# Patient Record
Sex: FEMALE | Race: WHITE | NOT HISPANIC OR LATINO | Employment: OTHER | URBAN - METROPOLITAN AREA
[De-identification: names, ages, dates, MRNs, and addresses within clinical notes are randomized per-mention and may not be internally consistent; named-entity substitution may affect disease eponyms.]

---

## 2017-07-05 ENCOUNTER — CONVERSION ENCOUNTER (OUTPATIENT)
Dept: MAMMOGRAPHY | Facility: HOSPITAL | Age: 62
End: 2017-07-05

## 2018-01-10 ENCOUNTER — OFFICE VISIT CONVERTED (OUTPATIENT)
Dept: FAMILY MEDICINE CLINIC | Age: 63
End: 2018-01-10
Attending: FAMILY MEDICINE

## 2018-01-18 ENCOUNTER — OFFICE VISIT (OUTPATIENT)
Dept: ORTHOPEDIC SURGERY | Facility: CLINIC | Age: 63
End: 2018-01-18

## 2018-01-18 VITALS — TEMPERATURE: 98 F | HEIGHT: 63 IN | WEIGHT: 256 LBS | BODY MASS INDEX: 45.36 KG/M2

## 2018-01-18 DIAGNOSIS — G56.02 CARPAL TUNNEL SYNDROME OF LEFT WRIST: Primary | ICD-10-CM

## 2018-01-18 PROCEDURE — 99204 OFFICE O/P NEW MOD 45 MIN: CPT | Performed by: ORTHOPAEDIC SURGERY

## 2018-01-18 RX ORDER — GABAPENTIN 400 MG/1
CAPSULE ORAL
COMMUNITY
Start: 2018-01-01 | End: 2021-06-16 | Stop reason: ALTCHOICE

## 2018-01-18 RX ORDER — NAPROXEN 500 MG/1
TABLET ORAL
COMMUNITY
Start: 2018-01-10 | End: 2021-06-16 | Stop reason: ALTCHOICE

## 2018-01-18 RX ORDER — ARIPIPRAZOLE 15 MG/1
TABLET ORAL
COMMUNITY
Start: 2018-01-10 | End: 2021-06-16 | Stop reason: ALTCHOICE

## 2018-01-18 RX ORDER — ZOLPIDEM TARTRATE 5 MG/1
TABLET ORAL
COMMUNITY
Start: 2018-01-14 | End: 2021-06-16 | Stop reason: ALTCHOICE

## 2018-01-18 RX ORDER — FUROSEMIDE 40 MG/1
TABLET ORAL
COMMUNITY
Start: 2018-01-03 | End: 2021-06-16 | Stop reason: ALTCHOICE

## 2018-01-18 RX ORDER — ALPRAZOLAM 1 MG/1
TABLET ORAL
COMMUNITY
Start: 2018-01-10 | End: 2021-06-16 | Stop reason: ALTCHOICE

## 2018-01-18 RX ORDER — ATORVASTATIN CALCIUM 10 MG/1
TABLET, FILM COATED ORAL
COMMUNITY
Start: 2018-01-01 | End: 2021-06-29

## 2018-01-18 RX ORDER — BACLOFEN 10 MG/1
10 TABLET ORAL 3 TIMES DAILY
COMMUNITY
Start: 2018-01-10 | End: 2021-07-09

## 2018-01-18 RX ORDER — POTASSIUM CHLORIDE 1500 MG/1
TABLET, FILM COATED, EXTENDED RELEASE ORAL
COMMUNITY
Start: 2018-01-03 | End: 2021-06-16 | Stop reason: ALTCHOICE

## 2018-01-18 RX ORDER — PROPRANOLOL HYDROCHLORIDE 120 MG/1
CAPSULE, EXTENDED RELEASE ORAL
COMMUNITY
Start: 2018-01-09 | End: 2021-06-16 | Stop reason: ALTCHOICE

## 2018-01-21 PROBLEM — G56.02 CARPAL TUNNEL SYNDROME OF LEFT WRIST: Status: ACTIVE | Noted: 2018-01-21

## 2018-01-29 ENCOUNTER — OUTSIDE FACILITY SERVICE (OUTPATIENT)
Dept: ORTHOPEDIC SURGERY | Facility: CLINIC | Age: 63
End: 2018-01-29

## 2018-01-29 PROCEDURE — 64721 CARPAL TUNNEL SURGERY: CPT | Performed by: ORTHOPAEDIC SURGERY

## 2018-02-02 ENCOUNTER — OFFICE VISIT (OUTPATIENT)
Dept: ORTHOPEDIC SURGERY | Facility: CLINIC | Age: 63
End: 2018-02-02

## 2018-02-02 DIAGNOSIS — G56.02 CARPAL TUNNEL SYNDROME OF LEFT WRIST: Primary | ICD-10-CM

## 2018-02-02 PROCEDURE — 99024 POSTOP FOLLOW-UP VISIT: CPT | Performed by: ORTHOPAEDIC SURGERY

## 2018-03-01 ENCOUNTER — OFFICE VISIT (OUTPATIENT)
Dept: ORTHOPEDIC SURGERY | Facility: CLINIC | Age: 63
End: 2018-03-01

## 2018-03-01 VITALS — WEIGHT: 249 LBS | BODY MASS INDEX: 42.51 KG/M2 | HEIGHT: 64 IN

## 2018-03-01 DIAGNOSIS — G56.01 CARPAL TUNNEL SYNDROME OF RIGHT WRIST: Primary | ICD-10-CM

## 2018-03-01 PROCEDURE — 99213 OFFICE O/P EST LOW 20 MIN: CPT | Performed by: ORTHOPAEDIC SURGERY

## 2018-03-01 RX ORDER — HYDROCODONE BITARTRATE AND ACETAMINOPHEN 10; 325 MG/1; MG/1
1 TABLET ORAL EVERY 4 HOURS PRN
COMMUNITY
Start: 2018-02-20 | End: 2021-07-26

## 2018-03-01 RX ORDER — PRIMIDONE 50 MG/1
TABLET ORAL
COMMUNITY
Start: 2018-02-01 | End: 2021-06-16 | Stop reason: ALTCHOICE

## 2018-03-01 RX ORDER — VENLAFAXINE HYDROCHLORIDE 150 MG/1
CAPSULE, EXTENDED RELEASE ORAL
COMMUNITY
Start: 2018-01-30 | End: 2021-06-16 | Stop reason: ALTCHOICE

## 2018-03-01 RX ORDER — VENLAFAXINE HYDROCHLORIDE 75 MG/1
CAPSULE, EXTENDED RELEASE ORAL
COMMUNITY
Start: 2018-01-31 | End: 2021-06-16 | Stop reason: ALTCHOICE

## 2018-03-01 RX ORDER — LISINOPRIL AND HYDROCHLOROTHIAZIDE 25; 20 MG/1; MG/1
TABLET ORAL
COMMUNITY
Start: 2018-02-24 | End: 2021-06-16 | Stop reason: ALTCHOICE

## 2018-03-03 PROBLEM — G56.01 CARPAL TUNNEL SYNDROME OF RIGHT WRIST: Status: ACTIVE | Noted: 2018-03-03

## 2018-04-10 ENCOUNTER — OFFICE VISIT CONVERTED (OUTPATIENT)
Dept: FAMILY MEDICINE CLINIC | Age: 63
End: 2018-04-10
Attending: FAMILY MEDICINE

## 2018-05-01 ENCOUNTER — OFFICE VISIT CONVERTED (OUTPATIENT)
Dept: FAMILY MEDICINE CLINIC | Age: 63
End: 2018-05-01
Attending: FAMILY MEDICINE

## 2018-05-14 ENCOUNTER — OFFICE VISIT CONVERTED (OUTPATIENT)
Dept: FAMILY MEDICINE CLINIC | Age: 63
End: 2018-05-14
Attending: FAMILY MEDICINE

## 2018-05-29 ENCOUNTER — OFFICE VISIT CONVERTED (OUTPATIENT)
Dept: FAMILY MEDICINE CLINIC | Age: 63
End: 2018-05-29
Attending: FAMILY MEDICINE

## 2018-06-18 ENCOUNTER — OFFICE VISIT CONVERTED (OUTPATIENT)
Dept: FAMILY MEDICINE CLINIC | Age: 63
End: 2018-06-18
Attending: FAMILY MEDICINE

## 2018-07-11 ENCOUNTER — OFFICE VISIT CONVERTED (OUTPATIENT)
Dept: FAMILY MEDICINE CLINIC | Age: 63
End: 2018-07-11
Attending: NURSE PRACTITIONER

## 2018-07-12 ENCOUNTER — CONVERSION ENCOUNTER (OUTPATIENT)
Dept: OTHER | Facility: HOSPITAL | Age: 63
End: 2018-07-12

## 2018-08-27 ENCOUNTER — OFFICE VISIT CONVERTED (OUTPATIENT)
Dept: FAMILY MEDICINE CLINIC | Age: 63
End: 2018-08-27
Attending: FAMILY MEDICINE

## 2018-12-18 ENCOUNTER — OFFICE VISIT CONVERTED (OUTPATIENT)
Dept: FAMILY MEDICINE CLINIC | Age: 63
End: 2018-12-18
Attending: NURSE PRACTITIONER

## 2019-01-16 ENCOUNTER — OFFICE VISIT CONVERTED (OUTPATIENT)
Dept: FAMILY MEDICINE CLINIC | Age: 64
End: 2019-01-16
Attending: FAMILY MEDICINE

## 2019-01-16 ENCOUNTER — HOSPITAL ENCOUNTER (OUTPATIENT)
Dept: OTHER | Facility: HOSPITAL | Age: 64
Discharge: HOME | End: 2019-01-16
Attending: FAMILY MEDICINE

## 2019-01-16 LAB
ALBUMIN SERPL-MCNC: 3.9 G/DL (ref 3.5–5)
ALBUMIN/GLOB SERPL: 1.1 {RATIO} (ref 1.4–2.6)
ALP SERPL-CCNC: 123 U/L (ref 43–160)
ALT SERPL-CCNC: 12 U/L (ref 10–40)
ANION GAP SERPL CALC-SCNC: 15 MMOL/L (ref 8–19)
AST SERPL-CCNC: 15 U/L (ref 15–50)
BILIRUB SERPL-MCNC: <0.15 MG/DL (ref 0.2–1.3)
BUN SERPL-MCNC: 18 MG/DL (ref 5–25)
BUN/CREAT SERPL: 17 {RATIO} (ref 6–20)
CALCIUM SERPL-MCNC: 9.3 MG/DL (ref 8.7–10.4)
CHLORIDE SERPL-SCNC: 99 MMOL/L (ref 99–111)
CHOLEST SERPL-MCNC: 165 MG/DL (ref 107–200)
CHOLEST/HDLC SERPL: 3.1 {RATIO} (ref 3–6)
CONV CO2: 28 MMOL/L (ref 22–32)
CONV TOTAL PROTEIN: 7.6 G/DL (ref 6.3–8.2)
CREAT UR-MCNC: 1.09 MG/DL (ref 0.5–0.9)
GFR SERPLBLD BASED ON 1.73 SQ M-ARVRAT: 54 ML/MIN/{1.73_M2}
GLOBULIN UR ELPH-MCNC: 3.7 G/DL (ref 2–3.5)
GLUCOSE SERPL-MCNC: 98 MG/DL (ref 65–99)
HDLC SERPL-MCNC: 53 MG/DL (ref 40–60)
LDLC SERPL CALC-MCNC: 80 MG/DL (ref 70–100)
OSMOLALITY SERPL CALC.SUM OF ELEC: 288 MOSM/KG (ref 273–304)
POTASSIUM SERPL-SCNC: 3.7 MMOL/L (ref 3.5–5.3)
SODIUM SERPL-SCNC: 138 MMOL/L (ref 135–147)
TRIGL SERPL-MCNC: 160 MG/DL (ref 40–150)
VLDLC SERPL-MCNC: 32 MG/DL (ref 5–37)

## 2019-01-17 LAB — 25(OH)D3 SERPL-MCNC: 35.4 NG/ML (ref 30–100)

## 2019-04-22 ENCOUNTER — HOSPITAL ENCOUNTER (OUTPATIENT)
Dept: OTHER | Facility: HOSPITAL | Age: 64
Discharge: HOME | End: 2019-04-22
Attending: FAMILY MEDICINE

## 2019-04-22 ENCOUNTER — OFFICE VISIT CONVERTED (OUTPATIENT)
Dept: FAMILY MEDICINE CLINIC | Age: 64
End: 2019-04-22
Attending: FAMILY MEDICINE

## 2019-04-22 LAB
ERYTHROCYTE [DISTWIDTH] IN BLOOD BY AUTOMATED COUNT: 12.9 % (ref 11.5–14.5)
HBA1C MFR BLD: 13.7 G/DL (ref 12–16)
HCT VFR BLD AUTO: 39.2 % (ref 37–47)
IRON SERPL-MCNC: 128 UG/DL (ref 60–170)
MCH RBC QN AUTO: 31.6 PG (ref 27–31)
MCHC RBC AUTO-ENTMCNC: 34.9 G/DL (ref 33–37)
MCV RBC AUTO: 90.5 FL (ref 81–99)
PLATELET # BLD AUTO: 302 10*3/UL (ref 130–400)
PMV BLD AUTO: 9 FL (ref 7.4–10.4)
RBC # BLD AUTO: 4.33 10*6/UL (ref 4.2–5.4)
WBC # BLD AUTO: 6.5 10*3/UL (ref 4.8–10.8)

## 2019-07-24 ENCOUNTER — HOSPITAL ENCOUNTER (OUTPATIENT)
Dept: OTHER | Facility: HOSPITAL | Age: 64
Discharge: HOME | End: 2019-07-24
Attending: FAMILY MEDICINE

## 2019-07-24 ENCOUNTER — OFFICE VISIT CONVERTED (OUTPATIENT)
Dept: FAMILY MEDICINE CLINIC | Age: 64
End: 2019-07-24
Attending: FAMILY MEDICINE

## 2019-07-24 LAB
ALBUMIN SERPL-MCNC: 3.7 G/DL (ref 3.5–5)
ALBUMIN/GLOB SERPL: 1.1 {RATIO} (ref 1.4–2.6)
ALP SERPL-CCNC: 115 U/L (ref 43–160)
ALT SERPL-CCNC: 9 U/L (ref 10–40)
ANION GAP SERPL CALC-SCNC: 17 MMOL/L (ref 8–19)
AST SERPL-CCNC: 13 U/L (ref 15–50)
BILIRUB SERPL-MCNC: 0.17 MG/DL (ref 0.2–1.3)
BUN SERPL-MCNC: 14 MG/DL (ref 5–25)
BUN/CREAT SERPL: 16 {RATIO} (ref 6–20)
CALCIUM SERPL-MCNC: 9.4 MG/DL (ref 8.7–10.4)
CHLORIDE SERPL-SCNC: 100 MMOL/L (ref 99–111)
CONV CO2: 24 MMOL/L (ref 22–32)
CONV TOTAL PROTEIN: 7.2 G/DL (ref 6.3–8.2)
CREAT UR-MCNC: 0.89 MG/DL (ref 0.5–0.9)
GFR SERPLBLD BASED ON 1.73 SQ M-ARVRAT: >60 ML/MIN/{1.73_M2}
GLOBULIN UR ELPH-MCNC: 3.5 G/DL (ref 2–3.5)
GLUCOSE SERPL-MCNC: 92 MG/DL (ref 65–99)
OSMOLALITY SERPL CALC.SUM OF ELEC: 284 MOSM/KG (ref 273–304)
POTASSIUM SERPL-SCNC: 3.9 MMOL/L (ref 3.5–5.3)
SODIUM SERPL-SCNC: 137 MMOL/L (ref 135–147)

## 2019-07-25 LAB — 25(OH)D3 SERPL-MCNC: 59.1 NG/ML (ref 30–100)

## 2019-09-17 ENCOUNTER — OFFICE VISIT CONVERTED (OUTPATIENT)
Dept: NEUROLOGY | Facility: CLINIC | Age: 64
End: 2019-09-17
Attending: PSYCHIATRY & NEUROLOGY

## 2019-09-17 ENCOUNTER — CONVERSION ENCOUNTER (OUTPATIENT)
Dept: NEUROLOGY | Facility: CLINIC | Age: 64
End: 2019-09-17

## 2019-10-15 ENCOUNTER — OFFICE VISIT CONVERTED (OUTPATIENT)
Dept: FAMILY MEDICINE CLINIC | Age: 64
End: 2019-10-15
Attending: FAMILY MEDICINE

## 2019-10-22 ENCOUNTER — OFFICE VISIT CONVERTED (OUTPATIENT)
Dept: NEUROLOGY | Facility: CLINIC | Age: 64
End: 2019-10-22
Attending: PSYCHIATRY & NEUROLOGY

## 2019-12-23 ENCOUNTER — HOSPITAL ENCOUNTER (OUTPATIENT)
Dept: OTHER | Facility: HOSPITAL | Age: 64
Discharge: HOME | End: 2019-12-23
Attending: FAMILY MEDICINE

## 2019-12-23 LAB
ALBUMIN SERPL-MCNC: 3.8 G/DL (ref 3.5–5)
ALBUMIN/GLOB SERPL: 1 {RATIO} (ref 1.4–2.6)
ALP SERPL-CCNC: 114 U/L (ref 43–160)
ALT SERPL-CCNC: 27 U/L (ref 10–40)
ANION GAP SERPL CALC-SCNC: 15 MMOL/L (ref 8–19)
AST SERPL-CCNC: 22 U/L (ref 15–50)
BILIRUB SERPL-MCNC: 0.15 MG/DL (ref 0.2–1.3)
BUN SERPL-MCNC: 13 MG/DL (ref 5–25)
BUN/CREAT SERPL: 16 {RATIO} (ref 6–20)
CALCIUM SERPL-MCNC: 9.7 MG/DL (ref 8.7–10.4)
CHLORIDE SERPL-SCNC: 105 MMOL/L (ref 99–111)
CHOLEST SERPL-MCNC: 155 MG/DL (ref 107–200)
CHOLEST/HDLC SERPL: 2.9 {RATIO} (ref 3–6)
CONV CO2: 27 MMOL/L (ref 22–32)
CONV TOTAL PROTEIN: 7.5 G/DL (ref 6.3–8.2)
CREAT UR-MCNC: 0.81 MG/DL (ref 0.5–0.9)
GFR SERPLBLD BASED ON 1.73 SQ M-ARVRAT: >60 ML/MIN/{1.73_M2}
GLOBULIN UR ELPH-MCNC: 3.7 G/DL (ref 2–3.5)
GLUCOSE SERPL-MCNC: 100 MG/DL (ref 65–99)
HDLC SERPL-MCNC: 54 MG/DL (ref 40–60)
LDLC SERPL CALC-MCNC: 85 MG/DL (ref 70–100)
OSMOLALITY SERPL CALC.SUM OF ELEC: 296 MOSM/KG (ref 273–304)
POTASSIUM SERPL-SCNC: 4 MMOL/L (ref 3.5–5.3)
SODIUM SERPL-SCNC: 143 MMOL/L (ref 135–147)
TRIGL SERPL-MCNC: 81 MG/DL (ref 40–150)
VLDLC SERPL-MCNC: 16 MG/DL (ref 5–37)

## 2020-03-03 ENCOUNTER — OFFICE VISIT CONVERTED (OUTPATIENT)
Dept: NEUROLOGY | Facility: CLINIC | Age: 65
End: 2020-03-03
Attending: PSYCHIATRY & NEUROLOGY

## 2020-03-17 ENCOUNTER — HOSPITAL ENCOUNTER (OUTPATIENT)
Dept: OTHER | Facility: HOSPITAL | Age: 65
Discharge: HOME | End: 2020-03-17
Attending: FAMILY MEDICINE

## 2020-03-17 ENCOUNTER — OFFICE VISIT CONVERTED (OUTPATIENT)
Dept: FAMILY MEDICINE CLINIC | Age: 65
End: 2020-03-17
Attending: FAMILY MEDICINE

## 2020-04-13 ENCOUNTER — HOSPITAL ENCOUNTER (OUTPATIENT)
Dept: OTHER | Facility: HOSPITAL | Age: 65
Discharge: HOME | End: 2020-04-13
Attending: FAMILY MEDICINE

## 2020-04-13 ENCOUNTER — OFFICE VISIT CONVERTED (OUTPATIENT)
Dept: FAMILY MEDICINE CLINIC | Age: 65
End: 2020-04-13
Attending: FAMILY MEDICINE

## 2020-04-16 LAB — GABAPENTIN UR-MCNC: 152.7 UG/ML

## 2020-04-19 LAB
AMOBARBITAL UR QL: <50 NG/ML
BUTALBITAL UR-MCNC: <50 NG/ML
Lab: >5000 NG/ML
PENTOBARB UR QL: <50 NG/ML
SECOBARBITAL UR QL: <50 NG/ML

## 2020-08-15 ENCOUNTER — HOSPITAL ENCOUNTER (OUTPATIENT)
Dept: OTHER | Facility: HOSPITAL | Age: 65
Discharge: HOME | End: 2020-08-15
Attending: FAMILY MEDICINE

## 2020-08-15 LAB
ALBUMIN SERPL-MCNC: 4 G/DL (ref 3.5–5)
ALBUMIN/GLOB SERPL: 1.2 {RATIO} (ref 1.4–2.6)
ALP SERPL-CCNC: 104 U/L (ref 43–160)
ALT SERPL-CCNC: 9 U/L (ref 10–40)
ANION GAP SERPL CALC-SCNC: 15 MMOL/L (ref 8–19)
AST SERPL-CCNC: 15 U/L (ref 15–50)
BILIRUB SERPL-MCNC: 0.31 MG/DL (ref 0.2–1.3)
BUN SERPL-MCNC: 24 MG/DL (ref 5–25)
BUN/CREAT SERPL: 21 {RATIO} (ref 6–20)
CALCIUM SERPL-MCNC: 10.2 MG/DL (ref 8.7–10.4)
CHLORIDE SERPL-SCNC: 102 MMOL/L (ref 99–111)
CHOLEST SERPL-MCNC: 178 MG/DL (ref 107–200)
CHOLEST/HDLC SERPL: 3.8 {RATIO} (ref 3–6)
CONV CO2: 27 MMOL/L (ref 22–32)
CONV TOTAL PROTEIN: 7.4 G/DL (ref 6.3–8.2)
CREAT UR-MCNC: 1.15 MG/DL (ref 0.5–0.9)
GFR SERPLBLD BASED ON 1.73 SQ M-ARVRAT: 50 ML/MIN/{1.73_M2}
GLOBULIN UR ELPH-MCNC: 3.4 G/DL (ref 2–3.5)
GLUCOSE SERPL-MCNC: 103 MG/DL (ref 65–99)
HDLC SERPL-MCNC: 47 MG/DL (ref 40–60)
LDLC SERPL CALC-MCNC: 107 MG/DL (ref 70–100)
OSMOLALITY SERPL CALC.SUM OF ELEC: 294 MOSM/KG (ref 273–304)
POTASSIUM SERPL-SCNC: 3.9 MMOL/L (ref 3.5–5.3)
SODIUM SERPL-SCNC: 140 MMOL/L (ref 135–147)
TRIGL SERPL-MCNC: 122 MG/DL (ref 40–150)
VLDLC SERPL-MCNC: 24 MG/DL (ref 5–37)

## 2020-08-25 ENCOUNTER — CONVERSION ENCOUNTER (OUTPATIENT)
Dept: NEUROLOGY | Facility: CLINIC | Age: 65
End: 2020-08-25

## 2020-08-25 ENCOUNTER — OFFICE VISIT CONVERTED (OUTPATIENT)
Dept: NEUROLOGY | Facility: CLINIC | Age: 65
End: 2020-08-25
Attending: PSYCHIATRY & NEUROLOGY

## 2020-08-31 ENCOUNTER — OFFICE VISIT CONVERTED (OUTPATIENT)
Dept: FAMILY MEDICINE CLINIC | Age: 65
End: 2020-08-31
Attending: FAMILY MEDICINE

## 2020-10-13 ENCOUNTER — HOSPITAL ENCOUNTER (OUTPATIENT)
Dept: OTHER | Facility: HOSPITAL | Age: 65
Discharge: HOME | End: 2020-10-13
Attending: FAMILY MEDICINE

## 2020-10-23 ENCOUNTER — HOSPITAL ENCOUNTER (OUTPATIENT)
Dept: URGENT CARE | Facility: CLINIC | Age: 65
Discharge: HOME | End: 2020-10-23
Attending: EMERGENCY MEDICINE

## 2020-10-26 LAB — SARS-COV-2 RNA SPEC QL NAA+PROBE: NOT DETECTED

## 2020-11-17 ENCOUNTER — OFFICE VISIT CONVERTED (OUTPATIENT)
Dept: FAMILY MEDICINE CLINIC | Age: 65
End: 2020-11-17
Attending: NURSE PRACTITIONER

## 2020-12-16 ENCOUNTER — HOSPITAL ENCOUNTER (OUTPATIENT)
Dept: OTHER | Facility: HOSPITAL | Age: 65
Discharge: HOME | End: 2020-12-16
Attending: FAMILY MEDICINE

## 2020-12-16 ENCOUNTER — OFFICE VISIT CONVERTED (OUTPATIENT)
Dept: FAMILY MEDICINE CLINIC | Age: 65
End: 2020-12-16
Attending: FAMILY MEDICINE

## 2020-12-16 LAB
ALBUMIN SERPL-MCNC: 3.9 G/DL (ref 3.5–5)
ALBUMIN/GLOB SERPL: 1.1 {RATIO} (ref 1.4–2.6)
ALP SERPL-CCNC: 114 U/L (ref 43–160)
ALT SERPL-CCNC: 11 U/L (ref 10–40)
ANION GAP SERPL CALC-SCNC: 14 MMOL/L (ref 8–19)
APPEARANCE UR: CLEAR
AST SERPL-CCNC: 14 U/L (ref 15–50)
BACTERIA UR QL AUTO: ABNORMAL
BASOPHILS # BLD MANUAL: 0.05 10*3/UL (ref 0–0.2)
BASOPHILS NFR BLD MANUAL: 0.7 % (ref 0–3)
BILIRUB SERPL-MCNC: 0.23 MG/DL (ref 0.2–1.3)
BILIRUB UR QL: NEGATIVE
BUN SERPL-MCNC: 14 MG/DL (ref 5–25)
BUN/CREAT SERPL: 13 {RATIO} (ref 6–20)
CALCIUM SERPL-MCNC: 9.7 MG/DL (ref 8.7–10.4)
CASTS URNS QL MICRO: ABNORMAL /[LPF]
CHLORIDE SERPL-SCNC: 99 MMOL/L (ref 99–111)
CHOLEST SERPL-MCNC: 166 MG/DL (ref 107–200)
CHOLEST/HDLC SERPL: 2.6 {RATIO} (ref 3–6)
COLOR UR: YELLOW
CONV CO2: 28 MMOL/L (ref 22–32)
CONV LEUKOCYTE ESTERASE: NEGATIVE
CONV TOTAL PROTEIN: 7.5 G/DL (ref 6.3–8.2)
CONV UROBILINOGEN IN URINE BY AUTOMATED TEST STRIP: 0.2 {EHRLICHU}/DL (ref 0.1–1)
CREAT UR-MCNC: 1.09 MG/DL (ref 0.5–0.9)
DEPRECATED RDW RBC AUTO: 45.7 FL
EOSINOPHIL # BLD MANUAL: 0.24 10*3/UL (ref 0–0.7)
EOSINOPHIL NFR BLD MANUAL: 3.5 % (ref 0–7)
EPI CELLS #/AREA URNS HPF: ABNORMAL /[HPF]
ERYTHROCYTE [DISTWIDTH] IN BLOOD BY AUTOMATED COUNT: 12.7 % (ref 11.5–14.5)
GFR SERPLBLD BASED ON 1.73 SQ M-ARVRAT: 53 ML/MIN/{1.73_M2}
GLOBULIN UR ELPH-MCNC: 3.6 G/DL (ref 2–3.5)
GLUCOSE 24H UR-MCNC: NEGATIVE MG/DL
GLUCOSE SERPL-MCNC: 86 MG/DL (ref 65–99)
GRANS (ABSOLUTE): 2.92 10*3/UL (ref 2–8)
GRANS: 42.5 % (ref 30–85)
HBA1C MFR BLD: 13.6 G/DL (ref 12–16)
HCT VFR BLD AUTO: 41.4 % (ref 37–47)
HDLC SERPL-MCNC: 64 MG/DL (ref 40–60)
HGB UR QL STRIP: NEGATIVE
IMM GRANULOCYTES # BLD: 0.01 10*3/UL (ref 0–0.54)
IMM GRANULOCYTES NFR BLD: 0.1 % (ref 0–0.43)
KETONES UR QL STRIP: NEGATIVE MG/DL
LDLC SERPL CALC-MCNC: 78 MG/DL (ref 70–100)
LYMPHOCYTES # BLD MANUAL: 2.8 10*3/UL (ref 1–5)
LYMPHOCYTES NFR BLD MANUAL: 12.6 % (ref 3–10)
MCH RBC QN AUTO: 31.7 PG (ref 27–31)
MCHC RBC AUTO-ENTMCNC: 32.9 G/DL (ref 33–37)
MCV RBC AUTO: 96.5 FL (ref 81–99)
MONOCYTES # BLD AUTO: 0.87 10*3/UL (ref 0.2–1.2)
MUCOUS THREADS URNS QL MICRO: ABNORMAL
NITRITE UR-MCNC: NEGATIVE MG/ML
OSMOLALITY SERPL CALC.SUM OF ELEC: 284 MOSM/KG (ref 273–304)
PH UR STRIP.AUTO: 5 [PH] (ref 5–8)
PLATELET # BLD AUTO: 264 10*3/UL (ref 130–400)
PMV BLD AUTO: 9 FL (ref 7.4–10.4)
POTASSIUM SERPL-SCNC: 4 MMOL/L (ref 3.5–5.3)
PROT UR-MCNC: NEGATIVE MG/DL
RBC # BLD AUTO: 4.29 10*6/UL (ref 4.2–5.4)
RBC # BLD AUTO: ABNORMAL /[HPF]
SODIUM SERPL-SCNC: 137 MMOL/L (ref 135–147)
SP GR UR STRIP: 1.01 (ref 1–1.03)
SPECIMEN SOURCE: ABNORMAL
TRIGL SERPL-MCNC: 121 MG/DL (ref 40–150)
TSH SERPL-ACNC: 1.37 M[IU]/L (ref 0.27–4.2)
UNIDENT CRYS URNS QL MICRO: ABNORMAL /[HPF]
URATE SERPL-MCNC: 6.8 MG/DL (ref 2.5–7.5)
VARIANT LYMPHS NFR BLD MANUAL: 40.6 % (ref 20–45)
VIT B12 SERPL-MCNC: 646 PG/ML (ref 211–911)
VLDLC SERPL-MCNC: 24 MG/DL (ref 5–37)
WBC # BLD AUTO: 6.89 10*3/UL (ref 4.8–10.8)
WBC #/AREA URNS HPF: ABNORMAL /[HPF]

## 2020-12-23 ENCOUNTER — HOSPITAL ENCOUNTER (OUTPATIENT)
Dept: OTHER | Facility: HOSPITAL | Age: 65
Discharge: HOME | End: 2020-12-23
Attending: FAMILY MEDICINE

## 2021-01-06 ENCOUNTER — HOSPITAL ENCOUNTER (OUTPATIENT)
Dept: OTHER | Facility: HOSPITAL | Age: 66
Discharge: HOME | End: 2021-01-06
Attending: FAMILY MEDICINE

## 2021-01-06 LAB
AMYLASE SERPL-CCNC: 48 U/L (ref 30–110)
LIPASE SERPL-CCNC: 36 U/L (ref 5–51)

## 2021-01-28 ENCOUNTER — OFFICE VISIT CONVERTED (OUTPATIENT)
Dept: FAMILY MEDICINE CLINIC | Age: 66
End: 2021-01-28
Attending: NURSE PRACTITIONER

## 2021-02-18 ENCOUNTER — OFFICE VISIT CONVERTED (OUTPATIENT)
Dept: FAMILY MEDICINE CLINIC | Age: 66
End: 2021-02-18
Attending: FAMILY MEDICINE

## 2021-03-17 ENCOUNTER — OFFICE VISIT CONVERTED (OUTPATIENT)
Dept: FAMILY MEDICINE CLINIC | Age: 66
End: 2021-03-17
Attending: FAMILY MEDICINE

## 2021-03-29 ENCOUNTER — OFFICE VISIT CONVERTED (OUTPATIENT)
Dept: FAMILY MEDICINE CLINIC | Age: 66
End: 2021-03-29
Attending: FAMILY MEDICINE

## 2021-04-10 ENCOUNTER — OFFICE VISIT CONVERTED (OUTPATIENT)
Dept: FAMILY MEDICINE CLINIC | Age: 66
End: 2021-04-10
Attending: NURSE PRACTITIONER

## 2021-04-19 ENCOUNTER — HOSPITAL ENCOUNTER (OUTPATIENT)
Dept: OTHER | Facility: HOSPITAL | Age: 66
Discharge: HOME | End: 2021-04-19
Attending: FAMILY MEDICINE

## 2021-04-19 ENCOUNTER — OFFICE VISIT CONVERTED (OUTPATIENT)
Dept: FAMILY MEDICINE CLINIC | Age: 66
End: 2021-04-19
Attending: FAMILY MEDICINE

## 2021-04-22 ENCOUNTER — OFFICE VISIT CONVERTED (OUTPATIENT)
Dept: FAMILY MEDICINE CLINIC | Age: 66
End: 2021-04-22
Attending: FAMILY MEDICINE

## 2021-05-14 VITALS
SYSTOLIC BLOOD PRESSURE: 107 MMHG | BODY MASS INDEX: 41.32 KG/M2 | HEIGHT: 64 IN | DIASTOLIC BLOOD PRESSURE: 43 MMHG | HEART RATE: 72 BPM | WEIGHT: 242 LBS | TEMPERATURE: 97.8 F

## 2021-05-15 VITALS — TEMPERATURE: 97.8 F | WEIGHT: 228 LBS | BODY MASS INDEX: 38.93 KG/M2 | HEART RATE: 52 BPM | HEIGHT: 64 IN

## 2021-05-15 VITALS
DIASTOLIC BLOOD PRESSURE: 84 MMHG | BODY MASS INDEX: 40.29 KG/M2 | SYSTOLIC BLOOD PRESSURE: 103 MMHG | HEIGHT: 64 IN | WEIGHT: 236 LBS | HEART RATE: 57 BPM

## 2021-05-15 VITALS
BODY MASS INDEX: 39.27 KG/M2 | WEIGHT: 230 LBS | SYSTOLIC BLOOD PRESSURE: 123 MMHG | DIASTOLIC BLOOD PRESSURE: 74 MMHG | HEART RATE: 76 BPM | HEIGHT: 64 IN

## 2021-05-17 ENCOUNTER — OFFICE VISIT CONVERTED (OUTPATIENT)
Dept: FAMILY MEDICINE CLINIC | Age: 66
End: 2021-05-17
Attending: NURSE PRACTITIONER

## 2021-05-17 ENCOUNTER — HOSPITAL ENCOUNTER (OUTPATIENT)
Dept: OTHER | Facility: HOSPITAL | Age: 66
Discharge: HOME | End: 2021-05-17
Attending: FAMILY MEDICINE

## 2021-05-21 LAB
CORTIS F 24H UR-MRATE: 13 UG/24 HR (ref 6–42)
CORTISOL (UR), FREE: 7 UG/L
TOTAL VOLUME: 1900 ML

## 2021-06-02 ENCOUNTER — OFFICE VISIT CONVERTED (OUTPATIENT)
Dept: CARDIOLOGY | Facility: CLINIC | Age: 66
End: 2021-06-02
Attending: INTERNAL MEDICINE

## 2021-06-02 ENCOUNTER — CONVERSION ENCOUNTER (OUTPATIENT)
Dept: CARDIOLOGY | Facility: CLINIC | Age: 66
End: 2021-06-02

## 2021-06-02 ENCOUNTER — HOSPITAL ENCOUNTER (OUTPATIENT)
Dept: OTHER | Facility: HOSPITAL | Age: 66
Discharge: HOME | End: 2021-06-02
Attending: FAMILY MEDICINE

## 2021-06-03 ENCOUNTER — OFFICE VISIT CONVERTED (OUTPATIENT)
Dept: FAMILY MEDICINE CLINIC | Age: 66
End: 2021-06-03
Attending: FAMILY MEDICINE

## 2021-06-03 ENCOUNTER — HOSPITAL ENCOUNTER (OUTPATIENT)
Dept: OTHER | Facility: HOSPITAL | Age: 66
Discharge: HOME | End: 2021-06-03
Attending: FAMILY MEDICINE

## 2021-06-03 LAB
ALBUMIN SERPL-MCNC: 4.1 G/DL (ref 3.5–5)
ALBUMIN/GLOB SERPL: 1.2 {RATIO} (ref 1.4–2.6)
ALP SERPL-CCNC: 105 U/L (ref 43–160)
ALT SERPL-CCNC: 20 U/L (ref 10–40)
ANION GAP SERPL CALC-SCNC: 12 MMOL/L (ref 8–19)
AST SERPL-CCNC: 20 U/L (ref 15–50)
BILIRUB SERPL-MCNC: 0.24 MG/DL (ref 0.2–1.3)
BUN SERPL-MCNC: 22 MG/DL (ref 5–25)
BUN/CREAT SERPL: 22 {RATIO} (ref 6–20)
CALCIUM SERPL-MCNC: 9.6 MG/DL (ref 8.7–10.4)
CHLORIDE SERPL-SCNC: 104 MMOL/L (ref 99–111)
CONV CO2: 30 MMOL/L (ref 22–32)
CONV TOTAL PROTEIN: 7.5 G/DL (ref 6.3–8.2)
CREAT UR-MCNC: 1.02 MG/DL (ref 0.5–0.9)
ERYTHROCYTE [DISTWIDTH] IN BLOOD BY AUTOMATED COUNT: 13.1 % (ref 11.5–14.5)
GFR SERPLBLD BASED ON 1.73 SQ M-ARVRAT: 57 ML/MIN/{1.73_M2}
GLOBULIN UR ELPH-MCNC: 3.4 G/DL (ref 2–3.5)
GLUCOSE SERPL-MCNC: 101 MG/DL (ref 65–99)
HBA1C MFR BLD: 12.7 G/DL (ref 12–16)
HCT VFR BLD AUTO: 39.7 % (ref 37–47)
MCH RBC QN AUTO: 31.3 PG (ref 27–31)
MCHC RBC AUTO-ENTMCNC: 32 G/DL (ref 33–37)
MCV RBC AUTO: 97.8 FL (ref 81–99)
OSMOLALITY SERPL CALC.SUM OF ELEC: 295 MOSM/KG (ref 273–304)
PLATELET # BLD AUTO: 255 10*3/UL (ref 130–400)
PMV BLD AUTO: 9.5 FL (ref 7.4–10.4)
POTASSIUM SERPL-SCNC: 4.8 MMOL/L (ref 3.5–5.3)
RBC # BLD AUTO: 4.06 10*6/UL (ref 4.2–5.4)
SODIUM SERPL-SCNC: 141 MMOL/L (ref 135–147)
WBC # BLD AUTO: 7.03 10*3/UL (ref 4.8–10.8)

## 2021-06-04 LAB — CORTIS AM PEAK SERPL-MCNC: 3.9 UG/DL (ref 6–18.4)

## 2021-06-07 LAB — ACTH PLAS-MCNC: 3.5 PG/ML (ref 7.2–63.3)

## 2021-06-16 RX ORDER — GABAPENTIN 300 MG/1
300 CAPSULE ORAL DAILY
COMMUNITY
End: 2022-05-19 | Stop reason: SDUPTHER

## 2021-06-16 RX ORDER — HYDRALAZINE HYDROCHLORIDE 10 MG/1
10 TABLET, FILM COATED ORAL 3 TIMES DAILY
COMMUNITY
End: 2021-11-19

## 2021-06-16 RX ORDER — AMLODIPINE BESYLATE 5 MG/1
5 TABLET ORAL DAILY
COMMUNITY
End: 2021-07-09

## 2021-06-16 RX ORDER — DEXAMETHASONE 4 MG/1
4 TABLET ORAL DAILY
COMMUNITY
End: 2021-07-08

## 2021-06-16 RX ORDER — ALPRAZOLAM 0.5 MG/1
0.5 TABLET ORAL 2 TIMES DAILY
COMMUNITY
End: 2022-01-05 | Stop reason: SDUPTHER

## 2021-06-16 RX ORDER — PRIMIDONE 250 MG/1
250 TABLET ORAL
COMMUNITY
End: 2021-07-08

## 2021-06-16 RX ORDER — BUSPIRONE HYDROCHLORIDE 15 MG/1
15 TABLET ORAL 3 TIMES DAILY
COMMUNITY
End: 2022-05-11

## 2021-06-16 RX ORDER — CLONIDINE HYDROCHLORIDE 0.1 MG/1
0.1 TABLET ORAL AS NEEDED
COMMUNITY
End: 2021-11-19

## 2021-06-16 RX ORDER — FLUTICASONE PROPIONATE 50 MCG
1 SPRAY, SUSPENSION (ML) NASAL 2 TIMES DAILY PRN
COMMUNITY
End: 2022-09-01

## 2021-06-16 RX ORDER — LORATADINE 10 MG/1
10 TABLET ORAL AS NEEDED
COMMUNITY
End: 2022-05-31

## 2021-06-16 RX ORDER — DICLOFENAC SODIUM 75 MG/1
TABLET, DELAYED RELEASE ORAL
Qty: 60 TABLET | Refills: 1 | Status: SHIPPED | OUTPATIENT
Start: 2021-06-16 | End: 2021-07-15

## 2021-06-16 RX ORDER — ARIPIPRAZOLE 5 MG/1
TABLET ORAL
COMMUNITY
End: 2021-07-15

## 2021-06-16 RX ORDER — ASPIRIN 81 MG/1
81 TABLET ORAL DAILY
COMMUNITY
End: 2022-08-15 | Stop reason: HOSPADM

## 2021-06-16 RX ORDER — ALBUTEROL SULFATE 90 UG/1
2 AEROSOL, METERED RESPIRATORY (INHALATION) EVERY 4 HOURS PRN
COMMUNITY
End: 2021-08-05

## 2021-06-16 RX ORDER — POTASSIUM CHLORIDE 750 MG/1
10 CAPSULE, EXTENDED RELEASE ORAL DAILY
COMMUNITY
End: 2021-07-22

## 2021-06-16 RX ORDER — MIRTAZAPINE 15 MG/1
15 TABLET, FILM COATED ORAL NIGHTLY
COMMUNITY
End: 2021-07-26

## 2021-06-16 RX ORDER — LABETALOL 100 MG/1
100 TABLET, FILM COATED ORAL 2 TIMES DAILY
COMMUNITY
End: 2021-08-16

## 2021-06-16 RX ORDER — DICLOFENAC SODIUM 75 MG/1
75 TABLET, DELAYED RELEASE ORAL 2 TIMES DAILY
COMMUNITY
End: 2021-06-16 | Stop reason: SDUPTHER

## 2021-06-24 ENCOUNTER — TELEPHONE (OUTPATIENT)
Dept: FAMILY MEDICINE CLINIC | Age: 66
End: 2021-06-24

## 2021-06-24 DIAGNOSIS — J44.1 COPD WITH EXACERBATION (HCC): Primary | ICD-10-CM

## 2021-06-29 RX ORDER — ATORVASTATIN CALCIUM 10 MG/1
TABLET, FILM COATED ORAL
Qty: 90 TABLET | Refills: 0 | Status: SHIPPED | OUTPATIENT
Start: 2021-06-29 | End: 2021-09-09

## 2021-07-01 ENCOUNTER — TELEPHONE (OUTPATIENT)
Dept: FAMILY MEDICINE CLINIC | Age: 66
End: 2021-07-01

## 2021-07-01 VITALS
HEART RATE: 75 BPM | DIASTOLIC BLOOD PRESSURE: 70 MMHG | BODY MASS INDEX: 50.42 KG/M2 | WEIGHT: 256.8 LBS | SYSTOLIC BLOOD PRESSURE: 124 MMHG | HEIGHT: 60 IN | TEMPERATURE: 97.5 F

## 2021-07-01 VITALS
SYSTOLIC BLOOD PRESSURE: 104 MMHG | BODY MASS INDEX: 47.63 KG/M2 | HEART RATE: 60 BPM | HEIGHT: 60 IN | WEIGHT: 242.6 LBS | DIASTOLIC BLOOD PRESSURE: 62 MMHG | TEMPERATURE: 97.3 F

## 2021-07-01 VITALS
HEART RATE: 69 BPM | BODY MASS INDEX: 47.83 KG/M2 | HEIGHT: 60 IN | WEIGHT: 243.6 LBS | SYSTOLIC BLOOD PRESSURE: 100 MMHG | TEMPERATURE: 98.1 F | DIASTOLIC BLOOD PRESSURE: 63 MMHG

## 2021-07-01 VITALS
DIASTOLIC BLOOD PRESSURE: 69 MMHG | TEMPERATURE: 98.4 F | HEIGHT: 60 IN | SYSTOLIC BLOOD PRESSURE: 96 MMHG | BODY MASS INDEX: 46.33 KG/M2 | WEIGHT: 236 LBS | HEART RATE: 58 BPM

## 2021-07-01 VITALS
SYSTOLIC BLOOD PRESSURE: 120 MMHG | HEIGHT: 60 IN | HEART RATE: 58 BPM | WEIGHT: 252.1 LBS | BODY MASS INDEX: 49.49 KG/M2 | TEMPERATURE: 99.4 F | DIASTOLIC BLOOD PRESSURE: 82 MMHG

## 2021-07-01 VITALS
TEMPERATURE: 98.7 F | HEIGHT: 60 IN | HEART RATE: 70 BPM | WEIGHT: 245 LBS | SYSTOLIC BLOOD PRESSURE: 118 MMHG | DIASTOLIC BLOOD PRESSURE: 78 MMHG | BODY MASS INDEX: 48.1 KG/M2

## 2021-07-01 VITALS
BODY MASS INDEX: 49.85 KG/M2 | HEIGHT: 60 IN | WEIGHT: 253.9 LBS | SYSTOLIC BLOOD PRESSURE: 124 MMHG | HEART RATE: 57 BPM | TEMPERATURE: 97.4 F | DIASTOLIC BLOOD PRESSURE: 78 MMHG

## 2021-07-01 VITALS
SYSTOLIC BLOOD PRESSURE: 138 MMHG | WEIGHT: 260 LBS | HEART RATE: 61 BPM | DIASTOLIC BLOOD PRESSURE: 80 MMHG | TEMPERATURE: 98.8 F | HEIGHT: 60 IN | BODY MASS INDEX: 51.04 KG/M2

## 2021-07-01 VITALS
SYSTOLIC BLOOD PRESSURE: 115 MMHG | WEIGHT: 252.4 LBS | OXYGEN SATURATION: 87 % | DIASTOLIC BLOOD PRESSURE: 71 MMHG | HEIGHT: 60 IN | TEMPERATURE: 97.8 F | BODY MASS INDEX: 49.55 KG/M2 | HEART RATE: 95 BPM

## 2021-07-01 VITALS
TEMPERATURE: 98.5 F | DIASTOLIC BLOOD PRESSURE: 72 MMHG | HEIGHT: 60 IN | SYSTOLIC BLOOD PRESSURE: 133 MMHG | WEIGHT: 246.6 LBS | BODY MASS INDEX: 48.42 KG/M2 | HEART RATE: 81 BPM

## 2021-07-01 VITALS
DIASTOLIC BLOOD PRESSURE: 78 MMHG | SYSTOLIC BLOOD PRESSURE: 118 MMHG | HEART RATE: 96 BPM | HEIGHT: 60 IN | BODY MASS INDEX: 50.55 KG/M2 | WEIGHT: 257.5 LBS | TEMPERATURE: 97.1 F

## 2021-07-01 VITALS
HEART RATE: 56 BPM | HEIGHT: 60 IN | WEIGHT: 236 LBS | DIASTOLIC BLOOD PRESSURE: 56 MMHG | BODY MASS INDEX: 46.33 KG/M2 | TEMPERATURE: 98.1 F | SYSTOLIC BLOOD PRESSURE: 120 MMHG

## 2021-07-01 VITALS
DIASTOLIC BLOOD PRESSURE: 84 MMHG | HEART RATE: 103 BPM | WEIGHT: 246.8 LBS | HEIGHT: 60 IN | BODY MASS INDEX: 48.45 KG/M2 | SYSTOLIC BLOOD PRESSURE: 138 MMHG | TEMPERATURE: 98.6 F

## 2021-07-01 DIAGNOSIS — J44.9 CHRONIC OBSTRUCTIVE PULMONARY DISEASE, UNSPECIFIED COPD TYPE (HCC): Primary | ICD-10-CM

## 2021-07-02 VITALS
WEIGHT: 256.8 LBS | BODY MASS INDEX: 50.42 KG/M2 | HEIGHT: 60 IN | HEART RATE: 67 BPM | DIASTOLIC BLOOD PRESSURE: 76 MMHG | TEMPERATURE: 97.3 F | SYSTOLIC BLOOD PRESSURE: 119 MMHG

## 2021-07-02 VITALS
BODY MASS INDEX: 49.16 KG/M2 | HEART RATE: 66 BPM | OXYGEN SATURATION: 96 % | WEIGHT: 250.4 LBS | TEMPERATURE: 97.2 F | SYSTOLIC BLOOD PRESSURE: 110 MMHG | DIASTOLIC BLOOD PRESSURE: 84 MMHG | HEIGHT: 60 IN

## 2021-07-02 VITALS
BODY MASS INDEX: 51.87 KG/M2 | DIASTOLIC BLOOD PRESSURE: 80 MMHG | SYSTOLIC BLOOD PRESSURE: 130 MMHG | OXYGEN SATURATION: 96 % | WEIGHT: 264.2 LBS | HEIGHT: 60 IN | TEMPERATURE: 98.3 F | HEART RATE: 63 BPM

## 2021-07-02 VITALS
BODY MASS INDEX: 48.49 KG/M2 | SYSTOLIC BLOOD PRESSURE: 144 MMHG | HEIGHT: 60 IN | DIASTOLIC BLOOD PRESSURE: 105 MMHG | WEIGHT: 247 LBS | HEART RATE: 66 BPM | TEMPERATURE: 96.7 F

## 2021-07-02 VITALS
HEIGHT: 60 IN | BODY MASS INDEX: 50.15 KG/M2 | SYSTOLIC BLOOD PRESSURE: 145 MMHG | DIASTOLIC BLOOD PRESSURE: 82 MMHG | HEART RATE: 78 BPM | TEMPERATURE: 97.7 F

## 2021-07-02 VITALS
HEIGHT: 60 IN | TEMPERATURE: 97.3 F | DIASTOLIC BLOOD PRESSURE: 57 MMHG | SYSTOLIC BLOOD PRESSURE: 103 MMHG | HEART RATE: 65 BPM | WEIGHT: 222.4 LBS | BODY MASS INDEX: 43.66 KG/M2

## 2021-07-02 VITALS
HEIGHT: 60 IN | DIASTOLIC BLOOD PRESSURE: 73 MMHG | OXYGEN SATURATION: 96 % | BODY MASS INDEX: 51.91 KG/M2 | SYSTOLIC BLOOD PRESSURE: 149 MMHG | HEART RATE: 72 BPM | TEMPERATURE: 97.6 F | WEIGHT: 264.4 LBS

## 2021-07-02 VITALS
WEIGHT: 245.2 LBS | HEIGHT: 60 IN | TEMPERATURE: 97.7 F | BODY MASS INDEX: 48.14 KG/M2 | SYSTOLIC BLOOD PRESSURE: 148 MMHG | HEART RATE: 76 BPM | DIASTOLIC BLOOD PRESSURE: 115 MMHG

## 2021-07-02 VITALS
SYSTOLIC BLOOD PRESSURE: 90 MMHG | OXYGEN SATURATION: 96 % | DIASTOLIC BLOOD PRESSURE: 61 MMHG | HEIGHT: 60 IN | TEMPERATURE: 96.4 F | WEIGHT: 258.4 LBS | BODY MASS INDEX: 50.73 KG/M2 | HEART RATE: 57 BPM

## 2021-07-02 VITALS
BODY MASS INDEX: 47 KG/M2 | HEART RATE: 73 BPM | SYSTOLIC BLOOD PRESSURE: 93 MMHG | WEIGHT: 239.4 LBS | HEIGHT: 60 IN | TEMPERATURE: 97.1 F | DIASTOLIC BLOOD PRESSURE: 38 MMHG

## 2021-07-02 VITALS
HEART RATE: 61 BPM | WEIGHT: 222 LBS | DIASTOLIC BLOOD PRESSURE: 86 MMHG | SYSTOLIC BLOOD PRESSURE: 141 MMHG | TEMPERATURE: 96.6 F | BODY MASS INDEX: 43.59 KG/M2 | HEIGHT: 60 IN | OXYGEN SATURATION: 100 %

## 2021-07-02 VITALS
HEART RATE: 69 BPM | TEMPERATURE: 97.8 F | HEIGHT: 60 IN | WEIGHT: 268.2 LBS | DIASTOLIC BLOOD PRESSURE: 122 MMHG | OXYGEN SATURATION: 95 % | SYSTOLIC BLOOD PRESSURE: 153 MMHG | BODY MASS INDEX: 52.65 KG/M2

## 2021-07-02 VITALS
DIASTOLIC BLOOD PRESSURE: 93 MMHG | HEART RATE: 60 BPM | TEMPERATURE: 97.2 F | SYSTOLIC BLOOD PRESSURE: 122 MMHG | OXYGEN SATURATION: 97 % | HEIGHT: 60 IN | BODY MASS INDEX: 51.91 KG/M2 | WEIGHT: 264.4 LBS

## 2021-07-08 DIAGNOSIS — I10 ESSENTIAL HYPERTENSION: Primary | ICD-10-CM

## 2021-07-08 RX ORDER — PRIMIDONE 250 MG/1
TABLET ORAL
Qty: 90 TABLET | Refills: 0 | Status: SHIPPED | OUTPATIENT
Start: 2021-07-08 | End: 2022-07-13 | Stop reason: SDUPTHER

## 2021-07-09 RX ORDER — BACLOFEN 10 MG/1
TABLET ORAL
Qty: 90 TABLET | Refills: 0 | Status: SHIPPED | OUTPATIENT
Start: 2021-07-09 | End: 2021-08-16

## 2021-07-09 RX ORDER — AMLODIPINE BESYLATE 5 MG/1
TABLET ORAL
Qty: 60 TABLET | Refills: 0 | Status: SHIPPED | OUTPATIENT
Start: 2021-07-09 | End: 2021-07-12

## 2021-07-10 DIAGNOSIS — I10 ESSENTIAL HYPERTENSION: ICD-10-CM

## 2021-07-12 ENCOUNTER — TELEPHONE (OUTPATIENT)
Dept: FAMILY MEDICINE CLINIC | Age: 66
End: 2021-07-12

## 2021-07-12 RX ORDER — AMLODIPINE BESYLATE 5 MG/1
TABLET ORAL
Qty: 60 TABLET | Refills: 0 | Status: SHIPPED | OUTPATIENT
Start: 2021-07-12 | End: 2021-09-08

## 2021-07-13 ENCOUNTER — TELEPHONE (OUTPATIENT)
Dept: FAMILY MEDICINE CLINIC | Age: 66
End: 2021-07-13

## 2021-07-13 DIAGNOSIS — Z91.89: Primary | ICD-10-CM

## 2021-07-13 DIAGNOSIS — F33.1 MODERATE EPISODE OF RECURRENT MAJOR DEPRESSIVE DISORDER: Primary | ICD-10-CM

## 2021-07-14 ENCOUNTER — REFERRAL TRIAGE (OUTPATIENT)
Dept: CASE MANAGEMENT | Facility: OTHER | Age: 66
End: 2021-07-14

## 2021-07-14 ENCOUNTER — TELEPHONE (OUTPATIENT)
Dept: FAMILY MEDICINE CLINIC | Age: 66
End: 2021-07-14

## 2021-07-15 ENCOUNTER — OFFICE VISIT (OUTPATIENT)
Dept: FAMILY MEDICINE CLINIC | Age: 66
End: 2021-07-15

## 2021-07-15 VITALS
SYSTOLIC BLOOD PRESSURE: 129 MMHG | TEMPERATURE: 98.1 F | BODY MASS INDEX: 49.12 KG/M2 | HEART RATE: 95 BPM | WEIGHT: 250.2 LBS | HEIGHT: 60 IN | DIASTOLIC BLOOD PRESSURE: 60 MMHG

## 2021-07-15 VITALS
DIASTOLIC BLOOD PRESSURE: 48 MMHG | SYSTOLIC BLOOD PRESSURE: 132 MMHG | WEIGHT: 264 LBS | HEART RATE: 61 BPM | HEIGHT: 64 IN | BODY MASS INDEX: 45.07 KG/M2

## 2021-07-15 DIAGNOSIS — F41.9 ANXIETY: Primary | ICD-10-CM

## 2021-07-15 DIAGNOSIS — I10 ESSENTIAL HYPERTENSION: ICD-10-CM

## 2021-07-15 DIAGNOSIS — R00.2 PALPITATIONS: ICD-10-CM

## 2021-07-15 PROCEDURE — 99214 OFFICE O/P EST MOD 30 MIN: CPT | Performed by: FAMILY MEDICINE

## 2021-07-15 RX ORDER — PROPRANOLOL HYDROCHLORIDE 10 MG/1
10 TABLET ORAL 2 TIMES DAILY
Qty: 40 TABLET | Refills: 0 | Status: SHIPPED | OUTPATIENT
Start: 2021-07-15 | End: 2021-07-26

## 2021-07-15 RX ORDER — BREXPIPRAZOLE 2 MG/1
1 TABLET ORAL DAILY
COMMUNITY
Start: 2021-06-22 | End: 2021-07-26

## 2021-07-15 RX ORDER — FUROSEMIDE 20 MG/1
1 TABLET ORAL DAILY
COMMUNITY
Start: 2021-04-19 | End: 2021-07-16

## 2021-07-16 ENCOUNTER — PATIENT OUTREACH (OUTPATIENT)
Dept: CASE MANAGEMENT | Facility: OTHER | Age: 66
End: 2021-07-16

## 2021-07-16 RX ORDER — FUROSEMIDE 20 MG/1
TABLET ORAL
Qty: 180 TABLET | Refills: 0 | Status: SHIPPED | OUTPATIENT
Start: 2021-07-16 | End: 2021-10-15

## 2021-07-22 RX ORDER — POTASSIUM CHLORIDE 750 MG/1
CAPSULE, EXTENDED RELEASE ORAL
Qty: 90 CAPSULE | Refills: 0 | Status: SHIPPED | OUTPATIENT
Start: 2021-07-22 | End: 2021-10-18

## 2021-07-23 ENCOUNTER — TELEPHONE (OUTPATIENT)
Dept: FAMILY MEDICINE CLINIC | Age: 66
End: 2021-07-23

## 2021-07-23 ENCOUNTER — CLINICAL SUPPORT (OUTPATIENT)
Dept: FAMILY MEDICINE CLINIC | Age: 66
End: 2021-07-23

## 2021-07-23 VITALS — SYSTOLIC BLOOD PRESSURE: 120 MMHG | HEART RATE: 76 BPM | DIASTOLIC BLOOD PRESSURE: 72 MMHG

## 2021-07-26 ENCOUNTER — OFFICE VISIT (OUTPATIENT)
Dept: FAMILY MEDICINE CLINIC | Age: 66
End: 2021-07-26

## 2021-07-26 ENCOUNTER — LAB (OUTPATIENT)
Dept: LAB | Facility: HOSPITAL | Age: 66
End: 2021-07-26

## 2021-07-26 ENCOUNTER — TRANSCRIBE ORDERS (OUTPATIENT)
Dept: ADMINISTRATIVE | Facility: HOSPITAL | Age: 66
End: 2021-07-26

## 2021-07-26 VITALS
HEIGHT: 60 IN | SYSTOLIC BLOOD PRESSURE: 118 MMHG | TEMPERATURE: 98.2 F | WEIGHT: 247.4 LBS | DIASTOLIC BLOOD PRESSURE: 52 MMHG | BODY MASS INDEX: 48.57 KG/M2

## 2021-07-26 DIAGNOSIS — F41.9 ANXIETY: Primary | ICD-10-CM

## 2021-07-26 DIAGNOSIS — I50.9 HEART FAILURE, UNSPECIFIED HF CHRONICITY, UNSPECIFIED HEART FAILURE TYPE (HCC): ICD-10-CM

## 2021-07-26 DIAGNOSIS — R53.83 OTHER FATIGUE: ICD-10-CM

## 2021-07-26 DIAGNOSIS — I10 ESSENTIAL HYPERTENSION: ICD-10-CM

## 2021-07-26 DIAGNOSIS — R45.0 JITTERY FEELING: ICD-10-CM

## 2021-07-26 DIAGNOSIS — Z79.899 ENCOUNTER FOR LONG-TERM (CURRENT) USE OF OTHER MEDICATIONS: ICD-10-CM

## 2021-07-26 DIAGNOSIS — I50.9 HEART FAILURE, UNSPECIFIED HF CHRONICITY, UNSPECIFIED HEART FAILURE TYPE (HCC): Primary | ICD-10-CM

## 2021-07-26 LAB
ANION GAP SERPL CALCULATED.3IONS-SCNC: 13.3 MMOL/L (ref 5–15)
BUN SERPL-MCNC: 10 MG/DL (ref 8–23)
BUN/CREAT SERPL: 9.4 (ref 7–25)
CALCIUM SPEC-SCNC: 9.7 MG/DL (ref 8.6–10.5)
CHLORIDE SERPL-SCNC: 105 MMOL/L (ref 98–107)
CO2 SERPL-SCNC: 23.7 MMOL/L (ref 22–29)
CREAT SERPL-MCNC: 1.06 MG/DL (ref 0.57–1)
DEPRECATED RDW RBC AUTO: 42 FL (ref 37–54)
ERYTHROCYTE [DISTWIDTH] IN BLOOD BY AUTOMATED COUNT: 12.1 % (ref 12.3–15.4)
GFR SERPL CREATININE-BSD FRML MDRD: 52 ML/MIN/1.73
GLUCOSE SERPL-MCNC: 105 MG/DL (ref 65–99)
HCT VFR BLD AUTO: 45 % (ref 34–46.6)
HGB BLD-MCNC: 14.8 G/DL (ref 12–15.9)
MAGNESIUM SERPL-MCNC: 2.3 MG/DL (ref 1.6–2.4)
MCH RBC QN AUTO: 30.8 PG (ref 26.6–33)
MCHC RBC AUTO-ENTMCNC: 32.9 G/DL (ref 31.5–35.7)
MCV RBC AUTO: 93.8 FL (ref 79–97)
NT-PROBNP SERPL-MCNC: 131.9 PG/ML (ref 0–900)
PLATELET # BLD AUTO: 296 10*3/MM3 (ref 140–450)
PMV BLD AUTO: 9.5 FL (ref 6–12)
POTASSIUM SERPL-SCNC: 3.6 MMOL/L (ref 3.5–5.2)
RBC # BLD AUTO: 4.8 10*6/MM3 (ref 3.77–5.28)
SODIUM SERPL-SCNC: 142 MMOL/L (ref 136–145)
T4 FREE SERPL-MCNC: 1.18 NG/DL (ref 0.93–1.7)
TSH SERPL DL<=0.05 MIU/L-ACNC: 0.45 UIU/ML (ref 0.27–4.2)
WBC # BLD AUTO: 8.76 10*3/MM3 (ref 3.4–10.8)

## 2021-07-26 PROCEDURE — 84439 ASSAY OF FREE THYROXINE: CPT | Performed by: FAMILY MEDICINE

## 2021-07-26 PROCEDURE — 83735 ASSAY OF MAGNESIUM: CPT

## 2021-07-26 PROCEDURE — 99214 OFFICE O/P EST MOD 30 MIN: CPT | Performed by: FAMILY MEDICINE

## 2021-07-26 PROCEDURE — 84443 ASSAY THYROID STIM HORMONE: CPT | Performed by: FAMILY MEDICINE

## 2021-07-26 PROCEDURE — 85027 COMPLETE CBC AUTOMATED: CPT | Performed by: FAMILY MEDICINE

## 2021-07-26 PROCEDURE — 83880 ASSAY OF NATRIURETIC PEPTIDE: CPT

## 2021-07-26 PROCEDURE — 36415 COLL VENOUS BLD VENIPUNCTURE: CPT

## 2021-07-26 PROCEDURE — 80048 BASIC METABOLIC PNL TOTAL CA: CPT

## 2021-07-26 RX ORDER — BREXPIPRAZOLE 3 MG/1
1 TABLET ORAL DAILY
COMMUNITY
End: 2021-10-18

## 2021-07-26 RX ORDER — MIRTAZAPINE 30 MG/1
1 TABLET, FILM COATED ORAL NIGHTLY
COMMUNITY
Start: 2021-07-19 | End: 2021-10-25 | Stop reason: SDUPTHER

## 2021-07-28 ENCOUNTER — OFFICE VISIT (OUTPATIENT)
Dept: CARDIOLOGY | Facility: CLINIC | Age: 66
End: 2021-07-28

## 2021-07-28 VITALS
HEART RATE: 76 BPM | WEIGHT: 246 LBS | DIASTOLIC BLOOD PRESSURE: 82 MMHG | SYSTOLIC BLOOD PRESSURE: 142 MMHG | BODY MASS INDEX: 42 KG/M2 | HEIGHT: 64 IN

## 2021-07-28 DIAGNOSIS — I50.32 CHRONIC DIASTOLIC HEART FAILURE (HCC): ICD-10-CM

## 2021-07-28 DIAGNOSIS — R53.82 CHRONIC FATIGUE: ICD-10-CM

## 2021-07-28 DIAGNOSIS — I10 ESSENTIAL HYPERTENSION: Primary | ICD-10-CM

## 2021-07-28 PROCEDURE — 99214 OFFICE O/P EST MOD 30 MIN: CPT | Performed by: INTERNAL MEDICINE

## 2021-07-29 ENCOUNTER — DOCUMENTATION (OUTPATIENT)
Dept: FAMILY MEDICINE CLINIC | Age: 66
End: 2021-07-29

## 2021-08-04 ENCOUNTER — TELEPHONE (OUTPATIENT)
Dept: FAMILY MEDICINE CLINIC | Age: 66
End: 2021-08-04

## 2021-08-05 RX ORDER — ALBUTEROL SULFATE 90 UG/1
AEROSOL, METERED RESPIRATORY (INHALATION)
Qty: 8.5 G | Refills: 0 | Status: SHIPPED | OUTPATIENT
Start: 2021-08-05 | End: 2021-09-30 | Stop reason: SDUPTHER

## 2021-08-06 ENCOUNTER — TELEPHONE (OUTPATIENT)
Dept: FAMILY MEDICINE CLINIC | Age: 66
End: 2021-08-06

## 2021-08-16 RX ORDER — BACLOFEN 10 MG/1
TABLET ORAL
Qty: 90 TABLET | Refills: 0 | Status: SHIPPED | OUTPATIENT
Start: 2021-08-16 | End: 2021-10-12 | Stop reason: SDUPTHER

## 2021-08-16 RX ORDER — LABETALOL 100 MG/1
TABLET, FILM COATED ORAL
Qty: 180 TABLET | Refills: 0 | Status: SHIPPED | OUTPATIENT
Start: 2021-08-16 | End: 2021-11-12

## 2021-08-26 ENCOUNTER — PATIENT OUTREACH (OUTPATIENT)
Dept: CASE MANAGEMENT | Facility: OTHER | Age: 66
End: 2021-08-26

## 2021-08-26 ENCOUNTER — TELEPHONE (OUTPATIENT)
Dept: FAMILY MEDICINE CLINIC | Age: 66
End: 2021-08-26

## 2021-09-03 ENCOUNTER — TRANSCRIBE ORDERS (OUTPATIENT)
Dept: ADMINISTRATIVE | Facility: HOSPITAL | Age: 66
End: 2021-09-03

## 2021-09-03 DIAGNOSIS — Z12.31 VISIT FOR SCREENING MAMMOGRAM: ICD-10-CM

## 2021-09-03 DIAGNOSIS — R92.8 ABNORMAL SCREENING MAMMOGRAM: Primary | ICD-10-CM

## 2021-09-06 DIAGNOSIS — I10 ESSENTIAL HYPERTENSION: ICD-10-CM

## 2021-09-08 RX ORDER — AMLODIPINE BESYLATE 5 MG/1
TABLET ORAL
Qty: 60 TABLET | Refills: 0 | Status: SHIPPED | OUTPATIENT
Start: 2021-09-08 | End: 2021-12-10

## 2021-09-09 RX ORDER — ATORVASTATIN CALCIUM 10 MG/1
TABLET, FILM COATED ORAL
Qty: 90 TABLET | Refills: 0 | Status: SHIPPED | OUTPATIENT
Start: 2021-09-09 | End: 2021-12-10

## 2021-09-15 RX ORDER — SPIRONOLACTONE 25 MG/1
25 TABLET ORAL DAILY
Qty: 90 TABLET | Refills: 3 | Status: SHIPPED | OUTPATIENT
Start: 2021-09-15 | End: 2023-01-11 | Stop reason: SDUPTHER

## 2021-09-22 ENCOUNTER — TELEPHONE (OUTPATIENT)
Dept: FAMILY MEDICINE CLINIC | Age: 66
End: 2021-09-22

## 2021-09-22 DIAGNOSIS — F33.1 MODERATE EPISODE OF RECURRENT MAJOR DEPRESSIVE DISORDER (HCC): Primary | ICD-10-CM

## 2021-09-28 ENCOUNTER — TELEPHONE (OUTPATIENT)
Dept: FAMILY MEDICINE CLINIC | Age: 66
End: 2021-09-28

## 2021-09-28 DIAGNOSIS — F33.1 MODERATE EPISODE OF RECURRENT MAJOR DEPRESSIVE DISORDER: Primary | ICD-10-CM

## 2021-09-30 RX ORDER — ALBUTEROL SULFATE 90 UG/1
2 AEROSOL, METERED RESPIRATORY (INHALATION) EVERY 4 HOURS PRN
Qty: 8.5 G | Refills: 0 | Status: SHIPPED | OUTPATIENT
Start: 2021-09-30 | End: 2022-07-05 | Stop reason: SDUPTHER

## 2021-10-12 ENCOUNTER — NURSE TRIAGE (OUTPATIENT)
Dept: CALL CENTER | Facility: HOSPITAL | Age: 66
End: 2021-10-12

## 2021-10-12 ENCOUNTER — TELEPHONE (OUTPATIENT)
Dept: FAMILY MEDICINE CLINIC | Age: 66
End: 2021-10-12

## 2021-10-12 RX ORDER — BACLOFEN 10 MG/1
10 TABLET ORAL 3 TIMES DAILY
Qty: 90 TABLET | Refills: 2 | Status: SHIPPED | OUTPATIENT
Start: 2021-10-12 | End: 2022-04-19

## 2021-10-15 ENCOUNTER — APPOINTMENT (OUTPATIENT)
Dept: MAMMOGRAPHY | Facility: HOSPITAL | Age: 66
End: 2021-10-15

## 2021-10-15 RX ORDER — FUROSEMIDE 20 MG/1
TABLET ORAL
Qty: 180 TABLET | Refills: 0 | Status: SHIPPED | OUTPATIENT
Start: 2021-10-15 | End: 2021-10-20

## 2021-10-18 ENCOUNTER — OFFICE VISIT (OUTPATIENT)
Dept: PSYCHIATRY | Facility: CLINIC | Age: 66
End: 2021-10-18

## 2021-10-18 ENCOUNTER — HOSPITAL ENCOUNTER (EMERGENCY)
Facility: HOSPITAL | Age: 66
Discharge: HOME | End: 2021-10-18
Attending: EMERGENCY MEDICINE | Admitting: EMERGENCY MEDICINE

## 2021-10-18 VITALS
SYSTOLIC BLOOD PRESSURE: 133 MMHG | DIASTOLIC BLOOD PRESSURE: 80 MMHG | BODY MASS INDEX: 40.35 KG/M2 | HEART RATE: 62 BPM | OXYGEN SATURATION: 96 % | TEMPERATURE: 97.8 F | WEIGHT: 236.33 LBS | HEIGHT: 64 IN | RESPIRATION RATE: 18 BRPM

## 2021-10-18 VITALS
WEIGHT: 236 LBS | SYSTOLIC BLOOD PRESSURE: 152 MMHG | HEIGHT: 64 IN | BODY MASS INDEX: 40.29 KG/M2 | DIASTOLIC BLOOD PRESSURE: 106 MMHG

## 2021-10-18 DIAGNOSIS — F33.1 MAJOR DEPRESSIVE DISORDER, RECURRENT EPISODE, MODERATE (HCC): Primary | ICD-10-CM

## 2021-10-18 DIAGNOSIS — F32.A DEPRESSION, UNSPECIFIED DEPRESSION TYPE: ICD-10-CM

## 2021-10-18 DIAGNOSIS — F51.05 INSOMNIA DUE TO MENTAL CONDITION: ICD-10-CM

## 2021-10-18 DIAGNOSIS — F41.1 GENERALIZED ANXIETY DISORDER: ICD-10-CM

## 2021-10-18 DIAGNOSIS — F41.9 ANXIETY: Primary | ICD-10-CM

## 2021-10-18 PROBLEM — Z79.4 ENCOUNTER FOR LONG-TERM (CURRENT) USE OF INSULIN: Status: ACTIVE | Noted: 2020-02-04

## 2021-10-18 PROBLEM — G25.0 ESSENTIAL TREMOR: Status: ACTIVE | Noted: 2019-09-17

## 2021-10-18 PROBLEM — M51.36 DEGENERATION OF LUMBAR INTERVERTEBRAL DISC: Status: ACTIVE | Noted: 2018-12-06

## 2021-10-18 PROBLEM — M47.816 LUMBAR SPONDYLOSIS: Status: ACTIVE | Noted: 2018-01-17

## 2021-10-18 PROBLEM — M19.90 OSTEOARTHRITIS: Status: ACTIVE | Noted: 2021-10-18

## 2021-10-18 PROBLEM — G56.00 CARPAL TUNNEL SYNDROME: Status: ACTIVE | Noted: 2018-01-17

## 2021-10-18 PROBLEM — K58.9 IRRITABLE BOWEL SYNDROME: Status: ACTIVE | Noted: 2021-10-18

## 2021-10-18 PROBLEM — G47.30 SLEEP APNEA: Status: ACTIVE | Noted: 2021-10-18

## 2021-10-18 PROBLEM — I77.4 CELIAC ARTERY STENOSIS: Status: ACTIVE | Noted: 2021-05-25

## 2021-10-18 PROBLEM — E78.00 HYPERCHOLESTEROLEMIA: Status: ACTIVE | Noted: 2021-10-18

## 2021-10-18 PROBLEM — M51.369 DEGENERATION OF LUMBAR INTERVERTEBRAL DISC: Status: ACTIVE | Noted: 2018-12-06

## 2021-10-18 PROBLEM — E11.40 DIABETIC NEUROPATHY: Status: ACTIVE | Noted: 2018-01-17

## 2021-10-18 PROBLEM — G47.00 INSOMNIA: Status: ACTIVE | Noted: 2021-10-18

## 2021-10-18 PROBLEM — I10 ESSENTIAL HYPERTENSION: Status: ACTIVE | Noted: 2021-10-18

## 2021-10-18 PROBLEM — I77.1 CELIAC ARTERY STENOSIS (HCC): Status: ACTIVE | Noted: 2021-05-25

## 2021-10-18 PROBLEM — M53.3 SACROILIAC JOINT PAIN: Status: ACTIVE | Noted: 2018-01-17

## 2021-10-18 PROBLEM — M50.90 CERVICAL DISC DISORDER: Status: ACTIVE | Noted: 2018-01-17

## 2021-10-18 PROBLEM — E66.01 MORBID OBESITY: Status: ACTIVE | Noted: 2021-10-18

## 2021-10-18 PROBLEM — G89.4 CHRONIC PAIN DISORDER: Status: ACTIVE | Noted: 2018-12-06

## 2021-10-18 LAB
ALBUMIN SERPL-MCNC: 3.9 G/DL (ref 3.5–5.2)
ALBUMIN/GLOB SERPL: 1.1 G/DL
ALP SERPL-CCNC: 134 U/L (ref 39–117)
ALT SERPL W P-5'-P-CCNC: 12 U/L (ref 1–33)
AMPHET+METHAMPHET UR QL: NEGATIVE
ANION GAP SERPL CALCULATED.3IONS-SCNC: 12.5 MMOL/L (ref 5–15)
APAP SERPL-MCNC: <5 MCG/ML (ref 0–30)
AST SERPL-CCNC: 15 U/L (ref 1–32)
BARBITURATES UR QL SCN: POSITIVE
BASOPHILS # BLD AUTO: 0.05 10*3/MM3 (ref 0–0.2)
BASOPHILS NFR BLD AUTO: 0.6 % (ref 0–1.5)
BENZODIAZ UR QL SCN: NEGATIVE
BILIRUB SERPL-MCNC: 0.3 MG/DL (ref 0–1.2)
BILIRUB UR QL STRIP: NEGATIVE
BUN SERPL-MCNC: 11 MG/DL (ref 8–23)
BUN/CREAT SERPL: 9.3 (ref 7–25)
CALCIUM SPEC-SCNC: 9.4 MG/DL (ref 8.6–10.5)
CANNABINOIDS SERPL QL: NEGATIVE
CHLORIDE SERPL-SCNC: 104 MMOL/L (ref 98–107)
CLARITY UR: CLEAR
CO2 SERPL-SCNC: 25.5 MMOL/L (ref 22–29)
COCAINE UR QL: NEGATIVE
COLOR UR: YELLOW
CREAT SERPL-MCNC: 1.18 MG/DL (ref 0.57–1)
DEPRECATED RDW RBC AUTO: 42.9 FL (ref 37–54)
EOSINOPHIL # BLD AUTO: 0.11 10*3/MM3 (ref 0–0.4)
EOSINOPHIL NFR BLD AUTO: 1.4 % (ref 0.3–6.2)
ERYTHROCYTE [DISTWIDTH] IN BLOOD BY AUTOMATED COUNT: 12.6 % (ref 12.3–15.4)
ETHANOL BLD-MCNC: <10 MG/DL (ref 0–10)
ETHANOL UR QL: <0.01 %
GFR SERPL CREATININE-BSD FRML MDRD: 46 ML/MIN/1.73
GLOBULIN UR ELPH-MCNC: 3.6 GM/DL
GLUCOSE SERPL-MCNC: 172 MG/DL (ref 65–99)
GLUCOSE UR STRIP-MCNC: NEGATIVE MG/DL
HCT VFR BLD AUTO: 44.6 % (ref 34–46.6)
HGB BLD-MCNC: 14.8 G/DL (ref 12–15.9)
HGB UR QL STRIP.AUTO: NEGATIVE
HOLD SPECIMEN: NORMAL
HOLD SPECIMEN: NORMAL
IMM GRANULOCYTES # BLD AUTO: 0.02 10*3/MM3 (ref 0–0.05)
IMM GRANULOCYTES NFR BLD AUTO: 0.3 % (ref 0–0.5)
KETONES UR QL STRIP: NEGATIVE
LEUKOCYTE ESTERASE UR QL STRIP.AUTO: NEGATIVE
LYMPHOCYTES # BLD AUTO: 1.7 10*3/MM3 (ref 0.7–3.1)
LYMPHOCYTES NFR BLD AUTO: 21.9 % (ref 19.6–45.3)
MCH RBC QN AUTO: 30.7 PG (ref 26.6–33)
MCHC RBC AUTO-ENTMCNC: 33.2 G/DL (ref 31.5–35.7)
MCV RBC AUTO: 92.5 FL (ref 79–97)
METHADONE UR QL SCN: NEGATIVE
MONOCYTES # BLD AUTO: 0.63 10*3/MM3 (ref 0.1–0.9)
MONOCYTES NFR BLD AUTO: 8.1 % (ref 5–12)
NEUTROPHILS NFR BLD AUTO: 5.27 10*3/MM3 (ref 1.7–7)
NEUTROPHILS NFR BLD AUTO: 67.7 % (ref 42.7–76)
NITRITE UR QL STRIP: NEGATIVE
NRBC BLD AUTO-RTO: 0 /100 WBC (ref 0–0.2)
OPIATES UR QL: POSITIVE
OXYCODONE UR QL SCN: NEGATIVE
PH UR STRIP.AUTO: <=5 [PH] (ref 5–8)
PLATELET # BLD AUTO: 321 10*3/MM3 (ref 140–450)
PMV BLD AUTO: 9.7 FL (ref 6–12)
POTASSIUM SERPL-SCNC: 3.7 MMOL/L (ref 3.5–5.2)
PROT SERPL-MCNC: 7.5 G/DL (ref 6–8.5)
PROT UR QL STRIP: NEGATIVE
RBC # BLD AUTO: 4.82 10*6/MM3 (ref 3.77–5.28)
SALICYLATES SERPL-MCNC: <0.3 MG/DL
SODIUM SERPL-SCNC: 142 MMOL/L (ref 136–145)
SP GR UR STRIP: 1.01 (ref 1–1.03)
TSH SERPL DL<=0.05 MIU/L-ACNC: 0.99 UIU/ML (ref 0.27–4.2)
UROBILINOGEN UR QL STRIP: NORMAL
WBC # BLD AUTO: 7.78 10*3/MM3 (ref 3.4–10.8)
WHOLE BLOOD HOLD SPECIMEN: NORMAL
WHOLE BLOOD HOLD SPECIMEN: NORMAL

## 2021-10-18 PROCEDURE — 84443 ASSAY THYROID STIM HORMONE: CPT | Performed by: EMERGENCY MEDICINE

## 2021-10-18 PROCEDURE — 80053 COMPREHEN METABOLIC PANEL: CPT | Performed by: EMERGENCY MEDICINE

## 2021-10-18 PROCEDURE — 80307 DRUG TEST PRSMV CHEM ANLYZR: CPT | Performed by: EMERGENCY MEDICINE

## 2021-10-18 PROCEDURE — 80143 DRUG ASSAY ACETAMINOPHEN: CPT | Performed by: EMERGENCY MEDICINE

## 2021-10-18 PROCEDURE — 36415 COLL VENOUS BLD VENIPUNCTURE: CPT

## 2021-10-18 PROCEDURE — 85025 COMPLETE CBC W/AUTO DIFF WBC: CPT | Performed by: EMERGENCY MEDICINE

## 2021-10-18 PROCEDURE — 80179 DRUG ASSAY SALICYLATE: CPT | Performed by: EMERGENCY MEDICINE

## 2021-10-18 PROCEDURE — 81003 URINALYSIS AUTO W/O SCOPE: CPT

## 2021-10-18 PROCEDURE — 82077 ASSAY SPEC XCP UR&BREATH IA: CPT | Performed by: EMERGENCY MEDICINE

## 2021-10-18 PROCEDURE — 99283 EMERGENCY DEPT VISIT LOW MDM: CPT

## 2021-10-18 PROCEDURE — 90792 PSYCH DIAG EVAL W/MED SRVCS: CPT | Performed by: STUDENT IN AN ORGANIZED HEALTH CARE EDUCATION/TRAINING PROGRAM

## 2021-10-18 RX ORDER — LAMOTRIGINE 25 MG/1
TABLET ORAL
COMMUNITY
Start: 2021-10-08 | End: 2021-10-18

## 2021-10-18 RX ORDER — ESCITALOPRAM OXALATE 20 MG/1
TABLET ORAL
COMMUNITY
Start: 2021-10-08 | End: 2021-12-07

## 2021-10-18 RX ORDER — SODIUM CHLORIDE 0.9 % (FLUSH) 0.9 %
10 SYRINGE (ML) INJECTION AS NEEDED
Status: DISCONTINUED | OUTPATIENT
Start: 2021-10-18 | End: 2021-10-18 | Stop reason: HOSPADM

## 2021-10-18 RX ORDER — POTASSIUM CHLORIDE 750 MG/1
CAPSULE, EXTENDED RELEASE ORAL
Qty: 90 CAPSULE | Refills: 0 | Status: SHIPPED | OUTPATIENT
Start: 2021-10-18 | End: 2021-10-20

## 2021-10-18 RX ORDER — TRAZODONE HYDROCHLORIDE 50 MG/1
TABLET ORAL
COMMUNITY
Start: 2021-08-04 | End: 2021-10-18

## 2021-10-18 RX ORDER — LORAZEPAM 0.5 MG/1
1 TABLET ORAL ONCE
Status: COMPLETED | OUTPATIENT
Start: 2021-10-18 | End: 2021-10-18

## 2021-10-18 RX ORDER — BUPROPION HYDROCHLORIDE 100 MG/1
TABLET ORAL
COMMUNITY
Start: 2021-10-14 | End: 2021-10-25

## 2021-10-18 RX ORDER — HYDROXYZINE HYDROCHLORIDE 25 MG/1
25 TABLET, FILM COATED ORAL AS NEEDED
COMMUNITY
Start: 2021-09-29 | End: 2021-12-07 | Stop reason: SDUPTHER

## 2021-10-18 RX ORDER — HYDROCODONE BITARTRATE AND ACETAMINOPHEN 10; 325 MG/1; MG/1
1 TABLET ORAL EVERY 6 HOURS PRN
COMMUNITY
Start: 2021-09-22

## 2021-10-18 RX ORDER — BREXPIPRAZOLE 1 MG/1
1 TABLET ORAL
COMMUNITY
End: 2021-10-22

## 2021-10-18 RX ORDER — METOPROLOL SUCCINATE 25 MG/1
TABLET, EXTENDED RELEASE ORAL
COMMUNITY
Start: 2021-08-29 | End: 2021-10-18

## 2021-10-18 RX ORDER — ESCITALOPRAM OXALATE 10 MG/1
TABLET ORAL
COMMUNITY
Start: 2021-10-01 | End: 2021-10-18

## 2021-10-18 RX ORDER — VORTIOXETINE 20 MG/1
TABLET, FILM COATED ORAL
COMMUNITY
Start: 2021-09-24 | End: 2021-10-18

## 2021-10-18 RX ADMIN — LORAZEPAM 1 MG: 0.5 TABLET ORAL at 13:09

## 2021-10-19 ENCOUNTER — TELEPHONE (OUTPATIENT)
Dept: FAMILY MEDICINE CLINIC | Age: 66
End: 2021-10-19

## 2021-10-19 DIAGNOSIS — F41.9 ANXIETY: Primary | ICD-10-CM

## 2021-10-19 DIAGNOSIS — F32.A DEPRESSIVE DISORDER: ICD-10-CM

## 2021-10-20 ENCOUNTER — TELEPHONE (OUTPATIENT)
Dept: PSYCHIATRY | Facility: CLINIC | Age: 66
End: 2021-10-20

## 2021-10-20 ENCOUNTER — REFERRAL TRIAGE (OUTPATIENT)
Dept: CASE MANAGEMENT | Facility: OTHER | Age: 66
End: 2021-10-20

## 2021-10-20 DIAGNOSIS — T50.905A MEDICATION ADVERSE EFFECT, INITIAL ENCOUNTER: Primary | ICD-10-CM

## 2021-10-20 RX ORDER — POTASSIUM CHLORIDE 750 MG/1
CAPSULE, EXTENDED RELEASE ORAL
Qty: 90 CAPSULE | Refills: 0 | Status: SHIPPED | OUTPATIENT
Start: 2021-10-20 | End: 2022-01-15

## 2021-10-20 RX ORDER — FUROSEMIDE 20 MG/1
TABLET ORAL
Qty: 180 TABLET | Refills: 0 | Status: SHIPPED | OUTPATIENT
Start: 2021-10-20 | End: 2022-01-15

## 2021-10-21 ENCOUNTER — CLINICAL SUPPORT (OUTPATIENT)
Dept: FAMILY MEDICINE CLINIC | Age: 66
End: 2021-10-21

## 2021-10-21 ENCOUNTER — PATIENT OUTREACH (OUTPATIENT)
Dept: CASE MANAGEMENT | Facility: OTHER | Age: 66
End: 2021-10-21

## 2021-10-21 DIAGNOSIS — F41.9 ANXIETY: ICD-10-CM

## 2021-10-21 DIAGNOSIS — F32.A DEPRESSIVE DISORDER: Primary | ICD-10-CM

## 2021-10-21 PROCEDURE — 93000 ELECTROCARDIOGRAM COMPLETE: CPT | Performed by: FAMILY MEDICINE

## 2021-10-22 DIAGNOSIS — F41.1 GENERALIZED ANXIETY DISORDER: Primary | ICD-10-CM

## 2021-10-22 RX ORDER — QUETIAPINE FUMARATE 25 MG/1
12.5 TABLET, FILM COATED ORAL 2 TIMES DAILY PRN
Qty: 60 TABLET | Refills: 2 | Status: SHIPPED | OUTPATIENT
Start: 2021-10-22 | End: 2021-10-25

## 2021-10-25 ENCOUNTER — DOCUMENTATION (OUTPATIENT)
Dept: PSYCHIATRY | Facility: CLINIC | Age: 66
End: 2021-10-25

## 2021-10-25 ENCOUNTER — OFFICE VISIT (OUTPATIENT)
Dept: PSYCHIATRY | Facility: CLINIC | Age: 66
End: 2021-10-25

## 2021-10-25 VITALS
SYSTOLIC BLOOD PRESSURE: 139 MMHG | DIASTOLIC BLOOD PRESSURE: 83 MMHG | BODY MASS INDEX: 41.18 KG/M2 | HEIGHT: 64 IN | WEIGHT: 241.2 LBS

## 2021-10-25 DIAGNOSIS — F33.1 MAJOR DEPRESSIVE DISORDER, RECURRENT EPISODE, MODERATE (HCC): ICD-10-CM

## 2021-10-25 DIAGNOSIS — F41.1 GENERALIZED ANXIETY DISORDER: Primary | ICD-10-CM

## 2021-10-25 DIAGNOSIS — F51.05 INSOMNIA DUE TO MENTAL CONDITION: ICD-10-CM

## 2021-10-25 PROCEDURE — 99214 OFFICE O/P EST MOD 30 MIN: CPT | Performed by: STUDENT IN AN ORGANIZED HEALTH CARE EDUCATION/TRAINING PROGRAM

## 2021-10-25 RX ORDER — MIRTAZAPINE 45 MG/1
45 TABLET, FILM COATED ORAL NIGHTLY
Qty: 30 TABLET | Refills: 2 | Status: SHIPPED | OUTPATIENT
Start: 2021-10-25 | End: 2022-01-25

## 2021-10-25 RX ORDER — ARIPIPRAZOLE 2 MG/1
2 TABLET ORAL DAILY
Qty: 30 TABLET | Refills: 2 | Status: SHIPPED | OUTPATIENT
Start: 2021-10-25 | End: 2021-11-08 | Stop reason: SDUPTHER

## 2021-10-26 ENCOUNTER — TELEPHONE (OUTPATIENT)
Dept: PSYCHIATRY | Facility: CLINIC | Age: 66
End: 2021-10-26

## 2021-10-26 ENCOUNTER — PATIENT OUTREACH (OUTPATIENT)
Dept: CASE MANAGEMENT | Facility: OTHER | Age: 66
End: 2021-10-26

## 2021-10-27 ENCOUNTER — HOSPITAL ENCOUNTER (OUTPATIENT)
Dept: MAMMOGRAPHY | Facility: HOSPITAL | Age: 66
Discharge: HOME | End: 2021-10-27
Admitting: FAMILY MEDICINE

## 2021-10-27 DIAGNOSIS — Z12.31 VISIT FOR SCREENING MAMMOGRAM: ICD-10-CM

## 2021-10-27 PROCEDURE — 77063 BREAST TOMOSYNTHESIS BI: CPT

## 2021-10-27 PROCEDURE — 77067 SCR MAMMO BI INCL CAD: CPT

## 2021-10-29 ENCOUNTER — PATIENT OUTREACH (OUTPATIENT)
Dept: CASE MANAGEMENT | Facility: OTHER | Age: 66
End: 2021-10-29

## 2021-11-01 ENCOUNTER — APPOINTMENT (OUTPATIENT)
Dept: MAMMOGRAPHY | Facility: HOSPITAL | Age: 66
End: 2021-11-01

## 2021-11-01 ENCOUNTER — TELEPHONE (OUTPATIENT)
Dept: PSYCHIATRY | Facility: CLINIC | Age: 66
End: 2021-11-01

## 2021-11-02 ENCOUNTER — TELEPHONE (OUTPATIENT)
Dept: PSYCHIATRY | Facility: CLINIC | Age: 66
End: 2021-11-02

## 2021-11-08 ENCOUNTER — OFFICE VISIT (OUTPATIENT)
Dept: PSYCHIATRY | Facility: CLINIC | Age: 66
End: 2021-11-08

## 2021-11-08 ENCOUNTER — TELEPHONE (OUTPATIENT)
Dept: PSYCHIATRY | Facility: CLINIC | Age: 66
End: 2021-11-08

## 2021-11-08 VITALS
BODY MASS INDEX: 43.23 KG/M2 | HEIGHT: 64 IN | DIASTOLIC BLOOD PRESSURE: 64 MMHG | WEIGHT: 253.2 LBS | SYSTOLIC BLOOD PRESSURE: 125 MMHG

## 2021-11-08 DIAGNOSIS — F51.05 INSOMNIA DUE TO MENTAL CONDITION: ICD-10-CM

## 2021-11-08 DIAGNOSIS — F33.1 MAJOR DEPRESSIVE DISORDER, RECURRENT EPISODE, MODERATE (HCC): ICD-10-CM

## 2021-11-08 DIAGNOSIS — F41.1 GENERALIZED ANXIETY DISORDER: Primary | ICD-10-CM

## 2021-11-08 PROCEDURE — 99214 OFFICE O/P EST MOD 30 MIN: CPT | Performed by: STUDENT IN AN ORGANIZED HEALTH CARE EDUCATION/TRAINING PROGRAM

## 2021-11-08 PROCEDURE — 90833 PSYTX W PT W E/M 30 MIN: CPT | Performed by: STUDENT IN AN ORGANIZED HEALTH CARE EDUCATION/TRAINING PROGRAM

## 2021-11-08 RX ORDER — ARIPIPRAZOLE 2 MG/1
4 TABLET ORAL DAILY
Qty: 60 TABLET | Refills: 2 | Status: SHIPPED | OUTPATIENT
Start: 2021-11-08 | End: 2021-12-07 | Stop reason: SDUPTHER

## 2021-11-08 RX ORDER — BREXPIPRAZOLE 0.5 MG/1
TABLET ORAL
COMMUNITY
Start: 2021-11-03 | End: 2021-11-08

## 2021-11-09 ENCOUNTER — HOSPITAL ENCOUNTER (EMERGENCY)
Facility: HOSPITAL | Age: 66
Discharge: HOME | End: 2021-11-09
Attending: EMERGENCY MEDICINE | Admitting: EMERGENCY MEDICINE

## 2021-11-09 ENCOUNTER — DOCUMENTATION (OUTPATIENT)
Dept: PSYCHIATRY | Facility: CLINIC | Age: 66
End: 2021-11-09

## 2021-11-09 VITALS
SYSTOLIC BLOOD PRESSURE: 126 MMHG | BODY MASS INDEX: 43.46 KG/M2 | TEMPERATURE: 98.3 F | DIASTOLIC BLOOD PRESSURE: 68 MMHG | RESPIRATION RATE: 18 BRPM | HEIGHT: 64 IN | HEART RATE: 57 BPM | OXYGEN SATURATION: 97 %

## 2021-11-09 DIAGNOSIS — F32.A DEPRESSION, UNSPECIFIED DEPRESSION TYPE: ICD-10-CM

## 2021-11-09 DIAGNOSIS — F41.9 ANXIETY: Primary | ICD-10-CM

## 2021-11-09 LAB
ALBUMIN SERPL-MCNC: 4 G/DL (ref 3.5–5.2)
ALBUMIN/GLOB SERPL: 1.3 G/DL
ALP SERPL-CCNC: 122 U/L (ref 39–117)
ALT SERPL W P-5'-P-CCNC: 18 U/L (ref 1–33)
AMPHET+METHAMPHET UR QL: NEGATIVE
ANION GAP SERPL CALCULATED.3IONS-SCNC: 9.8 MMOL/L (ref 5–15)
APAP SERPL-MCNC: <5 MCG/ML (ref 0–30)
AST SERPL-CCNC: 19 U/L (ref 1–32)
BARBITURATES UR QL SCN: POSITIVE
BASOPHILS # BLD AUTO: 0.06 10*3/MM3 (ref 0–0.2)
BASOPHILS NFR BLD AUTO: 0.9 % (ref 0–1.5)
BENZODIAZ UR QL SCN: NEGATIVE
BILIRUB SERPL-MCNC: 0.2 MG/DL (ref 0–1.2)
BUN SERPL-MCNC: 15 MG/DL (ref 8–23)
BUN/CREAT SERPL: 16.1 (ref 7–25)
CALCIUM SPEC-SCNC: 9.3 MG/DL (ref 8.6–10.5)
CANNABINOIDS SERPL QL: NEGATIVE
CHLORIDE SERPL-SCNC: 103 MMOL/L (ref 98–107)
CO2 SERPL-SCNC: 28.2 MMOL/L (ref 22–29)
COCAINE UR QL: NEGATIVE
CREAT SERPL-MCNC: 0.93 MG/DL (ref 0.57–1)
DEPRECATED RDW RBC AUTO: 44.7 FL (ref 37–54)
EOSINOPHIL # BLD AUTO: 0.16 10*3/MM3 (ref 0–0.4)
EOSINOPHIL NFR BLD AUTO: 2.4 % (ref 0.3–6.2)
ERYTHROCYTE [DISTWIDTH] IN BLOOD BY AUTOMATED COUNT: 13.1 % (ref 12.3–15.4)
ETHANOL BLD-MCNC: <10 MG/DL (ref 0–10)
ETHANOL UR QL: <0.01 %
GFR SERPL CREATININE-BSD FRML MDRD: 60 ML/MIN/1.73
GLOBULIN UR ELPH-MCNC: 3.2 GM/DL
GLUCOSE SERPL-MCNC: 93 MG/DL (ref 65–99)
HCT VFR BLD AUTO: 40.4 % (ref 34–46.6)
HGB BLD-MCNC: 13.3 G/DL (ref 12–15.9)
HOLD SPECIMEN: NORMAL
HOLD SPECIMEN: NORMAL
IMM GRANULOCYTES # BLD AUTO: 0.02 10*3/MM3 (ref 0–0.05)
IMM GRANULOCYTES NFR BLD AUTO: 0.3 % (ref 0–0.5)
LYMPHOCYTES # BLD AUTO: 1.56 10*3/MM3 (ref 0.7–3.1)
LYMPHOCYTES NFR BLD AUTO: 23.5 % (ref 19.6–45.3)
MCH RBC QN AUTO: 30.4 PG (ref 26.6–33)
MCHC RBC AUTO-ENTMCNC: 32.9 G/DL (ref 31.5–35.7)
MCV RBC AUTO: 92.2 FL (ref 79–97)
METHADONE UR QL SCN: NEGATIVE
MONOCYTES # BLD AUTO: 0.72 10*3/MM3 (ref 0.1–0.9)
MONOCYTES NFR BLD AUTO: 10.9 % (ref 5–12)
NEUTROPHILS NFR BLD AUTO: 4.11 10*3/MM3 (ref 1.7–7)
NEUTROPHILS NFR BLD AUTO: 62 % (ref 42.7–76)
NRBC BLD AUTO-RTO: 0 /100 WBC (ref 0–0.2)
OPIATES UR QL: POSITIVE
OXYCODONE UR QL SCN: NEGATIVE
PLATELET # BLD AUTO: 286 10*3/MM3 (ref 140–450)
PMV BLD AUTO: 9.7 FL (ref 6–12)
POTASSIUM SERPL-SCNC: 4.5 MMOL/L (ref 3.5–5.2)
PROT SERPL-MCNC: 7.2 G/DL (ref 6–8.5)
RBC # BLD AUTO: 4.38 10*6/MM3 (ref 3.77–5.28)
SALICYLATES SERPL-MCNC: 0.3 MG/DL
SODIUM SERPL-SCNC: 141 MMOL/L (ref 136–145)
WBC # BLD AUTO: 6.63 10*3/MM3 (ref 3.4–10.8)
WHOLE BLOOD HOLD SPECIMEN: NORMAL
WHOLE BLOOD HOLD SPECIMEN: NORMAL

## 2021-11-09 PROCEDURE — 85025 COMPLETE CBC W/AUTO DIFF WBC: CPT

## 2021-11-09 PROCEDURE — 80143 DRUG ASSAY ACETAMINOPHEN: CPT

## 2021-11-09 PROCEDURE — 82077 ASSAY SPEC XCP UR&BREATH IA: CPT

## 2021-11-09 PROCEDURE — 80307 DRUG TEST PRSMV CHEM ANLYZR: CPT | Performed by: EMERGENCY MEDICINE

## 2021-11-09 PROCEDURE — 99283 EMERGENCY DEPT VISIT LOW MDM: CPT

## 2021-11-09 PROCEDURE — 80179 DRUG ASSAY SALICYLATE: CPT

## 2021-11-09 PROCEDURE — 36415 COLL VENOUS BLD VENIPUNCTURE: CPT

## 2021-11-09 PROCEDURE — 80053 COMPREHEN METABOLIC PANEL: CPT

## 2021-11-09 RX ORDER — SODIUM CHLORIDE 0.9 % (FLUSH) 0.9 %
10 SYRINGE (ML) INJECTION AS NEEDED
Status: DISCONTINUED | OUTPATIENT
Start: 2021-11-09 | End: 2021-11-09 | Stop reason: HOSPADM

## 2021-11-10 ENCOUNTER — TELEPHONE (OUTPATIENT)
Dept: PSYCHIATRY | Facility: CLINIC | Age: 66
End: 2021-11-10

## 2021-11-12 RX ORDER — LABETALOL 100 MG/1
TABLET, FILM COATED ORAL
Qty: 180 TABLET | Refills: 0 | Status: SHIPPED | OUTPATIENT
Start: 2021-11-12 | End: 2022-02-10

## 2021-11-16 ENCOUNTER — PATIENT OUTREACH (OUTPATIENT)
Dept: CASE MANAGEMENT | Facility: OTHER | Age: 66
End: 2021-11-16

## 2021-11-16 RX ORDER — LABETALOL 100 MG/1
TABLET, FILM COATED ORAL
Qty: 180 TABLET | Refills: 0 | OUTPATIENT
Start: 2021-11-16

## 2021-11-17 ENCOUNTER — OFFICE VISIT (OUTPATIENT)
Dept: FAMILY MEDICINE CLINIC | Age: 66
End: 2021-11-17

## 2021-11-17 ENCOUNTER — TELEPHONE (OUTPATIENT)
Dept: FAMILY MEDICINE CLINIC | Age: 66
End: 2021-11-17

## 2021-11-17 VITALS
HEART RATE: 61 BPM | HEIGHT: 64 IN | DIASTOLIC BLOOD PRESSURE: 60 MMHG | SYSTOLIC BLOOD PRESSURE: 106 MMHG | WEIGHT: 253 LBS | BODY MASS INDEX: 43.19 KG/M2 | TEMPERATURE: 98.5 F

## 2021-11-17 DIAGNOSIS — R00.2 PALPITATIONS: ICD-10-CM

## 2021-11-17 DIAGNOSIS — F51.01 PRIMARY INSOMNIA: ICD-10-CM

## 2021-11-17 DIAGNOSIS — F32.4 MAJOR DEPRESSIVE DISORDER WITH SINGLE EPISODE, IN PARTIAL REMISSION (HCC): ICD-10-CM

## 2021-11-17 DIAGNOSIS — F41.9 ANXIETY: Primary | ICD-10-CM

## 2021-11-17 DIAGNOSIS — R07.89 OTHER CHEST PAIN: ICD-10-CM

## 2021-11-17 PROCEDURE — 93000 ELECTROCARDIOGRAM COMPLETE: CPT | Performed by: FAMILY MEDICINE

## 2021-11-17 PROCEDURE — 99214 OFFICE O/P EST MOD 30 MIN: CPT | Performed by: FAMILY MEDICINE

## 2021-11-19 RX ORDER — HYDRALAZINE HYDROCHLORIDE 10 MG/1
TABLET, FILM COATED ORAL
Qty: 270 TABLET | Refills: 0 | Status: SHIPPED | OUTPATIENT
Start: 2021-11-19 | End: 2021-12-01

## 2021-11-19 RX ORDER — CLONIDINE HYDROCHLORIDE 0.1 MG/1
TABLET ORAL
Qty: 270 TABLET | Refills: 0 | Status: SHIPPED | OUTPATIENT
Start: 2021-11-19 | End: 2022-02-17

## 2021-11-23 RX ORDER — METOPROLOL SUCCINATE 25 MG/1
TABLET, EXTENDED RELEASE ORAL
Qty: 90 TABLET | Refills: 0 | Status: SHIPPED | OUTPATIENT
Start: 2021-11-23 | End: 2021-11-30 | Stop reason: ALTCHOICE

## 2021-11-29 ENCOUNTER — TELEPHONE (OUTPATIENT)
Dept: PSYCHIATRY | Facility: CLINIC | Age: 66
End: 2021-11-29

## 2021-11-30 ENCOUNTER — OFFICE VISIT (OUTPATIENT)
Dept: PULMONOLOGY | Facility: CLINIC | Age: 66
End: 2021-11-30

## 2021-11-30 VITALS
OXYGEN SATURATION: 96 % | WEIGHT: 253 LBS | DIASTOLIC BLOOD PRESSURE: 62 MMHG | HEART RATE: 58 BPM | RESPIRATION RATE: 14 BRPM | TEMPERATURE: 98.6 F | BODY MASS INDEX: 43.19 KG/M2 | SYSTOLIC BLOOD PRESSURE: 86 MMHG | HEIGHT: 64 IN

## 2021-11-30 DIAGNOSIS — J44.9 CHRONIC OBSTRUCTIVE PULMONARY DISEASE, UNSPECIFIED COPD TYPE (HCC): Primary | ICD-10-CM

## 2021-11-30 DIAGNOSIS — E66.01 MORBID (SEVERE) OBESITY DUE TO EXCESS CALORIES (HCC): ICD-10-CM

## 2021-11-30 DIAGNOSIS — Z87.891 EX-SMOKER: ICD-10-CM

## 2021-11-30 DIAGNOSIS — G47.33 OSA (OBSTRUCTIVE SLEEP APNEA): ICD-10-CM

## 2021-11-30 PROCEDURE — 99203 OFFICE O/P NEW LOW 30 MIN: CPT | Performed by: SPECIALIST

## 2021-11-30 RX ORDER — ALBUTEROL SULFATE 2.5 MG/3ML
2.5 SOLUTION RESPIRATORY (INHALATION) EVERY 4 HOURS PRN
Qty: 120 EACH | Refills: 5
Start: 2021-11-30 | End: 2022-05-31

## 2021-11-30 RX ORDER — FORMOTEROL FUMARATE 20 UG/2ML
20 SOLUTION RESPIRATORY (INHALATION)
Qty: 60 EACH | Refills: 5
Start: 2021-11-30 | End: 2022-03-31

## 2021-11-30 RX ORDER — BUDESONIDE 0.5 MG/2ML
0.5 INHALANT ORAL 2 TIMES DAILY
Qty: 60 EACH | Refills: 5
Start: 2021-11-30 | End: 2022-03-31

## 2021-12-01 ENCOUNTER — OFFICE VISIT (OUTPATIENT)
Dept: CARDIOLOGY | Facility: CLINIC | Age: 66
End: 2021-12-01

## 2021-12-01 VITALS
HEIGHT: 64 IN | WEIGHT: 261 LBS | BODY MASS INDEX: 44.56 KG/M2 | HEART RATE: 81 BPM | SYSTOLIC BLOOD PRESSURE: 106 MMHG | DIASTOLIC BLOOD PRESSURE: 56 MMHG

## 2021-12-01 DIAGNOSIS — I10 ESSENTIAL HYPERTENSION: ICD-10-CM

## 2021-12-01 DIAGNOSIS — I50.32 CHRONIC DIASTOLIC HEART FAILURE (HCC): ICD-10-CM

## 2021-12-01 DIAGNOSIS — R00.2 PALPITATIONS: Primary | ICD-10-CM

## 2021-12-01 PROCEDURE — 99214 OFFICE O/P EST MOD 30 MIN: CPT | Performed by: INTERNAL MEDICINE

## 2021-12-06 ENCOUNTER — TELEPHONE (OUTPATIENT)
Dept: PSYCHIATRY | Facility: CLINIC | Age: 66
End: 2021-12-06

## 2021-12-07 ENCOUNTER — OFFICE VISIT (OUTPATIENT)
Dept: PSYCHIATRY | Facility: CLINIC | Age: 66
End: 2021-12-07

## 2021-12-07 VITALS
HEIGHT: 64 IN | SYSTOLIC BLOOD PRESSURE: 140 MMHG | BODY MASS INDEX: 44.97 KG/M2 | DIASTOLIC BLOOD PRESSURE: 71 MMHG | WEIGHT: 263.4 LBS

## 2021-12-07 DIAGNOSIS — F41.1 GENERALIZED ANXIETY DISORDER: Primary | ICD-10-CM

## 2021-12-07 DIAGNOSIS — F51.05 INSOMNIA DUE TO MENTAL CONDITION: ICD-10-CM

## 2021-12-07 DIAGNOSIS — F33.1 MAJOR DEPRESSIVE DISORDER, RECURRENT EPISODE, MODERATE (HCC): ICD-10-CM

## 2021-12-07 PROCEDURE — 90833 PSYTX W PT W E/M 30 MIN: CPT | Performed by: STUDENT IN AN ORGANIZED HEALTH CARE EDUCATION/TRAINING PROGRAM

## 2021-12-07 PROCEDURE — 99214 OFFICE O/P EST MOD 30 MIN: CPT | Performed by: STUDENT IN AN ORGANIZED HEALTH CARE EDUCATION/TRAINING PROGRAM

## 2021-12-07 RX ORDER — HYDROXYZINE HYDROCHLORIDE 25 MG/1
25 TABLET, FILM COATED ORAL EVERY 8 HOURS PRN
Qty: 60 TABLET | Refills: 2 | Status: SHIPPED | OUTPATIENT
Start: 2021-12-07 | End: 2022-01-05

## 2021-12-07 RX ORDER — VENLAFAXINE HYDROCHLORIDE 37.5 MG/1
37.5 CAPSULE, EXTENDED RELEASE ORAL DAILY
Qty: 30 CAPSULE | Refills: 2 | Status: SHIPPED | OUTPATIENT
Start: 2021-12-07 | End: 2021-12-21 | Stop reason: SDUPTHER

## 2021-12-07 RX ORDER — ARIPIPRAZOLE 10 MG/1
10 TABLET ORAL DAILY
Qty: 30 TABLET | Refills: 2 | Status: SHIPPED | OUTPATIENT
Start: 2021-12-07 | End: 2021-12-21 | Stop reason: SINTOL

## 2021-12-09 ENCOUNTER — TELEPHONE (OUTPATIENT)
Dept: PSYCHIATRY | Facility: CLINIC | Age: 66
End: 2021-12-09

## 2021-12-10 ENCOUNTER — TELEPHONE (OUTPATIENT)
Dept: CARDIOLOGY | Facility: CLINIC | Age: 66
End: 2021-12-10

## 2021-12-10 DIAGNOSIS — I10 ESSENTIAL HYPERTENSION: ICD-10-CM

## 2021-12-10 RX ORDER — AMLODIPINE BESYLATE 5 MG/1
TABLET ORAL
Qty: 60 TABLET | Refills: 0 | Status: SHIPPED | OUTPATIENT
Start: 2021-12-10 | End: 2022-02-07

## 2021-12-10 RX ORDER — ATORVASTATIN CALCIUM 10 MG/1
TABLET, FILM COATED ORAL
Qty: 90 TABLET | Refills: 0 | Status: SHIPPED | OUTPATIENT
Start: 2021-12-10 | End: 2022-03-25 | Stop reason: SDDI

## 2021-12-16 ENCOUNTER — TELEPHONE (OUTPATIENT)
Dept: CARDIOLOGY | Facility: CLINIC | Age: 66
End: 2021-12-16

## 2021-12-17 ENCOUNTER — TELEPHONE (OUTPATIENT)
Dept: PULMONOLOGY | Facility: CLINIC | Age: 66
End: 2021-12-17

## 2021-12-20 ENCOUNTER — HOSPITAL ENCOUNTER (OUTPATIENT)
Dept: NUCLEAR MEDICINE | Facility: HOSPITAL | Age: 66
Discharge: HOME | End: 2021-12-20

## 2021-12-20 DIAGNOSIS — R00.2 PALPITATIONS: ICD-10-CM

## 2021-12-20 DIAGNOSIS — R07.89 OTHER CHEST PAIN: ICD-10-CM

## 2021-12-20 PROCEDURE — 0 TECHNETIUM TETROFOSMIN KIT: Performed by: FAMILY MEDICINE

## 2021-12-20 PROCEDURE — A9502 TC99M TETROFOSMIN: HCPCS | Performed by: FAMILY MEDICINE

## 2021-12-20 PROCEDURE — 78452 HT MUSCLE IMAGE SPECT MULT: CPT | Performed by: INTERNAL MEDICINE

## 2021-12-20 PROCEDURE — 25010000002 REGADENOSON 0.4 MG/5ML SOLUTION

## 2021-12-20 PROCEDURE — 78452 HT MUSCLE IMAGE SPECT MULT: CPT

## 2021-12-20 PROCEDURE — 93017 CV STRESS TEST TRACING ONLY: CPT

## 2021-12-20 PROCEDURE — 93016 CV STRESS TEST SUPVJ ONLY: CPT | Performed by: NURSE PRACTITIONER

## 2021-12-20 PROCEDURE — 93018 CV STRESS TEST I&R ONLY: CPT | Performed by: INTERNAL MEDICINE

## 2021-12-20 RX ADMIN — REGADENOSON 0.4 MG: 0.08 INJECTION, SOLUTION INTRAVENOUS at 13:02

## 2021-12-20 RX ADMIN — TETROFOSMIN 1 DOSE: 1.38 INJECTION, POWDER, LYOPHILIZED, FOR SOLUTION INTRAVENOUS at 11:35

## 2021-12-20 RX ADMIN — TETROFOSMIN 1 DOSE: 1.38 INJECTION, POWDER, LYOPHILIZED, FOR SOLUTION INTRAVENOUS at 13:02

## 2021-12-21 ENCOUNTER — TELEPHONE (OUTPATIENT)
Dept: PSYCHIATRY | Facility: CLINIC | Age: 66
End: 2021-12-21

## 2021-12-21 DIAGNOSIS — F51.05 INSOMNIA DUE TO MENTAL CONDITION: ICD-10-CM

## 2021-12-21 DIAGNOSIS — F41.1 GENERALIZED ANXIETY DISORDER: ICD-10-CM

## 2021-12-21 DIAGNOSIS — F33.1 MAJOR DEPRESSIVE DISORDER, RECURRENT EPISODE, MODERATE (HCC): ICD-10-CM

## 2021-12-21 LAB
BH CV IMMEDIATE POST RECOVERY TECH DATA SYMPTOMS: NORMAL
BH CV IMMEDIATE POST TECH DATA BLOOD PRESSURE: NORMAL MMHG
BH CV IMMEDIATE POST TECH DATA HEART RATE: 66 BPM
BH CV IMMEDIATE POST TECH DATA OXYGEN SATS: 94 %
BH CV REST NUCLEAR ISOTOPE DOSE: 9.6 MCI
BH CV SIX MINUTE RECOVERY TECH DATA BLOOD PRESSURE: NORMAL
BH CV SIX MINUTE RECOVERY TECH DATA HEART RATE: 65 BPM
BH CV SIX MINUTE RECOVERY TECH DATA OXYGEN SATURATION: 97 %
BH CV SIX MINUTE RECOVERY TECH DATA SYMPTOMS: NORMAL
BH CV STRESS BP STAGE 1: NORMAL
BH CV STRESS COMMENTS STAGE 1: NORMAL
BH CV STRESS DOSE REGADENOSON STAGE 1: 0.4
BH CV STRESS DURATION MIN STAGE 1: 0
BH CV STRESS DURATION SEC STAGE 1: 10
BH CV STRESS HR STAGE 1: 59
BH CV STRESS NUCLEAR ISOTOPE DOSE: 32.2 MCI
BH CV STRESS O2 STAGE 1: 95
BH CV STRESS PROTOCOL 1: NORMAL
BH CV STRESS RECOVERY BP: NORMAL MMHG
BH CV STRESS RECOVERY HR: 65 BPM
BH CV STRESS RECOVERY O2: 97 %
BH CV STRESS STAGE 1: 1
BH CV THREE MINUTE POST TECH DATA BLOOD PRESSURE: NORMAL MMHG
BH CV THREE MINUTE POST TECH DATA HEART RATE: 67 BPM
BH CV THREE MINUTE POST TECH DATA OXYGEN SATURATION: 95 %
BH CV THREE MINUTE RECOVERY TECH DATA SYMPTOM: NORMAL
LV EF NUC BP: 44 %
MAXIMAL PREDICTED HEART RATE: 154 BPM
PERCENT MAX PREDICTED HR: 44.16 %
STRESS BASELINE BP: NORMAL MMHG
STRESS BASELINE HR: 58 BPM
STRESS O2 SAT REST: 94 %
STRESS PERCENT HR: 52 %
STRESS POST O2 SAT PEAK: 97 %
STRESS POST PEAK BP: NORMAL MMHG
STRESS POST PEAK HR: 68 BPM
STRESS TARGET HR: 131 BPM

## 2021-12-21 RX ORDER — VENLAFAXINE HYDROCHLORIDE 37.5 MG/1
75 CAPSULE, EXTENDED RELEASE ORAL DAILY
Qty: 60 CAPSULE | Refills: 2 | Status: SHIPPED | OUTPATIENT
Start: 2021-12-21 | End: 2022-01-12 | Stop reason: SDUPTHER

## 2021-12-24 ENCOUNTER — DOCUMENTATION (OUTPATIENT)
Dept: PSYCHIATRY | Facility: CLINIC | Age: 66
End: 2021-12-24

## 2021-12-27 ENCOUNTER — TELEPHONE (OUTPATIENT)
Dept: PSYCHIATRY | Facility: CLINIC | Age: 66
End: 2021-12-27

## 2021-12-27 ENCOUNTER — TELEPHONE (OUTPATIENT)
Dept: FAMILY MEDICINE CLINIC | Age: 66
End: 2021-12-27

## 2021-12-28 ENCOUNTER — TELEPHONE (OUTPATIENT)
Dept: CARDIOLOGY | Facility: CLINIC | Age: 66
End: 2021-12-28

## 2022-01-05 ENCOUNTER — TELEMEDICINE (OUTPATIENT)
Dept: PSYCHIATRY | Facility: CLINIC | Age: 67
End: 2022-01-05

## 2022-01-05 DIAGNOSIS — F41.1 GENERALIZED ANXIETY DISORDER: Primary | ICD-10-CM

## 2022-01-05 DIAGNOSIS — F33.2 SEVERE EPISODE OF RECURRENT MAJOR DEPRESSIVE DISORDER, WITHOUT PSYCHOTIC FEATURES: ICD-10-CM

## 2022-01-05 DIAGNOSIS — F51.05 INSOMNIA DUE TO MENTAL CONDITION: ICD-10-CM

## 2022-01-05 PROCEDURE — 99215 OFFICE O/P EST HI 40 MIN: CPT | Performed by: STUDENT IN AN ORGANIZED HEALTH CARE EDUCATION/TRAINING PROGRAM

## 2022-01-05 PROCEDURE — 90833 PSYTX W PT W E/M 30 MIN: CPT | Performed by: STUDENT IN AN ORGANIZED HEALTH CARE EDUCATION/TRAINING PROGRAM

## 2022-01-05 RX ORDER — ALPRAZOLAM 1 MG/1
TABLET ORAL
COMMUNITY
Start: 2021-12-10 | End: 2022-01-05

## 2022-01-05 RX ORDER — LAMOTRIGINE 25 MG/1
TABLET ORAL
COMMUNITY
Start: 2021-12-03 | End: 2022-01-12

## 2022-01-05 RX ORDER — CLONAZEPAM 0.5 MG/1
0.5 TABLET ORAL 2 TIMES DAILY
Qty: 60 TABLET | Refills: 2 | Status: SHIPPED | OUTPATIENT
Start: 2022-01-05 | End: 2022-01-12 | Stop reason: SDUPTHER

## 2022-01-05 RX ORDER — BREXPIPRAZOLE 0.5 MG/1
TABLET ORAL
COMMUNITY
Start: 2021-12-03 | End: 2022-01-05

## 2022-01-06 ENCOUNTER — TELEPHONE (OUTPATIENT)
Dept: FAMILY MEDICINE CLINIC | Age: 67
End: 2022-01-06

## 2022-01-10 ENCOUNTER — TELEPHONE (OUTPATIENT)
Dept: PSYCHIATRY | Facility: CLINIC | Age: 67
End: 2022-01-10

## 2022-01-12 ENCOUNTER — TELEPHONE (OUTPATIENT)
Dept: PSYCHIATRY | Facility: CLINIC | Age: 67
End: 2022-01-12

## 2022-01-12 ENCOUNTER — TELEPHONE (OUTPATIENT)
Dept: FAMILY MEDICINE CLINIC | Age: 67
End: 2022-01-12

## 2022-01-12 ENCOUNTER — TELEMEDICINE (OUTPATIENT)
Dept: PSYCHIATRY | Facility: CLINIC | Age: 67
End: 2022-01-12

## 2022-01-12 DIAGNOSIS — F33.2 SEVERE EPISODE OF RECURRENT MAJOR DEPRESSIVE DISORDER, WITHOUT PSYCHOTIC FEATURES: Primary | ICD-10-CM

## 2022-01-12 DIAGNOSIS — F41.1 GENERALIZED ANXIETY DISORDER: ICD-10-CM

## 2022-01-12 DIAGNOSIS — F51.05 INSOMNIA DUE TO MENTAL CONDITION: ICD-10-CM

## 2022-01-12 DIAGNOSIS — F33.1 MAJOR DEPRESSIVE DISORDER, RECURRENT EPISODE, MODERATE: ICD-10-CM

## 2022-01-12 PROCEDURE — 90833 PSYTX W PT W E/M 30 MIN: CPT | Performed by: STUDENT IN AN ORGANIZED HEALTH CARE EDUCATION/TRAINING PROGRAM

## 2022-01-12 PROCEDURE — 99215 OFFICE O/P EST HI 40 MIN: CPT | Performed by: STUDENT IN AN ORGANIZED HEALTH CARE EDUCATION/TRAINING PROGRAM

## 2022-01-12 RX ORDER — CLONAZEPAM 1 MG/1
1 TABLET ORAL 2 TIMES DAILY
Qty: 60 TABLET | Refills: 2 | Status: SHIPPED | OUTPATIENT
Start: 2022-01-23 | End: 2022-04-18

## 2022-01-12 RX ORDER — VENLAFAXINE HYDROCHLORIDE 37.5 MG/1
37.5 CAPSULE, EXTENDED RELEASE ORAL DAILY
Qty: 30 CAPSULE | Refills: 2 | Status: SHIPPED | OUTPATIENT
Start: 2022-01-12 | End: 2022-01-25 | Stop reason: SDUPTHER

## 2022-01-12 RX ORDER — VENLAFAXINE HYDROCHLORIDE 75 MG/1
75 CAPSULE, EXTENDED RELEASE ORAL DAILY
Qty: 30 CAPSULE | Refills: 2 | Status: SHIPPED | OUTPATIENT
Start: 2022-01-12 | End: 2022-01-25 | Stop reason: SDUPTHER

## 2022-01-15 RX ORDER — POTASSIUM CHLORIDE 750 MG/1
CAPSULE, EXTENDED RELEASE ORAL
Qty: 90 CAPSULE | Refills: 0 | Status: SHIPPED | OUTPATIENT
Start: 2022-01-15 | End: 2022-07-07

## 2022-01-15 RX ORDER — FUROSEMIDE 20 MG/1
TABLET ORAL
Qty: 180 TABLET | Refills: 0 | Status: SHIPPED | OUTPATIENT
Start: 2022-01-15 | End: 2022-07-07

## 2022-01-20 ENCOUNTER — TELEPHONE (OUTPATIENT)
Dept: FAMILY MEDICINE CLINIC | Age: 67
End: 2022-01-20

## 2022-01-25 ENCOUNTER — TELEMEDICINE (OUTPATIENT)
Dept: PSYCHIATRY | Facility: CLINIC | Age: 67
End: 2022-01-25

## 2022-01-25 DIAGNOSIS — F41.1 GENERALIZED ANXIETY DISORDER: ICD-10-CM

## 2022-01-25 DIAGNOSIS — F51.05 INSOMNIA DUE TO MENTAL CONDITION: ICD-10-CM

## 2022-01-25 DIAGNOSIS — F33.1 MAJOR DEPRESSIVE DISORDER, RECURRENT EPISODE, MODERATE: ICD-10-CM

## 2022-01-25 DIAGNOSIS — F33.2 SEVERE EPISODE OF RECURRENT MAJOR DEPRESSIVE DISORDER, WITHOUT PSYCHOTIC FEATURES: Primary | ICD-10-CM

## 2022-01-25 PROCEDURE — 90833 PSYTX W PT W E/M 30 MIN: CPT | Performed by: STUDENT IN AN ORGANIZED HEALTH CARE EDUCATION/TRAINING PROGRAM

## 2022-01-25 PROCEDURE — 99214 OFFICE O/P EST MOD 30 MIN: CPT | Performed by: STUDENT IN AN ORGANIZED HEALTH CARE EDUCATION/TRAINING PROGRAM

## 2022-01-25 RX ORDER — VENLAFAXINE HYDROCHLORIDE 75 MG/1
150 CAPSULE, EXTENDED RELEASE ORAL DAILY
Qty: 60 CAPSULE | Refills: 2 | Status: SHIPPED | OUTPATIENT
Start: 2022-01-25 | End: 2022-03-25 | Stop reason: SDUPTHER

## 2022-01-25 RX ORDER — CLONAZEPAM 0.5 MG/1
TABLET ORAL
COMMUNITY
End: 2022-01-25

## 2022-01-25 RX ORDER — ARIPIPRAZOLE 2 MG/1
TABLET ORAL
COMMUNITY
Start: 2022-01-12 | End: 2022-01-25

## 2022-01-25 RX ORDER — MIRTAZAPINE 45 MG/1
TABLET, FILM COATED ORAL
Qty: 30 TABLET | Refills: 2 | Status: SHIPPED | OUTPATIENT
Start: 2022-01-25 | End: 2022-02-22 | Stop reason: SDUPTHER

## 2022-01-31 ENCOUNTER — PATIENT OUTREACH (OUTPATIENT)
Dept: CASE MANAGEMENT | Facility: OTHER | Age: 67
End: 2022-01-31

## 2022-02-01 ENCOUNTER — TELEPHONE (OUTPATIENT)
Dept: NEUROLOGY | Facility: CLINIC | Age: 67
End: 2022-02-01

## 2022-02-05 DIAGNOSIS — I10 ESSENTIAL HYPERTENSION: ICD-10-CM

## 2022-02-07 ENCOUNTER — TELEPHONE (OUTPATIENT)
Dept: FAMILY MEDICINE CLINIC | Age: 67
End: 2022-02-07

## 2022-02-07 ENCOUNTER — PATIENT OUTREACH (OUTPATIENT)
Dept: CASE MANAGEMENT | Facility: OTHER | Age: 67
End: 2022-02-07

## 2022-02-07 RX ORDER — AMLODIPINE BESYLATE 5 MG/1
TABLET ORAL
Qty: 60 TABLET | Refills: 0 | Status: SHIPPED | OUTPATIENT
Start: 2022-02-07 | End: 2022-04-06

## 2022-02-08 ENCOUNTER — TELEPHONE (OUTPATIENT)
Dept: PSYCHIATRY | Facility: CLINIC | Age: 67
End: 2022-02-08

## 2022-02-08 ENCOUNTER — TELEPHONE (OUTPATIENT)
Dept: FAMILY MEDICINE CLINIC | Age: 67
End: 2022-02-08

## 2022-02-08 DIAGNOSIS — F41.9 ANXIETY: Primary | ICD-10-CM

## 2022-02-08 DIAGNOSIS — F32.4 MAJOR DEPRESSIVE DISORDER WITH SINGLE EPISODE, IN PARTIAL REMISSION: ICD-10-CM

## 2022-02-09 ENCOUNTER — TELEPHONE (OUTPATIENT)
Dept: PSYCHIATRY | Facility: CLINIC | Age: 67
End: 2022-02-09

## 2022-02-10 ENCOUNTER — DOCUMENTATION (OUTPATIENT)
Dept: PSYCHIATRY | Facility: CLINIC | Age: 67
End: 2022-02-10

## 2022-02-10 RX ORDER — LABETALOL 100 MG/1
TABLET, FILM COATED ORAL
Qty: 180 TABLET | Refills: 0 | Status: SHIPPED | OUTPATIENT
Start: 2022-02-10 | End: 2022-05-12

## 2022-02-11 ENCOUNTER — TELEMEDICINE (OUTPATIENT)
Dept: PSYCHIATRY | Facility: CLINIC | Age: 67
End: 2022-02-11

## 2022-02-11 DIAGNOSIS — F33.2 SEVERE EPISODE OF RECURRENT MAJOR DEPRESSIVE DISORDER, WITHOUT PSYCHOTIC FEATURES: Primary | ICD-10-CM

## 2022-02-11 DIAGNOSIS — F51.05 INSOMNIA DUE TO MENTAL CONDITION: ICD-10-CM

## 2022-02-11 DIAGNOSIS — F41.1 GENERALIZED ANXIETY DISORDER: ICD-10-CM

## 2022-02-11 PROCEDURE — 99214 OFFICE O/P EST MOD 30 MIN: CPT | Performed by: STUDENT IN AN ORGANIZED HEALTH CARE EDUCATION/TRAINING PROGRAM

## 2022-02-11 PROCEDURE — 90833 PSYTX W PT W E/M 30 MIN: CPT | Performed by: STUDENT IN AN ORGANIZED HEALTH CARE EDUCATION/TRAINING PROGRAM

## 2022-02-11 RX ORDER — VENLAFAXINE HYDROCHLORIDE 37.5 MG/1
37.5 CAPSULE, EXTENDED RELEASE ORAL DAILY
Qty: 30 CAPSULE | Refills: 2 | Status: SHIPPED | OUTPATIENT
Start: 2022-02-11 | End: 2022-03-11

## 2022-02-17 RX ORDER — CLONIDINE HYDROCHLORIDE 0.1 MG/1
0.1 TABLET ORAL EVERY 8 HOURS PRN
Qty: 270 TABLET | Refills: 0 | Status: SHIPPED | OUTPATIENT
Start: 2022-02-17 | End: 2022-03-25 | Stop reason: SDDI

## 2022-02-17 RX ORDER — HYDRALAZINE HYDROCHLORIDE 10 MG/1
TABLET, FILM COATED ORAL
Qty: 270 TABLET | OUTPATIENT
Start: 2022-02-17

## 2022-02-22 ENCOUNTER — TELEMEDICINE (OUTPATIENT)
Dept: PSYCHIATRY | Facility: CLINIC | Age: 67
End: 2022-02-22

## 2022-02-22 DIAGNOSIS — F33.2 SEVERE EPISODE OF RECURRENT MAJOR DEPRESSIVE DISORDER, WITHOUT PSYCHOTIC FEATURES: ICD-10-CM

## 2022-02-22 DIAGNOSIS — F51.05 INSOMNIA DUE TO MENTAL CONDITION: ICD-10-CM

## 2022-02-22 DIAGNOSIS — F32.1 MAJOR DEPRESSIVE DISORDER, SINGLE EPISODE, MODERATE: Primary | ICD-10-CM

## 2022-02-22 DIAGNOSIS — F33.1 MAJOR DEPRESSIVE DISORDER, RECURRENT EPISODE, MODERATE: ICD-10-CM

## 2022-02-22 DIAGNOSIS — F41.1 GENERALIZED ANXIETY DISORDER: ICD-10-CM

## 2022-02-22 PROCEDURE — 99214 OFFICE O/P EST MOD 30 MIN: CPT | Performed by: STUDENT IN AN ORGANIZED HEALTH CARE EDUCATION/TRAINING PROGRAM

## 2022-02-22 PROCEDURE — 90833 PSYTX W PT W E/M 30 MIN: CPT | Performed by: STUDENT IN AN ORGANIZED HEALTH CARE EDUCATION/TRAINING PROGRAM

## 2022-02-22 RX ORDER — MIRTAZAPINE 30 MG/1
30 TABLET, FILM COATED ORAL NIGHTLY
Qty: 30 TABLET | Refills: 2 | Status: SHIPPED | OUTPATIENT
Start: 2022-02-22 | End: 2022-04-21 | Stop reason: SDUPTHER

## 2022-03-01 ENCOUNTER — TELEPHONE (OUTPATIENT)
Dept: BEHAVIORAL HEALTH | Facility: CLINIC | Age: 67
End: 2022-03-01

## 2022-03-01 RX ORDER — METOPROLOL SUCCINATE 25 MG/1
TABLET, EXTENDED RELEASE ORAL
Qty: 90 TABLET | Refills: 0 | OUTPATIENT
Start: 2022-03-01

## 2022-03-04 ENCOUNTER — TELEMEDICINE (OUTPATIENT)
Dept: PSYCHIATRY | Facility: CLINIC | Age: 67
End: 2022-03-04

## 2022-03-04 DIAGNOSIS — F41.1 GENERALIZED ANXIETY DISORDER: ICD-10-CM

## 2022-03-04 DIAGNOSIS — F32.1 MAJOR DEPRESSIVE DISORDER, SINGLE EPISODE, MODERATE: ICD-10-CM

## 2022-03-04 DIAGNOSIS — F51.05 INSOMNIA DUE TO MENTAL CONDITION: ICD-10-CM

## 2022-03-04 DIAGNOSIS — F33.2 SEVERE EPISODE OF RECURRENT MAJOR DEPRESSIVE DISORDER, WITHOUT PSYCHOTIC FEATURES: Primary | ICD-10-CM

## 2022-03-04 PROCEDURE — 99214 OFFICE O/P EST MOD 30 MIN: CPT | Performed by: STUDENT IN AN ORGANIZED HEALTH CARE EDUCATION/TRAINING PROGRAM

## 2022-03-04 PROCEDURE — 90833 PSYTX W PT W E/M 30 MIN: CPT | Performed by: STUDENT IN AN ORGANIZED HEALTH CARE EDUCATION/TRAINING PROGRAM

## 2022-03-04 RX ORDER — PROPRANOLOL HYDROCHLORIDE 10 MG/1
10 TABLET ORAL DAILY
Qty: 30 TABLET | Refills: 2 | Status: SHIPPED | OUTPATIENT
Start: 2022-03-04 | End: 2022-03-11 | Stop reason: SDUPTHER

## 2022-03-11 ENCOUNTER — TELEMEDICINE (OUTPATIENT)
Dept: PSYCHIATRY | Facility: CLINIC | Age: 67
End: 2022-03-11

## 2022-03-11 DIAGNOSIS — F33.2 SEVERE EPISODE OF RECURRENT MAJOR DEPRESSIVE DISORDER, WITHOUT PSYCHOTIC FEATURES: Primary | ICD-10-CM

## 2022-03-11 DIAGNOSIS — F41.1 GENERALIZED ANXIETY DISORDER: ICD-10-CM

## 2022-03-11 DIAGNOSIS — F51.05 INSOMNIA DUE TO MENTAL CONDITION: ICD-10-CM

## 2022-03-11 PROCEDURE — 99214 OFFICE O/P EST MOD 30 MIN: CPT | Performed by: STUDENT IN AN ORGANIZED HEALTH CARE EDUCATION/TRAINING PROGRAM

## 2022-03-11 PROCEDURE — 90833 PSYTX W PT W E/M 30 MIN: CPT | Performed by: STUDENT IN AN ORGANIZED HEALTH CARE EDUCATION/TRAINING PROGRAM

## 2022-03-11 RX ORDER — PROPRANOLOL HYDROCHLORIDE 10 MG/1
10 TABLET ORAL 2 TIMES DAILY
Qty: 60 TABLET | Refills: 2 | Status: SHIPPED | OUTPATIENT
Start: 2022-03-11 | End: 2022-06-01

## 2022-03-17 ENCOUNTER — TELEMEDICINE (OUTPATIENT)
Dept: PSYCHIATRY | Facility: CLINIC | Age: 67
End: 2022-03-17

## 2022-03-17 DIAGNOSIS — F41.1 GENERALIZED ANXIETY DISORDER: ICD-10-CM

## 2022-03-17 DIAGNOSIS — F33.2 SEVERE EPISODE OF RECURRENT MAJOR DEPRESSIVE DISORDER, WITHOUT PSYCHOTIC FEATURES: Primary | ICD-10-CM

## 2022-03-17 DIAGNOSIS — F51.05 INSOMNIA DUE TO MENTAL CONDITION: ICD-10-CM

## 2022-03-17 PROCEDURE — 90833 PSYTX W PT W E/M 30 MIN: CPT | Performed by: STUDENT IN AN ORGANIZED HEALTH CARE EDUCATION/TRAINING PROGRAM

## 2022-03-17 PROCEDURE — 99214 OFFICE O/P EST MOD 30 MIN: CPT | Performed by: STUDENT IN AN ORGANIZED HEALTH CARE EDUCATION/TRAINING PROGRAM

## 2022-03-17 RX ORDER — QUETIAPINE FUMARATE 50 MG/1
50 TABLET, EXTENDED RELEASE ORAL NIGHTLY
Qty: 30 EACH | Refills: 2 | Status: SHIPPED | OUTPATIENT
Start: 2022-03-17 | End: 2022-04-04 | Stop reason: SINTOL

## 2022-03-23 ENCOUNTER — HOSPITAL ENCOUNTER (OUTPATIENT)
Dept: RESPIRATORY THERAPY | Facility: HOSPITAL | Age: 67
Discharge: HOME | End: 2022-03-23

## 2022-03-23 ENCOUNTER — PROCEDURE VISIT (OUTPATIENT)
Dept: CARDIAC REHAB | Facility: HOSPITAL | Age: 67
End: 2022-03-23

## 2022-03-23 DIAGNOSIS — E66.01 MORBID (SEVERE) OBESITY DUE TO EXCESS CALORIES: ICD-10-CM

## 2022-03-23 DIAGNOSIS — Z87.891 EX-SMOKER: ICD-10-CM

## 2022-03-23 DIAGNOSIS — J44.9 CHRONIC OBSTRUCTIVE PULMONARY DISEASE, UNSPECIFIED COPD TYPE: ICD-10-CM

## 2022-03-23 LAB
ARTERIAL PATENCY WRIST A: POSITIVE
BASE EXCESS BLDA CALC-SCNC: -8.5 MMOL/L (ref -2–2)
BDY SITE: ABNORMAL
COHGB MFR BLD: 0.7 % (ref 0–1.5)
FHHB: 4.5 % (ref 0–5)
HCO3 BLDA-SCNC: 15.3 MMOL/L (ref 22–26)
HGB BLDA-MCNC: 15.2 G/DL (ref 11.7–14.6)
LACTATE BLDA-SCNC: ABNORMAL MMOL/L
METHGB BLD QL: 0.3 % (ref 0–1.5)
MODALITY: ABNORMAL
OXYHGB MFR BLDV: 94.5 % (ref 94–99)
PCO2 BLDA: 27.7 MM HG (ref 35–45)
PH BLDA: 7.36 PH UNITS (ref 7.35–7.45)
PO2 BLDA: 83.4 MM HG (ref 80–100)
SAO2 % BLDCOA: 95.5 % (ref 95–99)

## 2022-03-23 PROCEDURE — 94729 DIFFUSING CAPACITY: CPT

## 2022-03-23 PROCEDURE — 82805 BLOOD GASES W/O2 SATURATION: CPT | Performed by: SPECIALIST

## 2022-03-23 PROCEDURE — 94060 EVALUATION OF WHEEZING: CPT

## 2022-03-23 PROCEDURE — 83050 HGB METHEMOGLOBIN QUAN: CPT | Performed by: SPECIALIST

## 2022-03-23 PROCEDURE — 82375 ASSAY CARBOXYHB QUANT: CPT | Performed by: SPECIALIST

## 2022-03-23 PROCEDURE — 94618 PULMONARY STRESS TESTING: CPT

## 2022-03-23 PROCEDURE — 36600 WITHDRAWAL OF ARTERIAL BLOOD: CPT | Performed by: SPECIALIST

## 2022-03-23 PROCEDURE — 94726 PLETHYSMOGRAPHY LUNG VOLUMES: CPT | Performed by: INTERNAL MEDICINE

## 2022-03-23 PROCEDURE — 94729 DIFFUSING CAPACITY: CPT | Performed by: INTERNAL MEDICINE

## 2022-03-23 PROCEDURE — 94060 EVALUATION OF WHEEZING: CPT | Performed by: INTERNAL MEDICINE

## 2022-03-23 PROCEDURE — 94726 PLETHYSMOGRAPHY LUNG VOLUMES: CPT

## 2022-03-23 RX ORDER — ALBUTEROL SULFATE 2.5 MG/3ML
2.5 SOLUTION RESPIRATORY (INHALATION) ONCE
Status: COMPLETED | OUTPATIENT
Start: 2022-03-23 | End: 2022-03-23

## 2022-03-23 RX ADMIN — ALBUTEROL SULFATE 2.5 MG: 2.5 SOLUTION RESPIRATORY (INHALATION) at 10:09

## 2022-03-25 ENCOUNTER — TELEPHONE (OUTPATIENT)
Dept: BEHAVIORAL HEALTH | Facility: CLINIC | Age: 67
End: 2022-03-25

## 2022-03-25 ENCOUNTER — LAB (OUTPATIENT)
Dept: LAB | Facility: HOSPITAL | Age: 67
End: 2022-03-25

## 2022-03-25 ENCOUNTER — DOCUMENTATION (OUTPATIENT)
Dept: PSYCHIATRY | Facility: CLINIC | Age: 67
End: 2022-03-25

## 2022-03-25 ENCOUNTER — OFFICE VISIT (OUTPATIENT)
Dept: FAMILY MEDICINE CLINIC | Age: 67
End: 2022-03-25

## 2022-03-25 VITALS
WEIGHT: 278 LBS | HEART RATE: 58 BPM | TEMPERATURE: 98.2 F | BODY MASS INDEX: 47.46 KG/M2 | DIASTOLIC BLOOD PRESSURE: 97 MMHG | OXYGEN SATURATION: 93 % | SYSTOLIC BLOOD PRESSURE: 125 MMHG | HEIGHT: 64 IN

## 2022-03-25 DIAGNOSIS — E55.9 VITAMIN D DEFICIENCY: ICD-10-CM

## 2022-03-25 DIAGNOSIS — I10 ESSENTIAL HYPERTENSION: ICD-10-CM

## 2022-03-25 DIAGNOSIS — R10.30 LOWER ABDOMINAL PAIN: ICD-10-CM

## 2022-03-25 DIAGNOSIS — I10 ESSENTIAL HYPERTENSION: Primary | ICD-10-CM

## 2022-03-25 DIAGNOSIS — R53.83 FATIGUE, UNSPECIFIED TYPE: ICD-10-CM

## 2022-03-25 DIAGNOSIS — E66.01 OBESITY, MORBID, BMI 40.0-49.9: ICD-10-CM

## 2022-03-25 DIAGNOSIS — F33.2 SEVERE EPISODE OF RECURRENT MAJOR DEPRESSIVE DISORDER, WITHOUT PSYCHOTIC FEATURES: ICD-10-CM

## 2022-03-25 DIAGNOSIS — Z11.59 SCREENING FOR VIRAL DISEASE: ICD-10-CM

## 2022-03-25 DIAGNOSIS — R73.9 HYPERGLYCEMIA, UNSPECIFIED: ICD-10-CM

## 2022-03-25 DIAGNOSIS — E78.00 HYPERCHOLESTEROLEMIA: ICD-10-CM

## 2022-03-25 DIAGNOSIS — F41.1 GENERALIZED ANXIETY DISORDER: ICD-10-CM

## 2022-03-25 LAB
25(OH)D3 SERPL-MCNC: 29.5 NG/ML (ref 30–100)
ALBUMIN SERPL-MCNC: 3.9 G/DL (ref 3.5–5.2)
ALBUMIN UR-MCNC: 1.4 MG/DL
ALBUMIN/GLOB SERPL: 1.1 G/DL
ALP SERPL-CCNC: 146 U/L (ref 39–117)
ALT SERPL W P-5'-P-CCNC: 14 U/L (ref 1–33)
ANION GAP SERPL CALCULATED.3IONS-SCNC: 14.7 MMOL/L (ref 5–15)
AST SERPL-CCNC: 14 U/L (ref 1–32)
BASOPHILS # BLD AUTO: 0.05 10*3/MM3 (ref 0–0.2)
BASOPHILS NFR BLD AUTO: 0.9 % (ref 0–1.5)
BILIRUB BLD-MCNC: NEGATIVE MG/DL
BILIRUB SERPL-MCNC: 0.2 MG/DL (ref 0–1.2)
BUN SERPL-MCNC: 14 MG/DL (ref 8–23)
BUN/CREAT SERPL: 14.1 (ref 7–25)
CALCIUM SPEC-SCNC: 9.6 MG/DL (ref 8.6–10.5)
CHLORIDE SERPL-SCNC: 102 MMOL/L (ref 98–107)
CHOLEST SERPL-MCNC: 186 MG/DL (ref 0–200)
CLARITY, POC: CLEAR
CO2 SERPL-SCNC: 25.3 MMOL/L (ref 22–29)
COLOR UR: YELLOW
CREAT SERPL-MCNC: 0.99 MG/DL (ref 0.57–1)
DEPRECATED RDW RBC AUTO: 45.3 FL (ref 37–54)
EGFRCR SERPLBLD CKD-EPI 2021: 63 ML/MIN/1.73
EOSINOPHIL # BLD AUTO: 0.25 10*3/MM3 (ref 0–0.4)
EOSINOPHIL NFR BLD AUTO: 4.3 % (ref 0.3–6.2)
ERYTHROCYTE [DISTWIDTH] IN BLOOD BY AUTOMATED COUNT: 12.9 % (ref 12.3–15.4)
EXPIRATION DATE: NORMAL
FERRITIN SERPL-MCNC: 66.6 NG/ML (ref 13–150)
GLOBULIN UR ELPH-MCNC: 3.5 GM/DL
GLUCOSE SERPL-MCNC: 122 MG/DL (ref 65–99)
GLUCOSE UR STRIP-MCNC: NEGATIVE MG/DL
HBA1C MFR BLD: 6 % (ref 4.8–5.6)
HCT VFR BLD AUTO: 47 % (ref 34–46.6)
HCV AB SER DONR QL: NORMAL
HDLC SERPL-MCNC: 59 MG/DL (ref 40–60)
HGB BLD-MCNC: 14.9 G/DL (ref 12–15.9)
IMM GRANULOCYTES # BLD AUTO: 0.02 10*3/MM3 (ref 0–0.05)
IMM GRANULOCYTES NFR BLD AUTO: 0.3 % (ref 0–0.5)
IRON 24H UR-MRATE: 82 MCG/DL (ref 37–145)
IRON SATN MFR SERPL: 22 % (ref 20–50)
KETONES UR QL: NEGATIVE
LDLC SERPL CALC-MCNC: 111 MG/DL (ref 0–100)
LDLC/HDLC SERPL: 1.86 {RATIO}
LEUKOCYTE EST, POC: NEGATIVE
LYMPHOCYTES # BLD AUTO: 2.03 10*3/MM3 (ref 0.7–3.1)
LYMPHOCYTES NFR BLD AUTO: 35.2 % (ref 19.6–45.3)
Lab: NORMAL
MCH RBC QN AUTO: 30 PG (ref 26.6–33)
MCHC RBC AUTO-ENTMCNC: 31.7 G/DL (ref 31.5–35.7)
MCV RBC AUTO: 94.8 FL (ref 79–97)
MONOCYTES # BLD AUTO: 0.74 10*3/MM3 (ref 0.1–0.9)
MONOCYTES NFR BLD AUTO: 12.8 % (ref 5–12)
NEUTROPHILS NFR BLD AUTO: 2.67 10*3/MM3 (ref 1.7–7)
NEUTROPHILS NFR BLD AUTO: 46.5 % (ref 42.7–76)
NITRITE UR-MCNC: NEGATIVE MG/ML
PH UR: 6 [PH] (ref 5–8)
PLATELET # BLD AUTO: 272 10*3/MM3 (ref 140–450)
PMV BLD AUTO: 9.3 FL (ref 6–12)
POTASSIUM SERPL-SCNC: 4.2 MMOL/L (ref 3.5–5.2)
PROT SERPL-MCNC: 7.4 G/DL (ref 6–8.5)
PROT UR STRIP-MCNC: NEGATIVE MG/DL
RBC # BLD AUTO: 4.96 10*6/MM3 (ref 3.77–5.28)
RBC # UR STRIP: NEGATIVE /UL
SODIUM SERPL-SCNC: 142 MMOL/L (ref 136–145)
SP GR UR: 1.01 (ref 1–1.03)
TIBC SERPL-MCNC: 371 MCG/DL (ref 298–536)
TRANSFERRIN SERPL-MCNC: 249 MG/DL (ref 200–360)
TRIGL SERPL-MCNC: 85 MG/DL (ref 0–150)
TSH SERPL DL<=0.05 MIU/L-ACNC: 0.95 UIU/ML (ref 0.27–4.2)
UROBILINOGEN UR QL: NORMAL
VIT B12 BLD-MCNC: 311 PG/ML (ref 211–946)
VLDLC SERPL-MCNC: 16 MG/DL (ref 5–40)
WBC NRBC COR # BLD: 5.76 10*3/MM3 (ref 3.4–10.8)

## 2022-03-25 PROCEDURE — 36415 COLL VENOUS BLD VENIPUNCTURE: CPT | Performed by: NURSE PRACTITIONER

## 2022-03-25 PROCEDURE — 84443 ASSAY THYROID STIM HORMONE: CPT

## 2022-03-25 PROCEDURE — 83036 HEMOGLOBIN GLYCOSYLATED A1C: CPT

## 2022-03-25 PROCEDURE — 80061 LIPID PANEL: CPT

## 2022-03-25 PROCEDURE — 84466 ASSAY OF TRANSFERRIN: CPT | Performed by: NURSE PRACTITIONER

## 2022-03-25 PROCEDURE — 82728 ASSAY OF FERRITIN: CPT | Performed by: NURSE PRACTITIONER

## 2022-03-25 PROCEDURE — 83540 ASSAY OF IRON: CPT | Performed by: NURSE PRACTITIONER

## 2022-03-25 PROCEDURE — 80053 COMPREHEN METABOLIC PANEL: CPT | Performed by: NURSE PRACTITIONER

## 2022-03-25 PROCEDURE — 82306 VITAMIN D 25 HYDROXY: CPT

## 2022-03-25 PROCEDURE — 86803 HEPATITIS C AB TEST: CPT

## 2022-03-25 PROCEDURE — 81003 URINALYSIS AUTO W/O SCOPE: CPT | Performed by: NURSE PRACTITIONER

## 2022-03-25 PROCEDURE — 82607 VITAMIN B-12: CPT | Performed by: NURSE PRACTITIONER

## 2022-03-25 PROCEDURE — 85025 COMPLETE CBC W/AUTO DIFF WBC: CPT | Performed by: NURSE PRACTITIONER

## 2022-03-25 PROCEDURE — 82043 UR ALBUMIN QUANTITATIVE: CPT

## 2022-03-25 PROCEDURE — 99214 OFFICE O/P EST MOD 30 MIN: CPT | Performed by: NURSE PRACTITIONER

## 2022-03-25 RX ORDER — VENLAFAXINE HYDROCHLORIDE 75 MG/1
75 CAPSULE, EXTENDED RELEASE ORAL DAILY
Qty: 30 CAPSULE | Refills: 2
Start: 2022-03-25 | End: 2022-04-11

## 2022-03-25 RX ORDER — CLONIDINE HYDROCHLORIDE 0.1 MG/1
0.1 TABLET ORAL EVERY 12 HOURS SCHEDULED
Status: DISCONTINUED | OUTPATIENT
Start: 2022-03-25 | End: 2022-04-28

## 2022-03-31 ENCOUNTER — OFFICE VISIT (OUTPATIENT)
Dept: PULMONOLOGY | Facility: CLINIC | Age: 67
End: 2022-03-31

## 2022-03-31 VITALS
TEMPERATURE: 98.4 F | SYSTOLIC BLOOD PRESSURE: 117 MMHG | OXYGEN SATURATION: 96 % | RESPIRATION RATE: 16 BRPM | HEART RATE: 69 BPM | DIASTOLIC BLOOD PRESSURE: 37 MMHG

## 2022-03-31 DIAGNOSIS — I50.32 CHRONIC DIASTOLIC HEART FAILURE: ICD-10-CM

## 2022-03-31 DIAGNOSIS — R06.09 DOE (DYSPNEA ON EXERTION): Primary | ICD-10-CM

## 2022-03-31 DIAGNOSIS — J30.9 ALLERGIC RHINITIS, UNSPECIFIED SEASONALITY, UNSPECIFIED TRIGGER: ICD-10-CM

## 2022-03-31 DIAGNOSIS — I10 ESSENTIAL HYPERTENSION: ICD-10-CM

## 2022-03-31 DIAGNOSIS — R09.02 HYPOXIA: ICD-10-CM

## 2022-03-31 DIAGNOSIS — J30.2 SEASONAL ALLERGIES: ICD-10-CM

## 2022-03-31 DIAGNOSIS — Z99.89 OSA ON CPAP: ICD-10-CM

## 2022-03-31 DIAGNOSIS — R53.83 FATIGUE, UNSPECIFIED TYPE: ICD-10-CM

## 2022-03-31 DIAGNOSIS — G47.33 OSA ON CPAP: ICD-10-CM

## 2022-03-31 DIAGNOSIS — Z23 ENCOUNTER FOR IMMUNIZATION: ICD-10-CM

## 2022-03-31 DIAGNOSIS — E66.01 MORBID OBESITY WITH BMI OF 45.0-49.9, ADULT: ICD-10-CM

## 2022-03-31 PROCEDURE — 99215 OFFICE O/P EST HI 40 MIN: CPT | Performed by: NURSE PRACTITIONER

## 2022-03-31 PROCEDURE — G0008 ADMIN INFLUENZA VIRUS VAC: HCPCS | Performed by: NURSE PRACTITIONER

## 2022-03-31 PROCEDURE — 90662 IIV NO PRSV INCREASED AG IM: CPT | Performed by: NURSE PRACTITIONER

## 2022-04-01 ENCOUNTER — TELEPHONE (OUTPATIENT)
Dept: PSYCHIATRY | Facility: CLINIC | Age: 67
End: 2022-04-01

## 2022-04-04 ENCOUNTER — TELEMEDICINE (OUTPATIENT)
Dept: PSYCHIATRY | Facility: CLINIC | Age: 67
End: 2022-04-04

## 2022-04-04 DIAGNOSIS — F33.2 SEVERE EPISODE OF RECURRENT MAJOR DEPRESSIVE DISORDER, WITHOUT PSYCHOTIC FEATURES: Primary | ICD-10-CM

## 2022-04-04 DIAGNOSIS — F41.1 GENERALIZED ANXIETY DISORDER: ICD-10-CM

## 2022-04-04 DIAGNOSIS — F51.05 INSOMNIA DUE TO MENTAL CONDITION: ICD-10-CM

## 2022-04-04 PROCEDURE — 99214 OFFICE O/P EST MOD 30 MIN: CPT | Performed by: STUDENT IN AN ORGANIZED HEALTH CARE EDUCATION/TRAINING PROGRAM

## 2022-04-04 PROCEDURE — 90833 PSYTX W PT W E/M 30 MIN: CPT | Performed by: STUDENT IN AN ORGANIZED HEALTH CARE EDUCATION/TRAINING PROGRAM

## 2022-04-04 RX ORDER — VENLAFAXINE HYDROCHLORIDE 37.5 MG/1
CAPSULE, EXTENDED RELEASE ORAL
COMMUNITY
Start: 2022-03-31 | End: 2022-04-04

## 2022-04-04 RX ORDER — LURASIDONE HYDROCHLORIDE 40 MG/1
20 TABLET, FILM COATED ORAL DAILY
Qty: 14 TABLET | Refills: 0 | COMMUNITY
Start: 2022-04-04 | End: 2022-04-21 | Stop reason: SDUPTHER

## 2022-04-05 ENCOUNTER — TELEPHONE (OUTPATIENT)
Dept: FAMILY MEDICINE CLINIC | Age: 67
End: 2022-04-05

## 2022-04-06 DIAGNOSIS — I10 ESSENTIAL HYPERTENSION: ICD-10-CM

## 2022-04-06 RX ORDER — AMLODIPINE BESYLATE 5 MG/1
TABLET ORAL
Qty: 60 TABLET | Refills: 0 | Status: SHIPPED | OUTPATIENT
Start: 2022-04-06 | End: 2022-06-02

## 2022-04-08 ENCOUNTER — TELEPHONE (OUTPATIENT)
Dept: FAMILY MEDICINE CLINIC | Age: 67
End: 2022-04-08

## 2022-04-08 ENCOUNTER — TELEPHONE (OUTPATIENT)
Dept: PSYCHIATRY | Facility: CLINIC | Age: 67
End: 2022-04-08

## 2022-04-11 ENCOUNTER — TELEMEDICINE (OUTPATIENT)
Dept: PSYCHIATRY | Facility: CLINIC | Age: 67
End: 2022-04-11

## 2022-04-11 DIAGNOSIS — F51.05 INSOMNIA DUE TO MENTAL CONDITION: ICD-10-CM

## 2022-04-11 DIAGNOSIS — F33.2 SEVERE EPISODE OF RECURRENT MAJOR DEPRESSIVE DISORDER, WITHOUT PSYCHOTIC FEATURES: Primary | ICD-10-CM

## 2022-04-11 DIAGNOSIS — F41.1 GENERALIZED ANXIETY DISORDER: ICD-10-CM

## 2022-04-11 PROCEDURE — 90833 PSYTX W PT W E/M 30 MIN: CPT | Performed by: STUDENT IN AN ORGANIZED HEALTH CARE EDUCATION/TRAINING PROGRAM

## 2022-04-11 PROCEDURE — 99214 OFFICE O/P EST MOD 30 MIN: CPT | Performed by: STUDENT IN AN ORGANIZED HEALTH CARE EDUCATION/TRAINING PROGRAM

## 2022-04-11 RX ORDER — DULOXETIN HYDROCHLORIDE 30 MG/1
30 CAPSULE, DELAYED RELEASE ORAL DAILY
Qty: 30 CAPSULE | Refills: 2 | Status: SHIPPED | OUTPATIENT
Start: 2022-04-11 | End: 2022-04-21 | Stop reason: SDUPTHER

## 2022-04-12 ENCOUNTER — HOSPITAL ENCOUNTER (OUTPATIENT)
Dept: CT IMAGING | Facility: HOSPITAL | Age: 67
Discharge: HOME | End: 2022-04-12
Admitting: NURSE PRACTITIONER

## 2022-04-12 ENCOUNTER — TELEPHONE (OUTPATIENT)
Dept: FAMILY MEDICINE CLINIC | Age: 67
End: 2022-04-12

## 2022-04-12 DIAGNOSIS — R06.09 DOE (DYSPNEA ON EXERTION): ICD-10-CM

## 2022-04-12 PROCEDURE — 71250 CT THORAX DX C-: CPT

## 2022-04-13 ENCOUNTER — DOCUMENTATION (OUTPATIENT)
Dept: PSYCHIATRY | Facility: CLINIC | Age: 67
End: 2022-04-13

## 2022-04-13 ENCOUNTER — TELEPHONE (OUTPATIENT)
Dept: FAMILY MEDICINE CLINIC | Age: 67
End: 2022-04-13

## 2022-04-13 ENCOUNTER — TELEPHONE (OUTPATIENT)
Dept: PSYCHIATRY | Facility: CLINIC | Age: 67
End: 2022-04-13

## 2022-04-18 DIAGNOSIS — F41.1 GENERALIZED ANXIETY DISORDER: ICD-10-CM

## 2022-04-18 RX ORDER — CLONAZEPAM 1 MG/1
TABLET ORAL
Qty: 60 TABLET | Refills: 2 | Status: SHIPPED | OUTPATIENT
Start: 2022-04-18 | End: 2022-05-02

## 2022-04-19 ENCOUNTER — TELEPHONE (OUTPATIENT)
Dept: PSYCHIATRY | Facility: CLINIC | Age: 67
End: 2022-04-19

## 2022-04-19 ENCOUNTER — TELEPHONE (OUTPATIENT)
Dept: FAMILY MEDICINE CLINIC | Age: 67
End: 2022-04-19

## 2022-04-19 RX ORDER — BACLOFEN 10 MG/1
TABLET ORAL
Qty: 90 TABLET | Refills: 2 | Status: SHIPPED | OUTPATIENT
Start: 2022-04-19 | End: 2022-11-29

## 2022-04-20 ENCOUNTER — TELEPHONE (OUTPATIENT)
Dept: FAMILY MEDICINE CLINIC | Age: 67
End: 2022-04-20

## 2022-04-20 RX ORDER — CALCIUM CARBONATE 160(400)MG
1 TABLET,CHEWABLE ORAL DAILY
Qty: 1 EACH | Refills: 0 | Status: SHIPPED | OUTPATIENT
Start: 2022-04-20 | End: 2022-06-01

## 2022-04-21 ENCOUNTER — OFFICE VISIT (OUTPATIENT)
Dept: PSYCHIATRY | Facility: CLINIC | Age: 67
End: 2022-04-21

## 2022-04-21 VITALS
BODY MASS INDEX: 48.49 KG/M2 | HEIGHT: 64 IN | WEIGHT: 284 LBS | SYSTOLIC BLOOD PRESSURE: 147 MMHG | DIASTOLIC BLOOD PRESSURE: 117 MMHG

## 2022-04-21 DIAGNOSIS — F32.1 MAJOR DEPRESSIVE DISORDER, SINGLE EPISODE, MODERATE: ICD-10-CM

## 2022-04-21 DIAGNOSIS — F33.2 SEVERE EPISODE OF RECURRENT MAJOR DEPRESSIVE DISORDER, WITHOUT PSYCHOTIC FEATURES: ICD-10-CM

## 2022-04-21 DIAGNOSIS — F33.1 MAJOR DEPRESSIVE DISORDER, RECURRENT EPISODE, MODERATE: ICD-10-CM

## 2022-04-21 DIAGNOSIS — F51.05 INSOMNIA DUE TO MENTAL CONDITION: ICD-10-CM

## 2022-04-21 DIAGNOSIS — F41.1 GENERALIZED ANXIETY DISORDER: Primary | ICD-10-CM

## 2022-04-21 DIAGNOSIS — M19.90 OSTEOARTHRITIS, UNSPECIFIED OSTEOARTHRITIS TYPE, UNSPECIFIED SITE: Primary | ICD-10-CM

## 2022-04-21 DIAGNOSIS — F60.9 CLUSTER B PERSONALITY DISORDER IN ADULT: ICD-10-CM

## 2022-04-21 PROCEDURE — 99214 OFFICE O/P EST MOD 30 MIN: CPT | Performed by: STUDENT IN AN ORGANIZED HEALTH CARE EDUCATION/TRAINING PROGRAM

## 2022-04-21 PROCEDURE — 90833 PSYTX W PT W E/M 30 MIN: CPT | Performed by: STUDENT IN AN ORGANIZED HEALTH CARE EDUCATION/TRAINING PROGRAM

## 2022-04-21 RX ORDER — LURASIDONE HYDROCHLORIDE 20 MG/1
20 TABLET, FILM COATED ORAL DAILY
Qty: 28 TABLET | Refills: 0 | COMMUNITY
Start: 2022-04-21 | End: 2022-05-10 | Stop reason: SDUPTHER

## 2022-04-21 RX ORDER — DULOXETIN HYDROCHLORIDE 60 MG/1
60 CAPSULE, DELAYED RELEASE ORAL DAILY
Qty: 30 CAPSULE | Refills: 2 | Status: SHIPPED | OUTPATIENT
Start: 2022-04-21 | End: 2022-05-26

## 2022-04-21 RX ORDER — MIRTAZAPINE 30 MG/1
30 TABLET, FILM COATED ORAL NIGHTLY
Qty: 30 TABLET | Refills: 2 | Status: SHIPPED | OUTPATIENT
Start: 2022-05-22 | End: 2022-09-01 | Stop reason: SDUPTHER

## 2022-04-22 ENCOUNTER — TELEPHONE (OUTPATIENT)
Dept: FAMILY MEDICINE CLINIC | Age: 67
End: 2022-04-22

## 2022-04-25 ENCOUNTER — TELEPHONE (OUTPATIENT)
Dept: FAMILY MEDICINE CLINIC | Age: 67
End: 2022-04-25

## 2022-04-26 ENCOUNTER — PATIENT OUTREACH (OUTPATIENT)
Dept: CASE MANAGEMENT | Facility: OTHER | Age: 67
End: 2022-04-26

## 2022-04-28 ENCOUNTER — TELEPHONE (OUTPATIENT)
Dept: PSYCHIATRY | Facility: CLINIC | Age: 67
End: 2022-04-28

## 2022-04-28 ENCOUNTER — DOCUMENTATION (OUTPATIENT)
Dept: PSYCHIATRY | Facility: CLINIC | Age: 67
End: 2022-04-28

## 2022-04-28 DIAGNOSIS — I10 ESSENTIAL HYPERTENSION: Primary | ICD-10-CM

## 2022-04-28 RX ORDER — HYDRALAZINE HYDROCHLORIDE 25 MG/1
25 TABLET, FILM COATED ORAL 3 TIMES DAILY
Qty: 90 TABLET | Refills: 5 | Status: SHIPPED | OUTPATIENT
Start: 2022-04-28 | End: 2022-06-01

## 2022-05-02 ENCOUNTER — TELEPHONE (OUTPATIENT)
Dept: FAMILY MEDICINE CLINIC | Age: 67
End: 2022-05-02

## 2022-05-02 ENCOUNTER — OFFICE VISIT (OUTPATIENT)
Dept: FAMILY MEDICINE CLINIC | Age: 67
End: 2022-05-02

## 2022-05-02 VITALS
DIASTOLIC BLOOD PRESSURE: 82 MMHG | HEIGHT: 64 IN | SYSTOLIC BLOOD PRESSURE: 146 MMHG | BODY MASS INDEX: 47.02 KG/M2 | OXYGEN SATURATION: 95 % | HEART RATE: 60 BPM | TEMPERATURE: 97.9 F | WEIGHT: 275.4 LBS

## 2022-05-02 DIAGNOSIS — E11.9 TYPE 2 DIABETES MELLITUS WITHOUT COMPLICATION, WITHOUT LONG-TERM CURRENT USE OF INSULIN: ICD-10-CM

## 2022-05-02 DIAGNOSIS — F41.1 GENERALIZED ANXIETY DISORDER: ICD-10-CM

## 2022-05-02 DIAGNOSIS — E66.01 OBESITY, MORBID, BMI 40.0-49.9: Primary | ICD-10-CM

## 2022-05-02 DIAGNOSIS — R07.89 OTHER CHEST PAIN: ICD-10-CM

## 2022-05-02 DIAGNOSIS — I10 ESSENTIAL HYPERTENSION: ICD-10-CM

## 2022-05-02 PROCEDURE — 93000 ELECTROCARDIOGRAM COMPLETE: CPT | Performed by: FAMILY MEDICINE

## 2022-05-02 PROCEDURE — 99214 OFFICE O/P EST MOD 30 MIN: CPT | Performed by: FAMILY MEDICINE

## 2022-05-02 RX ORDER — SEMAGLUTIDE 1.34 MG/ML
0.5 INJECTION, SOLUTION SUBCUTANEOUS WEEKLY
Qty: 1 PEN | Refills: 5 | Status: SHIPPED | OUTPATIENT
Start: 2022-05-02 | End: 2022-06-09

## 2022-05-02 RX ORDER — LOSARTAN POTASSIUM 25 MG/1
TABLET ORAL
Qty: 90 TABLET | Refills: 1 | Status: SHIPPED | OUTPATIENT
Start: 2022-05-02 | End: 2022-06-09

## 2022-05-03 DIAGNOSIS — E11.9 TYPE 2 DIABETES MELLITUS WITHOUT COMPLICATION, WITHOUT LONG-TERM CURRENT USE OF INSULIN: ICD-10-CM

## 2022-05-03 RX ORDER — PEN NEEDLE, DIABETIC 30 GX3/16"
1 NEEDLE, DISPOSABLE MISCELLANEOUS
COMMUNITY
End: 2022-05-03 | Stop reason: SDUPTHER

## 2022-05-04 RX ORDER — PEN NEEDLE, DIABETIC 30 GX3/16"
1 NEEDLE, DISPOSABLE MISCELLANEOUS WEEKLY
Qty: 100 EACH | Refills: 0 | Status: SHIPPED | OUTPATIENT
Start: 2022-05-04 | End: 2022-06-01 | Stop reason: SDUPTHER

## 2022-05-05 ENCOUNTER — TELEPHONE (OUTPATIENT)
Dept: FAMILY MEDICINE CLINIC | Age: 67
End: 2022-05-05

## 2022-05-09 ENCOUNTER — OFFICE VISIT (OUTPATIENT)
Dept: FAMILY MEDICINE CLINIC | Age: 67
End: 2022-05-09

## 2022-05-09 VITALS
HEART RATE: 63 BPM | SYSTOLIC BLOOD PRESSURE: 148 MMHG | DIASTOLIC BLOOD PRESSURE: 54 MMHG | HEIGHT: 64 IN | BODY MASS INDEX: 47.46 KG/M2 | WEIGHT: 278 LBS

## 2022-05-09 DIAGNOSIS — I10 ESSENTIAL HYPERTENSION: ICD-10-CM

## 2022-05-09 DIAGNOSIS — Z23 ENCOUNTER FOR IMMUNIZATION: ICD-10-CM

## 2022-05-09 DIAGNOSIS — G47.33 OBSTRUCTIVE SLEEP APNEA SYNDROME: ICD-10-CM

## 2022-05-09 DIAGNOSIS — F41.9 ANXIETY: Primary | ICD-10-CM

## 2022-05-09 DIAGNOSIS — E11.9 TYPE 2 DIABETES MELLITUS WITHOUT COMPLICATION, WITHOUT LONG-TERM CURRENT USE OF INSULIN: ICD-10-CM

## 2022-05-09 DIAGNOSIS — Z78.0 POSTMENOPAUSAL STATE: ICD-10-CM

## 2022-05-09 PROCEDURE — 0054A COVID-19 (PFIZER) 12+ YRS: CPT | Performed by: FAMILY MEDICINE

## 2022-05-09 PROCEDURE — 91305 COVID-19 (PFIZER) 12+ YRS: CPT | Performed by: FAMILY MEDICINE

## 2022-05-09 PROCEDURE — 99214 OFFICE O/P EST MOD 30 MIN: CPT | Performed by: FAMILY MEDICINE

## 2022-05-09 RX ORDER — SYRING-NEEDL,DISP,INSUL,0.3 ML 30 GX5/16"
1 SYRINGE, EMPTY DISPOSABLE MISCELLANEOUS DAILY
Qty: 100 EACH | Refills: 3 | Status: SHIPPED | OUTPATIENT
Start: 2022-05-09 | End: 2022-06-01 | Stop reason: SDUPTHER

## 2022-05-10 ENCOUNTER — TELEPHONE (OUTPATIENT)
Dept: FAMILY MEDICINE CLINIC | Age: 67
End: 2022-05-10

## 2022-05-10 ENCOUNTER — TELEPHONE (OUTPATIENT)
Dept: PSYCHIATRY | Facility: CLINIC | Age: 67
End: 2022-05-10

## 2022-05-10 DIAGNOSIS — F51.05 INSOMNIA DUE TO MENTAL CONDITION: ICD-10-CM

## 2022-05-10 DIAGNOSIS — F33.2 SEVERE EPISODE OF RECURRENT MAJOR DEPRESSIVE DISORDER, WITHOUT PSYCHOTIC FEATURES: ICD-10-CM

## 2022-05-10 DIAGNOSIS — F41.1 GENERALIZED ANXIETY DISORDER: ICD-10-CM

## 2022-05-10 RX ORDER — LURASIDONE HYDROCHLORIDE 20 MG/1
20 TABLET, FILM COATED ORAL DAILY
Qty: 28 TABLET | Refills: 0 | COMMUNITY
Start: 2022-05-10 | End: 2022-05-10 | Stop reason: SDUPTHER

## 2022-05-10 RX ORDER — LURASIDONE HYDROCHLORIDE 40 MG/1
20 TABLET, FILM COATED ORAL DAILY
Qty: 14 TABLET | Refills: 0 | COMMUNITY
Start: 2022-05-19 | End: 2022-06-13 | Stop reason: SDUPTHER

## 2022-05-11 ENCOUNTER — TELEPHONE (OUTPATIENT)
Dept: PSYCHIATRY | Facility: CLINIC | Age: 67
End: 2022-05-11

## 2022-05-12 RX ORDER — LABETALOL 100 MG/1
TABLET, FILM COATED ORAL
Qty: 180 TABLET | Refills: 0 | Status: SHIPPED | OUTPATIENT
Start: 2022-05-12 | End: 2022-06-01 | Stop reason: SDUPTHER

## 2022-05-13 ENCOUNTER — HOSPITAL ENCOUNTER (OUTPATIENT)
Dept: BONE DENSITY | Facility: HOSPITAL | Age: 67
Discharge: HOME | End: 2022-05-13
Admitting: FAMILY MEDICINE

## 2022-05-13 PROCEDURE — 77080 DXA BONE DENSITY AXIAL: CPT

## 2022-05-16 RX ORDER — CLONIDINE HYDROCHLORIDE 0.1 MG/1
TABLET ORAL
Qty: 270 TABLET | Refills: 0 | Status: SHIPPED | OUTPATIENT
Start: 2022-05-16 | End: 2022-05-19

## 2022-05-17 ENCOUNTER — TELEPHONE (OUTPATIENT)
Dept: FAMILY MEDICINE CLINIC | Age: 67
End: 2022-05-17

## 2022-05-18 ENCOUNTER — TELEPHONE (OUTPATIENT)
Dept: PSYCHIATRY | Facility: CLINIC | Age: 67
End: 2022-05-18

## 2022-05-19 ENCOUNTER — TELEMEDICINE (OUTPATIENT)
Dept: PSYCHIATRY | Facility: CLINIC | Age: 67
End: 2022-05-19

## 2022-05-19 ENCOUNTER — TELEPHONE (OUTPATIENT)
Dept: BEHAVIORAL HEALTH | Facility: CLINIC | Age: 67
End: 2022-05-19

## 2022-05-19 ENCOUNTER — TELEPHONE (OUTPATIENT)
Dept: PSYCHIATRY | Facility: CLINIC | Age: 67
End: 2022-05-19

## 2022-05-19 DIAGNOSIS — F51.05 INSOMNIA DUE TO MENTAL CONDITION: ICD-10-CM

## 2022-05-19 DIAGNOSIS — F41.1 GENERALIZED ANXIETY DISORDER: ICD-10-CM

## 2022-05-19 DIAGNOSIS — F33.1 MAJOR DEPRESSIVE DISORDER, RECURRENT EPISODE, MODERATE: Primary | ICD-10-CM

## 2022-05-19 PROCEDURE — 99214 OFFICE O/P EST MOD 30 MIN: CPT | Performed by: STUDENT IN AN ORGANIZED HEALTH CARE EDUCATION/TRAINING PROGRAM

## 2022-05-19 PROCEDURE — 90833 PSYTX W PT W E/M 30 MIN: CPT | Performed by: STUDENT IN AN ORGANIZED HEALTH CARE EDUCATION/TRAINING PROGRAM

## 2022-05-19 RX ORDER — GABAPENTIN 300 MG/1
300 CAPSULE ORAL 3 TIMES DAILY
Qty: 90 CAPSULE | Refills: 2 | Status: SHIPPED | OUTPATIENT
Start: 2022-05-19 | End: 2022-07-15

## 2022-05-19 RX ORDER — CLONAZEPAM 1 MG/1
1 TABLET ORAL DAILY PRN
Qty: 30 TABLET | Refills: 1 | Status: SHIPPED | OUTPATIENT
Start: 2022-05-19 | End: 2022-10-17

## 2022-05-24 RX ORDER — HYDROXYZINE HYDROCHLORIDE 25 MG/1
25 TABLET, FILM COATED ORAL EVERY 8 HOURS PRN
Qty: 60 TABLET | Refills: 2 | Status: SHIPPED | OUTPATIENT
Start: 2022-05-24 | End: 2022-06-01 | Stop reason: SDUPTHER

## 2022-05-26 DIAGNOSIS — F33.2 SEVERE EPISODE OF RECURRENT MAJOR DEPRESSIVE DISORDER, WITHOUT PSYCHOTIC FEATURES: ICD-10-CM

## 2022-05-26 DIAGNOSIS — F41.1 GENERALIZED ANXIETY DISORDER: ICD-10-CM

## 2022-05-26 RX ORDER — DULOXETIN HYDROCHLORIDE 30 MG/1
90 CAPSULE, DELAYED RELEASE ORAL DAILY
Qty: 90 CAPSULE | Refills: 0 | Status: SHIPPED
Start: 2022-05-26 | End: 2022-06-16 | Stop reason: SDUPTHER

## 2022-05-31 ENCOUNTER — LAB (OUTPATIENT)
Dept: LAB | Facility: HOSPITAL | Age: 67
End: 2022-05-31

## 2022-05-31 ENCOUNTER — OFFICE VISIT (OUTPATIENT)
Dept: FAMILY MEDICINE CLINIC | Age: 67
End: 2022-05-31

## 2022-05-31 ENCOUNTER — TELEPHONE (OUTPATIENT)
Dept: FAMILY MEDICINE CLINIC | Age: 67
End: 2022-05-31

## 2022-05-31 VITALS
HEART RATE: 69 BPM | HEIGHT: 64 IN | BODY MASS INDEX: 47.72 KG/M2 | OXYGEN SATURATION: 95 % | SYSTOLIC BLOOD PRESSURE: 122 MMHG | DIASTOLIC BLOOD PRESSURE: 88 MMHG

## 2022-05-31 DIAGNOSIS — Z13.6 ENCOUNTER FOR SCREENING FOR CARDIOVASCULAR DISORDERS: ICD-10-CM

## 2022-05-31 DIAGNOSIS — Z11.59 ENCOUNTER FOR SCREENING FOR OTHER VIRAL DISEASES: ICD-10-CM

## 2022-05-31 DIAGNOSIS — R53.1 GENERALIZED WEAKNESS: ICD-10-CM

## 2022-05-31 DIAGNOSIS — F41.9 ANXIETY: ICD-10-CM

## 2022-05-31 DIAGNOSIS — E55.9 VITAMIN D DEFICIENCY: ICD-10-CM

## 2022-05-31 DIAGNOSIS — Z12.11 COLON CANCER SCREENING: ICD-10-CM

## 2022-05-31 DIAGNOSIS — E78.00 HYPERCHOLESTEROLEMIA: ICD-10-CM

## 2022-05-31 DIAGNOSIS — Z12.31 ENCOUNTER FOR SCREENING MAMMOGRAM FOR BREAST CANCER: ICD-10-CM

## 2022-05-31 DIAGNOSIS — I10 ESSENTIAL HYPERTENSION: ICD-10-CM

## 2022-05-31 DIAGNOSIS — Z23 ENCOUNTER FOR IMMUNIZATION: ICD-10-CM

## 2022-05-31 DIAGNOSIS — E11.9 TYPE 2 DIABETES MELLITUS WITHOUT COMPLICATION, WITHOUT LONG-TERM CURRENT USE OF INSULIN: ICD-10-CM

## 2022-05-31 DIAGNOSIS — Z00.00 ENCOUNTER FOR ANNUAL WELLNESS EXAM IN MEDICARE PATIENT: Primary | ICD-10-CM

## 2022-05-31 DIAGNOSIS — Z78.0 POSTMENOPAUSAL STATE: ICD-10-CM

## 2022-05-31 LAB
BILIRUB UR QL STRIP: NEGATIVE
CLARITY UR: CLEAR
COLOR UR: YELLOW
DEPRECATED RDW RBC AUTO: 42.4 FL (ref 37–54)
ERYTHROCYTE [DISTWIDTH] IN BLOOD BY AUTOMATED COUNT: 12.5 % (ref 12.3–15.4)
GLUCOSE BLDC GLUCOMTR-MCNC: 94 MG/DL (ref 70–130)
GLUCOSE UR STRIP-MCNC: NEGATIVE MG/DL
HCT VFR BLD AUTO: 45.6 % (ref 34–46.6)
HGB BLD-MCNC: 15.2 G/DL (ref 12–15.9)
HGB UR QL STRIP.AUTO: NEGATIVE
KETONES UR QL STRIP: NEGATIVE
LEUKOCYTE ESTERASE UR QL STRIP.AUTO: NEGATIVE
MCH RBC QN AUTO: 30.6 PG (ref 26.6–33)
MCHC RBC AUTO-ENTMCNC: 33.3 G/DL (ref 31.5–35.7)
MCV RBC AUTO: 91.8 FL (ref 79–97)
NITRITE UR QL STRIP: NEGATIVE
PH UR STRIP.AUTO: 6.5 [PH] (ref 5–8)
PLATELET # BLD AUTO: 304 10*3/MM3 (ref 140–450)
PMV BLD AUTO: 9.6 FL (ref 6–12)
PROT UR QL STRIP: NEGATIVE
RBC # BLD AUTO: 4.97 10*6/MM3 (ref 3.77–5.28)
SP GR UR STRIP: 1.01 (ref 1–1.03)
UROBILINOGEN UR QL STRIP: NORMAL
WBC NRBC COR # BLD: 8.33 10*3/MM3 (ref 3.4–10.8)

## 2022-05-31 PROCEDURE — G0439 PPPS, SUBSEQ VISIT: HCPCS | Performed by: FAMILY MEDICINE

## 2022-05-31 PROCEDURE — 80053 COMPREHEN METABOLIC PANEL: CPT

## 2022-05-31 PROCEDURE — 82962 GLUCOSE BLOOD TEST: CPT | Performed by: FAMILY MEDICINE

## 2022-05-31 PROCEDURE — 1170F FXNL STATUS ASSESSED: CPT | Performed by: FAMILY MEDICINE

## 2022-05-31 PROCEDURE — 99214 OFFICE O/P EST MOD 30 MIN: CPT | Performed by: FAMILY MEDICINE

## 2022-05-31 PROCEDURE — 81003 URINALYSIS AUTO W/O SCOPE: CPT

## 2022-05-31 PROCEDURE — 36415 COLL VENOUS BLD VENIPUNCTURE: CPT

## 2022-05-31 PROCEDURE — 85027 COMPLETE CBC AUTOMATED: CPT

## 2022-05-31 PROCEDURE — 1159F MED LIST DOCD IN RCRD: CPT | Performed by: FAMILY MEDICINE

## 2022-05-31 RX ORDER — BUSPIRONE HYDROCHLORIDE 15 MG/1
TABLET ORAL
COMMUNITY
Start: 2022-05-24 | End: 2022-06-16 | Stop reason: SDUPTHER

## 2022-06-01 ENCOUNTER — OFFICE VISIT (OUTPATIENT)
Dept: CARDIOLOGY | Facility: CLINIC | Age: 67
End: 2022-06-01

## 2022-06-01 VITALS
WEIGHT: 271 LBS | HEART RATE: 75 BPM | DIASTOLIC BLOOD PRESSURE: 62 MMHG | SYSTOLIC BLOOD PRESSURE: 123 MMHG | HEIGHT: 64 IN | BODY MASS INDEX: 46.26 KG/M2

## 2022-06-01 DIAGNOSIS — I10 ESSENTIAL HYPERTENSION: ICD-10-CM

## 2022-06-01 DIAGNOSIS — R00.2 PALPITATIONS: ICD-10-CM

## 2022-06-01 DIAGNOSIS — I50.32 CHRONIC DIASTOLIC HEART FAILURE: Primary | ICD-10-CM

## 2022-06-01 DIAGNOSIS — R29.810 FACIAL WEAKNESS: Primary | ICD-10-CM

## 2022-06-01 LAB
ALBUMIN SERPL-MCNC: 4.2 G/DL (ref 3.5–5.2)
ALBUMIN/GLOB SERPL: 1.3 G/DL
ALP SERPL-CCNC: 127 U/L (ref 39–117)
ALT SERPL W P-5'-P-CCNC: 21 U/L (ref 1–33)
ANION GAP SERPL CALCULATED.3IONS-SCNC: 13.5 MMOL/L (ref 5–15)
AST SERPL-CCNC: 23 U/L (ref 1–32)
BILIRUB SERPL-MCNC: 0.4 MG/DL (ref 0–1.2)
BUN SERPL-MCNC: 11 MG/DL (ref 8–23)
BUN/CREAT SERPL: 13.1 (ref 7–25)
CALCIUM SPEC-SCNC: 9.4 MG/DL (ref 8.6–10.5)
CHLORIDE SERPL-SCNC: 101 MMOL/L (ref 98–107)
CO2 SERPL-SCNC: 24.5 MMOL/L (ref 22–29)
CREAT SERPL-MCNC: 0.84 MG/DL (ref 0.57–1)
EGFRCR SERPLBLD CKD-EPI 2021: 76.8 ML/MIN/1.73
GLOBULIN UR ELPH-MCNC: 3.2 GM/DL
GLUCOSE SERPL-MCNC: 92 MG/DL (ref 65–99)
POTASSIUM SERPL-SCNC: 3.9 MMOL/L (ref 3.5–5.2)
PROT SERPL-MCNC: 7.4 G/DL (ref 6–8.5)
SODIUM SERPL-SCNC: 139 MMOL/L (ref 136–145)

## 2022-06-01 PROCEDURE — 99213 OFFICE O/P EST LOW 20 MIN: CPT | Performed by: INTERNAL MEDICINE

## 2022-06-01 RX ORDER — LABETALOL 100 MG/1
100 TABLET, FILM COATED ORAL 2 TIMES DAILY
Qty: 180 TABLET | Refills: 1 | Status: SHIPPED | OUTPATIENT
Start: 2022-06-01 | End: 2022-08-09

## 2022-06-02 DIAGNOSIS — I10 ESSENTIAL HYPERTENSION: ICD-10-CM

## 2022-06-02 RX ORDER — AMLODIPINE BESYLATE 5 MG/1
TABLET ORAL
Qty: 60 TABLET | Refills: 0 | Status: SHIPPED | OUTPATIENT
Start: 2022-06-02 | End: 2022-08-02

## 2022-06-03 ENCOUNTER — OFFICE VISIT (OUTPATIENT)
Dept: PULMONOLOGY | Facility: CLINIC | Age: 67
End: 2022-06-03

## 2022-06-03 VITALS
WEIGHT: 271 LBS | HEIGHT: 64 IN | DIASTOLIC BLOOD PRESSURE: 83 MMHG | OXYGEN SATURATION: 97 % | SYSTOLIC BLOOD PRESSURE: 137 MMHG | HEART RATE: 57 BPM | RESPIRATION RATE: 16 BRPM | BODY MASS INDEX: 46.26 KG/M2

## 2022-06-03 DIAGNOSIS — G47.33 OSA ON CPAP: ICD-10-CM

## 2022-06-03 DIAGNOSIS — J30.2 SEASONAL ALLERGIES: ICD-10-CM

## 2022-06-03 DIAGNOSIS — J30.9 ALLERGIC RHINITIS, UNSPECIFIED SEASONALITY, UNSPECIFIED TRIGGER: ICD-10-CM

## 2022-06-03 DIAGNOSIS — I10 ESSENTIAL HYPERTENSION: ICD-10-CM

## 2022-06-03 DIAGNOSIS — Z99.89 OSA ON CPAP: ICD-10-CM

## 2022-06-03 DIAGNOSIS — R06.09 DYSPNEA ON EXERTION: Primary | ICD-10-CM

## 2022-06-03 DIAGNOSIS — E66.01 OBESITY, MORBID, BMI 40.0-49.9: ICD-10-CM

## 2022-06-03 DIAGNOSIS — I50.32 CHRONIC DIASTOLIC HEART FAILURE: ICD-10-CM

## 2022-06-03 PROCEDURE — 99214 OFFICE O/P EST MOD 30 MIN: CPT | Performed by: NURSE PRACTITIONER

## 2022-06-03 RX ORDER — FLUTICASONE PROPIONATE AND SALMETEROL XINAFOATE 115; 21 UG/1; UG/1
2 AEROSOL, METERED RESPIRATORY (INHALATION)
Qty: 1 EACH | Refills: 11 | Status: SHIPPED | OUTPATIENT
Start: 2022-06-03 | End: 2022-07-05 | Stop reason: SDUPTHER

## 2022-06-07 ENCOUNTER — HOSPITAL ENCOUNTER (OUTPATIENT)
Dept: CT IMAGING | Facility: HOSPITAL | Age: 67
Discharge: HOME | End: 2022-06-07
Admitting: FAMILY MEDICINE

## 2022-06-07 DIAGNOSIS — R29.810 FACIAL WEAKNESS: ICD-10-CM

## 2022-06-07 PROCEDURE — 70450 CT HEAD/BRAIN W/O DYE: CPT

## 2022-06-08 DIAGNOSIS — J34.89 MAXILLARY SINUS MASS: ICD-10-CM

## 2022-06-08 DIAGNOSIS — J34.89 MASS OF SPHENOID SINUS: Primary | ICD-10-CM

## 2022-06-08 RX ORDER — DOXYCYCLINE HYCLATE 100 MG/1
100 CAPSULE ORAL 2 TIMES DAILY
Qty: 20 CAPSULE | Refills: 0 | Status: SHIPPED | OUTPATIENT
Start: 2022-06-08 | End: 2022-07-11

## 2022-06-09 ENCOUNTER — OFFICE VISIT (OUTPATIENT)
Dept: FAMILY MEDICINE CLINIC | Age: 67
End: 2022-06-09

## 2022-06-09 VITALS
WEIGHT: 276 LBS | BODY MASS INDEX: 47.12 KG/M2 | HEART RATE: 64 BPM | DIASTOLIC BLOOD PRESSURE: 105 MMHG | TEMPERATURE: 98 F | HEIGHT: 64 IN | SYSTOLIC BLOOD PRESSURE: 175 MMHG | OXYGEN SATURATION: 95 %

## 2022-06-09 DIAGNOSIS — Z01.419 WELL WOMAN EXAM: Primary | ICD-10-CM

## 2022-06-09 DIAGNOSIS — Z12.31 ENCOUNTER FOR SCREENING MAMMOGRAM FOR BREAST CANCER: ICD-10-CM

## 2022-06-09 DIAGNOSIS — R53.81 MALAISE: ICD-10-CM

## 2022-06-09 DIAGNOSIS — Z78.0 POSTMENOPAUSAL STATE: ICD-10-CM

## 2022-06-09 DIAGNOSIS — F41.9 ANXIETY: ICD-10-CM

## 2022-06-09 DIAGNOSIS — Z12.11 COLON CANCER SCREENING: ICD-10-CM

## 2022-06-09 DIAGNOSIS — I10 ESSENTIAL HYPERTENSION: ICD-10-CM

## 2022-06-09 DIAGNOSIS — Z23 ENCOUNTER FOR IMMUNIZATION: ICD-10-CM

## 2022-06-09 DIAGNOSIS — G25.0 ESSENTIAL TREMOR: ICD-10-CM

## 2022-06-09 LAB
BILIRUB BLD-MCNC: NEGATIVE MG/DL
CLARITY, POC: CLEAR
COLOR UR: YELLOW
DEVELOPER EXPIRATION DATE: NORMAL
DEVELOPER LOT NUMBER: NORMAL
EXPIRATION DATE: NORMAL
EXPIRATION DATE: NORMAL
FECAL OCCULT BLOOD SCREEN, POC: NEGATIVE
GLUCOSE UR STRIP-MCNC: NEGATIVE MG/DL
KETONES UR QL: NEGATIVE
LEUKOCYTE EST, POC: NEGATIVE
Lab: 512
Lab: NORMAL
NEGATIVE CONTROL: NEGATIVE
NITRITE UR-MCNC: NEGATIVE MG/ML
PH UR: 6.5 [PH] (ref 5–8)
POSITIVE CONTROL: POSITIVE
PROT UR STRIP-MCNC: NEGATIVE MG/DL
RBC # UR STRIP: NEGATIVE /UL
SP GR UR: 1.01 (ref 1–1.03)
UROBILINOGEN UR QL: NORMAL

## 2022-06-09 PROCEDURE — G0123 SCREEN CERV/VAG THIN LAYER: HCPCS | Performed by: FAMILY MEDICINE

## 2022-06-09 PROCEDURE — 99213 OFFICE O/P EST LOW 20 MIN: CPT | Performed by: FAMILY MEDICINE

## 2022-06-09 PROCEDURE — 81003 URINALYSIS AUTO W/O SCOPE: CPT | Performed by: FAMILY MEDICINE

## 2022-06-09 PROCEDURE — 99397 PER PM REEVAL EST PAT 65+ YR: CPT | Performed by: FAMILY MEDICINE

## 2022-06-09 PROCEDURE — 82270 OCCULT BLOOD FECES: CPT | Performed by: FAMILY MEDICINE

## 2022-06-09 RX ORDER — LOSARTAN POTASSIUM 50 MG/1
50 TABLET ORAL DAILY
Qty: 90 TABLET | Refills: 1 | Status: SHIPPED | OUTPATIENT
Start: 2022-06-09 | End: 2022-12-12

## 2022-06-09 RX ORDER — HYDROXYZINE HYDROCHLORIDE 25 MG/1
TABLET, FILM COATED ORAL
COMMUNITY
Start: 2022-06-08 | End: 2022-06-16

## 2022-06-13 ENCOUNTER — TELEPHONE (OUTPATIENT)
Dept: FAMILY MEDICINE CLINIC | Age: 67
End: 2022-06-13

## 2022-06-13 DIAGNOSIS — F51.05 INSOMNIA DUE TO MENTAL CONDITION: ICD-10-CM

## 2022-06-13 DIAGNOSIS — F41.1 GENERALIZED ANXIETY DISORDER: ICD-10-CM

## 2022-06-13 DIAGNOSIS — F33.2 SEVERE EPISODE OF RECURRENT MAJOR DEPRESSIVE DISORDER, WITHOUT PSYCHOTIC FEATURES: ICD-10-CM

## 2022-06-13 RX ORDER — LURASIDONE HYDROCHLORIDE 20 MG/1
20 TABLET, FILM COATED ORAL DAILY
Qty: 28 TABLET | Refills: 0 | COMMUNITY
Start: 2022-06-13 | End: 2022-06-16 | Stop reason: SDUPTHER

## 2022-06-14 LAB
CYTOLOGIST CVX/VAG CYTO: NORMAL
CYTOLOGY CVX/VAG DOC CYTO: NORMAL
DX ICD CODE: NORMAL
HIV 1 & 2 AB SER-IMP: NORMAL
OTHER STN SPEC: NORMAL
STAT OF ADQ CVX/VAG CYTO-IMP: NORMAL

## 2022-06-15 ENCOUNTER — TELEPHONE (OUTPATIENT)
Dept: FAMILY MEDICINE CLINIC | Age: 67
End: 2022-06-15

## 2022-06-16 ENCOUNTER — OFFICE VISIT (OUTPATIENT)
Dept: PSYCHIATRY | Facility: CLINIC | Age: 67
End: 2022-06-16

## 2022-06-16 VITALS
SYSTOLIC BLOOD PRESSURE: 110 MMHG | HEIGHT: 64 IN | DIASTOLIC BLOOD PRESSURE: 48 MMHG | WEIGHT: 276.6 LBS | BODY MASS INDEX: 47.22 KG/M2

## 2022-06-16 DIAGNOSIS — F33.1 MAJOR DEPRESSIVE DISORDER, RECURRENT EPISODE, MODERATE: ICD-10-CM

## 2022-06-16 DIAGNOSIS — F33.2 SEVERE EPISODE OF RECURRENT MAJOR DEPRESSIVE DISORDER, WITHOUT PSYCHOTIC FEATURES: ICD-10-CM

## 2022-06-16 DIAGNOSIS — F41.1 GENERALIZED ANXIETY DISORDER: Primary | ICD-10-CM

## 2022-06-16 DIAGNOSIS — F51.05 INSOMNIA DUE TO MENTAL CONDITION: ICD-10-CM

## 2022-06-16 PROCEDURE — 99214 OFFICE O/P EST MOD 30 MIN: CPT | Performed by: STUDENT IN AN ORGANIZED HEALTH CARE EDUCATION/TRAINING PROGRAM

## 2022-06-16 PROCEDURE — 90833 PSYTX W PT W E/M 30 MIN: CPT | Performed by: STUDENT IN AN ORGANIZED HEALTH CARE EDUCATION/TRAINING PROGRAM

## 2022-06-16 RX ORDER — LURASIDONE HYDROCHLORIDE 20 MG/1
20 TABLET, FILM COATED ORAL DAILY
Qty: 56 TABLET | Refills: 0 | COMMUNITY
Start: 2022-06-16 | End: 2022-07-11

## 2022-06-16 RX ORDER — TRAZODONE HYDROCHLORIDE 50 MG/1
25 TABLET ORAL 2 TIMES DAILY PRN
Qty: 30 TABLET | Refills: 2 | Status: SHIPPED | OUTPATIENT
Start: 2022-06-16 | End: 2022-08-09

## 2022-06-16 RX ORDER — BUSPIRONE HYDROCHLORIDE 15 MG/1
15 TABLET ORAL 3 TIMES DAILY
Qty: 90 TABLET | Refills: 2 | Status: SHIPPED | OUTPATIENT
Start: 2022-06-16 | End: 2022-09-09 | Stop reason: DRUGHIGH

## 2022-06-16 RX ORDER — DULOXETIN HYDROCHLORIDE 60 MG/1
120 CAPSULE, DELAYED RELEASE ORAL DAILY
Qty: 60 CAPSULE | Refills: 2
Start: 2022-06-16 | End: 2022-06-21 | Stop reason: SDUPTHER

## 2022-06-20 DIAGNOSIS — F41.1 GENERALIZED ANXIETY DISORDER: ICD-10-CM

## 2022-06-20 DIAGNOSIS — F33.2 SEVERE EPISODE OF RECURRENT MAJOR DEPRESSIVE DISORDER, WITHOUT PSYCHOTIC FEATURES: ICD-10-CM

## 2022-06-21 RX ORDER — DULOXETIN HYDROCHLORIDE 60 MG/1
120 CAPSULE, DELAYED RELEASE ORAL DAILY
Qty: 60 CAPSULE | Refills: 2
Start: 2022-06-21 | End: 2022-06-24 | Stop reason: SDUPTHER

## 2022-06-23 ENCOUNTER — TELEPHONE (OUTPATIENT)
Dept: FAMILY MEDICINE CLINIC | Age: 67
End: 2022-06-23

## 2022-06-24 ENCOUNTER — TELEPHONE (OUTPATIENT)
Dept: FAMILY MEDICINE CLINIC | Age: 67
End: 2022-06-24

## 2022-06-24 DIAGNOSIS — F33.2 SEVERE EPISODE OF RECURRENT MAJOR DEPRESSIVE DISORDER, WITHOUT PSYCHOTIC FEATURES: ICD-10-CM

## 2022-06-24 DIAGNOSIS — F41.1 GENERALIZED ANXIETY DISORDER: ICD-10-CM

## 2022-06-24 RX ORDER — DULOXETIN HYDROCHLORIDE 60 MG/1
120 CAPSULE, DELAYED RELEASE ORAL DAILY
Qty: 60 CAPSULE | Refills: 2
Start: 2022-06-24 | End: 2022-06-29 | Stop reason: SDUPTHER

## 2022-06-27 ENCOUNTER — TELEPHONE (OUTPATIENT)
Dept: PSYCHIATRY | Facility: CLINIC | Age: 67
End: 2022-06-27

## 2022-06-29 ENCOUNTER — TELEPHONE (OUTPATIENT)
Dept: PSYCHIATRY | Facility: CLINIC | Age: 67
End: 2022-06-29

## 2022-06-29 DIAGNOSIS — F33.2 SEVERE EPISODE OF RECURRENT MAJOR DEPRESSIVE DISORDER, WITHOUT PSYCHOTIC FEATURES: ICD-10-CM

## 2022-06-29 DIAGNOSIS — F41.1 GENERALIZED ANXIETY DISORDER: ICD-10-CM

## 2022-06-29 RX ORDER — DULOXETIN HYDROCHLORIDE 60 MG/1
120 CAPSULE, DELAYED RELEASE ORAL DAILY
Qty: 60 CAPSULE | Refills: 2
Start: 2022-06-29 | End: 2022-07-01 | Stop reason: SDUPTHER

## 2022-06-30 ENCOUNTER — HOSPITAL ENCOUNTER (OUTPATIENT)
Dept: CARDIOLOGY | Facility: HOSPITAL | Age: 67
Discharge: HOME | End: 2022-06-30
Admitting: NURSE PRACTITIONER

## 2022-06-30 DIAGNOSIS — R06.09 DYSPNEA ON EXERTION: ICD-10-CM

## 2022-06-30 LAB
BH CV ECHO MEAS - AO ROOT DIAM: 3.7 CM
BH CV ECHO MEAS - EDV(MOD-SP2): 60 ML
BH CV ECHO MEAS - EDV(MOD-SP4): 108 ML
BH CV ECHO MEAS - EF(MOD-BP): 44 %
BH CV ECHO MEAS - ESV(MOD-SP2): 48 ML
BH CV ECHO MEAS - ESV(MOD-SP4): 50 ML
BH CV ECHO MEAS - IVSD: 1 CM
BH CV ECHO MEAS - LA DIMENSION: 3.8 CM
BH CV ECHO MEAS - LAT PEAK E' VEL: 5.1 CM/SEC
BH CV ECHO MEAS - LVIDD: 4.8 CM
BH CV ECHO MEAS - LVIDS: 3.4 CM
BH CV ECHO MEAS - LVOT DIAM: 2 CM
BH CV ECHO MEAS - LVPWD: 1.1 CM
BH CV ECHO MEAS - MED PEAK E' VEL: 4.9 CM/SEC
BH CV ECHO MEAS - MV A MAX VEL: 101 CM/SEC
BH CV ECHO MEAS - MV DEC TIME: 234 MSEC
BH CV ECHO MEAS - MV E MAX VEL: 59 CM/SEC
BH CV ECHO MEAS - MV E/A: 0.6
BH CV ECHO MEAS - RVDD: 2.9 CM
BH CV ECHO MEASUREMENTS AVERAGE E/E' RATIO: 11.8
LEFT ATRIUM VOLUME INDEX: 30.6 ML/M2
MAXIMAL PREDICTED HEART RATE: 154 BPM
STRESS TARGET HR: 131 BPM

## 2022-06-30 PROCEDURE — 93306 TTE W/DOPPLER COMPLETE: CPT

## 2022-06-30 PROCEDURE — 25010000002 SULFUR HEXAFLUORIDE MICROSPH 60.7-25 MG RECONSTITUTED SUSPENSION: Performed by: NURSE PRACTITIONER

## 2022-06-30 PROCEDURE — 93306 TTE W/DOPPLER COMPLETE: CPT | Performed by: INTERNAL MEDICINE

## 2022-06-30 RX ADMIN — SULFUR HEXAFLUORIDE 2 ML: KIT at 15:11

## 2022-07-01 ENCOUNTER — PATIENT OUTREACH (OUTPATIENT)
Dept: CASE MANAGEMENT | Facility: OTHER | Age: 67
End: 2022-07-01

## 2022-07-01 ENCOUNTER — TELEPHONE (OUTPATIENT)
Dept: PSYCHIATRY | Facility: CLINIC | Age: 67
End: 2022-07-01

## 2022-07-01 DIAGNOSIS — F33.2 SEVERE EPISODE OF RECURRENT MAJOR DEPRESSIVE DISORDER, WITHOUT PSYCHOTIC FEATURES: ICD-10-CM

## 2022-07-01 DIAGNOSIS — F41.1 GENERALIZED ANXIETY DISORDER: ICD-10-CM

## 2022-07-01 RX ORDER — DULOXETIN HYDROCHLORIDE 60 MG/1
120 CAPSULE, DELAYED RELEASE ORAL DAILY
Qty: 60 CAPSULE | Refills: 2 | Status: SHIPPED | OUTPATIENT
Start: 2022-07-01 | End: 2022-09-30

## 2022-07-05 ENCOUNTER — DOCUMENTATION (OUTPATIENT)
Dept: PSYCHIATRY | Facility: CLINIC | Age: 67
End: 2022-07-05

## 2022-07-05 ENCOUNTER — OFFICE VISIT (OUTPATIENT)
Dept: PULMONOLOGY | Facility: CLINIC | Age: 67
End: 2022-07-05

## 2022-07-05 VITALS
SYSTOLIC BLOOD PRESSURE: 141 MMHG | DIASTOLIC BLOOD PRESSURE: 100 MMHG | TEMPERATURE: 98.2 F | BODY MASS INDEX: 46.95 KG/M2 | HEIGHT: 64 IN | RESPIRATION RATE: 18 BRPM | WEIGHT: 275 LBS | OXYGEN SATURATION: 96 % | HEART RATE: 70 BPM

## 2022-07-05 DIAGNOSIS — J41.8 MIXED SIMPLE AND MUCOPURULENT CHRONIC BRONCHITIS: Primary | ICD-10-CM

## 2022-07-05 DIAGNOSIS — R06.09 DOE (DYSPNEA ON EXERTION): ICD-10-CM

## 2022-07-05 DIAGNOSIS — G47.33 OSA (OBSTRUCTIVE SLEEP APNEA): ICD-10-CM

## 2022-07-05 DIAGNOSIS — R06.09 DYSPNEA ON EXERTION: ICD-10-CM

## 2022-07-05 DIAGNOSIS — I50.32 CHRONIC DIASTOLIC CONGESTIVE HEART FAILURE: ICD-10-CM

## 2022-07-05 PROCEDURE — 99214 OFFICE O/P EST MOD 30 MIN: CPT | Performed by: INTERNAL MEDICINE

## 2022-07-05 RX ORDER — HYDROXYZINE HYDROCHLORIDE 25 MG/1
TABLET, FILM COATED ORAL
COMMUNITY
Start: 2022-07-01 | End: 2022-07-11

## 2022-07-05 RX ORDER — ALBUTEROL SULFATE 90 UG/1
2 AEROSOL, METERED RESPIRATORY (INHALATION) EVERY 4 HOURS PRN
Qty: 8.5 G | Refills: 0 | Status: SHIPPED | OUTPATIENT
Start: 2022-07-05 | End: 2022-10-14 | Stop reason: SDUPTHER

## 2022-07-05 RX ORDER — FLUTICASONE PROPIONATE AND SALMETEROL XINAFOATE 115; 21 UG/1; UG/1
2 AEROSOL, METERED RESPIRATORY (INHALATION)
Qty: 1 EACH | Refills: 11 | Status: SHIPPED | OUTPATIENT
Start: 2022-07-05 | End: 2022-12-30

## 2022-07-05 RX ORDER — SEMAGLUTIDE 1.34 MG/ML
INJECTION, SOLUTION SUBCUTANEOUS
COMMUNITY
Start: 2022-07-01 | End: 2022-07-11

## 2022-07-06 ENCOUNTER — OFFICE VISIT (OUTPATIENT)
Dept: OTOLARYNGOLOGY | Facility: CLINIC | Age: 67
End: 2022-07-06

## 2022-07-06 VITALS — HEIGHT: 64 IN | WEIGHT: 275 LBS | TEMPERATURE: 98 F | BODY MASS INDEX: 46.95 KG/M2

## 2022-07-06 DIAGNOSIS — R00.2 PALPITATIONS: Primary | ICD-10-CM

## 2022-07-06 DIAGNOSIS — B49 ALLERGIC FUNGAL SINUSITIS: Primary | ICD-10-CM

## 2022-07-06 DIAGNOSIS — R09.81 NASAL CONGESTION: ICD-10-CM

## 2022-07-06 DIAGNOSIS — J30.89 ALLERGIC FUNGAL SINUSITIS: Primary | ICD-10-CM

## 2022-07-06 PROCEDURE — 99204 OFFICE O/P NEW MOD 45 MIN: CPT | Performed by: OTOLARYNGOLOGY

## 2022-07-07 RX ORDER — POTASSIUM CHLORIDE 750 MG/1
CAPSULE, EXTENDED RELEASE ORAL
Qty: 90 CAPSULE | Refills: 0 | Status: SHIPPED | OUTPATIENT
Start: 2022-07-07 | End: 2022-10-05

## 2022-07-07 RX ORDER — FUROSEMIDE 20 MG/1
TABLET ORAL
Qty: 180 TABLET | Refills: 0 | Status: SHIPPED | OUTPATIENT
Start: 2022-07-07 | End: 2022-10-05

## 2022-07-11 ENCOUNTER — LAB (OUTPATIENT)
Dept: LAB | Facility: HOSPITAL | Age: 67
End: 2022-07-11

## 2022-07-11 ENCOUNTER — OFFICE VISIT (OUTPATIENT)
Dept: FAMILY MEDICINE CLINIC | Age: 67
End: 2022-07-11

## 2022-07-11 VITALS
OXYGEN SATURATION: 94 % | HEART RATE: 75 BPM | BODY MASS INDEX: 46.95 KG/M2 | WEIGHT: 275 LBS | SYSTOLIC BLOOD PRESSURE: 135 MMHG | DIASTOLIC BLOOD PRESSURE: 75 MMHG | HEIGHT: 64 IN

## 2022-07-11 DIAGNOSIS — F32.2 CURRENT SEVERE EPISODE OF MAJOR DEPRESSIVE DISORDER WITHOUT PSYCHOTIC FEATURES WITHOUT PRIOR EPISODE: ICD-10-CM

## 2022-07-11 DIAGNOSIS — G25.81 RESTLESS LEG SYNDROME: ICD-10-CM

## 2022-07-11 DIAGNOSIS — E11.9 TYPE 2 DIABETES MELLITUS WITHOUT COMPLICATION, WITHOUT LONG-TERM CURRENT USE OF INSULIN: ICD-10-CM

## 2022-07-11 DIAGNOSIS — G25.0 ESSENTIAL TREMOR: ICD-10-CM

## 2022-07-11 DIAGNOSIS — I10 ESSENTIAL HYPERTENSION: Primary | ICD-10-CM

## 2022-07-11 DIAGNOSIS — R00.2 PALPITATIONS: ICD-10-CM

## 2022-07-11 LAB
T3FREE SERPL-MCNC: 2.82 PG/ML (ref 2–4.4)
T4 FREE SERPL-MCNC: 0.94 NG/DL (ref 0.93–1.7)

## 2022-07-11 PROCEDURE — 84439 ASSAY OF FREE THYROXINE: CPT

## 2022-07-11 PROCEDURE — 84481 FREE ASSAY (FT-3): CPT

## 2022-07-11 PROCEDURE — 99214 OFFICE O/P EST MOD 30 MIN: CPT | Performed by: FAMILY MEDICINE

## 2022-07-11 PROCEDURE — 36415 COLL VENOUS BLD VENIPUNCTURE: CPT

## 2022-07-11 RX ORDER — PRAMIPEXOLE DIHYDROCHLORIDE 0.12 MG/1
0.12 TABLET ORAL 3 TIMES DAILY
Qty: 90 TABLET | Refills: 0 | Status: SHIPPED | OUTPATIENT
Start: 2022-07-11 | End: 2022-08-09 | Stop reason: SDUPTHER

## 2022-07-12 ENCOUNTER — TELEPHONE (OUTPATIENT)
Dept: PSYCHIATRY | Facility: CLINIC | Age: 67
End: 2022-07-12

## 2022-07-12 ENCOUNTER — TELEPHONE (OUTPATIENT)
Dept: FAMILY MEDICINE CLINIC | Age: 67
End: 2022-07-12

## 2022-07-13 RX ORDER — PRIMIDONE 250 MG/1
TABLET ORAL
Qty: 90 TABLET | Refills: 0 | Status: SHIPPED | OUTPATIENT
Start: 2022-07-13 | End: 2022-08-09

## 2022-07-14 ENCOUNTER — TELEPHONE (OUTPATIENT)
Dept: PSYCHIATRY | Facility: CLINIC | Age: 67
End: 2022-07-14

## 2022-07-14 ENCOUNTER — TELEPHONE (OUTPATIENT)
Dept: FAMILY MEDICINE CLINIC | Age: 67
End: 2022-07-14

## 2022-07-15 ENCOUNTER — TELEMEDICINE (OUTPATIENT)
Dept: PSYCHIATRY | Facility: CLINIC | Age: 67
End: 2022-07-15

## 2022-07-15 DIAGNOSIS — F33.9 RECURRENT MAJOR DEPRESSION RESISTANT TO TREATMENT: Primary | ICD-10-CM

## 2022-07-15 DIAGNOSIS — F51.05 INSOMNIA DUE TO MENTAL CONDITION: ICD-10-CM

## 2022-07-15 DIAGNOSIS — F41.1 GENERALIZED ANXIETY DISORDER: ICD-10-CM

## 2022-07-15 PROCEDURE — 99214 OFFICE O/P EST MOD 30 MIN: CPT | Performed by: STUDENT IN AN ORGANIZED HEALTH CARE EDUCATION/TRAINING PROGRAM

## 2022-07-15 PROCEDURE — 90833 PSYTX W PT W E/M 30 MIN: CPT | Performed by: STUDENT IN AN ORGANIZED HEALTH CARE EDUCATION/TRAINING PROGRAM

## 2022-07-15 RX ORDER — BUPROPION HYDROCHLORIDE 100 MG/1
100 TABLET, EXTENDED RELEASE ORAL DAILY
Qty: 30 TABLET | Refills: 2 | Status: SHIPPED | OUTPATIENT
Start: 2022-07-15 | End: 2022-09-09 | Stop reason: DRUGHIGH

## 2022-07-19 DIAGNOSIS — F41.1 GENERALIZED ANXIETY DISORDER: ICD-10-CM

## 2022-07-19 DIAGNOSIS — F33.2 SEVERE EPISODE OF RECURRENT MAJOR DEPRESSIVE DISORDER, WITHOUT PSYCHOTIC FEATURES: ICD-10-CM

## 2022-07-19 RX ORDER — DULOXETIN HYDROCHLORIDE 60 MG/1
CAPSULE, DELAYED RELEASE ORAL
Qty: 30 CAPSULE | OUTPATIENT
Start: 2022-07-19

## 2022-07-22 ENCOUNTER — TELEPHONE (OUTPATIENT)
Dept: FAMILY MEDICINE CLINIC | Age: 67
End: 2022-07-22

## 2022-07-25 ENCOUNTER — OUTSIDE FACILITY SERVICE (OUTPATIENT)
Dept: FAMILY MEDICINE CLINIC | Age: 67
End: 2022-07-25

## 2022-07-25 PROCEDURE — G0180 MD CERTIFICATION HHA PATIENT: HCPCS | Performed by: FAMILY MEDICINE

## 2022-07-26 ENCOUNTER — TELEPHONE (OUTPATIENT)
Dept: FAMILY MEDICINE CLINIC | Age: 67
End: 2022-07-26

## 2022-07-27 DIAGNOSIS — G25.0 ESSENTIAL TREMOR: Primary | ICD-10-CM

## 2022-07-28 ENCOUNTER — TELEPHONE (OUTPATIENT)
Dept: FAMILY MEDICINE CLINIC | Age: 67
End: 2022-07-28

## 2022-07-29 ENCOUNTER — TELEPHONE (OUTPATIENT)
Dept: FAMILY MEDICINE CLINIC | Age: 67
End: 2022-07-29

## 2022-07-29 PROBLEM — J30.89 ALLERGIC FUNGAL SINUSITIS: Status: ACTIVE | Noted: 2022-07-29

## 2022-07-29 PROBLEM — B49 ALLERGIC FUNGAL SINUSITIS: Status: ACTIVE | Noted: 2022-07-29

## 2022-07-29 PROBLEM — R09.81 NASAL CONGESTION: Status: ACTIVE | Noted: 2022-07-29

## 2022-07-31 DIAGNOSIS — I10 ESSENTIAL HYPERTENSION: ICD-10-CM

## 2022-08-01 DIAGNOSIS — G25.0 ESSENTIAL TREMOR: Primary | ICD-10-CM

## 2022-08-01 RX ORDER — LOSARTAN POTASSIUM 25 MG/1
TABLET ORAL
Qty: 90 TABLET | Refills: 1 | OUTPATIENT
Start: 2022-08-01

## 2022-08-02 ENCOUNTER — TELEPHONE (OUTPATIENT)
Dept: PULMONOLOGY | Facility: CLINIC | Age: 67
End: 2022-08-02

## 2022-08-02 ENCOUNTER — TELEPHONE (OUTPATIENT)
Dept: CARDIOLOGY | Facility: CLINIC | Age: 67
End: 2022-08-02

## 2022-08-02 DIAGNOSIS — I10 ESSENTIAL HYPERTENSION: ICD-10-CM

## 2022-08-02 RX ORDER — AMLODIPINE BESYLATE 5 MG/1
TABLET ORAL
Qty: 60 TABLET | Refills: 0 | Status: SHIPPED | OUTPATIENT
Start: 2022-08-02 | End: 2022-09-30

## 2022-08-04 ENCOUNTER — TELEPHONE (OUTPATIENT)
Dept: FAMILY MEDICINE CLINIC | Age: 67
End: 2022-08-04

## 2022-08-08 ENCOUNTER — PRE-ADMISSION TESTING (OUTPATIENT)
Dept: PREADMISSION TESTING | Facility: HOSPITAL | Age: 67
End: 2022-08-08

## 2022-08-08 VITALS
BODY MASS INDEX: 47.12 KG/M2 | TEMPERATURE: 97.6 F | HEART RATE: 60 BPM | OXYGEN SATURATION: 96 % | SYSTOLIC BLOOD PRESSURE: 142 MMHG | RESPIRATION RATE: 16 BRPM | WEIGHT: 276.02 LBS | DIASTOLIC BLOOD PRESSURE: 92 MMHG | HEIGHT: 64 IN

## 2022-08-08 DIAGNOSIS — Z01.818 ENCOUNTER FOR PREADMISSION TESTING: Primary | ICD-10-CM

## 2022-08-08 LAB
ANION GAP SERPL CALCULATED.3IONS-SCNC: 7.1 MMOL/L (ref 5–15)
BUN SERPL-MCNC: 10 MG/DL (ref 8–23)
BUN/CREAT SERPL: 9.3 (ref 7–25)
CALCIUM SPEC-SCNC: 9.8 MG/DL (ref 8.6–10.5)
CHLORIDE SERPL-SCNC: 100 MMOL/L (ref 98–107)
CO2 SERPL-SCNC: 30.9 MMOL/L (ref 22–29)
CREAT SERPL-MCNC: 1.07 MG/DL (ref 0.57–1)
EGFRCR SERPLBLD CKD-EPI 2021: 57.4 ML/MIN/1.73
GLUCOSE SERPL-MCNC: 103 MG/DL (ref 65–99)
POTASSIUM SERPL-SCNC: 3.9 MMOL/L (ref 3.5–5.2)
SODIUM SERPL-SCNC: 138 MMOL/L (ref 136–145)

## 2022-08-08 PROCEDURE — 80048 BASIC METABOLIC PNL TOTAL CA: CPT

## 2022-08-08 PROCEDURE — 36415 COLL VENOUS BLD VENIPUNCTURE: CPT

## 2022-08-09 ENCOUNTER — OFFICE VISIT (OUTPATIENT)
Dept: FAMILY MEDICINE CLINIC | Age: 67
End: 2022-08-09

## 2022-08-09 VITALS
OXYGEN SATURATION: 91 % | HEIGHT: 64 IN | SYSTOLIC BLOOD PRESSURE: 122 MMHG | HEART RATE: 81 BPM | DIASTOLIC BLOOD PRESSURE: 92 MMHG | BODY MASS INDEX: 46.4 KG/M2 | WEIGHT: 271.8 LBS

## 2022-08-09 DIAGNOSIS — H60.503 ACUTE OTITIS EXTERNA OF BOTH EARS, UNSPECIFIED TYPE: ICD-10-CM

## 2022-08-09 DIAGNOSIS — G25.0 ESSENTIAL TREMOR: ICD-10-CM

## 2022-08-09 DIAGNOSIS — E55.9 VITAMIN D DEFICIENCY DISEASE: Primary | ICD-10-CM

## 2022-08-09 DIAGNOSIS — G25.81 RESTLESS LEG SYNDROME: ICD-10-CM

## 2022-08-09 DIAGNOSIS — E11.9 TYPE 2 DIABETES MELLITUS WITHOUT COMPLICATION, WITHOUT LONG-TERM CURRENT USE OF INSULIN: ICD-10-CM

## 2022-08-09 DIAGNOSIS — I10 ESSENTIAL HYPERTENSION: ICD-10-CM

## 2022-08-09 DIAGNOSIS — F41.9 ANXIETY: ICD-10-CM

## 2022-08-09 DIAGNOSIS — E66.01 MORBID (SEVERE) OBESITY DUE TO EXCESS CALORIES: ICD-10-CM

## 2022-08-09 PROCEDURE — 99214 OFFICE O/P EST MOD 30 MIN: CPT | Performed by: FAMILY MEDICINE

## 2022-08-09 RX ORDER — PRAMIPEXOLE DIHYDROCHLORIDE 0.12 MG/1
TABLET ORAL
Qty: 90 TABLET | Refills: 0
Start: 2022-08-09 | End: 2022-08-10 | Stop reason: SDUPTHER

## 2022-08-09 RX ORDER — HYDROCORTISONE AND ACETIC ACID 20.75; 10.375 MG/ML; MG/ML
3 SOLUTION AURICULAR (OTIC) 2 TIMES DAILY
Qty: 10 ML | Refills: 0 | Status: SHIPPED | OUTPATIENT
Start: 2022-08-09 | End: 2022-09-01

## 2022-08-09 RX ORDER — CHOLECALCIFEROL (VITAMIN D3) 125 MCG
2000 CAPSULE ORAL DAILY
Qty: 30 TABLET | Refills: 11 | Status: SHIPPED | OUTPATIENT
Start: 2022-08-09 | End: 2023-08-09

## 2022-08-09 RX ORDER — PROPRANOLOL HCL 60 MG
60 CAPSULE, EXTENDED RELEASE 24HR ORAL DAILY
Qty: 30 CAPSULE | Refills: 2 | Status: SHIPPED | OUTPATIENT
Start: 2022-08-09 | End: 2023-01-09

## 2022-08-10 ENCOUNTER — ANESTHESIA EVENT (OUTPATIENT)
Dept: PERIOP | Facility: HOSPITAL | Age: 67
End: 2022-08-10

## 2022-08-10 DIAGNOSIS — G25.0 ESSENTIAL TREMOR: ICD-10-CM

## 2022-08-10 DIAGNOSIS — G25.81 RESTLESS LEG SYNDROME: ICD-10-CM

## 2022-08-10 RX ORDER — PRAMIPEXOLE DIHYDROCHLORIDE 0.12 MG/1
TABLET ORAL
Qty: 90 TABLET | Refills: 0 | OUTPATIENT
Start: 2022-08-10

## 2022-08-10 RX ORDER — PRAMIPEXOLE DIHYDROCHLORIDE 0.12 MG/1
TABLET ORAL
Qty: 90 TABLET | Refills: 0
Start: 2022-08-10 | End: 2022-08-16 | Stop reason: SDUPTHER

## 2022-08-10 RX ORDER — PRIMIDONE 250 MG/1
TABLET ORAL
Qty: 90 TABLET | OUTPATIENT
Start: 2022-08-10

## 2022-08-11 ENCOUNTER — PRIOR AUTHORIZATION (OUTPATIENT)
Dept: FAMILY MEDICINE CLINIC | Age: 67
End: 2022-08-11

## 2022-08-15 ENCOUNTER — HOSPITAL ENCOUNTER (OUTPATIENT)
Facility: HOSPITAL | Age: 67
Setting detail: HOSPITAL OUTPATIENT SURGERY
Discharge: HOME | End: 2022-08-15
Attending: OTOLARYNGOLOGY | Admitting: OTOLARYNGOLOGY

## 2022-08-15 ENCOUNTER — ANESTHESIA (OUTPATIENT)
Dept: PERIOP | Facility: HOSPITAL | Age: 67
End: 2022-08-15

## 2022-08-15 VITALS
HEART RATE: 64 BPM | TEMPERATURE: 97.6 F | WEIGHT: 269.4 LBS | BODY MASS INDEX: 45.99 KG/M2 | HEIGHT: 64 IN | SYSTOLIC BLOOD PRESSURE: 119 MMHG | OXYGEN SATURATION: 90 % | DIASTOLIC BLOOD PRESSURE: 83 MMHG | RESPIRATION RATE: 14 BRPM

## 2022-08-15 DIAGNOSIS — B49 ALLERGIC FUNGAL SINUSITIS: ICD-10-CM

## 2022-08-15 DIAGNOSIS — G25.0 ESSENTIAL TREMOR: ICD-10-CM

## 2022-08-15 DIAGNOSIS — G25.81 RESTLESS LEG SYNDROME: ICD-10-CM

## 2022-08-15 DIAGNOSIS — J30.89 ALLERGIC FUNGAL SINUSITIS: ICD-10-CM

## 2022-08-15 DIAGNOSIS — R09.81 NASAL CONGESTION: ICD-10-CM

## 2022-08-15 LAB
GLUCOSE BLDC GLUCOMTR-MCNC: 109 MG/DL (ref 70–99)
GLUCOSE BLDC GLUCOMTR-MCNC: 112 MG/DL (ref 70–99)

## 2022-08-15 PROCEDURE — 31267 ENDOSCOPY MAXILLARY SINUS: CPT | Performed by: OTOLARYNGOLOGY

## 2022-08-15 PROCEDURE — 25010000002 DEXAMETHASONE PER 1 MG: Performed by: NURSE ANESTHETIST, CERTIFIED REGISTERED

## 2022-08-15 PROCEDURE — 31254 NSL/SINS NDSC W/PRTL ETHMDCT: CPT | Performed by: OTOLARYNGOLOGY

## 2022-08-15 PROCEDURE — 25010000002 MIDAZOLAM PER 1 MG: Performed by: ANESTHESIOLOGY

## 2022-08-15 PROCEDURE — 82962 GLUCOSE BLOOD TEST: CPT

## 2022-08-15 PROCEDURE — 88311 DECALCIFY TISSUE: CPT | Performed by: OTOLARYNGOLOGY

## 2022-08-15 PROCEDURE — 25010000002 PROPOFOL 10 MG/ML EMULSION: Performed by: NURSE ANESTHETIST, CERTIFIED REGISTERED

## 2022-08-15 PROCEDURE — 88305 TISSUE EXAM BY PATHOLOGIST: CPT | Performed by: OTOLARYNGOLOGY

## 2022-08-15 PROCEDURE — 25010000002 FENTANYL CITRATE (PF) 50 MCG/ML SOLUTION: Performed by: NURSE ANESTHETIST, CERTIFIED REGISTERED

## 2022-08-15 PROCEDURE — 87075 CULTR BACTERIA EXCEPT BLOOD: CPT | Performed by: OTOLARYNGOLOGY

## 2022-08-15 PROCEDURE — 25010000002 ONDANSETRON PER 1 MG: Performed by: NURSE ANESTHETIST, CERTIFIED REGISTERED

## 2022-08-15 PROCEDURE — 25010000002 HYDROMORPHONE 1 MG/ML SOLUTION: Performed by: NURSE ANESTHETIST, CERTIFIED REGISTERED

## 2022-08-15 RX ORDER — DEXAMETHASONE SODIUM PHOSPHATE 4 MG/ML
INJECTION, SOLUTION INTRA-ARTICULAR; INTRALESIONAL; INTRAMUSCULAR; INTRAVENOUS; SOFT TISSUE AS NEEDED
Status: DISCONTINUED | OUTPATIENT
Start: 2022-08-15 | End: 2022-08-15 | Stop reason: SURG

## 2022-08-15 RX ORDER — AMOXICILLIN AND CLAVULANATE POTASSIUM 875; 125 MG/1; MG/1
1 TABLET, FILM COATED ORAL EVERY 12 HOURS SCHEDULED
Qty: 28 TABLET | Refills: 0 | Status: SHIPPED | OUTPATIENT
Start: 2022-08-15 | End: 2022-08-29

## 2022-08-15 RX ORDER — SODIUM CHLORIDE, SODIUM LACTATE, POTASSIUM CHLORIDE, CALCIUM CHLORIDE 600; 310; 30; 20 MG/100ML; MG/100ML; MG/100ML; MG/100ML
9 INJECTION, SOLUTION INTRAVENOUS CONTINUOUS PRN
Status: DISCONTINUED | OUTPATIENT
Start: 2022-08-15 | End: 2022-08-15 | Stop reason: HOSPADM

## 2022-08-15 RX ORDER — PROPOFOL 10 MG/ML
VIAL (ML) INTRAVENOUS AS NEEDED
Status: DISCONTINUED | OUTPATIENT
Start: 2022-08-15 | End: 2022-08-15 | Stop reason: SURG

## 2022-08-15 RX ORDER — ROCURONIUM BROMIDE 10 MG/ML
INJECTION, SOLUTION INTRAVENOUS AS NEEDED
Status: DISCONTINUED | OUTPATIENT
Start: 2022-08-15 | End: 2022-08-15 | Stop reason: SURG

## 2022-08-15 RX ORDER — MIDAZOLAM HYDROCHLORIDE 1 MG/ML
2 INJECTION INTRAMUSCULAR; INTRAVENOUS ONCE
Status: COMPLETED | OUTPATIENT
Start: 2022-08-15 | End: 2022-08-15

## 2022-08-15 RX ORDER — PHENYLEPHRINE HCL IN 0.9% NACL 1 MG/10 ML
SYRINGE (ML) INTRAVENOUS AS NEEDED
Status: DISCONTINUED | OUTPATIENT
Start: 2022-08-15 | End: 2022-08-15 | Stop reason: SURG

## 2022-08-15 RX ORDER — PROMETHAZINE HYDROCHLORIDE 25 MG/1
25 SUPPOSITORY RECTAL ONCE AS NEEDED
Status: DISCONTINUED | OUTPATIENT
Start: 2022-08-15 | End: 2022-08-15 | Stop reason: HOSPADM

## 2022-08-15 RX ORDER — MAGNESIUM HYDROXIDE 1200 MG/15ML
LIQUID ORAL AS NEEDED
Status: DISCONTINUED | OUTPATIENT
Start: 2022-08-15 | End: 2022-08-15 | Stop reason: HOSPADM

## 2022-08-15 RX ORDER — PROMETHAZINE HYDROCHLORIDE 12.5 MG/1
25 TABLET ORAL ONCE AS NEEDED
Status: DISCONTINUED | OUTPATIENT
Start: 2022-08-15 | End: 2022-08-15 | Stop reason: HOSPADM

## 2022-08-15 RX ORDER — OXYCODONE HYDROCHLORIDE 5 MG/1
5 TABLET ORAL
Status: DISCONTINUED | OUTPATIENT
Start: 2022-08-15 | End: 2022-08-15 | Stop reason: HOSPADM

## 2022-08-15 RX ORDER — OXYMETAZOLINE HYDROCHLORIDE 0.05 G/100ML
SPRAY NASAL AS NEEDED
Status: DISCONTINUED | OUTPATIENT
Start: 2022-08-15 | End: 2022-08-15 | Stop reason: HOSPADM

## 2022-08-15 RX ORDER — LIDOCAINE HYDROCHLORIDE 20 MG/ML
INJECTION, SOLUTION EPIDURAL; INFILTRATION; INTRACAUDAL; PERINEURAL AS NEEDED
Status: DISCONTINUED | OUTPATIENT
Start: 2022-08-15 | End: 2022-08-15 | Stop reason: SURG

## 2022-08-15 RX ORDER — DEXMEDETOMIDINE HYDROCHLORIDE 100 UG/ML
INJECTION, SOLUTION INTRAVENOUS AS NEEDED
Status: DISCONTINUED | OUTPATIENT
Start: 2022-08-15 | End: 2022-08-15 | Stop reason: SURG

## 2022-08-15 RX ORDER — ONDANSETRON 2 MG/ML
4 INJECTION INTRAMUSCULAR; INTRAVENOUS ONCE AS NEEDED
Status: DISCONTINUED | OUTPATIENT
Start: 2022-08-15 | End: 2022-08-15 | Stop reason: HOSPADM

## 2022-08-15 RX ORDER — FENTANYL CITRATE 50 UG/ML
INJECTION, SOLUTION INTRAMUSCULAR; INTRAVENOUS AS NEEDED
Status: DISCONTINUED | OUTPATIENT
Start: 2022-08-15 | End: 2022-08-15 | Stop reason: SURG

## 2022-08-15 RX ORDER — MEPERIDINE HYDROCHLORIDE 25 MG/ML
12.5 INJECTION INTRAMUSCULAR; INTRAVENOUS; SUBCUTANEOUS
Status: DISCONTINUED | OUTPATIENT
Start: 2022-08-15 | End: 2022-08-15 | Stop reason: HOSPADM

## 2022-08-15 RX ORDER — EPHEDRINE SULFATE 50 MG/ML
INJECTION, SOLUTION INTRAVENOUS AS NEEDED
Status: DISCONTINUED | OUTPATIENT
Start: 2022-08-15 | End: 2022-08-15 | Stop reason: SURG

## 2022-08-15 RX ORDER — SODIUM CHLORIDE, SODIUM LACTATE, POTASSIUM CHLORIDE, CALCIUM CHLORIDE 600; 310; 30; 20 MG/100ML; MG/100ML; MG/100ML; MG/100ML
INJECTION, SOLUTION INTRAVENOUS CONTINUOUS PRN
Status: DISCONTINUED | OUTPATIENT
Start: 2022-08-15 | End: 2022-08-15

## 2022-08-15 RX ORDER — ACETAMINOPHEN 500 MG
1000 TABLET ORAL ONCE
Status: COMPLETED | OUTPATIENT
Start: 2022-08-15 | End: 2022-08-15

## 2022-08-15 RX ORDER — LIDOCAINE HYDROCHLORIDE AND EPINEPHRINE 10; 10 MG/ML; UG/ML
INJECTION, SOLUTION INFILTRATION; PERINEURAL AS NEEDED
Status: DISCONTINUED | OUTPATIENT
Start: 2022-08-15 | End: 2022-08-15 | Stop reason: HOSPADM

## 2022-08-15 RX ORDER — ONDANSETRON 2 MG/ML
INJECTION INTRAMUSCULAR; INTRAVENOUS AS NEEDED
Status: DISCONTINUED | OUTPATIENT
Start: 2022-08-15 | End: 2022-08-15 | Stop reason: SURG

## 2022-08-15 RX ADMIN — EPHEDRINE SULFATE 10 MG: 50 INJECTION INTRAVENOUS at 14:09

## 2022-08-15 RX ADMIN — EPHEDRINE SULFATE 10 MG: 50 INJECTION INTRAVENOUS at 13:52

## 2022-08-15 RX ADMIN — ROCURONIUM BROMIDE 50 MG: 10 INJECTION INTRAVENOUS at 13:44

## 2022-08-15 RX ADMIN — MIDAZOLAM HYDROCHLORIDE 2 MG: 2 INJECTION, SOLUTION INTRAMUSCULAR; INTRAVENOUS at 13:27

## 2022-08-15 RX ADMIN — ACETAMINOPHEN 1000 MG: 500 TABLET ORAL at 10:47

## 2022-08-15 RX ADMIN — SUGAMMADEX 200 MG: 100 INJECTION, SOLUTION INTRAVENOUS at 14:34

## 2022-08-15 RX ADMIN — EPHEDRINE SULFATE 10 MG: 50 INJECTION INTRAVENOUS at 14:00

## 2022-08-15 RX ADMIN — DEXMEDETOMIDINE HYDROCHLORIDE 30 MCG: 100 INJECTION, SOLUTION, CONCENTRATE INTRAVENOUS at 13:44

## 2022-08-15 RX ADMIN — ONDANSETRON 4 MG: 2 INJECTION INTRAMUSCULAR; INTRAVENOUS at 13:51

## 2022-08-15 RX ADMIN — LIDOCAINE HYDROCHLORIDE 50 MG: 20 INJECTION, SOLUTION EPIDURAL; INFILTRATION; INTRACAUDAL; PERINEURAL at 13:44

## 2022-08-15 RX ADMIN — Medication 100 MCG: at 14:09

## 2022-08-15 RX ADMIN — FENTANYL CITRATE 25 MCG: 50 INJECTION, SOLUTION INTRAMUSCULAR; INTRAVENOUS at 13:44

## 2022-08-15 RX ADMIN — DEXAMETHASONE SODIUM PHOSPHATE 8 MG: 4 INJECTION INTRA-ARTICULAR; INTRALESIONAL; INTRAMUSCULAR; INTRAVENOUS; SOFT TISSUE at 13:51

## 2022-08-15 RX ADMIN — Medication 100 MCG: at 14:00

## 2022-08-15 RX ADMIN — HYDROMORPHONE HYDROCHLORIDE 0.5 MG: 1 INJECTION, SOLUTION INTRAMUSCULAR; INTRAVENOUS; SUBCUTANEOUS at 15:00

## 2022-08-15 RX ADMIN — SODIUM CHLORIDE, POTASSIUM CHLORIDE, SODIUM LACTATE AND CALCIUM CHLORIDE 9 ML/HR: 600; 310; 30; 20 INJECTION, SOLUTION INTRAVENOUS at 10:43

## 2022-08-15 RX ADMIN — PROPOFOL 180 MG: 10 INJECTION, EMULSION INTRAVENOUS at 13:44

## 2022-08-16 DIAGNOSIS — G25.0 ESSENTIAL TREMOR: ICD-10-CM

## 2022-08-16 DIAGNOSIS — G25.81 RESTLESS LEG SYNDROME: ICD-10-CM

## 2022-08-16 RX ORDER — PRAMIPEXOLE DIHYDROCHLORIDE 0.12 MG/1
TABLET ORAL
Qty: 90 TABLET | Refills: 2 | Status: SHIPPED | OUTPATIENT
Start: 2022-08-16 | End: 2022-11-11

## 2022-08-16 RX ORDER — PRAMIPEXOLE DIHYDROCHLORIDE 0.12 MG/1
TABLET ORAL
Qty: 90 TABLET | Refills: 0 | OUTPATIENT
Start: 2022-08-16

## 2022-08-20 LAB — BACTERIA SPEC ANAEROBE CULT: NORMAL

## 2022-08-22 LAB
CYTO UR: NORMAL
LAB AP CASE REPORT: NORMAL
LAB AP CLINICAL INFORMATION: NORMAL
PATH REPORT.FINAL DX SPEC: NORMAL
PATH REPORT.GROSS SPEC: NORMAL

## 2022-08-26 ENCOUNTER — TELEPHONE (OUTPATIENT)
Dept: BEHAVIORAL HEALTH | Facility: CLINIC | Age: 67
End: 2022-08-26

## 2022-08-26 ENCOUNTER — TELEPHONE (OUTPATIENT)
Dept: FAMILY MEDICINE CLINIC | Age: 67
End: 2022-08-26

## 2022-09-01 ENCOUNTER — OFFICE VISIT (OUTPATIENT)
Dept: FAMILY MEDICINE CLINIC | Age: 67
End: 2022-09-01

## 2022-09-01 VITALS
HEART RATE: 67 BPM | WEIGHT: 271.8 LBS | SYSTOLIC BLOOD PRESSURE: 156 MMHG | BODY MASS INDEX: 46.4 KG/M2 | TEMPERATURE: 97.9 F | DIASTOLIC BLOOD PRESSURE: 93 MMHG | HEIGHT: 64 IN

## 2022-09-01 DIAGNOSIS — F33.1 MAJOR DEPRESSIVE DISORDER, RECURRENT EPISODE, MODERATE: ICD-10-CM

## 2022-09-01 DIAGNOSIS — K21.9 GASTROESOPHAGEAL REFLUX DISEASE, UNSPECIFIED WHETHER ESOPHAGITIS PRESENT: Primary | ICD-10-CM

## 2022-09-01 DIAGNOSIS — M79.672 FOOT PAIN, BILATERAL: ICD-10-CM

## 2022-09-01 DIAGNOSIS — F51.05 INSOMNIA DUE TO MENTAL CONDITION: ICD-10-CM

## 2022-09-01 DIAGNOSIS — M79.671 FOOT PAIN, BILATERAL: ICD-10-CM

## 2022-09-01 DIAGNOSIS — F41.1 GENERALIZED ANXIETY DISORDER: ICD-10-CM

## 2022-09-01 PROCEDURE — 99214 OFFICE O/P EST MOD 30 MIN: CPT | Performed by: NURSE PRACTITIONER

## 2022-09-01 RX ORDER — MIRTAZAPINE 30 MG/1
45 TABLET, FILM COATED ORAL NIGHTLY
Qty: 45 TABLET | Refills: 1 | Status: SHIPPED | OUTPATIENT
Start: 2022-09-01 | End: 2022-10-11 | Stop reason: SDUPTHER

## 2022-09-01 RX ORDER — PANTOPRAZOLE SODIUM 40 MG/1
40 TABLET, DELAYED RELEASE ORAL DAILY
Qty: 30 TABLET | Refills: 1 | Status: SHIPPED | OUTPATIENT
Start: 2022-09-01 | End: 2022-10-28

## 2022-09-01 RX ORDER — AMOXICILLIN AND CLAVULANATE POTASSIUM 875; 125 MG/1; MG/1
1 TABLET, FILM COATED ORAL DAILY
COMMUNITY
End: 2022-09-09

## 2022-09-07 ENCOUNTER — OFFICE VISIT (OUTPATIENT)
Dept: CARDIOLOGY | Facility: CLINIC | Age: 67
End: 2022-09-07

## 2022-09-07 ENCOUNTER — OFFICE VISIT (OUTPATIENT)
Dept: OTOLARYNGOLOGY | Facility: CLINIC | Age: 67
End: 2022-09-07

## 2022-09-07 VITALS — BODY MASS INDEX: 46.2 KG/M2 | WEIGHT: 270.6 LBS | HEIGHT: 64 IN | TEMPERATURE: 96.5 F

## 2022-09-07 VITALS
SYSTOLIC BLOOD PRESSURE: 141 MMHG | BODY MASS INDEX: 45.93 KG/M2 | WEIGHT: 269 LBS | HEIGHT: 64 IN | HEART RATE: 80 BPM | DIASTOLIC BLOOD PRESSURE: 79 MMHG

## 2022-09-07 DIAGNOSIS — I50.32 CHRONIC DIASTOLIC HEART FAILURE: Primary | ICD-10-CM

## 2022-09-07 DIAGNOSIS — J30.89 ALLERGIC FUNGAL SINUSITIS: Primary | ICD-10-CM

## 2022-09-07 DIAGNOSIS — B49 ALLERGIC FUNGAL SINUSITIS: Primary | ICD-10-CM

## 2022-09-07 DIAGNOSIS — R09.81 NASAL CONGESTION: ICD-10-CM

## 2022-09-07 DIAGNOSIS — R00.2 PALPITATIONS: ICD-10-CM

## 2022-09-07 DIAGNOSIS — I10 ESSENTIAL HYPERTENSION: ICD-10-CM

## 2022-09-07 PROCEDURE — 99213 OFFICE O/P EST LOW 20 MIN: CPT | Performed by: OTOLARYNGOLOGY

## 2022-09-07 PROCEDURE — 31237 NSL/SINS NDSC SURG BX POLYPC: CPT | Performed by: OTOLARYNGOLOGY

## 2022-09-07 PROCEDURE — 99213 OFFICE O/P EST LOW 20 MIN: CPT | Performed by: INTERNAL MEDICINE

## 2022-09-09 ENCOUNTER — OFFICE VISIT (OUTPATIENT)
Dept: BEHAVIORAL HEALTH | Facility: CLINIC | Age: 67
End: 2022-09-09

## 2022-09-09 VITALS
HEIGHT: 64 IN | DIASTOLIC BLOOD PRESSURE: 90 MMHG | WEIGHT: 273 LBS | SYSTOLIC BLOOD PRESSURE: 118 MMHG | BODY MASS INDEX: 46.61 KG/M2 | OXYGEN SATURATION: 96 % | HEART RATE: 57 BPM

## 2022-09-09 DIAGNOSIS — F41.1 GENERALIZED ANXIETY DISORDER: ICD-10-CM

## 2022-09-09 DIAGNOSIS — F33.9 RECURRENT MAJOR DEPRESSION RESISTANT TO TREATMENT: ICD-10-CM

## 2022-09-09 PROCEDURE — 99215 OFFICE O/P EST HI 40 MIN: CPT | Performed by: NURSE PRACTITIONER

## 2022-09-09 RX ORDER — BUPROPION HYDROCHLORIDE 150 MG/1
150 TABLET, EXTENDED RELEASE ORAL EVERY MORNING
Qty: 30 TABLET | Refills: 2 | Status: SHIPPED | OUTPATIENT
Start: 2022-09-10 | End: 2022-10-17 | Stop reason: DRUGHIGH

## 2022-09-09 RX ORDER — BUSPIRONE HYDROCHLORIDE 15 MG/1
15 TABLET ORAL 2 TIMES DAILY
Qty: 60 TABLET | Refills: 2 | Status: SHIPPED | OUTPATIENT
Start: 2022-09-09 | End: 2022-10-17 | Stop reason: DRUGHIGH

## 2022-09-15 ENCOUNTER — TELEPHONE (OUTPATIENT)
Dept: FAMILY MEDICINE CLINIC | Age: 67
End: 2022-09-15

## 2022-09-16 ENCOUNTER — TELEPHONE (OUTPATIENT)
Dept: BEHAVIORAL HEALTH | Facility: CLINIC | Age: 67
End: 2022-09-16

## 2022-09-29 ENCOUNTER — TRANSCRIBE ORDERS (OUTPATIENT)
Dept: ADMINISTRATIVE | Facility: HOSPITAL | Age: 67
End: 2022-09-29

## 2022-09-29 DIAGNOSIS — Z12.31 VISIT FOR SCREENING MAMMOGRAM: Primary | ICD-10-CM

## 2022-09-29 DIAGNOSIS — I10 ESSENTIAL HYPERTENSION: ICD-10-CM

## 2022-09-30 ENCOUNTER — OFFICE VISIT (OUTPATIENT)
Dept: FAMILY MEDICINE CLINIC | Age: 67
End: 2022-09-30

## 2022-09-30 VITALS
DIASTOLIC BLOOD PRESSURE: 78 MMHG | SYSTOLIC BLOOD PRESSURE: 119 MMHG | HEART RATE: 57 BPM | OXYGEN SATURATION: 95 % | HEIGHT: 64 IN | TEMPERATURE: 98 F | BODY MASS INDEX: 47.12 KG/M2 | WEIGHT: 276 LBS

## 2022-09-30 DIAGNOSIS — E66.01 MORBID (SEVERE) OBESITY DUE TO EXCESS CALORIES: ICD-10-CM

## 2022-09-30 DIAGNOSIS — G25.0 ESSENTIAL TREMOR: ICD-10-CM

## 2022-09-30 DIAGNOSIS — F41.1 GENERALIZED ANXIETY DISORDER: ICD-10-CM

## 2022-09-30 DIAGNOSIS — F32.2 CURRENT SEVERE EPISODE OF MAJOR DEPRESSIVE DISORDER WITHOUT PSYCHOTIC FEATURES WITHOUT PRIOR EPISODE: ICD-10-CM

## 2022-09-30 DIAGNOSIS — E53.8 B12 DEFICIENCY: ICD-10-CM

## 2022-09-30 DIAGNOSIS — Z23 FLU VACCINE NEED: Primary | ICD-10-CM

## 2022-09-30 DIAGNOSIS — F33.2 SEVERE EPISODE OF RECURRENT MAJOR DEPRESSIVE DISORDER, WITHOUT PSYCHOTIC FEATURES: ICD-10-CM

## 2022-09-30 DIAGNOSIS — I10 ESSENTIAL HYPERTENSION: ICD-10-CM

## 2022-09-30 PROCEDURE — 90662 IIV NO PRSV INCREASED AG IM: CPT | Performed by: FAMILY MEDICINE

## 2022-09-30 PROCEDURE — G0008 ADMIN INFLUENZA VIRUS VAC: HCPCS | Performed by: FAMILY MEDICINE

## 2022-09-30 PROCEDURE — 99214 OFFICE O/P EST MOD 30 MIN: CPT | Performed by: FAMILY MEDICINE

## 2022-09-30 RX ORDER — CYANOCOBALAMIN 1000 UG/ML
1000 INJECTION, SOLUTION INTRAMUSCULAR; SUBCUTANEOUS
Status: SHIPPED | OUTPATIENT
Start: 2022-09-30

## 2022-09-30 RX ORDER — CARBOXYMETHYLCELLULOSE/CITRIC 0.75 G
3 CAPSULE ORAL
Qty: 180 CAPSULE | Refills: 0 | Status: SHIPPED | OUTPATIENT
Start: 2022-09-30 | End: 2022-10-17

## 2022-09-30 RX ORDER — DULOXETIN HYDROCHLORIDE 60 MG/1
CAPSULE, DELAYED RELEASE ORAL
Qty: 60 CAPSULE | Refills: 2 | Status: SHIPPED | OUTPATIENT
Start: 2022-09-30 | End: 2023-01-09 | Stop reason: DRUGHIGH

## 2022-09-30 RX ORDER — AMLODIPINE BESYLATE 5 MG/1
TABLET ORAL
Qty: 90 TABLET | Refills: 0 | Status: SHIPPED | OUTPATIENT
Start: 2022-09-30 | End: 2022-12-28 | Stop reason: SDUPTHER

## 2022-09-30 RX ORDER — PRIMIDONE 50 MG/1
50 TABLET ORAL DAILY PRN
Qty: 30 TABLET | Refills: 0 | Status: SHIPPED | OUTPATIENT
Start: 2022-09-30 | End: 2022-10-31

## 2022-10-03 ENCOUNTER — CLINICAL SUPPORT (OUTPATIENT)
Dept: FAMILY MEDICINE CLINIC | Age: 67
End: 2022-10-03

## 2022-10-03 VITALS — TEMPERATURE: 97.6 F

## 2022-10-03 DIAGNOSIS — E53.8 B12 DEFICIENCY: ICD-10-CM

## 2022-10-03 PROCEDURE — 96372 THER/PROPH/DIAG INJ SC/IM: CPT | Performed by: FAMILY MEDICINE

## 2022-10-03 RX ADMIN — CYANOCOBALAMIN 1000 MCG: 1000 INJECTION, SOLUTION INTRAMUSCULAR; SUBCUTANEOUS at 15:31

## 2022-10-04 ENCOUNTER — TELEPHONE (OUTPATIENT)
Dept: BEHAVIORAL HEALTH | Facility: CLINIC | Age: 67
End: 2022-10-04

## 2022-10-05 RX ORDER — POTASSIUM CHLORIDE 750 MG/1
CAPSULE, EXTENDED RELEASE ORAL
Qty: 90 CAPSULE | Refills: 0 | Status: SHIPPED | OUTPATIENT
Start: 2022-10-05 | End: 2023-01-05

## 2022-10-05 RX ORDER — FUROSEMIDE 20 MG/1
TABLET ORAL
Qty: 180 TABLET | Refills: 0 | Status: SHIPPED | OUTPATIENT
Start: 2022-10-05 | End: 2023-01-05

## 2022-10-07 DIAGNOSIS — F51.05 INSOMNIA DUE TO MENTAL CONDITION: ICD-10-CM

## 2022-10-07 DIAGNOSIS — F33.1 MAJOR DEPRESSIVE DISORDER, RECURRENT EPISODE, MODERATE: ICD-10-CM

## 2022-10-07 DIAGNOSIS — F41.1 GENERALIZED ANXIETY DISORDER: ICD-10-CM

## 2022-10-07 RX ORDER — CLONAZEPAM 1 MG/1
TABLET ORAL
Qty: 30 TABLET | OUTPATIENT
Start: 2022-10-07

## 2022-10-07 RX ORDER — MIRTAZAPINE 30 MG/1
TABLET, FILM COATED ORAL
Qty: 45 TABLET | Refills: 1 | OUTPATIENT
Start: 2022-10-07

## 2022-10-10 ENCOUNTER — TELEPHONE (OUTPATIENT)
Dept: FAMILY MEDICINE CLINIC | Age: 67
End: 2022-10-10

## 2022-10-11 DIAGNOSIS — F51.05 INSOMNIA DUE TO MENTAL CONDITION: ICD-10-CM

## 2022-10-11 DIAGNOSIS — F33.1 MAJOR DEPRESSIVE DISORDER, RECURRENT EPISODE, MODERATE: ICD-10-CM

## 2022-10-11 DIAGNOSIS — F41.1 GENERALIZED ANXIETY DISORDER: ICD-10-CM

## 2022-10-11 RX ORDER — MIRTAZAPINE 30 MG/1
45 TABLET, FILM COATED ORAL NIGHTLY
Qty: 45 TABLET | Refills: 1 | Status: SHIPPED | OUTPATIENT
Start: 2022-10-11 | End: 2023-01-12 | Stop reason: SDUPTHER

## 2022-10-11 RX ORDER — TRAZODONE HYDROCHLORIDE 50 MG/1
TABLET ORAL
Qty: 30 TABLET | Refills: 2 | OUTPATIENT
Start: 2022-10-11

## 2022-10-13 ENCOUNTER — NUTRITION (OUTPATIENT)
Dept: DIABETES SERVICES | Facility: HOSPITAL | Age: 67
End: 2022-10-13

## 2022-10-13 DIAGNOSIS — E66.01 MORBID OBESITY DUE TO EXCESS CALORIES: Primary | ICD-10-CM

## 2022-10-13 PROCEDURE — 97802 MEDICAL NUTRITION INDIV IN: CPT | Performed by: DIETITIAN, REGISTERED

## 2022-10-14 ENCOUNTER — OFFICE VISIT (OUTPATIENT)
Dept: PULMONOLOGY | Facility: CLINIC | Age: 67
End: 2022-10-14

## 2022-10-14 VITALS
HEART RATE: 65 BPM | HEIGHT: 64 IN | TEMPERATURE: 98.2 F | RESPIRATION RATE: 16 BRPM | WEIGHT: 276 LBS | SYSTOLIC BLOOD PRESSURE: 151 MMHG | DIASTOLIC BLOOD PRESSURE: 97 MMHG | OXYGEN SATURATION: 98 % | BODY MASS INDEX: 47.12 KG/M2

## 2022-10-14 DIAGNOSIS — I10 ESSENTIAL HYPERTENSION: ICD-10-CM

## 2022-10-14 DIAGNOSIS — R06.09 DOE (DYSPNEA ON EXERTION): ICD-10-CM

## 2022-10-14 DIAGNOSIS — I50.32 CHRONIC DIASTOLIC HEART FAILURE: ICD-10-CM

## 2022-10-14 DIAGNOSIS — E66.01 MORBID OBESITY WITH BMI OF 45.0-49.9, ADULT: ICD-10-CM

## 2022-10-14 DIAGNOSIS — G47.33 OSA ON CPAP: ICD-10-CM

## 2022-10-14 DIAGNOSIS — J42 CHRONIC BRONCHITIS, UNSPECIFIED CHRONIC BRONCHITIS TYPE: Primary | ICD-10-CM

## 2022-10-14 DIAGNOSIS — Z99.89 OSA ON CPAP: ICD-10-CM

## 2022-10-14 PROCEDURE — 99214 OFFICE O/P EST MOD 30 MIN: CPT | Performed by: NURSE PRACTITIONER

## 2022-10-14 RX ORDER — ALBUTEROL SULFATE 90 UG/1
2 AEROSOL, METERED RESPIRATORY (INHALATION) EVERY 4 HOURS PRN
Qty: 18 G | Refills: 11 | Status: SHIPPED | OUTPATIENT
Start: 2022-10-14

## 2022-10-14 RX ORDER — GABAPENTIN 300 MG/1
300 CAPSULE ORAL NIGHTLY
COMMUNITY
Start: 2022-10-11 | End: 2023-04-04 | Stop reason: ALTCHOICE

## 2022-10-17 ENCOUNTER — OFFICE VISIT (OUTPATIENT)
Dept: BEHAVIORAL HEALTH | Facility: CLINIC | Age: 67
End: 2022-10-17

## 2022-10-17 ENCOUNTER — OFFICE VISIT (OUTPATIENT)
Dept: FAMILY MEDICINE CLINIC | Age: 67
End: 2022-10-17

## 2022-10-17 ENCOUNTER — TELEPHONE (OUTPATIENT)
Dept: FAMILY MEDICINE CLINIC | Age: 67
End: 2022-10-17

## 2022-10-17 VITALS
BODY MASS INDEX: 47.53 KG/M2 | HEIGHT: 64 IN | WEIGHT: 278.4 LBS | HEART RATE: 67 BPM | SYSTOLIC BLOOD PRESSURE: 138 MMHG | DIASTOLIC BLOOD PRESSURE: 84 MMHG

## 2022-10-17 VITALS
WEIGHT: 279 LBS | SYSTOLIC BLOOD PRESSURE: 131 MMHG | BODY MASS INDEX: 47.63 KG/M2 | DIASTOLIC BLOOD PRESSURE: 70 MMHG | OXYGEN SATURATION: 98 % | HEART RATE: 74 BPM | HEIGHT: 64 IN

## 2022-10-17 DIAGNOSIS — R20.2 PARESTHESIA: Primary | ICD-10-CM

## 2022-10-17 DIAGNOSIS — F41.1 GENERALIZED ANXIETY DISORDER: ICD-10-CM

## 2022-10-17 DIAGNOSIS — E66.01 OBESITY, MORBID, BMI 40.0-49.9: ICD-10-CM

## 2022-10-17 DIAGNOSIS — F33.9 RECURRENT MAJOR DEPRESSION RESISTANT TO TREATMENT: Primary | ICD-10-CM

## 2022-10-17 DIAGNOSIS — F41.9 ANXIETY: ICD-10-CM

## 2022-10-17 PROCEDURE — 99213 OFFICE O/P EST LOW 20 MIN: CPT | Performed by: FAMILY MEDICINE

## 2022-10-17 PROCEDURE — 99215 OFFICE O/P EST HI 40 MIN: CPT | Performed by: NURSE PRACTITIONER

## 2022-10-17 RX ORDER — BUSPIRONE HYDROCHLORIDE 15 MG/1
30 TABLET ORAL 2 TIMES DAILY
Qty: 120 TABLET | Refills: 2
Start: 2022-10-17 | End: 2023-02-23 | Stop reason: SDUPTHER

## 2022-10-17 RX ORDER — BUPROPION HYDROCHLORIDE 200 MG/1
200 TABLET, EXTENDED RELEASE ORAL EVERY MORNING
Qty: 30 TABLET | Refills: 1 | Status: SHIPPED | OUTPATIENT
Start: 2022-10-17 | End: 2022-12-12 | Stop reason: SDUPTHER

## 2022-10-18 ENCOUNTER — OUTSIDE FACILITY SERVICE (OUTPATIENT)
Dept: FAMILY MEDICINE CLINIC | Age: 67
End: 2022-10-18

## 2022-10-18 PROCEDURE — G0180 MD CERTIFICATION HHA PATIENT: HCPCS | Performed by: FAMILY MEDICINE

## 2022-10-21 VITALS — HEIGHT: 64 IN | WEIGHT: 277.6 LBS | BODY MASS INDEX: 47.39 KG/M2

## 2022-10-25 ENCOUNTER — TELEPHONE (OUTPATIENT)
Dept: FAMILY MEDICINE CLINIC | Age: 67
End: 2022-10-25

## 2022-10-28 ENCOUNTER — TELEPHONE (OUTPATIENT)
Dept: FAMILY MEDICINE CLINIC | Age: 67
End: 2022-10-28

## 2022-10-28 DIAGNOSIS — K21.9 GASTROESOPHAGEAL REFLUX DISEASE, UNSPECIFIED WHETHER ESOPHAGITIS PRESENT: ICD-10-CM

## 2022-10-28 RX ORDER — PANTOPRAZOLE SODIUM 40 MG/1
TABLET, DELAYED RELEASE ORAL
Qty: 30 TABLET | Refills: 1 | Status: SHIPPED | OUTPATIENT
Start: 2022-10-28 | End: 2022-12-28 | Stop reason: SDUPTHER

## 2022-10-30 ENCOUNTER — APPOINTMENT (OUTPATIENT)
Dept: CT IMAGING | Facility: HOSPITAL | Age: 67
End: 2022-10-30

## 2022-10-30 ENCOUNTER — APPOINTMENT (OUTPATIENT)
Dept: GENERAL RADIOLOGY | Facility: HOSPITAL | Age: 67
End: 2022-10-30

## 2022-10-30 ENCOUNTER — HOSPITAL ENCOUNTER (EMERGENCY)
Facility: HOSPITAL | Age: 67
Discharge: HOME | End: 2022-10-30
Attending: EMERGENCY MEDICINE | Admitting: EMERGENCY MEDICINE

## 2022-10-30 VITALS
TEMPERATURE: 98.5 F | WEIGHT: 271 LBS | HEART RATE: 69 BPM | HEIGHT: 64 IN | SYSTOLIC BLOOD PRESSURE: 123 MMHG | BODY MASS INDEX: 46.26 KG/M2 | OXYGEN SATURATION: 92 % | RESPIRATION RATE: 20 BRPM | DIASTOLIC BLOOD PRESSURE: 84 MMHG

## 2022-10-30 DIAGNOSIS — R07.9 CHEST PAIN, UNSPECIFIED TYPE: ICD-10-CM

## 2022-10-30 DIAGNOSIS — F41.9 ANXIETY: ICD-10-CM

## 2022-10-30 DIAGNOSIS — R20.2 PARESTHESIA: Primary | ICD-10-CM

## 2022-10-30 LAB
ALBUMIN SERPL-MCNC: 4.1 G/DL (ref 3.5–5.2)
ALBUMIN/GLOB SERPL: 1 G/DL
ALP SERPL-CCNC: 142 U/L (ref 39–117)
ALT SERPL W P-5'-P-CCNC: 12 U/L (ref 1–33)
ANION GAP SERPL CALCULATED.3IONS-SCNC: 10.1 MMOL/L (ref 5–15)
AST SERPL-CCNC: 17 U/L (ref 1–32)
BASOPHILS # BLD AUTO: 0.04 10*3/MM3 (ref 0–0.2)
BASOPHILS NFR BLD AUTO: 0.5 % (ref 0–1.5)
BILIRUB SERPL-MCNC: 0.3 MG/DL (ref 0–1.2)
BUN SERPL-MCNC: 13 MG/DL (ref 8–23)
BUN/CREAT SERPL: 12.7 (ref 7–25)
CALCIUM SPEC-SCNC: 9.6 MG/DL (ref 8.6–10.5)
CHLORIDE SERPL-SCNC: 99 MMOL/L (ref 98–107)
CO2 SERPL-SCNC: 28.9 MMOL/L (ref 22–29)
CREAT SERPL-MCNC: 1.02 MG/DL (ref 0.57–1)
DEPRECATED RDW RBC AUTO: 44.1 FL (ref 37–54)
EGFRCR SERPLBLD CKD-EPI 2021: 60.8 ML/MIN/1.73
EOSINOPHIL # BLD AUTO: 0.07 10*3/MM3 (ref 0–0.4)
EOSINOPHIL NFR BLD AUTO: 0.8 % (ref 0.3–6.2)
ERYTHROCYTE [DISTWIDTH] IN BLOOD BY AUTOMATED COUNT: 12.9 % (ref 12.3–15.4)
GLOBULIN UR ELPH-MCNC: 4 GM/DL
GLUCOSE BLDC GLUCOMTR-MCNC: 113 MG/DL (ref 70–99)
GLUCOSE SERPL-MCNC: 125 MG/DL (ref 65–99)
HCT VFR BLD AUTO: 47.4 % (ref 34–46.6)
HGB BLD-MCNC: 15.7 G/DL (ref 12–15.9)
HOLD SPECIMEN: NORMAL
IMM GRANULOCYTES # BLD AUTO: 0.03 10*3/MM3 (ref 0–0.05)
IMM GRANULOCYTES NFR BLD AUTO: 0.4 % (ref 0–0.5)
LIPASE SERPL-CCNC: 43 U/L (ref 13–60)
LYMPHOCYTES # BLD AUTO: 1.84 10*3/MM3 (ref 0.7–3.1)
LYMPHOCYTES NFR BLD AUTO: 21.9 % (ref 19.6–45.3)
MAGNESIUM SERPL-MCNC: 2 MG/DL (ref 1.6–2.4)
MCH RBC QN AUTO: 30.8 PG (ref 26.6–33)
MCHC RBC AUTO-ENTMCNC: 33.1 G/DL (ref 31.5–35.7)
MCV RBC AUTO: 92.9 FL (ref 79–97)
MONOCYTES # BLD AUTO: 0.69 10*3/MM3 (ref 0.1–0.9)
MONOCYTES NFR BLD AUTO: 8.2 % (ref 5–12)
NEUTROPHILS NFR BLD AUTO: 5.74 10*3/MM3 (ref 1.7–7)
NEUTROPHILS NFR BLD AUTO: 68.2 % (ref 42.7–76)
NRBC BLD AUTO-RTO: 0 /100 WBC (ref 0–0.2)
NT-PROBNP SERPL-MCNC: 200.7 PG/ML (ref 0–900)
PLATELET # BLD AUTO: 339 10*3/MM3 (ref 140–450)
PMV BLD AUTO: 9.4 FL (ref 6–12)
POTASSIUM SERPL-SCNC: 4.3 MMOL/L (ref 3.5–5.2)
PROT SERPL-MCNC: 8.1 G/DL (ref 6–8.5)
RBC # BLD AUTO: 5.1 10*6/MM3 (ref 3.77–5.28)
SODIUM SERPL-SCNC: 138 MMOL/L (ref 136–145)
TROPONIN I SERPL-MCNC: 0 NG/ML (ref 0–0.08)
TROPONIN I SERPL-MCNC: 0.01 NG/ML (ref 0–0.08)
WBC NRBC COR # BLD: 8.41 10*3/MM3 (ref 3.4–10.8)
WHOLE BLOOD HOLD COAG: NORMAL
WHOLE BLOOD HOLD SPECIMEN: NORMAL

## 2022-10-30 PROCEDURE — 83880 ASSAY OF NATRIURETIC PEPTIDE: CPT | Performed by: EMERGENCY MEDICINE

## 2022-10-30 PROCEDURE — 83735 ASSAY OF MAGNESIUM: CPT | Performed by: EMERGENCY MEDICINE

## 2022-10-30 PROCEDURE — 83690 ASSAY OF LIPASE: CPT | Performed by: EMERGENCY MEDICINE

## 2022-10-30 PROCEDURE — 70450 CT HEAD/BRAIN W/O DYE: CPT

## 2022-10-30 PROCEDURE — 99284 EMERGENCY DEPT VISIT MOD MDM: CPT

## 2022-10-30 PROCEDURE — 82962 GLUCOSE BLOOD TEST: CPT

## 2022-10-30 PROCEDURE — 36415 COLL VENOUS BLD VENIPUNCTURE: CPT

## 2022-10-30 PROCEDURE — 85025 COMPLETE CBC W/AUTO DIFF WBC: CPT | Performed by: EMERGENCY MEDICINE

## 2022-10-30 PROCEDURE — 71045 X-RAY EXAM CHEST 1 VIEW: CPT

## 2022-10-30 PROCEDURE — 80053 COMPREHEN METABOLIC PANEL: CPT | Performed by: EMERGENCY MEDICINE

## 2022-10-30 PROCEDURE — 93005 ELECTROCARDIOGRAM TRACING: CPT | Performed by: EMERGENCY MEDICINE

## 2022-10-30 PROCEDURE — 84484 ASSAY OF TROPONIN QUANT: CPT

## 2022-10-30 RX ORDER — SODIUM CHLORIDE 0.9 % (FLUSH) 0.9 %
10 SYRINGE (ML) INJECTION AS NEEDED
Status: DISCONTINUED | OUTPATIENT
Start: 2022-10-30 | End: 2022-10-30 | Stop reason: HOSPADM

## 2022-10-30 RX ORDER — DIAZEPAM 5 MG/1
5 TABLET ORAL EVERY 8 HOURS PRN
Qty: 12 TABLET | Refills: 0 | Status: SHIPPED | OUTPATIENT
Start: 2022-10-30 | End: 2022-11-09

## 2022-10-30 RX ORDER — ASPIRIN 81 MG/1
324 TABLET, CHEWABLE ORAL ONCE
Status: DISCONTINUED | OUTPATIENT
Start: 2022-10-30 | End: 2022-10-30 | Stop reason: HOSPADM

## 2022-10-30 RX ORDER — LORAZEPAM 0.5 MG/1
1 TABLET ORAL ONCE
Status: COMPLETED | OUTPATIENT
Start: 2022-10-30 | End: 2022-10-30

## 2022-10-30 RX ADMIN — LORAZEPAM 1 MG: 0.5 TABLET ORAL at 15:09

## 2022-10-31 RX ORDER — PRIMIDONE 50 MG/1
TABLET ORAL
Qty: 30 TABLET | Refills: 0 | Status: SHIPPED | OUTPATIENT
Start: 2022-10-31 | End: 2022-11-29

## 2022-11-01 LAB
QT INTERVAL: 453 MS
QT INTERVAL: 484 MS

## 2022-11-08 ENCOUNTER — OFFICE VISIT (OUTPATIENT)
Dept: FAMILY MEDICINE CLINIC | Age: 67
End: 2022-11-08

## 2022-11-08 VITALS
OXYGEN SATURATION: 97 % | HEART RATE: 59 BPM | SYSTOLIC BLOOD PRESSURE: 147 MMHG | WEIGHT: 276.6 LBS | DIASTOLIC BLOOD PRESSURE: 80 MMHG | BODY MASS INDEX: 47.22 KG/M2 | HEIGHT: 64 IN

## 2022-11-08 DIAGNOSIS — E11.9 TYPE 2 DIABETES MELLITUS WITHOUT COMPLICATION, WITHOUT LONG-TERM CURRENT USE OF INSULIN: ICD-10-CM

## 2022-11-08 DIAGNOSIS — F32.A DEPRESSIVE DISORDER: ICD-10-CM

## 2022-11-08 DIAGNOSIS — F41.1 GENERALIZED ANXIETY DISORDER: Primary | ICD-10-CM

## 2022-11-08 DIAGNOSIS — I10 ESSENTIAL HYPERTENSION: ICD-10-CM

## 2022-11-08 DIAGNOSIS — L21.9 SEBORRHEIC DERMATITIS OF SCALP: ICD-10-CM

## 2022-11-08 DIAGNOSIS — E66.01 MORBID (SEVERE) OBESITY DUE TO EXCESS CALORIES: ICD-10-CM

## 2022-11-08 PROCEDURE — 99214 OFFICE O/P EST MOD 30 MIN: CPT | Performed by: FAMILY MEDICINE

## 2022-11-08 RX ORDER — DULAGLUTIDE 0.75 MG/.5ML
0.75 INJECTION, SOLUTION SUBCUTANEOUS WEEKLY
Qty: 2 ML | Refills: 2 | Status: SHIPPED | OUTPATIENT
Start: 2022-11-08 | End: 2022-12-22

## 2022-11-08 RX ORDER — CLOBETASOL PROPIONATE 0.46 MG/ML
SOLUTION TOPICAL 2 TIMES DAILY
Qty: 25 ML | Refills: 2 | Status: SHIPPED | OUTPATIENT
Start: 2022-11-08 | End: 2022-12-13

## 2022-11-09 ENCOUNTER — TELEPHONE (OUTPATIENT)
Dept: FAMILY MEDICINE CLINIC | Age: 67
End: 2022-11-09

## 2022-11-09 ENCOUNTER — HOSPITAL ENCOUNTER (OUTPATIENT)
Dept: GENERAL RADIOLOGY | Facility: HOSPITAL | Age: 67
Discharge: HOME | End: 2022-11-09

## 2022-11-09 ENCOUNTER — HOSPITAL ENCOUNTER (OUTPATIENT)
Dept: MAMMOGRAPHY | Facility: HOSPITAL | Age: 67
Discharge: HOME | End: 2022-11-09

## 2022-11-09 ENCOUNTER — OFFICE VISIT (OUTPATIENT)
Dept: FAMILY MEDICINE CLINIC | Age: 67
End: 2022-11-09

## 2022-11-09 ENCOUNTER — APPOINTMENT (OUTPATIENT)
Dept: CT IMAGING | Facility: HOSPITAL | Age: 67
End: 2022-11-09

## 2022-11-09 ENCOUNTER — HOSPITAL ENCOUNTER (EMERGENCY)
Facility: HOSPITAL | Age: 67
Discharge: HOME | End: 2022-11-09
Attending: EMERGENCY MEDICINE | Admitting: EMERGENCY MEDICINE

## 2022-11-09 VITALS
SYSTOLIC BLOOD PRESSURE: 133 MMHG | BODY MASS INDEX: 47.15 KG/M2 | WEIGHT: 276.2 LBS | OXYGEN SATURATION: 95 % | TEMPERATURE: 97.8 F | HEIGHT: 64 IN | HEART RATE: 65 BPM | DIASTOLIC BLOOD PRESSURE: 97 MMHG

## 2022-11-09 VITALS
HEART RATE: 61 BPM | TEMPERATURE: 97.8 F | HEIGHT: 64 IN | SYSTOLIC BLOOD PRESSURE: 128 MMHG | DIASTOLIC BLOOD PRESSURE: 80 MMHG | RESPIRATION RATE: 20 BRPM | BODY MASS INDEX: 47.12 KG/M2 | WEIGHT: 276 LBS | OXYGEN SATURATION: 96 %

## 2022-11-09 DIAGNOSIS — Z12.31 VISIT FOR SCREENING MAMMOGRAM: ICD-10-CM

## 2022-11-09 DIAGNOSIS — R10.9 RIGHT FLANK PAIN: ICD-10-CM

## 2022-11-09 DIAGNOSIS — M54.50 ACUTE RIGHT-SIDED LOW BACK PAIN WITHOUT SCIATICA: Primary | ICD-10-CM

## 2022-11-09 DIAGNOSIS — F41.9 ACUTE ANXIETY: ICD-10-CM

## 2022-11-09 DIAGNOSIS — S39.012A STRAIN OF MUSCLE, FASCIA AND TENDON OF LOWER BACK, INITIAL ENCOUNTER: ICD-10-CM

## 2022-11-09 DIAGNOSIS — K56.7 ILEUS: Primary | ICD-10-CM

## 2022-11-09 LAB
ALBUMIN SERPL-MCNC: 3.9 G/DL (ref 3.5–5.2)
ALBUMIN/GLOB SERPL: 1.1 G/DL
ALP SERPL-CCNC: 129 U/L (ref 39–117)
ALT SERPL W P-5'-P-CCNC: 11 U/L (ref 1–33)
ANION GAP SERPL CALCULATED.3IONS-SCNC: 6.5 MMOL/L (ref 5–15)
AST SERPL-CCNC: 14 U/L (ref 1–32)
BACTERIA UR QL AUTO: ABNORMAL /HPF
BASOPHILS # BLD AUTO: 0.05 10*3/MM3 (ref 0–0.2)
BASOPHILS NFR BLD AUTO: 0.6 % (ref 0–1.5)
BILIRUB SERPL-MCNC: 0.2 MG/DL (ref 0–1.2)
BILIRUB UR QL STRIP: NEGATIVE
BILIRUB UR QL STRIP: NEGATIVE
BUN SERPL-MCNC: 12 MG/DL (ref 8–23)
BUN/CREAT SERPL: 12.6 (ref 7–25)
CALCIUM SPEC-SCNC: 9.3 MG/DL (ref 8.6–10.5)
CHLORIDE SERPL-SCNC: 100 MMOL/L (ref 98–107)
CLARITY UR: CLEAR
CLARITY UR: CLEAR
CO2 SERPL-SCNC: 30.5 MMOL/L (ref 22–29)
COLOR UR: YELLOW
COLOR UR: YELLOW
CREAT SERPL-MCNC: 0.95 MG/DL (ref 0.57–1)
D-LACTATE SERPL-SCNC: 1.3 MMOL/L (ref 0.5–2)
DEPRECATED RDW RBC AUTO: 45 FL (ref 37–54)
EGFRCR SERPLBLD CKD-EPI 2021: 65.8 ML/MIN/1.73
EOSINOPHIL # BLD AUTO: 0.15 10*3/MM3 (ref 0–0.4)
EOSINOPHIL NFR BLD AUTO: 1.9 % (ref 0.3–6.2)
ERYTHROCYTE [DISTWIDTH] IN BLOOD BY AUTOMATED COUNT: 13.1 % (ref 12.3–15.4)
GLOBULIN UR ELPH-MCNC: 3.5 GM/DL
GLUCOSE SERPL-MCNC: 120 MG/DL (ref 65–99)
GLUCOSE UR STRIP-MCNC: NEGATIVE MG/DL
GLUCOSE UR STRIP-MCNC: NEGATIVE MG/DL
HCT VFR BLD AUTO: 43.9 % (ref 34–46.6)
HGB BLD-MCNC: 14.8 G/DL (ref 12–15.9)
HGB UR QL STRIP.AUTO: NEGATIVE
HGB UR QL STRIP.AUTO: NEGATIVE
HOLD SPECIMEN: NORMAL
HOLD SPECIMEN: NORMAL
IMM GRANULOCYTES # BLD AUTO: 0.04 10*3/MM3 (ref 0–0.05)
IMM GRANULOCYTES NFR BLD AUTO: 0.5 % (ref 0–0.5)
KETONES UR QL STRIP: NEGATIVE
KETONES UR QL STRIP: NEGATIVE
LEUKOCYTE ESTERASE UR QL STRIP.AUTO: NEGATIVE
LEUKOCYTE ESTERASE UR QL STRIP.AUTO: NEGATIVE
LIPASE SERPL-CCNC: 48 U/L (ref 13–60)
LYMPHOCYTES # BLD AUTO: 2.19 10*3/MM3 (ref 0.7–3.1)
LYMPHOCYTES NFR BLD AUTO: 27.3 % (ref 19.6–45.3)
MCH RBC QN AUTO: 31.6 PG (ref 26.6–33)
MCHC RBC AUTO-ENTMCNC: 33.7 G/DL (ref 31.5–35.7)
MCV RBC AUTO: 93.6 FL (ref 79–97)
MONOCYTES # BLD AUTO: 0.8 10*3/MM3 (ref 0.1–0.9)
MONOCYTES NFR BLD AUTO: 10 % (ref 5–12)
NEUTROPHILS NFR BLD AUTO: 4.78 10*3/MM3 (ref 1.7–7)
NEUTROPHILS NFR BLD AUTO: 59.7 % (ref 42.7–76)
NITRITE UR QL STRIP: NEGATIVE
NITRITE UR QL STRIP: NEGATIVE
NRBC BLD AUTO-RTO: 0 /100 WBC (ref 0–0.2)
PH UR STRIP.AUTO: 6 [PH] (ref 5–8)
PH UR STRIP.AUTO: 6.5 [PH] (ref 5–8)
PLATELET # BLD AUTO: 299 10*3/MM3 (ref 140–450)
PMV BLD AUTO: 9.4 FL (ref 6–12)
POTASSIUM SERPL-SCNC: 3.8 MMOL/L (ref 3.5–5.2)
PROT SERPL-MCNC: 7.4 G/DL (ref 6–8.5)
PROT UR QL STRIP: NEGATIVE
PROT UR QL STRIP: NEGATIVE
RBC # BLD AUTO: 4.69 10*6/MM3 (ref 3.77–5.28)
RBC # UR STRIP: ABNORMAL /HPF
REF LAB TEST METHOD: ABNORMAL
SODIUM SERPL-SCNC: 137 MMOL/L (ref 136–145)
SP GR UR STRIP: 1.01 (ref 1–1.03)
SP GR UR STRIP: <=1.005 (ref 1–1.03)
SQUAMOUS #/AREA URNS HPF: ABNORMAL /HPF
UROBILINOGEN UR QL STRIP: NORMAL
UROBILINOGEN UR QL STRIP: NORMAL
WBC # UR STRIP: ABNORMAL /HPF
WBC NRBC COR # BLD: 8.01 10*3/MM3 (ref 3.4–10.8)
WHOLE BLOOD HOLD COAG: NORMAL
WHOLE BLOOD HOLD SPECIMEN: NORMAL
YEAST URNS QL MICRO: ABNORMAL /HPF

## 2022-11-09 PROCEDURE — 71046 X-RAY EXAM CHEST 2 VIEWS: CPT

## 2022-11-09 PROCEDURE — 85025 COMPLETE CBC W/AUTO DIFF WBC: CPT

## 2022-11-09 PROCEDURE — 81001 URINALYSIS AUTO W/SCOPE: CPT | Performed by: PHYSICIAN ASSISTANT

## 2022-11-09 PROCEDURE — 96374 THER/PROPH/DIAG INJ IV PUSH: CPT

## 2022-11-09 PROCEDURE — 99283 EMERGENCY DEPT VISIT LOW MDM: CPT

## 2022-11-09 PROCEDURE — 77063 BREAST TOMOSYNTHESIS BI: CPT

## 2022-11-09 PROCEDURE — 83690 ASSAY OF LIPASE: CPT

## 2022-11-09 PROCEDURE — 25010000002 KETOROLAC TROMETHAMINE PER 15 MG: Performed by: EMERGENCY MEDICINE

## 2022-11-09 PROCEDURE — 80053 COMPREHEN METABOLIC PANEL: CPT

## 2022-11-09 PROCEDURE — 36415 COLL VENOUS BLD VENIPUNCTURE: CPT

## 2022-11-09 PROCEDURE — 83605 ASSAY OF LACTIC ACID: CPT

## 2022-11-09 PROCEDURE — 0 IOPAMIDOL PER 1 ML: Performed by: EMERGENCY MEDICINE

## 2022-11-09 PROCEDURE — 74019 RADEX ABDOMEN 2 VIEWS: CPT

## 2022-11-09 PROCEDURE — 74177 CT ABD & PELVIS W/CONTRAST: CPT

## 2022-11-09 PROCEDURE — 77067 SCR MAMMO BI INCL CAD: CPT

## 2022-11-09 PROCEDURE — 81003 URINALYSIS AUTO W/O SCOPE: CPT

## 2022-11-09 PROCEDURE — 99214 OFFICE O/P EST MOD 30 MIN: CPT | Performed by: PHYSICIAN ASSISTANT

## 2022-11-09 RX ORDER — IBUPROFEN 600 MG/1
600 TABLET ORAL EVERY 8 HOURS PRN
Qty: 30 TABLET | Refills: 0 | Status: SHIPPED | OUTPATIENT
Start: 2022-11-09 | End: 2022-12-13

## 2022-11-09 RX ORDER — LORAZEPAM 0.5 MG/1
1 TABLET ORAL ONCE
Status: COMPLETED | OUTPATIENT
Start: 2022-11-09 | End: 2022-11-09

## 2022-11-09 RX ORDER — SODIUM CHLORIDE 0.9 % (FLUSH) 0.9 %
10 SYRINGE (ML) INJECTION AS NEEDED
Status: DISCONTINUED | OUTPATIENT
Start: 2022-11-09 | End: 2022-11-09 | Stop reason: HOSPADM

## 2022-11-09 RX ORDER — KETOROLAC TROMETHAMINE 30 MG/ML
30 INJECTION, SOLUTION INTRAMUSCULAR; INTRAVENOUS ONCE
Status: COMPLETED | OUTPATIENT
Start: 2022-11-09 | End: 2022-11-09

## 2022-11-09 RX ADMIN — LORAZEPAM 1 MG: 0.5 TABLET ORAL at 21:25

## 2022-11-09 RX ADMIN — IOPAMIDOL 100 ML: 755 INJECTION, SOLUTION INTRAVENOUS at 20:17

## 2022-11-09 RX ADMIN — KETOROLAC TROMETHAMINE 30 MG: 30 INJECTION, SOLUTION INTRAMUSCULAR; INTRAVENOUS at 19:37

## 2022-11-10 ENCOUNTER — TELEPHONE (OUTPATIENT)
Dept: FAMILY MEDICINE CLINIC | Age: 67
End: 2022-11-10

## 2022-11-11 ENCOUNTER — TELEPHONE (OUTPATIENT)
Dept: BEHAVIORAL HEALTH | Facility: CLINIC | Age: 67
End: 2022-11-11

## 2022-11-11 DIAGNOSIS — Q89.1 ADRENAL GLAND ANOMALY: ICD-10-CM

## 2022-11-11 DIAGNOSIS — G25.81 RESTLESS LEG SYNDROME: ICD-10-CM

## 2022-11-11 DIAGNOSIS — G25.0 ESSENTIAL TREMOR: ICD-10-CM

## 2022-11-11 DIAGNOSIS — N28.1 KIDNEY CYSTS: Primary | ICD-10-CM

## 2022-11-11 RX ORDER — PRAMIPEXOLE DIHYDROCHLORIDE 0.12 MG/1
TABLET ORAL
Qty: 90 TABLET | Refills: 2 | Status: ON HOLD | OUTPATIENT
Start: 2022-11-11 | End: 2023-01-01

## 2022-11-18 ENCOUNTER — TELEPHONE (OUTPATIENT)
Dept: FAMILY MEDICINE CLINIC | Age: 67
End: 2022-11-18

## 2022-11-18 DIAGNOSIS — L21.9 SEBORRHEIC DERMATITIS OF SCALP: Primary | ICD-10-CM

## 2022-11-21 ENCOUNTER — OFFICE VISIT (OUTPATIENT)
Dept: BEHAVIORAL HEALTH | Facility: CLINIC | Age: 67
End: 2022-11-21

## 2022-11-21 VITALS
SYSTOLIC BLOOD PRESSURE: 141 MMHG | HEIGHT: 64 IN | HEART RATE: 73 BPM | DIASTOLIC BLOOD PRESSURE: 96 MMHG | WEIGHT: 282.8 LBS | BODY MASS INDEX: 48.28 KG/M2

## 2022-11-21 DIAGNOSIS — F33.2 SEVERE EPISODE OF RECURRENT MAJOR DEPRESSIVE DISORDER, WITHOUT PSYCHOTIC FEATURES: ICD-10-CM

## 2022-11-21 DIAGNOSIS — F41.1 GENERALIZED ANXIETY DISORDER: ICD-10-CM

## 2022-11-21 PROCEDURE — 99214 OFFICE O/P EST MOD 30 MIN: CPT | Performed by: NURSE PRACTITIONER

## 2022-11-29 RX ORDER — BACLOFEN 10 MG/1
10 TABLET ORAL NIGHTLY
Qty: 90 TABLET | Refills: 0 | Status: ON HOLD | OUTPATIENT
Start: 2022-11-29 | End: 2023-01-01

## 2022-11-29 RX ORDER — PRIMIDONE 50 MG/1
TABLET ORAL
Qty: 30 TABLET | Refills: 0 | Status: SHIPPED | OUTPATIENT
Start: 2022-11-29 | End: 2022-12-28 | Stop reason: SDUPTHER

## 2022-11-30 ENCOUNTER — HOSPITAL ENCOUNTER (OUTPATIENT)
Dept: GENERAL RADIOLOGY | Facility: HOSPITAL | Age: 67
Discharge: HOME | End: 2022-11-30
Admitting: PODIATRIST

## 2022-11-30 PROCEDURE — 73630 X-RAY EXAM OF FOOT: CPT

## 2022-12-01 ENCOUNTER — OFFICE VISIT (OUTPATIENT)
Dept: PODIATRY | Facility: CLINIC | Age: 67
End: 2022-12-01

## 2022-12-01 ENCOUNTER — CLINICAL SUPPORT (OUTPATIENT)
Dept: FAMILY MEDICINE CLINIC | Age: 67
End: 2022-12-01

## 2022-12-01 VITALS
DIASTOLIC BLOOD PRESSURE: 68 MMHG | TEMPERATURE: 96.8 F | WEIGHT: 280 LBS | HEART RATE: 75 BPM | BODY MASS INDEX: 47.8 KG/M2 | OXYGEN SATURATION: 98 % | HEIGHT: 64 IN | SYSTOLIC BLOOD PRESSURE: 95 MMHG

## 2022-12-01 DIAGNOSIS — R26.2 DIFFICULTY WALKING: ICD-10-CM

## 2022-12-01 DIAGNOSIS — B35.1 ONYCHOMYCOSIS: ICD-10-CM

## 2022-12-01 DIAGNOSIS — M79.671 FOOT PAIN, BILATERAL: Primary | ICD-10-CM

## 2022-12-01 DIAGNOSIS — E11.8 DIABETIC FOOT: ICD-10-CM

## 2022-12-01 DIAGNOSIS — L60.0 ONYCHOCRYPTOSIS: ICD-10-CM

## 2022-12-01 DIAGNOSIS — E11.9 NON-INSULIN DEPENDENT TYPE 2 DIABETES MELLITUS: ICD-10-CM

## 2022-12-01 DIAGNOSIS — M79.672 FOOT PAIN, BILATERAL: Primary | ICD-10-CM

## 2022-12-01 DIAGNOSIS — E53.8 VITAMIN B12 DEFICIENCY (NON ANEMIC): ICD-10-CM

## 2022-12-01 DIAGNOSIS — M19.90 ARTHRITIS: ICD-10-CM

## 2022-12-01 PROCEDURE — 99203 OFFICE O/P NEW LOW 30 MIN: CPT | Performed by: PODIATRIST

## 2022-12-01 PROCEDURE — 11721 DEBRIDE NAIL 6 OR MORE: CPT | Performed by: PODIATRIST

## 2022-12-01 PROCEDURE — 96372 THER/PROPH/DIAG INJ SC/IM: CPT | Performed by: FAMILY MEDICINE

## 2022-12-01 PROCEDURE — G8404 LOW EXTEMITY NEUR EXAM DOCUM: HCPCS | Performed by: PODIATRIST

## 2022-12-01 RX ORDER — GABAPENTIN 400 MG/1
CAPSULE ORAL
COMMUNITY
Start: 2022-11-29 | End: 2022-12-13

## 2022-12-01 RX ADMIN — CYANOCOBALAMIN 1000 MCG: 1000 INJECTION, SOLUTION INTRAMUSCULAR; SUBCUTANEOUS at 09:32

## 2022-12-06 ENCOUNTER — HOSPITAL ENCOUNTER (OUTPATIENT)
Dept: GENERAL RADIOLOGY | Facility: HOSPITAL | Age: 67
Discharge: HOME | End: 2022-12-06
Admitting: NURSE PRACTITIONER

## 2022-12-06 ENCOUNTER — OFFICE VISIT (OUTPATIENT)
Dept: FAMILY MEDICINE CLINIC | Age: 67
End: 2022-12-06

## 2022-12-06 VITALS
WEIGHT: 276.4 LBS | HEART RATE: 66 BPM | DIASTOLIC BLOOD PRESSURE: 90 MMHG | HEIGHT: 64 IN | SYSTOLIC BLOOD PRESSURE: 163 MMHG | TEMPERATURE: 98.1 F | BODY MASS INDEX: 47.19 KG/M2

## 2022-12-06 DIAGNOSIS — M79.89 SWELLING OF LEFT HAND: ICD-10-CM

## 2022-12-06 DIAGNOSIS — M79.89 SWELLING OF LEFT HAND: Primary | ICD-10-CM

## 2022-12-06 PROCEDURE — 73130 X-RAY EXAM OF HAND: CPT

## 2022-12-06 PROCEDURE — 99213 OFFICE O/P EST LOW 20 MIN: CPT | Performed by: NURSE PRACTITIONER

## 2022-12-06 RX ORDER — RISPERIDONE 0.5 MG/1
0.5 TABLET ORAL 2 TIMES DAILY
COMMUNITY
Start: 2022-12-05 | End: 2022-12-22

## 2022-12-06 RX ORDER — METHYLPREDNISOLONE 4 MG/1
TABLET ORAL
Qty: 21 EACH | Refills: 0 | Status: SHIPPED | OUTPATIENT
Start: 2022-12-06 | End: 2022-12-13

## 2022-12-07 ENCOUNTER — OFFICE VISIT (OUTPATIENT)
Dept: OTOLARYNGOLOGY | Facility: CLINIC | Age: 67
End: 2022-12-07

## 2022-12-07 VITALS — RESPIRATION RATE: 16 BRPM | WEIGHT: 281 LBS | BODY MASS INDEX: 47.97 KG/M2 | TEMPERATURE: 96.9 F | HEIGHT: 64 IN

## 2022-12-07 DIAGNOSIS — J30.89 ALLERGIC FUNGAL SINUSITIS: ICD-10-CM

## 2022-12-07 DIAGNOSIS — R09.81 NASAL CONGESTION: Primary | ICD-10-CM

## 2022-12-07 DIAGNOSIS — B49 ALLERGIC FUNGAL SINUSITIS: ICD-10-CM

## 2022-12-07 PROCEDURE — 99213 OFFICE O/P EST LOW 20 MIN: CPT | Performed by: OTOLARYNGOLOGY

## 2022-12-07 PROCEDURE — 31231 NASAL ENDOSCOPY DX: CPT | Performed by: OTOLARYNGOLOGY

## 2022-12-10 DIAGNOSIS — F33.9 RECURRENT MAJOR DEPRESSION RESISTANT TO TREATMENT: ICD-10-CM

## 2022-12-10 DIAGNOSIS — F41.1 GENERALIZED ANXIETY DISORDER: ICD-10-CM

## 2022-12-10 DIAGNOSIS — I10 ESSENTIAL HYPERTENSION: ICD-10-CM

## 2022-12-12 DIAGNOSIS — I10 ESSENTIAL HYPERTENSION: ICD-10-CM

## 2022-12-12 RX ORDER — BUPROPION HYDROCHLORIDE 200 MG/1
200 TABLET, EXTENDED RELEASE ORAL EVERY MORNING
Qty: 30 TABLET | Refills: 1 | Status: SHIPPED | OUTPATIENT
Start: 2022-12-12 | End: 2023-02-23 | Stop reason: DRUGHIGH

## 2022-12-12 RX ORDER — LOSARTAN POTASSIUM 50 MG/1
TABLET ORAL
Qty: 90 TABLET | Refills: 1 | Status: SHIPPED | OUTPATIENT
Start: 2022-12-12

## 2022-12-12 RX ORDER — LOSARTAN POTASSIUM 50 MG/1
TABLET ORAL
Qty: 90 TABLET | Refills: 1 | OUTPATIENT
Start: 2022-12-12

## 2022-12-12 RX ORDER — BUPROPION HYDROCHLORIDE 150 MG/1
TABLET, EXTENDED RELEASE ORAL
Qty: 30 TABLET | Refills: 2 | OUTPATIENT
Start: 2022-12-12

## 2022-12-13 ENCOUNTER — OFFICE VISIT (OUTPATIENT)
Dept: FAMILY MEDICINE CLINIC | Age: 67
End: 2022-12-13

## 2022-12-13 VITALS
HEART RATE: 71 BPM | BODY MASS INDEX: 48.14 KG/M2 | TEMPERATURE: 98.1 F | WEIGHT: 282 LBS | OXYGEN SATURATION: 97 % | HEIGHT: 64 IN | DIASTOLIC BLOOD PRESSURE: 124 MMHG | SYSTOLIC BLOOD PRESSURE: 173 MMHG

## 2022-12-13 DIAGNOSIS — R07.9 CHEST PAIN, UNSPECIFIED TYPE: Primary | ICD-10-CM

## 2022-12-13 DIAGNOSIS — I10 PRIMARY HYPERTENSION: ICD-10-CM

## 2022-12-13 PROCEDURE — 93000 ELECTROCARDIOGRAM COMPLETE: CPT | Performed by: NURSE PRACTITIONER

## 2022-12-13 PROCEDURE — 99213 OFFICE O/P EST LOW 20 MIN: CPT | Performed by: NURSE PRACTITIONER

## 2022-12-14 ENCOUNTER — TELEPHONE (OUTPATIENT)
Dept: CARDIOLOGY | Facility: CLINIC | Age: 67
End: 2022-12-14

## 2022-12-15 ENCOUNTER — TELEPHONE (OUTPATIENT)
Dept: FAMILY MEDICINE CLINIC | Age: 67
End: 2022-12-15

## 2022-12-16 ENCOUNTER — OFFICE VISIT (OUTPATIENT)
Dept: CARDIOLOGY | Facility: CLINIC | Age: 67
End: 2022-12-16

## 2022-12-16 VITALS
HEART RATE: 59 BPM | WEIGHT: 280 LBS | HEIGHT: 64 IN | DIASTOLIC BLOOD PRESSURE: 66 MMHG | SYSTOLIC BLOOD PRESSURE: 125 MMHG | BODY MASS INDEX: 47.8 KG/M2

## 2022-12-16 DIAGNOSIS — R07.89 OTHER CHEST PAIN: ICD-10-CM

## 2022-12-16 DIAGNOSIS — F41.9 ANXIETY: ICD-10-CM

## 2022-12-16 DIAGNOSIS — I10 ESSENTIAL HYPERTENSION: Primary | ICD-10-CM

## 2022-12-16 DIAGNOSIS — I50.32 CHRONIC DIASTOLIC HEART FAILURE: ICD-10-CM

## 2022-12-16 PROBLEM — R06.09 DYSPNEA ON EXERTION: Status: ACTIVE | Noted: 2022-12-16

## 2022-12-16 PROCEDURE — 99214 OFFICE O/P EST MOD 30 MIN: CPT | Performed by: FAMILY MEDICINE

## 2022-12-19 ENCOUNTER — OFFICE VISIT (OUTPATIENT)
Dept: FAMILY MEDICINE CLINIC | Age: 67
End: 2022-12-19

## 2022-12-19 VITALS
OXYGEN SATURATION: 98 % | WEIGHT: 280.8 LBS | SYSTOLIC BLOOD PRESSURE: 103 MMHG | HEART RATE: 79 BPM | HEIGHT: 64 IN | DIASTOLIC BLOOD PRESSURE: 75 MMHG | BODY MASS INDEX: 47.94 KG/M2 | TEMPERATURE: 97.7 F

## 2022-12-19 DIAGNOSIS — F41.1 GENERALIZED ANXIETY DISORDER: Primary | ICD-10-CM

## 2022-12-19 DIAGNOSIS — L98.9 SORE ON SCALP: ICD-10-CM

## 2022-12-19 DIAGNOSIS — I10 ESSENTIAL HYPERTENSION: ICD-10-CM

## 2022-12-19 DIAGNOSIS — E11.9 TYPE 2 DIABETES MELLITUS WITHOUT COMPLICATION, WITHOUT LONG-TERM CURRENT USE OF INSULIN: ICD-10-CM

## 2022-12-19 PROCEDURE — 99214 OFFICE O/P EST MOD 30 MIN: CPT | Performed by: FAMILY MEDICINE

## 2022-12-22 ENCOUNTER — OFFICE VISIT (OUTPATIENT)
Dept: FAMILY MEDICINE CLINIC | Age: 67
End: 2022-12-22

## 2022-12-22 VITALS
SYSTOLIC BLOOD PRESSURE: 155 MMHG | DIASTOLIC BLOOD PRESSURE: 107 MMHG | BODY MASS INDEX: 48.32 KG/M2 | WEIGHT: 283 LBS | OXYGEN SATURATION: 93 % | HEIGHT: 64 IN | HEART RATE: 79 BPM

## 2022-12-22 DIAGNOSIS — F41.9 ANXIETY: ICD-10-CM

## 2022-12-22 DIAGNOSIS — E11.9 TYPE 2 DIABETES MELLITUS WITHOUT COMPLICATION, WITHOUT LONG-TERM CURRENT USE OF INSULIN: ICD-10-CM

## 2022-12-22 DIAGNOSIS — M54.50 CHRONIC BILATERAL LOW BACK PAIN, UNSPECIFIED WHETHER SCIATICA PRESENT: ICD-10-CM

## 2022-12-22 DIAGNOSIS — I10 ESSENTIAL HYPERTENSION: Primary | ICD-10-CM

## 2022-12-22 DIAGNOSIS — F33.1 MAJOR DEPRESSIVE DISORDER, RECURRENT EPISODE, MODERATE: ICD-10-CM

## 2022-12-22 DIAGNOSIS — L98.9 SORE ON SCALP: ICD-10-CM

## 2022-12-22 DIAGNOSIS — F42.9 OBSESSIVE-COMPULSIVE DISORDER, UNSPECIFIED TYPE: ICD-10-CM

## 2022-12-22 DIAGNOSIS — G89.29 CHRONIC BILATERAL LOW BACK PAIN, UNSPECIFIED WHETHER SCIATICA PRESENT: ICD-10-CM

## 2022-12-22 PROCEDURE — 99214 OFFICE O/P EST MOD 30 MIN: CPT | Performed by: FAMILY MEDICINE

## 2022-12-22 RX ORDER — RISPERIDONE 1 MG/1
1 TABLET ORAL 2 TIMES DAILY
Qty: 60 TABLET | Refills: 0 | Status: SHIPPED | OUTPATIENT
Start: 2022-12-22 | End: 2023-01-30

## 2022-12-22 RX ORDER — DICLOFENAC SODIUM 75 MG/1
75 TABLET, DELAYED RELEASE ORAL 2 TIMES DAILY
Qty: 60 TABLET | Refills: 1 | Status: SHIPPED | OUTPATIENT
Start: 2022-12-22

## 2022-12-23 ENCOUNTER — DOCUMENTATION (OUTPATIENT)
Dept: FAMILY MEDICINE CLINIC | Age: 67
End: 2022-12-23

## 2022-12-26 DIAGNOSIS — K21.9 GASTROESOPHAGEAL REFLUX DISEASE, UNSPECIFIED WHETHER ESOPHAGITIS PRESENT: ICD-10-CM

## 2022-12-27 RX ORDER — GABAPENTIN 300 MG/1
CAPSULE ORAL
Qty: 90 CAPSULE | OUTPATIENT
Start: 2022-12-27

## 2022-12-28 ENCOUNTER — OUTSIDE FACILITY SERVICE (OUTPATIENT)
Dept: FAMILY MEDICINE CLINIC | Age: 67
End: 2022-12-28
Payer: MEDICARE

## 2022-12-28 ENCOUNTER — TELEPHONE (OUTPATIENT)
Dept: FAMILY MEDICINE CLINIC | Age: 67
End: 2022-12-28

## 2022-12-28 DIAGNOSIS — I10 ESSENTIAL HYPERTENSION: ICD-10-CM

## 2022-12-28 DIAGNOSIS — I10 PRIMARY HYPERTENSION: ICD-10-CM

## 2022-12-28 DIAGNOSIS — F33.1 MAJOR DEPRESSIVE DISORDER, RECURRENT EPISODE, MODERATE: Primary | ICD-10-CM

## 2022-12-28 PROCEDURE — G0179 MD RECERTIFICATION HHA PT: HCPCS | Performed by: FAMILY MEDICINE

## 2022-12-28 RX ORDER — PRIMIDONE 50 MG/1
TABLET ORAL
Qty: 30 TABLET | Refills: 0 | Status: ON HOLD | OUTPATIENT
Start: 2022-12-28 | End: 2023-01-01

## 2022-12-28 RX ORDER — PANTOPRAZOLE SODIUM 40 MG/1
TABLET, DELAYED RELEASE ORAL
Qty: 30 TABLET | Refills: 1 | Status: SHIPPED | OUTPATIENT
Start: 2022-12-28 | End: 2023-02-27 | Stop reason: SDUPTHER

## 2022-12-28 RX ORDER — AMLODIPINE BESYLATE 5 MG/1
TABLET ORAL
Qty: 90 TABLET | Refills: 0 | Status: SHIPPED | OUTPATIENT
Start: 2022-12-28 | End: 2023-03-27

## 2022-12-29 ENCOUNTER — LAB (OUTPATIENT)
Dept: LAB | Facility: HOSPITAL | Age: 67
End: 2022-12-29
Payer: MEDICARE

## 2022-12-29 ENCOUNTER — HOSPITAL ENCOUNTER (OUTPATIENT)
Dept: GENERAL RADIOLOGY | Facility: HOSPITAL | Age: 67
Discharge: HOME | End: 2022-12-29
Payer: MEDICARE

## 2022-12-29 ENCOUNTER — TELEPHONE (OUTPATIENT)
Dept: FAMILY MEDICINE CLINIC | Age: 67
End: 2022-12-29

## 2022-12-29 ENCOUNTER — OFFICE VISIT (OUTPATIENT)
Dept: FAMILY MEDICINE CLINIC | Age: 67
End: 2022-12-29
Payer: MEDICARE

## 2022-12-29 ENCOUNTER — E-VISIT (OUTPATIENT)
Dept: ADMINISTRATIVE | Facility: OTHER | Age: 67
End: 2022-12-29

## 2022-12-29 VITALS
TEMPERATURE: 98.1 F | HEART RATE: 92 BPM | BODY MASS INDEX: 48.21 KG/M2 | SYSTOLIC BLOOD PRESSURE: 106 MMHG | DIASTOLIC BLOOD PRESSURE: 81 MMHG | HEIGHT: 64 IN | OXYGEN SATURATION: 96 % | WEIGHT: 282.4 LBS

## 2022-12-29 DIAGNOSIS — R06.02 SHORTNESS OF BREATH: ICD-10-CM

## 2022-12-29 DIAGNOSIS — J44.9 CHRONIC OBSTRUCTIVE PULMONARY DISEASE, UNSPECIFIED COPD TYPE: Primary | ICD-10-CM

## 2022-12-29 DIAGNOSIS — R05.9 COUGH, UNSPECIFIED TYPE: ICD-10-CM

## 2022-12-29 LAB
DEPRECATED RDW RBC AUTO: 46.1 FL (ref 37–54)
ERYTHROCYTE [DISTWIDTH] IN BLOOD BY AUTOMATED COUNT: 12.9 % (ref 12.3–15.4)
EXPIRATION DATE: NORMAL
FLUAV AG UPPER RESP QL IA.RAPID: NOT DETECTED
FLUBV AG UPPER RESP QL IA.RAPID: NOT DETECTED
HCT VFR BLD AUTO: 46.7 % (ref 34–46.6)
HGB BLD-MCNC: 15 G/DL (ref 12–15.9)
INTERNAL CONTROL: NORMAL
Lab: NORMAL
MCH RBC QN AUTO: 30.7 PG (ref 26.6–33)
MCHC RBC AUTO-ENTMCNC: 32.1 G/DL (ref 31.5–35.7)
MCV RBC AUTO: 95.7 FL (ref 79–97)
PLATELET # BLD AUTO: 272 10*3/MM3 (ref 140–450)
PMV BLD AUTO: 8.9 FL (ref 6–12)
RBC # BLD AUTO: 4.88 10*6/MM3 (ref 3.77–5.28)
SARS-COV-2 AG UPPER RESP QL IA.RAPID: NOT DETECTED
SARS-COV-2 RNA PNL SPEC NAA+PROBE: NOT DETECTED
WBC NRBC COR # BLD: 7.64 10*3/MM3 (ref 3.4–10.8)

## 2022-12-29 PROCEDURE — C9803 HOPD COVID-19 SPEC COLLECT: HCPCS | Performed by: FAMILY MEDICINE

## 2022-12-29 PROCEDURE — U0004 COV-19 TEST NON-CDC HGH THRU: HCPCS | Performed by: FAMILY MEDICINE

## 2022-12-29 PROCEDURE — 87428 SARSCOV & INF VIR A&B AG IA: CPT | Performed by: FAMILY MEDICINE

## 2022-12-29 PROCEDURE — 71046 X-RAY EXAM CHEST 2 VIEWS: CPT

## 2022-12-29 PROCEDURE — 36415 COLL VENOUS BLD VENIPUNCTURE: CPT

## 2022-12-29 PROCEDURE — 99214 OFFICE O/P EST MOD 30 MIN: CPT | Performed by: FAMILY MEDICINE

## 2022-12-29 PROCEDURE — 85027 COMPLETE CBC AUTOMATED: CPT

## 2022-12-29 RX ORDER — METHYLPREDNISOLONE 4 MG/1
TABLET ORAL
Qty: 21 TABLET | Refills: 0 | Status: SHIPPED | OUTPATIENT
Start: 2022-12-29 | End: 2022-12-30

## 2022-12-29 RX ORDER — NALOXONE HYDROCHLORIDE 4 MG/.1ML
1 SPRAY NASAL AS NEEDED
COMMUNITY

## 2022-12-30 ENCOUNTER — OFFICE VISIT (OUTPATIENT)
Dept: PULMONOLOGY | Facility: CLINIC | Age: 67
End: 2022-12-30

## 2022-12-30 VITALS
DIASTOLIC BLOOD PRESSURE: 82 MMHG | RESPIRATION RATE: 20 BRPM | WEIGHT: 282 LBS | TEMPERATURE: 98.2 F | OXYGEN SATURATION: 100 % | HEIGHT: 64 IN | SYSTOLIC BLOOD PRESSURE: 116 MMHG | HEART RATE: 114 BPM | BODY MASS INDEX: 48.14 KG/M2

## 2022-12-30 DIAGNOSIS — E66.01 MORBID OBESITY WITH BMI OF 45.0-49.9, ADULT: ICD-10-CM

## 2022-12-30 DIAGNOSIS — R06.09 DYSPNEA ON EXERTION: Primary | ICD-10-CM

## 2022-12-30 DIAGNOSIS — I50.32 CHRONIC DIASTOLIC HEART FAILURE: ICD-10-CM

## 2022-12-30 DIAGNOSIS — M79.89 LEG SWELLING: ICD-10-CM

## 2022-12-30 DIAGNOSIS — G47.33 OSA (OBSTRUCTIVE SLEEP APNEA): ICD-10-CM

## 2022-12-30 DIAGNOSIS — F17.211 NICOTINE DEPENDENCE, CIGARETTES, IN REMISSION: ICD-10-CM

## 2022-12-30 PROCEDURE — 99214 OFFICE O/P EST MOD 30 MIN: CPT | Performed by: NURSE PRACTITIONER

## 2022-12-30 RX ORDER — FLUTICASONE PROPIONATE AND SALMETEROL XINAFOATE 230; 21 UG/1; UG/1
2 AEROSOL, METERED RESPIRATORY (INHALATION)
Qty: 1 EACH | Refills: 11 | Status: SHIPPED | OUTPATIENT
Start: 2022-12-30

## 2022-12-30 RX ORDER — PREDNISONE 10 MG/1
TABLET ORAL
Qty: 45 TABLET | Refills: 0 | Status: SHIPPED | OUTPATIENT
Start: 2022-12-30 | End: 2023-01-02 | Stop reason: HOSPADM

## 2022-12-31 ENCOUNTER — HOSPITAL ENCOUNTER (OUTPATIENT)
Facility: HOSPITAL | Age: 67
Setting detail: OBSERVATION
Discharge: HOME HEALTH | End: 2023-01-02
Attending: EMERGENCY MEDICINE | Admitting: INTERNAL MEDICINE
Payer: MEDICARE

## 2022-12-31 ENCOUNTER — APPOINTMENT (OUTPATIENT)
Dept: GENERAL RADIOLOGY | Facility: HOSPITAL | Age: 67
End: 2022-12-31
Payer: MEDICARE

## 2022-12-31 DIAGNOSIS — Z78.9 DECREASED ACTIVITIES OF DAILY LIVING (ADL): ICD-10-CM

## 2022-12-31 DIAGNOSIS — R07.9 CHEST PAIN, UNSPECIFIED TYPE: ICD-10-CM

## 2022-12-31 DIAGNOSIS — R00.0 TACHYCARDIA: ICD-10-CM

## 2022-12-31 DIAGNOSIS — J44.1 ACUTE EXACERBATION OF CHRONIC OBSTRUCTIVE PULMONARY DISEASE (COPD): Primary | ICD-10-CM

## 2022-12-31 DIAGNOSIS — R26.2 DIFFICULTY IN WALKING: ICD-10-CM

## 2022-12-31 DIAGNOSIS — E86.0 DEHYDRATION: ICD-10-CM

## 2022-12-31 LAB
ALBUMIN SERPL-MCNC: 3.8 G/DL (ref 3.5–5.2)
ALBUMIN/GLOB SERPL: 1 G/DL
ALP SERPL-CCNC: 118 U/L (ref 39–117)
ALT SERPL W P-5'-P-CCNC: 14 U/L (ref 1–33)
ANION GAP SERPL CALCULATED.3IONS-SCNC: 7.5 MMOL/L (ref 5–15)
AST SERPL-CCNC: 14 U/L (ref 1–32)
BASOPHILS # BLD AUTO: 0.04 10*3/MM3 (ref 0–0.2)
BASOPHILS NFR BLD AUTO: 0.4 % (ref 0–1.5)
BILIRUB SERPL-MCNC: 0.2 MG/DL (ref 0–1.2)
BUN SERPL-MCNC: 19 MG/DL (ref 8–23)
BUN/CREAT SERPL: 16.1 (ref 7–25)
CALCIUM SPEC-SCNC: 9.3 MG/DL (ref 8.6–10.5)
CHLORIDE SERPL-SCNC: 103 MMOL/L (ref 98–107)
CO2 SERPL-SCNC: 30.5 MMOL/L (ref 22–29)
CREAT SERPL-MCNC: 1.18 MG/DL (ref 0.57–1)
DEPRECATED RDW RBC AUTO: 47 FL (ref 37–54)
EGFRCR SERPLBLD CKD-EPI 2021: 50.7 ML/MIN/1.73
EOSINOPHIL # BLD AUTO: 0.05 10*3/MM3 (ref 0–0.4)
EOSINOPHIL NFR BLD AUTO: 0.5 % (ref 0.3–6.2)
ERYTHROCYTE [DISTWIDTH] IN BLOOD BY AUTOMATED COUNT: 13.3 % (ref 12.3–15.4)
GLOBULIN UR ELPH-MCNC: 3.7 GM/DL
GLUCOSE SERPL-MCNC: 85 MG/DL (ref 65–99)
HCT VFR BLD AUTO: 45.4 % (ref 34–46.6)
HGB BLD-MCNC: 14.8 G/DL (ref 12–15.9)
HOLD SPECIMEN: NORMAL
IMM GRANULOCYTES # BLD AUTO: 0.04 10*3/MM3 (ref 0–0.05)
IMM GRANULOCYTES NFR BLD AUTO: 0.4 % (ref 0–0.5)
LIPASE SERPL-CCNC: 30 U/L (ref 13–60)
LYMPHOCYTES # BLD AUTO: 2.43 10*3/MM3 (ref 0.7–3.1)
LYMPHOCYTES NFR BLD AUTO: 24.3 % (ref 19.6–45.3)
MAGNESIUM SERPL-MCNC: 2.1 MG/DL (ref 1.6–2.4)
MCH RBC QN AUTO: 30.9 PG (ref 26.6–33)
MCHC RBC AUTO-ENTMCNC: 32.6 G/DL (ref 31.5–35.7)
MCV RBC AUTO: 94.8 FL (ref 79–97)
MONOCYTES # BLD AUTO: 1.2 10*3/MM3 (ref 0.1–0.9)
MONOCYTES NFR BLD AUTO: 12 % (ref 5–12)
NEUTROPHILS NFR BLD AUTO: 6.22 10*3/MM3 (ref 1.7–7)
NEUTROPHILS NFR BLD AUTO: 62.4 % (ref 42.7–76)
NRBC BLD AUTO-RTO: 0 /100 WBC (ref 0–0.2)
NT-PROBNP SERPL-MCNC: 85.8 PG/ML (ref 0–900)
PLATELET # BLD AUTO: 326 10*3/MM3 (ref 140–450)
PMV BLD AUTO: 8.9 FL (ref 6–12)
POTASSIUM SERPL-SCNC: 3.9 MMOL/L (ref 3.5–5.2)
PROT SERPL-MCNC: 7.5 G/DL (ref 6–8.5)
RBC # BLD AUTO: 4.79 10*6/MM3 (ref 3.77–5.28)
SODIUM SERPL-SCNC: 141 MMOL/L (ref 136–145)
TROPONIN I SERPL-MCNC: 0 NG/ML (ref 0–0.08)
TROPONIN I SERPL-MCNC: 0.01 NG/ML (ref 0–0.08)
WBC NRBC COR # BLD: 9.98 10*3/MM3 (ref 3.4–10.8)
WHOLE BLOOD HOLD COAG: NORMAL
WHOLE BLOOD HOLD SPECIMEN: NORMAL

## 2022-12-31 PROCEDURE — 84484 ASSAY OF TROPONIN QUANT: CPT

## 2022-12-31 PROCEDURE — 71045 X-RAY EXAM CHEST 1 VIEW: CPT

## 2022-12-31 PROCEDURE — 36415 COLL VENOUS BLD VENIPUNCTURE: CPT

## 2022-12-31 PROCEDURE — 93005 ELECTROCARDIOGRAM TRACING: CPT

## 2022-12-31 PROCEDURE — 80053 COMPREHEN METABOLIC PANEL: CPT

## 2022-12-31 PROCEDURE — 94799 UNLISTED PULMONARY SVC/PX: CPT

## 2022-12-31 PROCEDURE — 99285 EMERGENCY DEPT VISIT HI MDM: CPT

## 2022-12-31 PROCEDURE — 94664 DEMO&/EVAL PT USE INHALER: CPT

## 2022-12-31 PROCEDURE — 85025 COMPLETE CBC W/AUTO DIFF WBC: CPT

## 2022-12-31 PROCEDURE — 94640 AIRWAY INHALATION TREATMENT: CPT

## 2022-12-31 PROCEDURE — 83880 ASSAY OF NATRIURETIC PEPTIDE: CPT

## 2022-12-31 PROCEDURE — 83690 ASSAY OF LIPASE: CPT

## 2022-12-31 PROCEDURE — 93005 ELECTROCARDIOGRAM TRACING: CPT | Performed by: EMERGENCY MEDICINE

## 2022-12-31 PROCEDURE — 85379 FIBRIN DEGRADATION QUANT: CPT | Performed by: EMERGENCY MEDICINE

## 2022-12-31 PROCEDURE — 83735 ASSAY OF MAGNESIUM: CPT

## 2022-12-31 PROCEDURE — 93010 ELECTROCARDIOGRAM REPORT: CPT | Performed by: SPECIALIST

## 2022-12-31 RX ORDER — SODIUM CHLORIDE 0.9 % (FLUSH) 0.9 %
10 SYRINGE (ML) INJECTION AS NEEDED
Status: DISCONTINUED | OUTPATIENT
Start: 2022-12-31 | End: 2023-01-02 | Stop reason: HOSPADM

## 2022-12-31 RX ORDER — ACETAMINOPHEN 650 MG/1
650 SUPPOSITORY RECTAL ONCE
Status: DISCONTINUED | OUTPATIENT
Start: 2022-12-31 | End: 2022-12-31

## 2022-12-31 RX ORDER — IPRATROPIUM BROMIDE AND ALBUTEROL SULFATE 2.5; .5 MG/3ML; MG/3ML
3 SOLUTION RESPIRATORY (INHALATION)
Status: COMPLETED | OUTPATIENT
Start: 2022-12-31 | End: 2022-12-31

## 2022-12-31 RX ORDER — GABAPENTIN 400 MG/1
400 CAPSULE ORAL ONCE
Status: COMPLETED | OUTPATIENT
Start: 2022-12-31 | End: 2022-12-31

## 2022-12-31 RX ORDER — ASPIRIN 81 MG/1
324 TABLET, CHEWABLE ORAL ONCE
Status: COMPLETED | OUTPATIENT
Start: 2022-12-31 | End: 2022-12-31

## 2022-12-31 RX ADMIN — GABAPENTIN 400 MG: 400 CAPSULE ORAL at 22:15

## 2022-12-31 RX ADMIN — ASPIRIN 324 MG: 81 TABLET, CHEWABLE ORAL at 20:19

## 2022-12-31 RX ADMIN — SODIUM CHLORIDE, POTASSIUM CHLORIDE, SODIUM LACTATE AND CALCIUM CHLORIDE 1000 ML: 600; 310; 30; 20 INJECTION, SOLUTION INTRAVENOUS at 22:14

## 2022-12-31 RX ADMIN — IPRATROPIUM BROMIDE AND ALBUTEROL SULFATE 3 ML: 2.5; .5 SOLUTION RESPIRATORY (INHALATION) at 22:21

## 2022-12-31 RX ADMIN — IPRATROPIUM BROMIDE AND ALBUTEROL SULFATE 3 ML: 2.5; .5 SOLUTION RESPIRATORY (INHALATION) at 22:26

## 2022-12-31 RX ADMIN — IPRATROPIUM BROMIDE AND ALBUTEROL SULFATE 3 ML: 2.5; .5 SOLUTION RESPIRATORY (INHALATION) at 22:17

## 2023-01-01 ENCOUNTER — APPOINTMENT (OUTPATIENT)
Dept: CT IMAGING | Facility: HOSPITAL | Age: 68
End: 2023-01-01
Payer: MEDICARE

## 2023-01-01 PROBLEM — R07.9 CHEST PAIN, RULE OUT ACUTE MYOCARDIAL INFARCTION: Status: ACTIVE | Noted: 2023-01-01

## 2023-01-01 LAB
ALBUMIN SERPL-MCNC: 3.5 G/DL (ref 3.5–5.2)
ANION GAP SERPL CALCULATED.3IONS-SCNC: 10.3 MMOL/L (ref 5–15)
BUN SERPL-MCNC: 18 MG/DL (ref 8–23)
BUN/CREAT SERPL: 17.8 (ref 7–25)
CALCIUM SPEC-SCNC: 9.2 MG/DL (ref 8.6–10.5)
CHLORIDE SERPL-SCNC: 100 MMOL/L (ref 98–107)
CO2 SERPL-SCNC: 27.7 MMOL/L (ref 22–29)
CREAT SERPL-MCNC: 1.01 MG/DL (ref 0.57–1)
D DIMER PPP FEU-MCNC: 0.78 MCGFEU/ML (ref 0–0.67)
EGFRCR SERPLBLD CKD-EPI 2021: 61.1 ML/MIN/1.73
GLUCOSE BLDC GLUCOMTR-MCNC: 128 MG/DL (ref 70–99)
GLUCOSE BLDC GLUCOMTR-MCNC: 178 MG/DL (ref 70–99)
GLUCOSE BLDC GLUCOMTR-MCNC: 184 MG/DL (ref 70–99)
GLUCOSE SERPL-MCNC: 117 MG/DL (ref 65–99)
PHOSPHATE SERPL-MCNC: 3.3 MG/DL (ref 2.5–4.5)
POTASSIUM SERPL-SCNC: 3.9 MMOL/L (ref 3.5–5.2)
QT INTERVAL: 383 MS
QT INTERVAL: 405 MS
SODIUM SERPL-SCNC: 138 MMOL/L (ref 136–145)
TROPONIN T SERPL-MCNC: <0.01 NG/ML (ref 0–0.03)

## 2023-01-01 PROCEDURE — G0378 HOSPITAL OBSERVATION PER HR: HCPCS

## 2023-01-01 PROCEDURE — 82962 GLUCOSE BLOOD TEST: CPT

## 2023-01-01 PROCEDURE — 97161 PT EVAL LOW COMPLEX 20 MIN: CPT

## 2023-01-01 PROCEDURE — 71260 CT THORAX DX C+: CPT

## 2023-01-01 PROCEDURE — 94760 N-INVAS EAR/PLS OXIMETRY 1: CPT

## 2023-01-01 PROCEDURE — 25010000002 ENOXAPARIN PER 10 MG: Performed by: INTERNAL MEDICINE

## 2023-01-01 PROCEDURE — 84484 ASSAY OF TROPONIN QUANT: CPT | Performed by: INTERNAL MEDICINE

## 2023-01-01 PROCEDURE — 94799 UNLISTED PULMONARY SVC/PX: CPT

## 2023-01-01 PROCEDURE — 96374 THER/PROPH/DIAG INJ IV PUSH: CPT

## 2023-01-01 PROCEDURE — 25010000002 FUROSEMIDE PER 20 MG: Performed by: INTERNAL MEDICINE

## 2023-01-01 PROCEDURE — 25010000002 METHYLPREDNISOLONE PER 40 MG: Performed by: INTERNAL MEDICINE

## 2023-01-01 PROCEDURE — 96375 TX/PRO/DX INJ NEW DRUG ADDON: CPT

## 2023-01-01 PROCEDURE — 99223 1ST HOSP IP/OBS HIGH 75: CPT | Performed by: INTERNAL MEDICINE

## 2023-01-01 PROCEDURE — 96376 TX/PRO/DX INJ SAME DRUG ADON: CPT

## 2023-01-01 PROCEDURE — 0 IOPAMIDOL PER 1 ML: Performed by: EMERGENCY MEDICINE

## 2023-01-01 PROCEDURE — 94660 CPAP INITIATION&MGMT: CPT

## 2023-01-01 PROCEDURE — 80069 RENAL FUNCTION PANEL: CPT | Performed by: INTERNAL MEDICINE

## 2023-01-01 PROCEDURE — 96372 THER/PROPH/DIAG INJ SC/IM: CPT

## 2023-01-01 PROCEDURE — 25010000002 ONDANSETRON PER 1 MG: Performed by: INTERNAL MEDICINE

## 2023-01-01 RX ORDER — MIRTAZAPINE 15 MG/1
45 TABLET, FILM COATED ORAL NIGHTLY
Status: DISCONTINUED | OUTPATIENT
Start: 2023-01-01 | End: 2023-01-02 | Stop reason: HOSPADM

## 2023-01-01 RX ORDER — NICOTINE POLACRILEX 4 MG
15 LOZENGE BUCCAL
Status: DISCONTINUED | OUTPATIENT
Start: 2023-01-01 | End: 2023-01-02 | Stop reason: HOSPADM

## 2023-01-01 RX ORDER — DEXTROSE MONOHYDRATE 25 G/50ML
25 INJECTION, SOLUTION INTRAVENOUS
Status: DISCONTINUED | OUTPATIENT
Start: 2023-01-01 | End: 2023-01-02 | Stop reason: HOSPADM

## 2023-01-01 RX ORDER — ACETAMINOPHEN 325 MG/1
650 TABLET ORAL EVERY 4 HOURS PRN
Status: DISCONTINUED | OUTPATIENT
Start: 2023-01-01 | End: 2023-01-02 | Stop reason: HOSPADM

## 2023-01-01 RX ORDER — BUPROPION HYDROCHLORIDE 100 MG/1
200 TABLET, EXTENDED RELEASE ORAL EVERY MORNING
Status: DISCONTINUED | OUTPATIENT
Start: 2023-01-01 | End: 2023-01-02 | Stop reason: HOSPADM

## 2023-01-01 RX ORDER — BUSPIRONE HYDROCHLORIDE 15 MG/1
30 TABLET ORAL 2 TIMES DAILY
Status: DISCONTINUED | OUTPATIENT
Start: 2023-01-01 | End: 2023-01-02 | Stop reason: HOSPADM

## 2023-01-01 RX ORDER — SODIUM CHLORIDE 0.9 % (FLUSH) 0.9 %
10 SYRINGE (ML) INJECTION AS NEEDED
Status: DISCONTINUED | OUTPATIENT
Start: 2023-01-01 | End: 2023-01-01

## 2023-01-01 RX ORDER — ALBUTEROL SULFATE 2.5 MG/3ML
2.5 SOLUTION RESPIRATORY (INHALATION) EVERY 6 HOURS PRN
Status: DISCONTINUED | OUTPATIENT
Start: 2023-01-01 | End: 2023-01-02 | Stop reason: HOSPADM

## 2023-01-01 RX ORDER — BUDESONIDE 0.25 MG/2ML
0.25 INHALANT ORAL
Status: DISCONTINUED | OUTPATIENT
Start: 2023-01-01 | End: 2023-01-02 | Stop reason: HOSPADM

## 2023-01-01 RX ORDER — IPRATROPIUM BROMIDE AND ALBUTEROL SULFATE 2.5; .5 MG/3ML; MG/3ML
3 SOLUTION RESPIRATORY (INHALATION) EVERY 4 HOURS PRN
Status: DISCONTINUED | OUTPATIENT
Start: 2023-01-01 | End: 2023-01-02 | Stop reason: HOSPADM

## 2023-01-01 RX ORDER — HYDROCODONE BITARTRATE AND ACETAMINOPHEN 10; 325 MG/1; MG/1
1 TABLET ORAL EVERY 6 HOURS PRN
Status: DISCONTINUED | OUTPATIENT
Start: 2023-01-01 | End: 2023-01-02 | Stop reason: HOSPADM

## 2023-01-01 RX ORDER — SODIUM CHLORIDE 0.9 % (FLUSH) 0.9 %
10 SYRINGE (ML) INJECTION EVERY 12 HOURS SCHEDULED
Status: DISCONTINUED | OUTPATIENT
Start: 2023-01-01 | End: 2023-01-02 | Stop reason: HOSPADM

## 2023-01-01 RX ORDER — IPRATROPIUM BROMIDE AND ALBUTEROL SULFATE 2.5; .5 MG/3ML; MG/3ML
3 SOLUTION RESPIRATORY (INHALATION)
Status: DISCONTINUED | OUTPATIENT
Start: 2023-01-01 | End: 2023-01-01

## 2023-01-01 RX ORDER — HYDROXYZINE PAMOATE 25 MG/1
25 CAPSULE ORAL 4 TIMES DAILY PRN
Status: DISCONTINUED | OUTPATIENT
Start: 2023-01-01 | End: 2023-01-02 | Stop reason: HOSPADM

## 2023-01-01 RX ORDER — RISPERIDONE 1 MG/1
1 TABLET ORAL EVERY 12 HOURS PRN
Status: DISCONTINUED | OUTPATIENT
Start: 2023-01-01 | End: 2023-01-02 | Stop reason: HOSPADM

## 2023-01-01 RX ORDER — SODIUM CHLORIDE 9 MG/ML
40 INJECTION, SOLUTION INTRAVENOUS AS NEEDED
Status: DISCONTINUED | OUTPATIENT
Start: 2023-01-01 | End: 2023-01-02 | Stop reason: HOSPADM

## 2023-01-01 RX ORDER — ONDANSETRON 2 MG/ML
4 INJECTION INTRAMUSCULAR; INTRAVENOUS EVERY 6 HOURS PRN
Status: DISCONTINUED | OUTPATIENT
Start: 2023-01-01 | End: 2023-01-02 | Stop reason: HOSPADM

## 2023-01-01 RX ORDER — GABAPENTIN 300 MG/1
300 CAPSULE ORAL AS NEEDED
Status: DISCONTINUED | OUTPATIENT
Start: 2023-01-01 | End: 2023-01-01

## 2023-01-01 RX ORDER — PANTOPRAZOLE SODIUM 40 MG/1
40 TABLET, DELAYED RELEASE ORAL EVERY MORNING
Status: DISCONTINUED | OUTPATIENT
Start: 2023-01-01 | End: 2023-01-02 | Stop reason: HOSPADM

## 2023-01-01 RX ORDER — NITROGLYCERIN 0.4 MG/1
0.4 TABLET SUBLINGUAL
Status: DISCONTINUED | OUTPATIENT
Start: 2023-01-01 | End: 2023-01-02 | Stop reason: HOSPADM

## 2023-01-01 RX ORDER — HYDROXYZINE HYDROCHLORIDE 10 MG/1
10 TABLET, FILM COATED ORAL EVERY 12 HOURS PRN
Status: DISCONTINUED | OUTPATIENT
Start: 2023-01-01 | End: 2023-01-01

## 2023-01-01 RX ORDER — ARFORMOTEROL TARTRATE 15 UG/2ML
15 SOLUTION RESPIRATORY (INHALATION)
Status: DISCONTINUED | OUTPATIENT
Start: 2023-01-01 | End: 2023-01-02 | Stop reason: HOSPADM

## 2023-01-01 RX ORDER — ENOXAPARIN SODIUM 100 MG/ML
40 INJECTION SUBCUTANEOUS EVERY 12 HOURS SCHEDULED
Status: DISCONTINUED | OUTPATIENT
Start: 2023-01-01 | End: 2023-01-02 | Stop reason: HOSPADM

## 2023-01-01 RX ORDER — METHYLPREDNISOLONE SODIUM SUCCINATE 40 MG/ML
40 INJECTION, POWDER, LYOPHILIZED, FOR SOLUTION INTRAMUSCULAR; INTRAVENOUS EVERY 12 HOURS
Status: DISCONTINUED | OUTPATIENT
Start: 2023-01-01 | End: 2023-01-02 | Stop reason: HOSPADM

## 2023-01-01 RX ORDER — DULOXETIN HYDROCHLORIDE 30 MG/1
120 CAPSULE, DELAYED RELEASE ORAL DAILY
Status: DISCONTINUED | OUTPATIENT
Start: 2023-01-01 | End: 2023-01-02 | Stop reason: HOSPADM

## 2023-01-01 RX ORDER — PRIMIDONE 50 MG/1
50 TABLET ORAL DAILY
Status: DISCONTINUED | OUTPATIENT
Start: 2023-01-01 | End: 2023-01-01

## 2023-01-01 RX ORDER — FUROSEMIDE 10 MG/ML
20 INJECTION INTRAMUSCULAR; INTRAVENOUS 2 TIMES DAILY
Status: DISCONTINUED | OUTPATIENT
Start: 2023-01-01 | End: 2023-01-02 | Stop reason: HOSPADM

## 2023-01-01 RX ORDER — SPIRONOLACTONE 25 MG/1
25 TABLET ORAL DAILY
Status: DISCONTINUED | OUTPATIENT
Start: 2023-01-01 | End: 2023-01-02 | Stop reason: HOSPADM

## 2023-01-01 RX ORDER — GABAPENTIN 400 MG/1
400 CAPSULE ORAL 2 TIMES DAILY PRN
Status: DISCONTINUED | OUTPATIENT
Start: 2023-01-01 | End: 2023-01-02 | Stop reason: HOSPADM

## 2023-01-01 RX ORDER — GABAPENTIN 300 MG/1
300 CAPSULE ORAL 2 TIMES DAILY
Status: DISCONTINUED | OUTPATIENT
Start: 2023-01-01 | End: 2023-01-01

## 2023-01-01 RX ORDER — PROPRANOLOL HCL 60 MG
60 CAPSULE, EXTENDED RELEASE 24HR ORAL DAILY
Status: DISCONTINUED | OUTPATIENT
Start: 2023-01-01 | End: 2023-01-02 | Stop reason: HOSPADM

## 2023-01-01 RX ORDER — INSULIN LISPRO 100 [IU]/ML
2-9 INJECTION, SOLUTION INTRAVENOUS; SUBCUTANEOUS
Status: DISCONTINUED | OUTPATIENT
Start: 2023-01-01 | End: 2023-01-02 | Stop reason: HOSPADM

## 2023-01-01 RX ADMIN — ARFORMOTEROL TARTRATE 15 MCG: 15 SOLUTION RESPIRATORY (INHALATION) at 07:33

## 2023-01-01 RX ADMIN — HYDROXYZINE PAMOATE 25 MG: 25 CAPSULE ORAL at 22:39

## 2023-01-01 RX ADMIN — BUDESONIDE 0.25 MG: 0.25 SUSPENSION RESPIRATORY (INHALATION) at 19:21

## 2023-01-01 RX ADMIN — HYDROCODONE BITARTRATE AND ACETAMINOPHEN 1 TABLET: 10; 325 TABLET ORAL at 13:59

## 2023-01-01 RX ADMIN — FUROSEMIDE 20 MG: 10 INJECTION, SOLUTION INTRAMUSCULAR; INTRAVENOUS at 20:37

## 2023-01-01 RX ADMIN — PANTOPRAZOLE SODIUM 40 MG: 40 TABLET, DELAYED RELEASE ORAL at 12:47

## 2023-01-01 RX ADMIN — FUROSEMIDE 20 MG: 10 INJECTION, SOLUTION INTRAMUSCULAR; INTRAVENOUS at 12:47

## 2023-01-01 RX ADMIN — HYDROCODONE BITARTRATE AND ACETAMINOPHEN 1 TABLET: 10; 325 TABLET ORAL at 20:37

## 2023-01-01 RX ADMIN — Medication 10 ML: at 08:32

## 2023-01-01 RX ADMIN — BUSPIRONE HYDROCHLORIDE 30 MG: 15 TABLET ORAL at 13:19

## 2023-01-01 RX ADMIN — GABAPENTIN 400 MG: 400 CAPSULE ORAL at 17:29

## 2023-01-01 RX ADMIN — METHYLPREDNISOLONE SODIUM SUCCINATE 40 MG: 40 INJECTION, POWDER, FOR SOLUTION INTRAMUSCULAR; INTRAVENOUS at 12:47

## 2023-01-01 RX ADMIN — BUSPIRONE HYDROCHLORIDE 30 MG: 15 TABLET ORAL at 20:37

## 2023-01-01 RX ADMIN — Medication 10 ML: at 20:37

## 2023-01-01 RX ADMIN — SPIRONOLACTONE 25 MG: 25 TABLET ORAL at 12:47

## 2023-01-01 RX ADMIN — BUPROPION HYDROCHLORIDE 200 MG: 100 TABLET, FILM COATED, EXTENDED RELEASE ORAL at 13:19

## 2023-01-01 RX ADMIN — RISPERIDONE 1 MG: 1 TABLET ORAL at 09:18

## 2023-01-01 RX ADMIN — ARFORMOTEROL TARTRATE 15 MCG: 15 SOLUTION RESPIRATORY (INHALATION) at 19:21

## 2023-01-01 RX ADMIN — HYDROCODONE BITARTRATE AND ACETAMINOPHEN 1 TABLET: 10; 325 TABLET ORAL at 04:47

## 2023-01-01 RX ADMIN — PRIMIDONE 50 MG: 50 TABLET ORAL at 13:19

## 2023-01-01 RX ADMIN — HYDROXYZINE PAMOATE 25 MG: 25 CAPSULE ORAL at 16:42

## 2023-01-01 RX ADMIN — DULOXETINE HYDROCHLORIDE 120 MG: 30 CAPSULE, DELAYED RELEASE ORAL at 13:18

## 2023-01-01 RX ADMIN — ENOXAPARIN SODIUM 40 MG: 100 INJECTION SUBCUTANEOUS at 20:36

## 2023-01-01 RX ADMIN — HYDROXYZINE HYDROCHLORIDE 10 MG: 10 TABLET ORAL at 04:50

## 2023-01-01 RX ADMIN — PROPRANOLOL HYDROCHLORIDE 60 MG: 60 CAPSULE, EXTENDED RELEASE ORAL at 13:19

## 2023-01-01 RX ADMIN — BUDESONIDE 0.25 MG: 0.25 SUSPENSION RESPIRATORY (INHALATION) at 07:33

## 2023-01-01 RX ADMIN — MIRTAZAPINE 45 MG: 15 TABLET, FILM COATED ORAL at 22:13

## 2023-01-01 RX ADMIN — IOPAMIDOL 100 ML: 755 INJECTION, SOLUTION INTRAVENOUS at 01:30

## 2023-01-01 RX ADMIN — ONDANSETRON 4 MG: 2 INJECTION INTRAMUSCULAR; INTRAVENOUS at 12:47

## 2023-01-01 RX ADMIN — IPRATROPIUM BROMIDE AND ALBUTEROL SULFATE 3 ML: .5; 3 SOLUTION RESPIRATORY (INHALATION) at 07:31

## 2023-01-01 RX ADMIN — ENOXAPARIN SODIUM 40 MG: 100 INJECTION SUBCUTANEOUS at 08:32

## 2023-01-02 ENCOUNTER — READMISSION MANAGEMENT (OUTPATIENT)
Dept: CALL CENTER | Facility: HOSPITAL | Age: 68
End: 2023-01-02
Payer: MEDICARE

## 2023-01-02 VITALS
BODY MASS INDEX: 48.44 KG/M2 | HEIGHT: 64 IN | RESPIRATION RATE: 18 BRPM | DIASTOLIC BLOOD PRESSURE: 87 MMHG | WEIGHT: 283.73 LBS | OXYGEN SATURATION: 95 % | HEART RATE: 74 BPM | TEMPERATURE: 98.4 F | SYSTOLIC BLOOD PRESSURE: 139 MMHG

## 2023-01-02 PROBLEM — J44.1 ACUTE EXACERBATION OF CHRONIC OBSTRUCTIVE PULMONARY DISEASE (COPD): Status: ACTIVE | Noted: 2023-01-02

## 2023-01-02 PROBLEM — R07.9 CHEST PAIN, RULE OUT ACUTE MYOCARDIAL INFARCTION: Status: RESOLVED | Noted: 2023-01-01 | Resolved: 2023-01-02

## 2023-01-02 PROBLEM — J44.1 ACUTE EXACERBATION OF CHRONIC OBSTRUCTIVE PULMONARY DISEASE (COPD): Status: RESOLVED | Noted: 2023-01-02 | Resolved: 2023-01-02

## 2023-01-02 LAB
ALBUMIN SERPL-MCNC: 3.6 G/DL (ref 3.5–5.2)
ANION GAP SERPL CALCULATED.3IONS-SCNC: 8.4 MMOL/L (ref 5–15)
BUN SERPL-MCNC: 17 MG/DL (ref 8–23)
BUN/CREAT SERPL: 17.7 (ref 7–25)
CALCIUM SPEC-SCNC: 9.4 MG/DL (ref 8.6–10.5)
CHLORIDE SERPL-SCNC: 99 MMOL/L (ref 98–107)
CO2 SERPL-SCNC: 28.6 MMOL/L (ref 22–29)
CREAT SERPL-MCNC: 0.96 MG/DL (ref 0.57–1)
EGFRCR SERPLBLD CKD-EPI 2021: 65 ML/MIN/1.73
GLUCOSE BLDC GLUCOMTR-MCNC: 107 MG/DL (ref 70–99)
GLUCOSE BLDC GLUCOMTR-MCNC: 177 MG/DL (ref 70–99)
GLUCOSE SERPL-MCNC: 119 MG/DL (ref 65–99)
PHOSPHATE SERPL-MCNC: 2.8 MG/DL (ref 2.5–4.5)
POTASSIUM SERPL-SCNC: 4.1 MMOL/L (ref 3.5–5.2)
SODIUM SERPL-SCNC: 136 MMOL/L (ref 136–145)

## 2023-01-02 PROCEDURE — 25010000002 METHYLPREDNISOLONE PER 40 MG: Performed by: INTERNAL MEDICINE

## 2023-01-02 PROCEDURE — 25010000002 FUROSEMIDE PER 20 MG: Performed by: INTERNAL MEDICINE

## 2023-01-02 PROCEDURE — 96376 TX/PRO/DX INJ SAME DRUG ADON: CPT

## 2023-01-02 PROCEDURE — 96372 THER/PROPH/DIAG INJ SC/IM: CPT

## 2023-01-02 PROCEDURE — 94660 CPAP INITIATION&MGMT: CPT

## 2023-01-02 PROCEDURE — 94799 UNLISTED PULMONARY SVC/PX: CPT

## 2023-01-02 PROCEDURE — 99239 HOSP IP/OBS DSCHRG MGMT >30: CPT | Performed by: INTERNAL MEDICINE

## 2023-01-02 PROCEDURE — G0378 HOSPITAL OBSERVATION PER HR: HCPCS

## 2023-01-02 PROCEDURE — 25010000002 ENOXAPARIN PER 10 MG: Performed by: INTERNAL MEDICINE

## 2023-01-02 PROCEDURE — 94760 N-INVAS EAR/PLS OXIMETRY 1: CPT

## 2023-01-02 PROCEDURE — 97165 OT EVAL LOW COMPLEX 30 MIN: CPT

## 2023-01-02 PROCEDURE — 80069 RENAL FUNCTION PANEL: CPT | Performed by: INTERNAL MEDICINE

## 2023-01-02 PROCEDURE — 82962 GLUCOSE BLOOD TEST: CPT

## 2023-01-02 RX ORDER — HYDROXYZINE PAMOATE 25 MG/1
25 CAPSULE ORAL 4 TIMES DAILY PRN
Qty: 12 CAPSULE | Refills: 0 | Status: SHIPPED | OUTPATIENT
Start: 2023-01-02 | End: 2023-01-09 | Stop reason: DRUGHIGH

## 2023-01-02 RX ORDER — GABAPENTIN 400 MG/1
400 CAPSULE ORAL ONCE
Status: COMPLETED | OUTPATIENT
Start: 2023-01-02 | End: 2023-01-02

## 2023-01-02 RX ORDER — AMLODIPINE BESYLATE 5 MG/1
5 TABLET ORAL
Status: DISCONTINUED | OUTPATIENT
Start: 2023-01-02 | End: 2023-01-02 | Stop reason: HOSPADM

## 2023-01-02 RX ORDER — PREDNISONE 20 MG/1
40 TABLET ORAL DAILY
Qty: 10 TABLET | Refills: 0 | Status: SHIPPED | OUTPATIENT
Start: 2023-01-02 | End: 2023-01-07

## 2023-01-02 RX ADMIN — Medication 10 ML: at 08:13

## 2023-01-02 RX ADMIN — FUROSEMIDE 20 MG: 10 INJECTION, SOLUTION INTRAMUSCULAR; INTRAVENOUS at 08:13

## 2023-01-02 RX ADMIN — AMLODIPINE BESYLATE 5 MG: 5 TABLET ORAL at 03:16

## 2023-01-02 RX ADMIN — BUDESONIDE 0.25 MG: 0.25 SUSPENSION RESPIRATORY (INHALATION) at 08:20

## 2023-01-02 RX ADMIN — SPIRONOLACTONE 25 MG: 25 TABLET ORAL at 08:13

## 2023-01-02 RX ADMIN — BUPROPION HYDROCHLORIDE 200 MG: 100 TABLET, FILM COATED, EXTENDED RELEASE ORAL at 07:23

## 2023-01-02 RX ADMIN — BUSPIRONE HYDROCHLORIDE 30 MG: 15 TABLET ORAL at 08:13

## 2023-01-02 RX ADMIN — ARFORMOTEROL TARTRATE 15 MCG: 15 SOLUTION RESPIRATORY (INHALATION) at 08:20

## 2023-01-02 RX ADMIN — DULOXETINE HYDROCHLORIDE 120 MG: 30 CAPSULE, DELAYED RELEASE ORAL at 08:13

## 2023-01-02 RX ADMIN — HYDROCODONE BITARTRATE AND ACETAMINOPHEN 1 TABLET: 10; 325 TABLET ORAL at 04:56

## 2023-01-02 RX ADMIN — HYDROXYZINE PAMOATE 25 MG: 25 CAPSULE ORAL at 08:56

## 2023-01-02 RX ADMIN — ENOXAPARIN SODIUM 40 MG: 100 INJECTION SUBCUTANEOUS at 08:13

## 2023-01-02 RX ADMIN — METHYLPREDNISOLONE SODIUM SUCCINATE 40 MG: 40 INJECTION, POWDER, FOR SOLUTION INTRAMUSCULAR; INTRAVENOUS at 12:03

## 2023-01-02 RX ADMIN — PANTOPRAZOLE SODIUM 40 MG: 40 TABLET, DELAYED RELEASE ORAL at 07:23

## 2023-01-02 RX ADMIN — GABAPENTIN 400 MG: 400 CAPSULE ORAL at 12:07

## 2023-01-02 RX ADMIN — PROPRANOLOL HYDROCHLORIDE 60 MG: 60 CAPSULE, EXTENDED RELEASE ORAL at 08:13

## 2023-01-02 RX ADMIN — GABAPENTIN 400 MG: 400 CAPSULE ORAL at 07:23

## 2023-01-02 RX ADMIN — METHYLPREDNISOLONE SODIUM SUCCINATE 40 MG: 40 INJECTION, POWDER, FOR SOLUTION INTRAMUSCULAR; INTRAVENOUS at 02:30

## 2023-01-02 RX ADMIN — IPRATROPIUM BROMIDE AND ALBUTEROL SULFATE 3 ML: 2.5; .5 SOLUTION RESPIRATORY (INHALATION) at 05:06

## 2023-01-02 RX ADMIN — ACETAMINOPHEN 650 MG: 325 TABLET ORAL at 08:59

## 2023-01-02 RX ADMIN — HYDROCODONE BITARTRATE AND ACETAMINOPHEN 1 TABLET: 10; 325 TABLET ORAL at 11:24

## 2023-01-03 ENCOUNTER — TRANSITIONAL CARE MANAGEMENT TELEPHONE ENCOUNTER (OUTPATIENT)
Dept: CALL CENTER | Facility: HOSPITAL | Age: 68
End: 2023-01-03
Payer: MEDICARE

## 2023-01-05 ENCOUNTER — OFFICE VISIT (OUTPATIENT)
Dept: PULMONOLOGY | Facility: CLINIC | Age: 68
End: 2023-01-05
Payer: MEDICARE

## 2023-01-05 VITALS
DIASTOLIC BLOOD PRESSURE: 97 MMHG | TEMPERATURE: 98.4 F | HEIGHT: 64 IN | RESPIRATION RATE: 18 BRPM | BODY MASS INDEX: 48.32 KG/M2 | WEIGHT: 283 LBS | OXYGEN SATURATION: 96 % | HEART RATE: 98 BPM | SYSTOLIC BLOOD PRESSURE: 139 MMHG

## 2023-01-05 DIAGNOSIS — J43.9 PULMONARY EMPHYSEMA, UNSPECIFIED EMPHYSEMA TYPE: ICD-10-CM

## 2023-01-05 DIAGNOSIS — R06.09 DYSPNEA ON EXERTION: ICD-10-CM

## 2023-01-05 DIAGNOSIS — F41.9 ANXIETY: Primary | ICD-10-CM

## 2023-01-05 PROBLEM — J44.9 COPD (CHRONIC OBSTRUCTIVE PULMONARY DISEASE): Status: ACTIVE | Noted: 2023-01-05

## 2023-01-05 PROCEDURE — 99214 OFFICE O/P EST MOD 30 MIN: CPT | Performed by: STUDENT IN AN ORGANIZED HEALTH CARE EDUCATION/TRAINING PROGRAM

## 2023-01-05 RX ORDER — POTASSIUM CHLORIDE 750 MG/1
CAPSULE, EXTENDED RELEASE ORAL
Qty: 90 CAPSULE | Refills: 0 | Status: SHIPPED | OUTPATIENT
Start: 2023-01-05 | End: 2023-01-24 | Stop reason: ALTCHOICE

## 2023-01-05 RX ORDER — TIOTROPIUM BROMIDE INHALATION SPRAY 1.56 UG/1
2 SPRAY, METERED RESPIRATORY (INHALATION)
Qty: 2 EACH | Refills: 0 | COMMUNITY
Start: 2023-01-05 | End: 2023-01-18 | Stop reason: SDUPTHER

## 2023-01-05 RX ORDER — FUROSEMIDE 20 MG/1
TABLET ORAL
Qty: 180 TABLET | Refills: 0 | Status: SHIPPED | OUTPATIENT
Start: 2023-01-05 | End: 2023-04-05

## 2023-01-09 ENCOUNTER — OFFICE VISIT (OUTPATIENT)
Dept: BEHAVIORAL HEALTH | Facility: CLINIC | Age: 68
End: 2023-01-09
Payer: MEDICARE

## 2023-01-09 ENCOUNTER — CLINICAL SUPPORT (OUTPATIENT)
Dept: FAMILY MEDICINE CLINIC | Age: 68
End: 2023-01-09
Payer: MEDICARE

## 2023-01-09 VITALS
HEIGHT: 64 IN | WEIGHT: 285.2 LBS | BODY MASS INDEX: 48.69 KG/M2 | DIASTOLIC BLOOD PRESSURE: 91 MMHG | HEART RATE: 104 BPM | SYSTOLIC BLOOD PRESSURE: 112 MMHG

## 2023-01-09 VITALS — SYSTOLIC BLOOD PRESSURE: 110 MMHG | HEART RATE: 97 BPM | DIASTOLIC BLOOD PRESSURE: 62 MMHG

## 2023-01-09 DIAGNOSIS — L29.9 ITCHING: ICD-10-CM

## 2023-01-09 DIAGNOSIS — F33.9 RECURRENT MAJOR DEPRESSION RESISTANT TO TREATMENT: ICD-10-CM

## 2023-01-09 DIAGNOSIS — F41.1 GENERALIZED ANXIETY DISORDER: ICD-10-CM

## 2023-01-09 DIAGNOSIS — F42.4 SKIN-PICKING DISORDER: ICD-10-CM

## 2023-01-09 DIAGNOSIS — E53.8 B12 DEFICIENCY: ICD-10-CM

## 2023-01-09 DIAGNOSIS — R44.2: ICD-10-CM

## 2023-01-09 PROCEDURE — 96372 THER/PROPH/DIAG INJ SC/IM: CPT | Performed by: FAMILY MEDICINE

## 2023-01-09 PROCEDURE — 99215 OFFICE O/P EST HI 40 MIN: CPT | Performed by: NURSE PRACTITIONER

## 2023-01-09 RX ORDER — HYDROXYZINE PAMOATE 50 MG/1
50 CAPSULE ORAL 3 TIMES DAILY PRN
Qty: 90 CAPSULE | Refills: 0 | Status: SHIPPED | OUTPATIENT
Start: 2023-01-09 | End: 2023-01-30 | Stop reason: SDUPTHER

## 2023-01-09 RX ORDER — DULOXETIN HYDROCHLORIDE 30 MG/1
CAPSULE, DELAYED RELEASE ORAL
Qty: 42 CAPSULE | Refills: 0 | Status: SHIPPED | OUTPATIENT
Start: 2023-01-10 | End: 2023-01-30 | Stop reason: ALTCHOICE

## 2023-01-09 RX ADMIN — CYANOCOBALAMIN 1000 MCG: 1000 INJECTION, SOLUTION INTRAMUSCULAR; SUBCUTANEOUS at 11:35

## 2023-01-11 ENCOUNTER — TELEPHONE (OUTPATIENT)
Dept: FAMILY MEDICINE CLINIC | Age: 68
End: 2023-01-11
Payer: MEDICARE

## 2023-01-11 ENCOUNTER — TELEPHONE (OUTPATIENT)
Dept: PSYCHIATRY | Facility: CLINIC | Age: 68
End: 2023-01-11
Payer: MEDICARE

## 2023-01-12 ENCOUNTER — OFFICE VISIT (OUTPATIENT)
Dept: FAMILY MEDICINE CLINIC | Age: 68
End: 2023-01-12
Payer: MEDICARE

## 2023-01-12 ENCOUNTER — TELEPHONE (OUTPATIENT)
Dept: PULMONOLOGY | Facility: CLINIC | Age: 68
End: 2023-01-12
Payer: MEDICARE

## 2023-01-12 VITALS
TEMPERATURE: 98 F | SYSTOLIC BLOOD PRESSURE: 101 MMHG | BODY MASS INDEX: 48.9 KG/M2 | HEIGHT: 64 IN | DIASTOLIC BLOOD PRESSURE: 80 MMHG | WEIGHT: 286.4 LBS | OXYGEN SATURATION: 96 % | HEART RATE: 71 BPM

## 2023-01-12 DIAGNOSIS — I10 ESSENTIAL HYPERTENSION: ICD-10-CM

## 2023-01-12 DIAGNOSIS — T50.1X5S: ICD-10-CM

## 2023-01-12 DIAGNOSIS — R07.89 OTHER CHEST PAIN: ICD-10-CM

## 2023-01-12 DIAGNOSIS — I50.32 CHRONIC DIASTOLIC HEART FAILURE: ICD-10-CM

## 2023-01-12 DIAGNOSIS — F33.1 MAJOR DEPRESSIVE DISORDER, RECURRENT EPISODE, MODERATE: ICD-10-CM

## 2023-01-12 DIAGNOSIS — R41.3 MEMORY CHANGES: ICD-10-CM

## 2023-01-12 DIAGNOSIS — E11.9 TYPE 2 DIABETES MELLITUS WITHOUT COMPLICATION, WITHOUT LONG-TERM CURRENT USE OF INSULIN: ICD-10-CM

## 2023-01-12 DIAGNOSIS — F41.1 GENERALIZED ANXIETY DISORDER: ICD-10-CM

## 2023-01-12 DIAGNOSIS — J44.9 CHRONIC OBSTRUCTIVE PULMONARY DISEASE, UNSPECIFIED COPD TYPE: Primary | ICD-10-CM

## 2023-01-12 DIAGNOSIS — I95.9 HYPOTENSION, UNSPECIFIED HYPOTENSION TYPE: ICD-10-CM

## 2023-01-12 DIAGNOSIS — F51.05 INSOMNIA DUE TO MENTAL CONDITION: ICD-10-CM

## 2023-01-12 PROCEDURE — 99495 TRANSJ CARE MGMT MOD F2F 14D: CPT | Performed by: FAMILY MEDICINE

## 2023-01-12 PROCEDURE — 1111F DSCHRG MED/CURRENT MED MERGE: CPT | Performed by: FAMILY MEDICINE

## 2023-01-12 RX ORDER — SPIRONOLACTONE 25 MG/1
25 TABLET ORAL DAILY
Qty: 90 TABLET | Refills: 0 | Status: SHIPPED | OUTPATIENT
Start: 2023-01-12

## 2023-01-12 RX ORDER — DULAGLUTIDE 0.75 MG/.5ML
INJECTION, SOLUTION SUBCUTANEOUS WEEKLY
COMMUNITY
Start: 2023-01-10 | End: 2023-02-06

## 2023-01-12 RX ORDER — MIRTAZAPINE 30 MG/1
45 TABLET, FILM COATED ORAL NIGHTLY
Qty: 45 TABLET | Refills: 1 | Status: SHIPPED | OUTPATIENT
Start: 2023-01-12 | End: 2023-02-23 | Stop reason: SDUPTHER

## 2023-01-13 RX ORDER — TIOTROPIUM BROMIDE INHALATION SPRAY 1.56 UG/1
2 SPRAY, METERED RESPIRATORY (INHALATION)
Qty: 4 G | Refills: 11 | Status: SHIPPED | OUTPATIENT
Start: 2023-01-13 | End: 2023-02-07

## 2023-01-16 ENCOUNTER — TELEPHONE (OUTPATIENT)
Dept: FAMILY MEDICINE CLINIC | Age: 68
End: 2023-01-16

## 2023-01-17 ENCOUNTER — TELEPHONE (OUTPATIENT)
Dept: FAMILY MEDICINE CLINIC | Age: 68
End: 2023-01-17
Payer: MEDICARE

## 2023-01-17 ENCOUNTER — TELEPHONE (OUTPATIENT)
Dept: BEHAVIORAL HEALTH | Facility: CLINIC | Age: 68
End: 2023-01-17
Payer: MEDICARE

## 2023-01-17 ENCOUNTER — OFFICE VISIT (OUTPATIENT)
Dept: CARDIOLOGY | Facility: CLINIC | Age: 68
End: 2023-01-17
Payer: MEDICARE

## 2023-01-17 VITALS
BODY MASS INDEX: 47.97 KG/M2 | DIASTOLIC BLOOD PRESSURE: 85 MMHG | SYSTOLIC BLOOD PRESSURE: 136 MMHG | HEART RATE: 91 BPM | WEIGHT: 281 LBS | HEIGHT: 64 IN

## 2023-01-17 DIAGNOSIS — F42.4 EXCORIATION (SKIN-PICKING) DISORDER: ICD-10-CM

## 2023-01-17 DIAGNOSIS — I50.32 CHRONIC DIASTOLIC HEART FAILURE: ICD-10-CM

## 2023-01-17 DIAGNOSIS — I10 ESSENTIAL HYPERTENSION: ICD-10-CM

## 2023-01-17 DIAGNOSIS — F33.9 RECURRENT MAJOR DEPRESSION RESISTANT TO TREATMENT: ICD-10-CM

## 2023-01-17 DIAGNOSIS — F42.4 SKIN-PICKING DISORDER: Primary | ICD-10-CM

## 2023-01-17 DIAGNOSIS — R00.2 PALPITATIONS: Primary | ICD-10-CM

## 2023-01-17 DIAGNOSIS — F41.1 GENERALIZED ANXIETY DISORDER: ICD-10-CM

## 2023-01-17 DIAGNOSIS — R07.89 OTHER CHEST PAIN: ICD-10-CM

## 2023-01-17 PROCEDURE — 99214 OFFICE O/P EST MOD 30 MIN: CPT | Performed by: INTERNAL MEDICINE

## 2023-01-18 ENCOUNTER — TELEPHONE (OUTPATIENT)
Dept: CARDIOLOGY | Facility: CLINIC | Age: 68
End: 2023-01-18
Payer: MEDICARE

## 2023-01-19 RX ORDER — GABAPENTIN 300 MG/1
CAPSULE ORAL
Qty: 90 CAPSULE | OUTPATIENT
Start: 2023-01-19

## 2023-01-20 ENCOUNTER — TELEPHONE (OUTPATIENT)
Dept: FAMILY MEDICINE CLINIC | Age: 68
End: 2023-01-20
Payer: MEDICARE

## 2023-01-22 DIAGNOSIS — F42.9 OBSESSIVE-COMPULSIVE DISORDER, UNSPECIFIED TYPE: ICD-10-CM

## 2023-01-22 DIAGNOSIS — F41.9 ANXIETY: ICD-10-CM

## 2023-01-23 ENCOUNTER — TELEPHONE (OUTPATIENT)
Dept: FAMILY MEDICINE CLINIC | Age: 68
End: 2023-01-23

## 2023-01-23 RX ORDER — GABAPENTIN 300 MG/1
CAPSULE ORAL
Qty: 90 CAPSULE | OUTPATIENT
Start: 2023-01-23

## 2023-01-23 RX ORDER — RISPERIDONE 1 MG/1
TABLET ORAL
Qty: 60 TABLET | Refills: 0 | OUTPATIENT
Start: 2023-01-23

## 2023-01-24 ENCOUNTER — LAB (OUTPATIENT)
Dept: LAB | Facility: HOSPITAL | Age: 68
End: 2023-01-24
Payer: MEDICARE

## 2023-01-24 ENCOUNTER — OFFICE VISIT (OUTPATIENT)
Dept: FAMILY MEDICINE CLINIC | Age: 68
End: 2023-01-24
Payer: MEDICARE

## 2023-01-24 ENCOUNTER — OFFICE VISIT (OUTPATIENT)
Dept: NEUROLOGY | Facility: CLINIC | Age: 68
End: 2023-01-24
Payer: MEDICARE

## 2023-01-24 VITALS
DIASTOLIC BLOOD PRESSURE: 73 MMHG | BODY MASS INDEX: 48.76 KG/M2 | OXYGEN SATURATION: 100 % | HEART RATE: 94 BPM | TEMPERATURE: 97.7 F | HEIGHT: 64 IN | WEIGHT: 285.6 LBS | SYSTOLIC BLOOD PRESSURE: 112 MMHG

## 2023-01-24 VITALS
BODY MASS INDEX: 48.83 KG/M2 | SYSTOLIC BLOOD PRESSURE: 108 MMHG | HEART RATE: 100 BPM | HEIGHT: 64 IN | DIASTOLIC BLOOD PRESSURE: 71 MMHG | WEIGHT: 286 LBS | OXYGEN SATURATION: 98 %

## 2023-01-24 DIAGNOSIS — I83.93 ASYMPTOMATIC VARICOSE VEINS OF BOTH LOWER EXTREMITIES: Primary | ICD-10-CM

## 2023-01-24 DIAGNOSIS — T50.1X5S: ICD-10-CM

## 2023-01-24 DIAGNOSIS — E11.9 TYPE 2 DIABETES MELLITUS WITHOUT COMPLICATION, UNSPECIFIED WHETHER LONG TERM INSULIN USE: ICD-10-CM

## 2023-01-24 DIAGNOSIS — E11.9 TYPE 2 DIABETES MELLITUS WITHOUT COMPLICATION, WITHOUT LONG-TERM CURRENT USE OF INSULIN: ICD-10-CM

## 2023-01-24 DIAGNOSIS — F09 MILD COGNITIVE DISORDER: ICD-10-CM

## 2023-01-24 DIAGNOSIS — F09 MILD COGNITIVE DISORDER: Primary | ICD-10-CM

## 2023-01-24 LAB
ALBUMIN UR-MCNC: <1.2 MG/DL
ANION GAP SERPL CALCULATED.3IONS-SCNC: 10.4 MMOL/L (ref 5–15)
BUN SERPL-MCNC: 16 MG/DL (ref 8–23)
BUN/CREAT SERPL: 13.6 (ref 7–25)
CALCIUM SPEC-SCNC: 9.2 MG/DL (ref 8.6–10.5)
CHLORIDE SERPL-SCNC: 103 MMOL/L (ref 98–107)
CHOLEST SERPL-MCNC: 216 MG/DL (ref 0–200)
CO2 SERPL-SCNC: 30.6 MMOL/L (ref 22–29)
CREAT SERPL-MCNC: 1.18 MG/DL (ref 0.57–1)
CREAT UR-MCNC: 33 MG/DL
EGFRCR SERPLBLD CKD-EPI 2021: 50.7 ML/MIN/1.73
FOLATE SERPL-MCNC: 15 NG/ML (ref 4.78–24.2)
GLUCOSE SERPL-MCNC: 116 MG/DL (ref 65–99)
HDLC SERPL-MCNC: 72 MG/DL (ref 40–60)
LDLC SERPL CALC-MCNC: 133 MG/DL (ref 0–100)
LDLC/HDLC SERPL: 1.83 {RATIO}
MICROALBUMIN/CREAT UR: NORMAL MG/G{CREAT}
POTASSIUM SERPL-SCNC: 4.4 MMOL/L (ref 3.5–5.2)
SODIUM SERPL-SCNC: 144 MMOL/L (ref 136–145)
TRIGL SERPL-MCNC: 63 MG/DL (ref 0–150)
TSH SERPL DL<=0.05 MIU/L-ACNC: 1.25 UIU/ML (ref 0.27–4.2)
VIT B12 BLD-MCNC: 455 PG/ML (ref 211–946)
VLDLC SERPL-MCNC: 11 MG/DL (ref 5–40)

## 2023-01-24 PROCEDURE — 84443 ASSAY THYROID STIM HORMONE: CPT

## 2023-01-24 PROCEDURE — 83921 ORGANIC ACID SINGLE QUANT: CPT

## 2023-01-24 PROCEDURE — 82043 UR ALBUMIN QUANTITATIVE: CPT | Performed by: FAMILY MEDICINE

## 2023-01-24 PROCEDURE — 82607 VITAMIN B-12: CPT

## 2023-01-24 PROCEDURE — 99205 OFFICE O/P NEW HI 60 MIN: CPT | Performed by: PSYCHIATRY & NEUROLOGY

## 2023-01-24 PROCEDURE — 82746 ASSAY OF FOLIC ACID SERUM: CPT

## 2023-01-24 PROCEDURE — 99212 OFFICE O/P EST SF 10 MIN: CPT | Performed by: NURSE PRACTITIONER

## 2023-01-24 PROCEDURE — 80061 LIPID PANEL: CPT

## 2023-01-24 PROCEDURE — 82570 ASSAY OF URINE CREATININE: CPT | Performed by: FAMILY MEDICINE

## 2023-01-24 PROCEDURE — 36415 COLL VENOUS BLD VENIPUNCTURE: CPT

## 2023-01-24 PROCEDURE — 80048 BASIC METABOLIC PNL TOTAL CA: CPT

## 2023-01-24 PROCEDURE — 83036 HEMOGLOBIN GLYCOSYLATED A1C: CPT

## 2023-01-24 RX ORDER — PRIMIDONE 50 MG/1
TABLET ORAL
Qty: 30 TABLET | OUTPATIENT
Start: 2023-01-24

## 2023-01-25 ENCOUNTER — PATIENT ROUNDING (BHMG ONLY) (OUTPATIENT)
Dept: NEUROLOGY | Facility: CLINIC | Age: 68
End: 2023-01-25
Payer: MEDICARE

## 2023-01-25 LAB — HBA1C MFR BLD: 5.9 % (ref 4.8–5.6)

## 2023-01-27 RX ORDER — PRIMIDONE 50 MG/1
TABLET ORAL
Qty: 30 TABLET | OUTPATIENT
Start: 2023-01-27

## 2023-01-28 LAB — METHYLMALONATE SERPL-SCNC: 131 NMOL/L (ref 0–378)

## 2023-01-30 ENCOUNTER — TELEPHONE (OUTPATIENT)
Dept: FAMILY MEDICINE CLINIC | Age: 68
End: 2023-01-30
Payer: MEDICARE

## 2023-01-30 ENCOUNTER — OFFICE VISIT (OUTPATIENT)
Dept: BEHAVIORAL HEALTH | Facility: CLINIC | Age: 68
End: 2023-01-30
Payer: MEDICARE

## 2023-01-30 VITALS
DIASTOLIC BLOOD PRESSURE: 103 MMHG | SYSTOLIC BLOOD PRESSURE: 118 MMHG | HEART RATE: 89 BPM | HEIGHT: 64 IN | BODY MASS INDEX: 48.38 KG/M2 | WEIGHT: 283.4 LBS

## 2023-01-30 DIAGNOSIS — F42.4 SKIN-PICKING DISORDER: Primary | ICD-10-CM

## 2023-01-30 DIAGNOSIS — F33.9 RECURRENT MAJOR DEPRESSION RESISTANT TO TREATMENT: ICD-10-CM

## 2023-01-30 DIAGNOSIS — F41.1 GENERALIZED ANXIETY DISORDER: ICD-10-CM

## 2023-01-30 DIAGNOSIS — L29.9 ITCHING: ICD-10-CM

## 2023-01-30 PROCEDURE — 99215 OFFICE O/P EST HI 40 MIN: CPT | Performed by: NURSE PRACTITIONER

## 2023-01-30 RX ORDER — FLUOCINONIDE TOPICAL SOLUTION USP, 0.05% 0.5 MG/ML
SOLUTION TOPICAL AS NEEDED
COMMUNITY
Start: 2023-01-24

## 2023-01-30 RX ORDER — FLUOXETINE HYDROCHLORIDE 20 MG/1
20 CAPSULE ORAL EVERY MORNING
Qty: 30 CAPSULE | Refills: 1 | Status: SHIPPED | OUTPATIENT
Start: 2023-01-30 | End: 2023-01-30

## 2023-01-30 RX ORDER — FLUOXETINE 10 MG/1
10 CAPSULE ORAL EVERY MORNING
Qty: 30 CAPSULE | Refills: 1 | Status: SHIPPED | OUTPATIENT
Start: 2023-01-30 | End: 2023-02-23 | Stop reason: DRUGHIGH

## 2023-01-30 RX ORDER — HYDROXYZINE PAMOATE 50 MG/1
50 CAPSULE ORAL 3 TIMES DAILY PRN
Qty: 90 CAPSULE | Refills: 0 | Status: SHIPPED | OUTPATIENT
Start: 2023-02-06 | End: 2023-03-24 | Stop reason: SDUPTHER

## 2023-02-02 RX ORDER — METHACHOLINE CHLORIDE 12 MG/3 ML
3 VIAL, NEBULIZER (ML) INHALATION
Status: ACTIVE | OUTPATIENT
Start: 2023-02-03 | End: 2023-02-03

## 2023-02-02 RX ORDER — METHACHOLINE CHLORIDE 0.1875/3ML
3 VIAL, NEBULIZER (ML) INHALATION
Status: COMPLETED | OUTPATIENT
Start: 2023-02-03 | End: 2023-02-03

## 2023-02-02 RX ORDER — METHACHOLINE CHLORIDE 3 MG/3 ML
3 VIAL, NEBULIZER (ML) INHALATION
Status: COMPLETED | OUTPATIENT
Start: 2023-02-03 | End: 2023-02-03

## 2023-02-02 RX ORDER — METHACHOLINE CHLORIDE 48 MG/3 ML
3 VIAL, NEBULIZER (ML) INHALATION
Status: ACTIVE | OUTPATIENT
Start: 2023-02-03 | End: 2023-02-03

## 2023-02-02 RX ORDER — METHACHOLINE CHLORIDE 0 MG/3 ML
3 VIAL, NEBULIZER (ML) INHALATION
Status: DISPENSED | OUTPATIENT
Start: 2023-02-03 | End: 2023-02-03

## 2023-02-02 RX ORDER — SODIUM CHLORIDE FOR INHALATION 0.9 %
3 VIAL, NEBULIZER (ML) INHALATION ONCE AS NEEDED
Status: COMPLETED | OUTPATIENT
Start: 2023-02-03 | End: 2023-02-03

## 2023-02-02 RX ORDER — ALBUTEROL SULFATE 2.5 MG/3ML
2.5 SOLUTION RESPIRATORY (INHALATION) ONCE
Status: COMPLETED | OUTPATIENT
Start: 2023-02-03 | End: 2023-02-03

## 2023-02-02 RX ORDER — METHACHOLINE CHLORIDE 0.75/3ML
3 VIAL, NEBULIZER (ML) INHALATION
Status: COMPLETED | OUTPATIENT
Start: 2023-02-03 | End: 2023-02-03

## 2023-02-02 RX ORDER — ALBUTEROL SULFATE 2.5 MG/3ML
2.5 SOLUTION RESPIRATORY (INHALATION) ONCE AS NEEDED
Status: COMPLETED | OUTPATIENT
Start: 2023-02-03 | End: 2023-02-03

## 2023-02-03 ENCOUNTER — HOSPITAL ENCOUNTER (OUTPATIENT)
Dept: RESPIRATORY THERAPY | Facility: HOSPITAL | Age: 68
End: 2023-02-03
Payer: MEDICARE

## 2023-02-03 ENCOUNTER — PROCEDURE VISIT (OUTPATIENT)
Dept: CARDIAC REHAB | Facility: HOSPITAL | Age: 68
End: 2023-02-03
Payer: MEDICARE

## 2023-02-03 ENCOUNTER — HOSPITAL ENCOUNTER (OUTPATIENT)
Dept: RESPIRATORY THERAPY | Facility: HOSPITAL | Age: 68
Discharge: HOME | End: 2023-02-03
Payer: MEDICARE

## 2023-02-03 DIAGNOSIS — R06.09 DOE (DYSPNEA ON EXERTION): ICD-10-CM

## 2023-02-03 PROCEDURE — 94070 EVALUATION OF WHEEZING: CPT

## 2023-02-03 PROCEDURE — 94618 PULMONARY STRESS TESTING: CPT

## 2023-02-03 PROCEDURE — 94070 EVALUATION OF WHEEZING: CPT | Performed by: STUDENT IN AN ORGANIZED HEALTH CARE EDUCATION/TRAINING PROGRAM

## 2023-02-03 RX ADMIN — ALBUTEROL SULFATE 2.5 MG: 2.5 SOLUTION RESPIRATORY (INHALATION) at 10:19

## 2023-02-03 RX ADMIN — Medication 0.19 MG: at 10:06

## 2023-02-03 RX ADMIN — Medication 3 MG: at 10:16

## 2023-02-03 RX ADMIN — Medication 0.75 MG: at 10:10

## 2023-02-03 RX ADMIN — ALBUTEROL SULFATE 2.5 MG: 2.5 SOLUTION RESPIRATORY (INHALATION) at 10:38

## 2023-02-03 RX ADMIN — ISODIUM CHLORIDE 3 ML: 0.03 SOLUTION RESPIRATORY (INHALATION) at 10:01

## 2023-02-04 DIAGNOSIS — R00.2 PALPITATIONS: ICD-10-CM

## 2023-02-06 ENCOUNTER — TELEPHONE (OUTPATIENT)
Dept: CARDIOLOGY | Facility: CLINIC | Age: 68
End: 2023-02-06
Payer: MEDICARE

## 2023-02-06 ENCOUNTER — TELEPHONE (OUTPATIENT)
Dept: NEUROLOGY | Facility: CLINIC | Age: 68
End: 2023-02-06

## 2023-02-06 ENCOUNTER — HOSPITAL ENCOUNTER (OUTPATIENT)
Dept: MRI IMAGING | Facility: HOSPITAL | Age: 68
Discharge: HOME | End: 2023-02-06
Admitting: PSYCHIATRY & NEUROLOGY
Payer: MEDICARE

## 2023-02-06 DIAGNOSIS — F09 MILD COGNITIVE DISORDER: ICD-10-CM

## 2023-02-06 PROCEDURE — 70551 MRI BRAIN STEM W/O DYE: CPT

## 2023-02-06 RX ORDER — DULAGLUTIDE 0.75 MG/.5ML
INJECTION, SOLUTION SUBCUTANEOUS
Qty: 2 ML | Refills: 2 | Status: SHIPPED | OUTPATIENT
Start: 2023-02-06

## 2023-02-06 RX ORDER — DULAGLUTIDE 0.75 MG/.5ML
INJECTION, SOLUTION SUBCUTANEOUS
Qty: 2 ML | Refills: 2 | OUTPATIENT
Start: 2023-02-06

## 2023-02-06 RX ORDER — LABETALOL 100 MG/1
TABLET, FILM COATED ORAL
Qty: 180 TABLET | Refills: 1 | OUTPATIENT
Start: 2023-02-06

## 2023-02-07 ENCOUNTER — OFFICE VISIT (OUTPATIENT)
Dept: FAMILY MEDICINE CLINIC | Age: 68
End: 2023-02-07
Payer: MEDICARE

## 2023-02-07 ENCOUNTER — OFFICE VISIT (OUTPATIENT)
Dept: NEUROLOGY | Facility: CLINIC | Age: 68
End: 2023-02-07
Payer: MEDICARE

## 2023-02-07 ENCOUNTER — HOSPITAL ENCOUNTER (OUTPATIENT)
Dept: ULTRASOUND IMAGING | Facility: HOSPITAL | Age: 68
Discharge: HOME | End: 2023-02-07
Payer: MEDICARE

## 2023-02-07 ENCOUNTER — LAB (OUTPATIENT)
Dept: LAB | Facility: HOSPITAL | Age: 68
End: 2023-02-07
Payer: MEDICARE

## 2023-02-07 VITALS
WEIGHT: 288.8 LBS | BODY MASS INDEX: 49.31 KG/M2 | HEIGHT: 64 IN | SYSTOLIC BLOOD PRESSURE: 106 MMHG | DIASTOLIC BLOOD PRESSURE: 80 MMHG | HEART RATE: 91 BPM | OXYGEN SATURATION: 99 %

## 2023-02-07 VITALS
TEMPERATURE: 98.2 F | OXYGEN SATURATION: 99 % | SYSTOLIC BLOOD PRESSURE: 102 MMHG | HEIGHT: 64 IN | WEIGHT: 288 LBS | DIASTOLIC BLOOD PRESSURE: 62 MMHG | HEART RATE: 95 BPM | BODY MASS INDEX: 49.17 KG/M2

## 2023-02-07 DIAGNOSIS — M79.89 PAIN AND SWELLING OF LOWER LEG, RIGHT: ICD-10-CM

## 2023-02-07 DIAGNOSIS — M79.661 PAIN AND SWELLING OF LOWER LEG, RIGHT: ICD-10-CM

## 2023-02-07 DIAGNOSIS — R06.02 SHORTNESS OF BREATH: ICD-10-CM

## 2023-02-07 DIAGNOSIS — F09 MILD COGNITIVE DISORDER: Primary | ICD-10-CM

## 2023-02-07 DIAGNOSIS — G89.29 CHRONIC PAIN OF RIGHT KNEE: Primary | ICD-10-CM

## 2023-02-07 DIAGNOSIS — M25.561 CHRONIC PAIN OF RIGHT KNEE: Primary | ICD-10-CM

## 2023-02-07 DIAGNOSIS — R09.81 HEAD CONGESTION: ICD-10-CM

## 2023-02-07 LAB — NT-PROBNP SERPL-MCNC: 83.2 PG/ML (ref 0–900)

## 2023-02-07 PROCEDURE — 99214 OFFICE O/P EST MOD 30 MIN: CPT | Performed by: PSYCHIATRY & NEUROLOGY

## 2023-02-07 PROCEDURE — 99213 OFFICE O/P EST LOW 20 MIN: CPT | Performed by: NURSE PRACTITIONER

## 2023-02-07 PROCEDURE — 93971 EXTREMITY STUDY: CPT

## 2023-02-07 PROCEDURE — 36415 COLL VENOUS BLD VENIPUNCTURE: CPT

## 2023-02-07 PROCEDURE — 83880 ASSAY OF NATRIURETIC PEPTIDE: CPT

## 2023-02-07 PROCEDURE — 3044F HG A1C LEVEL LT 7.0%: CPT | Performed by: NURSE PRACTITIONER

## 2023-02-07 RX ORDER — PROPRANOLOL HYDROCHLORIDE 10 MG/1
10 TABLET ORAL 2 TIMES DAILY
Qty: 180 TABLET | Refills: 3 | Status: SHIPPED | OUTPATIENT
Start: 2023-02-07 | End: 2023-02-22

## 2023-02-07 RX ORDER — GUAIFENESIN 600 MG/1
1200 TABLET, EXTENDED RELEASE ORAL 2 TIMES DAILY
Qty: 40 TABLET | Refills: 0 | Status: SHIPPED | OUTPATIENT
Start: 2023-02-07 | End: 2023-03-15

## 2023-02-07 RX ORDER — DONEPEZIL HYDROCHLORIDE 5 MG/1
5 TABLET, FILM COATED ORAL NIGHTLY
Qty: 30 TABLET | Refills: 0 | Status: SHIPPED | OUTPATIENT
Start: 2023-02-07 | End: 2023-03-07

## 2023-02-08 ENCOUNTER — TELEPHONE (OUTPATIENT)
Dept: PULMONOLOGY | Facility: CLINIC | Age: 68
End: 2023-02-08
Payer: MEDICARE

## 2023-02-09 ENCOUNTER — CLINICAL SUPPORT (OUTPATIENT)
Dept: FAMILY MEDICINE CLINIC | Age: 68
End: 2023-02-09
Payer: MEDICARE

## 2023-02-09 ENCOUNTER — TELEPHONE (OUTPATIENT)
Dept: PULMONOLOGY | Facility: CLINIC | Age: 68
End: 2023-02-09
Payer: MEDICARE

## 2023-02-09 DIAGNOSIS — E53.8 B12 DEFICIENCY: ICD-10-CM

## 2023-02-09 PROCEDURE — 96372 THER/PROPH/DIAG INJ SC/IM: CPT | Performed by: FAMILY MEDICINE

## 2023-02-09 RX ADMIN — CYANOCOBALAMIN 1000 MCG: 1000 INJECTION, SOLUTION INTRAMUSCULAR; SUBCUTANEOUS at 14:09

## 2023-02-15 ENCOUNTER — TELEPHONE (OUTPATIENT)
Dept: FAMILY MEDICINE CLINIC | Age: 68
End: 2023-02-15
Payer: MEDICARE

## 2023-02-16 ENCOUNTER — TELEPHONE (OUTPATIENT)
Dept: FAMILY MEDICINE CLINIC | Age: 68
End: 2023-02-16
Payer: MEDICARE

## 2023-02-22 ENCOUNTER — OFFICE VISIT (OUTPATIENT)
Dept: CARDIOLOGY | Facility: CLINIC | Age: 68
End: 2023-02-22
Payer: MEDICARE

## 2023-02-22 VITALS
HEART RATE: 72 BPM | HEIGHT: 64 IN | BODY MASS INDEX: 47.53 KG/M2 | DIASTOLIC BLOOD PRESSURE: 106 MMHG | WEIGHT: 278.4 LBS | SYSTOLIC BLOOD PRESSURE: 140 MMHG

## 2023-02-22 DIAGNOSIS — I10 ESSENTIAL HYPERTENSION: ICD-10-CM

## 2023-02-22 DIAGNOSIS — R00.2 PALPITATIONS: ICD-10-CM

## 2023-02-22 DIAGNOSIS — I50.32 CHRONIC DIASTOLIC HEART FAILURE: Primary | ICD-10-CM

## 2023-02-22 PROCEDURE — 1160F RVW MEDS BY RX/DR IN RCRD: CPT | Performed by: FAMILY MEDICINE

## 2023-02-22 PROCEDURE — 99214 OFFICE O/P EST MOD 30 MIN: CPT | Performed by: FAMILY MEDICINE

## 2023-02-22 PROCEDURE — 3077F SYST BP >= 140 MM HG: CPT | Performed by: FAMILY MEDICINE

## 2023-02-22 PROCEDURE — 3080F DIAST BP >= 90 MM HG: CPT | Performed by: FAMILY MEDICINE

## 2023-02-22 PROCEDURE — 1159F MED LIST DOCD IN RCRD: CPT | Performed by: FAMILY MEDICINE

## 2023-02-22 RX ORDER — METOPROLOL SUCCINATE 25 MG/1
25 TABLET, EXTENDED RELEASE ORAL DAILY
Qty: 90 TABLET | Refills: 3 | Status: SHIPPED | OUTPATIENT
Start: 2023-02-22

## 2023-02-22 RX ORDER — PRAMIPEXOLE DIHYDROCHLORIDE 0.12 MG/1
TABLET ORAL
COMMUNITY
Start: 2023-02-10 | End: 2023-03-29 | Stop reason: SDUPTHER

## 2023-02-22 RX ORDER — TIOTROPIUM BROMIDE INHALATION SPRAY 1.56 UG/1
SPRAY, METERED RESPIRATORY (INHALATION)
COMMUNITY
Start: 2023-02-13 | End: 2023-03-16 | Stop reason: SDUPTHER

## 2023-02-22 RX ORDER — DUPILUMAB 300 MG/2ML
INJECTION, SOLUTION SUBCUTANEOUS
COMMUNITY
Start: 2023-02-09

## 2023-02-23 ENCOUNTER — OFFICE VISIT (OUTPATIENT)
Dept: BEHAVIORAL HEALTH | Facility: CLINIC | Age: 68
End: 2023-02-23
Payer: MEDICARE

## 2023-02-23 VITALS
HEIGHT: 64 IN | HEART RATE: 66 BPM | WEIGHT: 278 LBS | BODY MASS INDEX: 47.46 KG/M2 | DIASTOLIC BLOOD PRESSURE: 101 MMHG | SYSTOLIC BLOOD PRESSURE: 182 MMHG

## 2023-02-23 DIAGNOSIS — F51.05 INSOMNIA DUE TO MENTAL CONDITION: ICD-10-CM

## 2023-02-23 DIAGNOSIS — F41.1 GENERALIZED ANXIETY DISORDER: ICD-10-CM

## 2023-02-23 DIAGNOSIS — F33.9 RECURRENT MAJOR DEPRESSION RESISTANT TO TREATMENT: ICD-10-CM

## 2023-02-23 DIAGNOSIS — F42.4 SKIN-PICKING DISORDER: ICD-10-CM

## 2023-02-23 DIAGNOSIS — F33.1 MAJOR DEPRESSIVE DISORDER, RECURRENT EPISODE, MODERATE: ICD-10-CM

## 2023-02-23 PROCEDURE — 99214 OFFICE O/P EST MOD 30 MIN: CPT | Performed by: NURSE PRACTITIONER

## 2023-02-23 RX ORDER — RISPERIDONE 0.5 MG/1
TABLET ORAL
COMMUNITY
End: 2023-02-23

## 2023-02-23 RX ORDER — BUSPIRONE HYDROCHLORIDE 15 MG/1
30 TABLET ORAL 2 TIMES DAILY
Qty: 120 TABLET | Refills: 2 | Status: SHIPPED | OUTPATIENT
Start: 2023-02-23 | End: 2023-05-24

## 2023-02-23 RX ORDER — BUPROPION HYDROCHLORIDE 100 MG/1
TABLET, EXTENDED RELEASE ORAL
Qty: 10 TABLET | Refills: 0 | Status: SHIPPED | OUTPATIENT
Start: 2023-02-24 | End: 2023-03-09

## 2023-02-23 RX ORDER — FLUOXETINE 10 MG/1
20 CAPSULE ORAL EVERY MORNING
Qty: 60 CAPSULE | Refills: 1
Start: 2023-02-24 | End: 2023-02-24 | Stop reason: DRUGHIGH

## 2023-02-23 RX ORDER — RISPERIDONE 1 MG/1
TABLET ORAL
COMMUNITY
End: 2023-02-23

## 2023-02-23 RX ORDER — MIRTAZAPINE 30 MG/1
45 TABLET, FILM COATED ORAL NIGHTLY
Qty: 45 TABLET | Refills: 2 | Status: SHIPPED | OUTPATIENT
Start: 2023-02-23 | End: 2023-05-24

## 2023-02-24 ENCOUNTER — OFFICE VISIT (OUTPATIENT)
Dept: UROLOGY | Facility: CLINIC | Age: 68
End: 2023-02-24
Payer: MEDICARE

## 2023-02-24 ENCOUNTER — TELEPHONE (OUTPATIENT)
Dept: PSYCHIATRY | Facility: CLINIC | Age: 68
End: 2023-02-24
Payer: MEDICARE

## 2023-02-24 VITALS — HEIGHT: 64 IN | BODY MASS INDEX: 47.63 KG/M2 | WEIGHT: 279 LBS

## 2023-02-24 DIAGNOSIS — N28.1 RENAL CYST: Primary | ICD-10-CM

## 2023-02-24 LAB
BILIRUB BLD-MCNC: NEGATIVE MG/DL
CLARITY, POC: CLEAR
COLOR UR: YELLOW
EXPIRATION DATE: NORMAL
GLUCOSE UR STRIP-MCNC: NEGATIVE MG/DL
KETONES UR QL: NEGATIVE
LEUKOCYTE EST, POC: NEGATIVE
Lab: NORMAL
NITRITE UR-MCNC: NEGATIVE MG/ML
PH UR: 6 [PH] (ref 5–8)
PROT UR STRIP-MCNC: NEGATIVE MG/DL
RBC # UR STRIP: NEGATIVE /UL
SP GR UR: 1 (ref 1–1.03)
UROBILINOGEN UR QL: NORMAL

## 2023-02-24 PROCEDURE — 99203 OFFICE O/P NEW LOW 30 MIN: CPT | Performed by: UROLOGY

## 2023-02-24 RX ORDER — FLUOXETINE HYDROCHLORIDE 20 MG/1
20 CAPSULE ORAL EVERY MORNING
Qty: 30 CAPSULE | Refills: 0 | Status: SHIPPED | OUTPATIENT
Start: 2023-02-24 | End: 2023-03-24 | Stop reason: DRUGHIGH

## 2023-02-27 DIAGNOSIS — K21.9 GASTROESOPHAGEAL REFLUX DISEASE, UNSPECIFIED WHETHER ESOPHAGITIS PRESENT: ICD-10-CM

## 2023-02-27 RX ORDER — PANTOPRAZOLE SODIUM 40 MG/1
40 TABLET, DELAYED RELEASE ORAL DAILY
Qty: 30 TABLET | Refills: 1 | Status: SHIPPED | OUTPATIENT
Start: 2023-02-27

## 2023-03-02 ENCOUNTER — OFFICE VISIT (OUTPATIENT)
Dept: PODIATRY | Facility: CLINIC | Age: 68
End: 2023-03-02
Payer: MEDICARE

## 2023-03-02 VITALS
BODY MASS INDEX: 47.46 KG/M2 | DIASTOLIC BLOOD PRESSURE: 58 MMHG | SYSTOLIC BLOOD PRESSURE: 108 MMHG | OXYGEN SATURATION: 98 % | HEART RATE: 77 BPM | WEIGHT: 278 LBS | TEMPERATURE: 97.3 F | HEIGHT: 64 IN

## 2023-03-02 DIAGNOSIS — B35.1 ONYCHOMYCOSIS: ICD-10-CM

## 2023-03-02 DIAGNOSIS — L60.0 ONYCHOCRYPTOSIS: ICD-10-CM

## 2023-03-02 DIAGNOSIS — M79.672 FOOT PAIN, BILATERAL: Primary | ICD-10-CM

## 2023-03-02 DIAGNOSIS — E11.9 NON-INSULIN DEPENDENT TYPE 2 DIABETES MELLITUS: ICD-10-CM

## 2023-03-02 DIAGNOSIS — E11.8 DIABETIC FOOT: ICD-10-CM

## 2023-03-02 DIAGNOSIS — M79.671 FOOT PAIN, BILATERAL: Primary | ICD-10-CM

## 2023-03-02 DIAGNOSIS — R26.2 DIFFICULTY WALKING: ICD-10-CM

## 2023-03-02 PROCEDURE — G8404 LOW EXTEMITY NEUR EXAM DOCUM: HCPCS | Performed by: PODIATRIST

## 2023-03-02 PROCEDURE — 11721 DEBRIDE NAIL 6 OR MORE: CPT | Performed by: PODIATRIST

## 2023-03-02 RX ORDER — FLUOXETINE 10 MG/1
CAPSULE ORAL
COMMUNITY
Start: 2023-02-28 | End: 2023-03-09

## 2023-03-03 ENCOUNTER — TELEPHONE (OUTPATIENT)
Dept: BEHAVIORAL HEALTH | Facility: CLINIC | Age: 68
End: 2023-03-03
Payer: MEDICARE

## 2023-03-07 RX ORDER — DONEPEZIL HYDROCHLORIDE 5 MG/1
TABLET, FILM COATED ORAL
Qty: 30 TABLET | Refills: 0 | Status: SHIPPED | OUTPATIENT
Start: 2023-03-07 | End: 2023-03-28 | Stop reason: SDUPTHER

## 2023-03-09 ENCOUNTER — OFFICE VISIT (OUTPATIENT)
Dept: BEHAVIORAL HEALTH | Facility: CLINIC | Age: 68
End: 2023-03-09
Payer: MEDICARE

## 2023-03-09 VITALS
WEIGHT: 279.4 LBS | HEART RATE: 80 BPM | HEIGHT: 64 IN | BODY MASS INDEX: 47.7 KG/M2 | DIASTOLIC BLOOD PRESSURE: 99 MMHG | SYSTOLIC BLOOD PRESSURE: 135 MMHG

## 2023-03-09 DIAGNOSIS — F42.4 SKIN-PICKING DISORDER: ICD-10-CM

## 2023-03-09 DIAGNOSIS — R44.2: ICD-10-CM

## 2023-03-09 DIAGNOSIS — F41.1 GENERALIZED ANXIETY DISORDER: ICD-10-CM

## 2023-03-09 DIAGNOSIS — F33.1 MAJOR DEPRESSIVE DISORDER, RECURRENT EPISODE, MODERATE: Primary | ICD-10-CM

## 2023-03-09 PROCEDURE — 1160F RVW MEDS BY RX/DR IN RCRD: CPT | Performed by: NURSE PRACTITIONER

## 2023-03-09 PROCEDURE — 99213 OFFICE O/P EST LOW 20 MIN: CPT | Performed by: NURSE PRACTITIONER

## 2023-03-09 PROCEDURE — 3075F SYST BP GE 130 - 139MM HG: CPT | Performed by: NURSE PRACTITIONER

## 2023-03-09 PROCEDURE — 1159F MED LIST DOCD IN RCRD: CPT | Performed by: NURSE PRACTITIONER

## 2023-03-09 PROCEDURE — 3080F DIAST BP >= 90 MM HG: CPT | Performed by: NURSE PRACTITIONER

## 2023-03-09 RX ORDER — PRIMIDONE 50 MG/1
TABLET ORAL
COMMUNITY
Start: 2023-03-06

## 2023-03-09 RX ORDER — BUPROPION HYDROCHLORIDE 200 MG/1
TABLET, EXTENDED RELEASE ORAL
COMMUNITY
Start: 2023-03-07 | End: 2023-03-09

## 2023-03-09 RX ORDER — OLANZAPINE 2.5 MG/1
2.5 TABLET ORAL DAILY
Qty: 30 TABLET | Refills: 1 | Status: SHIPPED | OUTPATIENT
Start: 2023-03-09 | End: 2023-04-07 | Stop reason: DRUGHIGH

## 2023-03-15 ENCOUNTER — OFFICE VISIT (OUTPATIENT)
Dept: FAMILY MEDICINE CLINIC | Age: 68
End: 2023-03-15
Payer: MEDICARE

## 2023-03-15 ENCOUNTER — TELEPHONE (OUTPATIENT)
Dept: CARDIOLOGY | Facility: CLINIC | Age: 68
End: 2023-03-15

## 2023-03-15 ENCOUNTER — TELEPHONE (OUTPATIENT)
Dept: PSYCHIATRY | Facility: CLINIC | Age: 68
End: 2023-03-15
Payer: MEDICARE

## 2023-03-15 ENCOUNTER — HOSPITAL ENCOUNTER (OUTPATIENT)
Dept: GENERAL RADIOLOGY | Facility: HOSPITAL | Age: 68
Discharge: HOME | End: 2023-03-15
Admitting: FAMILY MEDICINE
Payer: MEDICARE

## 2023-03-15 VITALS
OXYGEN SATURATION: 98 % | SYSTOLIC BLOOD PRESSURE: 136 MMHG | DIASTOLIC BLOOD PRESSURE: 96 MMHG | HEART RATE: 68 BPM | TEMPERATURE: 98.2 F | HEIGHT: 64 IN | WEIGHT: 274 LBS | BODY MASS INDEX: 46.78 KG/M2

## 2023-03-15 DIAGNOSIS — J44.9 CHRONIC OBSTRUCTIVE PULMONARY DISEASE, UNSPECIFIED COPD TYPE: ICD-10-CM

## 2023-03-15 DIAGNOSIS — R20.2 PARESTHESIAS: ICD-10-CM

## 2023-03-15 DIAGNOSIS — E53.8 B12 DEFICIENCY: ICD-10-CM

## 2023-03-15 DIAGNOSIS — F41.9 ANXIETY: ICD-10-CM

## 2023-03-15 DIAGNOSIS — F42.9 OBSESSIVE-COMPULSIVE DISORDER, UNSPECIFIED TYPE: ICD-10-CM

## 2023-03-15 DIAGNOSIS — Z12.31 ENCOUNTER FOR SCREENING MAMMOGRAM FOR BREAST CANCER: Primary | ICD-10-CM

## 2023-03-15 DIAGNOSIS — M54.2 NECK PAIN: ICD-10-CM

## 2023-03-15 DIAGNOSIS — G25.81 RESTLESS LEG SYNDROME: ICD-10-CM

## 2023-03-15 DIAGNOSIS — I10 ESSENTIAL HYPERTENSION: ICD-10-CM

## 2023-03-15 DIAGNOSIS — Z79.899 HIGH RISK MEDICATION USE: ICD-10-CM

## 2023-03-15 DIAGNOSIS — E11.9 TYPE 2 DIABETES MELLITUS WITHOUT COMPLICATION, WITHOUT LONG-TERM CURRENT USE OF INSULIN: ICD-10-CM

## 2023-03-15 LAB
AMPHET+METHAMPHET UR QL: NEGATIVE
AMPHETAMINES UR QL: NEGATIVE
BARBITURATES UR QL SCN: POSITIVE
BENZODIAZ UR QL SCN: NEGATIVE
BUPRENORPHINE SERPL-MCNC: NEGATIVE NG/ML
CANNABINOIDS SERPL QL: NEGATIVE
COCAINE UR QL: NEGATIVE
EXPIRATION DATE: ABNORMAL
Lab: ABNORMAL
MDMA UR QL SCN: NEGATIVE
METHADONE UR QL SCN: NEGATIVE
OPIATES UR QL: POSITIVE
OXYCODONE UR QL SCN: NEGATIVE
PCP UR QL SCN: NEGATIVE

## 2023-03-15 PROCEDURE — 96372 THER/PROPH/DIAG INJ SC/IM: CPT | Performed by: FAMILY MEDICINE

## 2023-03-15 PROCEDURE — 3044F HG A1C LEVEL LT 7.0%: CPT | Performed by: FAMILY MEDICINE

## 2023-03-15 PROCEDURE — 99214 OFFICE O/P EST MOD 30 MIN: CPT | Performed by: FAMILY MEDICINE

## 2023-03-15 PROCEDURE — 3075F SYST BP GE 130 - 139MM HG: CPT | Performed by: FAMILY MEDICINE

## 2023-03-15 PROCEDURE — 72050 X-RAY EXAM NECK SPINE 4/5VWS: CPT

## 2023-03-15 PROCEDURE — 3080F DIAST BP >= 90 MM HG: CPT | Performed by: FAMILY MEDICINE

## 2023-03-15 PROCEDURE — 80305 DRUG TEST PRSMV DIR OPT OBS: CPT | Performed by: FAMILY MEDICINE

## 2023-03-15 RX ORDER — PREGABALIN 50 MG/1
50 CAPSULE ORAL 2 TIMES DAILY
Qty: 60 CAPSULE | Refills: 0 | Status: SHIPPED | OUTPATIENT
Start: 2023-03-15 | End: 2023-03-27

## 2023-03-15 RX ADMIN — CYANOCOBALAMIN 1000 MCG: 1000 INJECTION, SOLUTION INTRAMUSCULAR; SUBCUTANEOUS at 11:16

## 2023-03-16 ENCOUNTER — OFFICE VISIT (OUTPATIENT)
Dept: PULMONOLOGY | Facility: CLINIC | Age: 68
End: 2023-03-16
Payer: MEDICARE

## 2023-03-16 VITALS
OXYGEN SATURATION: 100 % | SYSTOLIC BLOOD PRESSURE: 149 MMHG | HEART RATE: 72 BPM | HEIGHT: 64 IN | BODY MASS INDEX: 47.63 KG/M2 | TEMPERATURE: 98.6 F | WEIGHT: 279 LBS | DIASTOLIC BLOOD PRESSURE: 81 MMHG | RESPIRATION RATE: 18 BRPM

## 2023-03-16 DIAGNOSIS — F41.9 ANXIETY: ICD-10-CM

## 2023-03-16 DIAGNOSIS — E66.01 MORBID OBESITY WITH BMI OF 45.0-49.9, ADULT: ICD-10-CM

## 2023-03-16 DIAGNOSIS — G47.33 OSA (OBSTRUCTIVE SLEEP APNEA): ICD-10-CM

## 2023-03-16 DIAGNOSIS — F17.211 NICOTINE DEPENDENCE, CIGARETTES, IN REMISSION: ICD-10-CM

## 2023-03-16 DIAGNOSIS — J45.909 ASTHMA, UNSPECIFIED ASTHMA SEVERITY, UNSPECIFIED WHETHER COMPLICATED, UNSPECIFIED WHETHER PERSISTENT: Primary | ICD-10-CM

## 2023-03-16 DIAGNOSIS — I50.32 CHRONIC DIASTOLIC HEART FAILURE: ICD-10-CM

## 2023-03-16 DIAGNOSIS — R06.09 DYSPNEA ON EXERTION: ICD-10-CM

## 2023-03-16 PROCEDURE — 3079F DIAST BP 80-89 MM HG: CPT

## 2023-03-16 PROCEDURE — 3077F SYST BP >= 140 MM HG: CPT

## 2023-03-16 PROCEDURE — 99214 OFFICE O/P EST MOD 30 MIN: CPT

## 2023-03-16 PROCEDURE — 1159F MED LIST DOCD IN RCRD: CPT

## 2023-03-16 PROCEDURE — 1160F RVW MEDS BY RX/DR IN RCRD: CPT

## 2023-03-16 RX ORDER — TIOTROPIUM BROMIDE INHALATION SPRAY 1.56 UG/1
2 SPRAY, METERED RESPIRATORY (INHALATION)
Qty: 4 EACH | Refills: 0 | COMMUNITY
Start: 2023-03-16 | End: 2023-03-28

## 2023-03-16 RX ORDER — TIOTROPIUM BROMIDE INHALATION SPRAY 1.56 UG/1
2 SPRAY, METERED RESPIRATORY (INHALATION)
Qty: 1 EACH | Refills: 5 | Status: SHIPPED | OUTPATIENT
Start: 2023-03-16

## 2023-03-21 ENCOUNTER — LAB (OUTPATIENT)
Dept: LAB | Facility: HOSPITAL | Age: 68
End: 2023-03-21
Payer: MEDICARE

## 2023-03-21 DIAGNOSIS — R06.09 DYSPNEA ON EXERTION: ICD-10-CM

## 2023-03-21 DIAGNOSIS — J45.909 ASTHMA, UNSPECIFIED ASTHMA SEVERITY, UNSPECIFIED WHETHER COMPLICATED, UNSPECIFIED WHETHER PERSISTENT: ICD-10-CM

## 2023-03-21 LAB
BASOPHILS # BLD AUTO: 0.04 10*3/MM3 (ref 0–0.2)
BASOPHILS NFR BLD AUTO: 0.5 % (ref 0–1.5)
DEPRECATED RDW RBC AUTO: 43.4 FL (ref 37–54)
EOSINOPHIL # BLD AUTO: 0.05 10*3/MM3 (ref 0–0.4)
EOSINOPHIL NFR BLD AUTO: 0.6 % (ref 0.3–6.2)
ERYTHROCYTE [DISTWIDTH] IN BLOOD BY AUTOMATED COUNT: 12.5 % (ref 12.3–15.4)
HCT VFR BLD AUTO: 46.1 % (ref 34–46.6)
HGB BLD-MCNC: 14.7 G/DL (ref 12–15.9)
IMM GRANULOCYTES # BLD AUTO: 0.02 10*3/MM3 (ref 0–0.05)
IMM GRANULOCYTES NFR BLD AUTO: 0.2 % (ref 0–0.5)
LYMPHOCYTES # BLD AUTO: 2.02 10*3/MM3 (ref 0.7–3.1)
LYMPHOCYTES NFR BLD AUTO: 25.1 % (ref 19.6–45.3)
MCH RBC QN AUTO: 30 PG (ref 26.6–33)
MCHC RBC AUTO-ENTMCNC: 31.9 G/DL (ref 31.5–35.7)
MCV RBC AUTO: 94.1 FL (ref 79–97)
MONOCYTES # BLD AUTO: 0.87 10*3/MM3 (ref 0.1–0.9)
MONOCYTES NFR BLD AUTO: 10.8 % (ref 5–12)
NEUTROPHILS NFR BLD AUTO: 5.06 10*3/MM3 (ref 1.7–7)
NEUTROPHILS NFR BLD AUTO: 62.8 % (ref 42.7–76)
PLATELET # BLD AUTO: 303 10*3/MM3 (ref 140–450)
PMV BLD AUTO: 9.1 FL (ref 6–12)
RBC # BLD AUTO: 4.9 10*6/MM3 (ref 3.77–5.28)
WBC NRBC COR # BLD: 8.06 10*3/MM3 (ref 3.4–10.8)

## 2023-03-21 PROCEDURE — 36415 COLL VENOUS BLD VENIPUNCTURE: CPT

## 2023-03-21 PROCEDURE — 82785 ASSAY OF IGE: CPT

## 2023-03-21 PROCEDURE — 85025 COMPLETE CBC W/AUTO DIFF WBC: CPT

## 2023-03-22 ENCOUNTER — TELEPHONE (OUTPATIENT)
Dept: FAMILY MEDICINE CLINIC | Age: 68
End: 2023-03-22
Payer: MEDICARE

## 2023-03-22 DIAGNOSIS — M50.30 DEGENERATIVE DISC DISEASE, CERVICAL: Primary | ICD-10-CM

## 2023-03-24 ENCOUNTER — TELEPHONE (OUTPATIENT)
Dept: BEHAVIORAL HEALTH | Facility: CLINIC | Age: 68
End: 2023-03-24
Payer: MEDICARE

## 2023-03-24 DIAGNOSIS — F33.1 MAJOR DEPRESSIVE DISORDER, RECURRENT EPISODE, MODERATE: ICD-10-CM

## 2023-03-24 DIAGNOSIS — L29.9 ITCHING: ICD-10-CM

## 2023-03-24 DIAGNOSIS — F42.4 SKIN-PICKING DISORDER: ICD-10-CM

## 2023-03-24 DIAGNOSIS — F33.9 RECURRENT MAJOR DEPRESSION RESISTANT TO TREATMENT: ICD-10-CM

## 2023-03-24 DIAGNOSIS — F41.1 GENERALIZED ANXIETY DISORDER: ICD-10-CM

## 2023-03-24 LAB — IGE SERPL-ACNC: 22.9 KU/L

## 2023-03-24 RX ORDER — FLUOXETINE HYDROCHLORIDE 40 MG/1
40 CAPSULE ORAL EVERY MORNING
Qty: 30 CAPSULE | Refills: 0 | Status: SHIPPED | OUTPATIENT
Start: 2023-03-24

## 2023-03-24 RX ORDER — HYDROXYZINE PAMOATE 50 MG/1
50 CAPSULE ORAL 3 TIMES DAILY PRN
Qty: 90 CAPSULE | Refills: 0 | Status: SHIPPED | OUTPATIENT
Start: 2023-03-24

## 2023-03-25 DIAGNOSIS — I10 ESSENTIAL HYPERTENSION: ICD-10-CM

## 2023-03-27 ENCOUNTER — TELEPHONE (OUTPATIENT)
Dept: FAMILY MEDICINE CLINIC | Age: 68
End: 2023-03-27
Payer: MEDICARE

## 2023-03-27 DIAGNOSIS — M50.30 DEGENERATIVE DISC DISEASE, CERVICAL: Primary | ICD-10-CM

## 2023-03-27 RX ORDER — PREGABALIN 100 MG/1
100 CAPSULE ORAL 2 TIMES DAILY
Qty: 60 CAPSULE | Refills: 0 | Status: SHIPPED | OUTPATIENT
Start: 2023-03-27

## 2023-03-27 RX ORDER — AMLODIPINE BESYLATE 5 MG/1
TABLET ORAL
Qty: 90 TABLET | Refills: 0 | Status: SHIPPED | OUTPATIENT
Start: 2023-03-27

## 2023-03-28 ENCOUNTER — PRIOR AUTHORIZATION (OUTPATIENT)
Dept: FAMILY MEDICINE CLINIC | Age: 68
End: 2023-03-28
Payer: MEDICARE

## 2023-03-28 ENCOUNTER — OFFICE VISIT (OUTPATIENT)
Dept: FAMILY MEDICINE CLINIC | Age: 68
End: 2023-03-28
Payer: MEDICARE

## 2023-03-28 VITALS
DIASTOLIC BLOOD PRESSURE: 97 MMHG | BODY MASS INDEX: 47.56 KG/M2 | HEIGHT: 64 IN | OXYGEN SATURATION: 100 % | WEIGHT: 278.6 LBS | TEMPERATURE: 98.1 F | HEART RATE: 75 BPM | SYSTOLIC BLOOD PRESSURE: 123 MMHG

## 2023-03-28 DIAGNOSIS — R31.9 HEMATURIA, UNSPECIFIED TYPE: ICD-10-CM

## 2023-03-28 DIAGNOSIS — M54.50 MIDLINE LOW BACK PAIN, UNSPECIFIED CHRONICITY, UNSPECIFIED WHETHER SCIATICA PRESENT: Primary | ICD-10-CM

## 2023-03-28 DIAGNOSIS — R07.89 OTHER CHEST PAIN: ICD-10-CM

## 2023-03-28 LAB
BACTERIA UR QL AUTO: ABNORMAL /HPF
BILIRUB BLD-MCNC: NEGATIVE MG/DL
BILIRUB UR QL STRIP: NEGATIVE
CLARITY UR: CLEAR
CLARITY, POC: CLEAR
COLOR UR: YELLOW
COLOR UR: YELLOW
EXPIRATION DATE: ABNORMAL
GLUCOSE UR STRIP-MCNC: NEGATIVE MG/DL
GLUCOSE UR STRIP-MCNC: NEGATIVE MG/DL
HGB UR QL STRIP.AUTO: NEGATIVE
KETONES UR QL STRIP: NEGATIVE
KETONES UR QL: NEGATIVE
LEUKOCYTE EST, POC: NEGATIVE
LEUKOCYTE ESTERASE UR QL STRIP.AUTO: NEGATIVE
Lab: ABNORMAL
NITRITE UR QL STRIP: NEGATIVE
NITRITE UR-MCNC: NEGATIVE MG/ML
PH UR STRIP.AUTO: <=5 [PH] (ref 5–8)
PH UR: 5.5 [PH] (ref 5–8)
PROT UR QL STRIP: NEGATIVE
PROT UR STRIP-MCNC: NEGATIVE MG/DL
RBC # UR STRIP: ABNORMAL /HPF
RBC # UR STRIP: ABNORMAL /UL
REF LAB TEST METHOD: ABNORMAL
SP GR UR STRIP: <=1.005 (ref 1–1.03)
SP GR UR: 1.01 (ref 1–1.03)
SQUAMOUS #/AREA URNS HPF: ABNORMAL /HPF
UROBILINOGEN UR QL STRIP: NORMAL
UROBILINOGEN UR QL: NORMAL
WBC # UR STRIP: ABNORMAL /HPF
YEAST URNS QL MICRO: ABNORMAL /HPF

## 2023-03-28 PROCEDURE — 81001 URINALYSIS AUTO W/SCOPE: CPT | Performed by: NURSE PRACTITIONER

## 2023-03-28 RX ORDER — DONEPEZIL HYDROCHLORIDE 5 MG/1
5 TABLET, FILM COATED ORAL NIGHTLY
Qty: 30 TABLET | Refills: 0 | Status: SHIPPED | OUTPATIENT
Start: 2023-03-28 | End: 2023-04-27

## 2023-03-28 RX ORDER — METHOCARBAMOL 500 MG/1
500 TABLET, FILM COATED ORAL 4 TIMES DAILY
Qty: 40 TABLET | Refills: 1 | Status: SHIPPED | OUTPATIENT
Start: 2023-03-28

## 2023-03-28 RX ORDER — TRIAMCINOLONE ACETONIDE 0.25 MG/G
CREAM TOPICAL
COMMUNITY
Start: 2023-03-21

## 2023-03-29 ENCOUNTER — TELEPHONE (OUTPATIENT)
Dept: FAMILY MEDICINE CLINIC | Age: 68
End: 2023-03-29
Payer: MEDICARE

## 2023-03-29 RX ORDER — PRAMIPEXOLE DIHYDROCHLORIDE 0.12 MG/1
0.12 TABLET ORAL DAILY
Qty: 90 TABLET | Refills: 0 | Status: SHIPPED | OUTPATIENT
Start: 2023-03-29

## 2023-03-30 DIAGNOSIS — F33.9 RECURRENT MAJOR DEPRESSION RESISTANT TO TREATMENT: ICD-10-CM

## 2023-03-30 DIAGNOSIS — F41.1 GENERALIZED ANXIETY DISORDER: ICD-10-CM

## 2023-03-30 DIAGNOSIS — F42.4 SKIN-PICKING DISORDER: ICD-10-CM

## 2023-03-30 RX ORDER — FLUOXETINE 10 MG/1
CAPSULE ORAL
Qty: 30 CAPSULE | Refills: 1 | OUTPATIENT
Start: 2023-03-30

## 2023-04-04 ENCOUNTER — OFFICE VISIT (OUTPATIENT)
Dept: FAMILY MEDICINE CLINIC | Age: 68
End: 2023-04-04
Payer: MEDICARE

## 2023-04-04 VITALS
HEART RATE: 96 BPM | WEIGHT: 275 LBS | BODY MASS INDEX: 46.95 KG/M2 | SYSTOLIC BLOOD PRESSURE: 114 MMHG | OXYGEN SATURATION: 97 % | DIASTOLIC BLOOD PRESSURE: 70 MMHG | HEIGHT: 64 IN

## 2023-04-04 DIAGNOSIS — N30.00 ACUTE CYSTITIS WITHOUT HEMATURIA: Primary | ICD-10-CM

## 2023-04-04 DIAGNOSIS — F32.1 CURRENT MODERATE EPISODE OF MAJOR DEPRESSIVE DISORDER WITHOUT PRIOR EPISODE: ICD-10-CM

## 2023-04-04 DIAGNOSIS — R20.2 PARESTHESIA: ICD-10-CM

## 2023-04-04 DIAGNOSIS — F41.1 GENERALIZED ANXIETY DISORDER: ICD-10-CM

## 2023-04-04 PROCEDURE — 1160F RVW MEDS BY RX/DR IN RCRD: CPT | Performed by: FAMILY MEDICINE

## 2023-04-04 PROCEDURE — 3078F DIAST BP <80 MM HG: CPT | Performed by: FAMILY MEDICINE

## 2023-04-04 PROCEDURE — 3074F SYST BP LT 130 MM HG: CPT | Performed by: FAMILY MEDICINE

## 2023-04-04 PROCEDURE — 1159F MED LIST DOCD IN RCRD: CPT | Performed by: FAMILY MEDICINE

## 2023-04-04 PROCEDURE — 3044F HG A1C LEVEL LT 7.0%: CPT | Performed by: FAMILY MEDICINE

## 2023-04-04 PROCEDURE — 99214 OFFICE O/P EST MOD 30 MIN: CPT | Performed by: FAMILY MEDICINE

## 2023-04-04 RX ORDER — FLUCONAZOLE 150 MG/1
150 TABLET ORAL ONCE
Qty: 2 TABLET | Refills: 0 | Status: SHIPPED | OUTPATIENT
Start: 2023-04-04 | End: 2023-04-06

## 2023-04-05 RX ORDER — POTASSIUM CHLORIDE 750 MG/1
CAPSULE, EXTENDED RELEASE ORAL
Qty: 90 CAPSULE | Refills: 0 | Status: SHIPPED | OUTPATIENT
Start: 2023-04-05

## 2023-04-05 RX ORDER — FUROSEMIDE 20 MG/1
TABLET ORAL
Qty: 180 TABLET | Refills: 0 | Status: SHIPPED | OUTPATIENT
Start: 2023-04-05

## 2023-04-07 ENCOUNTER — OFFICE VISIT (OUTPATIENT)
Dept: BEHAVIORAL HEALTH | Facility: CLINIC | Age: 68
End: 2023-04-07
Payer: MEDICARE

## 2023-04-07 VITALS
SYSTOLIC BLOOD PRESSURE: 132 MMHG | BODY MASS INDEX: 47.15 KG/M2 | DIASTOLIC BLOOD PRESSURE: 109 MMHG | WEIGHT: 276.2 LBS | HEART RATE: 75 BPM | HEIGHT: 64 IN

## 2023-04-07 DIAGNOSIS — F33.1 MAJOR DEPRESSIVE DISORDER, RECURRENT EPISODE, MODERATE: ICD-10-CM

## 2023-04-07 DIAGNOSIS — F41.1 GENERALIZED ANXIETY DISORDER: Primary | ICD-10-CM

## 2023-04-07 DIAGNOSIS — R44.2: ICD-10-CM

## 2023-04-07 PROCEDURE — 1159F MED LIST DOCD IN RCRD: CPT | Performed by: NURSE PRACTITIONER

## 2023-04-07 PROCEDURE — 3080F DIAST BP >= 90 MM HG: CPT | Performed by: NURSE PRACTITIONER

## 2023-04-07 PROCEDURE — 3075F SYST BP GE 130 - 139MM HG: CPT | Performed by: NURSE PRACTITIONER

## 2023-04-07 PROCEDURE — 1160F RVW MEDS BY RX/DR IN RCRD: CPT | Performed by: NURSE PRACTITIONER

## 2023-04-07 PROCEDURE — 99214 OFFICE O/P EST MOD 30 MIN: CPT | Performed by: NURSE PRACTITIONER

## 2023-04-07 RX ORDER — OLANZAPINE 5 MG/1
5 TABLET ORAL DAILY
Qty: 30 TABLET | Refills: 1 | Status: SHIPPED | OUTPATIENT
Start: 2023-04-07

## 2023-04-08 DIAGNOSIS — M50.30 DEGENERATIVE DISC DISEASE, CERVICAL: ICD-10-CM

## 2023-04-10 ENCOUNTER — TELEPHONE (OUTPATIENT)
Dept: FAMILY MEDICINE CLINIC | Age: 68
End: 2023-04-10
Payer: MEDICARE

## 2023-04-10 RX ORDER — PREGABALIN 100 MG/1
CAPSULE ORAL
Qty: 60 CAPSULE | OUTPATIENT
Start: 2023-04-10

## 2023-04-10 RX ORDER — SPIRONOLACTONE 25 MG/1
TABLET ORAL
Qty: 90 TABLET | Refills: 0 | Status: SHIPPED | OUTPATIENT
Start: 2023-04-10

## 2023-04-11 ENCOUNTER — OFFICE VISIT (OUTPATIENT)
Dept: NEUROLOGY | Facility: CLINIC | Age: 68
End: 2023-04-11
Payer: MEDICARE

## 2023-04-11 ENCOUNTER — TELEPHONE (OUTPATIENT)
Dept: FAMILY MEDICINE CLINIC | Age: 68
End: 2023-04-11
Payer: MEDICARE

## 2023-04-11 ENCOUNTER — OFFICE VISIT (OUTPATIENT)
Dept: FAMILY MEDICINE CLINIC | Age: 68
End: 2023-04-11
Payer: MEDICARE

## 2023-04-11 VITALS
SYSTOLIC BLOOD PRESSURE: 112 MMHG | HEART RATE: 59 BPM | BODY MASS INDEX: 46.67 KG/M2 | HEIGHT: 64 IN | WEIGHT: 273.4 LBS | OXYGEN SATURATION: 100 % | DIASTOLIC BLOOD PRESSURE: 65 MMHG

## 2023-04-11 VITALS
WEIGHT: 274.8 LBS | OXYGEN SATURATION: 100 % | HEIGHT: 64 IN | HEART RATE: 69 BPM | SYSTOLIC BLOOD PRESSURE: 160 MMHG | BODY MASS INDEX: 46.92 KG/M2 | DIASTOLIC BLOOD PRESSURE: 91 MMHG

## 2023-04-11 DIAGNOSIS — F33.1 MODERATE RECURRENT MAJOR DEPRESSION: ICD-10-CM

## 2023-04-11 DIAGNOSIS — M54.50 MIDLINE LOW BACK PAIN, UNSPECIFIED CHRONICITY, UNSPECIFIED WHETHER SCIATICA PRESENT: ICD-10-CM

## 2023-04-11 DIAGNOSIS — S46.911A MUSCLE STRAIN, SHOULDER REGION, RIGHT, INITIAL ENCOUNTER: ICD-10-CM

## 2023-04-11 DIAGNOSIS — R41.89 COGNITIVE DECLINE: Primary | ICD-10-CM

## 2023-04-11 DIAGNOSIS — J45.909 ASTHMA, UNSPECIFIED ASTHMA SEVERITY, UNSPECIFIED WHETHER COMPLICATED, UNSPECIFIED WHETHER PERSISTENT: ICD-10-CM

## 2023-04-11 DIAGNOSIS — M50.30 DEGENERATIVE DISC DISEASE, CERVICAL: Primary | ICD-10-CM

## 2023-04-11 DIAGNOSIS — R06.09 DYSPNEA ON EXERTION: ICD-10-CM

## 2023-04-11 PROCEDURE — 1159F MED LIST DOCD IN RCRD: CPT | Performed by: PSYCHIATRY & NEUROLOGY

## 2023-04-11 PROCEDURE — 3077F SYST BP >= 140 MM HG: CPT | Performed by: PSYCHIATRY & NEUROLOGY

## 2023-04-11 PROCEDURE — 1160F RVW MEDS BY RX/DR IN RCRD: CPT | Performed by: PSYCHIATRY & NEUROLOGY

## 2023-04-11 PROCEDURE — 3080F DIAST BP >= 90 MM HG: CPT | Performed by: PSYCHIATRY & NEUROLOGY

## 2023-04-11 PROCEDURE — 99214 OFFICE O/P EST MOD 30 MIN: CPT | Performed by: PSYCHIATRY & NEUROLOGY

## 2023-04-11 RX ORDER — TIOTROPIUM BROMIDE INHALATION SPRAY 1.56 UG/1
2 SPRAY, METERED RESPIRATORY (INHALATION)
Qty: 3 EACH | Refills: 3 | Status: SHIPPED | OUTPATIENT
Start: 2023-04-11

## 2023-04-11 RX ORDER — DONEPEZIL HYDROCHLORIDE 10 MG/1
10 TABLET, FILM COATED ORAL NIGHTLY
Qty: 30 TABLET | Refills: 2 | Status: SHIPPED | OUTPATIENT
Start: 2023-04-11 | End: 2024-04-10

## 2023-04-12 DIAGNOSIS — R20.2 PARESTHESIAS: ICD-10-CM

## 2023-04-12 RX ORDER — PREGABALIN 50 MG/1
CAPSULE ORAL
Qty: 60 CAPSULE | OUTPATIENT
Start: 2023-04-12

## 2023-04-17 ENCOUNTER — TELEPHONE (OUTPATIENT)
Dept: FAMILY MEDICINE CLINIC | Age: 68
End: 2023-04-17

## 2023-04-18 ENCOUNTER — TELEPHONE (OUTPATIENT)
Dept: NEUROLOGY | Facility: CLINIC | Age: 68
End: 2023-04-18

## 2023-04-22 DIAGNOSIS — L29.9 ITCHING: ICD-10-CM

## 2023-04-22 DIAGNOSIS — F42.4 SKIN-PICKING DISORDER: ICD-10-CM

## 2023-04-22 DIAGNOSIS — F33.1 MAJOR DEPRESSIVE DISORDER, RECURRENT EPISODE, MODERATE: ICD-10-CM

## 2023-04-22 DIAGNOSIS — F33.9 RECURRENT MAJOR DEPRESSION RESISTANT TO TREATMENT: ICD-10-CM

## 2023-04-22 DIAGNOSIS — F41.1 GENERALIZED ANXIETY DISORDER: ICD-10-CM

## 2023-04-24 RX ORDER — FLUOXETINE HYDROCHLORIDE 40 MG/1
CAPSULE ORAL
Qty: 30 CAPSULE | Refills: 0 | Status: SHIPPED | OUTPATIENT
Start: 2023-04-24

## 2023-04-24 RX ORDER — HYDROXYZINE PAMOATE 50 MG/1
CAPSULE ORAL
Qty: 90 CAPSULE | Refills: 0 | Status: SHIPPED | OUTPATIENT
Start: 2023-04-24

## 2023-04-26 ENCOUNTER — TREATMENT (OUTPATIENT)
Dept: PHYSICAL THERAPY | Facility: CLINIC | Age: 68
End: 2023-04-26
Payer: MEDICARE

## 2023-04-26 DIAGNOSIS — M54.2 PAIN, NECK: ICD-10-CM

## 2023-04-26 DIAGNOSIS — M50.30 DEGENERATIVE DISC DISEASE, CERVICAL: Primary | ICD-10-CM

## 2023-04-26 DIAGNOSIS — M25.619 DECREASED RANGE OF MOTION OF SHOULDER, UNSPECIFIED LATERALITY: ICD-10-CM

## 2023-04-26 DIAGNOSIS — R29.3 POOR POSTURE: ICD-10-CM

## 2023-04-26 PROCEDURE — 97162 PT EVAL MOD COMPLEX 30 MIN: CPT | Performed by: PHYSICAL THERAPIST

## 2023-04-27 ENCOUNTER — HOSPITAL ENCOUNTER (OUTPATIENT)
Dept: MRI IMAGING | Facility: HOSPITAL | Age: 68
Discharge: HOME | End: 2023-04-27
Payer: MEDICARE

## 2023-04-27 DIAGNOSIS — M54.50 MIDLINE LOW BACK PAIN, UNSPECIFIED CHRONICITY, UNSPECIFIED WHETHER SCIATICA PRESENT: ICD-10-CM

## 2023-04-27 DIAGNOSIS — M50.30 DEGENERATIVE DISC DISEASE, CERVICAL: ICD-10-CM

## 2023-04-27 PROCEDURE — 72146 MRI CHEST SPINE W/O DYE: CPT

## 2023-04-27 PROCEDURE — 72141 MRI NECK SPINE W/O DYE: CPT

## 2023-04-28 ENCOUNTER — TELEPHONE (OUTPATIENT)
Dept: FAMILY MEDICINE CLINIC | Age: 68
End: 2023-04-28
Payer: MEDICARE

## 2023-05-01 RX ORDER — DULAGLUTIDE 0.75 MG/.5ML
INJECTION, SOLUTION SUBCUTANEOUS
Qty: 2 ML | Refills: 2 | Status: SHIPPED | OUTPATIENT
Start: 2023-05-01

## 2023-05-03 DIAGNOSIS — K21.9 GASTROESOPHAGEAL REFLUX DISEASE, UNSPECIFIED WHETHER ESOPHAGITIS PRESENT: ICD-10-CM

## 2023-05-03 RX ORDER — PANTOPRAZOLE SODIUM 40 MG/1
TABLET, DELAYED RELEASE ORAL
Qty: 90 TABLET | Refills: 1 | Status: SHIPPED | OUTPATIENT
Start: 2023-05-03

## 2023-05-03 RX ORDER — DONEPEZIL HYDROCHLORIDE 5 MG/1
TABLET, FILM COATED ORAL
Qty: 30 TABLET | Refills: 0 | OUTPATIENT
Start: 2023-05-03

## 2023-05-04 ENCOUNTER — LAB (OUTPATIENT)
Dept: LAB | Facility: HOSPITAL | Age: 68
End: 2023-05-04
Payer: MEDICARE

## 2023-05-04 ENCOUNTER — CLINICAL SUPPORT (OUTPATIENT)
Dept: FAMILY MEDICINE CLINIC | Age: 68
End: 2023-05-04
Payer: MEDICARE

## 2023-05-04 ENCOUNTER — TELEPHONE (OUTPATIENT)
Dept: PHYSICAL THERAPY | Facility: CLINIC | Age: 68
End: 2023-05-04

## 2023-05-04 ENCOUNTER — TRANSCRIBE ORDERS (OUTPATIENT)
Dept: FAMILY MEDICINE CLINIC | Age: 68
End: 2023-05-04
Payer: MEDICARE

## 2023-05-04 DIAGNOSIS — E53.8 B12 DEFICIENCY: ICD-10-CM

## 2023-05-04 DIAGNOSIS — F41.9 ANXIETY: Primary | ICD-10-CM

## 2023-05-04 DIAGNOSIS — M47.24 THORACIC RADICULOPATHY DUE TO DEGENERATIVE JOINT DISEASE OF SPINE: ICD-10-CM

## 2023-05-04 LAB
DEPRECATED RDW RBC AUTO: 46.3 FL (ref 37–54)
ERYTHROCYTE [DISTWIDTH] IN BLOOD BY AUTOMATED COUNT: 13 % (ref 12.3–15.4)
HCT VFR BLD AUTO: 42 % (ref 34–46.6)
HGB BLD-MCNC: 13.3 G/DL (ref 12–15.9)
MCH RBC QN AUTO: 30.2 PG (ref 26.6–33)
MCHC RBC AUTO-ENTMCNC: 31.7 G/DL (ref 31.5–35.7)
MCV RBC AUTO: 95.5 FL (ref 79–97)
PLATELET # BLD AUTO: 288 10*3/MM3 (ref 140–450)
PMV BLD AUTO: 9 FL (ref 6–12)
RBC # BLD AUTO: 4.4 10*6/MM3 (ref 3.77–5.28)
WBC NRBC COR # BLD: 7.72 10*3/MM3 (ref 3.4–10.8)

## 2023-05-04 PROCEDURE — 36415 COLL VENOUS BLD VENIPUNCTURE: CPT

## 2023-05-04 PROCEDURE — 85027 COMPLETE CBC AUTOMATED: CPT

## 2023-05-04 RX ORDER — PRAMIPEXOLE DIHYDROCHLORIDE 0.12 MG/1
0.12 TABLET ORAL DAILY
Qty: 90 TABLET | Refills: 0 | Status: SHIPPED | OUTPATIENT
Start: 2023-05-04

## 2023-05-04 RX ADMIN — CYANOCOBALAMIN 1000 MCG: 1000 INJECTION, SOLUTION INTRAMUSCULAR; SUBCUTANEOUS at 14:10

## 2023-05-05 ENCOUNTER — TELEPHONE (OUTPATIENT)
Dept: FAMILY MEDICINE CLINIC | Age: 68
End: 2023-05-05
Payer: MEDICARE

## 2023-05-09 ENCOUNTER — TELEPHONE (OUTPATIENT)
Dept: CARDIOLOGY | Facility: CLINIC | Age: 68
End: 2023-05-09
Payer: MEDICARE

## 2023-05-09 ENCOUNTER — READMISSION MANAGEMENT (OUTPATIENT)
Dept: CALL CENTER | Facility: HOSPITAL | Age: 68
End: 2023-05-09
Payer: MEDICARE

## 2023-05-09 DIAGNOSIS — M50.30 DEGENERATIVE DISC DISEASE, CERVICAL: ICD-10-CM

## 2023-05-09 DIAGNOSIS — M54.50 MIDLINE LOW BACK PAIN, UNSPECIFIED CHRONICITY, UNSPECIFIED WHETHER SCIATICA PRESENT: ICD-10-CM

## 2023-05-10 ENCOUNTER — TREATMENT (OUTPATIENT)
Dept: PHYSICAL THERAPY | Facility: CLINIC | Age: 68
End: 2023-05-10
Payer: MEDICARE

## 2023-05-10 ENCOUNTER — TRANSITIONAL CARE MANAGEMENT TELEPHONE ENCOUNTER (OUTPATIENT)
Dept: CALL CENTER | Facility: HOSPITAL | Age: 68
End: 2023-05-10
Payer: MEDICARE

## 2023-05-10 DIAGNOSIS — R29.3 POOR POSTURE: ICD-10-CM

## 2023-05-10 DIAGNOSIS — M50.30 DEGENERATIVE DISC DISEASE, CERVICAL: Primary | ICD-10-CM

## 2023-05-10 DIAGNOSIS — M25.619 DECREASED RANGE OF MOTION OF SHOULDER, UNSPECIFIED LATERALITY: ICD-10-CM

## 2023-05-10 DIAGNOSIS — M54.2 PAIN, NECK: ICD-10-CM

## 2023-05-10 RX ORDER — PREGABALIN 100 MG/1
100 CAPSULE ORAL 2 TIMES DAILY
Qty: 60 CAPSULE | Refills: 0 | Status: SHIPPED | OUTPATIENT
Start: 2023-05-10

## 2023-05-10 RX ORDER — METHOCARBAMOL 500 MG/1
500 TABLET, FILM COATED ORAL 4 TIMES DAILY
Qty: 40 TABLET | Refills: 1 | Status: SHIPPED | OUTPATIENT
Start: 2023-05-10

## 2023-05-11 ENCOUNTER — TELEPHONE (OUTPATIENT)
Dept: BEHAVIORAL HEALTH | Facility: CLINIC | Age: 68
End: 2023-05-11
Payer: MEDICARE

## 2023-05-11 DIAGNOSIS — F41.1 GENERALIZED ANXIETY DISORDER: ICD-10-CM

## 2023-05-11 DIAGNOSIS — L29.9 ITCHING: ICD-10-CM

## 2023-05-11 RX ORDER — HYDROXYZINE PAMOATE 50 MG/1
100 CAPSULE ORAL 3 TIMES DAILY PRN
Qty: 90 CAPSULE | Refills: 0
Start: 2023-05-11 | End: 2023-05-15 | Stop reason: SDUPTHER

## 2023-05-15 ENCOUNTER — PRIOR AUTHORIZATION (OUTPATIENT)
Dept: BEHAVIORAL HEALTH | Facility: CLINIC | Age: 68
End: 2023-05-15
Payer: MEDICARE

## 2023-05-15 ENCOUNTER — OFFICE VISIT (OUTPATIENT)
Dept: BEHAVIORAL HEALTH | Facility: CLINIC | Age: 68
End: 2023-05-15
Payer: MEDICARE

## 2023-05-15 VITALS
SYSTOLIC BLOOD PRESSURE: 130 MMHG | WEIGHT: 283.8 LBS | HEART RATE: 66 BPM | BODY MASS INDEX: 48.45 KG/M2 | DIASTOLIC BLOOD PRESSURE: 89 MMHG | HEIGHT: 64 IN

## 2023-05-15 DIAGNOSIS — L29.9 ITCHING: ICD-10-CM

## 2023-05-15 DIAGNOSIS — F41.9 INSOMNIA SECONDARY TO ANXIETY: ICD-10-CM

## 2023-05-15 DIAGNOSIS — F41.1 GENERALIZED ANXIETY DISORDER: Primary | ICD-10-CM

## 2023-05-15 DIAGNOSIS — F51.05 INSOMNIA SECONDARY TO ANXIETY: ICD-10-CM

## 2023-05-15 DIAGNOSIS — F33.9 RECURRENT MAJOR DEPRESSION RESISTANT TO TREATMENT: ICD-10-CM

## 2023-05-15 DIAGNOSIS — F42.4 SKIN-PICKING DISORDER: ICD-10-CM

## 2023-05-15 DIAGNOSIS — R44.2: ICD-10-CM

## 2023-05-15 PROCEDURE — 1159F MED LIST DOCD IN RCRD: CPT | Performed by: NURSE PRACTITIONER

## 2023-05-15 PROCEDURE — 99214 OFFICE O/P EST MOD 30 MIN: CPT | Performed by: NURSE PRACTITIONER

## 2023-05-15 PROCEDURE — 3079F DIAST BP 80-89 MM HG: CPT | Performed by: NURSE PRACTITIONER

## 2023-05-15 PROCEDURE — 1160F RVW MEDS BY RX/DR IN RCRD: CPT | Performed by: NURSE PRACTITIONER

## 2023-05-15 PROCEDURE — 3075F SYST BP GE 130 - 139MM HG: CPT | Performed by: NURSE PRACTITIONER

## 2023-05-15 RX ORDER — FLUTICASONE PROPIONATE 50 MCG
1 SPRAY, SUSPENSION (ML) NASAL
COMMUNITY

## 2023-05-15 RX ORDER — HYDROXYZINE PAMOATE 100 MG/1
100 CAPSULE ORAL 3 TIMES DAILY PRN
Qty: 90 CAPSULE | Refills: 1 | Status: SHIPPED | OUTPATIENT
Start: 2023-05-15 | End: 2023-05-16

## 2023-05-15 RX ORDER — MIRTAZAPINE 30 MG/1
45 TABLET, FILM COATED ORAL NIGHTLY
Qty: 45 TABLET | Refills: 2 | Status: SHIPPED | OUTPATIENT
Start: 2023-05-15 | End: 2023-08-13

## 2023-05-15 RX ORDER — FUROSEMIDE 40 MG/1
TABLET ORAL
COMMUNITY
Start: 2023-05-08

## 2023-05-15 RX ORDER — OLANZAPINE 7.5 MG/1
7.5 TABLET ORAL
Qty: 30 TABLET | Refills: 1 | Status: SHIPPED | OUTPATIENT
Start: 2023-05-15

## 2023-05-15 RX ORDER — CHLORHEXIDINE GLUCONATE 0.12 MG/ML
RINSE ORAL
COMMUNITY
Start: 2023-04-19

## 2023-05-15 RX ORDER — PROPRANOLOL HYDROCHLORIDE 10 MG/1
TABLET ORAL
COMMUNITY
Start: 2023-05-04

## 2023-05-15 RX ORDER — FLUOXETINE HYDROCHLORIDE 20 MG/1
60 CAPSULE ORAL EVERY MORNING
Qty: 90 CAPSULE | Refills: 1 | Status: SHIPPED | OUTPATIENT
Start: 2023-05-15 | End: 2023-07-14

## 2023-05-15 RX ORDER — BUSPIRONE HYDROCHLORIDE 15 MG/1
30 TABLET ORAL 2 TIMES DAILY
Qty: 120 TABLET | Refills: 2 | Status: SHIPPED | OUTPATIENT
Start: 2023-05-15 | End: 2023-08-13

## 2023-05-16 DIAGNOSIS — L29.9 ITCHING: ICD-10-CM

## 2023-05-16 DIAGNOSIS — F41.1 GENERALIZED ANXIETY DISORDER: ICD-10-CM

## 2023-05-16 RX ORDER — HYDROXYZINE PAMOATE 50 MG/1
100 CAPSULE ORAL 3 TIMES DAILY PRN
Qty: 180 CAPSULE | Refills: 1 | Status: SHIPPED | OUTPATIENT
Start: 2023-05-16

## 2023-05-17 ENCOUNTER — LAB (OUTPATIENT)
Dept: LAB | Facility: HOSPITAL | Age: 68
End: 2023-05-17
Payer: MEDICARE

## 2023-05-17 ENCOUNTER — OFFICE VISIT (OUTPATIENT)
Dept: FAMILY MEDICINE CLINIC | Age: 68
End: 2023-05-17
Payer: MEDICARE

## 2023-05-17 VITALS
DIASTOLIC BLOOD PRESSURE: 72 MMHG | SYSTOLIC BLOOD PRESSURE: 132 MMHG | HEART RATE: 57 BPM | BODY MASS INDEX: 48.59 KG/M2 | OXYGEN SATURATION: 98 % | WEIGHT: 284.6 LBS | HEIGHT: 64 IN

## 2023-05-17 DIAGNOSIS — E87.6 HYPOKALEMIA: ICD-10-CM

## 2023-05-17 DIAGNOSIS — G25.0 ESSENTIAL TREMOR: ICD-10-CM

## 2023-05-17 DIAGNOSIS — E66.01 MORBID (SEVERE) OBESITY DUE TO EXCESS CALORIES: ICD-10-CM

## 2023-05-17 DIAGNOSIS — F32.A DEPRESSIVE DISORDER: ICD-10-CM

## 2023-05-17 DIAGNOSIS — E88.81 METABOLIC SYNDROME: ICD-10-CM

## 2023-05-17 DIAGNOSIS — E78.00 HYPERCHOLESTEROLEMIA: ICD-10-CM

## 2023-05-17 DIAGNOSIS — G25.81 RESTLESS LEG SYNDROME: ICD-10-CM

## 2023-05-17 DIAGNOSIS — K21.9 GASTROESOPHAGEAL REFLUX DISEASE, UNSPECIFIED WHETHER ESOPHAGITIS PRESENT: ICD-10-CM

## 2023-05-17 DIAGNOSIS — I10 ESSENTIAL HYPERTENSION: Primary | ICD-10-CM

## 2023-05-17 DIAGNOSIS — E11.9 TYPE 2 DIABETES MELLITUS WITHOUT COMPLICATION, WITHOUT LONG-TERM CURRENT USE OF INSULIN: ICD-10-CM

## 2023-05-17 LAB
ANION GAP SERPL CALCULATED.3IONS-SCNC: 8 MMOL/L (ref 5–15)
BUN SERPL-MCNC: 18 MG/DL (ref 8–23)
BUN/CREAT SERPL: 18.8 (ref 7–25)
CALCIUM SPEC-SCNC: 9.5 MG/DL (ref 8.6–10.5)
CHLORIDE SERPL-SCNC: 103 MMOL/L (ref 98–107)
CO2 SERPL-SCNC: 31 MMOL/L (ref 22–29)
CREAT SERPL-MCNC: 0.96 MG/DL (ref 0.57–1)
EGFRCR SERPLBLD CKD-EPI 2021: 65 ML/MIN/1.73
GLUCOSE SERPL-MCNC: 103 MG/DL (ref 65–99)
POTASSIUM SERPL-SCNC: 4.5 MMOL/L (ref 3.5–5.2)
SODIUM SERPL-SCNC: 142 MMOL/L (ref 136–145)

## 2023-05-17 PROCEDURE — 36415 COLL VENOUS BLD VENIPUNCTURE: CPT

## 2023-05-17 PROCEDURE — 80048 BASIC METABOLIC PNL TOTAL CA: CPT

## 2023-05-17 RX ORDER — DULAGLUTIDE 1.5 MG/.5ML
1.5 INJECTION, SOLUTION SUBCUTANEOUS
Qty: 2 ML | Refills: 1 | Status: SHIPPED | OUTPATIENT
Start: 2023-05-17 | End: 2023-11-13

## 2023-05-17 RX ORDER — PANTOPRAZOLE SODIUM 40 MG/1
40 TABLET, DELAYED RELEASE ORAL 2 TIMES DAILY
Qty: 180 TABLET | Refills: 0
Start: 2023-05-17

## 2023-05-17 RX ORDER — PRAMIPEXOLE DIHYDROCHLORIDE 0.5 MG/1
0.5 TABLET ORAL 3 TIMES DAILY
Qty: 90 TABLET | Refills: 2 | Status: SHIPPED | OUTPATIENT
Start: 2023-05-17

## 2023-05-18 ENCOUNTER — TELEPHONE (OUTPATIENT)
Dept: FAMILY MEDICINE CLINIC | Age: 68
End: 2023-05-18

## 2023-05-18 ENCOUNTER — TELEPHONE (OUTPATIENT)
Dept: FAMILY MEDICINE CLINIC | Age: 68
End: 2023-05-18
Payer: MEDICARE

## 2023-05-19 ENCOUNTER — TELEPHONE (OUTPATIENT)
Dept: BEHAVIORAL HEALTH | Facility: CLINIC | Age: 68
End: 2023-05-19
Payer: MEDICARE

## 2023-05-22 ENCOUNTER — TELEPHONE (OUTPATIENT)
Dept: FAMILY MEDICINE CLINIC | Age: 68
End: 2023-05-22

## 2023-05-22 RX ORDER — FLUOXETINE HYDROCHLORIDE 40 MG/1
CAPSULE ORAL
Qty: 30 CAPSULE | OUTPATIENT
Start: 2023-05-22

## 2023-05-24 ENCOUNTER — TREATMENT (OUTPATIENT)
Dept: PHYSICAL THERAPY | Facility: CLINIC | Age: 68
End: 2023-05-24
Payer: MEDICARE

## 2023-05-24 DIAGNOSIS — R29.3 POOR POSTURE: ICD-10-CM

## 2023-05-24 DIAGNOSIS — M54.2 PAIN, NECK: ICD-10-CM

## 2023-05-24 DIAGNOSIS — M25.619 DECREASED RANGE OF MOTION OF SHOULDER, UNSPECIFIED LATERALITY: ICD-10-CM

## 2023-05-24 DIAGNOSIS — M50.30 DEGENERATIVE DISC DISEASE, CERVICAL: Primary | ICD-10-CM

## 2023-05-24 PROCEDURE — 97110 THERAPEUTIC EXERCISES: CPT | Performed by: PHYSICAL THERAPIST

## 2023-05-24 PROCEDURE — 97140 MANUAL THERAPY 1/> REGIONS: CPT | Performed by: PHYSICAL THERAPIST

## 2023-05-31 ENCOUNTER — TREATMENT (OUTPATIENT)
Dept: PHYSICAL THERAPY | Facility: CLINIC | Age: 68
End: 2023-05-31

## 2023-05-31 DIAGNOSIS — M50.30 DEGENERATIVE DISC DISEASE, CERVICAL: Primary | ICD-10-CM

## 2023-05-31 DIAGNOSIS — R29.3 POOR POSTURE: ICD-10-CM

## 2023-05-31 DIAGNOSIS — M54.2 PAIN, NECK: ICD-10-CM

## 2023-05-31 DIAGNOSIS — M25.619 DECREASED RANGE OF MOTION OF SHOULDER, UNSPECIFIED LATERALITY: ICD-10-CM

## 2023-05-31 PROCEDURE — 97112 NEUROMUSCULAR REEDUCATION: CPT | Performed by: PHYSICAL THERAPIST

## 2023-05-31 PROCEDURE — 97110 THERAPEUTIC EXERCISES: CPT | Performed by: PHYSICAL THERAPIST

## 2023-06-01 ENCOUNTER — OFFICE VISIT (OUTPATIENT)
Dept: PODIATRY | Facility: CLINIC | Age: 68
End: 2023-06-01

## 2023-06-01 VITALS
OXYGEN SATURATION: 99 % | HEART RATE: 93 BPM | TEMPERATURE: 96.9 F | SYSTOLIC BLOOD PRESSURE: 99 MMHG | HEIGHT: 64 IN | BODY MASS INDEX: 48.49 KG/M2 | DIASTOLIC BLOOD PRESSURE: 51 MMHG | WEIGHT: 284 LBS

## 2023-06-01 DIAGNOSIS — R26.2 DIFFICULTY WALKING: ICD-10-CM

## 2023-06-01 DIAGNOSIS — E11.8 DIABETIC FOOT: ICD-10-CM

## 2023-06-01 DIAGNOSIS — M79.672 FOOT PAIN, BILATERAL: Primary | ICD-10-CM

## 2023-06-01 DIAGNOSIS — M79.671 FOOT PAIN, BILATERAL: Primary | ICD-10-CM

## 2023-06-01 DIAGNOSIS — E11.9 NON-INSULIN DEPENDENT TYPE 2 DIABETES MELLITUS: ICD-10-CM

## 2023-06-01 DIAGNOSIS — B35.1 ONYCHOMYCOSIS: ICD-10-CM

## 2023-06-01 DIAGNOSIS — L60.0 ONYCHOCRYPTOSIS: ICD-10-CM

## 2023-06-01 RX ORDER — TACROLIMUS 1 MG/G
OINTMENT TOPICAL
COMMUNITY
Start: 2023-05-23

## 2023-06-02 DIAGNOSIS — M54.50 MIDLINE LOW BACK PAIN, UNSPECIFIED CHRONICITY, UNSPECIFIED WHETHER SCIATICA PRESENT: ICD-10-CM

## 2023-06-05 RX ORDER — METHOCARBAMOL 500 MG/1
TABLET, FILM COATED ORAL
Qty: 40 TABLET | Refills: 1 | Status: SHIPPED | OUTPATIENT
Start: 2023-06-05

## 2023-06-06 DIAGNOSIS — F33.1 MAJOR DEPRESSIVE DISORDER, RECURRENT EPISODE, MODERATE: ICD-10-CM

## 2023-06-06 DIAGNOSIS — R44.2: ICD-10-CM

## 2023-06-06 RX ORDER — OLANZAPINE 5 MG/1
TABLET ORAL
Qty: 30 TABLET | Refills: 1 | OUTPATIENT
Start: 2023-06-06

## 2023-06-07 ENCOUNTER — CLINICAL SUPPORT (OUTPATIENT)
Dept: FAMILY MEDICINE CLINIC | Age: 68
End: 2023-06-07
Payer: MEDICARE

## 2023-06-07 ENCOUNTER — TREATMENT (OUTPATIENT)
Dept: PHYSICAL THERAPY | Facility: CLINIC | Age: 68
End: 2023-06-07
Payer: MEDICARE

## 2023-06-07 DIAGNOSIS — M25.619 DECREASED RANGE OF MOTION OF SHOULDER, UNSPECIFIED LATERALITY: ICD-10-CM

## 2023-06-07 DIAGNOSIS — M54.2 PAIN, NECK: ICD-10-CM

## 2023-06-07 DIAGNOSIS — M50.30 DEGENERATIVE DISC DISEASE, CERVICAL: Primary | ICD-10-CM

## 2023-06-07 DIAGNOSIS — E53.8 B12 DEFICIENCY: Primary | ICD-10-CM

## 2023-06-07 DIAGNOSIS — R29.3 POOR POSTURE: ICD-10-CM

## 2023-06-07 PROCEDURE — 97140 MANUAL THERAPY 1/> REGIONS: CPT | Performed by: PHYSICAL THERAPIST

## 2023-06-07 PROCEDURE — 97110 THERAPEUTIC EXERCISES: CPT | Performed by: PHYSICAL THERAPIST

## 2023-06-07 RX ADMIN — CYANOCOBALAMIN 1000 MCG: 1000 INJECTION, SOLUTION INTRAMUSCULAR; SUBCUTANEOUS at 13:41

## 2023-06-08 DIAGNOSIS — I10 ESSENTIAL HYPERTENSION: ICD-10-CM

## 2023-06-09 ENCOUNTER — OFFICE VISIT (OUTPATIENT)
Dept: NEUROSURGERY | Facility: CLINIC | Age: 68
End: 2023-06-09
Payer: MEDICARE

## 2023-06-09 VITALS
WEIGHT: 280 LBS | HEART RATE: 77 BPM | HEIGHT: 64 IN | SYSTOLIC BLOOD PRESSURE: 139 MMHG | BODY MASS INDEX: 47.8 KG/M2 | DIASTOLIC BLOOD PRESSURE: 56 MMHG

## 2023-06-09 DIAGNOSIS — G89.29 CHRONIC MIDLINE LOW BACK PAIN WITHOUT SCIATICA: ICD-10-CM

## 2023-06-09 DIAGNOSIS — M54.50 CHRONIC MIDLINE LOW BACK PAIN WITHOUT SCIATICA: ICD-10-CM

## 2023-06-09 DIAGNOSIS — M50.30 DDD (DEGENERATIVE DISC DISEASE), CERVICAL: ICD-10-CM

## 2023-06-09 DIAGNOSIS — M54.2 CERVICALGIA: ICD-10-CM

## 2023-06-09 DIAGNOSIS — M51.34 DDD (DEGENERATIVE DISC DISEASE), THORACIC: Primary | ICD-10-CM

## 2023-06-09 RX ORDER — LOSARTAN POTASSIUM 50 MG/1
TABLET ORAL
Qty: 90 TABLET | Refills: 1 | Status: SHIPPED | OUTPATIENT
Start: 2023-06-09

## 2023-06-12 ENCOUNTER — PATIENT ROUNDING (BHMG ONLY) (OUTPATIENT)
Dept: NEUROSURGERY | Facility: CLINIC | Age: 68
End: 2023-06-12
Payer: MEDICARE

## 2023-06-14 ENCOUNTER — TREATMENT (OUTPATIENT)
Dept: PHYSICAL THERAPY | Facility: CLINIC | Age: 68
End: 2023-06-14
Payer: MEDICARE

## 2023-06-14 DIAGNOSIS — M54.2 PAIN, NECK: ICD-10-CM

## 2023-06-14 DIAGNOSIS — R29.3 POOR POSTURE: ICD-10-CM

## 2023-06-14 DIAGNOSIS — M50.30 DEGENERATIVE DISC DISEASE, CERVICAL: Primary | ICD-10-CM

## 2023-06-14 DIAGNOSIS — M25.619 DECREASED RANGE OF MOTION OF SHOULDER, UNSPECIFIED LATERALITY: ICD-10-CM

## 2023-06-14 PROCEDURE — 97140 MANUAL THERAPY 1/> REGIONS: CPT | Performed by: PHYSICAL THERAPIST

## 2023-06-14 PROCEDURE — 97110 THERAPEUTIC EXERCISES: CPT | Performed by: PHYSICAL THERAPIST

## 2023-06-15 ENCOUNTER — TELEPHONE (OUTPATIENT)
Dept: BEHAVIORAL HEALTH | Facility: CLINIC | Age: 68
End: 2023-06-15
Payer: MEDICARE

## 2023-06-19 ENCOUNTER — OFFICE VISIT (OUTPATIENT)
Dept: PULMONOLOGY | Facility: CLINIC | Age: 68
End: 2023-06-19
Payer: MEDICARE

## 2023-06-19 VITALS
WEIGHT: 281 LBS | DIASTOLIC BLOOD PRESSURE: 47 MMHG | OXYGEN SATURATION: 95 % | HEART RATE: 70 BPM | SYSTOLIC BLOOD PRESSURE: 108 MMHG | BODY MASS INDEX: 47.97 KG/M2 | TEMPERATURE: 98 F | HEIGHT: 64 IN | RESPIRATION RATE: 18 BRPM

## 2023-06-19 DIAGNOSIS — F41.9 ANXIETY: ICD-10-CM

## 2023-06-19 DIAGNOSIS — J45.909 ASTHMA, UNSPECIFIED ASTHMA SEVERITY, UNSPECIFIED WHETHER COMPLICATED, UNSPECIFIED WHETHER PERSISTENT: Primary | ICD-10-CM

## 2023-06-19 DIAGNOSIS — E66.01 MORBID OBESITY WITH BMI OF 45.0-49.9, ADULT: ICD-10-CM

## 2023-06-19 DIAGNOSIS — F17.211 NICOTINE DEPENDENCE, CIGARETTES, IN REMISSION: ICD-10-CM

## 2023-06-19 DIAGNOSIS — J44.9 CHRONIC OBSTRUCTIVE PULMONARY DISEASE, UNSPECIFIED COPD TYPE: ICD-10-CM

## 2023-06-19 DIAGNOSIS — J43.9 PULMONARY EMPHYSEMA, UNSPECIFIED EMPHYSEMA TYPE: ICD-10-CM

## 2023-06-19 DIAGNOSIS — G47.33 OSA (OBSTRUCTIVE SLEEP APNEA): ICD-10-CM

## 2023-06-19 DIAGNOSIS — I50.32 CHRONIC DIASTOLIC HEART FAILURE: ICD-10-CM

## 2023-06-19 DIAGNOSIS — R06.09 DYSPNEA ON EXERTION: ICD-10-CM

## 2023-06-19 PROCEDURE — 3074F SYST BP LT 130 MM HG: CPT

## 2023-06-19 PROCEDURE — 1160F RVW MEDS BY RX/DR IN RCRD: CPT

## 2023-06-19 PROCEDURE — 99214 OFFICE O/P EST MOD 30 MIN: CPT

## 2023-06-19 PROCEDURE — 1159F MED LIST DOCD IN RCRD: CPT

## 2023-06-19 PROCEDURE — 3078F DIAST BP <80 MM HG: CPT

## 2023-07-20 ENCOUNTER — LAB (OUTPATIENT)
Dept: LAB | Facility: HOSPITAL | Age: 68
End: 2023-07-20
Payer: MEDICARE

## 2023-07-20 ENCOUNTER — OFFICE VISIT (OUTPATIENT)
Dept: FAMILY MEDICINE CLINIC | Age: 68
End: 2023-07-20
Payer: MEDICARE

## 2023-07-20 VITALS
SYSTOLIC BLOOD PRESSURE: 132 MMHG | BODY MASS INDEX: 49.03 KG/M2 | HEART RATE: 62 BPM | OXYGEN SATURATION: 96 % | DIASTOLIC BLOOD PRESSURE: 77 MMHG | HEIGHT: 64 IN | TEMPERATURE: 97.7 F | WEIGHT: 287.2 LBS

## 2023-07-20 DIAGNOSIS — E11.9 TYPE 2 DIABETES MELLITUS WITHOUT COMPLICATION, WITHOUT LONG-TERM CURRENT USE OF INSULIN: ICD-10-CM

## 2023-07-20 DIAGNOSIS — R60.0 BILATERAL LOWER EXTREMITY EDEMA: ICD-10-CM

## 2023-07-20 DIAGNOSIS — R60.0 BILATERAL LOWER EXTREMITY EDEMA: Primary | ICD-10-CM

## 2023-07-20 DIAGNOSIS — E66.01 OBESITY, MORBID, BMI 40.0-49.9: ICD-10-CM

## 2023-07-20 DIAGNOSIS — R06.09 DYSPNEA ON EXERTION: ICD-10-CM

## 2023-07-20 DIAGNOSIS — E66.01 MORBID (SEVERE) OBESITY DUE TO EXCESS CALORIES: ICD-10-CM

## 2023-07-20 LAB
ALBUMIN SERPL-MCNC: 3.8 G/DL (ref 3.5–5.2)
ALBUMIN/GLOB SERPL: 1 G/DL
ALP SERPL-CCNC: 126 U/L (ref 39–117)
ALT SERPL W P-5'-P-CCNC: 10 U/L (ref 1–33)
ANION GAP SERPL CALCULATED.3IONS-SCNC: 9.2 MMOL/L (ref 5–15)
AST SERPL-CCNC: 22 U/L (ref 1–32)
BILIRUB SERPL-MCNC: 0.2 MG/DL (ref 0–1.2)
BUN SERPL-MCNC: 21 MG/DL (ref 8–23)
BUN/CREAT SERPL: 18.4 (ref 7–25)
CALCIUM SPEC-SCNC: 10.1 MG/DL (ref 8.6–10.5)
CHLORIDE SERPL-SCNC: 103 MMOL/L (ref 98–107)
CO2 SERPL-SCNC: 29.8 MMOL/L (ref 22–29)
CREAT SERPL-MCNC: 1.14 MG/DL (ref 0.57–1)
DEPRECATED RDW RBC AUTO: 45.8 FL (ref 37–54)
EGFRCR SERPLBLD CKD-EPI 2021: 52.9 ML/MIN/1.73
ERYTHROCYTE [DISTWIDTH] IN BLOOD BY AUTOMATED COUNT: 13.1 % (ref 12.3–15.4)
GLOBULIN UR ELPH-MCNC: 3.8 GM/DL
GLUCOSE SERPL-MCNC: 89 MG/DL (ref 65–99)
HCT VFR BLD AUTO: 44.2 % (ref 34–46.6)
HGB BLD-MCNC: 13.9 G/DL (ref 12–15.9)
MCH RBC QN AUTO: 29.4 PG (ref 26.6–33)
MCHC RBC AUTO-ENTMCNC: 31.4 G/DL (ref 31.5–35.7)
MCV RBC AUTO: 93.4 FL (ref 79–97)
NT-PROBNP SERPL-MCNC: 77.9 PG/ML (ref 0–900)
PLATELET # BLD AUTO: 273 10*3/MM3 (ref 140–450)
PMV BLD AUTO: 9.1 FL (ref 6–12)
POTASSIUM SERPL-SCNC: 4.3 MMOL/L (ref 3.5–5.2)
PROT SERPL-MCNC: 7.6 G/DL (ref 6–8.5)
RBC # BLD AUTO: 4.73 10*6/MM3 (ref 3.77–5.28)
SODIUM SERPL-SCNC: 142 MMOL/L (ref 136–145)
WBC NRBC COR # BLD: 7.72 10*3/MM3 (ref 3.4–10.8)

## 2023-07-20 PROCEDURE — 1160F RVW MEDS BY RX/DR IN RCRD: CPT | Performed by: NURSE PRACTITIONER

## 2023-07-20 PROCEDURE — 85027 COMPLETE CBC AUTOMATED: CPT

## 2023-07-20 PROCEDURE — 36415 COLL VENOUS BLD VENIPUNCTURE: CPT

## 2023-07-20 PROCEDURE — 3075F SYST BP GE 130 - 139MM HG: CPT | Performed by: NURSE PRACTITIONER

## 2023-07-20 PROCEDURE — 1159F MED LIST DOCD IN RCRD: CPT | Performed by: NURSE PRACTITIONER

## 2023-07-20 PROCEDURE — 99214 OFFICE O/P EST MOD 30 MIN: CPT | Performed by: NURSE PRACTITIONER

## 2023-07-20 PROCEDURE — 3078F DIAST BP <80 MM HG: CPT | Performed by: NURSE PRACTITIONER

## 2023-07-20 PROCEDURE — 80053 COMPREHEN METABOLIC PANEL: CPT

## 2023-07-20 PROCEDURE — 3044F HG A1C LEVEL LT 7.0%: CPT | Performed by: NURSE PRACTITIONER

## 2023-07-20 PROCEDURE — 83880 ASSAY OF NATRIURETIC PEPTIDE: CPT

## 2023-07-20 RX ORDER — PROPRANOLOL HYDROCHLORIDE 10 MG/1
TABLET ORAL
COMMUNITY
Start: 2023-07-19 | End: 2023-07-26

## 2023-07-26 ENCOUNTER — OFFICE VISIT (OUTPATIENT)
Dept: CARDIOLOGY | Facility: CLINIC | Age: 68
End: 2023-07-26
Payer: MEDICARE

## 2023-07-26 VITALS
WEIGHT: 285 LBS | BODY MASS INDEX: 48.65 KG/M2 | DIASTOLIC BLOOD PRESSURE: 55 MMHG | HEIGHT: 64 IN | HEART RATE: 61 BPM | SYSTOLIC BLOOD PRESSURE: 116 MMHG

## 2023-07-26 DIAGNOSIS — M50.30 DEGENERATIVE DISC DISEASE, CERVICAL: ICD-10-CM

## 2023-07-26 DIAGNOSIS — I50.32 CHRONIC DIASTOLIC HEART FAILURE: ICD-10-CM

## 2023-07-26 DIAGNOSIS — I10 ESSENTIAL HYPERTENSION: Primary | ICD-10-CM

## 2023-07-26 DIAGNOSIS — R00.2 PALPITATIONS: ICD-10-CM

## 2023-07-27 RX ORDER — PREGABALIN 100 MG/1
100 CAPSULE ORAL 2 TIMES DAILY
Qty: 60 CAPSULE | Refills: 2 | Status: SHIPPED | OUTPATIENT
Start: 2023-07-27

## 2023-07-28 ENCOUNTER — LAB (OUTPATIENT)
Dept: LAB | Facility: HOSPITAL | Age: 68
End: 2023-07-28
Payer: MEDICARE

## 2023-07-28 ENCOUNTER — OFFICE VISIT (OUTPATIENT)
Dept: NEUROLOGY | Facility: CLINIC | Age: 68
End: 2023-07-28
Payer: MEDICARE

## 2023-07-28 VITALS
HEART RATE: 75 BPM | SYSTOLIC BLOOD PRESSURE: 116 MMHG | DIASTOLIC BLOOD PRESSURE: 62 MMHG | BODY MASS INDEX: 48.45 KG/M2 | WEIGHT: 283.8 LBS | HEIGHT: 64 IN | OXYGEN SATURATION: 96 %

## 2023-07-28 DIAGNOSIS — R20.2 PARESTHESIAS: ICD-10-CM

## 2023-07-28 DIAGNOSIS — R20.2 PARESTHESIAS: Primary | ICD-10-CM

## 2023-07-28 LAB
ALBUMIN SERPL-MCNC: 3.7 G/DL (ref 3.5–5.2)
ALBUMIN/GLOB SERPL: 1.2 G/DL
ALP SERPL-CCNC: 130 U/L (ref 39–117)
ALT SERPL W P-5'-P-CCNC: 20 U/L (ref 1–33)
ANION GAP SERPL CALCULATED.3IONS-SCNC: 11 MMOL/L (ref 5–15)
AST SERPL-CCNC: 14 U/L (ref 1–32)
BASOPHILS # BLD AUTO: 0.04 10*3/MM3 (ref 0–0.2)
BASOPHILS NFR BLD AUTO: 0.4 % (ref 0–1.5)
BILIRUB SERPL-MCNC: 0.2 MG/DL (ref 0–1.2)
BUN SERPL-MCNC: 18 MG/DL (ref 8–23)
BUN/CREAT SERPL: 13.5 (ref 7–25)
CALCIUM SPEC-SCNC: 9.7 MG/DL (ref 8.6–10.5)
CHLORIDE SERPL-SCNC: 103 MMOL/L (ref 98–107)
CO2 SERPL-SCNC: 28 MMOL/L (ref 22–29)
CREAT SERPL-MCNC: 1.33 MG/DL (ref 0.57–1)
DEPRECATED RDW RBC AUTO: 45.6 FL (ref 37–54)
EGFRCR SERPLBLD CKD-EPI 2021: 43.9 ML/MIN/1.73
EOSINOPHIL # BLD AUTO: 0.12 10*3/MM3 (ref 0–0.4)
EOSINOPHIL NFR BLD AUTO: 1.2 % (ref 0.3–6.2)
ERYTHROCYTE [DISTWIDTH] IN BLOOD BY AUTOMATED COUNT: 13.3 % (ref 12.3–15.4)
FOLATE SERPL-MCNC: 16.8 NG/ML (ref 4.78–24.2)
GLOBULIN UR ELPH-MCNC: 3.2 GM/DL
GLUCOSE SERPL-MCNC: 90 MG/DL (ref 65–99)
HCT VFR BLD AUTO: 42.1 % (ref 34–46.6)
HGB BLD-MCNC: 13.5 G/DL (ref 12–15.9)
IMM GRANULOCYTES # BLD AUTO: 0.11 10*3/MM3 (ref 0–0.05)
IMM GRANULOCYTES NFR BLD AUTO: 1.1 % (ref 0–0.5)
LYMPHOCYTES # BLD AUTO: 2.84 10*3/MM3 (ref 0.7–3.1)
LYMPHOCYTES NFR BLD AUTO: 27.4 % (ref 19.6–45.3)
MCH RBC QN AUTO: 29.9 PG (ref 26.6–33)
MCHC RBC AUTO-ENTMCNC: 32.1 G/DL (ref 31.5–35.7)
MCV RBC AUTO: 93.1 FL (ref 79–97)
MONOCYTES # BLD AUTO: 1.02 10*3/MM3 (ref 0.1–0.9)
MONOCYTES NFR BLD AUTO: 9.8 % (ref 5–12)
NEUTROPHILS NFR BLD AUTO: 6.25 10*3/MM3 (ref 1.7–7)
NEUTROPHILS NFR BLD AUTO: 60.1 % (ref 42.7–76)
PLATELET # BLD AUTO: 316 10*3/MM3 (ref 140–450)
PMV BLD AUTO: 9 FL (ref 6–12)
POTASSIUM SERPL-SCNC: 4.5 MMOL/L (ref 3.5–5.2)
PROT SERPL-MCNC: 6.9 G/DL (ref 6–8.5)
RBC # BLD AUTO: 4.52 10*6/MM3 (ref 3.77–5.28)
SODIUM SERPL-SCNC: 142 MMOL/L (ref 136–145)
TSH SERPL DL<=0.05 MIU/L-ACNC: 1.41 UIU/ML (ref 0.27–4.2)
VIT B12 BLD-MCNC: 611 PG/ML (ref 211–946)
WBC NRBC COR # BLD: 10.38 10*3/MM3 (ref 3.4–10.8)

## 2023-07-28 PROCEDURE — 85025 COMPLETE CBC W/AUTO DIFF WBC: CPT

## 2023-07-28 PROCEDURE — 83921 ORGANIC ACID SINGLE QUANT: CPT

## 2023-07-28 PROCEDURE — 82746 ASSAY OF FOLIC ACID SERUM: CPT

## 2023-07-28 PROCEDURE — 80053 COMPREHEN METABOLIC PANEL: CPT

## 2023-07-28 PROCEDURE — 82607 VITAMIN B-12: CPT

## 2023-07-28 PROCEDURE — 84443 ASSAY THYROID STIM HORMONE: CPT

## 2023-07-28 PROCEDURE — 36415 COLL VENOUS BLD VENIPUNCTURE: CPT

## 2023-08-02 ENCOUNTER — OFFICE VISIT (OUTPATIENT)
Dept: FAMILY MEDICINE CLINIC | Age: 68
End: 2023-08-02
Payer: MEDICARE

## 2023-08-02 ENCOUNTER — TELEPHONE (OUTPATIENT)
Dept: FAMILY MEDICINE CLINIC | Age: 68
End: 2023-08-02

## 2023-08-02 VITALS
WEIGHT: 274 LBS | HEART RATE: 98 BPM | DIASTOLIC BLOOD PRESSURE: 85 MMHG | TEMPERATURE: 98.4 F | OXYGEN SATURATION: 98 % | HEIGHT: 64 IN | BODY MASS INDEX: 46.78 KG/M2 | SYSTOLIC BLOOD PRESSURE: 141 MMHG

## 2023-08-02 DIAGNOSIS — F41.9 ANXIETY: Primary | ICD-10-CM

## 2023-08-02 DIAGNOSIS — F51.01 PRIMARY INSOMNIA: ICD-10-CM

## 2023-08-02 DIAGNOSIS — R06.02 SHORTNESS OF BREATH: ICD-10-CM

## 2023-08-02 DIAGNOSIS — G47.33 OBSTRUCTIVE SLEEP APNEA SYNDROME: ICD-10-CM

## 2023-08-02 DIAGNOSIS — I10 ESSENTIAL HYPERTENSION: ICD-10-CM

## 2023-08-02 DIAGNOSIS — R53.83 OTHER FATIGUE: ICD-10-CM

## 2023-08-02 DIAGNOSIS — G25.0 ESSENTIAL TREMOR: ICD-10-CM

## 2023-08-02 DIAGNOSIS — R60.0 BILATERAL LEG EDEMA: ICD-10-CM

## 2023-08-02 DIAGNOSIS — E11.9 TYPE 2 DIABETES MELLITUS WITHOUT COMPLICATION, WITHOUT LONG-TERM CURRENT USE OF INSULIN: ICD-10-CM

## 2023-08-02 DIAGNOSIS — R07.9 CHEST PAIN, UNSPECIFIED TYPE: ICD-10-CM

## 2023-08-02 PROCEDURE — 3044F HG A1C LEVEL LT 7.0%: CPT | Performed by: FAMILY MEDICINE

## 2023-08-02 PROCEDURE — 3079F DIAST BP 80-89 MM HG: CPT | Performed by: FAMILY MEDICINE

## 2023-08-02 PROCEDURE — 1160F RVW MEDS BY RX/DR IN RCRD: CPT | Performed by: FAMILY MEDICINE

## 2023-08-02 PROCEDURE — 1159F MED LIST DOCD IN RCRD: CPT | Performed by: FAMILY MEDICINE

## 2023-08-02 PROCEDURE — 99214 OFFICE O/P EST MOD 30 MIN: CPT | Performed by: FAMILY MEDICINE

## 2023-08-02 PROCEDURE — 3077F SYST BP >= 140 MM HG: CPT | Performed by: FAMILY MEDICINE

## 2023-08-03 LAB — METHYLMALONATE SERPL-SCNC: 208 NMOL/L (ref 0–378)

## 2023-08-06 DIAGNOSIS — E66.01 OBESITY, MORBID, BMI 40.0-49.9: ICD-10-CM

## 2023-08-06 DIAGNOSIS — E11.9 TYPE 2 DIABETES MELLITUS WITHOUT COMPLICATION, WITHOUT LONG-TERM CURRENT USE OF INSULIN: ICD-10-CM

## 2023-08-07 RX ORDER — DULAGLUTIDE 3 MG/.5ML
INJECTION, SOLUTION SUBCUTANEOUS
Qty: 2 ML | Refills: 2 | Status: SHIPPED | OUTPATIENT
Start: 2023-08-07

## 2023-08-08 RX ORDER — DONEPEZIL HYDROCHLORIDE 10 MG/1
TABLET, FILM COATED ORAL
Qty: 30 TABLET | Refills: 5 | Status: SHIPPED | OUTPATIENT
Start: 2023-08-08

## 2023-08-15 ENCOUNTER — TELEPHONE (OUTPATIENT)
Dept: FAMILY MEDICINE CLINIC | Age: 68
End: 2023-08-15
Payer: MEDICARE

## 2023-08-15 DIAGNOSIS — F41.1 GENERALIZED ANXIETY DISORDER: ICD-10-CM

## 2023-08-15 DIAGNOSIS — G25.81 RESTLESS LEG SYNDROME: ICD-10-CM

## 2023-08-15 RX ORDER — PRAMIPEXOLE DIHYDROCHLORIDE 0.5 MG/1
TABLET ORAL
Qty: 90 TABLET | Refills: 2 | Status: SHIPPED | OUTPATIENT
Start: 2023-08-15

## 2023-08-15 RX ORDER — BUSPIRONE HYDROCHLORIDE 15 MG/1
TABLET ORAL
Qty: 120 TABLET | Refills: 2 | Status: SHIPPED | OUTPATIENT
Start: 2023-08-15

## 2023-08-17 ENCOUNTER — TELEPHONE (OUTPATIENT)
Dept: BEHAVIORAL HEALTH | Facility: CLINIC | Age: 68
End: 2023-08-17

## 2023-08-17 ENCOUNTER — TELEMEDICINE (OUTPATIENT)
Dept: BEHAVIORAL HEALTH | Facility: CLINIC | Age: 68
End: 2023-08-17
Payer: MEDICARE

## 2023-08-17 DIAGNOSIS — F51.05 INSOMNIA SECONDARY TO ANXIETY: ICD-10-CM

## 2023-08-17 DIAGNOSIS — F41.1 GENERALIZED ANXIETY DISORDER: ICD-10-CM

## 2023-08-17 DIAGNOSIS — F42.4 SKIN-PICKING DISORDER: ICD-10-CM

## 2023-08-17 DIAGNOSIS — F68.13: Primary | ICD-10-CM

## 2023-08-17 DIAGNOSIS — F33.9 RECURRENT MAJOR DEPRESSION RESISTANT TO TREATMENT: ICD-10-CM

## 2023-08-17 DIAGNOSIS — F41.9 INSOMNIA SECONDARY TO ANXIETY: ICD-10-CM

## 2023-08-17 RX ORDER — FLUOXETINE HYDROCHLORIDE 40 MG/1
80 CAPSULE ORAL EVERY MORNING
Qty: 60 CAPSULE | Refills: 2 | Status: SHIPPED | OUTPATIENT
Start: 2023-08-18 | End: 2023-11-16

## 2023-08-17 RX ORDER — MIRTAZAPINE 30 MG/1
45 TABLET, FILM COATED ORAL NIGHTLY
Qty: 45 TABLET | Refills: 2 | Status: SHIPPED | OUTPATIENT
Start: 2023-08-17 | End: 2023-11-15

## 2023-08-19 DIAGNOSIS — F41.1 GENERALIZED ANXIETY DISORDER: ICD-10-CM

## 2023-08-19 DIAGNOSIS — L29.9 ITCHING: ICD-10-CM

## 2023-08-21 ENCOUNTER — TELEPHONE (OUTPATIENT)
Dept: BEHAVIORAL HEALTH | Facility: CLINIC | Age: 68
End: 2023-08-21
Payer: MEDICARE

## 2023-08-21 RX ORDER — HYDROXYZINE PAMOATE 50 MG/1
CAPSULE ORAL
Qty: 180 CAPSULE | Refills: 1 | Status: SHIPPED | OUTPATIENT
Start: 2023-08-21

## 2023-08-27 ENCOUNTER — DOCUMENTATION (OUTPATIENT)
Dept: FAMILY MEDICINE CLINIC | Age: 68
End: 2023-08-27
Payer: MEDICARE

## 2023-08-28 ENCOUNTER — TELEPHONE (OUTPATIENT)
Dept: FAMILY MEDICINE CLINIC | Age: 68
End: 2023-08-28
Payer: MEDICARE

## 2023-08-28 ENCOUNTER — TELEPHONE (OUTPATIENT)
Dept: FAMILY MEDICINE CLINIC | Age: 68
End: 2023-08-28

## 2023-08-30 ENCOUNTER — HOSPITAL ENCOUNTER (OUTPATIENT)
Dept: MRI IMAGING | Facility: HOSPITAL | Age: 68
Discharge: HOME | End: 2023-08-30
Admitting: PSYCHIATRY & NEUROLOGY
Payer: MEDICARE

## 2023-08-30 ENCOUNTER — OFFICE VISIT (OUTPATIENT)
Dept: FAMILY MEDICINE CLINIC | Age: 68
End: 2023-08-30
Payer: MEDICARE

## 2023-08-30 VITALS
HEIGHT: 64 IN | WEIGHT: 287 LBS | OXYGEN SATURATION: 94 % | SYSTOLIC BLOOD PRESSURE: 140 MMHG | BODY MASS INDEX: 49 KG/M2 | DIASTOLIC BLOOD PRESSURE: 72 MMHG | HEART RATE: 70 BPM

## 2023-08-30 DIAGNOSIS — R20.2 PARESTHESIAS: ICD-10-CM

## 2023-08-30 DIAGNOSIS — G25.0 ESSENTIAL TREMOR: ICD-10-CM

## 2023-08-30 DIAGNOSIS — F41.9 ANXIETY: ICD-10-CM

## 2023-08-30 DIAGNOSIS — G25.81 RESTLESS LEG SYNDROME: ICD-10-CM

## 2023-08-30 DIAGNOSIS — S00.01XD: ICD-10-CM

## 2023-08-30 DIAGNOSIS — R00.2 HEART PALPITATIONS: Primary | ICD-10-CM

## 2023-08-30 DIAGNOSIS — I10 ESSENTIAL HYPERTENSION: ICD-10-CM

## 2023-08-30 DIAGNOSIS — E11.9 TYPE 2 DIABETES MELLITUS WITHOUT COMPLICATION, WITHOUT LONG-TERM CURRENT USE OF INSULIN: ICD-10-CM

## 2023-08-30 DIAGNOSIS — E66.01 OBESITY, MORBID, BMI 40.0-49.9: ICD-10-CM

## 2023-08-30 PROCEDURE — 72141 MRI NECK SPINE W/O DYE: CPT

## 2023-08-30 RX ORDER — METOPROLOL SUCCINATE 25 MG/1
25 TABLET, EXTENDED RELEASE ORAL 2 TIMES DAILY
Qty: 180 TABLET | Refills: 0 | Status: SHIPPED | OUTPATIENT
Start: 2023-08-30

## 2023-08-30 RX ADMIN — CYANOCOBALAMIN 1000 MCG: 1000 INJECTION, SOLUTION INTRAMUSCULAR; SUBCUTANEOUS at 14:16

## 2023-09-06 ENCOUNTER — OFFICE VISIT (OUTPATIENT)
Dept: PODIATRY | Facility: CLINIC | Age: 68
End: 2023-09-06
Payer: MEDICARE

## 2023-09-06 VITALS
TEMPERATURE: 98.2 F | OXYGEN SATURATION: 95 % | DIASTOLIC BLOOD PRESSURE: 67 MMHG | HEART RATE: 61 BPM | BODY MASS INDEX: 48.14 KG/M2 | WEIGHT: 282 LBS | HEIGHT: 64 IN | SYSTOLIC BLOOD PRESSURE: 168 MMHG

## 2023-09-06 DIAGNOSIS — L60.0 ONYCHOCRYPTOSIS: ICD-10-CM

## 2023-09-06 DIAGNOSIS — R26.2 DIFFICULTY WALKING: ICD-10-CM

## 2023-09-06 DIAGNOSIS — M79.671 FOOT PAIN, BILATERAL: Primary | ICD-10-CM

## 2023-09-06 DIAGNOSIS — E11.9 NON-INSULIN DEPENDENT TYPE 2 DIABETES MELLITUS: ICD-10-CM

## 2023-09-06 DIAGNOSIS — E11.8 DIABETIC FOOT: ICD-10-CM

## 2023-09-06 DIAGNOSIS — B35.1 ONYCHOMYCOSIS: ICD-10-CM

## 2023-09-06 DIAGNOSIS — M79.672 FOOT PAIN, BILATERAL: Primary | ICD-10-CM

## 2023-09-08 DIAGNOSIS — F51.05 INSOMNIA SECONDARY TO ANXIETY: ICD-10-CM

## 2023-09-08 DIAGNOSIS — F41.9 INSOMNIA SECONDARY TO ANXIETY: ICD-10-CM

## 2023-09-08 RX ORDER — MIRTAZAPINE 30 MG/1
TABLET, FILM COATED ORAL
Qty: 90 TABLET | OUTPATIENT
Start: 2023-09-08

## 2023-09-14 ENCOUNTER — TELEPHONE (OUTPATIENT)
Dept: NEUROLOGY | Facility: CLINIC | Age: 68
End: 2023-09-14

## 2023-09-15 RX ORDER — FLUOXETINE HYDROCHLORIDE 20 MG/1
CAPSULE ORAL
Qty: 90 CAPSULE | OUTPATIENT
Start: 2023-09-15

## 2023-09-21 DIAGNOSIS — I10 ESSENTIAL HYPERTENSION: ICD-10-CM

## 2023-09-21 RX ORDER — AMLODIPINE BESYLATE 5 MG/1
TABLET ORAL
Qty: 90 TABLET | Refills: 0 | Status: SHIPPED | OUTPATIENT
Start: 2023-09-21

## 2023-10-03 ENCOUNTER — OFFICE VISIT (OUTPATIENT)
Dept: OBSTETRICS AND GYNECOLOGY | Facility: CLINIC | Age: 68
End: 2023-10-03
Payer: MEDICARE

## 2023-10-03 VITALS
WEIGHT: 278 LBS | SYSTOLIC BLOOD PRESSURE: 160 MMHG | DIASTOLIC BLOOD PRESSURE: 85 MMHG | BODY MASS INDEX: 47.46 KG/M2 | HEART RATE: 67 BPM | HEIGHT: 64 IN

## 2023-10-03 DIAGNOSIS — G62.9 NEUROPATHY: Primary | ICD-10-CM

## 2023-10-03 DIAGNOSIS — M62.81 MUSCLE WEAKNESS (GENERALIZED): ICD-10-CM

## 2023-10-03 DIAGNOSIS — N90.89 VULVAR LESION: Primary | ICD-10-CM

## 2023-10-03 PROBLEM — Z00.00 ENCOUNTER FOR ANNUAL WELLNESS EXAM IN MEDICARE PATIENT: Status: RESOLVED | Noted: 2022-05-31 | Resolved: 2023-10-03

## 2023-10-03 PROBLEM — Z79.891 LONG-TERM CURRENT USE OF OPIATE ANALGESIC: Status: ACTIVE | Noted: 2018-12-06

## 2023-10-03 PROBLEM — M54.12 CERVICAL RADICULOPATHY: Status: ACTIVE | Noted: 2023-07-11

## 2023-10-03 PROBLEM — M47.814 THORACIC SPONDYLOSIS: Status: ACTIVE | Noted: 2023-07-11

## 2023-10-03 PROBLEM — M51.34 DEGENERATION OF THORACIC INTERVERTEBRAL DISC: Status: ACTIVE | Noted: 2023-07-11

## 2023-10-03 PROBLEM — M47.812 CERVICAL SPONDYLOSIS: Status: ACTIVE | Noted: 2023-07-11

## 2023-10-03 RX ORDER — OXYCODONE AND ACETAMINOPHEN 7.5; 325 MG/1; MG/1
1 TABLET ORAL EVERY 6 HOURS PRN
COMMUNITY
Start: 2023-09-28

## 2023-10-05 ENCOUNTER — TELEMEDICINE (OUTPATIENT)
Dept: BEHAVIORAL HEALTH | Facility: CLINIC | Age: 68
End: 2023-10-05
Payer: MEDICARE

## 2023-10-05 ENCOUNTER — TELEPHONE (OUTPATIENT)
Dept: FAMILY MEDICINE CLINIC | Age: 68
End: 2023-10-05

## 2023-10-05 DIAGNOSIS — F42.4 SKIN-PICKING DISORDER: ICD-10-CM

## 2023-10-05 DIAGNOSIS — E11.9 TYPE 2 DIABETES MELLITUS WITHOUT COMPLICATION, WITHOUT LONG-TERM CURRENT USE OF INSULIN: ICD-10-CM

## 2023-10-05 DIAGNOSIS — F33.9 RECURRENT MAJOR DEPRESSION RESISTANT TO TREATMENT: Chronic | ICD-10-CM

## 2023-10-05 DIAGNOSIS — R44.2: ICD-10-CM

## 2023-10-05 DIAGNOSIS — F51.05 INSOMNIA DUE TO MENTAL CONDITION: ICD-10-CM

## 2023-10-05 DIAGNOSIS — F41.1 GENERALIZED ANXIETY DISORDER: ICD-10-CM

## 2023-10-05 DIAGNOSIS — E66.01 OBESITY, MORBID, BMI 40.0-49.9: ICD-10-CM

## 2023-10-05 DIAGNOSIS — F68.13: Primary | ICD-10-CM

## 2023-10-05 RX ORDER — OLANZAPINE 7.5 MG/1
7.5 TABLET ORAL
Qty: 30 TABLET | Refills: 1 | Status: SHIPPED | OUTPATIENT
Start: 2023-10-05

## 2023-10-05 RX ORDER — DULAGLUTIDE 3 MG/.5ML
3 INJECTION, SOLUTION SUBCUTANEOUS WEEKLY
Qty: 6 ML | Refills: 2 | Status: SHIPPED | OUTPATIENT
Start: 2023-10-05

## 2023-10-06 ENCOUNTER — TELEPHONE (OUTPATIENT)
Dept: PSYCHIATRY | Facility: CLINIC | Age: 68
End: 2023-10-06
Payer: MEDICARE

## 2023-10-09 RX ORDER — SPIRONOLACTONE 25 MG/1
25 TABLET ORAL DAILY
Qty: 90 TABLET | Refills: 0 | Status: SHIPPED | OUTPATIENT
Start: 2023-10-09

## 2023-10-09 RX ORDER — FUROSEMIDE 20 MG/1
TABLET ORAL
Qty: 180 TABLET | Refills: 0 | Status: SHIPPED | OUTPATIENT
Start: 2023-10-09

## 2023-10-09 RX ORDER — POTASSIUM CHLORIDE 750 MG/1
10 CAPSULE, EXTENDED RELEASE ORAL DAILY
Qty: 90 CAPSULE | Refills: 0 | Status: SHIPPED | OUTPATIENT
Start: 2023-10-09

## 2023-10-12 ENCOUNTER — TELEPHONE (OUTPATIENT)
Dept: NEUROLOGY | Facility: CLINIC | Age: 68
End: 2023-10-12

## 2023-10-12 ENCOUNTER — TELEPHONE (OUTPATIENT)
Dept: PSYCHIATRY | Facility: CLINIC | Age: 68
End: 2023-10-12
Payer: MEDICARE

## 2023-10-12 ENCOUNTER — HOSPITAL ENCOUNTER (OUTPATIENT)
Facility: HOSPITAL | Age: 68
Discharge: HOME | End: 2023-10-12
Payer: MEDICARE

## 2023-10-12 DIAGNOSIS — G62.9 NEUROPATHY: ICD-10-CM

## 2023-10-12 DIAGNOSIS — M62.81 MUSCLE WEAKNESS (GENERALIZED): ICD-10-CM

## 2023-10-12 DIAGNOSIS — F42.4 SKIN-PICKING DISORDER: ICD-10-CM

## 2023-10-12 DIAGNOSIS — R44.2: ICD-10-CM

## 2023-10-12 DIAGNOSIS — F33.9 RECURRENT MAJOR DEPRESSION RESISTANT TO TREATMENT: Chronic | ICD-10-CM

## 2023-10-12 PROCEDURE — 95911 NRV CNDJ TEST 9-10 STUDIES: CPT

## 2023-10-12 PROCEDURE — 95886 MUSC TEST DONE W/N TEST COMP: CPT

## 2023-10-12 RX ORDER — OLANZAPINE 5 MG/1
TABLET ORAL
Qty: 21 TABLET | Refills: 0 | Status: SHIPPED | OUTPATIENT
Start: 2023-10-13 | End: 2023-11-10

## 2023-10-13 ENCOUNTER — OFFICE VISIT (OUTPATIENT)
Dept: NEUROLOGY | Facility: CLINIC | Age: 68
End: 2023-10-13
Payer: MEDICARE

## 2023-10-13 VITALS
OXYGEN SATURATION: 95 % | HEIGHT: 64 IN | BODY MASS INDEX: 48.35 KG/M2 | HEART RATE: 69 BPM | WEIGHT: 283.2 LBS | SYSTOLIC BLOOD PRESSURE: 99 MMHG | DIASTOLIC BLOOD PRESSURE: 77 MMHG

## 2023-10-13 DIAGNOSIS — G20.C PARKINSONISM, UNSPECIFIED PARKINSONISM TYPE: ICD-10-CM

## 2023-10-13 DIAGNOSIS — G62.9 NEUROPATHY: Primary | ICD-10-CM

## 2023-10-13 PROCEDURE — 99214 OFFICE O/P EST MOD 30 MIN: CPT | Performed by: PSYCHIATRY & NEUROLOGY

## 2023-10-13 PROCEDURE — 3078F DIAST BP <80 MM HG: CPT | Performed by: PSYCHIATRY & NEUROLOGY

## 2023-10-13 PROCEDURE — 3074F SYST BP LT 130 MM HG: CPT | Performed by: PSYCHIATRY & NEUROLOGY

## 2023-10-13 RX ORDER — PREGABALIN 150 MG/1
150 CAPSULE ORAL 2 TIMES DAILY
Qty: 60 CAPSULE | Refills: 2 | Status: SHIPPED | OUTPATIENT
Start: 2023-10-13

## 2023-10-17 ENCOUNTER — TELEPHONE (OUTPATIENT)
Dept: PSYCHIATRY | Facility: CLINIC | Age: 68
End: 2023-10-17
Payer: MEDICARE

## 2023-10-18 ENCOUNTER — OFFICE VISIT (OUTPATIENT)
Dept: FAMILY MEDICINE CLINIC | Age: 68
End: 2023-10-18
Payer: MEDICARE

## 2023-10-18 VITALS
HEIGHT: 64 IN | OXYGEN SATURATION: 98 % | WEIGHT: 290 LBS | DIASTOLIC BLOOD PRESSURE: 61 MMHG | BODY MASS INDEX: 49.51 KG/M2 | HEART RATE: 73 BPM | SYSTOLIC BLOOD PRESSURE: 136 MMHG

## 2023-10-18 DIAGNOSIS — G25.0 ESSENTIAL TREMOR: ICD-10-CM

## 2023-10-18 DIAGNOSIS — S00.01XD: ICD-10-CM

## 2023-10-18 DIAGNOSIS — E11.9 TYPE 2 DIABETES MELLITUS WITHOUT COMPLICATION, WITHOUT LONG-TERM CURRENT USE OF INSULIN: ICD-10-CM

## 2023-10-18 DIAGNOSIS — I10 ESSENTIAL HYPERTENSION: ICD-10-CM

## 2023-10-18 DIAGNOSIS — Z23 ENCOUNTER FOR IMMUNIZATION: Primary | ICD-10-CM

## 2023-10-18 DIAGNOSIS — E53.8 B12 DEFICIENCY: ICD-10-CM

## 2023-10-18 DIAGNOSIS — G25.81 RESTLESS LEG SYNDROME: ICD-10-CM

## 2023-10-18 DIAGNOSIS — E66.01 OBESITY, MORBID, BMI 40.0-49.9: ICD-10-CM

## 2023-10-18 RX ORDER — SYRING-NEEDL,DISP,INSUL,0.3 ML 30 GX5/16"
1 SYRINGE, EMPTY DISPOSABLE MISCELLANEOUS DAILY
Qty: 100 EACH | Refills: 3 | Status: SHIPPED | OUTPATIENT
Start: 2023-10-18

## 2023-10-18 RX ORDER — CYANOCOBALAMIN 1000 UG/ML
1000 INJECTION, SOLUTION INTRAMUSCULAR; SUBCUTANEOUS
Status: SHIPPED | OUTPATIENT
Start: 2023-10-27

## 2023-10-18 RX ADMIN — CYANOCOBALAMIN 1000 MCG: 1000 INJECTION, SOLUTION INTRAMUSCULAR; SUBCUTANEOUS at 15:17

## 2023-10-23 ENCOUNTER — TELEPHONE (OUTPATIENT)
Dept: PSYCHIATRY | Facility: CLINIC | Age: 68
End: 2023-10-23
Payer: MEDICARE

## 2023-10-27 ENCOUNTER — TELEPHONE (OUTPATIENT)
Dept: FAMILY MEDICINE CLINIC | Age: 68
End: 2023-10-27

## 2023-10-27 ENCOUNTER — OFFICE VISIT (OUTPATIENT)
Dept: FAMILY MEDICINE CLINIC | Age: 68
End: 2023-10-27
Payer: MEDICARE

## 2023-10-27 ENCOUNTER — HOSPITAL ENCOUNTER (OUTPATIENT)
Dept: GENERAL RADIOLOGY | Facility: HOSPITAL | Age: 68
Discharge: HOME | End: 2023-10-27
Admitting: PHYSICIAN ASSISTANT
Payer: MEDICARE

## 2023-10-27 VITALS
HEART RATE: 63 BPM | TEMPERATURE: 97.7 F | SYSTOLIC BLOOD PRESSURE: 135 MMHG | WEIGHT: 293 LBS | DIASTOLIC BLOOD PRESSURE: 64 MMHG | OXYGEN SATURATION: 96 % | BODY MASS INDEX: 50.02 KG/M2 | HEIGHT: 64 IN

## 2023-10-27 DIAGNOSIS — R06.02 SHORT OF BREATH ON EXERTION: Primary | ICD-10-CM

## 2023-10-27 DIAGNOSIS — R06.02 SHORT OF BREATH ON EXERTION: ICD-10-CM

## 2023-10-27 DIAGNOSIS — J43.9 PULMONARY EMPHYSEMA, UNSPECIFIED EMPHYSEMA TYPE: ICD-10-CM

## 2023-10-27 DIAGNOSIS — I50.32 CHRONIC DIASTOLIC HEART FAILURE: ICD-10-CM

## 2023-10-27 DIAGNOSIS — N18.30 STAGE 3 CHRONIC KIDNEY DISEASE, UNSPECIFIED WHETHER STAGE 3A OR 3B CKD: ICD-10-CM

## 2023-10-27 DIAGNOSIS — E66.01 OBESITY, MORBID, BMI 40.0-49.9: ICD-10-CM

## 2023-10-27 DIAGNOSIS — E11.9 TYPE 2 DIABETES MELLITUS WITHOUT COMPLICATION, WITHOUT LONG-TERM CURRENT USE OF INSULIN: ICD-10-CM

## 2023-10-27 PROCEDURE — 71046 X-RAY EXAM CHEST 2 VIEWS: CPT

## 2023-10-27 RX ORDER — HYDROCODONE BITARTRATE AND ACETAMINOPHEN 10; 325 MG/1; MG/1
TABLET ORAL
COMMUNITY
Start: 2023-10-25 | End: 2023-10-27

## 2023-10-31 ENCOUNTER — HOSPITAL ENCOUNTER (EMERGENCY)
Facility: HOSPITAL | Age: 68
Discharge: HOME | End: 2023-10-31
Attending: EMERGENCY MEDICINE | Admitting: EMERGENCY MEDICINE
Payer: MEDICARE

## 2023-10-31 ENCOUNTER — APPOINTMENT (OUTPATIENT)
Dept: CT IMAGING | Facility: HOSPITAL | Age: 68
End: 2023-10-31
Payer: MEDICARE

## 2023-10-31 ENCOUNTER — APPOINTMENT (OUTPATIENT)
Dept: GENERAL RADIOLOGY | Facility: HOSPITAL | Age: 68
End: 2023-10-31
Payer: MEDICARE

## 2023-10-31 VITALS
TEMPERATURE: 97.7 F | BODY MASS INDEX: 50.02 KG/M2 | RESPIRATION RATE: 20 BRPM | HEART RATE: 75 BPM | SYSTOLIC BLOOD PRESSURE: 132 MMHG | DIASTOLIC BLOOD PRESSURE: 56 MMHG | OXYGEN SATURATION: 100 % | WEIGHT: 293 LBS | HEIGHT: 64 IN

## 2023-10-31 DIAGNOSIS — R06.02 SHORTNESS OF BREATH: Primary | ICD-10-CM

## 2023-10-31 DIAGNOSIS — K21.9 GASTROESOPHAGEAL REFLUX DISEASE, UNSPECIFIED WHETHER ESOPHAGITIS PRESENT: ICD-10-CM

## 2023-10-31 LAB
ALBUMIN SERPL-MCNC: 3.8 G/DL (ref 3.5–5.2)
ALBUMIN/GLOB SERPL: 1 G/DL
ALP SERPL-CCNC: 122 U/L (ref 39–117)
ALT SERPL W P-5'-P-CCNC: 5 U/L (ref 1–33)
ANION GAP SERPL CALCULATED.3IONS-SCNC: 8.8 MMOL/L (ref 5–15)
AST SERPL-CCNC: 21 U/L (ref 1–32)
BASOPHILS # BLD AUTO: 0.05 10*3/MM3 (ref 0–0.2)
BASOPHILS NFR BLD AUTO: 0.7 % (ref 0–1.5)
BILIRUB SERPL-MCNC: 0.3 MG/DL (ref 0–1.2)
BUN SERPL-MCNC: 11 MG/DL (ref 8–23)
BUN/CREAT SERPL: 10.6 (ref 7–25)
CALCIUM SPEC-SCNC: 9.6 MG/DL (ref 8.6–10.5)
CHLORIDE SERPL-SCNC: 101 MMOL/L (ref 98–107)
CO2 SERPL-SCNC: 30.2 MMOL/L (ref 22–29)
CREAT SERPL-MCNC: 1.04 MG/DL (ref 0.57–1)
DEPRECATED RDW RBC AUTO: 44.2 FL (ref 37–54)
EGFRCR SERPLBLD CKD-EPI 2021: 59 ML/MIN/1.73
EOSINOPHIL # BLD AUTO: 0.07 10*3/MM3 (ref 0–0.4)
EOSINOPHIL NFR BLD AUTO: 1 % (ref 0.3–6.2)
ERYTHROCYTE [DISTWIDTH] IN BLOOD BY AUTOMATED COUNT: 13.7 % (ref 12.3–15.4)
FLUAV SUBTYP SPEC NAA+PROBE: NOT DETECTED
FLUBV RNA ISLT QL NAA+PROBE: NOT DETECTED
GLOBULIN UR ELPH-MCNC: 3.8 GM/DL
GLUCOSE SERPL-MCNC: 102 MG/DL (ref 65–99)
HCT VFR BLD AUTO: 41.7 % (ref 34–46.6)
HGB BLD-MCNC: 13.3 G/DL (ref 12–15.9)
HOLD SPECIMEN: NORMAL
HOLD SPECIMEN: NORMAL
IMM GRANULOCYTES # BLD AUTO: 0.02 10*3/MM3 (ref 0–0.05)
IMM GRANULOCYTES NFR BLD AUTO: 0.3 % (ref 0–0.5)
LYMPHOCYTES # BLD AUTO: 1.46 10*3/MM3 (ref 0.7–3.1)
LYMPHOCYTES NFR BLD AUTO: 20.1 % (ref 19.6–45.3)
MCH RBC QN AUTO: 28.2 PG (ref 26.6–33)
MCHC RBC AUTO-ENTMCNC: 31.9 G/DL (ref 31.5–35.7)
MCV RBC AUTO: 88.3 FL (ref 79–97)
MONOCYTES # BLD AUTO: 0.73 10*3/MM3 (ref 0.1–0.9)
MONOCYTES NFR BLD AUTO: 10 % (ref 5–12)
NEUTROPHILS NFR BLD AUTO: 4.95 10*3/MM3 (ref 1.7–7)
NEUTROPHILS NFR BLD AUTO: 67.9 % (ref 42.7–76)
NRBC BLD AUTO-RTO: 0 /100 WBC (ref 0–0.2)
NT-PROBNP SERPL-MCNC: 607 PG/ML (ref 0–900)
PLATELET # BLD AUTO: 334 10*3/MM3 (ref 140–450)
PMV BLD AUTO: 9.4 FL (ref 6–12)
POTASSIUM SERPL-SCNC: 4 MMOL/L (ref 3.5–5.2)
PROT SERPL-MCNC: 7.6 G/DL (ref 6–8.5)
QT INTERVAL: 482 MS
QTC INTERVAL: 526 MS
RBC # BLD AUTO: 4.72 10*6/MM3 (ref 3.77–5.28)
RSV RNA NPH QL NAA+NON-PROBE: NOT DETECTED
SARS-COV-2 RNA RESP QL NAA+PROBE: NOT DETECTED
SODIUM SERPL-SCNC: 140 MMOL/L (ref 136–145)
TROPONIN T SERPL HS-MCNC: 34 NG/L
WBC NRBC COR # BLD: 7.28 10*3/MM3 (ref 3.4–10.8)
WHOLE BLOOD HOLD COAG: NORMAL
WHOLE BLOOD HOLD SPECIMEN: NORMAL

## 2023-10-31 PROCEDURE — 71260 CT THORAX DX C+: CPT

## 2023-10-31 PROCEDURE — 93005 ELECTROCARDIOGRAM TRACING: CPT

## 2023-10-31 PROCEDURE — 71045 X-RAY EXAM CHEST 1 VIEW: CPT

## 2023-10-31 PROCEDURE — 36415 COLL VENOUS BLD VENIPUNCTURE: CPT

## 2023-10-31 PROCEDURE — 25510000001 IOPAMIDOL PER 1 ML: Performed by: EMERGENCY MEDICINE

## 2023-10-31 PROCEDURE — 80053 COMPREHEN METABOLIC PANEL: CPT

## 2023-10-31 PROCEDURE — 25010000002 FUROSEMIDE PER 20 MG: Performed by: EMERGENCY MEDICINE

## 2023-10-31 PROCEDURE — 83880 ASSAY OF NATRIURETIC PEPTIDE: CPT

## 2023-10-31 PROCEDURE — 84484 ASSAY OF TROPONIN QUANT: CPT

## 2023-10-31 PROCEDURE — 93005 ELECTROCARDIOGRAM TRACING: CPT | Performed by: EMERGENCY MEDICINE

## 2023-10-31 PROCEDURE — 85025 COMPLETE CBC W/AUTO DIFF WBC: CPT

## 2023-10-31 PROCEDURE — 87637 SARSCOV2&INF A&B&RSV AMP PRB: CPT | Performed by: EMERGENCY MEDICINE

## 2023-10-31 PROCEDURE — 96374 THER/PROPH/DIAG INJ IV PUSH: CPT

## 2023-10-31 PROCEDURE — 99285 EMERGENCY DEPT VISIT HI MDM: CPT

## 2023-10-31 RX ORDER — FUROSEMIDE 10 MG/ML
40 INJECTION INTRAMUSCULAR; INTRAVENOUS ONCE
Status: COMPLETED | OUTPATIENT
Start: 2023-10-31 | End: 2023-10-31

## 2023-10-31 RX ORDER — PANTOPRAZOLE SODIUM 40 MG/1
40 TABLET, DELAYED RELEASE ORAL DAILY
Qty: 90 TABLET | Refills: 0 | Status: SHIPPED | OUTPATIENT
Start: 2023-10-31

## 2023-10-31 RX ORDER — SODIUM CHLORIDE 0.9 % (FLUSH) 0.9 %
10 SYRINGE (ML) INJECTION AS NEEDED
Status: DISCONTINUED | OUTPATIENT
Start: 2023-10-31 | End: 2023-10-31 | Stop reason: HOSPADM

## 2023-10-31 RX ADMIN — FUROSEMIDE 40 MG: 10 INJECTION, SOLUTION INTRAMUSCULAR; INTRAVENOUS at 13:31

## 2023-10-31 RX ADMIN — IOPAMIDOL 100 ML: 755 INJECTION, SOLUTION INTRAVENOUS at 13:53

## 2023-11-02 ENCOUNTER — OFFICE VISIT (OUTPATIENT)
Dept: FAMILY MEDICINE CLINIC | Age: 68
End: 2023-11-02
Payer: MEDICARE

## 2023-11-02 ENCOUNTER — TELEPHONE (OUTPATIENT)
Dept: CARDIOLOGY | Facility: CLINIC | Age: 68
End: 2023-11-02
Payer: MEDICARE

## 2023-11-02 VITALS
BODY MASS INDEX: 49.72 KG/M2 | HEIGHT: 64 IN | SYSTOLIC BLOOD PRESSURE: 113 MMHG | DIASTOLIC BLOOD PRESSURE: 73 MMHG | TEMPERATURE: 97.6 F | HEART RATE: 61 BPM | WEIGHT: 291.2 LBS | OXYGEN SATURATION: 99 %

## 2023-11-02 DIAGNOSIS — R06.02 SHORT OF BREATH ON EXERTION: Primary | ICD-10-CM

## 2023-11-02 DIAGNOSIS — M79.89 LEG SWELLING: ICD-10-CM

## 2023-11-02 PROCEDURE — 1159F MED LIST DOCD IN RCRD: CPT | Performed by: PHYSICIAN ASSISTANT

## 2023-11-02 PROCEDURE — 99214 OFFICE O/P EST MOD 30 MIN: CPT | Performed by: PHYSICIAN ASSISTANT

## 2023-11-02 PROCEDURE — 3074F SYST BP LT 130 MM HG: CPT | Performed by: PHYSICIAN ASSISTANT

## 2023-11-02 PROCEDURE — 1160F RVW MEDS BY RX/DR IN RCRD: CPT | Performed by: PHYSICIAN ASSISTANT

## 2023-11-02 PROCEDURE — 3044F HG A1C LEVEL LT 7.0%: CPT | Performed by: PHYSICIAN ASSISTANT

## 2023-11-02 PROCEDURE — 3078F DIAST BP <80 MM HG: CPT | Performed by: PHYSICIAN ASSISTANT

## 2023-11-03 DIAGNOSIS — E11.9 TYPE 2 DIABETES MELLITUS WITHOUT COMPLICATION, WITHOUT LONG-TERM CURRENT USE OF INSULIN: ICD-10-CM

## 2023-11-03 DIAGNOSIS — E66.01 OBESITY, MORBID, BMI 40.0-49.9: ICD-10-CM

## 2023-11-03 RX ORDER — DULAGLUTIDE 3 MG/.5ML
INJECTION, SOLUTION SUBCUTANEOUS
Qty: 2 ML | Refills: 1 | Status: SHIPPED | OUTPATIENT
Start: 2023-11-03

## 2023-11-06 ENCOUNTER — OFFICE VISIT (OUTPATIENT)
Dept: PULMONOLOGY | Facility: CLINIC | Age: 68
End: 2023-11-06
Payer: MEDICARE

## 2023-11-06 ENCOUNTER — TELEPHONE (OUTPATIENT)
Dept: OBSTETRICS AND GYNECOLOGY | Facility: CLINIC | Age: 68
End: 2023-11-06

## 2023-11-06 ENCOUNTER — TELEPHONE (OUTPATIENT)
Dept: PULMONOLOGY | Facility: CLINIC | Age: 68
End: 2023-11-06

## 2023-11-06 VITALS
DIASTOLIC BLOOD PRESSURE: 81 MMHG | SYSTOLIC BLOOD PRESSURE: 109 MMHG | HEART RATE: 65 BPM | WEIGHT: 289.4 LBS | HEIGHT: 64 IN | TEMPERATURE: 98.9 F | OXYGEN SATURATION: 95 % | BODY MASS INDEX: 49.41 KG/M2 | RESPIRATION RATE: 14 BRPM

## 2023-11-06 DIAGNOSIS — F33.9 RECURRENT MAJOR DEPRESSION RESISTANT TO TREATMENT: Chronic | ICD-10-CM

## 2023-11-06 DIAGNOSIS — J45.909 ASTHMA, UNSPECIFIED ASTHMA SEVERITY, UNSPECIFIED WHETHER COMPLICATED, UNSPECIFIED WHETHER PERSISTENT: Primary | ICD-10-CM

## 2023-11-06 DIAGNOSIS — J45.901 EXACERBATION OF ASTHMA, UNSPECIFIED ASTHMA SEVERITY, UNSPECIFIED WHETHER PERSISTENT: ICD-10-CM

## 2023-11-06 DIAGNOSIS — R06.09 DYSPNEA ON EXERTION: ICD-10-CM

## 2023-11-06 DIAGNOSIS — R44.2: ICD-10-CM

## 2023-11-06 DIAGNOSIS — F42.4 SKIN-PICKING DISORDER: ICD-10-CM

## 2023-11-06 RX ORDER — OLANZAPINE 5 MG/1
TABLET ORAL
Qty: 21 TABLET | Refills: 0 | OUTPATIENT
Start: 2023-11-06

## 2023-11-06 RX ORDER — METHYLPREDNISOLONE SODIUM SUCCINATE 40 MG/ML
40 INJECTION, POWDER, LYOPHILIZED, FOR SOLUTION INTRAMUSCULAR; INTRAVENOUS ONCE
Status: COMPLETED | OUTPATIENT
Start: 2023-11-06 | End: 2023-11-06

## 2023-11-06 RX ORDER — ALBUTEROL SULFATE 2.5 MG/3ML
2.5 SOLUTION RESPIRATORY (INHALATION) 4 TIMES DAILY PRN
Qty: 360 ML | Refills: 5 | Status: SHIPPED | OUTPATIENT
Start: 2023-11-06

## 2023-11-06 RX ORDER — PREDNISONE 10 MG/1
TABLET ORAL
Qty: 42 TABLET | Refills: 0 | Status: SHIPPED | OUTPATIENT
Start: 2023-11-06

## 2023-11-06 RX ORDER — ALBUTEROL SULFATE 2.5 MG/3ML
2.5 SOLUTION RESPIRATORY (INHALATION) ONCE
Status: SHIPPED | OUTPATIENT
Start: 2023-11-06

## 2023-11-06 RX ORDER — METHYLPREDNISOLONE SODIUM SUCCINATE 40 MG/ML
80 INJECTION, POWDER, LYOPHILIZED, FOR SOLUTION INTRAMUSCULAR; INTRAVENOUS ONCE
Status: DISCONTINUED | OUTPATIENT
Start: 2023-11-06 | End: 2023-11-06

## 2023-11-06 RX ADMIN — METHYLPREDNISOLONE SODIUM SUCCINATE 40 MG: 40 INJECTION, POWDER, LYOPHILIZED, FOR SOLUTION INTRAMUSCULAR; INTRAVENOUS at 10:32

## 2023-11-06 RX ADMIN — METHYLPREDNISOLONE SODIUM SUCCINATE 40 MG: 40 INJECTION, POWDER, LYOPHILIZED, FOR SOLUTION INTRAMUSCULAR; INTRAVENOUS at 10:33

## 2023-11-07 ENCOUNTER — OFFICE VISIT (OUTPATIENT)
Dept: FAMILY MEDICINE CLINIC | Age: 68
End: 2023-11-07
Payer: MEDICARE

## 2023-11-07 VITALS
HEIGHT: 64 IN | DIASTOLIC BLOOD PRESSURE: 80 MMHG | OXYGEN SATURATION: 100 % | BODY MASS INDEX: 49.68 KG/M2 | HEART RATE: 60 BPM | WEIGHT: 291 LBS | SYSTOLIC BLOOD PRESSURE: 150 MMHG | TEMPERATURE: 97.7 F

## 2023-11-07 DIAGNOSIS — I10 ESSENTIAL HYPERTENSION: ICD-10-CM

## 2023-11-07 DIAGNOSIS — F41.9 ANXIETY: ICD-10-CM

## 2023-11-07 DIAGNOSIS — G25.0 ESSENTIAL TREMOR: ICD-10-CM

## 2023-11-07 DIAGNOSIS — G25.81 RESTLESS LEG SYNDROME: ICD-10-CM

## 2023-11-07 DIAGNOSIS — M79.89 LEG SWELLING: ICD-10-CM

## 2023-11-07 DIAGNOSIS — R06.02 SHORT OF BREATH ON EXERTION: ICD-10-CM

## 2023-11-07 DIAGNOSIS — E11.9 TYPE 2 DIABETES MELLITUS WITHOUT COMPLICATION, WITHOUT LONG-TERM CURRENT USE OF INSULIN: Primary | ICD-10-CM

## 2023-11-07 DIAGNOSIS — I50.32 CHRONIC DIASTOLIC HEART FAILURE: ICD-10-CM

## 2023-11-07 DIAGNOSIS — E66.01 OBESITY, MORBID, BMI 40.0-49.9: ICD-10-CM

## 2023-11-07 DIAGNOSIS — F51.01 PRIMARY INSOMNIA: ICD-10-CM

## 2023-11-07 DIAGNOSIS — N18.30 STAGE 3 CHRONIC KIDNEY DISEASE, UNSPECIFIED WHETHER STAGE 3A OR 3B CKD: ICD-10-CM

## 2023-11-07 DIAGNOSIS — R53.1 GENERALIZED WEAKNESS: ICD-10-CM

## 2023-11-07 PROBLEM — I25.10 CORONARY ARTERY CALCIFICATION SEEN ON CT SCAN: Status: ACTIVE | Noted: 2023-11-07

## 2023-11-07 RX ORDER — SPIRONOLACTONE 25 MG/1
25 TABLET ORAL 2 TIMES DAILY
Qty: 180 TABLET | Refills: 0 | Status: SHIPPED | OUTPATIENT
Start: 2023-11-07

## 2023-11-07 RX ADMIN — CYANOCOBALAMIN 1000 MCG: 1000 INJECTION, SOLUTION INTRAMUSCULAR; SUBCUTANEOUS at 12:10

## 2023-11-08 ENCOUNTER — OFFICE VISIT (OUTPATIENT)
Dept: CARDIOLOGY | Facility: CLINIC | Age: 68
End: 2023-11-08
Payer: MEDICARE

## 2023-11-08 VITALS
DIASTOLIC BLOOD PRESSURE: 88 MMHG | SYSTOLIC BLOOD PRESSURE: 148 MMHG | HEIGHT: 64 IN | BODY MASS INDEX: 49.78 KG/M2 | WEIGHT: 291.6 LBS | HEART RATE: 70 BPM

## 2023-11-08 DIAGNOSIS — I50.32 CHRONIC DIASTOLIC HEART FAILURE: ICD-10-CM

## 2023-11-08 DIAGNOSIS — I25.10 CORONARY ARTERY CALCIFICATION SEEN ON CT SCAN: ICD-10-CM

## 2023-11-08 DIAGNOSIS — I10 ESSENTIAL HYPERTENSION: Primary | ICD-10-CM

## 2023-11-08 DIAGNOSIS — R00.2 PALPITATIONS: ICD-10-CM

## 2023-11-08 RX ORDER — FUROSEMIDE 20 MG/1
TABLET ORAL
Qty: 270 TABLET | Refills: 1 | Status: SHIPPED | OUTPATIENT
Start: 2023-11-08

## 2023-11-09 ENCOUNTER — TELEMEDICINE (OUTPATIENT)
Dept: BEHAVIORAL HEALTH | Facility: CLINIC | Age: 68
End: 2023-11-09
Payer: MEDICARE

## 2023-11-09 DIAGNOSIS — F33.9 RECURRENT MAJOR DEPRESSION RESISTANT TO TREATMENT: ICD-10-CM

## 2023-11-09 DIAGNOSIS — F41.1 GENERALIZED ANXIETY DISORDER: ICD-10-CM

## 2023-11-09 DIAGNOSIS — Z79.899 POLYPHARMACY: ICD-10-CM

## 2023-11-09 DIAGNOSIS — F68.13: Primary | ICD-10-CM

## 2023-11-09 DIAGNOSIS — F41.9 INSOMNIA SECONDARY TO ANXIETY: ICD-10-CM

## 2023-11-09 DIAGNOSIS — F42.4 SKIN-PICKING DISORDER: ICD-10-CM

## 2023-11-09 DIAGNOSIS — Z79.899 MEDICATION MANAGEMENT: ICD-10-CM

## 2023-11-09 DIAGNOSIS — F51.05 INSOMNIA SECONDARY TO ANXIETY: ICD-10-CM

## 2023-11-09 RX ORDER — FLUOXETINE HYDROCHLORIDE 40 MG/1
80 CAPSULE ORAL EVERY MORNING
Qty: 60 CAPSULE | Refills: 2 | Status: SHIPPED | OUTPATIENT
Start: 2023-11-09 | End: 2024-02-07

## 2023-11-09 RX ORDER — MIRTAZAPINE 30 MG/1
30 TABLET, FILM COATED ORAL NIGHTLY
Qty: 30 TABLET | Refills: 2 | Status: SHIPPED | OUTPATIENT
Start: 2023-11-09 | End: 2024-02-07

## 2023-11-09 RX ORDER — BUSPIRONE HYDROCHLORIDE 10 MG/1
20 TABLET ORAL 3 TIMES DAILY
Qty: 180 TABLET | Refills: 2 | Status: SHIPPED | OUTPATIENT
Start: 2023-11-09 | End: 2024-02-07

## 2023-11-13 ENCOUNTER — HOSPITAL ENCOUNTER (OUTPATIENT)
Dept: MAMMOGRAPHY | Facility: HOSPITAL | Age: 68
Discharge: HOME | End: 2023-11-13
Admitting: FAMILY MEDICINE
Payer: MEDICARE

## 2023-11-13 DIAGNOSIS — Z12.31 ENCOUNTER FOR SCREENING MAMMOGRAM FOR BREAST CANCER: ICD-10-CM

## 2023-11-13 PROCEDURE — 77063 BREAST TOMOSYNTHESIS BI: CPT

## 2023-11-13 PROCEDURE — 77067 SCR MAMMO BI INCL CAD: CPT

## 2023-11-14 ENCOUNTER — TELEMEDICINE (OUTPATIENT)
Dept: PSYCHIATRY | Facility: CLINIC | Age: 68
End: 2023-11-14
Payer: MEDICARE

## 2023-11-14 DIAGNOSIS — G25.81 RESTLESS LEG SYNDROME: ICD-10-CM

## 2023-11-14 DIAGNOSIS — F41.1 GENERALIZED ANXIETY DISORDER: ICD-10-CM

## 2023-11-14 DIAGNOSIS — F33.1 MAJOR DEPRESSIVE DISORDER, RECURRENT EPISODE, MODERATE: Primary | ICD-10-CM

## 2023-11-14 RX ORDER — PRAMIPEXOLE DIHYDROCHLORIDE 0.5 MG/1
TABLET ORAL
Qty: 270 TABLET | Refills: 0 | Status: SHIPPED | OUTPATIENT
Start: 2023-11-14

## 2023-11-17 ENCOUNTER — TELEPHONE (OUTPATIENT)
Dept: OBSTETRICS AND GYNECOLOGY | Facility: CLINIC | Age: 68
End: 2023-11-17
Payer: MEDICARE

## 2023-11-22 DIAGNOSIS — F41.1 GENERALIZED ANXIETY DISORDER: ICD-10-CM

## 2023-11-22 RX ORDER — BUSPIRONE HYDROCHLORIDE 15 MG/1
30 TABLET ORAL 2 TIMES DAILY
Qty: 120 TABLET | Refills: 2 | OUTPATIENT
Start: 2023-11-22

## 2023-11-27 ENCOUNTER — TELEPHONE (OUTPATIENT)
Dept: FAMILY MEDICINE CLINIC | Age: 68
End: 2023-11-27
Payer: MEDICARE

## 2023-11-27 ENCOUNTER — TELEPHONE (OUTPATIENT)
Dept: FAMILY MEDICINE CLINIC | Age: 68
End: 2023-11-27

## 2023-11-27 DIAGNOSIS — S00.01XD: Primary | ICD-10-CM

## 2023-12-03 DIAGNOSIS — F41.9 INSOMNIA SECONDARY TO ANXIETY: ICD-10-CM

## 2023-12-03 DIAGNOSIS — F51.05 INSOMNIA SECONDARY TO ANXIETY: ICD-10-CM

## 2023-12-03 DIAGNOSIS — F33.9 RECURRENT MAJOR DEPRESSION RESISTANT TO TREATMENT: ICD-10-CM

## 2023-12-04 RX ORDER — MIRTAZAPINE 30 MG/1
TABLET, FILM COATED ORAL
Qty: 135 TABLET | OUTPATIENT
Start: 2023-12-04

## 2023-12-05 ENCOUNTER — TELEPHONE (OUTPATIENT)
Dept: BEHAVIORAL HEALTH | Facility: CLINIC | Age: 68
End: 2023-12-05
Payer: MEDICARE

## 2023-12-07 ENCOUNTER — OFFICE VISIT (OUTPATIENT)
Dept: PODIATRY | Facility: CLINIC | Age: 68
End: 2023-12-07
Payer: MEDICARE

## 2023-12-07 VITALS
HEART RATE: 66 BPM | SYSTOLIC BLOOD PRESSURE: 105 MMHG | DIASTOLIC BLOOD PRESSURE: 49 MMHG | WEIGHT: 293 LBS | HEIGHT: 64 IN | BODY MASS INDEX: 50.02 KG/M2 | OXYGEN SATURATION: 99 %

## 2023-12-07 DIAGNOSIS — R26.2 DIFFICULTY WALKING: ICD-10-CM

## 2023-12-07 DIAGNOSIS — E11.9 NON-INSULIN DEPENDENT TYPE 2 DIABETES MELLITUS: ICD-10-CM

## 2023-12-07 DIAGNOSIS — L60.0 ONYCHOCRYPTOSIS: ICD-10-CM

## 2023-12-07 DIAGNOSIS — E11.8 DIABETIC FOOT: ICD-10-CM

## 2023-12-07 DIAGNOSIS — M79.672 FOOT PAIN, BILATERAL: Primary | ICD-10-CM

## 2023-12-07 DIAGNOSIS — M79.671 FOOT PAIN, BILATERAL: Primary | ICD-10-CM

## 2023-12-07 DIAGNOSIS — B35.1 ONYCHOMYCOSIS: ICD-10-CM

## 2023-12-07 RX ORDER — HYDROCODONE BITARTRATE AND ACETAMINOPHEN 10; 325 MG/1; MG/1
TABLET ORAL
COMMUNITY
Start: 2023-11-28

## 2023-12-09 DIAGNOSIS — I10 ESSENTIAL HYPERTENSION: ICD-10-CM

## 2023-12-11 ENCOUNTER — TELEMEDICINE (OUTPATIENT)
Dept: PSYCHIATRY | Facility: CLINIC | Age: 68
End: 2023-12-11
Payer: MEDICARE

## 2023-12-11 DIAGNOSIS — F43.0 PANIC ATTACK AS REACTION TO STRESS: Primary | ICD-10-CM

## 2023-12-11 DIAGNOSIS — F41.0 PANIC ATTACK AS REACTION TO STRESS: Primary | ICD-10-CM

## 2023-12-11 DIAGNOSIS — F41.1 GAD (GENERALIZED ANXIETY DISORDER): ICD-10-CM

## 2023-12-11 DIAGNOSIS — F33.1 MDD (MAJOR DEPRESSIVE DISORDER), RECURRENT EPISODE, MODERATE: ICD-10-CM

## 2023-12-11 PROCEDURE — 90834 PSYTX W PT 45 MINUTES: CPT | Performed by: COUNSELOR

## 2023-12-11 PROCEDURE — 1159F MED LIST DOCD IN RCRD: CPT | Performed by: COUNSELOR

## 2023-12-11 PROCEDURE — 1160F RVW MEDS BY RX/DR IN RCRD: CPT | Performed by: COUNSELOR

## 2023-12-11 RX ORDER — LOSARTAN POTASSIUM 50 MG/1
50 TABLET ORAL DAILY
Qty: 90 TABLET | Refills: 1 | Status: SHIPPED | OUTPATIENT
Start: 2023-12-11

## 2023-12-12 ENCOUNTER — TELEPHONE (OUTPATIENT)
Dept: OBSTETRICS AND GYNECOLOGY | Facility: CLINIC | Age: 68
End: 2023-12-12

## 2023-12-12 ENCOUNTER — HOSPITAL ENCOUNTER (EMERGENCY)
Facility: HOSPITAL | Age: 68
Discharge: HOME | End: 2023-12-12
Attending: EMERGENCY MEDICINE | Admitting: EMERGENCY MEDICINE
Payer: MEDICARE

## 2023-12-12 ENCOUNTER — APPOINTMENT (OUTPATIENT)
Dept: GENERAL RADIOLOGY | Facility: HOSPITAL | Age: 68
End: 2023-12-12
Payer: MEDICARE

## 2023-12-12 VITALS
HEART RATE: 69 BPM | RESPIRATION RATE: 20 BRPM | DIASTOLIC BLOOD PRESSURE: 81 MMHG | HEIGHT: 64 IN | WEIGHT: 293 LBS | TEMPERATURE: 99.5 F | SYSTOLIC BLOOD PRESSURE: 98 MMHG | OXYGEN SATURATION: 97 % | BODY MASS INDEX: 50.02 KG/M2

## 2023-12-12 DIAGNOSIS — J40 BRONCHITIS: Primary | ICD-10-CM

## 2023-12-12 LAB
ALBUMIN SERPL-MCNC: 3.6 G/DL (ref 3.5–5.2)
ALBUMIN/GLOB SERPL: 1 G/DL
ALP SERPL-CCNC: 110 U/L (ref 39–117)
ALT SERPL W P-5'-P-CCNC: 17 U/L (ref 1–33)
ANION GAP SERPL CALCULATED.3IONS-SCNC: 9.6 MMOL/L (ref 5–15)
AST SERPL-CCNC: 21 U/L (ref 1–32)
BASOPHILS # BLD AUTO: 0.03 10*3/MM3 (ref 0–0.2)
BASOPHILS NFR BLD AUTO: 0.5 % (ref 0–1.5)
BILIRUB SERPL-MCNC: 0.2 MG/DL (ref 0–1.2)
BUN SERPL-MCNC: 8 MG/DL (ref 8–23)
BUN/CREAT SERPL: 6.7 (ref 7–25)
CALCIUM SPEC-SCNC: 9.1 MG/DL (ref 8.6–10.5)
CHLORIDE SERPL-SCNC: 98 MMOL/L (ref 98–107)
CO2 SERPL-SCNC: 26.4 MMOL/L (ref 22–29)
CREAT SERPL-MCNC: 1.2 MG/DL (ref 0.57–1)
DEPRECATED RDW RBC AUTO: 49.4 FL (ref 37–54)
EGFRCR SERPLBLD CKD-EPI 2021: 49.4 ML/MIN/1.73
EOSINOPHIL # BLD AUTO: 0.01 10*3/MM3 (ref 0–0.4)
EOSINOPHIL NFR BLD AUTO: 0.2 % (ref 0.3–6.2)
ERYTHROCYTE [DISTWIDTH] IN BLOOD BY AUTOMATED COUNT: 15.7 % (ref 12.3–15.4)
FLUAV SUBTYP SPEC NAA+PROBE: NOT DETECTED
FLUBV RNA ISLT QL NAA+PROBE: NOT DETECTED
GLOBULIN UR ELPH-MCNC: 3.5 GM/DL
GLUCOSE SERPL-MCNC: 104 MG/DL (ref 65–99)
HCT VFR BLD AUTO: 36.5 % (ref 34–46.6)
HGB BLD-MCNC: 11.7 G/DL (ref 12–15.9)
HOLD SPECIMEN: NORMAL
HOLD SPECIMEN: NORMAL
IMM GRANULOCYTES # BLD AUTO: 0.03 10*3/MM3 (ref 0–0.05)
IMM GRANULOCYTES NFR BLD AUTO: 0.5 % (ref 0–0.5)
LYMPHOCYTES # BLD AUTO: 0.68 10*3/MM3 (ref 0.7–3.1)
LYMPHOCYTES NFR BLD AUTO: 10.6 % (ref 19.6–45.3)
MCH RBC QN AUTO: 27.7 PG (ref 26.6–33)
MCHC RBC AUTO-ENTMCNC: 32.1 G/DL (ref 31.5–35.7)
MCV RBC AUTO: 86.3 FL (ref 79–97)
MONOCYTES # BLD AUTO: 1.06 10*3/MM3 (ref 0.1–0.9)
MONOCYTES NFR BLD AUTO: 16.5 % (ref 5–12)
NEUTROPHILS NFR BLD AUTO: 4.61 10*3/MM3 (ref 1.7–7)
NEUTROPHILS NFR BLD AUTO: 71.7 % (ref 42.7–76)
NRBC BLD AUTO-RTO: 0 /100 WBC (ref 0–0.2)
NT-PROBNP SERPL-MCNC: 342 PG/ML (ref 0–900)
PLATELET # BLD AUTO: 290 10*3/MM3 (ref 140–450)
PMV BLD AUTO: 9.8 FL (ref 6–12)
POTASSIUM SERPL-SCNC: 3.6 MMOL/L (ref 3.5–5.2)
PROT SERPL-MCNC: 7.1 G/DL (ref 6–8.5)
QT INTERVAL: 452 MS
QTC INTERVAL: 499 MS
RBC # BLD AUTO: 4.23 10*6/MM3 (ref 3.77–5.28)
RSV RNA NPH QL NAA+NON-PROBE: NOT DETECTED
SARS-COV-2 RNA RESP QL NAA+PROBE: NOT DETECTED
SODIUM SERPL-SCNC: 134 MMOL/L (ref 136–145)
TROPONIN T SERPL HS-MCNC: 26 NG/L
WBC NRBC COR # BLD AUTO: 6.42 10*3/MM3 (ref 3.4–10.8)
WHOLE BLOOD HOLD COAG: NORMAL
WHOLE BLOOD HOLD SPECIMEN: NORMAL

## 2023-12-12 PROCEDURE — 93005 ELECTROCARDIOGRAM TRACING: CPT | Performed by: EMERGENCY MEDICINE

## 2023-12-12 PROCEDURE — 93005 ELECTROCARDIOGRAM TRACING: CPT

## 2023-12-12 PROCEDURE — 80053 COMPREHEN METABOLIC PANEL: CPT

## 2023-12-12 PROCEDURE — 87637 SARSCOV2&INF A&B&RSV AMP PRB: CPT | Performed by: EMERGENCY MEDICINE

## 2023-12-12 PROCEDURE — 71045 X-RAY EXAM CHEST 1 VIEW: CPT

## 2023-12-12 PROCEDURE — 99284 EMERGENCY DEPT VISIT MOD MDM: CPT

## 2023-12-12 PROCEDURE — 96374 THER/PROPH/DIAG INJ IV PUSH: CPT

## 2023-12-12 PROCEDURE — 83880 ASSAY OF NATRIURETIC PEPTIDE: CPT

## 2023-12-12 PROCEDURE — 94799 UNLISTED PULMONARY SVC/PX: CPT

## 2023-12-12 PROCEDURE — 25010000002 METHYLPREDNISOLONE PER 125 MG: Performed by: EMERGENCY MEDICINE

## 2023-12-12 PROCEDURE — 94640 AIRWAY INHALATION TREATMENT: CPT

## 2023-12-12 PROCEDURE — 84484 ASSAY OF TROPONIN QUANT: CPT

## 2023-12-12 PROCEDURE — 85025 COMPLETE CBC W/AUTO DIFF WBC: CPT

## 2023-12-12 RX ORDER — SODIUM CHLORIDE 0.9 % (FLUSH) 0.9 %
10 SYRINGE (ML) INJECTION AS NEEDED
Status: DISCONTINUED | OUTPATIENT
Start: 2023-12-12 | End: 2023-12-12 | Stop reason: HOSPADM

## 2023-12-12 RX ORDER — ALBUTEROL SULFATE 90 UG/1
2 AEROSOL, METERED RESPIRATORY (INHALATION) EVERY 4 HOURS PRN
Qty: 90 G | Refills: 0 | Status: SHIPPED | OUTPATIENT
Start: 2023-12-12

## 2023-12-12 RX ORDER — ALBUTEROL SULFATE 2.5 MG/3ML
7.5 SOLUTION RESPIRATORY (INHALATION) CONTINUOUS
Status: DISCONTINUED | OUTPATIENT
Start: 2023-12-12 | End: 2023-12-12 | Stop reason: HOSPADM

## 2023-12-12 RX ORDER — AZITHROMYCIN 250 MG/1
TABLET, FILM COATED ORAL
Qty: 6 TABLET | Refills: 0 | Status: SHIPPED | OUTPATIENT
Start: 2023-12-12 | End: 2023-12-14

## 2023-12-12 RX ORDER — PREDNISONE 50 MG/1
50 TABLET ORAL DAILY
Qty: 5 TABLET | Refills: 0 | Status: SHIPPED | OUTPATIENT
Start: 2023-12-12

## 2023-12-12 RX ORDER — METHYLPREDNISOLONE SODIUM SUCCINATE 125 MG/2ML
125 INJECTION, POWDER, LYOPHILIZED, FOR SOLUTION INTRAMUSCULAR; INTRAVENOUS ONCE
Status: COMPLETED | OUTPATIENT
Start: 2023-12-12 | End: 2023-12-12

## 2023-12-12 RX ADMIN — ALBUTEROL SULFATE 7.5 MG: 2.5 SOLUTION RESPIRATORY (INHALATION) at 11:43

## 2023-12-12 RX ADMIN — METHYLPREDNISOLONE SODIUM SUCCINATE 125 MG: 125 INJECTION INTRAMUSCULAR; INTRAVENOUS at 11:26

## 2023-12-13 ENCOUNTER — TELEMEDICINE (OUTPATIENT)
Dept: PSYCHIATRY | Facility: CLINIC | Age: 68
End: 2023-12-13
Payer: MEDICARE

## 2023-12-13 DIAGNOSIS — F51.05 INSOMNIA SECONDARY TO ANXIETY: ICD-10-CM

## 2023-12-13 DIAGNOSIS — F42.4 SKIN-PICKING DISORDER: ICD-10-CM

## 2023-12-13 DIAGNOSIS — F33.9 RECURRENT MAJOR DEPRESSION RESISTANT TO TREATMENT: ICD-10-CM

## 2023-12-13 DIAGNOSIS — F41.9 INSOMNIA SECONDARY TO ANXIETY: ICD-10-CM

## 2023-12-13 DIAGNOSIS — F68.11: Primary | ICD-10-CM

## 2023-12-13 DIAGNOSIS — Z79.899 POLYPHARMACY: ICD-10-CM

## 2023-12-13 DIAGNOSIS — Z79.899 MEDICATION MANAGEMENT: ICD-10-CM

## 2023-12-13 DIAGNOSIS — F41.1 GENERALIZED ANXIETY DISORDER: ICD-10-CM

## 2023-12-13 RX ORDER — MIRTAZAPINE 30 MG/1
TABLET, FILM COATED ORAL
Qty: 135 TABLET | OUTPATIENT
Start: 2023-12-13

## 2023-12-13 RX ORDER — FLUOXETINE HYDROCHLORIDE 20 MG/1
CAPSULE ORAL
Qty: 105 CAPSULE | Refills: 0 | Status: SHIPPED | OUTPATIENT
Start: 2023-12-14 | End: 2024-01-25

## 2023-12-14 ENCOUNTER — OFFICE VISIT (OUTPATIENT)
Dept: FAMILY MEDICINE CLINIC | Age: 68
End: 2023-12-14
Payer: MEDICARE

## 2023-12-14 VITALS
OXYGEN SATURATION: 98 % | BODY MASS INDEX: 49.06 KG/M2 | HEART RATE: 65 BPM | WEIGHT: 287.4 LBS | DIASTOLIC BLOOD PRESSURE: 66 MMHG | TEMPERATURE: 98.3 F | SYSTOLIC BLOOD PRESSURE: 139 MMHG | HEIGHT: 64 IN

## 2023-12-14 DIAGNOSIS — R06.00 DYSPNEA, UNSPECIFIED TYPE: Primary | ICD-10-CM

## 2023-12-14 DIAGNOSIS — N18.30 STAGE 3 CHRONIC KIDNEY DISEASE, UNSPECIFIED WHETHER STAGE 3A OR 3B CKD: ICD-10-CM

## 2023-12-14 DIAGNOSIS — I10 ESSENTIAL HYPERTENSION: ICD-10-CM

## 2023-12-14 DIAGNOSIS — J11.1 FLU: ICD-10-CM

## 2023-12-14 DIAGNOSIS — I50.32 CHRONIC DIASTOLIC HEART FAILURE: ICD-10-CM

## 2023-12-14 DIAGNOSIS — J40 BRONCHITIS: ICD-10-CM

## 2023-12-14 DIAGNOSIS — J44.1 COPD WITH EXACERBATION: ICD-10-CM

## 2023-12-14 DIAGNOSIS — F33.9 RECURRENT MAJOR DEPRESSION RESISTANT TO TREATMENT: ICD-10-CM

## 2023-12-14 DIAGNOSIS — F41.9 INSOMNIA SECONDARY TO ANXIETY: ICD-10-CM

## 2023-12-14 DIAGNOSIS — F51.05 INSOMNIA SECONDARY TO ANXIETY: ICD-10-CM

## 2023-12-14 LAB
EXPIRATION DATE: ABNORMAL
FLUAV AG UPPER RESP QL IA.RAPID: DETECTED
FLUBV AG UPPER RESP QL IA.RAPID: NOT DETECTED
INTERNAL CONTROL: ABNORMAL
Lab: ABNORMAL
SARS-COV-2 AG UPPER RESP QL IA.RAPID: NOT DETECTED

## 2023-12-14 RX ORDER — OSELTAMIVIR PHOSPHATE 30 MG/1
30 CAPSULE ORAL EVERY 12 HOURS SCHEDULED
Qty: 10 CAPSULE | Refills: 0 | Status: SHIPPED | OUTPATIENT
Start: 2023-12-14

## 2023-12-14 RX ORDER — MIRTAZAPINE 30 MG/1
30 TABLET, FILM COATED ORAL NIGHTLY
Qty: 30 TABLET | Refills: 2 | Status: SHIPPED | OUTPATIENT
Start: 2023-12-14 | End: 2024-03-13

## 2023-12-14 RX ORDER — AZITHROMYCIN 250 MG/1
TABLET, FILM COATED ORAL
Start: 2023-12-14

## 2023-12-14 RX ORDER — DEXTROMETHORPHAN HYDROBROMIDE AND PROMETHAZINE HYDROCHLORIDE 15; 6.25 MG/5ML; MG/5ML
5 SYRUP ORAL 4 TIMES DAILY PRN
Qty: 180 ML | Refills: 0 | Status: SHIPPED | OUTPATIENT
Start: 2023-12-14

## 2023-12-14 RX ORDER — HYDRALAZINE HYDROCHLORIDE 25 MG/1
25 TABLET, FILM COATED ORAL 2 TIMES DAILY
Start: 2023-12-14

## 2023-12-21 DIAGNOSIS — I10 ESSENTIAL HYPERTENSION: ICD-10-CM

## 2023-12-21 RX ORDER — AMLODIPINE BESYLATE 5 MG/1
TABLET ORAL
Qty: 90 TABLET | Refills: 0 | Status: SHIPPED | OUTPATIENT
Start: 2023-12-21

## 2023-12-27 ENCOUNTER — TELEMEDICINE (OUTPATIENT)
Dept: PSYCHIATRY | Facility: CLINIC | Age: 68
End: 2023-12-27
Payer: MEDICARE

## 2023-12-27 ENCOUNTER — OFFICE VISIT (OUTPATIENT)
Dept: PULMONOLOGY | Facility: CLINIC | Age: 68
End: 2023-12-27
Payer: MEDICARE

## 2023-12-27 VITALS
BODY MASS INDEX: 49.34 KG/M2 | WEIGHT: 289 LBS | OXYGEN SATURATION: 100 % | SYSTOLIC BLOOD PRESSURE: 133 MMHG | RESPIRATION RATE: 18 BRPM | TEMPERATURE: 98.4 F | HEART RATE: 71 BPM | HEIGHT: 64 IN | DIASTOLIC BLOOD PRESSURE: 62 MMHG

## 2023-12-27 DIAGNOSIS — J45.30 MILD PERSISTENT ASTHMA WITHOUT COMPLICATION: ICD-10-CM

## 2023-12-27 DIAGNOSIS — G47.33 OBSTRUCTIVE SLEEP APNEA SYNDROME: ICD-10-CM

## 2023-12-27 DIAGNOSIS — Z53.21 PATIENT LEFT WITHOUT BEING SEEN: Primary | ICD-10-CM

## 2023-12-27 DIAGNOSIS — R06.02 SHORTNESS OF BREATH: Primary | ICD-10-CM

## 2023-12-27 DIAGNOSIS — J43.9 PULMONARY EMPHYSEMA, UNSPECIFIED EMPHYSEMA TYPE: ICD-10-CM

## 2023-12-27 RX ORDER — BUDESONIDE, GLYCOPYRROLATE, AND FORMOTEROL FUMARATE 160; 9; 4.8 UG/1; UG/1; UG/1
2 AEROSOL, METERED RESPIRATORY (INHALATION) 2 TIMES DAILY
Qty: 2 EACH | Refills: 0 | COMMUNITY
Start: 2023-12-27 | End: 2023-12-28

## 2024-01-03 ENCOUNTER — HOSPITAL ENCOUNTER (OUTPATIENT)
Dept: RESPIRATORY THERAPY | Facility: HOSPITAL | Age: 69
Discharge: HOME | End: 2024-01-03
Admitting: FAMILY MEDICINE
Payer: MEDICARE

## 2024-01-03 DIAGNOSIS — J44.1 COPD WITH EXACERBATION: ICD-10-CM

## 2024-01-03 DIAGNOSIS — J40 BRONCHITIS: ICD-10-CM

## 2024-01-03 PROCEDURE — 94060 EVALUATION OF WHEEZING: CPT

## 2024-01-03 PROCEDURE — 94729 DIFFUSING CAPACITY: CPT

## 2024-01-03 PROCEDURE — 94726 PLETHYSMOGRAPHY LUNG VOLUMES: CPT

## 2024-01-03 RX ORDER — ALBUTEROL SULFATE 2.5 MG/3ML
2.5 SOLUTION RESPIRATORY (INHALATION) ONCE
Status: COMPLETED | OUTPATIENT
Start: 2024-01-03 | End: 2024-01-03

## 2024-01-03 RX ADMIN — ALBUTEROL SULFATE 2.5 MG: 2.5 SOLUTION RESPIRATORY (INHALATION) at 11:21

## 2024-01-04 ENCOUNTER — TELEPHONE (OUTPATIENT)
Dept: CARDIOLOGY | Facility: CLINIC | Age: 69
End: 2024-01-04
Payer: MEDICARE

## 2024-01-04 DIAGNOSIS — R06.09 DYSPNEA ON EXERTION: ICD-10-CM

## 2024-01-04 DIAGNOSIS — I50.32 CHRONIC DIASTOLIC HEART FAILURE: ICD-10-CM

## 2024-01-04 DIAGNOSIS — M79.89 LEG SWELLING: ICD-10-CM

## 2024-01-04 DIAGNOSIS — I10 ESSENTIAL HYPERTENSION: Primary | ICD-10-CM

## 2024-01-04 RX ORDER — FUROSEMIDE 20 MG/1
TABLET ORAL
Qty: 180 TABLET | OUTPATIENT
Start: 2024-01-04

## 2024-01-04 RX ORDER — FLUTICASONE PROPIONATE AND SALMETEROL XINAFOATE 230; 21 UG/1; UG/1
2 AEROSOL, METERED RESPIRATORY (INHALATION) 2 TIMES DAILY
Qty: 36 G | OUTPATIENT
Start: 2024-01-04

## 2024-01-05 DIAGNOSIS — E11.9 TYPE 2 DIABETES MELLITUS WITHOUT COMPLICATION, WITHOUT LONG-TERM CURRENT USE OF INSULIN: ICD-10-CM

## 2024-01-05 DIAGNOSIS — E66.01 OBESITY, MORBID, BMI 40.0-49.9: ICD-10-CM

## 2024-01-05 RX ORDER — POTASSIUM CHLORIDE 750 MG/1
10 CAPSULE, EXTENDED RELEASE ORAL DAILY
Qty: 90 CAPSULE | Refills: 0 | OUTPATIENT
Start: 2024-01-05

## 2024-01-05 RX ORDER — SPIRONOLACTONE 25 MG/1
25 TABLET ORAL DAILY
Qty: 90 TABLET | Refills: 1 | Status: SHIPPED | OUTPATIENT
Start: 2024-01-05

## 2024-01-07 RX ORDER — DULAGLUTIDE 3 MG/.5ML
3 INJECTION, SOLUTION SUBCUTANEOUS WEEKLY
Qty: 2 ML | Refills: 2 | Status: SHIPPED | OUTPATIENT
Start: 2024-01-07

## 2024-01-08 ENCOUNTER — TELEPHONE (OUTPATIENT)
Dept: FAMILY MEDICINE CLINIC | Age: 69
End: 2024-01-08
Payer: MEDICARE

## 2024-01-09 ENCOUNTER — TELEPHONE (OUTPATIENT)
Dept: NEUROLOGY | Facility: CLINIC | Age: 69
End: 2024-01-09

## 2024-01-09 ENCOUNTER — PRIOR AUTHORIZATION (OUTPATIENT)
Dept: FAMILY MEDICINE CLINIC | Age: 69
End: 2024-01-09
Payer: MEDICARE

## 2024-01-11 ENCOUNTER — OFFICE VISIT (OUTPATIENT)
Dept: PULMONOLOGY | Facility: CLINIC | Age: 69
End: 2024-01-11
Payer: MEDICARE

## 2024-01-11 VITALS
TEMPERATURE: 98.6 F | OXYGEN SATURATION: 100 % | BODY MASS INDEX: 50.02 KG/M2 | WEIGHT: 293 LBS | RESPIRATION RATE: 20 BRPM | DIASTOLIC BLOOD PRESSURE: 65 MMHG | HEART RATE: 61 BPM | SYSTOLIC BLOOD PRESSURE: 140 MMHG | HEIGHT: 64 IN

## 2024-01-11 DIAGNOSIS — E66.01 MORBID OBESITY WITH BMI OF 45.0-49.9, ADULT: ICD-10-CM

## 2024-01-11 DIAGNOSIS — F17.201 TOBACCO ABUSE, IN REMISSION: ICD-10-CM

## 2024-01-11 DIAGNOSIS — G47.33 OSA (OBSTRUCTIVE SLEEP APNEA): ICD-10-CM

## 2024-01-11 DIAGNOSIS — J30.9 ALLERGIC RHINITIS, UNSPECIFIED SEASONALITY, UNSPECIFIED TRIGGER: ICD-10-CM

## 2024-01-11 DIAGNOSIS — Z23 ENCOUNTER FOR IMMUNIZATION: ICD-10-CM

## 2024-01-11 DIAGNOSIS — F41.9 ANXIETY: ICD-10-CM

## 2024-01-11 DIAGNOSIS — J43.9 PULMONARY EMPHYSEMA, UNSPECIFIED EMPHYSEMA TYPE: ICD-10-CM

## 2024-01-11 DIAGNOSIS — J44.9 CHRONIC OBSTRUCTIVE PULMONARY DISEASE, UNSPECIFIED COPD TYPE: Primary | ICD-10-CM

## 2024-01-11 DIAGNOSIS — R06.09 DYSPNEA ON EXERTION: ICD-10-CM

## 2024-01-11 DIAGNOSIS — G47.34 NOCTURNAL HYPOXIA: ICD-10-CM

## 2024-01-11 DIAGNOSIS — I50.32 CHRONIC DIASTOLIC HEART FAILURE: ICD-10-CM

## 2024-01-11 DIAGNOSIS — J30.2 SEASONAL ALLERGIES: ICD-10-CM

## 2024-01-11 DIAGNOSIS — R06.2 WHEEZING: ICD-10-CM

## 2024-01-11 DIAGNOSIS — J45.30 MILD PERSISTENT ASTHMA WITHOUT COMPLICATION: ICD-10-CM

## 2024-01-11 RX ORDER — PREDNISONE 20 MG/1
40 TABLET ORAL DAILY
Qty: 10 TABLET | Refills: 0 | Status: SHIPPED | OUTPATIENT
Start: 2024-01-11 | End: 2024-01-16

## 2024-01-15 ENCOUNTER — TELEPHONE (OUTPATIENT)
Dept: BEHAVIORAL HEALTH | Facility: CLINIC | Age: 69
End: 2024-01-15
Payer: MEDICARE

## 2024-01-15 RX ORDER — FUROSEMIDE 20 MG/1
TABLET ORAL
Qty: 270 TABLET | Refills: 1 | OUTPATIENT
Start: 2024-01-15

## 2024-01-17 ENCOUNTER — LAB (OUTPATIENT)
Dept: LAB | Facility: HOSPITAL | Age: 69
End: 2024-01-17
Payer: MEDICARE

## 2024-01-17 ENCOUNTER — OFFICE VISIT (OUTPATIENT)
Dept: FAMILY MEDICINE CLINIC | Age: 69
End: 2024-01-17
Payer: MEDICARE

## 2024-01-17 VITALS
SYSTOLIC BLOOD PRESSURE: 116 MMHG | BODY MASS INDEX: 50.02 KG/M2 | HEART RATE: 58 BPM | OXYGEN SATURATION: 99 % | WEIGHT: 293 LBS | DIASTOLIC BLOOD PRESSURE: 69 MMHG | TEMPERATURE: 98.2 F | HEIGHT: 64 IN

## 2024-01-17 DIAGNOSIS — I50.32 CHRONIC DIASTOLIC HEART FAILURE: ICD-10-CM

## 2024-01-17 DIAGNOSIS — I10 ESSENTIAL HYPERTENSION: ICD-10-CM

## 2024-01-17 DIAGNOSIS — M47.816 LUMBAR SPONDYLOSIS: ICD-10-CM

## 2024-01-17 DIAGNOSIS — E66.01 CLASS 3 SEVERE OBESITY DUE TO EXCESS CALORIES WITH SERIOUS COMORBIDITY AND BODY MASS INDEX (BMI) OF 50.0 TO 59.9 IN ADULT: ICD-10-CM

## 2024-01-17 DIAGNOSIS — E11.9 TYPE 2 DIABETES MELLITUS WITHOUT COMPLICATION, WITHOUT LONG-TERM CURRENT USE OF INSULIN: ICD-10-CM

## 2024-01-17 DIAGNOSIS — Z23 IMMUNIZATION DUE: Primary | ICD-10-CM

## 2024-01-17 LAB
ANION GAP SERPL CALCULATED.3IONS-SCNC: 12 MMOL/L (ref 5–15)
BUN SERPL-MCNC: 17 MG/DL (ref 8–23)
BUN/CREAT SERPL: 14.7 (ref 7–25)
CALCIUM SPEC-SCNC: 9.2 MG/DL (ref 8.6–10.5)
CHLORIDE SERPL-SCNC: 101 MMOL/L (ref 98–107)
CO2 SERPL-SCNC: 31 MMOL/L (ref 22–29)
CREAT SERPL-MCNC: 1.16 MG/DL (ref 0.57–1)
EGFRCR SERPLBLD CKD-EPI 2021: 51.5 ML/MIN/1.73
EXPIRATION DATE: ABNORMAL
GLUCOSE SERPL-MCNC: 89 MG/DL (ref 65–99)
HBA1C MFR BLD: 6.5 % (ref 4.5–5.7)
Lab: ABNORMAL
NT-PROBNP SERPL-MCNC: 276 PG/ML (ref 0–900)
POTASSIUM SERPL-SCNC: 3.6 MMOL/L (ref 3.5–5.2)
SODIUM SERPL-SCNC: 144 MMOL/L (ref 136–145)

## 2024-01-17 PROCEDURE — 36415 COLL VENOUS BLD VENIPUNCTURE: CPT

## 2024-01-17 PROCEDURE — 80048 BASIC METABOLIC PNL TOTAL CA: CPT

## 2024-01-17 PROCEDURE — 83880 ASSAY OF NATRIURETIC PEPTIDE: CPT

## 2024-01-17 RX ORDER — PREGABALIN 75 MG/1
75 CAPSULE ORAL 2 TIMES DAILY
Qty: 60 CAPSULE | Refills: 2 | Status: SHIPPED | OUTPATIENT
Start: 2024-01-17

## 2024-01-17 RX ADMIN — CYANOCOBALAMIN 1000 MCG: 1000 INJECTION, SOLUTION INTRAMUSCULAR; SUBCUTANEOUS at 10:52

## 2024-01-18 ENCOUNTER — TELEMEDICINE (OUTPATIENT)
Dept: BEHAVIORAL HEALTH | Facility: CLINIC | Age: 69
End: 2024-01-18
Payer: MEDICARE

## 2024-01-18 DIAGNOSIS — F51.05 INSOMNIA SECONDARY TO ANXIETY: ICD-10-CM

## 2024-01-18 DIAGNOSIS — Z79.899 POLYPHARMACY: ICD-10-CM

## 2024-01-18 DIAGNOSIS — F41.9 INSOMNIA SECONDARY TO ANXIETY: ICD-10-CM

## 2024-01-18 DIAGNOSIS — Z79.899 MEDICATION MANAGEMENT: ICD-10-CM

## 2024-01-18 DIAGNOSIS — F41.1 GENERALIZED ANXIETY DISORDER: Primary | ICD-10-CM

## 2024-01-18 DIAGNOSIS — F33.9 RECURRENT MAJOR DEPRESSION RESISTANT TO TREATMENT: ICD-10-CM

## 2024-01-18 DIAGNOSIS — F68.13: ICD-10-CM

## 2024-01-18 RX ORDER — MIRTAZAPINE 30 MG/1
15 TABLET, FILM COATED ORAL NIGHTLY
Start: 2024-01-18 | End: 2024-02-17

## 2024-01-22 ENCOUNTER — TELEPHONE (OUTPATIENT)
Dept: BEHAVIORAL HEALTH | Facility: CLINIC | Age: 69
End: 2024-01-22
Payer: MEDICARE

## 2024-01-22 DIAGNOSIS — F33.1 MAJOR DEPRESSIVE DISORDER, RECURRENT EPISODE, MODERATE: Primary | ICD-10-CM

## 2024-01-23 ENCOUNTER — TELEPHONE (OUTPATIENT)
Dept: FAMILY MEDICINE CLINIC | Age: 69
End: 2024-01-23
Payer: MEDICARE

## 2024-01-23 ENCOUNTER — OFFICE VISIT (OUTPATIENT)
Dept: FAMILY MEDICINE CLINIC | Age: 69
End: 2024-01-23
Payer: MEDICARE

## 2024-01-23 VITALS
BODY MASS INDEX: 50.02 KG/M2 | DIASTOLIC BLOOD PRESSURE: 80 MMHG | TEMPERATURE: 98 F | SYSTOLIC BLOOD PRESSURE: 139 MMHG | OXYGEN SATURATION: 98 % | HEIGHT: 64 IN | WEIGHT: 293 LBS | HEART RATE: 65 BPM

## 2024-01-23 DIAGNOSIS — R94.31 QT PROLONGATION: Primary | ICD-10-CM

## 2024-01-23 DIAGNOSIS — R06.02 SHORTNESS OF BREATH: ICD-10-CM

## 2024-01-23 DIAGNOSIS — E55.9 VITAMIN D DEFICIENCY: ICD-10-CM

## 2024-01-23 DIAGNOSIS — E53.8 B12 DEFICIENCY: Primary | ICD-10-CM

## 2024-01-23 DIAGNOSIS — E87.6 HYPOKALEMIA: ICD-10-CM

## 2024-01-23 DIAGNOSIS — E66.01 CLASS 3 SEVERE OBESITY DUE TO EXCESS CALORIES WITH SERIOUS COMORBIDITY AND BODY MASS INDEX (BMI) OF 50.0 TO 59.9 IN ADULT: ICD-10-CM

## 2024-01-23 DIAGNOSIS — N18.30 STAGE 3 CHRONIC KIDNEY DISEASE, UNSPECIFIED WHETHER STAGE 3A OR 3B CKD: ICD-10-CM

## 2024-01-23 DIAGNOSIS — F41.1 GENERALIZED ANXIETY DISORDER: ICD-10-CM

## 2024-01-23 PROCEDURE — 3044F HG A1C LEVEL LT 7.0%: CPT | Performed by: PHYSICIAN ASSISTANT

## 2024-01-23 PROCEDURE — 3075F SYST BP GE 130 - 139MM HG: CPT | Performed by: PHYSICIAN ASSISTANT

## 2024-01-23 PROCEDURE — 1159F MED LIST DOCD IN RCRD: CPT | Performed by: PHYSICIAN ASSISTANT

## 2024-01-23 PROCEDURE — 99214 OFFICE O/P EST MOD 30 MIN: CPT | Performed by: PHYSICIAN ASSISTANT

## 2024-01-23 PROCEDURE — 93000 ELECTROCARDIOGRAM COMPLETE: CPT | Performed by: PHYSICIAN ASSISTANT

## 2024-01-23 PROCEDURE — 3079F DIAST BP 80-89 MM HG: CPT | Performed by: PHYSICIAN ASSISTANT

## 2024-01-23 PROCEDURE — 1160F RVW MEDS BY RX/DR IN RCRD: CPT | Performed by: PHYSICIAN ASSISTANT

## 2024-01-24 DIAGNOSIS — K21.9 GASTROESOPHAGEAL REFLUX DISEASE, UNSPECIFIED WHETHER ESOPHAGITIS PRESENT: ICD-10-CM

## 2024-01-24 DIAGNOSIS — I10 ESSENTIAL HYPERTENSION: ICD-10-CM

## 2024-01-24 DIAGNOSIS — R00.2 HEART PALPITATIONS: ICD-10-CM

## 2024-01-24 DIAGNOSIS — G25.81 RESTLESS LEG SYNDROME: ICD-10-CM

## 2024-01-24 DIAGNOSIS — F41.1 GENERALIZED ANXIETY DISORDER: ICD-10-CM

## 2024-01-24 DIAGNOSIS — R44.2: ICD-10-CM

## 2024-01-24 DIAGNOSIS — F42.4 SKIN-PICKING DISORDER: ICD-10-CM

## 2024-01-24 DIAGNOSIS — S00.01XD: ICD-10-CM

## 2024-01-24 DIAGNOSIS — F33.9 RECURRENT MAJOR DEPRESSION RESISTANT TO TREATMENT: Chronic | ICD-10-CM

## 2024-01-24 RX ORDER — OLANZAPINE 5 MG/1
TABLET ORAL
Qty: 21 TABLET | Refills: 0 | OUTPATIENT
Start: 2024-01-24

## 2024-01-24 RX ORDER — METOPROLOL SUCCINATE 25 MG/1
25 TABLET, EXTENDED RELEASE ORAL DAILY
Qty: 90 TABLET | Refills: 3 | Status: SHIPPED | OUTPATIENT
Start: 2024-01-24

## 2024-01-24 RX ORDER — BUSPIRONE HYDROCHLORIDE 15 MG/1
30 TABLET ORAL 2 TIMES DAILY
Qty: 120 TABLET | Refills: 2 | OUTPATIENT
Start: 2024-01-24

## 2024-01-24 RX ORDER — BUSPIRONE HYDROCHLORIDE 10 MG/1
20 TABLET ORAL 3 TIMES DAILY
Qty: 180 TABLET | Refills: 2 | OUTPATIENT
Start: 2024-01-24

## 2024-01-24 RX ORDER — FLUOXETINE HYDROCHLORIDE 40 MG/1
80 CAPSULE ORAL EVERY MORNING
Qty: 60 CAPSULE | Refills: 2 | OUTPATIENT
Start: 2024-01-24

## 2024-01-25 RX ORDER — AMLODIPINE BESYLATE 5 MG/1
TABLET ORAL
Qty: 90 TABLET | Refills: 0 | OUTPATIENT
Start: 2024-01-25

## 2024-01-25 RX ORDER — PRAMIPEXOLE DIHYDROCHLORIDE 0.5 MG/1
TABLET ORAL
Qty: 270 TABLET | Refills: 0 | Status: SHIPPED | OUTPATIENT
Start: 2024-01-25

## 2024-01-25 RX ORDER — PANTOPRAZOLE SODIUM 40 MG/1
40 TABLET, DELAYED RELEASE ORAL DAILY
Qty: 90 TABLET | Refills: 0 | Status: SHIPPED | OUTPATIENT
Start: 2024-01-25

## 2024-01-27 DIAGNOSIS — K21.9 GASTROESOPHAGEAL REFLUX DISEASE, UNSPECIFIED WHETHER ESOPHAGITIS PRESENT: ICD-10-CM

## 2024-01-29 RX ORDER — PANTOPRAZOLE SODIUM 40 MG/1
40 TABLET, DELAYED RELEASE ORAL DAILY
Qty: 90 TABLET | Refills: 0 | OUTPATIENT
Start: 2024-01-29

## 2024-01-30 ENCOUNTER — OFFICE VISIT (OUTPATIENT)
Dept: CARDIOLOGY | Facility: CLINIC | Age: 69
End: 2024-01-30
Payer: MEDICARE

## 2024-01-30 ENCOUNTER — LAB (OUTPATIENT)
Dept: LAB | Facility: HOSPITAL | Age: 69
End: 2024-01-30
Payer: MEDICARE

## 2024-01-30 VITALS
WEIGHT: 293 LBS | HEIGHT: 64 IN | BODY MASS INDEX: 50.02 KG/M2 | HEART RATE: 67 BPM | SYSTOLIC BLOOD PRESSURE: 151 MMHG | DIASTOLIC BLOOD PRESSURE: 69 MMHG

## 2024-01-30 DIAGNOSIS — I25.10 CORONARY ARTERY CALCIFICATION SEEN ON CT SCAN: ICD-10-CM

## 2024-01-30 DIAGNOSIS — R00.2 PALPITATIONS: ICD-10-CM

## 2024-01-30 DIAGNOSIS — I50.32 CHRONIC DIASTOLIC HEART FAILURE: ICD-10-CM

## 2024-01-30 DIAGNOSIS — N18.30 STAGE 3 CHRONIC KIDNEY DISEASE, UNSPECIFIED WHETHER STAGE 3A OR 3B CKD: ICD-10-CM

## 2024-01-30 DIAGNOSIS — E53.8 B12 DEFICIENCY: ICD-10-CM

## 2024-01-30 DIAGNOSIS — E87.6 HYPOKALEMIA: ICD-10-CM

## 2024-01-30 DIAGNOSIS — I10 ESSENTIAL HYPERTENSION: Primary | ICD-10-CM

## 2024-01-30 DIAGNOSIS — E55.9 VITAMIN D DEFICIENCY: ICD-10-CM

## 2024-01-30 DIAGNOSIS — I10 ESSENTIAL HYPERTENSION: ICD-10-CM

## 2024-01-30 LAB
25(OH)D3 SERPL-MCNC: 28.8 NG/ML (ref 30–100)
ANION GAP SERPL CALCULATED.3IONS-SCNC: 11.3 MMOL/L (ref 5–15)
BUN SERPL-MCNC: 18 MG/DL (ref 8–23)
BUN/CREAT SERPL: 17.8 (ref 7–25)
CALCIUM SPEC-SCNC: 9.5 MG/DL (ref 8.6–10.5)
CHLORIDE SERPL-SCNC: 102 MMOL/L (ref 98–107)
CO2 SERPL-SCNC: 30.7 MMOL/L (ref 22–29)
CREAT SERPL-MCNC: 1.01 MG/DL (ref 0.57–1)
EGFRCR SERPLBLD CKD-EPI 2021: 60.8 ML/MIN/1.73
GLUCOSE SERPL-MCNC: 90 MG/DL (ref 65–99)
IRON 24H UR-MRATE: 54 MCG/DL (ref 37–145)
IRON SATN MFR SERPL: 12 % (ref 20–50)
MAGNESIUM SERPL-MCNC: 2.4 MG/DL (ref 1.6–2.4)
NT-PROBNP SERPL-MCNC: 95 PG/ML (ref 0–900)
POTASSIUM SERPL-SCNC: 4 MMOL/L (ref 3.5–5.2)
SODIUM SERPL-SCNC: 144 MMOL/L (ref 136–145)
TIBC SERPL-MCNC: 437 MCG/DL (ref 298–536)
TRANSFERRIN SERPL-MCNC: 293 MG/DL (ref 200–360)
VIT B12 BLD-MCNC: 693 PG/ML (ref 211–946)

## 2024-01-30 PROCEDURE — 83880 ASSAY OF NATRIURETIC PEPTIDE: CPT

## 2024-01-30 PROCEDURE — 84466 ASSAY OF TRANSFERRIN: CPT

## 2024-01-30 PROCEDURE — 83735 ASSAY OF MAGNESIUM: CPT

## 2024-01-30 PROCEDURE — 36415 COLL VENOUS BLD VENIPUNCTURE: CPT

## 2024-01-30 PROCEDURE — 82607 VITAMIN B-12: CPT

## 2024-01-30 PROCEDURE — 82306 VITAMIN D 25 HYDROXY: CPT

## 2024-01-30 PROCEDURE — 83540 ASSAY OF IRON: CPT

## 2024-01-30 PROCEDURE — 80048 BASIC METABOLIC PNL TOTAL CA: CPT

## 2024-01-30 RX ORDER — DULAGLUTIDE 3 MG/.5ML
INJECTION, SOLUTION SUBCUTANEOUS
COMMUNITY
Start: 2024-01-24

## 2024-01-31 ENCOUNTER — TELEMEDICINE (OUTPATIENT)
Dept: PSYCHIATRY | Facility: CLINIC | Age: 69
End: 2024-01-31
Payer: MEDICARE

## 2024-01-31 ENCOUNTER — TELEPHONE (OUTPATIENT)
Dept: CARDIOLOGY | Facility: CLINIC | Age: 69
End: 2024-01-31
Payer: MEDICARE

## 2024-01-31 DIAGNOSIS — F41.9 INSOMNIA SECONDARY TO ANXIETY: ICD-10-CM

## 2024-01-31 DIAGNOSIS — F41.1 GENERALIZED ANXIETY DISORDER: ICD-10-CM

## 2024-01-31 DIAGNOSIS — F51.05 INSOMNIA SECONDARY TO ANXIETY: ICD-10-CM

## 2024-01-31 DIAGNOSIS — F33.9 RECURRENT MAJOR DEPRESSION RESISTANT TO TREATMENT: Primary | Chronic | ICD-10-CM

## 2024-01-31 PROCEDURE — 99214 OFFICE O/P EST MOD 30 MIN: CPT | Performed by: NURSE PRACTITIONER

## 2024-01-31 PROCEDURE — 1160F RVW MEDS BY RX/DR IN RCRD: CPT | Performed by: NURSE PRACTITIONER

## 2024-01-31 PROCEDURE — 1159F MED LIST DOCD IN RCRD: CPT | Performed by: NURSE PRACTITIONER

## 2024-01-31 RX ORDER — MIRTAZAPINE 15 MG/1
15 TABLET, FILM COATED ORAL NIGHTLY
Qty: 30 TABLET | Refills: 1 | Status: SHIPPED | OUTPATIENT
Start: 2024-01-31 | End: 2024-03-31

## 2024-01-31 RX ORDER — OXYCODONE AND ACETAMINOPHEN 7.5; 325 MG/1; MG/1
TABLET ORAL
COMMUNITY
Start: 2024-01-30

## 2024-01-31 RX ORDER — BUSPIRONE HYDROCHLORIDE 10 MG/1
20 TABLET ORAL 3 TIMES DAILY
Qty: 180 TABLET | Refills: 2 | Status: SHIPPED | OUTPATIENT
Start: 2024-01-31 | End: 2024-04-30

## 2024-02-01 RX ORDER — PROPRANOLOL HYDROCHLORIDE 10 MG/1
10 TABLET ORAL 2 TIMES DAILY
Qty: 180 TABLET | Refills: 3 | OUTPATIENT
Start: 2024-02-01

## 2024-02-06 ENCOUNTER — TELEPHONE (OUTPATIENT)
Dept: FAMILY MEDICINE CLINIC | Age: 69
End: 2024-02-06
Payer: MEDICARE

## 2024-02-12 ENCOUNTER — TELEPHONE (OUTPATIENT)
Dept: FAMILY MEDICINE CLINIC | Age: 69
End: 2024-02-12
Payer: MEDICARE

## 2024-02-12 DIAGNOSIS — F41.1 GENERALIZED ANXIETY DISORDER: ICD-10-CM

## 2024-02-12 DIAGNOSIS — F42.4 SKIN-PICKING DISORDER: ICD-10-CM

## 2024-02-12 DIAGNOSIS — F33.9 RECURRENT MAJOR DEPRESSION RESISTANT TO TREATMENT: ICD-10-CM

## 2024-02-12 RX ORDER — BUSPIRONE HYDROCHLORIDE 10 MG/1
20 TABLET ORAL 3 TIMES DAILY
Qty: 180 TABLET | Refills: 2 | OUTPATIENT
Start: 2024-02-12

## 2024-02-12 RX ORDER — FLUOXETINE HYDROCHLORIDE 40 MG/1
80 CAPSULE ORAL EVERY MORNING
Qty: 60 CAPSULE | Refills: 2 | OUTPATIENT
Start: 2024-02-12

## 2024-02-13 DIAGNOSIS — R29.898 UPPER EXTREMITY WEAKNESS: Primary | ICD-10-CM

## 2024-02-14 DIAGNOSIS — J45.909 ASTHMA, UNSPECIFIED ASTHMA SEVERITY, UNSPECIFIED WHETHER COMPLICATED, UNSPECIFIED WHETHER PERSISTENT: ICD-10-CM

## 2024-02-14 DIAGNOSIS — L29.9 ITCHING: ICD-10-CM

## 2024-02-14 DIAGNOSIS — R06.09 DYSPNEA ON EXERTION: ICD-10-CM

## 2024-02-14 DIAGNOSIS — F41.1 GENERALIZED ANXIETY DISORDER: ICD-10-CM

## 2024-02-14 DIAGNOSIS — J45.901 EXACERBATION OF ASTHMA, UNSPECIFIED ASTHMA SEVERITY, UNSPECIFIED WHETHER PERSISTENT: ICD-10-CM

## 2024-02-15 DIAGNOSIS — I10 ESSENTIAL HYPERTENSION: ICD-10-CM

## 2024-02-15 DIAGNOSIS — G25.81 RESTLESS LEG SYNDROME: ICD-10-CM

## 2024-02-15 DIAGNOSIS — R00.2 HEART PALPITATIONS: ICD-10-CM

## 2024-02-15 RX ORDER — HYDROXYZINE PAMOATE 50 MG/1
CAPSULE ORAL
Qty: 360 CAPSULE | OUTPATIENT
Start: 2024-02-15

## 2024-02-15 RX ORDER — METOPROLOL SUCCINATE 25 MG/1
25 TABLET, EXTENDED RELEASE ORAL DAILY
Qty: 90 TABLET | Refills: 3 | Status: SHIPPED | OUTPATIENT
Start: 2024-02-15

## 2024-02-15 RX ORDER — PRAMIPEXOLE DIHYDROCHLORIDE 0.5 MG/1
TABLET ORAL
Qty: 270 TABLET | Refills: 0 | OUTPATIENT
Start: 2024-02-15

## 2024-02-15 RX ORDER — PREDNISONE 10 MG/1
TABLET ORAL
Qty: 42 TABLET | Refills: 0 | OUTPATIENT
Start: 2024-02-15

## 2024-02-16 ENCOUNTER — OFFICE VISIT (OUTPATIENT)
Dept: PULMONOLOGY | Facility: CLINIC | Age: 69
End: 2024-02-16
Payer: MEDICARE

## 2024-02-16 ENCOUNTER — TELEPHONE (OUTPATIENT)
Dept: CARDIOLOGY | Facility: CLINIC | Age: 69
End: 2024-02-16
Payer: MEDICARE

## 2024-02-16 VITALS
WEIGHT: 293 LBS | DIASTOLIC BLOOD PRESSURE: 70 MMHG | SYSTOLIC BLOOD PRESSURE: 121 MMHG | BODY MASS INDEX: 50.02 KG/M2 | HEART RATE: 62 BPM | HEIGHT: 64 IN | TEMPERATURE: 98 F | RESPIRATION RATE: 18 BRPM | OXYGEN SATURATION: 99 %

## 2024-02-16 DIAGNOSIS — J44.9 CHRONIC OBSTRUCTIVE PULMONARY DISEASE, UNSPECIFIED COPD TYPE: ICD-10-CM

## 2024-02-16 DIAGNOSIS — J43.9 PULMONARY EMPHYSEMA, UNSPECIFIED EMPHYSEMA TYPE: ICD-10-CM

## 2024-02-16 DIAGNOSIS — I50.32 CHRONIC DIASTOLIC HEART FAILURE: ICD-10-CM

## 2024-02-16 DIAGNOSIS — F17.201 TOBACCO ABUSE, IN REMISSION: ICD-10-CM

## 2024-02-16 DIAGNOSIS — J30.2 SEASONAL ALLERGIES: ICD-10-CM

## 2024-02-16 DIAGNOSIS — R06.09 DYSPNEA ON EXERTION: ICD-10-CM

## 2024-02-16 DIAGNOSIS — F41.9 ANXIETY: ICD-10-CM

## 2024-02-16 DIAGNOSIS — E66.01 MORBID OBESITY WITH BMI OF 45.0-49.9, ADULT: ICD-10-CM

## 2024-02-16 DIAGNOSIS — G47.34 NOCTURNAL HYPOXIA: ICD-10-CM

## 2024-02-16 DIAGNOSIS — G47.33 OSA (OBSTRUCTIVE SLEEP APNEA): ICD-10-CM

## 2024-02-16 DIAGNOSIS — J45.909 ASTHMA, UNSPECIFIED ASTHMA SEVERITY, UNSPECIFIED WHETHER COMPLICATED, UNSPECIFIED WHETHER PERSISTENT: Primary | ICD-10-CM

## 2024-02-16 DIAGNOSIS — J30.9 ALLERGIC RHINITIS, UNSPECIFIED SEASONALITY, UNSPECIFIED TRIGGER: ICD-10-CM

## 2024-02-16 RX ORDER — LANCETS 33 GAUGE
EACH MISCELLANEOUS
COMMUNITY
Start: 2024-02-14

## 2024-02-16 RX ORDER — PREDNISONE 10 MG/1
TABLET ORAL
Qty: 31 TABLET | Refills: 0 | Status: SHIPPED | OUTPATIENT
Start: 2024-02-16

## 2024-02-16 RX ORDER — HYDROXYZINE PAMOATE 100 MG
CAPSULE ORAL
COMMUNITY
Start: 2024-02-14 | End: 2024-02-19

## 2024-02-16 RX ORDER — BUDESONIDE, GLYCOPYRROLATE, AND FORMOTEROL FUMARATE 160; 9; 4.8 UG/1; UG/1; UG/1
2 AEROSOL, METERED RESPIRATORY (INHALATION) 2 TIMES DAILY
Qty: 2 EACH | Refills: 0 | COMMUNITY
Start: 2024-02-16 | End: 2024-02-17

## 2024-02-19 ENCOUNTER — OFFICE VISIT (OUTPATIENT)
Dept: FAMILY MEDICINE CLINIC | Age: 69
End: 2024-02-19
Payer: MEDICARE

## 2024-02-19 VITALS
SYSTOLIC BLOOD PRESSURE: 154 MMHG | HEIGHT: 64 IN | DIASTOLIC BLOOD PRESSURE: 79 MMHG | WEIGHT: 293 LBS | BODY MASS INDEX: 50.02 KG/M2 | OXYGEN SATURATION: 98 % | HEART RATE: 60 BPM

## 2024-02-19 DIAGNOSIS — E11.9 TYPE 2 DIABETES MELLITUS WITHOUT COMPLICATION, WITHOUT LONG-TERM CURRENT USE OF INSULIN: ICD-10-CM

## 2024-02-19 DIAGNOSIS — E66.01 CLASS 3 SEVERE OBESITY WITH SERIOUS COMORBIDITY AND BODY MASS INDEX (BMI) OF 50.0 TO 59.9 IN ADULT, UNSPECIFIED OBESITY TYPE: ICD-10-CM

## 2024-02-19 DIAGNOSIS — G47.30 SLEEP APNEA, UNSPECIFIED TYPE: ICD-10-CM

## 2024-02-19 DIAGNOSIS — I10 ESSENTIAL HYPERTENSION: Primary | ICD-10-CM

## 2024-02-19 DIAGNOSIS — F41.1 GENERALIZED ANXIETY DISORDER: ICD-10-CM

## 2024-02-19 DIAGNOSIS — F32.A DEPRESSIVE DISORDER: ICD-10-CM

## 2024-02-19 DIAGNOSIS — F33.1 MAJOR DEPRESSIVE DISORDER, RECURRENT EPISODE, MODERATE: ICD-10-CM

## 2024-02-19 PROCEDURE — 82043 UR ALBUMIN QUANTITATIVE: CPT | Performed by: FAMILY MEDICINE

## 2024-02-19 RX ORDER — PRIMIDONE 50 MG/1
50 TABLET ORAL
COMMUNITY
Start: 2024-02-16

## 2024-02-19 RX ORDER — CARIPRAZINE 1.5 MG/1
1.5 CAPSULE, GELATIN COATED ORAL DAILY
Qty: 30 CAPSULE | Refills: 0 | Status: SHIPPED | OUTPATIENT
Start: 2024-02-19

## 2024-02-19 RX ORDER — HYDRALAZINE HYDROCHLORIDE 10 MG/1
10 TABLET, FILM COATED ORAL 3 TIMES DAILY
Qty: 90 TABLET | Refills: 2 | Status: SHIPPED | OUTPATIENT
Start: 2024-02-19

## 2024-02-19 RX ADMIN — CYANOCOBALAMIN 1000 MCG: 1000 INJECTION, SOLUTION INTRAMUSCULAR; SUBCUTANEOUS at 11:12

## 2024-02-20 LAB — ALBUMIN UR-MCNC: <1.2 MG/DL

## 2024-02-21 ENCOUNTER — TRANSCRIBE ORDERS (OUTPATIENT)
Dept: ADMINISTRATIVE | Facility: HOSPITAL | Age: 69
End: 2024-02-21
Payer: MEDICARE

## 2024-02-21 ENCOUNTER — LAB (OUTPATIENT)
Dept: LAB | Facility: HOSPITAL | Age: 69
End: 2024-02-21
Payer: MEDICARE

## 2024-02-21 DIAGNOSIS — R25.1 TREMORS OF NERVOUS SYSTEM: Primary | ICD-10-CM

## 2024-02-21 DIAGNOSIS — R25.1 TREMORS OF NERVOUS SYSTEM: ICD-10-CM

## 2024-02-21 LAB
FERRITIN SERPL-MCNC: 25.2 NG/ML (ref 13–150)
TSH SERPL DL<=0.05 MIU/L-ACNC: 0.77 UIU/ML (ref 0.27–4.2)

## 2024-02-21 PROCEDURE — 84443 ASSAY THYROID STIM HORMONE: CPT

## 2024-02-21 PROCEDURE — 82728 ASSAY OF FERRITIN: CPT

## 2024-02-21 PROCEDURE — 36415 COLL VENOUS BLD VENIPUNCTURE: CPT

## 2024-03-04 ENCOUNTER — OFFICE VISIT (OUTPATIENT)
Dept: FAMILY MEDICINE CLINIC | Age: 69
End: 2024-03-04
Payer: MEDICARE

## 2024-03-04 ENCOUNTER — HOSPITAL ENCOUNTER (OUTPATIENT)
Dept: GENERAL RADIOLOGY | Facility: HOSPITAL | Age: 69
Discharge: HOME | End: 2024-03-04
Payer: MEDICARE

## 2024-03-04 ENCOUNTER — LAB (OUTPATIENT)
Dept: LAB | Facility: HOSPITAL | Age: 69
End: 2024-03-04
Payer: MEDICARE

## 2024-03-04 VITALS
HEART RATE: 70 BPM | SYSTOLIC BLOOD PRESSURE: 141 MMHG | WEIGHT: 293 LBS | DIASTOLIC BLOOD PRESSURE: 90 MMHG | TEMPERATURE: 98 F | OXYGEN SATURATION: 98 % | HEIGHT: 64 IN | BODY MASS INDEX: 50.02 KG/M2

## 2024-03-04 DIAGNOSIS — J02.9 SORE THROAT: Primary | ICD-10-CM

## 2024-03-04 DIAGNOSIS — M25.562 ACUTE PAIN OF LEFT KNEE: ICD-10-CM

## 2024-03-04 DIAGNOSIS — I50.32 CHRONIC DIASTOLIC HEART FAILURE: ICD-10-CM

## 2024-03-04 DIAGNOSIS — E66.01 OBESITY, MORBID, BMI 50 OR HIGHER: ICD-10-CM

## 2024-03-04 DIAGNOSIS — J02.9 SORE THROAT: ICD-10-CM

## 2024-03-04 LAB
ANION GAP SERPL CALCULATED.3IONS-SCNC: 10.7 MMOL/L (ref 5–15)
BASOPHILS # BLD AUTO: 0.02 10*3/MM3 (ref 0–0.2)
BASOPHILS NFR BLD AUTO: 0.2 % (ref 0–1.5)
BUN SERPL-MCNC: 18 MG/DL (ref 8–23)
BUN/CREAT SERPL: 17 (ref 7–25)
CALCIUM SPEC-SCNC: 9.6 MG/DL (ref 8.6–10.5)
CHLORIDE SERPL-SCNC: 101 MMOL/L (ref 98–107)
CO2 SERPL-SCNC: 31.3 MMOL/L (ref 22–29)
CREAT SERPL-MCNC: 1.06 MG/DL (ref 0.57–1)
DEPRECATED RDW RBC AUTO: 50 FL (ref 37–54)
EGFRCR SERPLBLD CKD-EPI 2021: 57.3 ML/MIN/1.73
EOSINOPHIL # BLD AUTO: 0.16 10*3/MM3 (ref 0–0.4)
EOSINOPHIL NFR BLD AUTO: 1.8 % (ref 0.3–6.2)
ERYTHROCYTE [DISTWIDTH] IN BLOOD BY AUTOMATED COUNT: 14.8 % (ref 12.3–15.4)
EXPIRATION DATE: NORMAL
GLUCOSE SERPL-MCNC: 84 MG/DL (ref 65–99)
HCT VFR BLD AUTO: 40.5 % (ref 34–46.6)
HGB BLD-MCNC: 12.7 G/DL (ref 12–15.9)
IMM GRANULOCYTES # BLD AUTO: 0.04 10*3/MM3 (ref 0–0.05)
IMM GRANULOCYTES NFR BLD AUTO: 0.5 % (ref 0–0.5)
INTERNAL CONTROL: NORMAL
LYMPHOCYTES # BLD AUTO: 1.86 10*3/MM3 (ref 0.7–3.1)
LYMPHOCYTES NFR BLD AUTO: 21.3 % (ref 19.6–45.3)
Lab: NORMAL
MCH RBC QN AUTO: 28.6 PG (ref 26.6–33)
MCHC RBC AUTO-ENTMCNC: 31.4 G/DL (ref 31.5–35.7)
MCV RBC AUTO: 91.2 FL (ref 79–97)
MONOCYTES # BLD AUTO: 1 10*3/MM3 (ref 0.1–0.9)
MONOCYTES NFR BLD AUTO: 11.4 % (ref 5–12)
NEUTROPHILS NFR BLD AUTO: 5.67 10*3/MM3 (ref 1.7–7)
NEUTROPHILS NFR BLD AUTO: 64.8 % (ref 42.7–76)
NT-PROBNP SERPL-MCNC: 131 PG/ML (ref 0–900)
PLATELET # BLD AUTO: 302 10*3/MM3 (ref 140–450)
PMV BLD AUTO: 9.5 FL (ref 6–12)
POTASSIUM SERPL-SCNC: 4.5 MMOL/L (ref 3.5–5.2)
RBC # BLD AUTO: 4.44 10*6/MM3 (ref 3.77–5.28)
S PYO AG THROAT QL: NEGATIVE
SODIUM SERPL-SCNC: 143 MMOL/L (ref 136–145)
URATE SERPL-MCNC: 6.2 MG/DL (ref 2.4–5.7)
WBC NRBC COR # BLD AUTO: 8.75 10*3/MM3 (ref 3.4–10.8)

## 2024-03-04 PROCEDURE — 80048 BASIC METABOLIC PNL TOTAL CA: CPT

## 2024-03-04 PROCEDURE — 83880 ASSAY OF NATRIURETIC PEPTIDE: CPT

## 2024-03-04 PROCEDURE — 87081 CULTURE SCREEN ONLY: CPT | Performed by: NURSE PRACTITIONER

## 2024-03-04 PROCEDURE — 73562 X-RAY EXAM OF KNEE 3: CPT

## 2024-03-04 PROCEDURE — 85025 COMPLETE CBC W/AUTO DIFF WBC: CPT

## 2024-03-04 PROCEDURE — 36415 COLL VENOUS BLD VENIPUNCTURE: CPT

## 2024-03-04 PROCEDURE — 84550 ASSAY OF BLOOD/URIC ACID: CPT

## 2024-03-04 RX ORDER — FLUOXETINE 10 MG/1
20 CAPSULE ORAL DAILY
COMMUNITY

## 2024-03-05 ENCOUNTER — TELEPHONE (OUTPATIENT)
Dept: FAMILY MEDICINE CLINIC | Age: 69
End: 2024-03-05
Payer: MEDICARE

## 2024-03-06 ENCOUNTER — TELEPHONE (OUTPATIENT)
Dept: PULMONOLOGY | Facility: CLINIC | Age: 69
End: 2024-03-06
Payer: MEDICARE

## 2024-03-06 LAB — BACTERIA SPEC AEROBE CULT: NORMAL

## 2024-03-08 ENCOUNTER — OFFICE VISIT (OUTPATIENT)
Dept: PULMONOLOGY | Facility: CLINIC | Age: 69
End: 2024-03-08
Payer: MEDICARE

## 2024-03-08 VITALS
HEART RATE: 63 BPM | WEIGHT: 293 LBS | BODY MASS INDEX: 50.02 KG/M2 | DIASTOLIC BLOOD PRESSURE: 35 MMHG | OXYGEN SATURATION: 97 % | SYSTOLIC BLOOD PRESSURE: 108 MMHG | RESPIRATION RATE: 18 BRPM | TEMPERATURE: 98.2 F | HEIGHT: 64 IN

## 2024-03-08 DIAGNOSIS — F17.201 TOBACCO ABUSE, IN REMISSION: ICD-10-CM

## 2024-03-08 DIAGNOSIS — J44.9 CHRONIC OBSTRUCTIVE PULMONARY DISEASE, UNSPECIFIED COPD TYPE: ICD-10-CM

## 2024-03-08 DIAGNOSIS — G47.34 NOCTURNAL HYPOXIA: ICD-10-CM

## 2024-03-08 DIAGNOSIS — I50.32 CHRONIC DIASTOLIC HEART FAILURE: ICD-10-CM

## 2024-03-08 DIAGNOSIS — J30.9 ALLERGIC RHINITIS, UNSPECIFIED SEASONALITY, UNSPECIFIED TRIGGER: ICD-10-CM

## 2024-03-08 DIAGNOSIS — F41.9 ANXIETY: ICD-10-CM

## 2024-03-08 DIAGNOSIS — G47.33 OSA (OBSTRUCTIVE SLEEP APNEA): ICD-10-CM

## 2024-03-08 DIAGNOSIS — J45.909 ASTHMA, UNSPECIFIED ASTHMA SEVERITY, UNSPECIFIED WHETHER COMPLICATED, UNSPECIFIED WHETHER PERSISTENT: Primary | ICD-10-CM

## 2024-03-08 DIAGNOSIS — J30.2 SEASONAL ALLERGIES: ICD-10-CM

## 2024-03-08 DIAGNOSIS — J43.9 PULMONARY EMPHYSEMA, UNSPECIFIED EMPHYSEMA TYPE: ICD-10-CM

## 2024-03-08 DIAGNOSIS — R06.09 DYSPNEA ON EXERTION: ICD-10-CM

## 2024-03-08 DIAGNOSIS — E66.01 MORBID OBESITY WITH BMI OF 45.0-49.9, ADULT: ICD-10-CM

## 2024-03-08 RX ORDER — CALCIUM CARBONATE 300MG(750)
400 TABLET,CHEWABLE ORAL
COMMUNITY
Start: 2024-03-07

## 2024-03-08 RX ORDER — PREDNISONE 10 MG/1
TABLET ORAL
Qty: 31 TABLET | Refills: 0 | Status: SHIPPED | OUTPATIENT
Start: 2024-03-08

## 2024-03-08 RX ORDER — PREDNISONE 20 MG/1
40 TABLET ORAL DAILY
Qty: 10 TABLET | Refills: 0 | Status: SHIPPED | OUTPATIENT
Start: 2024-03-08 | End: 2024-03-13

## 2024-03-14 ENCOUNTER — TELEMEDICINE (OUTPATIENT)
Dept: BEHAVIORAL HEALTH | Facility: CLINIC | Age: 69
End: 2024-03-14
Payer: MEDICARE

## 2024-03-14 DIAGNOSIS — F33.9 RECURRENT MAJOR DEPRESSION RESISTANT TO TREATMENT: Chronic | ICD-10-CM

## 2024-03-14 DIAGNOSIS — F51.05 INSOMNIA SECONDARY TO ANXIETY: ICD-10-CM

## 2024-03-14 DIAGNOSIS — F33.1 MAJOR DEPRESSIVE DISORDER, RECURRENT EPISODE, MODERATE: ICD-10-CM

## 2024-03-14 DIAGNOSIS — F41.9 INSOMNIA SECONDARY TO ANXIETY: ICD-10-CM

## 2024-03-14 RX ORDER — MIRTAZAPINE 7.5 MG/1
7.5 TABLET, FILM COATED ORAL NIGHTLY
Qty: 30 TABLET | Refills: 1 | Status: SHIPPED | OUTPATIENT
Start: 2024-03-14

## 2024-03-14 RX ORDER — IBUPROFEN 800 MG/1
800 TABLET ORAL EVERY 8 HOURS PRN
COMMUNITY
Start: 2024-03-12

## 2024-03-17 DIAGNOSIS — F33.9 RECURRENT MAJOR DEPRESSION RESISTANT TO TREATMENT: Chronic | ICD-10-CM

## 2024-03-17 DIAGNOSIS — F33.1 MAJOR DEPRESSIVE DISORDER, RECURRENT EPISODE, MODERATE: ICD-10-CM

## 2024-03-18 DIAGNOSIS — I10 ESSENTIAL HYPERTENSION: ICD-10-CM

## 2024-03-18 RX ORDER — AMLODIPINE BESYLATE 5 MG/1
TABLET ORAL
Qty: 90 TABLET | Refills: 0 | Status: SHIPPED | OUTPATIENT
Start: 2024-03-18

## 2024-03-18 RX ORDER — CARIPRAZINE 1.5 MG/1
1.5 CAPSULE, GELATIN COATED ORAL DAILY
Qty: 30 CAPSULE | Refills: 1 | OUTPATIENT
Start: 2024-03-18

## 2024-03-19 ENCOUNTER — OFFICE VISIT (OUTPATIENT)
Dept: PODIATRY | Facility: CLINIC | Age: 69
End: 2024-03-19
Payer: MEDICARE

## 2024-03-19 VITALS
OXYGEN SATURATION: 90 % | WEIGHT: 293 LBS | BODY MASS INDEX: 50.02 KG/M2 | DIASTOLIC BLOOD PRESSURE: 74 MMHG | TEMPERATURE: 98 F | HEART RATE: 80 BPM | HEIGHT: 64 IN | SYSTOLIC BLOOD PRESSURE: 141 MMHG

## 2024-03-19 DIAGNOSIS — L60.0 ONYCHOCRYPTOSIS: ICD-10-CM

## 2024-03-19 DIAGNOSIS — B35.1 ONYCHOMYCOSIS: ICD-10-CM

## 2024-03-19 DIAGNOSIS — M79.671 FOOT PAIN, BILATERAL: ICD-10-CM

## 2024-03-19 DIAGNOSIS — R26.2 DIFFICULTY WALKING: ICD-10-CM

## 2024-03-19 DIAGNOSIS — E11.9 NON-INSULIN DEPENDENT TYPE 2 DIABETES MELLITUS: ICD-10-CM

## 2024-03-19 DIAGNOSIS — E11.8 DIABETIC FOOT: ICD-10-CM

## 2024-03-19 DIAGNOSIS — M79.672 FOOT PAIN, BILATERAL: ICD-10-CM

## 2024-03-19 DIAGNOSIS — M19.90 ARTHRITIS: Primary | ICD-10-CM

## 2024-03-19 PROCEDURE — 3078F DIAST BP <80 MM HG: CPT | Performed by: PODIATRIST

## 2024-03-19 PROCEDURE — 11721 DEBRIDE NAIL 6 OR MORE: CPT | Performed by: PODIATRIST

## 2024-03-19 PROCEDURE — 1159F MED LIST DOCD IN RCRD: CPT | Performed by: PODIATRIST

## 2024-03-19 PROCEDURE — G8404 LOW EXTEMITY NEUR EXAM DOCUM: HCPCS | Performed by: PODIATRIST

## 2024-03-19 PROCEDURE — 99213 OFFICE O/P EST LOW 20 MIN: CPT | Performed by: PODIATRIST

## 2024-03-19 PROCEDURE — 1160F RVW MEDS BY RX/DR IN RCRD: CPT | Performed by: PODIATRIST

## 2024-03-19 PROCEDURE — 3077F SYST BP >= 140 MM HG: CPT | Performed by: PODIATRIST

## 2024-03-25 ENCOUNTER — OFFICE VISIT (OUTPATIENT)
Dept: FAMILY MEDICINE CLINIC | Age: 69
End: 2024-03-25
Payer: MEDICARE

## 2024-03-25 VITALS
DIASTOLIC BLOOD PRESSURE: 85 MMHG | WEIGHT: 293 LBS | SYSTOLIC BLOOD PRESSURE: 142 MMHG | HEIGHT: 64 IN | BODY MASS INDEX: 50.02 KG/M2 | OXYGEN SATURATION: 92 % | HEART RATE: 66 BPM

## 2024-03-25 DIAGNOSIS — E66.01 CLASS 3 SEVERE OBESITY DUE TO EXCESS CALORIES WITH SERIOUS COMORBIDITY AND BODY MASS INDEX (BMI) OF 50.0 TO 59.9 IN ADULT: ICD-10-CM

## 2024-03-25 DIAGNOSIS — N18.30 STAGE 3 CHRONIC KIDNEY DISEASE, UNSPECIFIED WHETHER STAGE 3A OR 3B CKD: ICD-10-CM

## 2024-03-25 DIAGNOSIS — I50.32 CHRONIC DIASTOLIC HEART FAILURE: ICD-10-CM

## 2024-03-25 DIAGNOSIS — E11.9 TYPE 2 DIABETES MELLITUS WITHOUT COMPLICATION, WITHOUT LONG-TERM CURRENT USE OF INSULIN: ICD-10-CM

## 2024-03-25 DIAGNOSIS — M25.562 CHRONIC PAIN OF LEFT KNEE: ICD-10-CM

## 2024-03-25 DIAGNOSIS — M79.89 LEG SWELLING: ICD-10-CM

## 2024-03-25 DIAGNOSIS — I10 ESSENTIAL HYPERTENSION: Primary | ICD-10-CM

## 2024-03-25 DIAGNOSIS — F41.1 GENERALIZED ANXIETY DISORDER: ICD-10-CM

## 2024-03-25 DIAGNOSIS — G89.29 CHRONIC PAIN OF LEFT KNEE: ICD-10-CM

## 2024-03-25 RX ORDER — DULAGLUTIDE 3 MG/.5ML
INJECTION, SOLUTION SUBCUTANEOUS
COMMUNITY
Start: 2024-03-18 | End: 2024-03-25

## 2024-03-25 RX ORDER — FUROSEMIDE 40 MG/1
40 TABLET ORAL 2 TIMES DAILY
Qty: 60 TABLET | Refills: 5 | Status: SHIPPED | OUTPATIENT
Start: 2024-03-25

## 2024-03-25 RX ORDER — SPIRONOLACTONE 25 MG/1
25 TABLET ORAL 2 TIMES DAILY
Qty: 60 TABLET | Refills: 5 | Status: SHIPPED | OUTPATIENT
Start: 2024-03-25

## 2024-03-25 RX ADMIN — CYANOCOBALAMIN 1000 MCG: 1000 INJECTION, SOLUTION INTRAMUSCULAR; SUBCUTANEOUS at 11:16

## 2024-03-26 ENCOUNTER — TELEPHONE (OUTPATIENT)
Dept: FAMILY MEDICINE CLINIC | Age: 69
End: 2024-03-26
Payer: MEDICARE

## 2024-04-01 ENCOUNTER — TELEPHONE (OUTPATIENT)
Dept: FAMILY MEDICINE CLINIC | Age: 69
End: 2024-04-01
Payer: MEDICARE

## 2024-04-01 DIAGNOSIS — I10 ESSENTIAL HYPERTENSION: Primary | ICD-10-CM

## 2024-04-01 DIAGNOSIS — I50.32 CHRONIC DIASTOLIC HEART FAILURE: ICD-10-CM

## 2024-04-02 DIAGNOSIS — I10 ESSENTIAL HYPERTENSION: Primary | ICD-10-CM

## 2024-04-11 ENCOUNTER — TELEPHONE (OUTPATIENT)
Dept: BEHAVIORAL HEALTH | Facility: CLINIC | Age: 69
End: 2024-04-11
Payer: MEDICARE

## 2024-04-19 DIAGNOSIS — F51.05 INSOMNIA SECONDARY TO ANXIETY: ICD-10-CM

## 2024-04-19 DIAGNOSIS — F33.9 RECURRENT MAJOR DEPRESSION RESISTANT TO TREATMENT: Chronic | ICD-10-CM

## 2024-04-19 DIAGNOSIS — F41.9 INSOMNIA SECONDARY TO ANXIETY: ICD-10-CM

## 2024-04-19 RX ORDER — MIRTAZAPINE 15 MG/1
15 TABLET, FILM COATED ORAL EVERY EVENING
Qty: 30 TABLET | Refills: 1 | OUTPATIENT
Start: 2024-04-19

## 2024-04-21 DIAGNOSIS — K21.9 GASTROESOPHAGEAL REFLUX DISEASE, UNSPECIFIED WHETHER ESOPHAGITIS PRESENT: ICD-10-CM

## 2024-04-22 ENCOUNTER — OFFICE VISIT (OUTPATIENT)
Dept: ENDOCRINOLOGY | Age: 69
End: 2024-04-22
Payer: MEDICARE

## 2024-04-22 VITALS
HEART RATE: 65 BPM | OXYGEN SATURATION: 98 % | WEIGHT: 291.8 LBS | HEIGHT: 64 IN | SYSTOLIC BLOOD PRESSURE: 128 MMHG | BODY MASS INDEX: 49.82 KG/M2 | DIASTOLIC BLOOD PRESSURE: 80 MMHG

## 2024-04-22 DIAGNOSIS — E11.9 TYPE 2 DIABETES MELLITUS WITHOUT COMPLICATION, WITHOUT LONG-TERM CURRENT USE OF INSULIN: Primary | ICD-10-CM

## 2024-04-22 DIAGNOSIS — E66.01 OBESITY, MORBID, BMI 50 OR HIGHER: ICD-10-CM

## 2024-04-22 PROCEDURE — 3079F DIAST BP 80-89 MM HG: CPT | Performed by: INTERNAL MEDICINE

## 2024-04-22 PROCEDURE — 1160F RVW MEDS BY RX/DR IN RCRD: CPT | Performed by: INTERNAL MEDICINE

## 2024-04-22 PROCEDURE — 99204 OFFICE O/P NEW MOD 45 MIN: CPT | Performed by: INTERNAL MEDICINE

## 2024-04-22 PROCEDURE — 3044F HG A1C LEVEL LT 7.0%: CPT | Performed by: INTERNAL MEDICINE

## 2024-04-22 PROCEDURE — 1159F MED LIST DOCD IN RCRD: CPT | Performed by: INTERNAL MEDICINE

## 2024-04-22 PROCEDURE — 3074F SYST BP LT 130 MM HG: CPT | Performed by: INTERNAL MEDICINE

## 2024-04-22 RX ORDER — PANTOPRAZOLE SODIUM 40 MG/1
40 TABLET, DELAYED RELEASE ORAL DAILY
Qty: 90 TABLET | Refills: 0 | Status: SHIPPED | OUTPATIENT
Start: 2024-04-22

## 2024-04-25 ENCOUNTER — NUTRITION (OUTPATIENT)
Dept: DIABETES SERVICES | Facility: HOSPITAL | Age: 69
End: 2024-04-25
Payer: MEDICARE

## 2024-04-25 ENCOUNTER — OFFICE VISIT (OUTPATIENT)
Dept: FAMILY MEDICINE CLINIC | Age: 69
End: 2024-04-25
Payer: MEDICARE

## 2024-04-25 ENCOUNTER — TELEPHONE (OUTPATIENT)
Dept: FAMILY MEDICINE CLINIC | Age: 69
End: 2024-04-25

## 2024-04-25 ENCOUNTER — PATIENT ROUNDING (BHMG ONLY) (OUTPATIENT)
Dept: ENDOCRINOLOGY | Age: 69
End: 2024-04-25
Payer: MEDICARE

## 2024-04-25 VITALS
SYSTOLIC BLOOD PRESSURE: 107 MMHG | WEIGHT: 293 LBS | DIASTOLIC BLOOD PRESSURE: 62 MMHG | HEIGHT: 64 IN | OXYGEN SATURATION: 95 % | HEART RATE: 81 BPM | BODY MASS INDEX: 50.02 KG/M2

## 2024-04-25 DIAGNOSIS — F41.1 GENERALIZED ANXIETY DISORDER: ICD-10-CM

## 2024-04-25 DIAGNOSIS — I10 ESSENTIAL HYPERTENSION: Primary | ICD-10-CM

## 2024-04-25 DIAGNOSIS — J44.9 CHRONIC OBSTRUCTIVE PULMONARY DISEASE, UNSPECIFIED COPD TYPE: ICD-10-CM

## 2024-04-25 DIAGNOSIS — E11.9 TYPE 2 DIABETES MELLITUS WITHOUT COMPLICATION, WITHOUT LONG-TERM CURRENT USE OF INSULIN: ICD-10-CM

## 2024-04-25 DIAGNOSIS — G25.81 RESTLESS LEG SYNDROME: ICD-10-CM

## 2024-04-25 DIAGNOSIS — E11.65 TYPE 2 DIABETES MELLITUS WITH HYPERGLYCEMIA, WITHOUT LONG-TERM CURRENT USE OF INSULIN: Primary | ICD-10-CM

## 2024-04-25 DIAGNOSIS — N18.30 STAGE 3 CHRONIC KIDNEY DISEASE, UNSPECIFIED WHETHER STAGE 3A OR 3B CKD: ICD-10-CM

## 2024-04-25 DIAGNOSIS — E53.8 B12 DEFICIENCY: ICD-10-CM

## 2024-04-25 DIAGNOSIS — I50.32 CHRONIC DIASTOLIC HEART FAILURE: ICD-10-CM

## 2024-04-25 PROCEDURE — 99214 OFFICE O/P EST MOD 30 MIN: CPT | Performed by: FAMILY MEDICINE

## 2024-04-25 PROCEDURE — 96372 THER/PROPH/DIAG INJ SC/IM: CPT | Performed by: FAMILY MEDICINE

## 2024-04-25 PROCEDURE — 3074F SYST BP LT 130 MM HG: CPT | Performed by: FAMILY MEDICINE

## 2024-04-25 PROCEDURE — 3044F HG A1C LEVEL LT 7.0%: CPT | Performed by: FAMILY MEDICINE

## 2024-04-25 PROCEDURE — 3078F DIAST BP <80 MM HG: CPT | Performed by: FAMILY MEDICINE

## 2024-04-25 PROCEDURE — 97802 MEDICAL NUTRITION INDIV IN: CPT | Performed by: DIETITIAN, REGISTERED

## 2024-04-25 RX ORDER — TRALOKINUMAB-LDRM 150 MG/ML
INJECTION, SOLUTION SUBCUTANEOUS
COMMUNITY
Start: 2024-04-24

## 2024-04-25 RX ADMIN — CYANOCOBALAMIN 1000 MCG: 1000 INJECTION, SOLUTION INTRAMUSCULAR; SUBCUTANEOUS at 13:12

## 2024-04-26 RX ORDER — PRAMIPEXOLE DIHYDROCHLORIDE 0.5 MG/1
TABLET ORAL
Qty: 270 TABLET | Refills: 0 | Status: SHIPPED | OUTPATIENT
Start: 2024-04-26

## 2024-05-02 ENCOUNTER — TELEMEDICINE (OUTPATIENT)
Dept: BEHAVIORAL HEALTH | Facility: CLINIC | Age: 69
End: 2024-05-02
Payer: MEDICARE

## 2024-05-02 DIAGNOSIS — Z79.899 POLYPHARMACY: ICD-10-CM

## 2024-05-02 DIAGNOSIS — F41.1 GENERALIZED ANXIETY DISORDER: ICD-10-CM

## 2024-05-02 DIAGNOSIS — F33.1 MAJOR DEPRESSIVE DISORDER, RECURRENT EPISODE, MODERATE: ICD-10-CM

## 2024-05-02 DIAGNOSIS — F41.9 INSOMNIA SECONDARY TO ANXIETY: ICD-10-CM

## 2024-05-02 DIAGNOSIS — F33.9 RECURRENT MAJOR DEPRESSION RESISTANT TO TREATMENT: Primary | Chronic | ICD-10-CM

## 2024-05-02 DIAGNOSIS — F51.05 INSOMNIA SECONDARY TO ANXIETY: ICD-10-CM

## 2024-05-02 DIAGNOSIS — Z79.899 MEDICATION MANAGEMENT: ICD-10-CM

## 2024-05-02 RX ORDER — BUSPIRONE HYDROCHLORIDE 10 MG/1
20 TABLET ORAL 3 TIMES DAILY
Qty: 180 TABLET | Refills: 2 | Status: SHIPPED | OUTPATIENT
Start: 2024-05-02 | End: 2024-07-31

## 2024-05-02 RX ORDER — MIRTAZAPINE 7.5 MG/1
7.5 TABLET, FILM COATED ORAL NIGHTLY
Qty: 30 TABLET | Refills: 2 | Status: SHIPPED | OUTPATIENT
Start: 2024-05-02

## 2024-05-08 ENCOUNTER — OFFICE VISIT (OUTPATIENT)
Dept: CARDIOLOGY | Facility: CLINIC | Age: 69
End: 2024-05-08
Payer: MEDICARE

## 2024-05-08 VITALS
HEIGHT: 64 IN | DIASTOLIC BLOOD PRESSURE: 69 MMHG | HEART RATE: 75 BPM | BODY MASS INDEX: 50.02 KG/M2 | WEIGHT: 293 LBS | SYSTOLIC BLOOD PRESSURE: 117 MMHG

## 2024-05-08 DIAGNOSIS — I50.32 CHRONIC DIASTOLIC HEART FAILURE: Primary | ICD-10-CM

## 2024-05-08 DIAGNOSIS — R00.2 PALPITATIONS: ICD-10-CM

## 2024-05-08 DIAGNOSIS — I10 ESSENTIAL HYPERTENSION: ICD-10-CM

## 2024-05-08 RX ORDER — TORSEMIDE 20 MG/1
20 TABLET ORAL 2 TIMES DAILY
Qty: 60 TABLET | Refills: 5 | Status: SHIPPED | OUTPATIENT
Start: 2024-05-08

## 2024-05-15 ENCOUNTER — OFFICE VISIT (OUTPATIENT)
Dept: FAMILY MEDICINE CLINIC | Age: 69
End: 2024-05-15
Payer: MEDICARE

## 2024-05-15 VITALS
HEIGHT: 64 IN | SYSTOLIC BLOOD PRESSURE: 145 MMHG | HEART RATE: 85 BPM | WEIGHT: 293 LBS | OXYGEN SATURATION: 95 % | DIASTOLIC BLOOD PRESSURE: 81 MMHG | BODY MASS INDEX: 50.02 KG/M2

## 2024-05-15 DIAGNOSIS — I10 ESSENTIAL HYPERTENSION: ICD-10-CM

## 2024-05-15 DIAGNOSIS — Z78.0 MENOPAUSE: ICD-10-CM

## 2024-05-15 DIAGNOSIS — M79.89 LEG SWELLING: ICD-10-CM

## 2024-05-15 DIAGNOSIS — Z12.31 ENCOUNTER FOR SCREENING MAMMOGRAM FOR BREAST CANCER: ICD-10-CM

## 2024-05-15 DIAGNOSIS — Z12.11 COLON CANCER SCREENING: ICD-10-CM

## 2024-05-15 DIAGNOSIS — F32.A DEPRESSIVE DISORDER: ICD-10-CM

## 2024-05-15 DIAGNOSIS — Z00.00 ANNUAL PHYSICAL EXAM: Primary | ICD-10-CM

## 2024-05-15 DIAGNOSIS — Z01.419 WELL WOMAN EXAM: ICD-10-CM

## 2024-05-15 DIAGNOSIS — Z23 IMMUNIZATION DUE: ICD-10-CM

## 2024-05-15 DIAGNOSIS — F41.9 ANXIETY: ICD-10-CM

## 2024-05-15 DIAGNOSIS — N18.30 STAGE 3 CHRONIC KIDNEY DISEASE, UNSPECIFIED WHETHER STAGE 3A OR 3B CKD: ICD-10-CM

## 2024-05-15 DIAGNOSIS — N90.89 VULVAR MASS: ICD-10-CM

## 2024-05-15 DIAGNOSIS — I50.32 CHRONIC DIASTOLIC HEART FAILURE: ICD-10-CM

## 2024-05-15 DIAGNOSIS — Z23 ENCOUNTER FOR IMMUNIZATION: ICD-10-CM

## 2024-05-15 LAB
DEVELOPER EXPIRATION DATE: NORMAL
DEVELOPER LOT NUMBER: NORMAL
EXPIRATION DATE: NORMAL
FECAL OCCULT BLOOD SCREEN, POC: NEGATIVE
Lab: 232
NEGATIVE CONTROL: NEGATIVE
POSITIVE CONTROL: POSITIVE

## 2024-05-15 PROCEDURE — 82270 OCCULT BLOOD FECES: CPT | Performed by: FAMILY MEDICINE

## 2024-05-15 PROCEDURE — 99214 OFFICE O/P EST MOD 30 MIN: CPT | Performed by: FAMILY MEDICINE

## 2024-05-15 PROCEDURE — 91320 SARSCV2 VAC 30MCG TRS-SUC IM: CPT | Performed by: FAMILY MEDICINE

## 2024-05-15 PROCEDURE — 90480 ADMN SARSCOV2 VAC 1/ONLY CMP: CPT | Performed by: FAMILY MEDICINE

## 2024-05-16 ENCOUNTER — TELEPHONE (OUTPATIENT)
Dept: CARDIOLOGY | Facility: CLINIC | Age: 69
End: 2024-05-16
Payer: MEDICARE

## 2024-05-19 DIAGNOSIS — I10 ESSENTIAL HYPERTENSION: ICD-10-CM

## 2024-05-20 RX ORDER — HYDRALAZINE HYDROCHLORIDE 10 MG/1
10 TABLET, FILM COATED ORAL 3 TIMES DAILY
Qty: 90 TABLET | Refills: 2 | Status: SHIPPED | OUTPATIENT
Start: 2024-05-20

## 2024-05-21 VITALS — HEIGHT: 64 IN | WEIGHT: 293 LBS | BODY MASS INDEX: 50.02 KG/M2

## 2024-05-22 ENCOUNTER — TELEPHONE (OUTPATIENT)
Dept: PSYCHIATRY | Facility: CLINIC | Age: 69
End: 2024-05-22
Payer: MEDICARE

## 2024-05-30 ENCOUNTER — OFFICE VISIT (OUTPATIENT)
Dept: FAMILY MEDICINE CLINIC | Age: 69
End: 2024-05-30
Payer: MEDICARE

## 2024-05-30 ENCOUNTER — TELEPHONE (OUTPATIENT)
Dept: FAMILY MEDICINE CLINIC | Age: 69
End: 2024-05-30

## 2024-05-30 VITALS
WEIGHT: 293 LBS | DIASTOLIC BLOOD PRESSURE: 70 MMHG | SYSTOLIC BLOOD PRESSURE: 128 MMHG | HEIGHT: 64 IN | HEART RATE: 72 BPM | BODY MASS INDEX: 50.02 KG/M2 | TEMPERATURE: 97.3 F | OXYGEN SATURATION: 96 %

## 2024-05-30 DIAGNOSIS — F41.9 ANXIETY: ICD-10-CM

## 2024-05-30 DIAGNOSIS — M17.12 PRIMARY OSTEOARTHRITIS OF LEFT KNEE: Primary | ICD-10-CM

## 2024-05-30 DIAGNOSIS — Z79.899 HIGH RISK MEDICATION USE: ICD-10-CM

## 2024-05-30 DIAGNOSIS — N18.30 STAGE 3 CHRONIC KIDNEY DISEASE, UNSPECIFIED WHETHER STAGE 3A OR 3B CKD: ICD-10-CM

## 2024-05-30 DIAGNOSIS — G62.9 PERIPHERAL POLYNEUROPATHY: ICD-10-CM

## 2024-05-30 DIAGNOSIS — E11.9 TYPE 2 DIABETES MELLITUS WITHOUT COMPLICATION, WITHOUT LONG-TERM CURRENT USE OF INSULIN: ICD-10-CM

## 2024-05-30 DIAGNOSIS — E66.01 CLASS 3 SEVERE OBESITY WITH SERIOUS COMORBIDITY AND BODY MASS INDEX (BMI) OF 50.0 TO 59.9 IN ADULT, UNSPECIFIED OBESITY TYPE: ICD-10-CM

## 2024-05-30 LAB
AMPHET+METHAMPHET UR QL: NEGATIVE
AMPHETAMINES UR QL: NEGATIVE
BARBITURATES UR QL SCN: NEGATIVE
BENZODIAZ UR QL SCN: NEGATIVE
BUPRENORPHINE SERPL-MCNC: NEGATIVE NG/ML
CANNABINOIDS SERPL QL: NEGATIVE
COCAINE UR QL: NEGATIVE
EXPIRATION DATE: ABNORMAL
Lab: ABNORMAL
MDMA UR QL SCN: NEGATIVE
METHADONE UR QL SCN: NEGATIVE
MORPHINE/OPIATES SCREEN, URINE: POSITIVE
OXYCODONE UR QL SCN: NEGATIVE
PCP UR QL SCN: NEGATIVE

## 2024-05-30 PROCEDURE — 1159F MED LIST DOCD IN RCRD: CPT | Performed by: FAMILY MEDICINE

## 2024-05-30 PROCEDURE — 3044F HG A1C LEVEL LT 7.0%: CPT | Performed by: FAMILY MEDICINE

## 2024-05-30 PROCEDURE — 3074F SYST BP LT 130 MM HG: CPT | Performed by: FAMILY MEDICINE

## 2024-05-30 PROCEDURE — 96372 THER/PROPH/DIAG INJ SC/IM: CPT | Performed by: FAMILY MEDICINE

## 2024-05-30 PROCEDURE — 1126F AMNT PAIN NOTED NONE PRSNT: CPT | Performed by: FAMILY MEDICINE

## 2024-05-30 PROCEDURE — 80305 DRUG TEST PRSMV DIR OPT OBS: CPT | Performed by: FAMILY MEDICINE

## 2024-05-30 PROCEDURE — 3078F DIAST BP <80 MM HG: CPT | Performed by: FAMILY MEDICINE

## 2024-05-30 PROCEDURE — 99214 OFFICE O/P EST MOD 30 MIN: CPT | Performed by: FAMILY MEDICINE

## 2024-05-30 PROCEDURE — 1160F RVW MEDS BY RX/DR IN RCRD: CPT | Performed by: FAMILY MEDICINE

## 2024-05-30 RX ORDER — PREGABALIN 75 MG/1
75 CAPSULE ORAL 2 TIMES DAILY
Qty: 60 CAPSULE | Refills: 2 | Status: SHIPPED | OUTPATIENT
Start: 2024-05-30

## 2024-05-30 RX ADMIN — CYANOCOBALAMIN 1000 MCG: 1000 INJECTION, SOLUTION INTRAMUSCULAR; SUBCUTANEOUS at 11:12

## 2024-06-05 ENCOUNTER — LAB (OUTPATIENT)
Dept: LAB | Facility: HOSPITAL | Age: 69
End: 2024-06-05
Payer: MEDICARE

## 2024-06-05 ENCOUNTER — HOSPITAL ENCOUNTER (OUTPATIENT)
Dept: GENERAL RADIOLOGY | Facility: HOSPITAL | Age: 69
Discharge: HOME | End: 2024-06-05
Payer: MEDICARE

## 2024-06-05 ENCOUNTER — OFFICE VISIT (OUTPATIENT)
Dept: FAMILY MEDICINE CLINIC | Age: 69
End: 2024-06-05
Payer: MEDICARE

## 2024-06-05 VITALS
HEIGHT: 64 IN | HEART RATE: 77 BPM | BODY MASS INDEX: 48.45 KG/M2 | SYSTOLIC BLOOD PRESSURE: 100 MMHG | DIASTOLIC BLOOD PRESSURE: 71 MMHG | TEMPERATURE: 98.1 F | OXYGEN SATURATION: 97 % | WEIGHT: 283.8 LBS

## 2024-06-05 DIAGNOSIS — I50.32 CHRONIC DIASTOLIC HEART FAILURE: ICD-10-CM

## 2024-06-05 DIAGNOSIS — R68.83 CHILLS: Primary | ICD-10-CM

## 2024-06-05 DIAGNOSIS — R53.83 FATIGUE, UNSPECIFIED TYPE: ICD-10-CM

## 2024-06-05 DIAGNOSIS — R06.09 DOE (DYSPNEA ON EXERTION): ICD-10-CM

## 2024-06-05 DIAGNOSIS — N18.30 STAGE 3 CHRONIC KIDNEY DISEASE, UNSPECIFIED WHETHER STAGE 3A OR 3B CKD: ICD-10-CM

## 2024-06-05 DIAGNOSIS — E11.9 TYPE 2 DIABETES MELLITUS WITHOUT COMPLICATION, WITHOUT LONG-TERM CURRENT USE OF INSULIN: ICD-10-CM

## 2024-06-05 DIAGNOSIS — R68.83 CHILLS: ICD-10-CM

## 2024-06-05 LAB
ALBUMIN SERPL-MCNC: 4.2 G/DL (ref 3.5–5.2)
ALBUMIN/GLOB SERPL: 1.2 G/DL
ALP SERPL-CCNC: 113 U/L (ref 39–117)
ALT SERPL W P-5'-P-CCNC: 17 U/L (ref 1–33)
ANION GAP SERPL CALCULATED.3IONS-SCNC: 10.9 MMOL/L (ref 5–15)
AST SERPL-CCNC: 18 U/L (ref 1–32)
BASOPHILS # BLD AUTO: 0.03 10*3/MM3 (ref 0–0.2)
BASOPHILS NFR BLD AUTO: 0.4 % (ref 0–1.5)
BILIRUB SERPL-MCNC: 0.2 MG/DL (ref 0–1.2)
BUN SERPL-MCNC: 11 MG/DL (ref 8–23)
BUN/CREAT SERPL: 9.1 (ref 7–25)
CALCIUM SPEC-SCNC: 10 MG/DL (ref 8.6–10.5)
CHLORIDE SERPL-SCNC: 101 MMOL/L (ref 98–107)
CO2 SERPL-SCNC: 30.1 MMOL/L (ref 22–29)
CREAT SERPL-MCNC: 1.21 MG/DL (ref 0.57–1)
DEPRECATED RDW RBC AUTO: 48.5 FL (ref 37–54)
EGFRCR SERPLBLD CKD-EPI 2021: 48.9 ML/MIN/1.73
EOSINOPHIL # BLD AUTO: 0.02 10*3/MM3 (ref 0–0.4)
EOSINOPHIL NFR BLD AUTO: 0.3 % (ref 0.3–6.2)
ERYTHROCYTE [DISTWIDTH] IN BLOOD BY AUTOMATED COUNT: 14.4 % (ref 12.3–15.4)
EXPIRATION DATE: NORMAL
FLUAV AG UPPER RESP QL IA.RAPID: NOT DETECTED
FLUBV AG UPPER RESP QL IA.RAPID: NOT DETECTED
GLOBULIN UR ELPH-MCNC: 3.5 GM/DL
GLUCOSE BLDC GLUCOMTR-MCNC: 109 MG/DL (ref 70–130)
GLUCOSE SERPL-MCNC: 104 MG/DL (ref 65–99)
HCT VFR BLD AUTO: 43.6 % (ref 34–46.6)
HGB BLD-MCNC: 13.7 G/DL (ref 12–15.9)
IMM GRANULOCYTES # BLD AUTO: 0.05 10*3/MM3 (ref 0–0.05)
IMM GRANULOCYTES NFR BLD AUTO: 0.7 % (ref 0–0.5)
INTERNAL CONTROL: NORMAL
LYMPHOCYTES # BLD AUTO: 1.77 10*3/MM3 (ref 0.7–3.1)
LYMPHOCYTES NFR BLD AUTO: 23.3 % (ref 19.6–45.3)
Lab: NORMAL
MCH RBC QN AUTO: 28.4 PG (ref 26.6–33)
MCHC RBC AUTO-ENTMCNC: 31.4 G/DL (ref 31.5–35.7)
MCV RBC AUTO: 90.3 FL (ref 79–97)
MONOCYTES # BLD AUTO: 0.69 10*3/MM3 (ref 0.1–0.9)
MONOCYTES NFR BLD AUTO: 9.1 % (ref 5–12)
NEUTROPHILS NFR BLD AUTO: 5.05 10*3/MM3 (ref 1.7–7)
NEUTROPHILS NFR BLD AUTO: 66.2 % (ref 42.7–76)
NT-PROBNP SERPL-MCNC: 88.5 PG/ML (ref 0–900)
PLATELET # BLD AUTO: 314 10*3/MM3 (ref 140–450)
PMV BLD AUTO: 8.6 FL (ref 6–12)
POTASSIUM SERPL-SCNC: 4.1 MMOL/L (ref 3.5–5.2)
PROT SERPL-MCNC: 7.7 G/DL (ref 6–8.5)
RBC # BLD AUTO: 4.83 10*6/MM3 (ref 3.77–5.28)
SARS-COV-2 AG UPPER RESP QL IA.RAPID: NOT DETECTED
SODIUM SERPL-SCNC: 142 MMOL/L (ref 136–145)
WBC NRBC COR # BLD AUTO: 7.61 10*3/MM3 (ref 3.4–10.8)

## 2024-06-05 PROCEDURE — 82948 REAGENT STRIP/BLOOD GLUCOSE: CPT

## 2024-06-05 PROCEDURE — 1160F RVW MEDS BY RX/DR IN RCRD: CPT

## 2024-06-05 PROCEDURE — 3044F HG A1C LEVEL LT 7.0%: CPT

## 2024-06-05 PROCEDURE — 1159F MED LIST DOCD IN RCRD: CPT

## 2024-06-05 PROCEDURE — 3074F SYST BP LT 130 MM HG: CPT

## 2024-06-05 PROCEDURE — 85025 COMPLETE CBC W/AUTO DIFF WBC: CPT

## 2024-06-05 PROCEDURE — 71046 X-RAY EXAM CHEST 2 VIEWS: CPT

## 2024-06-05 PROCEDURE — 87428 SARSCOV & INF VIR A&B AG IA: CPT

## 2024-06-05 PROCEDURE — 83880 ASSAY OF NATRIURETIC PEPTIDE: CPT

## 2024-06-05 PROCEDURE — 3078F DIAST BP <80 MM HG: CPT

## 2024-06-05 PROCEDURE — 36415 COLL VENOUS BLD VENIPUNCTURE: CPT

## 2024-06-05 PROCEDURE — 80053 COMPREHEN METABOLIC PANEL: CPT

## 2024-06-05 PROCEDURE — 99214 OFFICE O/P EST MOD 30 MIN: CPT

## 2024-06-05 PROCEDURE — 1126F AMNT PAIN NOTED NONE PRSNT: CPT

## 2024-06-06 ENCOUNTER — OFFICE VISIT (OUTPATIENT)
Dept: PODIATRY | Facility: CLINIC | Age: 69
End: 2024-06-06
Payer: MEDICARE

## 2024-06-06 VITALS
OXYGEN SATURATION: 98 % | HEART RATE: 68 BPM | SYSTOLIC BLOOD PRESSURE: 103 MMHG | BODY MASS INDEX: 48.55 KG/M2 | WEIGHT: 283 LBS | DIASTOLIC BLOOD PRESSURE: 75 MMHG

## 2024-06-06 DIAGNOSIS — R26.2 DIFFICULTY WALKING: ICD-10-CM

## 2024-06-06 DIAGNOSIS — M79.672 FOOT PAIN, BILATERAL: ICD-10-CM

## 2024-06-06 DIAGNOSIS — M79.671 FOOT PAIN, BILATERAL: ICD-10-CM

## 2024-06-06 DIAGNOSIS — I10 ESSENTIAL HYPERTENSION: ICD-10-CM

## 2024-06-06 DIAGNOSIS — E11.8 DIABETIC FOOT: ICD-10-CM

## 2024-06-06 DIAGNOSIS — E11.9 NON-INSULIN DEPENDENT TYPE 2 DIABETES MELLITUS: ICD-10-CM

## 2024-06-06 DIAGNOSIS — B35.1 ONYCHOMYCOSIS: ICD-10-CM

## 2024-06-06 DIAGNOSIS — L60.0 ONYCHOCRYPTOSIS: Primary | ICD-10-CM

## 2024-06-06 DIAGNOSIS — M19.90 ARTHRITIS: ICD-10-CM

## 2024-06-06 RX ORDER — LOSARTAN POTASSIUM 50 MG/1
50 TABLET ORAL DAILY
Qty: 90 TABLET | Refills: 1 | Status: SHIPPED | OUTPATIENT
Start: 2024-06-06

## 2024-06-07 ENCOUNTER — OFFICE VISIT (OUTPATIENT)
Dept: BEHAVIORAL HEALTH | Facility: CLINIC | Age: 69
End: 2024-06-07
Payer: MEDICARE

## 2024-06-07 VITALS
WEIGHT: 278.6 LBS | DIASTOLIC BLOOD PRESSURE: 88 MMHG | HEIGHT: 64 IN | BODY MASS INDEX: 47.56 KG/M2 | SYSTOLIC BLOOD PRESSURE: 144 MMHG | HEART RATE: 75 BPM

## 2024-06-07 DIAGNOSIS — F33.1 MAJOR DEPRESSIVE DISORDER, RECURRENT EPISODE, MODERATE: ICD-10-CM

## 2024-06-07 DIAGNOSIS — Z79.899 POLYPHARMACY: ICD-10-CM

## 2024-06-07 DIAGNOSIS — F41.1 GENERALIZED ANXIETY DISORDER: ICD-10-CM

## 2024-06-07 DIAGNOSIS — F68.13: ICD-10-CM

## 2024-06-07 DIAGNOSIS — F51.05 INSOMNIA SECONDARY TO ANXIETY: ICD-10-CM

## 2024-06-07 DIAGNOSIS — F33.9 RECURRENT MAJOR DEPRESSION RESISTANT TO TREATMENT: Primary | ICD-10-CM

## 2024-06-07 DIAGNOSIS — F41.9 INSOMNIA SECONDARY TO ANXIETY: ICD-10-CM

## 2024-06-07 DIAGNOSIS — Z79.899 MEDICATION MANAGEMENT: ICD-10-CM

## 2024-06-07 RX ORDER — MIRTAZAPINE 7.5 MG/1
7.5 TABLET, FILM COATED ORAL NIGHTLY
Qty: 30 TABLET | Refills: 1 | Status: SHIPPED | OUTPATIENT
Start: 2024-06-07

## 2024-06-10 ENCOUNTER — OFFICE VISIT (OUTPATIENT)
Dept: PULMONOLOGY | Facility: CLINIC | Age: 69
End: 2024-06-10
Payer: MEDICARE

## 2024-06-10 VITALS
RESPIRATION RATE: 18 BRPM | DIASTOLIC BLOOD PRESSURE: 72 MMHG | BODY MASS INDEX: 48.32 KG/M2 | HEART RATE: 80 BPM | WEIGHT: 283 LBS | OXYGEN SATURATION: 94 % | SYSTOLIC BLOOD PRESSURE: 96 MMHG | TEMPERATURE: 97.4 F | HEIGHT: 64 IN

## 2024-06-10 DIAGNOSIS — J96.01 ACUTE RESPIRATORY FAILURE WITH HYPOXIA: ICD-10-CM

## 2024-06-10 DIAGNOSIS — J45.902 COPD WITH ASTHMA AND STATUS ASTHMATICUS: ICD-10-CM

## 2024-06-10 DIAGNOSIS — G47.33 OSA ON CPAP: ICD-10-CM

## 2024-06-10 DIAGNOSIS — J44.89 COPD WITH ASTHMA AND STATUS ASTHMATICUS: ICD-10-CM

## 2024-06-10 DIAGNOSIS — R06.02 SHORTNESS OF BREATH: Primary | ICD-10-CM

## 2024-06-10 PROCEDURE — 3078F DIAST BP <80 MM HG: CPT | Performed by: STUDENT IN AN ORGANIZED HEALTH CARE EDUCATION/TRAINING PROGRAM

## 2024-06-10 PROCEDURE — 99214 OFFICE O/P EST MOD 30 MIN: CPT | Performed by: STUDENT IN AN ORGANIZED HEALTH CARE EDUCATION/TRAINING PROGRAM

## 2024-06-10 PROCEDURE — 3074F SYST BP LT 130 MM HG: CPT | Performed by: STUDENT IN AN ORGANIZED HEALTH CARE EDUCATION/TRAINING PROGRAM

## 2024-06-10 PROCEDURE — 1160F RVW MEDS BY RX/DR IN RCRD: CPT | Performed by: STUDENT IN AN ORGANIZED HEALTH CARE EDUCATION/TRAINING PROGRAM

## 2024-06-10 PROCEDURE — 1159F MED LIST DOCD IN RCRD: CPT | Performed by: STUDENT IN AN ORGANIZED HEALTH CARE EDUCATION/TRAINING PROGRAM

## 2024-06-10 RX ORDER — IPRATROPIUM BROMIDE AND ALBUTEROL SULFATE 2.5; .5 MG/3ML; MG/3ML
3 SOLUTION RESPIRATORY (INHALATION) 4 TIMES DAILY PRN
Qty: 360 ML | Refills: 3 | Status: SHIPPED | OUTPATIENT
Start: 2024-06-10

## 2024-06-11 DIAGNOSIS — J44.89 COPD WITH ASTHMA AND STATUS ASTHMATICUS: ICD-10-CM

## 2024-06-11 DIAGNOSIS — J45.30 MILD PERSISTENT ASTHMA WITHOUT COMPLICATION: Primary | ICD-10-CM

## 2024-06-11 DIAGNOSIS — J45.902 COPD WITH ASTHMA AND STATUS ASTHMATICUS: ICD-10-CM

## 2024-06-12 DIAGNOSIS — F33.9 RECURRENT MAJOR DEPRESSION RESISTANT TO TREATMENT: ICD-10-CM

## 2024-06-12 DIAGNOSIS — F41.9 INSOMNIA SECONDARY TO ANXIETY: ICD-10-CM

## 2024-06-12 DIAGNOSIS — F51.05 INSOMNIA SECONDARY TO ANXIETY: ICD-10-CM

## 2024-06-12 RX ORDER — MIRTAZAPINE 30 MG/1
30 TABLET, FILM COATED ORAL NIGHTLY
Qty: 90 TABLET | OUTPATIENT
Start: 2024-06-12

## 2024-06-14 DIAGNOSIS — I10 ESSENTIAL HYPERTENSION: ICD-10-CM

## 2024-06-14 RX ORDER — AMLODIPINE BESYLATE 5 MG/1
TABLET ORAL
Qty: 90 TABLET | Refills: 0 | Status: SHIPPED | OUTPATIENT
Start: 2024-06-14

## 2024-06-17 ENCOUNTER — OFFICE VISIT (OUTPATIENT)
Dept: FAMILY MEDICINE CLINIC | Age: 69
End: 2024-06-17
Payer: MEDICARE

## 2024-06-17 VITALS
WEIGHT: 283 LBS | OXYGEN SATURATION: 96 % | DIASTOLIC BLOOD PRESSURE: 42 MMHG | HEART RATE: 78 BPM | HEIGHT: 64 IN | TEMPERATURE: 98.2 F | BODY MASS INDEX: 48.32 KG/M2 | SYSTOLIC BLOOD PRESSURE: 94 MMHG

## 2024-06-17 DIAGNOSIS — I95.9 HYPOTENSION, UNSPECIFIED HYPOTENSION TYPE: Primary | ICD-10-CM

## 2024-06-17 PROCEDURE — 1159F MED LIST DOCD IN RCRD: CPT | Performed by: NURSE PRACTITIONER

## 2024-06-17 PROCEDURE — 1160F RVW MEDS BY RX/DR IN RCRD: CPT | Performed by: NURSE PRACTITIONER

## 2024-06-17 PROCEDURE — 3044F HG A1C LEVEL LT 7.0%: CPT | Performed by: NURSE PRACTITIONER

## 2024-06-17 PROCEDURE — 1126F AMNT PAIN NOTED NONE PRSNT: CPT | Performed by: NURSE PRACTITIONER

## 2024-06-17 PROCEDURE — 3074F SYST BP LT 130 MM HG: CPT | Performed by: NURSE PRACTITIONER

## 2024-06-17 PROCEDURE — 99213 OFFICE O/P EST LOW 20 MIN: CPT | Performed by: NURSE PRACTITIONER

## 2024-06-17 PROCEDURE — 3078F DIAST BP <80 MM HG: CPT | Performed by: NURSE PRACTITIONER

## 2024-06-18 ENCOUNTER — OFFICE VISIT (OUTPATIENT)
Dept: CARDIOLOGY | Facility: CLINIC | Age: 69
End: 2024-06-18
Payer: MEDICARE

## 2024-06-18 ENCOUNTER — TELEPHONE (OUTPATIENT)
Dept: PULMONOLOGY | Facility: CLINIC | Age: 69
End: 2024-06-18

## 2024-06-18 VITALS
HEIGHT: 64 IN | WEIGHT: 272.2 LBS | DIASTOLIC BLOOD PRESSURE: 71 MMHG | HEART RATE: 69 BPM | BODY MASS INDEX: 46.47 KG/M2 | SYSTOLIC BLOOD PRESSURE: 98 MMHG

## 2024-06-18 DIAGNOSIS — I50.32 CHRONIC DIASTOLIC HEART FAILURE: ICD-10-CM

## 2024-06-18 DIAGNOSIS — R00.2 PALPITATIONS: ICD-10-CM

## 2024-06-18 DIAGNOSIS — I25.10 CORONARY ARTERY CALCIFICATION SEEN ON CT SCAN: ICD-10-CM

## 2024-06-18 DIAGNOSIS — I10 ESSENTIAL HYPERTENSION: Primary | ICD-10-CM

## 2024-06-18 DIAGNOSIS — I10 ESSENTIAL HYPERTENSION: ICD-10-CM

## 2024-06-18 PROCEDURE — 3074F SYST BP LT 130 MM HG: CPT | Performed by: FAMILY MEDICINE

## 2024-06-18 PROCEDURE — 3078F DIAST BP <80 MM HG: CPT | Performed by: FAMILY MEDICINE

## 2024-06-18 PROCEDURE — 99214 OFFICE O/P EST MOD 30 MIN: CPT | Performed by: FAMILY MEDICINE

## 2024-06-18 RX ORDER — LOSARTAN POTASSIUM 25 MG/1
25 TABLET ORAL DAILY
Qty: 90 TABLET | Refills: 1 | Status: SHIPPED | OUTPATIENT
Start: 2024-06-18

## 2024-06-18 RX ORDER — TORSEMIDE 20 MG/1
20 TABLET ORAL DAILY
Start: 2024-06-18

## 2024-06-26 ENCOUNTER — OFFICE VISIT (OUTPATIENT)
Dept: OBSTETRICS AND GYNECOLOGY | Facility: CLINIC | Age: 69
End: 2024-06-26
Payer: MEDICARE

## 2024-06-26 VITALS
DIASTOLIC BLOOD PRESSURE: 66 MMHG | HEART RATE: 88 BPM | BODY MASS INDEX: 46.1 KG/M2 | HEIGHT: 64 IN | WEIGHT: 270 LBS | SYSTOLIC BLOOD PRESSURE: 84 MMHG

## 2024-06-26 DIAGNOSIS — N90.7 VULVAR CYST: Primary | ICD-10-CM

## 2024-06-26 PROBLEM — E61.1 IRON DEFICIENCY: Status: ACTIVE | Noted: 2024-03-09

## 2024-06-26 PROBLEM — E66.3 OVERWEIGHT: Status: ACTIVE | Noted: 2024-02-20

## 2024-06-26 PROCEDURE — 11422 EXC H-F-NK-SP B9+MARG 1.1-2: CPT | Performed by: OBSTETRICS & GYNECOLOGY

## 2024-06-26 PROCEDURE — 3078F DIAST BP <80 MM HG: CPT | Performed by: OBSTETRICS & GYNECOLOGY

## 2024-06-26 PROCEDURE — 88304 TISSUE EXAM BY PATHOLOGIST: CPT | Performed by: OBSTETRICS & GYNECOLOGY

## 2024-06-26 PROCEDURE — 3074F SYST BP LT 130 MM HG: CPT | Performed by: OBSTETRICS & GYNECOLOGY

## 2024-07-01 ENCOUNTER — OFFICE VISIT (OUTPATIENT)
Dept: FAMILY MEDICINE CLINIC | Age: 69
End: 2024-07-01
Payer: MEDICARE

## 2024-07-01 VITALS
SYSTOLIC BLOOD PRESSURE: 102 MMHG | DIASTOLIC BLOOD PRESSURE: 74 MMHG | OXYGEN SATURATION: 95 % | BODY MASS INDEX: 45.62 KG/M2 | HEART RATE: 90 BPM | HEIGHT: 64 IN | TEMPERATURE: 97.9 F | WEIGHT: 267.2 LBS

## 2024-07-01 DIAGNOSIS — N18.30 STAGE 3 CHRONIC KIDNEY DISEASE, UNSPECIFIED WHETHER STAGE 3A OR 3B CKD: ICD-10-CM

## 2024-07-01 DIAGNOSIS — Z78.0 POSTMENOPAUSAL STATE: ICD-10-CM

## 2024-07-01 DIAGNOSIS — R41.3 MEMORY LOSS: ICD-10-CM

## 2024-07-01 DIAGNOSIS — E11.9 TYPE 2 DIABETES MELLITUS WITHOUT COMPLICATION, WITHOUT LONG-TERM CURRENT USE OF INSULIN: ICD-10-CM

## 2024-07-01 DIAGNOSIS — E53.8 B12 DEFICIENCY: ICD-10-CM

## 2024-07-01 DIAGNOSIS — I95.9 HYPOTENSION, UNSPECIFIED HYPOTENSION TYPE: ICD-10-CM

## 2024-07-01 DIAGNOSIS — Z12.11 COLON CANCER SCREENING: ICD-10-CM

## 2024-07-01 DIAGNOSIS — I10 ESSENTIAL HYPERTENSION: ICD-10-CM

## 2024-07-01 DIAGNOSIS — E78.00 HYPERCHOLESTEROLEMIA: ICD-10-CM

## 2024-07-01 DIAGNOSIS — D50.9 IRON DEFICIENCY ANEMIA, UNSPECIFIED IRON DEFICIENCY ANEMIA TYPE: ICD-10-CM

## 2024-07-01 DIAGNOSIS — Z00.00 ENCOUNTER FOR ANNUAL WELLNESS EXAM IN MEDICARE PATIENT: Primary | ICD-10-CM

## 2024-07-01 DIAGNOSIS — Z12.31 ENCOUNTER FOR SCREENING MAMMOGRAM FOR BREAST CANCER: ICD-10-CM

## 2024-07-01 DIAGNOSIS — E55.9 VITAMIN D DEFICIENCY: ICD-10-CM

## 2024-07-01 DIAGNOSIS — Z23 ENCOUNTER FOR IMMUNIZATION: ICD-10-CM

## 2024-07-01 DIAGNOSIS — I50.32 CHRONIC DIASTOLIC HEART FAILURE: ICD-10-CM

## 2024-07-01 PROBLEM — N90.89 VULVAR LESION: Status: RESOLVED | Noted: 2023-10-03 | Resolved: 2024-07-01

## 2024-07-01 PROBLEM — N90.7 VULVAR CYST: Status: ACTIVE | Noted: 2024-07-01

## 2024-07-01 RX ORDER — FERROUS SULFATE 325(65) MG
325 TABLET ORAL
Qty: 90 TABLET | Refills: 1 | Status: SHIPPED | OUTPATIENT
Start: 2024-07-01

## 2024-07-01 RX ORDER — LOSARTAN POTASSIUM 25 MG/1
12.5 TABLET ORAL DAILY
Qty: 45 TABLET | Refills: 1 | Status: SHIPPED | OUTPATIENT
Start: 2024-07-01

## 2024-07-01 RX ORDER — DONEPEZIL HYDROCHLORIDE 10 MG/1
10 TABLET, FILM COATED ORAL NIGHTLY
Qty: 30 TABLET | Refills: 5 | Status: SHIPPED | OUTPATIENT
Start: 2024-07-01

## 2024-07-01 RX ADMIN — CYANOCOBALAMIN 1000 MCG: 1000 INJECTION, SOLUTION INTRAMUSCULAR; SUBCUTANEOUS at 15:53

## 2024-07-02 ENCOUNTER — HOSPITAL ENCOUNTER (OUTPATIENT)
Dept: CT IMAGING | Facility: HOSPITAL | Age: 69
Discharge: HOME | End: 2024-07-02
Admitting: STUDENT IN AN ORGANIZED HEALTH CARE EDUCATION/TRAINING PROGRAM
Payer: MEDICARE

## 2024-07-02 DIAGNOSIS — R06.02 SHORTNESS OF BREATH: ICD-10-CM

## 2024-07-02 PROCEDURE — 71250 CT THORAX DX C-: CPT

## 2024-07-03 ENCOUNTER — LAB (OUTPATIENT)
Dept: LAB | Facility: HOSPITAL | Age: 69
End: 2024-07-03
Payer: MEDICARE

## 2024-07-03 DIAGNOSIS — I10 ESSENTIAL HYPERTENSION: ICD-10-CM

## 2024-07-03 DIAGNOSIS — E53.8 B12 DEFICIENCY: ICD-10-CM

## 2024-07-03 DIAGNOSIS — D50.9 IRON DEFICIENCY ANEMIA, UNSPECIFIED IRON DEFICIENCY ANEMIA TYPE: ICD-10-CM

## 2024-07-03 DIAGNOSIS — E11.9 TYPE 2 DIABETES MELLITUS WITHOUT COMPLICATION, WITHOUT LONG-TERM CURRENT USE OF INSULIN: ICD-10-CM

## 2024-07-03 DIAGNOSIS — I50.32 CHRONIC DIASTOLIC HEART FAILURE: ICD-10-CM

## 2024-07-03 DIAGNOSIS — E78.00 HYPERCHOLESTEROLEMIA: ICD-10-CM

## 2024-07-03 DIAGNOSIS — E55.9 VITAMIN D DEFICIENCY: ICD-10-CM

## 2024-07-03 LAB
ANION GAP SERPL CALCULATED.3IONS-SCNC: 10 MMOL/L (ref 5–15)
BUN SERPL-MCNC: 17 MG/DL (ref 8–23)
BUN/CREAT SERPL: 16.5 (ref 7–25)
CALCIUM SPEC-SCNC: 10.2 MG/DL (ref 8.6–10.5)
CHLORIDE SERPL-SCNC: 99 MMOL/L (ref 98–107)
CHOLEST SERPL-MCNC: 167 MG/DL (ref 0–200)
CO2 SERPL-SCNC: 33 MMOL/L (ref 22–29)
CREAT SERPL-MCNC: 1.03 MG/DL (ref 0.57–1)
EGFRCR SERPLBLD CKD-EPI 2021: 59.3 ML/MIN/1.73
GLUCOSE SERPL-MCNC: 87 MG/DL (ref 65–99)
HBA1C MFR BLD: 5.8 % (ref 4.8–5.6)
HDLC SERPL-MCNC: 59 MG/DL (ref 40–60)
IRON 24H UR-MRATE: 96 MCG/DL (ref 37–145)
IRON SATN MFR SERPL: 27 % (ref 20–50)
LDLC SERPL CALC-MCNC: 89 MG/DL (ref 0–100)
LDLC/HDLC SERPL: 1.47 {RATIO}
POTASSIUM SERPL-SCNC: 3.5 MMOL/L (ref 3.5–5.2)
SODIUM SERPL-SCNC: 142 MMOL/L (ref 136–145)
TIBC SERPL-MCNC: 355 MCG/DL (ref 298–536)
TRANSFERRIN SERPL-MCNC: 238 MG/DL (ref 200–360)
TRIGL SERPL-MCNC: 105 MG/DL (ref 0–150)
VLDLC SERPL-MCNC: 19 MG/DL (ref 5–40)

## 2024-07-03 PROCEDURE — 84466 ASSAY OF TRANSFERRIN: CPT

## 2024-07-03 PROCEDURE — 83540 ASSAY OF IRON: CPT

## 2024-07-03 PROCEDURE — 82607 VITAMIN B-12: CPT

## 2024-07-03 PROCEDURE — 36415 COLL VENOUS BLD VENIPUNCTURE: CPT

## 2024-07-03 PROCEDURE — 80048 BASIC METABOLIC PNL TOTAL CA: CPT

## 2024-07-03 PROCEDURE — 83036 HEMOGLOBIN GLYCOSYLATED A1C: CPT

## 2024-07-03 PROCEDURE — 80061 LIPID PANEL: CPT

## 2024-07-03 PROCEDURE — 82306 VITAMIN D 25 HYDROXY: CPT

## 2024-07-04 LAB
25(OH)D3 SERPL-MCNC: 41.6 NG/ML (ref 30–100)
VIT B12 BLD-MCNC: >2000 PG/ML (ref 211–946)

## 2024-07-17 ENCOUNTER — TELEMEDICINE (OUTPATIENT)
Dept: PSYCHIATRY | Facility: CLINIC | Age: 69
End: 2024-07-17
Payer: MEDICARE

## 2024-07-17 DIAGNOSIS — Z63.9 FAMILY DISTRESS: ICD-10-CM

## 2024-07-17 DIAGNOSIS — F33.9 RECURRENT MAJOR DEPRESSION RESISTANT TO TREATMENT: Primary | ICD-10-CM

## 2024-07-17 DIAGNOSIS — F41.1 GENERALIZED ANXIETY DISORDER: ICD-10-CM

## 2024-07-17 DIAGNOSIS — F68.11: ICD-10-CM

## 2024-07-17 DIAGNOSIS — Z79.899 MEDICATION MANAGEMENT: ICD-10-CM

## 2024-07-17 DIAGNOSIS — Z79.899 POLYPHARMACY: ICD-10-CM

## 2024-07-17 SDOH — SOCIAL STABILITY - SOCIAL INSECURITY: PROBLEM RELATED TO PRIMARY SUPPORT GROUP, UNSPECIFIED: Z63.9

## 2024-07-18 ENCOUNTER — TELEPHONE (OUTPATIENT)
Dept: BEHAVIORAL HEALTH | Facility: CLINIC | Age: 69
End: 2024-07-18
Payer: MEDICARE

## 2024-07-18 RX ORDER — FUROSEMIDE 20 MG/1
TABLET ORAL
Qty: 270 TABLET | Refills: 1 | OUTPATIENT
Start: 2024-07-18

## 2024-07-20 DIAGNOSIS — K21.9 GASTROESOPHAGEAL REFLUX DISEASE, UNSPECIFIED WHETHER ESOPHAGITIS PRESENT: ICD-10-CM

## 2024-07-22 RX ORDER — PANTOPRAZOLE SODIUM 40 MG/1
40 TABLET, DELAYED RELEASE ORAL DAILY
Qty: 90 TABLET | Refills: 0 | Status: SHIPPED | OUTPATIENT
Start: 2024-07-22

## 2024-07-24 DIAGNOSIS — G25.81 RESTLESS LEG SYNDROME: ICD-10-CM

## 2024-07-24 RX ORDER — PRAMIPEXOLE DIHYDROCHLORIDE 0.5 MG/1
0.5 TABLET ORAL 3 TIMES DAILY
Qty: 270 TABLET | Refills: 0 | Status: SHIPPED | OUTPATIENT
Start: 2024-07-24

## 2024-07-25 DIAGNOSIS — M54.50 CHRONIC BILATERAL LOW BACK PAIN, UNSPECIFIED WHETHER SCIATICA PRESENT: ICD-10-CM

## 2024-07-25 DIAGNOSIS — G89.29 CHRONIC BILATERAL LOW BACK PAIN, UNSPECIFIED WHETHER SCIATICA PRESENT: ICD-10-CM

## 2024-07-26 ENCOUNTER — OFFICE VISIT (OUTPATIENT)
Dept: PULMONOLOGY | Facility: CLINIC | Age: 69
End: 2024-07-26
Payer: MEDICARE

## 2024-07-26 VITALS
DIASTOLIC BLOOD PRESSURE: 83 MMHG | TEMPERATURE: 98.2 F | HEIGHT: 64 IN | OXYGEN SATURATION: 93 % | BODY MASS INDEX: 47.15 KG/M2 | HEART RATE: 68 BPM | WEIGHT: 276.2 LBS | RESPIRATION RATE: 18 BRPM | SYSTOLIC BLOOD PRESSURE: 124 MMHG

## 2024-07-26 DIAGNOSIS — R06.09 DYSPNEA ON EXERTION: ICD-10-CM

## 2024-07-26 DIAGNOSIS — J45.30 MILD PERSISTENT ASTHMA WITHOUT COMPLICATION: ICD-10-CM

## 2024-07-26 DIAGNOSIS — F41.9 ANXIETY: ICD-10-CM

## 2024-07-26 DIAGNOSIS — G47.33 OSA ON CPAP: ICD-10-CM

## 2024-07-26 DIAGNOSIS — F17.201 TOBACCO ABUSE, IN REMISSION: ICD-10-CM

## 2024-07-26 DIAGNOSIS — J98.11 ATELECTASIS: ICD-10-CM

## 2024-07-26 DIAGNOSIS — E66.01 MORBID OBESITY WITH BMI OF 45.0-49.9, ADULT: ICD-10-CM

## 2024-07-26 DIAGNOSIS — J96.11 CHRONIC RESPIRATORY FAILURE WITH HYPOXIA: Primary | ICD-10-CM

## 2024-07-26 DIAGNOSIS — J30.2 SEASONAL ALLERGIES: ICD-10-CM

## 2024-07-26 DIAGNOSIS — J44.9 CHRONIC OBSTRUCTIVE PULMONARY DISEASE, UNSPECIFIED COPD TYPE: ICD-10-CM

## 2024-07-26 DIAGNOSIS — I50.32 CHRONIC DIASTOLIC HEART FAILURE: ICD-10-CM

## 2024-07-26 DIAGNOSIS — J43.9 PULMONARY EMPHYSEMA, UNSPECIFIED EMPHYSEMA TYPE: ICD-10-CM

## 2024-07-26 RX ORDER — DICLOFENAC SODIUM 75 MG/1
75 TABLET, DELAYED RELEASE ORAL 2 TIMES DAILY
Qty: 60 TABLET | Refills: 1 | Status: SHIPPED | OUTPATIENT
Start: 2024-07-26

## 2024-07-26 RX ORDER — PREDNISONE 10 MG/1
TABLET ORAL
Qty: 31 TABLET | Refills: 0 | Status: SHIPPED | OUTPATIENT
Start: 2024-07-26

## 2024-07-26 RX ORDER — FLUTICASONE FUROATE, UMECLIDINIUM BROMIDE AND VILANTEROL TRIFENATATE 200; 62.5; 25 UG/1; UG/1; UG/1
1 POWDER RESPIRATORY (INHALATION) DAILY
Qty: 2 EACH | Refills: 0 | COMMUNITY
Start: 2024-07-26 | End: 2024-07-27

## 2024-07-27 DIAGNOSIS — J45.30 MILD PERSISTENT ASTHMA WITHOUT COMPLICATION: ICD-10-CM

## 2024-07-27 DIAGNOSIS — R06.09 DYSPNEA ON EXERTION: ICD-10-CM

## 2024-07-29 RX ORDER — BUDESONIDE, GLYCOPYRROLATE, AND FORMOTEROL FUMARATE 160; 9; 4.8 UG/1; UG/1; UG/1
2 AEROSOL, METERED RESPIRATORY (INHALATION) 2 TIMES DAILY
Qty: 10.7 G | OUTPATIENT
Start: 2024-07-29

## 2024-08-02 ENCOUNTER — HOSPITAL ENCOUNTER (OUTPATIENT)
Dept: BONE DENSITY | Facility: HOSPITAL | Age: 69
Discharge: HOME | End: 2024-08-02
Payer: MEDICARE

## 2024-08-02 DIAGNOSIS — Z78.0 MENOPAUSE: ICD-10-CM

## 2024-08-02 PROCEDURE — 77080 DXA BONE DENSITY AXIAL: CPT

## 2024-08-05 DIAGNOSIS — J44.9 CHRONIC OBSTRUCTIVE PULMONARY DISEASE, UNSPECIFIED COPD TYPE: ICD-10-CM

## 2024-08-05 DIAGNOSIS — F41.1 GENERALIZED ANXIETY DISORDER: ICD-10-CM

## 2024-08-05 DIAGNOSIS — J45.30 MILD PERSISTENT ASTHMA WITHOUT COMPLICATION: ICD-10-CM

## 2024-08-05 DIAGNOSIS — R06.09 DYSPNEA ON EXERTION: ICD-10-CM

## 2024-08-06 RX ORDER — BUSPIRONE HYDROCHLORIDE 10 MG/1
20 TABLET ORAL 3 TIMES DAILY
Qty: 180 TABLET | Refills: 2 | Status: SHIPPED | OUTPATIENT
Start: 2024-08-06 | End: 2024-11-04

## 2024-08-06 RX ORDER — PREDNISONE 10 MG/1
TABLET ORAL
Qty: 31 TABLET | Refills: 0 | OUTPATIENT
Start: 2024-08-06

## 2024-08-13 ENCOUNTER — OFFICE VISIT (OUTPATIENT)
Dept: FAMILY MEDICINE CLINIC | Age: 69
End: 2024-08-13
Payer: MEDICARE

## 2024-08-13 VITALS
HEART RATE: 110 BPM | BODY MASS INDEX: 46.26 KG/M2 | TEMPERATURE: 97.7 F | HEIGHT: 64 IN | DIASTOLIC BLOOD PRESSURE: 84 MMHG | SYSTOLIC BLOOD PRESSURE: 123 MMHG | WEIGHT: 271 LBS

## 2024-08-13 DIAGNOSIS — Z79.899 HIGH RISK MEDICATION USE: ICD-10-CM

## 2024-08-13 DIAGNOSIS — Z78.0 OSTEOPENIA AFTER MENOPAUSE: ICD-10-CM

## 2024-08-13 DIAGNOSIS — E11.9 TYPE 2 DIABETES MELLITUS WITHOUT COMPLICATION, WITHOUT LONG-TERM CURRENT USE OF INSULIN: ICD-10-CM

## 2024-08-13 DIAGNOSIS — I50.32 CHRONIC DIASTOLIC HEART FAILURE: ICD-10-CM

## 2024-08-13 DIAGNOSIS — M54.50 CHRONIC BILATERAL LOW BACK PAIN, UNSPECIFIED WHETHER SCIATICA PRESENT: ICD-10-CM

## 2024-08-13 DIAGNOSIS — R82.90 ABNORMAL URINE ODOR: ICD-10-CM

## 2024-08-13 DIAGNOSIS — M85.80 OSTEOPENIA AFTER MENOPAUSE: ICD-10-CM

## 2024-08-13 DIAGNOSIS — E78.00 HYPERCHOLESTEROLEMIA: ICD-10-CM

## 2024-08-13 DIAGNOSIS — N18.30 STAGE 3 CHRONIC KIDNEY DISEASE, UNSPECIFIED WHETHER STAGE 3A OR 3B CKD: ICD-10-CM

## 2024-08-13 DIAGNOSIS — G89.29 CHRONIC BILATERAL LOW BACK PAIN, UNSPECIFIED WHETHER SCIATICA PRESENT: ICD-10-CM

## 2024-08-13 DIAGNOSIS — G62.9 PERIPHERAL POLYNEUROPATHY: ICD-10-CM

## 2024-08-13 DIAGNOSIS — F41.9 ANXIETY: ICD-10-CM

## 2024-08-13 DIAGNOSIS — D50.9 IRON DEFICIENCY ANEMIA, UNSPECIFIED IRON DEFICIENCY ANEMIA TYPE: ICD-10-CM

## 2024-08-13 DIAGNOSIS — E53.8 B12 DEFICIENCY: ICD-10-CM

## 2024-08-13 DIAGNOSIS — I95.9 HYPOTENSION, UNSPECIFIED HYPOTENSION TYPE: ICD-10-CM

## 2024-08-13 DIAGNOSIS — I10 ESSENTIAL HYPERTENSION: Primary | ICD-10-CM

## 2024-08-13 LAB
AMPHET+METHAMPHET UR QL: NEGATIVE
AMPHETAMINES UR QL: NEGATIVE
BACTERIA UR QL AUTO: ABNORMAL /HPF
BARBITURATES UR QL SCN: POSITIVE
BENZODIAZ UR QL SCN: NEGATIVE
BILIRUB UR QL STRIP: NEGATIVE
BUPRENORPHINE SERPL-MCNC: NEGATIVE NG/ML
CANNABINOIDS SERPL QL: NEGATIVE
CLARITY UR: ABNORMAL
COCAINE UR QL: NEGATIVE
COLOR UR: YELLOW
EXPIRATION DATE: ABNORMAL
GLUCOSE UR STRIP-MCNC: NEGATIVE MG/DL
HGB UR QL STRIP.AUTO: NEGATIVE
HOLD SPECIMEN: NORMAL
HYALINE CASTS UR QL AUTO: ABNORMAL /LPF
KETONES UR QL STRIP: NEGATIVE
LEUKOCYTE ESTERASE UR QL STRIP.AUTO: ABNORMAL
Lab: ABNORMAL
MDMA UR QL SCN: NEGATIVE
METHADONE UR QL SCN: NEGATIVE
MORPHINE/OPIATES SCREEN, URINE: POSITIVE
NITRITE UR QL STRIP: NEGATIVE
OXYCODONE UR QL SCN: NEGATIVE
PCP UR QL SCN: NEGATIVE
PH UR STRIP.AUTO: 5.5 [PH] (ref 5–8)
PROT UR QL STRIP: NEGATIVE
RBC # UR STRIP: ABNORMAL /HPF
REF LAB TEST METHOD: ABNORMAL
SP GR UR STRIP: 1.01 (ref 1–1.03)
SQUAMOUS #/AREA URNS HPF: ABNORMAL /HPF
UROBILINOGEN UR QL STRIP: ABNORMAL
WBC # UR STRIP: ABNORMAL /HPF

## 2024-08-13 PROCEDURE — 81001 URINALYSIS AUTO W/SCOPE: CPT | Performed by: FAMILY MEDICINE

## 2024-08-13 PROCEDURE — 1160F RVW MEDS BY RX/DR IN RCRD: CPT | Performed by: FAMILY MEDICINE

## 2024-08-13 PROCEDURE — 3074F SYST BP LT 130 MM HG: CPT | Performed by: FAMILY MEDICINE

## 2024-08-13 PROCEDURE — 99214 OFFICE O/P EST MOD 30 MIN: CPT | Performed by: FAMILY MEDICINE

## 2024-08-13 PROCEDURE — 1126F AMNT PAIN NOTED NONE PRSNT: CPT | Performed by: FAMILY MEDICINE

## 2024-08-13 PROCEDURE — 3044F HG A1C LEVEL LT 7.0%: CPT | Performed by: FAMILY MEDICINE

## 2024-08-13 PROCEDURE — 1159F MED LIST DOCD IN RCRD: CPT | Performed by: FAMILY MEDICINE

## 2024-08-13 PROCEDURE — 96372 THER/PROPH/DIAG INJ SC/IM: CPT | Performed by: FAMILY MEDICINE

## 2024-08-13 PROCEDURE — 87086 URINE CULTURE/COLONY COUNT: CPT | Performed by: FAMILY MEDICINE

## 2024-08-13 PROCEDURE — 3079F DIAST BP 80-89 MM HG: CPT | Performed by: FAMILY MEDICINE

## 2024-08-13 RX ORDER — CLONAZEPAM 0.5 MG/1
0.5 TABLET ORAL 2 TIMES DAILY PRN
Qty: 60 TABLET | Refills: 0 | Status: SHIPPED | OUTPATIENT
Start: 2024-08-13

## 2024-08-13 RX ORDER — PREGABALIN 75 MG/1
75 CAPSULE ORAL 2 TIMES DAILY
Qty: 60 CAPSULE | Refills: 2 | Status: SHIPPED | OUTPATIENT
Start: 2024-08-13

## 2024-08-13 RX ORDER — LOSARTAN POTASSIUM 25 MG/1
25 TABLET ORAL DAILY
Qty: 90 TABLET | Refills: 1 | Status: SHIPPED | OUTPATIENT
Start: 2024-08-13

## 2024-08-13 RX ADMIN — CYANOCOBALAMIN 1000 MCG: 1000 INJECTION, SOLUTION INTRAMUSCULAR; SUBCUTANEOUS at 17:01

## 2024-08-15 LAB — BACTERIA SPEC AEROBE CULT: NO GROWTH

## 2024-08-19 ENCOUNTER — TELEPHONE (OUTPATIENT)
Dept: BEHAVIORAL HEALTH | Facility: CLINIC | Age: 69
End: 2024-08-19
Payer: MEDICARE

## 2024-08-21 ENCOUNTER — TELEPHONE (OUTPATIENT)
Dept: FAMILY MEDICINE CLINIC | Age: 69
End: 2024-08-21
Payer: MEDICARE

## 2024-08-21 DIAGNOSIS — G25.0 ESSENTIAL TREMOR: Primary | ICD-10-CM

## 2024-08-22 DIAGNOSIS — I10 ESSENTIAL HYPERTENSION: ICD-10-CM

## 2024-08-22 RX ORDER — HYDRALAZINE HYDROCHLORIDE 10 MG/1
10 TABLET, FILM COATED ORAL 3 TIMES DAILY
Qty: 90 TABLET | Refills: 2 | Status: SHIPPED | OUTPATIENT
Start: 2024-08-22

## 2024-08-26 ENCOUNTER — TELEMEDICINE (OUTPATIENT)
Dept: BEHAVIORAL HEALTH | Facility: CLINIC | Age: 69
End: 2024-08-26
Payer: MEDICARE

## 2024-08-26 DIAGNOSIS — F33.9 RECURRENT MAJOR DEPRESSION RESISTANT TO TREATMENT: ICD-10-CM

## 2024-08-26 DIAGNOSIS — F41.1 GENERALIZED ANXIETY DISORDER: ICD-10-CM

## 2024-08-26 DIAGNOSIS — Z79.899 MEDICATION MANAGEMENT: ICD-10-CM

## 2024-08-26 DIAGNOSIS — Z79.899 POLYPHARMACY: ICD-10-CM

## 2024-08-26 DIAGNOSIS — F33.1 MAJOR DEPRESSIVE DISORDER, RECURRENT EPISODE, MODERATE: Primary | ICD-10-CM

## 2024-08-26 DIAGNOSIS — F68.13: ICD-10-CM

## 2024-08-26 PROCEDURE — 1160F RVW MEDS BY RX/DR IN RCRD: CPT | Performed by: NURSE PRACTITIONER

## 2024-08-26 PROCEDURE — 99214 OFFICE O/P EST MOD 30 MIN: CPT | Performed by: NURSE PRACTITIONER

## 2024-08-26 PROCEDURE — 1159F MED LIST DOCD IN RCRD: CPT | Performed by: NURSE PRACTITIONER

## 2024-08-27 DIAGNOSIS — I50.32 CHRONIC DIASTOLIC HEART FAILURE: ICD-10-CM

## 2024-08-27 DIAGNOSIS — E11.9 TYPE 2 DIABETES MELLITUS WITHOUT COMPLICATION, WITHOUT LONG-TERM CURRENT USE OF INSULIN: ICD-10-CM

## 2024-08-27 RX ORDER — TORSEMIDE 20 MG/1
20 TABLET ORAL 2 TIMES DAILY
Qty: 60 TABLET | Refills: 5 | Status: SHIPPED | OUTPATIENT
Start: 2024-08-27

## 2024-08-27 RX ORDER — LANCETS 33 GAUGE
1 EACH MISCELLANEOUS DAILY
Qty: 100 EACH | Refills: 0 | Status: SHIPPED | OUTPATIENT
Start: 2024-08-27

## 2024-09-05 ENCOUNTER — OFFICE VISIT (OUTPATIENT)
Dept: ENDOCRINOLOGY | Age: 69
End: 2024-09-05
Payer: MEDICARE

## 2024-09-05 VITALS
DIASTOLIC BLOOD PRESSURE: 80 MMHG | OXYGEN SATURATION: 97 % | SYSTOLIC BLOOD PRESSURE: 132 MMHG | HEART RATE: 70 BPM | WEIGHT: 283.2 LBS | HEIGHT: 64 IN | BODY MASS INDEX: 48.35 KG/M2

## 2024-09-05 DIAGNOSIS — E11.9 TYPE 2 DIABETES MELLITUS WITHOUT COMPLICATION, WITHOUT LONG-TERM CURRENT USE OF INSULIN: Primary | ICD-10-CM

## 2024-09-05 DIAGNOSIS — E66.01 OBESITY, MORBID, BMI 50 OR HIGHER: ICD-10-CM

## 2024-09-05 RX ORDER — TIRZEPATIDE 10 MG/.5ML
10 INJECTION, SOLUTION SUBCUTANEOUS WEEKLY
Qty: 2 ML | Refills: 5 | Status: SHIPPED | OUTPATIENT
Start: 2024-09-05

## 2024-09-11 ENCOUNTER — OFFICE VISIT (OUTPATIENT)
Dept: CARDIOLOGY | Facility: CLINIC | Age: 69
End: 2024-09-11
Payer: MEDICARE

## 2024-09-11 VITALS
WEIGHT: 283 LBS | HEIGHT: 64 IN | SYSTOLIC BLOOD PRESSURE: 92 MMHG | HEART RATE: 96 BPM | BODY MASS INDEX: 48.32 KG/M2 | DIASTOLIC BLOOD PRESSURE: 45 MMHG

## 2024-09-11 DIAGNOSIS — R00.2 PALPITATIONS: ICD-10-CM

## 2024-09-11 DIAGNOSIS — I10 ESSENTIAL HYPERTENSION: ICD-10-CM

## 2024-09-11 DIAGNOSIS — I50.32 CHRONIC DIASTOLIC HEART FAILURE: Primary | ICD-10-CM

## 2024-09-11 RX ORDER — METOPROLOL SUCCINATE 25 MG/1
25 TABLET, EXTENDED RELEASE ORAL DAILY
Qty: 90 TABLET | Refills: 3 | Status: SHIPPED | OUTPATIENT
Start: 2024-09-11

## 2024-09-12 DIAGNOSIS — F51.05 INSOMNIA SECONDARY TO ANXIETY: ICD-10-CM

## 2024-09-12 DIAGNOSIS — F33.1 MAJOR DEPRESSIVE DISORDER, RECURRENT EPISODE, MODERATE: ICD-10-CM

## 2024-09-12 DIAGNOSIS — F41.9 INSOMNIA SECONDARY TO ANXIETY: ICD-10-CM

## 2024-09-12 DIAGNOSIS — I10 ESSENTIAL HYPERTENSION: ICD-10-CM

## 2024-09-12 RX ORDER — AMLODIPINE BESYLATE 5 MG/1
TABLET ORAL
Qty: 90 TABLET | Refills: 0 | OUTPATIENT
Start: 2024-09-12

## 2024-09-12 RX ORDER — MIRTAZAPINE 7.5 MG/1
7.5 TABLET, FILM COATED ORAL NIGHTLY
Qty: 30 TABLET | Refills: 1 | Status: SHIPPED | OUTPATIENT
Start: 2024-09-12

## 2024-09-30 ENCOUNTER — TELEMEDICINE (OUTPATIENT)
Dept: FAMILY MEDICINE CLINIC | Age: 69
End: 2024-09-30
Payer: MEDICARE

## 2024-09-30 VITALS — WEIGHT: 276 LBS | HEIGHT: 64 IN | BODY MASS INDEX: 47.12 KG/M2

## 2024-09-30 DIAGNOSIS — D50.9 IRON DEFICIENCY ANEMIA, UNSPECIFIED IRON DEFICIENCY ANEMIA TYPE: ICD-10-CM

## 2024-09-30 DIAGNOSIS — F51.04 PSYCHOPHYSIOLOGICAL INSOMNIA: ICD-10-CM

## 2024-09-30 DIAGNOSIS — F41.9 ANXIETY: Primary | ICD-10-CM

## 2024-09-30 DIAGNOSIS — E11.9 TYPE 2 DIABETES MELLITUS WITHOUT COMPLICATION, WITHOUT LONG-TERM CURRENT USE OF INSULIN: ICD-10-CM

## 2024-09-30 DIAGNOSIS — R53.83 FATIGUE, UNSPECIFIED TYPE: ICD-10-CM

## 2024-09-30 DIAGNOSIS — I10 ESSENTIAL HYPERTENSION: ICD-10-CM

## 2024-09-30 DIAGNOSIS — G25.0 ESSENTIAL TREMOR: ICD-10-CM

## 2024-09-30 PROCEDURE — 1126F AMNT PAIN NOTED NONE PRSNT: CPT | Performed by: FAMILY MEDICINE

## 2024-09-30 PROCEDURE — 3044F HG A1C LEVEL LT 7.0%: CPT | Performed by: FAMILY MEDICINE

## 2024-09-30 PROCEDURE — 99214 OFFICE O/P EST MOD 30 MIN: CPT | Performed by: FAMILY MEDICINE

## 2024-09-30 PROCEDURE — G2211 COMPLEX E/M VISIT ADD ON: HCPCS | Performed by: FAMILY MEDICINE

## 2024-10-01 ENCOUNTER — TELEPHONE (OUTPATIENT)
Dept: FAMILY MEDICINE CLINIC | Age: 69
End: 2024-10-01

## 2024-10-01 ENCOUNTER — CLINICAL SUPPORT (OUTPATIENT)
Dept: FAMILY MEDICINE CLINIC | Age: 69
End: 2024-10-01
Payer: MEDICARE

## 2024-10-01 VITALS — SYSTOLIC BLOOD PRESSURE: 104 MMHG | OXYGEN SATURATION: 97 % | HEART RATE: 75 BPM | DIASTOLIC BLOOD PRESSURE: 62 MMHG

## 2024-10-01 DIAGNOSIS — F41.9 ANXIETY: ICD-10-CM

## 2024-10-01 PROCEDURE — 96372 THER/PROPH/DIAG INJ SC/IM: CPT | Performed by: FAMILY MEDICINE

## 2024-10-01 RX ADMIN — CYANOCOBALAMIN 1000 MCG: 1000 INJECTION, SOLUTION INTRAMUSCULAR; SUBCUTANEOUS at 16:22

## 2024-10-02 RX ORDER — CLONAZEPAM 0.5 MG/1
0.5 TABLET ORAL 2 TIMES DAILY PRN
Qty: 60 TABLET | Refills: 0 | Status: SHIPPED | OUTPATIENT
Start: 2024-10-02

## 2024-10-03 ENCOUNTER — OFFICE VISIT (OUTPATIENT)
Dept: PODIATRY | Facility: CLINIC | Age: 69
End: 2024-10-03
Payer: MEDICARE

## 2024-10-03 VITALS
OXYGEN SATURATION: 98 % | DIASTOLIC BLOOD PRESSURE: 54 MMHG | BODY MASS INDEX: 47.12 KG/M2 | HEIGHT: 64 IN | WEIGHT: 276 LBS | HEART RATE: 68 BPM | SYSTOLIC BLOOD PRESSURE: 90 MMHG

## 2024-10-03 DIAGNOSIS — F51.05 INSOMNIA SECONDARY TO ANXIETY: ICD-10-CM

## 2024-10-03 DIAGNOSIS — I50.32 CHRONIC DIASTOLIC HEART FAILURE: ICD-10-CM

## 2024-10-03 DIAGNOSIS — L60.0 ONYCHOCRYPTOSIS: Primary | ICD-10-CM

## 2024-10-03 DIAGNOSIS — B35.3 TINEA PEDIS OF BOTH FEET: ICD-10-CM

## 2024-10-03 DIAGNOSIS — M79.671 FOOT PAIN, BILATERAL: ICD-10-CM

## 2024-10-03 DIAGNOSIS — F33.1 MAJOR DEPRESSIVE DISORDER, RECURRENT EPISODE, MODERATE: ICD-10-CM

## 2024-10-03 DIAGNOSIS — M79.89 LEG SWELLING: ICD-10-CM

## 2024-10-03 DIAGNOSIS — M79.672 FOOT PAIN, BILATERAL: ICD-10-CM

## 2024-10-03 DIAGNOSIS — R26.2 DIFFICULTY WALKING: ICD-10-CM

## 2024-10-03 DIAGNOSIS — E11.9 NON-INSULIN DEPENDENT TYPE 2 DIABETES MELLITUS: ICD-10-CM

## 2024-10-03 DIAGNOSIS — F41.9 INSOMNIA SECONDARY TO ANXIETY: ICD-10-CM

## 2024-10-03 DIAGNOSIS — E11.8 DIABETIC FOOT: ICD-10-CM

## 2024-10-03 DIAGNOSIS — B35.1 ONYCHOMYCOSIS: ICD-10-CM

## 2024-10-03 RX ORDER — MIRTAZAPINE 7.5 MG/1
7.5 TABLET, FILM COATED ORAL NIGHTLY
Qty: 30 TABLET | Refills: 1 | OUTPATIENT
Start: 2024-10-03

## 2024-10-03 RX ORDER — MICONAZOLE NITRATE 20 MG/G
1 POWDER TOPICAL 2 TIMES DAILY
Qty: 71 G | Refills: 11 | Status: SHIPPED | OUTPATIENT
Start: 2024-10-03

## 2024-10-04 ENCOUNTER — TELEMEDICINE (OUTPATIENT)
Dept: BEHAVIORAL HEALTH | Facility: CLINIC | Age: 69
End: 2024-10-04
Payer: MEDICARE

## 2024-10-04 DIAGNOSIS — F33.9 RECURRENT MAJOR DEPRESSION RESISTANT TO TREATMENT: ICD-10-CM

## 2024-10-04 DIAGNOSIS — F41.9 INSOMNIA SECONDARY TO ANXIETY: ICD-10-CM

## 2024-10-04 DIAGNOSIS — F68.13: ICD-10-CM

## 2024-10-04 DIAGNOSIS — F41.1 GENERALIZED ANXIETY DISORDER: ICD-10-CM

## 2024-10-04 DIAGNOSIS — Z79.899 POLYPHARMACY: ICD-10-CM

## 2024-10-04 DIAGNOSIS — F33.1 MAJOR DEPRESSIVE DISORDER, RECURRENT EPISODE, MODERATE: Primary | ICD-10-CM

## 2024-10-04 DIAGNOSIS — F51.05 INSOMNIA SECONDARY TO ANXIETY: ICD-10-CM

## 2024-10-04 DIAGNOSIS — Z79.899 MEDICATION MANAGEMENT: ICD-10-CM

## 2024-10-04 RX ORDER — SPIRONOLACTONE 25 MG/1
25 TABLET ORAL 2 TIMES DAILY
Qty: 60 TABLET | Refills: 5 | Status: SHIPPED | OUTPATIENT
Start: 2024-10-04

## 2024-10-04 RX ORDER — MIRTAZAPINE 7.5 MG/1
7.5 TABLET, FILM COATED ORAL NIGHTLY
Qty: 30 TABLET | Refills: 1 | Status: SHIPPED | OUTPATIENT
Start: 2024-10-04

## 2024-10-07 ENCOUNTER — OFFICE VISIT (OUTPATIENT)
Dept: PULMONOLOGY | Facility: CLINIC | Age: 69
End: 2024-10-07
Payer: MEDICARE

## 2024-10-07 VITALS
HEIGHT: 64 IN | OXYGEN SATURATION: 97 % | RESPIRATION RATE: 16 BRPM | SYSTOLIC BLOOD PRESSURE: 101 MMHG | BODY MASS INDEX: 47.07 KG/M2 | DIASTOLIC BLOOD PRESSURE: 46 MMHG | WEIGHT: 275.7 LBS | TEMPERATURE: 97.6 F | HEART RATE: 66 BPM

## 2024-10-07 DIAGNOSIS — J30.2 SEASONAL ALLERGIES: ICD-10-CM

## 2024-10-07 DIAGNOSIS — R06.2 WHEEZING: ICD-10-CM

## 2024-10-07 DIAGNOSIS — J43.9 PULMONARY EMPHYSEMA, UNSPECIFIED EMPHYSEMA TYPE: ICD-10-CM

## 2024-10-07 DIAGNOSIS — J45.30 MILD PERSISTENT ASTHMA WITHOUT COMPLICATION: Primary | ICD-10-CM

## 2024-10-07 DIAGNOSIS — R06.09 DYSPNEA ON EXERTION: ICD-10-CM

## 2024-10-07 DIAGNOSIS — J96.11 CHRONIC RESPIRATORY FAILURE WITH HYPOXIA: ICD-10-CM

## 2024-10-07 DIAGNOSIS — E66.01 MORBID OBESITY WITH BMI OF 45.0-49.9, ADULT: ICD-10-CM

## 2024-10-07 DIAGNOSIS — F17.201 TOBACCO ABUSE, IN REMISSION: ICD-10-CM

## 2024-10-07 DIAGNOSIS — F41.9 ANXIETY: ICD-10-CM

## 2024-10-07 DIAGNOSIS — J44.9 CHRONIC OBSTRUCTIVE PULMONARY DISEASE, UNSPECIFIED COPD TYPE: ICD-10-CM

## 2024-10-07 DIAGNOSIS — G47.33 OSA ON CPAP: ICD-10-CM

## 2024-10-07 DIAGNOSIS — I50.32 CHRONIC DIASTOLIC HEART FAILURE: ICD-10-CM

## 2024-10-07 PROCEDURE — 99214 OFFICE O/P EST MOD 30 MIN: CPT

## 2024-10-07 PROCEDURE — 1159F MED LIST DOCD IN RCRD: CPT

## 2024-10-07 PROCEDURE — 3074F SYST BP LT 130 MM HG: CPT

## 2024-10-07 PROCEDURE — 1160F RVW MEDS BY RX/DR IN RCRD: CPT

## 2024-10-07 PROCEDURE — 3078F DIAST BP <80 MM HG: CPT

## 2024-10-07 RX ORDER — PREDNISONE 20 MG/1
40 TABLET ORAL DAILY
Qty: 14 TABLET | Refills: 0 | Status: SHIPPED | OUTPATIENT
Start: 2024-10-07 | End: 2024-10-14

## 2024-10-22 ENCOUNTER — TELEPHONE (OUTPATIENT)
Dept: ENDOCRINOLOGY | Age: 69
End: 2024-10-22
Payer: MEDICARE

## 2024-10-23 DIAGNOSIS — K21.9 GASTROESOPHAGEAL REFLUX DISEASE, UNSPECIFIED WHETHER ESOPHAGITIS PRESENT: ICD-10-CM

## 2024-10-23 RX ORDER — PANTOPRAZOLE SODIUM 40 MG/1
40 TABLET, DELAYED RELEASE ORAL DAILY
Qty: 90 TABLET | Refills: 0 | Status: SHIPPED | OUTPATIENT
Start: 2024-10-23

## 2024-10-30 ENCOUNTER — OFFICE VISIT (OUTPATIENT)
Dept: FAMILY MEDICINE CLINIC | Age: 69
End: 2024-10-30
Payer: MEDICARE

## 2024-10-30 VITALS
SYSTOLIC BLOOD PRESSURE: 90 MMHG | DIASTOLIC BLOOD PRESSURE: 62 MMHG | BODY MASS INDEX: 49 KG/M2 | HEART RATE: 76 BPM | HEIGHT: 64 IN | WEIGHT: 287 LBS | OXYGEN SATURATION: 93 %

## 2024-10-30 DIAGNOSIS — R09.81 SINUS CONGESTION: ICD-10-CM

## 2024-10-30 DIAGNOSIS — R41.3 MEMORY LOSS: ICD-10-CM

## 2024-10-30 DIAGNOSIS — R53.83 OTHER FATIGUE: ICD-10-CM

## 2024-10-30 DIAGNOSIS — R60.0 BILATERAL LEG EDEMA: Primary | ICD-10-CM

## 2024-10-30 DIAGNOSIS — I50.32 CHRONIC DIASTOLIC HEART FAILURE: ICD-10-CM

## 2024-10-30 DIAGNOSIS — I95.89 OTHER SPECIFIED HYPOTENSION: ICD-10-CM

## 2024-10-30 LAB
BILIRUB UR QL STRIP: NEGATIVE
CLARITY UR: ABNORMAL
COLOR UR: YELLOW
EXPIRATION DATE: NORMAL
FLUAV AG UPPER RESP QL IA.RAPID: NOT DETECTED
FLUBV AG UPPER RESP QL IA.RAPID: NOT DETECTED
GLUCOSE UR STRIP-MCNC: NEGATIVE MG/DL
HGB UR QL STRIP.AUTO: NEGATIVE
INTERNAL CONTROL: NORMAL
KETONES UR QL STRIP: NEGATIVE
LEUKOCYTE ESTERASE UR QL STRIP.AUTO: NEGATIVE
Lab: NORMAL
NITRITE UR QL STRIP: NEGATIVE
PH UR STRIP.AUTO: 5.5 [PH] (ref 5–8)
PROT UR QL STRIP: NEGATIVE
SARS-COV-2 AG UPPER RESP QL IA.RAPID: NOT DETECTED
SP GR UR STRIP: 1.02 (ref 1–1.03)
UROBILINOGEN UR QL STRIP: ABNORMAL

## 2024-10-30 PROCEDURE — 96372 THER/PROPH/DIAG INJ SC/IM: CPT | Performed by: FAMILY MEDICINE

## 2024-10-30 PROCEDURE — 3044F HG A1C LEVEL LT 7.0%: CPT | Performed by: FAMILY MEDICINE

## 2024-10-30 PROCEDURE — 3074F SYST BP LT 130 MM HG: CPT | Performed by: FAMILY MEDICINE

## 2024-10-30 PROCEDURE — 81003 URINALYSIS AUTO W/O SCOPE: CPT | Performed by: FAMILY MEDICINE

## 2024-10-30 PROCEDURE — 99214 OFFICE O/P EST MOD 30 MIN: CPT | Performed by: FAMILY MEDICINE

## 2024-10-30 PROCEDURE — 87428 SARSCOV & INF VIR A&B AG IA: CPT | Performed by: FAMILY MEDICINE

## 2024-10-30 PROCEDURE — 1126F AMNT PAIN NOTED NONE PRSNT: CPT | Performed by: FAMILY MEDICINE

## 2024-10-30 PROCEDURE — 3078F DIAST BP <80 MM HG: CPT | Performed by: FAMILY MEDICINE

## 2024-10-30 RX ORDER — MEMANTINE HYDROCHLORIDE 5 MG/1
5 TABLET ORAL DAILY
Qty: 30 TABLET | Refills: 2 | Status: SHIPPED | OUTPATIENT
Start: 2024-10-30

## 2024-10-30 RX ADMIN — CYANOCOBALAMIN 1000 MCG: 1000 INJECTION, SOLUTION INTRAMUSCULAR; SUBCUTANEOUS at 14:54

## 2024-10-30 NOTE — PROGRESS NOTES
Chasity Torres presents to be seen at [facility]      Chief Complaint:  stress, wt gain    Subjective     History of Present Illness:  HPI    I am seeing Chasity for ongoing fatigue.  Of note her blood pressure is low today.  It was also low last visit.  She had been taken off her losartan but it looks like she has continued to take it.  In addition she is on metoprolol.  I think with her blood pressure is low as it is today this would cause extreme fatigue.  She also has ongoing depression and sadness.  She lives with chronic anxiety and in her household.  Today is her F2 F for refill of her clonazepam.  This helps keep her calm and helps with her tremor. She is also on Vryalar and BuSpar and continues to see her psychiatrist  Best Paz.  She has no suicidal or homicidal thoughts.  She is sleeping okay at night.  She has had a 12 pound weight gain in the past month and is on Mounjaro and tolerating well and lost 37 pounds with this medication.  This weight gain is quite surprising and I am wondering if she has got fluid retention going on.  She has underlying history of congestive heart failure and sees cardiologist Dr. George.  She does have mild lower extremity edema but has no chest pain and is not currently short of air.  She is on torsemide 20 mg twice daily.   Last echo showed LV ejection fraction 65 to 70% with normal valvular structure and function     Her breathing and COPD today are stable.  Current treatment includes albuterol, Trelegy, DuoNebs and O2 as needed she wears CPAP at night    She presents with ongoing memory issues and would like to see if she could increase her Aricept.  Says that she just feels foggy in the brain and has trouble recalling names and feels very forgetful.    She has a history of diabetes.  She is currently on Mounjaro and tolerates med well.  She has lost approximately 37 pounds  Her last hemoglobin A1c was 5.8 %.  She rarely checks her home blood sugar.  She denies  "hypoglycemia symptoms.  She has peripheral neuropathy but no other acute foot issues.  She is aware she needs to see the eye doctor once a year.  She defers dietitian referral and is not following a diabetic diet.      She also has chronic hypercholesterolemia, she is overall stable on low-cholesterol diet due for cholesterol.  Defer statin treatment        Result Review   The following data was reviewed by Ami Diego MD on 09/30/2024.  Lab Results   Component Value Date    WBC 7.61 06/05/2024    HGB 13.7 06/05/2024    HCT 43.6 06/05/2024    MCV 90.3 06/05/2024     06/05/2024     Lab Results   Component Value Date    GLUCOSE 87 07/03/2024    BUN 17 07/03/2024    CREATININE 1.03 (H) 07/03/2024     07/03/2024    K 3.5 07/03/2024    CL 99 07/03/2024    CALCIUM 10.2 07/03/2024    PROTEINTOT 7.7 06/05/2024    ALBUMIN 4.2 06/05/2024    ALT 17 06/05/2024    AST 18 06/05/2024    ALKPHOS 113 06/05/2024    BILITOT 0.2 06/05/2024    GLOB 3.5 06/05/2024    AGRATIO 1.2 06/05/2024    BCR 16.5 07/03/2024    ANIONGAP 10.0 07/03/2024    EGFR 59.3 (L) 07/03/2024     Lab Results   Component Value Date    CHOL 167 07/03/2024    CHLPL 166 12/16/2020    TRIG 105 07/03/2024    HDL 59 07/03/2024    LDL 89 07/03/2024     Lab Results   Component Value Date    TSH 0.774 02/21/2024     Lab Results   Component Value Date    HGBA1C 5.80 (H) 07/03/2024     No results found for: \"PSA\"  Lab Results   Component Value Date    IRON 96 07/03/2024      Lab Results   Component Value Date    PONF79HB 41.6 07/03/2024               Assessment and Plan:   Diagnoses and all orders for this visit:    1. Bilateral leg edema (Primary)  Comments:  Will check a BNP to rule out acute congestive heart failure given 12 pound weight gain and mild lower extremity edema  Orders:  -     proBNP; Future    2. Chronic diastolic heart failure  Comments:  Will check a BNP to rule out acute congestive heart failure given 12 pound weight gain and mild " lower extremity edema  Orders:  -     proBNP; Future  -     Comprehensive metabolic panel; Future    3. Other fatigue  Comments:  She does have a cold she is chronically fatigued.  Will check labs  Orders:  -     CBC No Differential; Future  -     Urinalysis With Culture If Indicated -; Future  -     Urinalysis With Culture If Indicated - Urine, Clean Catch    4. Other specified hypotension  Comments:  Stop losartan and check home BP and call if BP remains less than 115/50    5. Sinus congestion  Comments:  Around a granddaughter who has a cold and pneumonia.  COVID and flu were negative.  Conservative treatment with rest and pushing fluids  Orders:  -     POCT SARS-CoV-2 Antigen DEANDRE + Flu    6. Memory loss  Comments:  Will add Namenda 5 mg daily    Other orders  -     memantine (Namenda) 5 MG tablet; Take 1 tablet by mouth Daily.  Dispense: 30 tablet; Refill: 2            Objective     Medications:  Current Outpatient Medications   Medication Instructions    albuterol sulfate  (90 Base) MCG/ACT inhaler 2 puffs, Inhalation, Every 4 Hours PRN    betamethasone dipropionate (DIPROLENE) 0.05 % ointment Apply 1 Application topically to the appropriate area as directed As Needed.    Blood Glucose Monitoring Suppl device 1 each, Does not apply, Daily    busPIRone (BUSPAR) 10 MG tablet Take 2 tablets by mouth 3 (Three) Times a Day    Calcium Carb-Cholecalciferol (Calcium 500 + D) 500-3.125 MG-MCG tablet 1 each, Oral, 2 Times Daily    Cariprazine HCl (Vraylar) 3 MG capsule capsule Take 1 capsule by mouth Every Morning.    clonazePAM (KLONOPIN) 0.5 mg, Oral, 2 Times Daily PRN    diclofenac (VOLTAREN) 75 mg, Oral, 2 Times Daily    donepezil (ARICEPT) 10 mg, Oral, Nightly    FeroSul 325 mg, Oral, Daily With Breakfast    Fluticasone-Umeclidin-Vilant (TRELEGY ELLIPTA) 200-62.5-25 MCG/ACT inhaler 1 puff, Inhalation, Daily - RT    glucose blood test strip Use as instructed to check blood sugar daily     "HYDROcodone-acetaminophen (NORCO)  MG per tablet     ipratropium-albuterol (DUO-NEB) 0.5-2.5 mg/3 ml nebulizer Nebulize and inhale 1 vial 4 (Four) Times a Day As Needed for Wheezing.    Lancet Device misc 1 each, Does not apply, Daily, Use as directed; check blood sugar once daily    Lancets (OneTouch Delica Plus [id number]) misc 1 each, Does not apply, Daily    Magnesium Oxide -Mg Supplement 400 mg, Oral, Nightly    memantine (NAMENDA) 5 mg, Oral, Daily    metoprolol succinate XL (TOPROL-XL) 25 mg, Oral, Daily    miconazole (Desenex) 2 % powder Apply topically to the appropriate area as directed 2 (Two) Times a Day.    mirtazapine (REMERON) 7.5 MG tablet Take 1 tablet by mouth Every Night.    mupirocin (BACTROBAN) 2 % ointment APPLY TO AFFECTED AREA(S) TWO TIMES A DAY AS DIRECTED FOR 14 DAYS -    naloxone (NARCAN) 4 MG/0.1ML nasal spray 1 spray, As Needed    O2 (OXYGEN) 2 L/min, Nightly    pantoprazole (PROTONIX) 40 mg, Oral, Daily    pramipexole (MIRAPEX) 0.5 mg, Oral, 3 Times Daily    pregabalin (LYRICA) 75 mg, Oral, 2 Times Daily    primidone (MYSOLINE) 50 mg    spironolactone (ALDACTONE) 25 mg, Oral, 2 Times Daily    tacrolimus (PROTOPIC) 0.1 % ointment Apply topical ointment to affected area twice daily    Tirzepatide (Mounjaro) 12.5 MG/0.5ML solution pen-injector pen Inject 12.5mg under the skin into the appropriate area as directed 1 (One) Time Per Week.    torsemide (DEMADEX) 20 mg, Oral, 2 Times Daily    triamcinolone (KENALOG) 0.025 % cream As Needed        Vital Signs:   BP 90/62 (BP Location: Left arm, Patient Position: Sitting, Cuff Size: Large Adult)   Pulse 76   Ht 162.6 cm (64.02\")   Wt 130 kg (287 lb)   SpO2 93%   BMI 49.24 kg/m²             Physical Exam:  Physical Exam  Vitals and nursing note reviewed.   Constitutional:       General: She is not in acute distress.     Appearance: Normal appearance. She is obese. She is not ill-appearing, toxic-appearing or diaphoretic.   HENT:      " Head: Normocephalic and atraumatic.      Right Ear: Tympanic membrane, ear canal and external ear normal.      Left Ear: Tympanic membrane, ear canal and external ear normal.      Nose: No congestion or rhinorrhea.      Mouth/Throat:      Mouth: Mucous membranes are moist.      Pharynx: Oropharynx is clear. No oropharyngeal exudate or posterior oropharyngeal erythema.   Eyes:      Extraocular Movements: Extraocular movements intact.      Conjunctiva/sclera: Conjunctivae normal.      Pupils: Pupils are equal, round, and reactive to light.   Cardiovascular:      Rate and Rhythm: Normal rate and regular rhythm.      Heart sounds: Normal heart sounds.   Pulmonary:      Effort: Pulmonary effort is normal.      Breath sounds: Normal breath sounds. No wheezing, rhonchi or rales.   Abdominal:      General: Abdomen is flat.      Palpations: Abdomen is soft. There is no mass.      Tenderness: There is no abdominal tenderness.      Hernia: No hernia is present.   Musculoskeletal:      Cervical back: Neck supple. No rigidity.      Right lower leg: No edema.      Left lower leg: No edema.   Lymphadenopathy:      Cervical: No cervical adenopathy.   Skin:     General: Skin is warm and dry.   Neurological:      General: No focal deficit present.      Mental Status: She is alert and oriented to person, place, and time. Mental status is at baseline.      Motor: Weakness present.      Comments: She has a mild tremor at rest with her hands and occasionally with her head   Psychiatric:         Mood and Affect: Mood is depressed. Affect is flat.         Speech: Speech normal.         Behavior: Behavior normal. Behavior is cooperative.         Thought Content: Thought content normal.         Cognition and Memory: Memory is impaired.         Judgment: Judgment normal.           Review of Systems:  Review of Systems   Constitutional:  Positive for fatigue. Negative for chills and fever.   HENT:  Negative for ear pain, sinus pressure and  sore throat.    Eyes:  Negative for blurred vision and double vision.   Respiratory:  Negative for cough, shortness of breath and wheezing.    Cardiovascular:  Negative for chest pain and palpitations.   Gastrointestinal:  Negative for abdominal pain, blood in stool, constipation, diarrhea, nausea and vomiting.   Neurological:  Positive for tremors and weakness. Negative for dizziness and headache.   Psychiatric/Behavioral:  Positive for sleep disturbance and depressed mood. Negative for suicidal ideas. The patient is nervous/anxious.               Follow Up   Return in about 1 month (around 11/30/2024).    Part of this note may be an electronic transcription/translation of spoken language to printed   text using the Dragon Dictation System.            Medical History:  Medications Discontinued During This Encounter   Medication Reason    losartan (COZAAR) 25 MG tablet *Therapy completed      Past Medical History:    Adverse effect of other viral vaccines, initial encounter    Covid vaccine    Allergic fungal sinusitis    Allergic rhinitis    Anxiety disorder    Asthma    CHF (congestive heart failure)    COPD with acute exacerbation    CTS (carpal tunnel syndrome)    left    Difficulty walking    Essential (primary) hypertension    Essential tremor    Hypercholesterolemia    Irritable bowel syndrome    Low back pain    Major depressive disorder    Morbid (severe) obesity due to excess calories    Nasal congestion    Neuropathy in diabetes    Obstructive sleep apnea (adult) (pediatric)    Other hereditary and idiopathic neuropathies    Pain in left hip    Pain in right toe(s)    Pain in thoracic spine    Peripheral neuropathy    Hands    Primary generalized (osteo)arthritis    Primary insomnia    Pulmonary emphysema    Restless leg syndrome    SOBOE (shortness of breath on exertion)    Substance abuse    Type 2 diabetes mellitus without complication, without long-term current use of insulin    Vitamin D deficiency      Past Surgical History:    CARPAL TUNNEL RELEASE    COLONOSCOPY    ENDOSCOPIC FUNCTIONAL SINUS SURGERY (FESS)    Procedure: ENDOSCOPIC FUNCTIONAL SINUS SURGERY, left maxillary antrostomy with removal of contents, possible left ethmoidectomy;  Surgeon: Johnny Calderon MD;  [place] [facility] [place] [facility] [place]  Service: [place];  Laterality: Left;    HYSTERECTOMY    complete- ovarian cysts      Family History   Problem Relation Age of Onset    COPD Father     Heart failure Father     Hypertension Mother     Neuropathy Sister     Anxiety disorder Daughter     Depression Daughter     Malig Hyperthermia Neg Hx     Breast cancer Neg Hx     Ovarian cancer Neg Hx     Uterine cancer Neg Hx     Cervical cancer Neg Hx     Colon cancer Neg Hx     Stomach cancer Neg Hx     Skin cancer Neg Hx     Clotting disorder Neg Hx     Deep vein thrombosis Neg Hx     Pulmonary embolism Neg Hx      Social History     Tobacco Use    Smoking status: Former     Current packs/day: 0.00     Average packs/day: 0.5 packs/day for 12.0 years (6.0 ttl pk-yrs)     Types: Cigarettes     Start date: 1/1/2009     Quit date: 1/1/2021     Years since quitting: 3.8     Passive exposure: Never    Smokeless tobacco: Never   Substance Use Topics    Alcohol use: Not Currently       Health Maintenance Due   Topic Date Due    DIABETIC EYE EXAM  06/28/2024    INFLUENZA VACCINE  08/01/2024    HEMOGLOBIN A1C  01/03/2025        Immunization History   Administered Date(s) Administered    Arexvy (RSV, Adults 60+ yrs) 01/11/2024    COVID-19 (ANGELA) 12/28/2021    COVID-19 (MODERNA) 1st,2nd,3rd Dose Monovalent 03/17/2021    COVID-19 (PFIZER) 12YRS+ (COMIRNATY) 01/17/2024, 05/15/2024    Covid-19 (Pfizer) Gray Cap Monovalent 05/09/2022    Fluzone  >6mos 10/07/2013    Fluzone (or Fluarix & Flulaval for VFC) >6mos 10/10/2017    Fluzone High-Dose 65+yrs 03/31/2022, 09/30/2022, 10/18/2023    Influenza Seasonal Injectable 12/20/2012    Pneumococcal Conjugate 13-Valent (PCV13) 09/29/2020     Pneumococcal Polysaccharide (PPSV23) 11/30/2020    Shingrix 03/28/2023, 06/27/2023    Tdap 05/20/2015    Zostavax 09/16/2008       No Known Allergies

## 2024-10-31 ENCOUNTER — TELEPHONE (OUTPATIENT)
Dept: FAMILY MEDICINE CLINIC | Age: 69
End: 2024-10-31
Payer: MEDICARE

## 2024-11-01 ENCOUNTER — LAB (OUTPATIENT)
Dept: LAB | Facility: HOSPITAL | Age: 69
End: 2024-11-01
Payer: MEDICARE

## 2024-11-01 DIAGNOSIS — I50.32 CHRONIC DIASTOLIC HEART FAILURE: ICD-10-CM

## 2024-11-01 DIAGNOSIS — R60.0 BILATERAL LEG EDEMA: ICD-10-CM

## 2024-11-01 DIAGNOSIS — R53.83 OTHER FATIGUE: ICD-10-CM

## 2024-11-01 DIAGNOSIS — F41.9 ANXIETY: ICD-10-CM

## 2024-11-01 LAB
ALBUMIN SERPL-MCNC: 3.8 G/DL (ref 3.5–5.2)
ALBUMIN/GLOB SERPL: 1.2 G/DL
ALP SERPL-CCNC: 123 U/L (ref 39–117)
ALT SERPL W P-5'-P-CCNC: 15 U/L (ref 1–33)
ANION GAP SERPL CALCULATED.3IONS-SCNC: 10.6 MMOL/L (ref 5–15)
AST SERPL-CCNC: 20 U/L (ref 1–32)
BILIRUB SERPL-MCNC: 0.3 MG/DL (ref 0–1.2)
BUN SERPL-MCNC: 14 MG/DL (ref 8–23)
BUN/CREAT SERPL: 11.1 (ref 7–25)
CALCIUM SPEC-SCNC: 10 MG/DL (ref 8.6–10.5)
CHLORIDE SERPL-SCNC: 102 MMOL/L (ref 98–107)
CO2 SERPL-SCNC: 29.4 MMOL/L (ref 22–29)
CREAT SERPL-MCNC: 1.26 MG/DL (ref 0.57–1)
DEPRECATED RDW RBC AUTO: 46.3 FL (ref 37–54)
EGFRCR SERPLBLD CKD-EPI 2021: 46.6 ML/MIN/1.73
ERYTHROCYTE [DISTWIDTH] IN BLOOD BY AUTOMATED COUNT: 13 % (ref 12.3–15.4)
GLOBULIN UR ELPH-MCNC: 3.3 GM/DL
GLUCOSE SERPL-MCNC: 102 MG/DL (ref 65–99)
HCT VFR BLD AUTO: 42.6 % (ref 34–46.6)
HGB BLD-MCNC: 13.7 G/DL (ref 12–15.9)
MCH RBC QN AUTO: 30.6 PG (ref 26.6–33)
MCHC RBC AUTO-ENTMCNC: 32.2 G/DL (ref 31.5–35.7)
MCV RBC AUTO: 95.1 FL (ref 79–97)
NT-PROBNP SERPL-MCNC: 93.7 PG/ML (ref 0–900)
PLATELET # BLD AUTO: 265 10*3/MM3 (ref 140–450)
PMV BLD AUTO: 9.2 FL (ref 6–12)
POTASSIUM SERPL-SCNC: 4.6 MMOL/L (ref 3.5–5.2)
PROT SERPL-MCNC: 7.1 G/DL (ref 6–8.5)
RBC # BLD AUTO: 4.48 10*6/MM3 (ref 3.77–5.28)
SODIUM SERPL-SCNC: 142 MMOL/L (ref 136–145)
WBC NRBC COR # BLD AUTO: 6.88 10*3/MM3 (ref 3.4–10.8)

## 2024-11-01 PROCEDURE — 83880 ASSAY OF NATRIURETIC PEPTIDE: CPT

## 2024-11-01 PROCEDURE — 36415 COLL VENOUS BLD VENIPUNCTURE: CPT

## 2024-11-01 PROCEDURE — 80053 COMPREHEN METABOLIC PANEL: CPT

## 2024-11-01 PROCEDURE — 85027 COMPLETE CBC AUTOMATED: CPT

## 2024-11-04 RX ORDER — CLONAZEPAM 0.5 MG/1
0.5 TABLET ORAL 2 TIMES DAILY PRN
Qty: 60 TABLET | Refills: 0 | Status: SHIPPED | OUTPATIENT
Start: 2024-11-04

## 2024-11-05 DIAGNOSIS — F51.04 PSYCHOPHYSIOLOGICAL INSOMNIA: Primary | ICD-10-CM

## 2024-11-05 NOTE — TELEPHONE ENCOUNTER
Let Chasity know I am going to reach out to her psychiatrist Kathy Paz about treatment options because many sleep aids mix with her current antidepressant and anxiety medications

## 2024-11-05 NOTE — TELEPHONE ENCOUNTER
Both Trish Paz and myself do not think adding sleep aids with her current psychiatric medications and tremor would be appropriate.  We will set her up with the sleep medicine specialist to see what is going on and what the best appropriate next treatment would be.  Dr. Diego

## 2024-11-08 DIAGNOSIS — G25.81 RESTLESS LEG SYNDROME: ICD-10-CM

## 2024-11-08 DIAGNOSIS — G89.29 CHRONIC BILATERAL LOW BACK PAIN, UNSPECIFIED WHETHER SCIATICA PRESENT: ICD-10-CM

## 2024-11-08 DIAGNOSIS — R41.3 MEMORY LOSS: ICD-10-CM

## 2024-11-08 DIAGNOSIS — M54.50 CHRONIC BILATERAL LOW BACK PAIN, UNSPECIFIED WHETHER SCIATICA PRESENT: ICD-10-CM

## 2024-11-11 RX ORDER — PRAMIPEXOLE DIHYDROCHLORIDE 0.5 MG/1
0.5 TABLET ORAL 3 TIMES DAILY
Qty: 270 TABLET | Refills: 0 | Status: SHIPPED | OUTPATIENT
Start: 2024-11-11

## 2024-11-11 RX ORDER — DONEPEZIL HYDROCHLORIDE 10 MG/1
10 TABLET, FILM COATED ORAL NIGHTLY
Qty: 30 TABLET | Refills: 2 | Status: SHIPPED | OUTPATIENT
Start: 2024-11-11

## 2024-11-11 RX ORDER — DICLOFENAC SODIUM 75 MG/1
75 TABLET, DELAYED RELEASE ORAL 2 TIMES DAILY
Qty: 60 TABLET | Refills: 2 | Status: SHIPPED | OUTPATIENT
Start: 2024-11-11

## 2024-11-20 DIAGNOSIS — I10 ESSENTIAL HYPERTENSION: ICD-10-CM

## 2024-11-20 RX ORDER — HYDRALAZINE HYDROCHLORIDE 10 MG/1
10 TABLET, FILM COATED ORAL 3 TIMES DAILY
Qty: 270 TABLET | OUTPATIENT
Start: 2024-11-20

## 2024-11-21 ENCOUNTER — OFFICE VISIT (OUTPATIENT)
Dept: FAMILY MEDICINE CLINIC | Age: 69
End: 2024-11-21
Payer: MEDICARE

## 2024-11-21 ENCOUNTER — TELEMEDICINE (OUTPATIENT)
Dept: BEHAVIORAL HEALTH | Facility: CLINIC | Age: 69
End: 2024-11-21
Payer: MEDICARE

## 2024-11-21 ENCOUNTER — LAB (OUTPATIENT)
Dept: LAB | Facility: HOSPITAL | Age: 69
End: 2024-11-21
Payer: MEDICARE

## 2024-11-21 VITALS
HEIGHT: 64 IN | HEART RATE: 70 BPM | OXYGEN SATURATION: 97 % | SYSTOLIC BLOOD PRESSURE: 96 MMHG | DIASTOLIC BLOOD PRESSURE: 72 MMHG | WEIGHT: 274 LBS | BODY MASS INDEX: 46.78 KG/M2

## 2024-11-21 DIAGNOSIS — J44.9 CHRONIC OBSTRUCTIVE PULMONARY DISEASE, UNSPECIFIED COPD TYPE: ICD-10-CM

## 2024-11-21 DIAGNOSIS — F32.A ANXIETY AND DEPRESSION: ICD-10-CM

## 2024-11-21 DIAGNOSIS — F33.9 RECURRENT MAJOR DEPRESSION RESISTANT TO TREATMENT: Primary | ICD-10-CM

## 2024-11-21 DIAGNOSIS — Z91.89 AT RISK FOR MEDICATION NONADHERENCE: ICD-10-CM

## 2024-11-21 DIAGNOSIS — Z71.89 MEDICATION CARE PLAN DISCUSSED WITH PATIENT: ICD-10-CM

## 2024-11-21 DIAGNOSIS — Z79.899 HIGH RISK MEDICATION USE: ICD-10-CM

## 2024-11-21 DIAGNOSIS — Z23 IMMUNIZATION DUE: ICD-10-CM

## 2024-11-21 DIAGNOSIS — D50.9 IRON DEFICIENCY ANEMIA, UNSPECIFIED IRON DEFICIENCY ANEMIA TYPE: ICD-10-CM

## 2024-11-21 DIAGNOSIS — G47.30 INSOMNIA WITH SLEEP APNEA: ICD-10-CM

## 2024-11-21 DIAGNOSIS — I10 ESSENTIAL HYPERTENSION: ICD-10-CM

## 2024-11-21 DIAGNOSIS — Z79.899 POLYPHARMACY: ICD-10-CM

## 2024-11-21 DIAGNOSIS — F51.04 PSYCHOPHYSIOLOGICAL INSOMNIA: ICD-10-CM

## 2024-11-21 DIAGNOSIS — R00.2 PALPITATIONS: ICD-10-CM

## 2024-11-21 DIAGNOSIS — Z79.899 MEDICATION MANAGEMENT: ICD-10-CM

## 2024-11-21 DIAGNOSIS — E83.42 HYPOMAGNESEMIA: Primary | ICD-10-CM

## 2024-11-21 DIAGNOSIS — F68.13: ICD-10-CM

## 2024-11-21 DIAGNOSIS — E83.42 HYPOMAGNESEMIA: ICD-10-CM

## 2024-11-21 DIAGNOSIS — F41.9 ANXIETY AND DEPRESSION: ICD-10-CM

## 2024-11-21 DIAGNOSIS — G47.00 INSOMNIA WITH SLEEP APNEA: ICD-10-CM

## 2024-11-21 DIAGNOSIS — F41.1 GENERALIZED ANXIETY DISORDER: ICD-10-CM

## 2024-11-21 LAB
AMPHET+METHAMPHET UR QL: NEGATIVE
AMPHETAMINES UR QL: NEGATIVE
BARBITURATES UR QL SCN: POSITIVE
BENZODIAZ UR QL SCN: POSITIVE
BUPRENORPHINE SERPL-MCNC: NEGATIVE NG/ML
CANNABINOIDS SERPL QL: NEGATIVE
COCAINE UR QL: NEGATIVE
EXPIRATION DATE: ABNORMAL
IRON 24H UR-MRATE: 104 MCG/DL (ref 37–145)
IRON SATN MFR SERPL: 31 % (ref 20–50)
Lab: ABNORMAL
MAGNESIUM SERPL-MCNC: 2.2 MG/DL (ref 1.6–2.4)
MDMA UR QL SCN: NEGATIVE
METHADONE UR QL SCN: NEGATIVE
MORPHINE/OPIATES SCREEN, URINE: POSITIVE
OXYCODONE UR QL SCN: NEGATIVE
PCP UR QL SCN: NEGATIVE
TIBC SERPL-MCNC: 332 MCG/DL (ref 298–536)
TRANSFERRIN SERPL-MCNC: 223 MG/DL (ref 200–360)

## 2024-11-21 PROCEDURE — 83735 ASSAY OF MAGNESIUM: CPT

## 2024-11-21 PROCEDURE — 84466 ASSAY OF TRANSFERRIN: CPT

## 2024-11-21 PROCEDURE — 36415 COLL VENOUS BLD VENIPUNCTURE: CPT

## 2024-11-21 PROCEDURE — 83540 ASSAY OF IRON: CPT

## 2024-11-21 RX ORDER — BUSPIRONE HYDROCHLORIDE 10 MG/1
TABLET ORAL
Start: 2024-11-21 | End: 2024-12-05

## 2024-11-21 RX ORDER — FLUOXETINE 40 MG/1
40 CAPSULE ORAL DAILY
COMMUNITY
End: 2024-11-21

## 2024-11-21 RX ORDER — LANOLIN ALCOHOL/MO/W.PET/CERES
400 CREAM (GRAM) TOPICAL NIGHTLY
Qty: 30 TABLET | Refills: 11 | Status: SHIPPED | OUTPATIENT
Start: 2024-11-21

## 2024-11-21 RX ORDER — METOPROLOL SUCCINATE 25 MG/1
12.5 TABLET, EXTENDED RELEASE ORAL DAILY
Start: 2024-11-21

## 2024-11-21 RX ADMIN — CYANOCOBALAMIN 1000 MCG: 1000 INJECTION, SOLUTION INTRAMUSCULAR; SUBCUTANEOUS at 12:17

## 2024-11-21 NOTE — PROGRESS NOTES
Chasity Torres presents to be seen at [facility]      Chief Complaint:  stress, insomnia    Subjective     History of Present Illness:  HPI    I am seeing Chasity for ongoing fatigue.  Of note her blood pressure is low today.  It was also low last visit.  She had been taken off her losartan but it looks like she has continued to take it.  In addition she is on metoprolol.  I think with her blood pressure is low as it is today this would cause extreme fatigue.  She also has ongoing depression and sadness.  She lives with chronic anxiety and in her household.  Today is her F2 F for refill of her clonazepam.  This helps keep her calm and helps with her tremor. She is also on Vryalar and BuSpar and continues to see her psychiatrist  Best Paz.  She has no suicidal or homicidal thoughts.  She is sleeping okay at night.  She has had a 12 pound weight gain in the past month and is on Mounjaro and tolerating well and lost 37 pounds with this medication.  This weight gain is quite surprising and I am wondering if she has got fluid retention going on.  She has underlying history of congestive heart failure and sees cardiologist Dr. George.  She does have mild lower extremity edema but has no chest pain and is not currently short of air.  She is on torsemide 20 mg twice daily.   Last echo showed LV ejection fraction 65 to 70% with normal valvular structure and function     Her breathing and COPD today are stable.  Current treatment includes albuterol, Trelegy, DuoNebs and O2 as needed she wears CPAP at night    She presents with ongoing memory issues and would like to see if she could increase her Aricept.  Says that she just feels foggy in the brain and has trouble recalling names and feels very forgetful.    She has a history of diabetes.  She is currently on Mounjaro and tolerates med well.  She has lost approximately 37 pounds  Her last hemoglobin A1c was 5.8 %.  She rarely checks her home blood sugar.  She denies  "hypoglycemia symptoms.  She has peripheral neuropathy but no other acute foot issues.  She is aware she needs to see the eye doctor once a year.  She defers dietitian referral and is not following a diabetic diet.      She also has chronic hypercholesterolemia, she is overall stable on low-cholesterol diet due for cholesterol.  Defer statin treatment        Result Review   The following data was reviewed by Ami Diego MD on 09/30/2024.  Lab Results   Component Value Date    WBC 6.88 11/01/2024    HGB 13.7 11/01/2024    HCT 42.6 11/01/2024    MCV 95.1 11/01/2024     11/01/2024     Lab Results   Component Value Date    GLUCOSE 102 (H) 11/01/2024    BUN 14 11/01/2024    CREATININE 1.26 (H) 11/01/2024     11/01/2024    K 4.6 11/01/2024     11/01/2024    CALCIUM 10.0 11/01/2024    PROTEINTOT 7.1 11/01/2024    ALBUMIN 3.8 11/01/2024    ALT 15 11/01/2024    AST 20 11/01/2024    ALKPHOS 123 (H) 11/01/2024    BILITOT 0.3 11/01/2024    GLOB 3.3 11/01/2024    AGRATIO 1.2 11/01/2024    BCR 11.1 11/01/2024    ANIONGAP 10.6 11/01/2024    EGFR 46.6 (L) 11/01/2024     Lab Results   Component Value Date    CHOL 167 07/03/2024    CHLPL 166 12/16/2020    TRIG 105 07/03/2024    HDL 59 07/03/2024    LDL 89 07/03/2024     Lab Results   Component Value Date    TSH 0.774 02/21/2024     Lab Results   Component Value Date    HGBA1C 5.80 (H) 07/03/2024     No results found for: \"PSA\"  Lab Results   Component Value Date    Iron 96 07/03/2024    Iron Saturation (TSAT) 27 07/03/2024      Lab Results   Component Value Date    WXCC90CA 41.6 07/03/2024               Assessment and Plan:   Diagnoses and all orders for this visit:    1. Hypomagnesemia (Primary)  Comments:  Will change her magnesium to nighttime to see if this helps with her sleep  Orders:  -     Magnesium; Future  -     Magnesium Oxide -Mg Supplement 400 (240 Mg) MG tablet; Take 1 tablet by mouth Every Night.  Dispense: 30 tablet; Refill: 11    2. High " risk medication use  Comments:  Today is her F2 F.  She is on clonazepam with me.  She tolerates well.  No signs of diversion or abuse.  She also sees psychiatry for her anxiety  Orders:  -     POC Medline 12 Panel Urine Drug Screen    3. Iron deficiency anemia, unspecified iron deficiency anemia type  -     Iron Profile; Future    4. Essential hypertension  Comments:  Blood pressure is low, will decrease metoprolol to half a pill daily, heart palpitations are stable.  Orders:  -     metoprolol succinate XL (TOPROL-XL) 25 MG 24 hr tablet; Take 0.5 tablets by mouth Daily.    5. Immunization due  -     Fluzone High-Dose 65+yrs ([id number])    6. Palpitations  Comments:  No acute issues today on metoprolol 25 mg XL.  She tolerates med well  Orders:  -     metoprolol succinate XL (TOPROL-XL) 25 MG 24 hr tablet; Take 0.5 tablets by mouth Daily.    7. Psychophysiological insomnia  Comments:  Will change her magnesium to nighttime for her to take with her Remeron    8. Anxiety and depression  Comments:  She states her anxiety and depression are worse.  She saw her psychiatrist this morning via telehealth.  I will defer her to her treatment plan    9. Chronic obstructive pulmonary disease, unspecified COPD type  Comments:  Stable with no acute issues.  Dr. Diego            Objective     Medications:  Current Outpatient Medications   Medication Instructions    albuterol sulfate  (90 Base) MCG/ACT inhaler 2 puffs, Inhalation, Every 4 Hours PRN    betamethasone dipropionate (DIPROLENE) 0.05 % ointment Apply 1 Application topically to the appropriate area as directed As Needed.    Blood Glucose Monitoring Suppl device 1 each, Not Applicable, Daily    busPIRone (BUSPAR) 10 MG tablet Take 1 tablet by mouth 3 (Three) Times a Day for 7 days, THEN 1 tablet 2 (Two) Times a Day for 7 days. Indications: Anxiety Disorder    Calcium Carb-Cholecalciferol (Calcium 500 + D) 500-3.125 MG-MCG tablet 1 each, Oral, 2 Times Daily     Cariprazine HCl (Vraylar) 3 MG capsule capsule Take 1 capsule by mouth Every Morning.    clonazePAM (KLONOPIN) 0.5 mg, Oral, 2 Times Daily PRN    diclofenac (VOLTAREN) 75 mg, Oral, 2 Times Daily    donepezil (ARICEPT) 10 mg, Oral, Nightly    FeroSul 325 mg, Oral, Daily With Breakfast    Fluticasone-Umeclidin-Vilant (TRELEGY ELLIPTA) 200-62.5-25 MCG/ACT inhaler 1 puff, Inhalation, Daily - RT    glucose blood test strip Use as instructed to check blood sugar daily    HYDROcodone-acetaminophen (NORCO)  MG per tablet     ipratropium-albuterol (DUO-NEB) 0.5-2.5 mg/3 ml nebulizer Nebulize and inhale 1 vial 4 (Four) Times a Day As Needed for Wheezing.    Lancet Device misc 1 each, Not Applicable, Daily, Use as directed; check blood sugar once daily    Lancets (OneTouch Delica Plus [id number]) misc 1 each, Not Applicable, Daily    Magnesium Oxide -Mg Supplement 400 mg, Oral, Nightly    memantine (NAMENDA) 5 mg, Oral, Daily    metoprolol succinate XL (TOPROL-XL) 12.5 mg, Oral, Daily    miconazole (Desenex) 2 % powder Apply topically to the appropriate area as directed 2 (Two) Times a Day.    mirtazapine (REMERON) 7.5 MG tablet Take 1 tablet by mouth Every Night.    mupirocin (BACTROBAN) 2 % ointment APPLY TO AFFECTED AREA(S) TWO TIMES A DAY AS DIRECTED FOR 14 DAYS -    naloxone (NARCAN) 4 MG/0.1ML nasal spray 1 spray, As Needed    O2 (OXYGEN) 2 L/min, Nightly    pantoprazole (PROTONIX) 40 mg, Oral, Daily    pramipexole (MIRAPEX) 0.5 mg, Oral, 3 Times Daily    pregabalin (LYRICA) 75 mg, Oral, 2 Times Daily    primidone (MYSOLINE) 50 mg    spironolactone (ALDACTONE) 25 mg, Oral, 2 Times Daily    tacrolimus (PROTOPIC) 0.1 % ointment Apply topical ointment to affected area twice daily    Tirzepatide 12.5 MG/0.5ML solution auto-injector Inject 12.5mg under the skin into the appropriate area as directed 1 (One) Time Per Week.    torsemide (DEMADEX) 20 mg, Oral, 2 Times Daily    triamcinolone (KENALOG) 0.025 % cream As Needed  "       Vital Signs:   BP 96/72 (BP Location: Left arm, Patient Position: Sitting, Cuff Size: Large Adult)   Pulse 70   Ht 162.6 cm (64.02\")   Wt 124 kg (274 lb)   SpO2 97%   BMI 47.01 kg/m²             Physical Exam:  Physical Exam  Vitals and nursing note reviewed.   Constitutional:       General: She is not in acute distress.     Appearance: Normal appearance. She is obese. She is not ill-appearing, toxic-appearing or diaphoretic.   HENT:      Head: Normocephalic and atraumatic.      Nose: No congestion or rhinorrhea.      Mouth/Throat:      Mouth: Mucous membranes are moist.      Pharynx: Oropharynx is clear. No oropharyngeal exudate or posterior oropharyngeal erythema.   Eyes:      Extraocular Movements: Extraocular movements intact.      Conjunctiva/sclera: Conjunctivae normal.      Pupils: Pupils are equal, round, and reactive to light.   Cardiovascular:      Rate and Rhythm: Normal rate and regular rhythm.      Heart sounds: Normal heart sounds.   Pulmonary:      Effort: Pulmonary effort is normal.      Breath sounds: Normal breath sounds. No wheezing, rhonchi or rales.   Abdominal:      General: Abdomen is flat.      Palpations: Abdomen is soft. There is no mass.      Tenderness: There is no abdominal tenderness.      Hernia: No hernia is present.   Musculoskeletal:      Cervical back: Neck supple. No rigidity.      Right lower leg: No edema.      Left lower leg: No edema.   Lymphadenopathy:      Cervical: No cervical adenopathy.   Skin:     General: Skin is warm and dry.   Neurological:      General: No focal deficit present.      Mental Status: She is alert and oriented to person, place, and time. Mental status is at baseline.      Motor: Weakness present.      Comments: She has a mild tremor at rest with her hands and occasionally with her head   Psychiatric:         Mood and Affect: Mood is depressed. Affect is flat.         Speech: Speech normal.         Behavior: Behavior normal. Behavior is " cooperative.         Thought Content: Thought content normal.         Cognition and Memory: Memory is impaired.         Judgment: Judgment normal.           Review of Systems:  Review of Systems   Constitutional:  Positive for fatigue. Negative for chills and fever.   HENT:  Negative for ear pain, sinus pressure and sore throat.    Eyes:  Negative for blurred vision and double vision.   Respiratory:  Negative for cough, shortness of breath and wheezing.    Cardiovascular:  Negative for chest pain and palpitations.   Gastrointestinal:  Negative for abdominal pain, blood in stool, constipation, diarrhea, nausea and vomiting.   Neurological:  Positive for tremors and weakness. Negative for dizziness and headache.   Psychiatric/Behavioral:  Positive for sleep disturbance and depressed mood. Negative for suicidal ideas. The patient is nervous/anxious.               Follow Up   No follow-ups on file.    Part of this note may be an electronic transcription/translation of spoken language to printed   text using the Dragon Dictation System.            Medical History:  Medications Discontinued During This Encounter   Medication Reason    Magnesium Oxide -Mg Supplement 400 (240 Mg) MG tablet Reorder    metoprolol succinate XL (TOPROL-XL) 25 MG 24 hr tablet Reorder        Past Medical History:    Adverse effect of other viral vaccines, initial encounter    Covid vaccine    Allergic fungal sinusitis    Allergic rhinitis    Anxiety disorder    Asthma    CHF (congestive heart failure)    COPD with acute exacerbation    CTS (carpal tunnel syndrome)    left    Difficulty walking    Essential (primary) hypertension    Essential tremor    Hypercholesterolemia    Irritable bowel syndrome    Low back pain    Major depressive disorder    Morbid (severe) obesity due to excess calories    Nasal congestion    Neuropathy in diabetes    Obstructive sleep apnea (adult) (pediatric)    Other hereditary and idiopathic neuropathies    Pain in  left hip    Pain in right toe(s)    Pain in thoracic spine    Peripheral neuropathy    Hands    Primary generalized (osteo)arthritis    Primary insomnia    Pulmonary emphysema    Restless leg syndrome    SOBOE (shortness of breath on exertion)    Substance abuse    Type 2 diabetes mellitus without complication, without long-term current use of insulin    Vitamin D deficiency     Past Surgical History:    CARPAL TUNNEL RELEASE    COLONOSCOPY    ENDOSCOPIC FUNCTIONAL SINUS SURGERY (FESS)    Procedure: ENDOSCOPIC FUNCTIONAL SINUS SURGERY, left maxillary antrostomy with removal of contents, possible left ethmoidectomy;  Surgeon: Johnny Calderon MD;  [place] [facility] [place] [facility]  Service: ENT;  Laterality: Left;    HYSTERECTOMY    complete- ovarian cysts      Family History   Problem Relation Age of Onset    COPD Father     Heart failure Father     Hypertension Mother     Neuropathy Sister     Anxiety disorder Daughter     Depression Daughter     Malig Hyperthermia Neg Hx     Breast cancer Neg Hx     Ovarian cancer Neg Hx     Uterine cancer Neg Hx     Cervical cancer Neg Hx     Colon cancer Neg Hx     Stomach cancer Neg Hx     Skin cancer Neg Hx     Clotting disorder Neg Hx     Deep vein thrombosis Neg Hx     Pulmonary embolism Neg Hx      Social History     Tobacco Use    Smoking status: Former     Current packs/day: 0.00     Average packs/day: 0.5 packs/day for 12.0 years (6.0 ttl pk-yrs)     Types: Cigarettes     Start date: 1/1/2009     Quit date: 1/1/2021     Years since quitting: 3.8     Passive exposure: Never    Smokeless tobacco: Never   Substance Use Topics    Alcohol use: Not Currently       Health Maintenance Due   Topic Date Due    DIABETIC EYE EXAM  06/28/2024    HEMOGLOBIN A1C  01/03/2025    URINE MICROALBUMIN  02/19/2025        Immunization History   Administered Date(s) Administered    Arexvy (RSV, Adults 60+ yrs) 01/11/2024    COVID-19 (ANGELA) 12/28/2021    COVID-19 (MODERNA) 1st,2nd,3rd Dose  Monovalent 03/17/2021    COVID-19 (PFIZER) 12YRS+ (COMIRNATY) 01/17/2024, 05/15/2024    Covid-19 (Pfizer) Gray Cap Monovalent 05/09/2022    Fluzone  >6mos 10/07/2013    Fluzone (or Fluarix & Flulaval for VFC) >6mos 10/10/2017    Fluzone High-Dose 65+YRS 11/21/2024    Fluzone High-Dose 65+yrs 03/31/2022, 09/30/2022, 10/18/2023    Influenza Seasonal Injectable 12/20/2012    Pneumococcal Conjugate 13-Valent (PCV13) 09/29/2020    Pneumococcal Polysaccharide (PPSV23) 11/30/2020    Shingrix 03/28/2023, 06/27/2023    Tdap 05/20/2015    Zostavax 09/16/2008       No Known Allergies

## 2024-11-21 NOTE — PATIENT INSTRUCTIONS
"SPECIFIC RECOMMENDATIONS:     1.      Medications discussed at this encounter:                   -  -Decrease Buspar FROM 20 mg TO 10 mg 3 times daily without food FOR 7 days THEN DECREASE TO 10 mg by mouth for 7 days THEN STOP. Patient reported ineffectiveness and has been taking Klonopin per PCP.      stop Prozac 40 mg      2.      Psychotherapy recommendations: Schedule     3.     Return to clinic: 7 weeks     Please arrive at least 15 minutes before your scheduled appointment time to complete check in process.      IF you are scheduled for a [facility] VIDEO visit, YOU MUST COMPLETE THE \"E-CHECK IN\" PROCESS PRIOR TO BEGINNING THE VISIT, You may still complete the E-Check in for in office visits prior to appointment, you will receive multiple text/email reminders which will direct you further if needed.            "

## 2024-11-21 NOTE — PROGRESS NOTES
This provider is located at [address] [place] [address], [address], [place] [address]. The Patient is seen remotely using [email or web address]. Patient is being seen via telehealth and confirm that they are in a secure environment for this session. The patient's condition being diagnosed/treated is appropriate for telemedicine. The provider identified himself/herself: herself as well as her credentials.  The patient gave consent to be seen remotely, and when consent is given they understand that the consent allows for patient identifiable information to be sent to a third party as needed.  They may refuse to be seen remotely at any time. The electronic data is encrypted and password protected, and the patient has been advised of the potential risks to privacy not withstanding such measures.    You have chosen to receive care through a telehealth visit.  Do you consent to use a video/audio connection for your medical care today? Yes    Mode of Visit: Video  Location of patient: -OTHER-: passenger side of vehicle while daughter Becki is driving  Location of provider: +[facility]+  You have chosen to receive care through a telehealth visit.  The patient has signed the video visit consent form.  The visit included audio and video interaction. No technical issues occurred during this visit.    Ciro Torres is a 69 y.o. female who presents today for follow up     Referring Provider:  No referring provider defined for this encounter.    Chief Complaint: depression, anxiety, factitious disorder, insomnia, medication management, risk for non-adherence to medication plan, polypharmacy, medication care plan discussed      History of Present Illness:   11/21/24:    Patient presents today via [employer] Video visit from passenger of vehicle while daughter driving as patient woke up late and had to change today's appointment from in office to video, located in [place], [place].   At last visit Vraylar was increased from 1.5 mg to 3 mg  "daily, for which patient reports:  \"I really been depressed, I know your going to be mad at me, I been taking that Prozac 40 mg one every other day I started taking about 2 weeks ago.\" Reports she does feel better.  Anxiety and depressed mood are improving.     In regards to therapy, patient was advised to reschedule with therapist which patient reports had not scheduled yet, though plans to schedule today.  Patient reports \"I wasn't crazy about Becki, and she's the only one there to provide mental health.\"    Patient has been referred to another sleep medicine provider per PCP as of 11/5/24.  Reports sleeps for an hour, then awake, admits to adherence with CPAP. Though continues to struggle with sleep, which patient acknowledges contributes to mood.     Patient voices Buspar is no longer effective and would like to come off medication, though discussed previously patient expressed willingness to taper to discontinue.     Wt Readings from Last 3 Encounters:   10/30/24 130 kg (287 lb)   10/07/24 125 kg (275 lb 11.2 oz)   10/03/24 125 kg (276 lb)       10/4/24:  arrival 0954; compelted 0952    Answers submitted by the patient for this visit:  Primary Reason for Visit (Submitted on 10/4/2024)  What is the primary reason for your visit?: Depression  Depression (Submitted on 10/4/2024)  Chief Complaint: Depression  Visit: follow-up  Frequency: constantly  Severity: severe  excessive worry: Yes  insomnia: Yes  irritability: Yes  malaise/fatigue: Yes  obsessions: Yes  hypersomnia: No  difficulty controlling mood: Yes  difficulty staying asleep: Yes  difficulty falling asleep: Yes  Hours of sleep per night: 3 Hours  Medication compliance: %  Side effects: fatigue  Aggravated by: family issues      Patient presents today via [employer] Video visit from home, located in [place], [place]. At last visit patient was restarted on Vraylar at 1.5 mg due to self stopping for approximately 1 week, for which patient reports \"I am still " "depressed, I was gonna ask if we could go back to the 3 mg or put me back on Prozac.\" Patient reports the shakiness is due to stress and not actually the medication.   Patient reports waking up 2-3 times per night, \"I don't sleep hardly at all, then during the day all I want to do is sleep, I don't have any energy.\"  Patient reports adherence with CPAP, though sleeping in short intervals, and does nap throughout the day while sitting in chair watching TV, \"I can hardly keep my eyes open.\"    Sleep hygiene discussed with PCP on 9/30/24 as patient reported taking Remeron at 10 pm, going to bed at 11pm with TV on which patient has been turning the TV upon feeling sleepy. \"Usually\" has CPAP mask on while watching TV in bed.  \"Is there something else I can take to help me stay asleep longer?\" Patient reports taking Klonopin 0.5 mg 1 during the day and one in the evening, not at bedtime.     In regards to therapy patient has not seen in over one month, \"I was getting burnt out by the therapist. What we talked about I really wasn't doing what I needed to do, and last time I went there she said if your not going to do what we talked about  then your not going to get anywhere.\"     In regards to PHQ9 screening question of thoughts of being better off dead or hurting herself  in some way, patient reports  has days of  \"I wished I wouldn't wake up in the morning.\" However, denies actual thoughts of harming self or rumination nor planning,  Cssr low.    Depression: Patient complains of depression. She complains of anhedonia, depressed mood, difficulty concentrating, fatigue, feelings of worthlessness/guilt, and insomnia  Anxiety:  The patient endorses to the following symptoms of anxiety including: excessive anxiety and worry about a number of events or activities for more days than not, restlessness or feeling keyed up, being easily fatigued, difficulty concentrating or mind going blank, irritability, muscle tension, and sleep " "disturbance which have caused impairment in important areas of daily functioning.      8/26/24:  Patient presents today via MyChart Video visit from home, located in [place], [place].  \"I can't take that Vraylar anymore, it makes me shake.  I stopped taking it about a week ago.\" Patient reports her arms are no longer shaking.    Patient reports Buspar no longer helps for anxiety. Asking for Prozac.  Upon informing patient of option to increase Remeron, patient began crying more intensely, \"I don't understand why you can't give me something for depression.  Can we go back down to the lower dose of Vraylar?\"  Patient is also asking for provider to manage Klonopin ordered per PCP recently.      In regards to therapy patient is still seeing \"I have to make an appointment, I don't see she is helping me.\"  Patient has tried to schedule Neuropsychological testing and places from list mailed to patient several do not take her insurance. Patient reports she lost the list.  Last seen therapist a few weeks ago.        7/17/24:  Patient presents today via MyChart Video visit from home, located in [place], [place].  At last visit Vraylar was increased from 1.5 to 3 mg daily, for which patient reports \"helped a little bit, I been crying a lot, I am still depressed.\"    Patient was also referred to [facility] and did not contact, had lost paperwork from last visit. \"My therapist said testing was only for schizophrenia or bipolar, I don't know if that's true?\" In regards to therapy, patient is seeing Becki with [facility], \"I like her okay, I don't know if I want to continue, we talk about the same thing, we talk more about my Becki, I don't know its hard.\"  Patient further explains therapist wants her to make a list of all the things she wants her daughter to do, \"she said it would be hard, but I haven't done it yet.\"     Patient has concerns of how her son treats her grand daughter whom will be 17 in Sept. Patient became " "tearful and then crying.    \"Does the Prozac come in 10 mg, could I get that?\"      6/7/24:  Patient presents today in office using [employer], \"I'm not good, I am really depressed again, jittery, I have to have something to calm me down.\"  Patient reports having to come to PCP office this past Wed, due to feeling sick. \"I need something to take the edge off. There are some nights I pray not to wake up in the morning.\"  Patient is tearful today. Denies SI and is convincing, but rather more frustrated.   Patient did start with therapist, Shanti, with [facility], \"I am not too crazy about her.\" Patient does attend in person.     Patient was also scheduled for Neuropsychological testing 5/24/24 which was canceled by provider office at [facility] due to short staff, \"they said they would call back when they got someone hired.\" Which was originally ordered by Neurology 2/2023 for mild cognitive disorder. And patient prefers a closer location.     Patient is asking for Zyprexa or Prozac.  Reminded patient of weight gain and prolonged QTI which is reason for discontinuation in addition to ineffectiveness.   Daughter will be going to [place] 7/19/24 for 10 days and patient utilizes daughter phone for telehealth visits and daughter assists with logging on to [employer].  As patient expresses fear of not being able to see this provider nor go for testing due to transportation.     Patient noted with weight loss of 5lb since last recorded weight which patient contributes to poor appetite due to depressed mood.   Sleep remains the same.      5/2/24:  Patient presents today via [employer] Video visit from home in [facility], located in [place], [place]. Patient apologized for missing last visit.  At last visit Remeron was decreased from 15 to 7.5 mg, for which patient reports \"I woke up this morning at 419, I am sleeping worse now than I was.\" Patient will go to sleep around  pm and wakes up at 4 or 5 am. Denies day time naps, " "reported adherence with CPAP.  \"If I take another Mirtazapine I can, but I normally don't do that.\"     Depression: continues to take Vraylar 1.5 mg, \"its going good, but I am still depressed, can I go back on 20 mg of the Prozac, I still feel like there is something crawling in my head.\"   Anxiety: continues to tolerate Buspar 20 mg 3 times daily which has been somewhat effective.      Patient has an upcoming appointment for neuropsychological testing 5/24/24.   Therapy: patient has an appointment with [facility] tomorrow via telephone and will be further scheduled for an appointment.        3/14/24:    Answers submitted by the patient for this visit:  Other (Submitted on 3/14/2024)  Please describe your symptoms.: Depression  Have you had these symptoms before?: Yes  How long have you been having these symptoms?: Greater than 2 weeks  Primary Reason for Visit (Submitted on 3/14/2024)  What is the primary reason for your visit?: Other    Patient presents today via [employer] Video visit from home, located in [place], [place].   Patient has chosen to switch from [facility] to [facility] in clinic. Patient reports mood is better, \"I think that Vraylar is  helping, but I still feel there is something itching my head, I got these gummies,\" patient showed a bottle of Hemp Gummies to provider, \"they are helping a little bit too, I take 6-7 per day, it says take 1-2 per day.\" Hemp oil 200 mg, 175 mg of Omega 3,A,B,E, \"it curves my anxiety, I don't feel my heart coming out of my skin.\"     Therapy: has not called [facility] back to schedule.   Patient is asking about [facility] as patient grandson attends.   Patient still wakes up during the night, 2-3 times, \"I am still depressed.\"  Patient is asking about  restarting Prozac again.    In regards to PHQ9 screening question of thoughts of being better off dead or hurting himself/herself  in some way, patient reports  \"That was along time ago, I think Becki put " "that in there wrong, I havent' had those thoughts.\" CSSRS answers all \"none\".    Psych: Anxiety fatigue, insomnia  Depression Low mood for > 2 weeks, Decreased/Increased sleep, Loss of Interest, Feelings of guilt/worthlessness, and Low energy      1/31/24:  Patient presents today via MyChart Video visit from home, located in [place], [place].  At last visit Prozac was stopped and Remeron was decreased from 30 to 15 mg nightly due to ongoing weight gain which is suspected due to combination of medications and comorbid conditions, patient reports still waking up in the middle of the night, 2-3 times, though able to return back to sleep easily.  Continues to wear CPAP with 2L, denies mask falling off and just randomly wakes up at night.     On 1/23/24, patient was started on Vraylar 1.5 mg, reported the cost was 1700.00 and patient did not have to pay any out of pocket. Patient admits to some improvement in mood, \"I am still depressed, I was gonna ask if I could stay on maybe a 20 mg of the Prozac, I don't want to get like I was.\"    Patient reports son had spoke with friend and discussed anxiety, and took a fourth CBD gummy which helped with anxiety.     Patient continues to take Buspar 20 mg 3 times daily, which is effective. Patient would like to switch pharmacies due to difficulty with getting ahold of [facility] staff.     Patient reports having not scheduled with therapist, due to not having the phone number.      1/18/24: Patient presents today via MyChart Video visit from home, located in [place], [place].  \"I am not doing good, I am short of air, don't have any energy, the anxiety is getting the best of me.\"  At last visit Prozac was decreased from 80 to 60 mg for 21 days, then to decrease to 40 mg for 21 days (start 1/4/24) which planned to end 1/25/24.  Daughter confirmed in the background of current dose of Prozac is 40 mg.     Patient reports received a denied payment from [employer] [facility] plan which patient no longer has as " "of 1/1/24. Patient has not called therapist back to schedule another appointment at this time.     Anxiety and Depressive symptoms: \"stress related, not knowing what is really wrong with me, shortness of air, I can't hardly do anything, I don't feel like doing anything. I don't think that Buspar is helping anymore.\"    Coping strategies:  \"I just sit, my heart feels like its beating out of my chest.\" Reports heart rate will range from 50-70-80. \"Oxygen level is good most of the time.\"   Fears stopping Buspar though reports primarily ineffective.   Patient denies practicing coping techniques at this time.      12/13/23:  Patient presents today via MyChart Video visit from home, located in [place], [place].  \"I am not too good, I was in the hospital I have bronchitis.\"    At last visit Remeron was decreased from 45 mg to 30 mg nightly and changed Buspar from 30 mg twice daily to 20 mg 3 times daily. Patient reports \"I need my sleep medicine filled, I only have one left.\" Patient feels sleep is not good.  Anxiety-\"it's still bad.\" Continues to struggle with itching and picking at scalp, reports \"nothing helps.\" Continues to express feeling depressed.     Patient has started psychotherapy with LUIS Santamaria with [facility]. \"I really would like to see someone in person, it's hard to talk to someone on the phone.\" Clarified patient is seeing therapist via video and not via telephone. Does not wish to drive to [place] due to being so far away, and will consider.     Patient has set up a new insurance plan [facility]- medicare advantage plan though has not received card to upload to EHR.     11/9/23:  Patient presents today via MyChart Video visit from home, located in [place], [place].  At last visit patient was tapered off Zyprexa, for which patient reports \"I still feel like theres something crawling in my head.\"    Patient continues to take Hydroxyzine 100 mg 3 times daily for itching and anxiety which is ineffective. Reports " "\"anxiety is a little better, though not a whole lot, still very anxious. Everything in general, my health.\" Mood has improved somewhat, though ongoing complaint of scalp itching, and anxiety.      Patient continues to wake up during the night 2-3 times and is able to return to sleep most nights. Admits to adherence with CPAP with 2 L.  Currently taking Buspar every morning and night, and Remeron at bedtime with addition of Melatonin recently, though minimal effectiveness.     Chart review: patient seen cardiology 11/7/23 as patient was seen in the ED 10/31/23 with SOA and 15 lbs weight gain and treated with one dose of IV Lasix and discharged. Seen by PCP 11/7/23 noting depression has worsened since tapering down from Zyprexa, and neurology was concerned psychotropic medications are causing tremors. Noted increase of Lasix and Aldactone, and added Melatonin 3 mg nightly. Referred to physical therapy as well for generalized weakness. Patient was seen by Pulmonary 11/6/23, noted report of a fall 2 weeks prior on porch. CT of chest without acute findings.     10/5/23:  Patient presents today via [employer] Video visit from home, located in [place], [place].  \"Not to good, I am still depressed, I still feel like something is crawling in my head, I am anxious all the time, it is getting the best of me.\"     At last visit Prozac was increased from 60 to 80 mg daily for which patient reports \"can it be increased\".   Patient was also referred to [facility] for psychotherapy as patient was dissatisfied with therapist at [facility]. Patient reports she had to change phones and lost phone number and is asking for number today.     Reports adherence with medications and denies sleepiness with Zyprexa. Patient says she [place] [facility] and was advised to see a psychiatrist. Reports Dr. Alvarado will be returning to [place] clinic.  Patient is uncertain of what she wants to do at this time, " "  \"I don't know what to do.\"    In regards to negative intrusive thoughts of being better off dead, patient denies SI, though admits to having some days of feeling so frustrated due to depression and anxiety.     Patient was asked again about therapy referral if she had received a voice mail, for which patient does report receiving a voice mail which was on other phone she no longer has access to, phone number has not changed.     At end of visit patient asking to take 2 tabs of Remeron for waking in the middle of night.     Wt Readings from Last 3 Encounters:   10/03/23 126 kg (278 lb)   09/06/23 128 kg (282 lb)   08/30/23 130 kg (287 lb)       Depression: Patient complains of depression. She complains of anhedonia, depressed mood, difficulty concentrating, fatigue, feelings of worthlessness/guilt, and insomnia  Anxiety:  The patient endorses significant symptoms of anxiety including: excessive anxiety and worry about a number of events or activities for more days than not, restlessness or feeling keyed up, being easily fatigued, difficulty concentrating or mind going blank, irritability, muscle tension, and sleep disturbance which have caused impairment in important areas of daily functioning.        8/17/23: Patient presents today via [employer] Video visit from home, located in [place], [place].  Patient called this morning to switch from in office to video due to daughter being ill.  \"I'm not doing too good, I am still depressed, I feel like something is crawling in my head.\" Admits to ongoing itching, despite taking Hydroxyzine 3 times daily.     In regards to noted report to PCP recently of SI, patient denies presently. Reports difficulty staying asleep, able to fall asleep without problems, reports last night did not wake up however, frequency of night time awakenings vary. Denies day time naps. Occasionally drifts to sleep while in recliner, though does not lay down or sleep very long during those times.     Noted " "per chart review of Weight loss of 13 lb on Trulicity, currently receiving Dupixent injections and topical antifungals per Dermatology.  \"I loose it and I gain it back, guess because I don't get much exercise.\"    Patient asking about alternate therapist as patient does not feel current treatment with current [facility] provider, Marie Conde, is going well. Currently see's every 2 weeks. \"I don't feel like it's helping me at all.\"   Patient asking for anxiety medication, \"can you prescribe the klonopin\".    Weight today:   Wt Readings from Last 3 Encounters:   08/02/23 124 kg (274 lb)   07/28/23 129 kg (283 lb 12.8 oz)   07/26/23 129 kg (285 lb)       7/13/23:  Patient presents today in office, with well groomed appearance, subtle make-up, which patient was complimented on appearance, at last visit Prozac was increased from 40 to 60 mg daily and Zyprexa increased from 5 to 7.5 mg daily, for which patient reports \"I am still depressed\"    Patient was to start therapy with [facility] in May, and has started, just seen Marie today prior to this appointment. \"I don't know if I like her or not.\" Expresses frustration with not working on coping strategies, \"she just sits there and lets me talk. All other places do not accept my insurance. I don't know who takes my insurance or not.\"     Patient continues to struggle with tactile hallucinations, feeling something is crawling on scalp.  Patient has been taking Dupixent from dermatologist which has not been effective. Patient feels due to these symptoms, depression exacerbates. Patient is also having difficulty with neck pain and minimal relief from pain medications. Denies drowsiness with Zyprexa. Denies Prozac helping with \"crawling sensation.\" Continues to take Hydroxyzine 100 mg 3 times daily and the Buspar, \"I wish I could have something else for the anxiety.\" Patient asking to come off Zyprexa due to weight gain.     Patient was referred to Bariatric surgery for gastric bypass " "per PCP note 7/11/23 with notes to eventually come off the Zyprexa, though due to chronic mental health conditions the medication is necessary at this time.     5/15/23:  Patient presents today in office, \"I am still picking at my head.\" Patient was started on Dupixent per Dermatology in Feb.  \"I am still depressed, the anxiety is getting the best of me.\"    At last visit Zyprexa was increased from 2.5 mg to 5 mg daily at 12 noon, for which patient reports ongoing tactile hallucinations.  Due to elevated anxiety, patient was instructed to increase dose of Hydroxyzine over this past weekend to 100 mg 3 times daily as needed anxiety, for which patient reports tolerating well, \"It works for 3 hrs, then I am right back picking at my head, very anxious, like I have pressure in my chest all the time.\" Denies increased drowsiness with dose.  Continues to have sensation of bugs crawling on head and around ears.      Continues to take Zyprexa 5 mg at noon, and reports mild drowsiness.  Patient will \"doze off\" during the day, denies taking a nap.  Reports waking up during the night, approximately twice and able to eventually return to sleep.      Depression: Patient complains of depression. She complains of anhedonia, depressed mood, difficulty concentrating, fatigue, hopelessness and insomnia   Anxiety:  The patient endorses significant symptoms of anxiety including: excessive anxiety and worry about a number of events or activities for more days than not, restlessness or feeling keyed up, being easily fatigued, difficulty concentrating or mind going blank, irritability, muscle tension and sleep disturbance which have caused impairment in important areas of daily functioning.      4/7/23: Patient presents today in office, at last visit patient was started on Zyprexa 2.5 mg daily at noon for which patient reports \"it's okay\". Admits to medication adherence, \"I am still depressed, still picking my head, and feel theres still " "something crawling in my head.\" reports with excessive picking areas will bleed.  Patient tried to use an ice pack to head, though had a headache, which was wrapped in a towel. Denies relief from ice pack.    Prozac was increased from 20 to 40 mg on 3/24/23, for which patient reports she is still depressed.     Denies drowsiness with Zyprexa, or naps. Denies scratching or feeling as if something is crawling on any other places of body, only the scalp.     Patient reports asking PCP for something for anxiety and was told to discuss with this provider.    Patient reports [facility] provider, Barbie, had left [facility], and was waiting to hear back due to locating a provider whom accepted patient insurance. And patient has not heard back at this time.       3/9/23:  Patient presents today in office, \"I am not any better, I can't hardly stand it. The feeling of something crawling in my head, and I am depressed again, can you put me back on the Cymbalta along with the Prozac.  I am so anxious.\"     Patient continues to experience crying spells, anxiety and depression ongoing with minimal relief.  Continues to feel as if something is crawling on scalp, complaint of itching is not voiced today.       2/23/23:  Patient presents today in office, at last visit Risperdal was stopped due to potential D2D interaction and short duration of use and started Prozac 10 mg was started for which patient reports \"I can't see a difference, I am so depressed, I am still scratching my head.\" Reports Hydroxyzine has helped with the itching, though, \"I can't help it, I can't stop scratching.\"  Patient wore gloves for short duration with minimal effectiveness, \"I still feel that sensation, like something is crawling in my head, then I go pick at it.\" Reports sensation does go down neck.    \"I get this tingling sensation through this upper part of my body.\" as patient pointing to upper chest, shoulders. Patient recalls this sensation has been going on " "for \"along time, several months, even when I was seeing .\"    Patient is no longer seeing therapist, patient has an appointment with [facility] in April with Barbie,  as Senyd with [employer] is on maternity leave, and was referred to Kayleigh and she lives in [place] and was only talking via phone.       1/30/23:  Patient presents today in office reports tolerating taper of Cymbalta though continues to feel depressed and continues to scratch at head, \"My anxiety is ridiculous, over the weekend I said to myself I don't even want to live.\" Patient was able to take some deep breaths and managed to deal with symptoms, though difficult.  Denies current SI.     Increase Hydroxyzine FROM 25 mg 4 times daily as needed TO 50 mg by mouth 3 times daily as needed at last visit, which patient feels itching has not improved.     Risperdal w/Prozac D2D discussed.   Patient is uncertain if Risperdal is helpful, \"I can stop taking it, if you can get me on something to stop tearing up my head.\" Patient reports times of dozing off during the day, though uncertain if dose was taken yesterday, denies recent falls. Patient reports trying to lose weight and prefers to not take any medications that can potentially increase weight as patient denies knowledge of Risperdal causing weight gain.     Patient informed of other potential D2D interaction with Wellbutrin and Prozac as dose of Prozac would be doubled essentially.  Patient wishes to proceed with switch to Prozac as previously discussed.      Patient asking about Gabapentin refill, uses for RLS and was told by PCP medication would need to come from psychiatry or pain management.      1/9/23:  Patient presents today in office complaining of severe itching and skin picking to head and scalp.  Since last visit, patient was started on Risperdal 1 mg twice daily 12/22/22 per PCP.  Noted PCP also weaning down from Hydrocodone and switched to Diclofenac.  Patient was admitted to [facility] 12/31/22-1/2/23 " "for chest pain, palpitations, acute exacerbation of COPD, anxiety disorder, chronic hypoxemic respiratory failure on home oxygen.     Started Hydroxyzine (Vistaril) 25 mg 4 times daily as needed, which patient takes 3-4 times daily which has not provided effectiveness, \"Nothing helps, I swear there is something crawling in my head, I can't stop scratching it, I been to a dermatologist.\"  Admits to medication calming the itching a little bit, \"not really helping with anxiety, just a little bit.\" Denies drowsiness, though reports ongoing low energy.      In regards to Risperdal, \"I don't know if it helps either.\"    Admits to having weight gain, has been walking in home due to living [place] a hill cannot walk outside, which has helped some in regards to exercise.  Patient states, \"I can feel the depression coming.\"   \"When I stand up I have to hold on to this and not take off right away.\"  Referring to rollater walker.  Admits to waking up in the middle of the night, at times goes to sleep at 1 am, uses CPAP.      11/21/22:  Patient presents today in office, \"I am still depressed and my anxiety is off the roof, my head is so sore from scratching.\"   Increased Wellbutrin SR to 200 mg daily and Buspar from 15 to 30 mg twice daily at last visit, for which patient reports has not been helpful.  \"I wonder if you can raise the Cymbalta or not?\"    \"There are some days I feel like I want to die.\"     Patient continues to report difficulty with living situation with daughter and grand children, \"I can't hardly get her to help me with the house cleaning, I just don't live like her.\"  Admits to ongoing stress due to current living environment.     Upon inquiring of amount of Cymbalta, patient is uncertain if there are 2 of the 60 mg in medi-planner, as the current order is for 2 of the 60 mg daily.      Patient expresses desire to speak with a therapist more often, as patient does not have another appointment for a few weeks with " "current therapist.     \"I know you won't give me the Diazepam, but is there something else you can give me for anxiety?\"      10/17/22:  Patient presents today in office for medication check as at last visit Wellbutrin SR was increased to 150 mg, changed Buspar frequency to twice daily as patient was not consistent with 3 times daily dosing, and due to not taking Remeron as prescribed patient was educated to start taking the 1.5 tabs to equal 45 mg.  Patient was also instructed to allow daughter to fill all medications to medi-planner as patient was administering some medications independently leading to inconsistency and inaccurate dosing.     Patient reports taking Remeron 45 mg and it is the only bottle at bedside which patient admits to adherence.  Buspar is now in prefilled medi planner filled by daughter.     Patient reports depression has worsened, \"my nerves I just get nervous all the time, my daughter lives with me, it's hard sometimes.\"  Patients 2 grand children almost 3 and 8, and \"Becki is suárez and is sometimes not nice to me.\"  Patient began to cry explaining this morning she kept son home from school and got upset because she had to change son's doctor appointment from 130 to 230 pm, as patient has appointment at 1230 with PCP today.    Patient continues to work with Sendy, therapist from [facility].     Patient reports utilizing deep focused breathing to help with anxiety which isn't always effective.   Patient admits to taking Klonopin, \"the little blue pill\", \"I only got 30 so I can only take one a day.  I believe you gave me a prescription last time for 30 days.\"  Patient reports no Klonopin remain, as last pill was taken yesterday.  Patient reports taking upon feeling very anxious.  Denies falls or dizziness.       9/9/22: INITIAL EVAL/Transfer of care   Patient presents today in office for transfer of care within practice due to location, as patient residence is in [place].  Patient has been a " "patient of  in [place] since 10/2021.  Patient was last seen by  7/15/22.  Patient has a longstanding history of depression and anxiety which has been treatment resistant for the most part, as patient has failed numerous medications due to either minimal effectiveness, side effects, or induction/worsening of other comorbid conditions. 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  Patient had been advised numerous times in the last year to receive a higher level of care with inpatient mental health care-suggestions of ECT- due to minimal improvement with outpatient medication management, however, patient has either refused due to a multitude of reasons or failure to follow through after arrangements for treatment were finalized.  Unable to have TMS due to transportation issues, has had ECT in the past.     Patient has a history of CHF,HTN,HLD,DM,OA,chronic neck and back pain (L-spine), MEGAN with CPAP w/2L O2, IBS, Peripheral Neuropathy,COPD,RLS, essential tremor,2 and obesity.  Patient pain is managed by [facility].  Due to these chronic medical conditions she has physical limitations which also contribute to mood.      Patient had been living alone up until this past summer, as patient did not like living alone.  Patient daughter, Becki, and her 2 children age 2.5 and almost 8 yr old, moved in with patient in her mobile home in [place].     Patient complaint of \"very jittery all the time, I am still depressed, I didn't know if you could raise my Cymbalta or Wellbutrin to help me? Sometimes my nerves, I just feel like I am going to explode\".  \"The 2 little one's, it's just really hard on me.\"   Patient admits triggers of anxiety are primarily 2 grand children whom live in home, \"they live different than I do.\"   Reports daughter is also irritable which makes it difficult for patient to cope with anxiety.   Stress and " "anxiety has worsened since they moved into home.      Patient feels Buspar at 15 mg 3 times daily is not always effective, denies drowsiness though has little energy.  \"I get agitated very easily.  If kids make a mess, and she doesn't clean it up then I have to do it, I can't go behind her, but if I say something, she acts like it's nothing.\"     Patient denies sleep difficulty, adheres to nightly CPAP with Oxygen 2L, denies day time oxygen as patient did not qualify.  Denies recent falls.  Patient sleeps from 1130 pm until 8-9 am, denies night time awakenings nor naps.     Patient states, \"I think I have my days and nights mixed up.\"  Patient will try to eat dinner, at night snacks on pretzles or crackers.  Patient does not eat breakfast or lunch, will snack sometimes. Patient checks blood sugars daily, yesterday was 124 when nurse came, confirmed [facility]  services for SN only, though patient believes will be released soon, and would find out today if services would continue. Patient has been receiving services for approximately 4 months.     Patient uses a medi-planner which daughter fills weekly.  Currently taking Wellbutrin  mg once daily in the morning, \"I try to take the Buspar 15 mg 3 times daily, I will take it if I feel anxious.\" Denies 3 times daily dosing as prescribed. Patient reports keeping Buspar bottle out of routine medications which daughter fills.  Clonazepam 1 mg nightly with Remeron.  Reports upon last  of Remeron it was 30 mg and not 45 mg, denies reading bottle which instructs patient to take 1.5 tabs to equal 45 mg. \"when I take my night medications I don't always go to sleep at that time, so I wait until I get ready to go to sleep then take it.\"  \"It's in the bottle next to my night stand, I leave the pill box on the table in the kitchen.\"  Patient takes Cymbalta 120 mg in the morning.   Confirmed with patient both the Wellbutrin and Cymbalta are in the medi-planner.  And the " "Buspar, Clonazepam, and Remeron bottles are at patient bedside.  The medi-planner is for am and pm, and \"leg medicine and blood pressure medicine are in the night\". And patient takes those medications around 10 pm, and takes Remeron later at 11 pm.  Patient reports taking Clonazepam once daily, and does not take at night.  Confirmed as patient had stated differently earlier in visit.  \"I just want to feel better.\"     Denies Signs & Symptoms of Serotonin Syndrome: confusion, hallucinations, seizures, increased heart rate, fever, excessive sweating, shivering or shaking, blurred vision, muscle spasms or stiffness, tremors, incoordination, stomach cramps, nausea, & vomiting.  Though patient endorses to soa with minimal exertion with \"beads of sweat on my forehead while I got dressed this morning.\"  Patient tremors are chronic.        PHQ-9 Depression Screening  PHQ-9 Total Score:    10/4/24: 16    DIVYA-7    10/4/24: 15    The following portions of the patient's history were reviewed and updated as appropriate: allergies, current medications, past family history, past medical history, past social history, and past surgical history.     Past Surgical History:  Past Surgical History:   Procedure Laterality Date    CARPAL TUNNEL RELEASE      COLONOSCOPY  2020    ENDOSCOPIC FUNCTIONAL SINUS SURGERY (FESS) Left 08/15/2022    Procedure: ENDOSCOPIC FUNCTIONAL SINUS SURGERY, left maxillary antrostomy with removal of contents, possible left ethmoidectomy;  Surgeon: Johnny Calderon MD;  [place] [facility] [place] [facility]  Service: E[place];  Laterality: Left;    HYSTERECTOMY  2002    complete- ovarian cysts       Problem List:  Patient Active Problem List   Diagnosis    Carpal tunnel syndrome of left wrist    Carpal tunnel syndrome of right wrist    Anxiety    Celiac artery stenosis    Cervical disc disorder    Chronic pain disorder    Degeneration of lumbar intervertebral disc    Depressive disorder    Diabetic neuropathy    Essential " hypertension    Essential tremor    Hypercholesterolemia    Insomnia    Irritable bowel syndrome    Lumbar spondylosis    Obesity, morbid, BMI 50 or higher    Osteoarthritis    Sacroiliac joint pain    MEGAN on CPAP    Chronic diastolic heart failure    Palpitations    Type 2 diabetes mellitus without complication, without long-term current use of insulin    Other chest pain    Generalized anxiety disorder    Paresthesia    Shortness of breath    Chronic obstructive pulmonary disease    Long-term current use of opiate analgesic    Cervical radiculopathy    Cervical spondylosis    Degeneration of thoracic intervertebral disc    Thoracic spondylosis    Coronary artery calcification seen on CT scan    Mild persistent asthma without complication    Sore throat    Acute pain of left knee    Stage 3 chronic kidney disease    B12 deficiency    COPD with asthma and status asthmaticus    Iron deficiency    Overweight    Vulvar cyst    Iron deficiency anemia    Vitamin D deficiency       Allergy:   No Known Allergies     Discontinued Medications:  Medications Discontinued During This Encounter   Medication Reason    busPIRone (BUSPAR) 10 MG tablet Dose adjustment    FLUoxetine (PROzac) 40 MG capsule Other- See Medication Note                               Current Medications:   Current Outpatient Medications   Medication Sig Dispense Refill    busPIRone (BUSPAR) 10 MG tablet Take 1 tablet by mouth 3 (Three) Times a Day for 7 days, THEN 1 tablet 2 (Two) Times a Day for 7 days. Indications: Anxiety Disorder      albuterol sulfate  (90 Base) MCG/ACT inhaler Inhale 2 puffs Every 4 (Four) Hours As Needed for Wheezing. 90 g 0    betamethasone dipropionate (DIPROLENE) 0.05 % ointment Apply 1 Application topically to the appropriate area as directed As Needed.      Blood Glucose Monitoring Suppl device Use 1 each Daily. 1 each 0    Calcium Carb-Cholecalciferol (Calcium 500 + D) 500-3.125 MG-MCG tablet Take 1 each by mouth 2  (Two) Times a Day. 60 tablet 2    Cariprazine HCl (Vraylar) 3 MG capsule capsule Take 1 capsule by mouth Every Morning. 30 capsule 1    clonazePAM (KlonoPIN) 0.5 MG tablet Take 1 tablet by mouth 2 (Two) Times a Day As Needed for Anxiety. 60 tablet 0    diclofenac (VOLTAREN) 75 MG EC tablet Take 1 tablet by mouth 2 (Two) Times a Day. 60 tablet 2    donepezil (ARICEPT) 10 MG tablet Take 1 tablet by mouth Every Night. 30 tablet 2    ferrous sulfate 325 (65 FE) MG tablet Take 1 tablet by mouth Daily With Breakfast. 90 tablet 1    Fluticasone-Umeclidin-Vilant (TRELEGY ELLIPTA) 200-62.5-25 MCG/ACT inhaler Inhale 1 puff Daily. 1 each 5    glucose blood test strip Use as instructed to check blood sugar daily 100 each 3    HYDROcodone-acetaminophen (NORCO)  MG per tablet       ipratropium-albuterol (DUO-NEB) 0.5-2.5 mg/3 ml nebulizer Nebulize and inhale 1 vial 4 (Four) Times a Day As Needed for Wheezing. 360 mL 3    Lancet Device misc Use 1 each Daily. Use as directed; check blood sugar once daily 100 each 3    Lancets (OneTouch Delica Plus [id number]) misc Use 1 each Daily. 100 each 0    Magnesium Oxide -Mg Supplement 400 (240 Mg) MG tablet Take 1 tablet by mouth Every Night. 30 tablet 11    memantine (Namenda) 5 MG tablet Take 1 tablet by mouth Daily. 30 tablet 2    metoprolol succinate XL (TOPROL-XL) 25 MG 24 hr tablet Take 0.5 tablets by mouth Daily.      miconazole (Desenex) 2 % powder Apply topically to the appropriate area as directed 2 (Two) Times a Day. 71 g 11    mirtazapine (REMERON) 7.5 MG tablet Take 1 tablet by mouth Every Night. 30 tablet 1    mupirocin (BACTROBAN) 2 % ointment APPLY TO AFFECTED AREA(S) TWO TIMES A DAY AS DIRECTED FOR 14 DAYS - 22 g 0    naloxone (NARCAN) 4 MG/0.1ML nasal spray 1 spray As Needed. Has on hand      O2 (OXYGEN) Inhale 2 L/min Every Night.      pantoprazole (PROTONIX) 40 MG EC tablet TAKE 1 TABLET BY MOUTH DAILY 90 tablet 0    pramipexole (MIRAPEX) 0.5 MG tablet Take 1 tablet  by mouth 3 (Three) Times a Day. 270 tablet 0    pregabalin (LYRICA) 75 MG capsule Take 1 capsule by mouth 2 (Two) Times a Day. 60 capsule 2    primidone (MYSOLINE) 50 MG tablet Take 1 tablet by mouth.      spironolactone (ALDACTONE) 25 MG tablet Take 1 tablet by mouth 2 (Two) Times a Day. 60 tablet 5    tacrolimus (PROTOPIC) 0.1 % ointment Apply topical ointment to affected area twice daily      Tirzepatide 12.5 MG/0.5ML solution auto-injector Inject 12.5mg under the skin into the appropriate area as directed 1 (One) Time Per Week. 2 mL 5    torsemide (DEMADEX) 20 MG tablet Take 1 tablet by mouth 2 (Two) Times a Day. 60 tablet 5    triamcinolone (KENALOG) 0.025 % cream Apply 1 Application topically to the appropriate area as directed As Needed.       Current Facility-Administered Medications   Medication Dose Route Frequency Provider Last Rate Last Admin    cyanocobalamin injection 1,000 mcg  1,000 mcg Intramuscular Q28 Days Ami Diego MD   1,000 mcg at 11/21/24 1217       Past Medical History:  Past Medical History:   Diagnosis Date    Adverse effect of other viral vaccines, initial encounter     Covid vaccine    Allergic fungal sinusitis 07/29/2022    Allergic rhinitis     Anxiety disorder     Asthma     CHF (congestive heart failure)     COPD with acute exacerbation     CTS (carpal tunnel syndrome)     left    Difficulty walking 2022    Essential (primary) hypertension     Essential tremor     Hypercholesterolemia 10/18/2021    Irritable bowel syndrome     Low back pain     Major depressive disorder     Morbid (severe) obesity due to excess calories     Nasal congestion     Neuropathy in diabetes 2023    Obstructive sleep apnea (adult) (pediatric)     Other hereditary and idiopathic neuropathies     Pain in left hip     Pain in right toe(s)     Pain in thoracic spine     Peripheral neuropathy     Hands    Primary generalized (osteo)arthritis     Primary insomnia     Pulmonary emphysema 01/05/2023     Restless leg syndrome     SOBOE (shortness of breath on exertion)     Substance abuse     Type 2 diabetes mellitus without complication, without long-term current use of insulin 2022    Vitamin D deficiency        Past Psychiatric History:  Began Treatment: while in her 20's  Diagnoses:Depression and Anxiety  Psychiatrist: -talked on phone-managed meds for 8 months prior to SOC with  in 10/2021; Also at [facility] for 2 yrs [id number]  Therapist: Sendy with [facility] (no in office visits) (telephone) next appt Oct. 2022 (for the last 8 months) prior seen Jessica at [facility] in [place] for 1-2 yrs  Admission History:Denies  Medication Trials: Several for which pt unable to recall if effective: Lexapro(ineff.),Abilify(eff),Prozac,Zoloft,Trintellix,Paxil,Celexa, (can't remember/uncertain); SGA-worsened tremors-per neuro medication induced parkisonism-Latuda,Rexulti,Seroquel (wt gain,helped with insomnia&anxiety); Lamictal-ineff for anxiety; Xanax-weaned, Hydroxyzine(ineff 1 mo. 2022-2022)Trazodone 2021-2022 somewhat helpful for anxiety though made pt tired, Ambien [id number]?; retried Effexor XR, Wellbutrin,Cymbalta  Risperdal 1 mo approximately stopped due to starting Prozac; Hydroxyzine 100 mg 3 times daily ineffective for itching and anxiety; Zyprexa-ineffective; Prozac up to 80 mg-ineffective; Klonopin-weaned, effective  Self Harm: Denies  Suicide Attempts:Denies   Psychosis, Anxiety, Depression: Denies    Substance Abuse History:   Types:Denies all, including illicit  Withdrawal Symptoms:Denies  Longest Period Sober:Not Applicable   AA: Not applicable     Social History:  Martial Status:   Employed:No Retired from [employer], worked in [employer]  Kids:Yes or If so, how many 1 son, Simon; 1 dtr, Becki  House: mobile home  daughter, Becki and her 2 young children    History: Denies  Access to Guns:  Denies (son has his guns locked in safe in patient home, which patient  does not have access to)    Social History     Socioeconomic History    Marital status:     Number of children: 2   Tobacco Use    Smoking status: Former     Current packs/day: 0.00     Average packs/day: 0.5 packs/day for 12.0 years (6.0 ttl pk-yrs)     Types: Cigarettes     Start date: 2009     Quit date: 2021     Years since quitting: 3.8     Passive exposure: Never    Smokeless tobacco: Never   Vaping Use    Vaping status: Never Used   Substance and Sexual Activity    Alcohol use: Not Currently    Drug use: Never    Sexual activity: Not Currently     Partners: Male     Birth control/protection: Hysterectomy, Surgical       Family History:   Suicide Attempts: Denies  Suicide Completions:Denies      Family History   Problem Relation Age of Onset    COPD Father     Heart failure Father     Hypertension Mother     Neuropathy Sister     Anxiety disorder Daughter     Depression Daughter     Malig Hyperthermia Neg Hx     Breast cancer Neg Hx     Ovarian cancer Neg Hx     Uterine cancer Neg Hx     Cervical cancer Neg Hx     Colon cancer Neg Hx     Stomach cancer Neg Hx     Skin cancer Neg Hx     Clotting disorder Neg Hx     Deep vein thrombosis Neg Hx     Pulmonary embolism Neg Hx        Developmental History:   Born: [place], [place]  Siblings:3 brothers, 6 sisters (1 )  Childhood: Denies Abuse  High School:Completed  College:Denies    Mental Status Exam:   Hygiene:   good  Cooperation:  Cooperative  Eye Contact:  Good  Psychomotor Behavior:  Appropriate   Affect:  Appropriate  Mood: depressed and anxious improving  Speech:  Normal  Thought Process:  Goal directed   Thought Content:  Mood congruent  Suicidal:  None  Homicidal:  None  Hallucinations:  None   Delusion:  None  Memory:  Intact   Orientation:  Person, Place, Time and Situation  Reliability:  good  Insight:  Good  Judgement:  Good  Impulse Control:  Good  Physical/Medical Issues:  Yes CHF,COPD,MEGAN,HTN,HLD,CKD3,RLS,IBS,OA,Peripheral  Neuropathy,Chronic back&neck pain,DM,obesity,essential tremors to kush hands  Home Oxygen    Review of Systems:  Review of Systems   Constitutional:  Positive for fatigue. Negative for diaphoresis.   HENT:  Negative for drooling.    Eyes:  Negative for visual disturbance.   Respiratory:  Positive for apnea. Negative for cough and shortness of breath.         MEGAN with CPAP w/2L O2   Cardiovascular:  Negative for chest pain, palpitations and leg swelling.   Gastrointestinal:  Negative for nausea and vomiting.   Endocrine: Negative for cold intolerance and heat intolerance.   Genitourinary:  Negative for difficulty urinating.   Musculoskeletal:  Positive for back pain, gait problem and neck pain. Negative for joint swelling.        Use of Rolator walker   Allergic/Immunologic: Negative for immunocompromised state.   Neurological:  Positive for weakness. Negative for dizziness, seizures and speech difficulty.   Psychiatric/Behavioral:  Positive for decreased concentration and sleep disturbance. Negative for agitation, confusion, hallucinations, self-injury and suicidal ideas. The patient is nervous/anxious. The patient is not hyperactive.          Physical Exam:  Physical Exam  Psychiatric:         Attention and Perception: Attention and perception normal.         Mood and Affect: Mood is anxious and depressed.         Speech: Speech normal.         Behavior: Behavior normal. Behavior is cooperative.         Thought Content: Thought content normal. Thought content does not include suicidal ideation. Thought content does not include suicidal plan.         Cognition and Memory: Cognition normal.         Judgment: Judgment normal.         Vital Signs:   There were no vitals taken for this visit.     Office notes from care team reviewed:  Date of Service: 10/30/24 OV with  with [facility]   Date of Service: 10/21/24 OV with Anni with [facility]  Date of Service: 10/7/24 OV with DIANE Olson  with [facility]      Lab Results: REVIEWED  Office Visit on 11/21/2024   Component Date Value Ref Range Status    Amphetamine Screen, Urine 11/21/2024 Negative  Negative Final    Barbiturates Screen, Urine 11/21/2024 Positive (A)  Negative Final    Buprenorphine, Screen, Urine 11/21/2024 Negative  Negative Final    Benzodiazepine Screen, Urine 11/21/2024 Positive (A)  Negative Final    Cocaine Screen, Urine 11/21/2024 Negative  Negative Final    MDMA (ECSTASY) 11/21/2024 Negative  Negative Final    Methamphetamine, Ur 11/21/2024 Negative  Negative Final    Morphine/Opiates Screen, Urine 11/21/2024 Positive (A)  Negative Final    Methadone Screen, Urine 11/21/2024 Negative  Negative Final    Oxycodone Screen, Urine 11/21/2024 Negative  Negative Final    Phencyclidine (PCP), Urine 11/21/2024 Negative  Negative Final    THC, Screen, Urine 11/21/2024 Negative  Negative Final    Lot Number 11/21/2024 [id number]   Final    Expiration Date 11/21/2024 7-4-26   Final   Lab on 11/01/2024   Component Date Value Ref Range Status    proBNP 11/01/2024 93.7  0.0 - 900.0 pg/mL Final    Glucose 11/01/2024 102 (H)  65 - 99 mg/dL Final    BUN 11/01/2024 14  8 - 23 mg/dL Final    Creatinine 11/01/2024 1.26 (H)  0.57 - 1.00 mg/dL Final    Sodium 11/01/2024 142  136 - 145 mmol/L Final    Potassium 11/01/2024 4.6  3.5 - 5.2 mmol/L Final    Chloride 11/01/2024 102  98 - 107 mmol/L Final    CO2 11/01/2024 29.4 (H)  22.0 - 29.0 mmol/L Final    Calcium 11/01/2024 10.0  8.6 - 10.5 mg/dL Final    Total Protein 11/01/2024 7.1  6.0 - 8.5 g/dL Final    Albumin 11/01/2024 3.8  3.5 - 5.2 g/dL Final    ALT (SGPT) 11/01/2024 15  1 - 33 U/L Final    AST (SGOT) 11/01/2024 20  1 - 32 U/L Final    Alkaline Phosphatase 11/01/2024 123 (H)  39 - 117 U/L Final    Total Bilirubin 11/01/2024 0.3  0.0 - 1.2 mg/dL Final    Globulin 11/01/2024 3.3  gm/dL Final    A/G Ratio 11/01/2024 1.2  g/dL Final    BUN/Creatinine Ratio 11/01/2024 11.1  7.0 - 25.0 Final     Anion Gap 11/01/2024 10.6  5.0 - 15.0 mmol/L Final    eGFR 11/01/2024 46.6 (L)  >60.0 mL/min/1.73 Final    WBC 11/01/2024 6.88  3.40 - 10.80 10*3/mm3 Final    RBC 11/01/2024 4.48  3.77 - 5.28 10*6/mm3 Final    Hemoglobin 11/01/2024 13.7  12.0 - 15.9 g/dL Final    Hematocrit 11/01/2024 42.6  34.0 - 46.6 % Final    MCV 11/01/2024 95.1  79.0 - 97.0 fL Final    MCH 11/01/2024 30.6  26.6 - 33.0 pg Final    MCHC 11/01/2024 32.2  31.5 - 35.7 g/dL Final    RDW 11/01/2024 13.0  12.3 - 15.4 % Final    RDW-SD 11/01/2024 46.3  37.0 - 54.0 fl Final    MPV 11/01/2024 9.2  6.0 - 12.0 fL Final    Platelets 11/01/2024 265  140 - 450 10*3/mm3 Final   Office Visit on 10/30/2024   Component Date Value Ref Range Status    SARS Antigen 10/30/2024 Not Detected  Not Detected, Presumptive Negative Final    Influenza A Antigen DEANDRE 10/30/2024 Not Detected  Not Detected Final    Influenza B Antigen DEANDRE 10/30/2024 Not Detected  Not Detected Final    Internal Control 10/30/2024 Passed  Passed Final    Lot Number 10/30/2024 [id number]   Final    Expiration Date 10/30/2024 6/28/24   Final    Color, UA 10/30/2024 Yellow  Yellow, Straw Final    Appearance, UA 10/30/2024 Slightly Cloudy (A)  Clear Final    pH, UA 10/30/2024 5.5  5.0 - 8.0 Final    Specific Gravity, UA 10/30/2024 1.025  1.005 - 1.030 Final    Glucose, UA 10/30/2024 Negative  Negative Final    Ketones, UA 10/30/2024 Negative  Negative Final    Bilirubin, UA 10/30/2024 Negative  Negative Final    Blood, UA 10/30/2024 Negative  Negative Final    Protein, UA 10/30/2024 Negative  Negative Final    Leuk Esterase, UA 10/30/2024 Negative  Negative Final    Nitrite, UA 10/30/2024 Negative  Negative Final    Urobilinogen, UA 10/30/2024 0.2 E.U./dL  0.2 - 1.0 E.U./dL Final   Office Visit on 08/13/2024   Component Date Value Ref Range Status    Amphetamine Screen, Urine 08/13/2024 Negative  Negative Final    Barbiturates Screen, Urine 08/13/2024 Positive (A)  Negative Final    Buprenorphine, Screen,  Urine 08/13/2024 Negative  Negative Final    Benzodiazepine Screen, Urine 08/13/2024 Negative  Negative Final    Cocaine Screen, Urine 08/13/2024 Negative  Negative Final    MDMA (ECSTASY) 08/13/2024 Negative  Negative Final    Methamphetamine, Ur 08/13/2024 Negative  Negative Final    Morphine/Opiates Screen, Urine 08/13/2024 Positive (A)  Negative Final    Methadone Screen, Urine 08/13/2024 Negative  Negative Final    Oxycodone Screen, Urine 08/13/2024 Negative  Negative Final    Phencyclidine (PCP), Urine 08/13/2024 Negative  Negative Final    THC, Screen, Urine 08/13/2024 Negative  Negative Final    Lot Number 08/13/2024 [id number]   Final    Expiration Date 08/13/2024 11/12/2025   Final    Color, UA 08/13/2024 Yellow  Yellow, Straw Final    Appearance, UA 08/13/2024 Cloudy (A)  Clear Final    pH, UA 08/13/2024 5.5  5.0 - 8.0 Final    Specific Gravity, UA 08/13/2024 1.013  1.005 - 1.030 Final    Glucose, UA 08/13/2024 Negative  Negative Final    Ketones, UA 08/13/2024 Negative  Negative Final    Bilirubin, UA 08/13/2024 Negative  Negative Final    Blood, UA 08/13/2024 Negative  Negative Final    Protein, UA 08/13/2024 Negative  Negative Final    Leuk Esterase, UA 08/13/2024 Small (1+) (A)  Negative Final    Nitrite, UA 08/13/2024 Negative  Negative Final    Urobilinogen, UA 08/13/2024 0.2 E.U./dL  0.2 - 1.0 E.U./dL Final    Extra Tube 08/13/2024 Hold for add-ons.   Final    Auto resulted.    RBC, UA 08/13/2024 0-2  None Seen, 0-2 /HPF Final    WBC, UA 08/13/2024 11-20 (A)  None Seen, 0-2 /HPF Final    Bacteria, UA 08/13/2024 Trace (A)  None Seen /HPF Final    Squamous Epithelial Cells, UA 08/13/2024 31-50 (A)  None Seen, 0-2 /HPF Final    Hyaline Casts, UA 08/13/2024 3-6  None Seen /LPF Final    Methodology 08/13/2024 Automated Microscopy   Final    Urine Culture 08/13/2024 No growth   Final   Lab on 07/03/2024   Component Date Value Ref Range Status    Glucose 07/03/2024 87  65 - 99 mg/dL Final    BUN 07/03/2024  17  8 - 23 mg/dL Final    Creatinine 07/03/2024 1.03 (H)  0.57 - 1.00 mg/dL Final    Sodium 07/03/2024 142  136 - 145 mmol/L Final    Potassium 07/03/2024 3.5  3.5 - 5.2 mmol/L Final    Chloride 07/03/2024 99  98 - 107 mmol/L Final    CO2 07/03/2024 33.0 (H)  22.0 - 29.0 mmol/L Final    Calcium 07/03/2024 10.2  8.6 - 10.5 mg/dL Final    BUN/Creatinine Ratio 07/03/2024 16.5  7.0 - 25.0 Final    Anion Gap 07/03/2024 10.0  5.0 - 15.0 mmol/L Final    eGFR 07/03/2024 59.3 (L)  >60.0 mL/min/1.73 Final    Vitamin B-12 07/03/2024 >2,000 (H)  211 - 946 pg/mL Final    Total Cholesterol 07/03/2024 167  0 - 200 mg/dL Final    Triglycerides 07/03/2024 105  0 - 150 mg/dL Final    HDL Cholesterol 07/03/2024 59  40 - 60 mg/dL Final    LDL Cholesterol  07/03/2024 89  0 - 100 mg/dL Final    VLDL Cholesterol 07/03/2024 19  5 - 40 mg/dL Final    LDL/HDL Ratio 07/03/2024 1.47   Final    25 Hydroxy, Vitamin D 07/03/2024 41.6  30.0 - 100.0 ng/ml Final    Iron 07/03/2024 96  37 - 145 mcg/dL Final    Iron Saturation (TSAT) 07/03/2024 27  20 - 50 % Final    Transferrin 07/03/2024 238  200 - 360 mg/dL Final    TIBC 07/03/2024 355  298 - 536 mcg/dL Final    Hemoglobin A1C 07/03/2024 5.80 (H)  4.80 - 5.60 % Final   Office Visit on 06/26/2024   Component Date Value Ref Range Status    Case Report 06/26/2024    Final                    Value:Surgical Pathology Report                         Case: [id number]                                  Authorizing Provider:  Isaac Hawkins MD  Collected:           06/26/2024 04:35 PM          Ordering Location:     [facility]     Received:            06/26/2024 04:35 PM                                 [facility]                                                                  Pathologist:           Becki Watson MD                                                     Specimen:    Vulva, Vulvar cyst                                                                         Clinical  Information 06/26/2024    Final                    Value:This result contains rich text formatting which cannot be displayed here.    Final Diagnosis 06/26/2024    Final                    Value:This result contains rich text formatting which cannot be displayed here.    Gross Description 06/26/2024    Final                    Value:This result contains rich text formatting which cannot be displayed here.    Microscopic Description 06/26/2024    Final                    Value:This result contains rich text formatting which cannot be displayed here.   Lab on 06/05/2024   Component Date Value Ref Range Status    Glucose 06/05/2024 104 (H)  65 - 99 mg/dL Final    BUN 06/05/2024 11  8 - 23 mg/dL Final    Creatinine 06/05/2024 1.21 (H)  0.57 - 1.00 mg/dL Final    Sodium 06/05/2024 142  136 - 145 mmol/L Final    Potassium 06/05/2024 4.1  3.5 - 5.2 mmol/L Final    Chloride 06/05/2024 101  98 - 107 mmol/L Final    CO2 06/05/2024 30.1 (H)  22.0 - 29.0 mmol/L Final    Calcium 06/05/2024 10.0  8.6 - 10.5 mg/dL Final    Total Protein 06/05/2024 7.7  6.0 - 8.5 g/dL Final    Albumin 06/05/2024 4.2  3.5 - 5.2 g/dL Final    ALT (SGPT) 06/05/2024 17  1 - 33 U/L Final    AST (SGOT) 06/05/2024 18  1 - 32 U/L Final    Alkaline Phosphatase 06/05/2024 113  39 - 117 U/L Final    Total Bilirubin 06/05/2024 0.2  0.0 - 1.2 mg/dL Final    Globulin 06/05/2024 3.5  gm/dL Final    A/G Ratio 06/05/2024 1.2  g/dL Final    BUN/Creatinine Ratio 06/05/2024 9.1  7.0 - 25.0 Final    Anion Gap 06/05/2024 10.9  5.0 - 15.0 mmol/L Final    eGFR 06/05/2024 48.9 (L)  >60.0 mL/min/1.73 Final    proBNP 06/05/2024 88.5  0.0 - 900.0 pg/mL Final    WBC 06/05/2024 7.61  3.40 - 10.80 10*3/mm3 Final    RBC 06/05/2024 4.83  3.77 - 5.28 10*6/mm3 Final    Hemoglobin 06/05/2024 13.7  12.0 - 15.9 g/dL Final    Hematocrit 06/05/2024 43.6  34.0 - 46.6 % Final    MCV 06/05/2024 90.3  79.0 - 97.0 fL Final    MCH 06/05/2024 28.4  26.6 - 33.0 pg Final    MCHC 06/05/2024 31.4  (L)  31.5 - 35.7 g/dL Final    RDW 06/05/2024 14.4  12.3 - 15.4 % Final    RDW-SD 06/05/2024 48.5  37.0 - 54.0 fl Final    MPV 06/05/2024 8.6  6.0 - 12.0 fL Final    Platelets 06/05/2024 314  140 - 450 10*3/mm3 Final    Neutrophil % 06/05/2024 66.2  42.7 - 76.0 % Final    Lymphocyte % 06/05/2024 23.3  19.6 - 45.3 % Final    Monocyte % 06/05/2024 9.1  5.0 - 12.0 % Final    Eosinophil % 06/05/2024 0.3  0.3 - 6.2 % Final    Basophil % 06/05/2024 0.4  0.0 - 1.5 % Final    Immature Grans % 06/05/2024 0.7 (H)  0.0 - 0.5 % Final    Neutrophils, Absolute 06/05/2024 5.05  1.70 - 7.00 10*3/mm3 Final    Lymphocytes, Absolute 06/05/2024 1.77  0.70 - 3.10 10*3/mm3 Final    Monocytes, Absolute 06/05/2024 0.69  0.10 - 0.90 10*3/mm3 Final    Eosinophils, Absolute 06/05/2024 0.02  0.00 - 0.40 10*3/mm3 Final    Basophils, Absolute 06/05/2024 0.03  0.00 - 0.20 10*3/mm3 Final    Immature Grans, Absolute 06/05/2024 0.05  0.00 - 0.05 10*3/mm3 Final   Office Visit on 06/05/2024   Component Date Value Ref Range Status    SARS Antigen 06/05/2024 Not Detected  Not Detected, Presumptive Negative Final    Influenza A Antigen DEANDRE 06/05/2024 Not Detected  Not Detected Final    Influenza B Antigen DEANDRE 06/05/2024 Not Detected  Not Detected Final    Internal Control 06/05/2024 Passed  Passed Final    Lot Number 06/05/2024 [id number]   Final    Expiration Date 06/05/2024 11/2/2024   Final    Glucose 06/05/2024 109  70 - 130 mg/dL Final   Office Visit on 05/30/2024   Component Date Value Ref Range Status    Amphetamine Screen, Urine 05/30/2024 Negative  Negative Final    Barbiturates Screen, Urine 05/30/2024 Negative  Negative Final    Buprenorphine, Screen, Urine 05/30/2024 Negative  Negative Final    Benzodiazepine Screen, Urine 05/30/2024 Negative  Negative Final    Cocaine Screen, Urine 05/30/2024 Negative  Negative Final    MDMA (ECSTASY) 05/30/2024 Negative  Negative Final    Methamphetamine, Ur 05/30/2024 Negative  Negative Final     Morphine/Opiates Screen, Urine 05/30/2024 Positive (A)  Negative Final    Methadone Screen, Urine 05/30/2024 Negative  Negative Final    Oxycodone Screen, Urine 05/30/2024 Negative  Negative Final    Phencyclidine (PCP), Urine 05/30/2024 Negative  Negative Final    THC, Screen, Urine 05/30/2024 Negative  Negative Final    Lot Number 05/30/2024 [id number]   Final    Expiration Date 05/30/2024 12/28/25   Final       EKG Results:  No orders to display       Imaging Results:  CT Head Without Contrast    Result Date: 6/7/2022    1. Generalized atrophy with no acute intracranial findings 2. Expansion of the left maxillary sinus with some thickening of the bony wall laterally suggesting a mucocele or mass.  Adjacent involvement of the left ethmoid air cells and left frontal sinus.  Recommend ENT consultation.  Mild mucosal disease in the sphenoid sinuses.     Mars Aden MD       Electronically Signed and Approved By: Mars Aden MD on 6/07/2022 at 14:20                 Assessment & Plan   Diagnoses and all orders for this visit:    1. Recurrent major depression resistant to treatment (Primary)    2. Generalized anxiety disorder  -     busPIRone (BUSPAR) 10 MG tablet; Take 1 tablet by mouth 3 (Three) Times a Day for 7 days, THEN 1 tablet 2 (Two) Times a Day for 7 days. Indications: Anxiety Disorder    3. Factitious disord w comb psych and physcl signs and symptoms    4. Insomnia with sleep apnea    5. Polypharmacy    6. Medication management    7. Medication care plan discussed with patient    8. At risk for medication nonadherence                                Visit Diagnoses:    ICD-10-CM ICD-9-CM   1. Recurrent major depression resistant to treatment  F33.9 296.30   2. Generalized anxiety disorder  F41.1 300.02   3. 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 Insomnia with sleep apnea  G47.00 780.51    G47.30    5. Polypharmacy  Z79.899 V58.69   6. Medication management   Z79.899 V58.69   7. Medication care plan discussed with patient  Z71.89 V65.49   8. At risk for medication nonadherence  Z91.89 V49.89                               PLAN:  Safety: No acute safety concerns  Therapy: Currently Seeing a Therapist Shanti with [facility], and was again advised to reschedule an appointment today which patient was in agreement to do, though verbalized thinking the therapist was the only one in the office, which patient was advised there are other therapist and to voice concerns upon calling today.   Risk Assessment: Risk of self-harm acutely is low.  Risk factors include anxiety disorder, mood disorder, and recent psychosocial stressors (pandemic). Protective factors include no family history, denies access to guns/weapons, no present SI, no history of suicide attempts or self-harm in the past, minimal AODA, healthcare seeking, future orientation, willingness to engage in care.  Risk of self-harm chronically is also low, but could be further elevated in the event of treatment noncompliance and/or AODA.  Meds:    -Decrease Buspar FROM 20 mg TO 10 mg 3 times daily without food FOR 7 days THEN DECREASE TO 10 mg by mouth for 7 days THEN STOP. Patient reported ineffectiveness and has been taking Klonopin per PCP.      -Continue MIRTAZAPINE (REMERON) 7.5 mg by mouth nightly to target depression and insomnia. (No adjustment required for most recent eGFR of 46.6 11/1/24)    -Continue Vraylar (Cariprazine) 3 mg by mouth daily in the morning to target unstable mood, depressive symptoms.  Can be taken with or without food.  Discussed all risks, benefits, alternatives, and side effects of Vraylar  including but not limited to GI upset, sedation, rare weight gain, dyslipidemia, extrapyramidal symptoms (dystonia, drug-induced parkinsonism, akathisia, tardive dyskinesia), lowering of seizure threshold, hematologic abnormalities, hyperglycemia, increased mortality in elderly patients  with dementia-related psychosis, neuroleptic malignant syndrome, sexual dysfunction, orthostatic hypotension, (may enhance the effects of antihypertensive medications) falls risk in older adults, and temperature dysregulation. Patient instructed to avoid driving and doing other tasks or actions that require to be alert until knowing how the drug affects them. Discussed the need for patient to immediately call the office for any new or worsening symptoms, such as worsening depression; feeling nervous or restless; suicidal thoughts or actions; or other changes changes in mood or behavior, and all other concerns. Patient educated on medication compliance and the risks of suddenly stopping this medication or missing doses. Patient verbalized understanding and is agreeable to taking Vraylar. Addressed all questions and concerns. After discussion of these risks and benefits, the patient voiced understanding and agreed to proceed. No adjustment required with current eGFR of 46.6.  No adverse effects of antipsychotic medications noted, such as EPS (Extrapyramidal side effects and TD (Tardive Dyskinesia), patient to contact provider immediately if experienced.      Advised patient to stop Prozac 40 mg as she reported self starting approximately 2 weeks ago as every other day. Informed patient the goal is to decrease the amount of psychotropic medications and not add as we have discussed multiple times.   5.  Labs: n/a  6.  Updated  of POC   7.  Patient instructed to request refills when appropriate, when down to 5-7 days remaining via pharmacy or via [employer].      8.  Instructed patient to stop by [facility] office after seeing PCP today to schedule a follow up visit as patient daughter is now working and needs assistance with logging on to [employer] and prefers afternoon appointments.     Symptoms of anxiety, depression are under good control with current medication regimen.    The plan was discussed with the patient. The  patient was given time to ask questions and these questions were answered. At the conclusion of their visit they had no additional questions or concerns and all questions were answered to their satisfaction.   Patient was given instructions and counseling regarding condition and for health maintenance advice. Please see specific information pulled into the AVS if appropriate.    Patient to contact provider if symptoms worsen or fail to improve.        10/4/24:  -Therapy: Currently Seeing a Therapist Shanti with [facility], which patient reported today of not seeing in over one month, and was again advised to reschedule an appointment.   -Continue Buspar 20 mg 3 times daily without food as patient has not been taking with food. Space times between doses of 6 hrs. Prescription ends 11/4/24  -Continue MIRTAZAPINE (REMERON) 7.5 mg by mouth nightly to target depression and insomnia.  Refilled today for 30 days with 1 refill.    -INCREASE Vraylar (Cariprazine) FROM 1.5 mg TO 3 mg by mouth daily in the morning to target unstable mood, depressive symptoms.  Can be taken with or without food.   No adverse effects of antipsychotic medications noted, such as EPS (Extrapyramidal side effects and TD (Tardive Dyskinesia), patient to contact provider immediately if experienced.    Instructed patient to take 2 of the 1.5 mg capsules, may take additional one now as patient took 1.5 mg this morning, however, new prescription is for the 3 mg capsule, 30 days with 1 refill.    -Updated  of [facility]   -Patient instructed to request refills when appropriate, when down to 5-7 days remaining via pharmacy or via MyChart.    -Patient will return to office on same day as she is scheduled with PCP.   Discussed sleep hygiene measures including regular sleep schedule, optimal sleep environment, and relaxing presleep rituals.  Advised not to watch TV in the bed and to start going into bed at 930 pm to allow time to relax  and place CPAP on at that time, avoid watching TV in bed, only watch in living room. Advised to avoid caffeinated beverages in the evening. Advised not to use electronic devices (tablet, ipad, Gerard and like) immediately before bedtime.    Symptoms of anxiety, depression, insomnia are under fair control with current medication regimen. Patient advised to contact therapist to reschedule as patient has not seen in over one month due to dislike of actions needed on patient part, which discussed in length today of patient necessity to engage in psychotherapy to overall improve mental health.   The plan was discussed with the patient. The patient was given time to ask questions and these questions were answered. At the conclusion of their visit they had no additional questions or concerns and all questions were answered to their satisfaction.   Patient was given instructions and counseling regarding condition and for health maintenance advice. Please see specific information pulled into the AVS if appropriate.    Patient to contact provider if symptoms worsen or fail to improve.        8/26/24:    -Therapy: Currently Seeing a Therapist Shanti with [facility], has not seen in a few weeks, advised to schedule an appointment and to see at least weekly or every 2 weeks depending on provider schedule.  Advise to make list of goals and what you want to achieve or work on for session.    -Continue Buspar 20 mg 3 times daily without food as patient has not been taking with food. Space times between doses of 6 hrs.    -Continue MIRTAZAPINE (REMERON) 7.5 mg by mouth nightly to target depression and insomnia.   -RESTART Vraylar (Cariprazine)1.5 mg by mouth daily to target unstable mood, depressive symptoms.  Can be taken with or without food.    -Updated  of POC   -Patient instructed to request refills when appropriate, when down to 5-7 days remaining via pharmacy or via [employer].      -Patient given  contact information for [facility]  Address: [facility] [facility] [address]  Phone: [phone]  Other location: [id number] [place] [address], [facility] [address]. [phone]  Website: [email or web address]  Services offered: Testing and assessments for adult ADHD, intelligence/adaptive functioning, dementia and cognitive impairment. Also provides counseling and psychotherapy for anxiety, depression, trauma, grief, and more.  Insurance accepted: [employer], [facility], [facility], [employer], [employer], [employer], Humana, Medicare, [employer], [employer], [employer], Medicaid plans ([employer] [place] [employer], [employer], [place] [employer], [employer], [place], Well[employer]).     Symptoms of anxiety, depression, insomnia, are under fair control with current medication regimen.  Patient asked about restarting Prozac which patient was informed the Remeron would need to be tapered beforehand, option to increase Remeron was declined.  Patient also asking if this provider would take over the Klonopin ordered per PCP, which was denied as patient was reminded per PCP note the Klonopin was only short term.    Patient claims current therapist is not working out, though patient unable to specify details, highly encouraged and reminded patient therapy was essential in care regimen along with medications.     The plan was discussed with the patient. The patient was given time to ask questions and these questions were answered. At the conclusion of their visit they had no additional questions or concerns and all questions were answered to their satisfaction.   Patient was given instructions and counseling regarding condition and for health maintenance advice. Please see specific information pulled into the AVS if appropriate.    Patient to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 now and upcoming appointment on 11/9/23.     10/9/23: TELEPHONE  Returned call to patient as planned, informed patient that  was given an update of current treatment plan and concerns and would plan to reconvene this Wed. And if there were any changes in treatment plan provider would notify patient on Thurs. This week. Patient voiced understanding and thanked provider for the call.     10/5/23:   Therapy: New referral sent to [facility] at last visit.  Patient given number again today as patient had lost phone with contacts, informed patient referral had been closed due to not responding to voice mails. Patient wrote down both numbers listed: [phone] and [phone]    -Continue Buspar 30 mg by mouth twice daily to target anxiety.    -Continue Remeron 45 mg by mouth nightly to target insomnia and depression.  Taking 1.5 tabs of the 30 mg to equal 45 mg. Refilled today  -Continue Hydroxyzine 100 mg by mouth 3 times daily as needed to target itching and anxiety.    -Continue  Prozac 80 mg by mouth daily in the morning to target skin picking disorder, OCD, anxiety, and depression.   -Continue Zyprexa 7.5 mg by mouth daily at 12 noon with food to target tactile hallucinations, depression, and anxiety.  Risks, benefits, alternatives discussed with patient including nausea and vomiting, GI upset, sedation, dizziness/falls risk, akathisia, hypotension, increased appetite, lowering of seizure threshold, theoretical risk of tardive dyskinesia. After discussion of these risks and benefits, the patient voiced understanding and agreed to proceed.  Slight tremor noted around mouth when mouth closed which are suspected related to medication vs anxiety.   Refilled today    -All medications are good through mid Nov.   -Will plan on contacting Dr. Newman to discuss case as patient has had minimal improvement with outpatient medication management.  And calling patient by early next week.   Patient had been advised numerous times in the past to receive a higher level of care with inpatient mental health care-suggestions of ECT- due to minimal improvement with outpatient medication management, however, patient has either refused due to a multitude of reasons or failure to follow through after arrangements for treatment were finalized.  Symptoms of anxiety, depression, insomnia, tactile hallucinations, and factitious disorder are under fair control with current medication regimen.  Lengthy discussion with patient today regarding plan of care, past discussion and treatment recommendations, which had been advised by Dr. Newman prior to start of care with this provider 1 yr ago.  Patient wishes to remain with this provider.   Patient was given instructions and counseling regarding condition and for health maintenance advice. Please see specific information pulled into the AVS if appropriate.    Patient to contact provider if symptoms worsen or fail to improve.        8/17/23:   Therapy: [facility]    New referral sent to [facility], the office will contact you to set up the appointment. Please contact them at [phone] if you do not hear from them by next week.     -Continue Buspar 30 mg by mouth twice daily to target anxiety.    -Continue Remeron 45 mg by mouth nightly to target insomnia and depression.  Taking 1.5 tabs of the 30 mg to equal 45 mg. Refilled today  -Continue Hydroxyzine 100 mg by mouth 3 times daily as needed to target itching and anxiety.      Increase Prozac FROM 60 mg TO 80 mg by mouth daily in the morning to target skin picking disorder, OCD, anxiety, and depression. Instructed to start taking 2 of the 40 mg caps prescribed today, and may take 4 of the 20 mg on hand until new dose is picked up.    Continue Zyprexa 7.5 mg by mouth daily at 12 noon with food to target tactile hallucinations, depression, and anxiety.  No signs of EPS or TD noted.     Patient instructed to request refills when appropriate, when down to 5-7 days remaining via  pharmacy or via [employer].       Symptoms of anxiety, depression, insomnia, skin picking are under fair control with current medication regimen.  Continues to request Klonopin for anxiety which has been denied due to patient with long term and current use of opioids, comorbid conditions, placing patient at higher risks, and has been off of Benzodiazepines for over 1 yr. CBT advised, agreeable to see therapist via telehealth only.  Patient with both physical and psychological signs and symptoms of factitious disorder.   Patient was given instructions and counseling regarding condition and for health maintenance advice. Please see specific information pulled into the AVS if appropriate.    Patient to contact provider if symptoms worsen or fail to improve.      7/13/23:   Therapy: [facility]     -Continue Buspar 30 mg by mouth twice daily to target anxiety.    -Continue Remeron 45 mg by mouth nightly to target  insomnia and depression.  Taking 1.5 tabs of the 30 mg to equal 45 mg.   -Continue Hydroxyzine 100 mg by mouth 3 times daily as needed to target itching and anxiety.    Continue Prozac 60 mg by mouth daily in the morning to target skin picking disorder, OCD, anxiety, and depression. Instructed to start taking 3 of the 20 mg caps prescribed.   Continue Zyprexa 7.5 mg by mouth daily at 12 noon with food to target tactile hallucinations, depression, and anxiety.  Risks, benefits, alternatives discussed with patient including nausea and vomiting, GI upset, sedation, dizziness/falls risk, akathisia, hypotension, increased appetite, lowering of seizure threshold, theoretical risk of tardive dyskinesia. After discussion of these risks and benefits, the patient voiced understanding and agreed to proceed.   No signs of EPS or TD noted. Refilled today for 30 days with 1 refill    Symptoms of anxiety, depression, skin picking, and tactile hallucinations are under good to fair control with current medication regimen.  Suspect ongoing problem with skin picking negatively impacts mood. Explained to patient again that due to several comorbid conditions and current medications that weight gain has been noted and reviewed for several years, however, due to ongoing weight gain, plan to continue current medications along with therapy and determine at next visit if alternative medications are needed vs tapering down from Zyprexa.  Patient continues to request alternative medication for anxiety, which have been declined.     Patient has been advised to do the following at next therapy session due to displeased recent session, which is listed on AVS:   Recommend writing down a list of a few things you want to discuss at next appointment, perhaps have 2 things that you definitely want to discuss to help guide the visit. What you want to accomplish or discuss more?  Such as developing coping strategies to help with anxiety, AND practice  those techniques during the visit with your therapist.  Also, ask for those coping strategies to be written down or what resources she can give you.   Patient was given instructions and counseling regarding condition and for health maintenance advice. Please see specific information pulled into the AVS if appropriate.    Patient to contact provider if symptoms worsen or fail to improve.      5/15/23:   Therapy: [facility] provider scheduled end of May   -Continue Buspar 30 mg by mouth twice daily to target anxiety.  Refilled today  -Continue Remeron 45 mg by mouth nightly to target insomnia and depression.  Taking 1.5 tabs of the 30 mg to equal 45 mg. Refilled today  -Continue Hydroxyzine 100 mg by mouth 3 times daily as needed to target itching and anxiety.   Refilled today, tolerated well without reported increase in drowsiness.    -Increase Prozac FROM 40 mg TO 60 mg by mouth daily in the morning to target skin picking disorder, OCD, anxiety, and depression. Instructed to start taking 3 of the 20 mg caps prescribed today.   Dose increased to 40 mg ordered 3/24/23.   -Increase Zyprexa FROM 5 mg TO 7.5 mg  by mouth daily at 12 noon with food to target tactile hallucinations, depression, and anxiety.  Risks, benefits, alternatives discussed with patient including nausea and vomiting, GI upset, sedation, dizziness/falls risk, akathisia, hypotension, increased appetite, lowering of seizure threshold, theoretical risk of tardive dyskinesia. After discussion of these risks and benefits, the patient voiced understanding and agreed to proceed.    No signs of EPS or TD noted.  AIMS-0; Informed patient a new prescription was sent for the 7.5 mg tabs.     Symptoms of anxiety, depression, insomnia, OCD, tactile hallucinations are under fair control presently, medications dose adjustments made today, patient instructed to contact provider if the Zyprexa 7.5 mg causes increased drowsiness as the dose may be switched to nightly.   "Suspect anxiety is worsened by tactile hallucinations, which aim to control with Zyprexa.   Patient to contact provider if symptoms worsen or fail to improve.     5/11/23:   Patient LMVM that she is needing something other than the Buspar for her Anxiety.  Patient reports \"My Anxiety if through the roof\"  Please call me back\"    Returned call to patient as requested, patient reports \"I can hardly go on living like this, I just don't feel right, I just got out of the hospital, mostly anxiety and stress, they did a Cat scan and MRI and everything was all okay, I just don't feel right.\" Patient admits to adherence to medications. Patient is uncertain if the Zyprexa is effective, \"I still feel like there's something crawling in my head, I need something else.\" Reports taking Hydroxyzine 50 mg 2-3 times daily, which is somewhat helpful, denies drowsiness. Instructed patient to start taking routinely 3 times daily. Patient has an appointment with [facility] therapist end of May.    \"The only thing I took that helped was the Xanax.\" Patient asking if Buspar can be changed to another medication.  \"I been battling this for over a year.\" Patient requesting refill for Remeron, instructed patient to check bottle to determine if refill is available as patient only has 4 tabs and 2 half tabs remaining, patient instructed to request refill from pharmacy if one remains, otherwise, patient to inform provider or MA upon return call later this afternoon and a refill can be sent.  Informed patient will call back later this afternoon after further review of other alternatives for anxiety. Patient verbalized understanding.    Returned call to patient as planned, left  instructing patient to start taking 2 of the Hydroxyzine 50 mg to equal 100 mg by mouth 3 times daily as needed anxiety/itching temporarily until seen in office at scheduled appointment Monday 5/15/23, and to return to 50 mg dose if excessive grogginess, drowsiness occurs as " there is an increased fall risk with higher dose.  Will plan to discuss further at appointment.   Patient called re: the missed call from last evening.  Patient states she did Not check her voicemail.  I read patient Trish's exact message verbatim about temporary med increase of Hydroxyzine until Monday appt.  Patient voiced understanding      4/7/23:  Therapy: [facility] provider no longer working at [facility], and waiting for [facility] to contact pt with new provider List of counseling services given to patient today, Patient to contact provider and set up appointment.  And to contact office if unable to get an appointment scheduled.   Continue Buspar 30 mg by mouth twice daily to target anxiety.     Continue Remeron 45 mg by mouth nightly to target insomnia and depression.  Taking 1.5 tabs of the 30 mg to equal 45 mg.    Continue Hydroxyzine 50 mg by mouth 3 times daily as needed to target itching and anxiety.    Continue Prozac 40 mg by mouth daily in the morning to target skin picking disorder, anxiety, and depression. Dose increased to 40 mg ordered 3/24/23.     Increase Zyprexa FROM 2.5 mg TO 5 mg by mouth daily at 12 noon with food to target tactile hallucinations, depression, and anxiety.  Risks, benefits, alternatives discussed with patient including nausea and vomiting, GI upset, sedation, dizziness/falls risk, akathisia, hypotension, increased appetite, lowering of seizure threshold, theoretical risk of tardive dyskinesia. After discussion of these risks and benefits, the patient voiced understanding and agreed to proceed.  Instructed patient to ensure accurate dose is picked up with pharmacy, new order with notes to pharmacy informing to not fill 2.5 mg dose previously requested.  No signs of EPS or TD noted.     Reminded patient of refill process for all medications     Symptoms of anxiety, tactile hallucinations, and depression continue to be present, advised for patient to get set up with therapy, as this will  "provide great benefit for patient and help establish coping strategies to help manage chronic symptoms.  Patient somewhat anxious regarding advising to return in 5 weeks vs earlier.  Reiterated to patient  Antidepressants can take 4-6 weeks to start working and up to 2-3 months for the full benefits. Due to toleration of Zyprexa, will increase to help with tactile hallucinations. Patient to contact provider if symptoms worsen or fail to improve.     3/24/23:   Patient called stating she is needing a refill for her Prozac and you had spoke with her about increasing the dose at last visit.  Patient would like for you to increase her Prozac because she says she is still having \"increased depression and still feels like something is crawling in my head\"  Please advise  Per discussion at last visit, Prozac dose can be increased from 20 to 40 mg by mouth daily in the morning, order sent to pharmacy.   Called patient and let her know that the Prozac has been increased at which time she said she is also needing a refill on her Hydroxyzine.  Please refill  Med pended    3/9/23:   Therapy: Currently Seeing a Therapist scheduled with new therapist at [facility], as prior provider-[facility] went on maternity leave, and Kayleigh lives in [place] and was providing telephone therapy support.  To see new therapist in April.  -Continue Buspar 30 mg by mouth twice daily to target anxiety.   -Continue Remeron 45 mg by mouth nightly to target insomnia and depression.  Taking 1.5 tabs of the 30 mg to equal 45 mg.  -Continue Hydroxyzine 50 mg by mouth 3 times daily as needed to target itching and anxiety.    -Continue Prozac 20 mg by mouth daily in the morning to target skin picking disorder, anxiety, and depression.   Will plan on increasing in 2-3 weeks if needed.    Start Zyprexa 2.5 mg by mouth daily at 12 noon with food to target tactile hallucinations, depression, and anxiety.  Risks, benefits, alternatives discussed with patient " including nausea and vomiting, GI upset, sedation, dizziness/falls risk, akathisia, hypotension, increased appetite, lowering of seizure threshold, theoretical risk of tardive dyskinesia. After discussion of these risks and benefits, the patient voiced understanding and agreed to proceed.  Instructed patient to contact provider next Tues. 3/14/23 to inform of toleration of afternoon dose, plan to have patient take without any other medications to determine if drowsiness occurs and effectiveness. Informed patient dose may need to be given later in the evening depending on drowsiness.     Symptoms of depression, anxiety, skin picking disorder and tactile hallucinations are not under good control, will add low dose Zyprexa. Due to patient not able to come early in the morning for an appointment she will return in approximately 3 weeks.  Patient to contact provider if symptoms worsen or fail to improve.         2/23/23:   Therapy: Currently Seeing a Therapist scheduled with new therapist at [facility], as prior provider-[facility] went on maternity leave, and Kayleigh lives in [place] and was providing telephone therapy support.  To see new therapist in April.  Continue Buspar 30 mg by mouth twice daily to target anxiety. Refilled today    Decrease Wellbutrin SR FROM 200 mg daily  mg by mouth daily in the morning FOR 7 DAYS (2/24-3/2/23) THEN take every other day on the following days: 3/4, 3/6, and 3/8 to target depression and anxiety, THEN STOP.  New order sent to pharmacy. Informed patient of the D2D interaction with Wellbutrin and Prozac, and as the dose is decreased the Prozac will be increased.  Discussed Symptoms of withdrawal:  GI flu-like symptoms, paresthesias (prickling or tingling sensation, like pins and needs), irritability, insomnia, dizziness, or vivid dreams    Continue Remeron 45 mg by mouth nightly to target insomnia and depression.  Taking 1.5 tabs of the 30 mg to equal 45 mg. Refilled  today    Continue Hydroxyzine 50 mg by mouth 3 times daily as needed to target itching and anxiety.  Risks, benefits, alternatives discussed with patient including sedation, dizziness, fall risk, GI upset, and risk of increased CNS depression and elevated heart rate if taken with other antihistamines.  After discussion of these risks and benefits, the patient voiced understanding and agreed to proceed.    Increase Prozac FROM 10 mg TO 20 mg by mouth daily in the morning to target skin picking disorder, anxiety, and depression.  Discussed all risks, benefits, alternatives, and side effects of Fluoxetine including but not limited to GI upset, decreased appetite, sexual dysfunction, bleeding risk, seizure risk, insomnia, anxiety, drowsiness, headache, tremor, nervousness, activation of hossein or hypomania, increased fragility fracture risk, hyponatremia, ocular effects, serotonin syndrome, hypersensitivity reaction, and activation of suicidal ideation and behavior.  Discussed the need for patient to immediately call the office for any new or worsening symptoms, such as worsening depression; feeling nervous or restless; suicidal thoughts or actions; or other changes in mood or behavior, and all other concerns. Patient educated on medication compliance.  Patient verbalized understanding and is agreeable to taking Fluoxetine. Addressed all questions and concerns.  Provider informed patient had not requested refill of 10 mg dose ordered previously, therefore, a new prescription was sent for the 20 mg cap, MA informed patient.     Symptoms of depression, anxiety, and skin picking is ongoing, as expected patient mood is down due to medication adjustments, which was reiterated to patient today while switching medications to expect a decreased mood, increased irritability, etc.  Will see patient back in office in 2 weeks to determine toleration of Wellbutrin wean.  Patient was asking for Valium today as a relative takes opioids  with Valium, again reiterated to patient the increased risk of overdose death, suicide, worse treatment outcomes, and increased health service use when co-prescribing of opioids and benzodiazepines regardless of whether prescribed for comorbid chronic pain, anxiety, or insomnia.  Patient to contact provider if symptoms worsen or fail to improve.       1/30/23:   Continue Buspar 30 mg by mouth twice daily to target anxiety.     Continue Wellbutrin  mg by mouth daily in the morning to target depression and anxiety.      Continue Remeron 45 mg by mouth nightly to target insomnia and depression.  Taking 1.5 tabs of the 30 mg to equal 45 mg.     Stop Risperdal due to D2D interaction with Prozac. And ineffectiveness, patient has been taking for less than 2 months and missed doses reported.     Continue Hydroxyzine 50 mg by mouth 3 times daily as needed to target itching and anxiety.  Refilled today, able to receive 2/6/23. Risks, benefits, alternatives discussed with patient including sedation, dizziness, fall risk, GI upset, and risk of increased CNS depression and elevated heart rate if taken with other antihistamines.  After discussion of these risks and benefits, the patient voiced understanding and agreed to proceed.    Start Prozac 10 mg by mouth daily in the morning to target skin picking disorder, anxiety, and depression.  Informed patient due to potential D2D with Wellbutrin as dose of Prozac would be doubled. Canceled initial order for 20 mg due to this potential D2D interaction.  Discussed all risks, benefits, alternatives, and side effects of Fluoxetine including but not limited to GI upset, decreased appetite, sexual dysfunction, bleeding risk, seizure risk, insomnia, anxiety, drowsiness, headache, tremor, nervousness, activation of hossein or hypomania, increased fragility fracture risk, hyponatremia, ocular effects, serotonin syndrome, hypersensitivity reaction, and activation of suicidal ideation and  behavior.  Discussed the need for patient to immediately call the office for any new or worsening symptoms, such as worsening depression; feeling nervous or restless; suicidal thoughts or actions; or other changes in mood or behavior, and all other concerns. Patient educated on medication compliance.  Patient verbalized understanding and is agreeable to taking Fluoxetine. Addressed all questions and concerns.     Patient tolerated Cymbalta taper, continues to struggle with skin picking to scalp which has worsened mood.  Will start Prozac as discussed previously, due to potential D2D interaction with Wellbutrin, the dose of Wellbutrin may need to be decreased before increasing Prozac dose, which was discussed today. Patient instructed to discuss Gabapentin with Pain management at upcoming appointment 2/23/23 and to update provider regarding current provider for mental health and medications as past note did not have accurate information. Patient to contact provider if symptoms worsen or fail to improve.       1/9/23:   Continue Buspar 30 mg by mouth twice daily to target anxiety.     Continue Wellbutrin  mg by mouth daily in the morning to target depression and anxiety.      Continue Remeron 45 mg by mouth nightly to target insomnia and depression.  Taking 1.5 tabs of the 30 mg to equal 45 mg.     Continue Risperdal 1 mg by mouth twice daily to target delusional thoughts as patient believed there was a bug infestation on scalp. No signs of TD noted today.     Start Taper of Cymbalta FROM 120 mg by mouth daily in the morning TO the following:  Cymbalta 30 mg caps ordered-take 3 to equal 90 mg daily for 7 days 1/10-1/16 THEN  Decrease to 60 mg (2 of the 30 mg cap) daily for 7 days 1/17-1/23/23  THEN  Decrease 30 mg (1 cap of 30 mg) daily for 7 days 1/24-1/30/23. THEN STOP    Symptoms of withdrawal:  GI flu-like symptoms, paresthesias (prickling or tingling sensation, like pins and needs), irritability, insomnia,  dizziness, or vivid dreams     Increase Hydroxyzine FROM 25 mg 4 times daily as needed TO 50 mg by mouth 3 times daily as needed to target itching and anxiety.  Risks, benefits, alternatives discussed with patient including sedation, dizziness, fall risk, GI upset, and risk of increased CNS depression and elevated heart rate if taken with other antihistamines.  After discussion of these risks and benefits, the patient voiced understanding and agreed to proceed.    Due to chronic skin picking to head causing significant distress to patient, will plan on tapering down from Cymbalta and switching to Prozac 10 mg.  Discussed risks of withdrawal today with patient.  Also, discussed plans to taper down from Wellbutrin as there is a potential D2D interaction with combination of Wellbutrin with Prozac.  Patient agreeable to plan.    Current symptoms of depression and anxiety are under fair control with current medication regimen, however, due to severe tactile hallucinations her anxiety has been elevated.    Patient to contact provider if symptoms worsen or fail to improve.       11/21/22:   Therapy: Currently Seeing a Therapist Sendy Downing via telephone; Patient would like to seek a therapist in [place] due to current therapist is only via phone and has to wait until Oct. For next appointment. Advised patient to contact insurance company to determine available therapist in [facility] and/or check [place] and filter results based on needs. And to contact provider if a referral order is needed.  Continue Buspar 30 mg by mouth twice daily to target anxiety.  Instructed to take 2 of the 15 mg on hand to equal 30 mg.     Continue Wellbutrin  mg by mouth daily in the morning to target depression and anxiety.      Continue Remeron 45 mg by mouth nightly to target insomnia and depression.  Taking 1.5 tabs of the 30 mg to equal 45 mg.     Advised patient to inquire with therapist or search on own about  local  support groups, Jainism groups, library, and/or adult day a few days per week which will get patient out of the house and develop relationships with other people her age.    Patient continues with ongoing stressors in home, which have not improved, highly encouraged patient to get out of home several days per week which I believe will provide benefit for patient due to ongoing stress she complains of in the home. Patient continues to ask for other medication or an increase in medications.  Patient is uncertain of amount of Cymbalta she is taking and is to check with daughter.  Patient is on chronic opioids and benzodiazepines will not be added.  Currently on max daily dose of Buspar, Cymbalta, and recently increased both Buspar and Wellbutrin SR at last appointment.  May consider increasing Wellbutrin SR to an additional dose in the evening, however, due to suspected worsening insomnia with Wellbutrin SR, this will have to be revisited.    Patient to contact provider if symptoms worsen or fail to improve.       11/11/22: TELEPHONE  Patient called back and stated her Depression and Anxiety are bad and she don't know why Trish doesn't understand that.  I told patient that Trish is trying to help the patient but giving her Benzodiazepine's is not an option.  I told her that Trish had said she needs to go to therapy and find other ways of coping rather than just taking a pill.  Patient said she is seeing a Therapist at [facility].  I explained to her that at her age there is more chance of falling with the Benzodiazepine's and I am sure she doesn't want to fall and break a hip.  Patient said OK I told her to have a good weekend she said you too and hung up      10/17/22:   Current with therapy, Sendy with [facility] via telephone; Patient would like to seek a therapist in [place] due to current therapist is only via phone and has to wait until Oct. For next appointment. Advised patient to contact insurance company to  determine available therapist in area and/or check [facility] and filter results based on needs. And to contact provider if a referral order is needed.    Increase Buspar from 15 mg to 30 mg by mouth twice daily to target anxiety.  Instructed to take 2 of the 15 mg on hand to equal 30 mg.     Increase Wellbutrin SR from 150 mg to 200 mg by mouth daily in the morning.  Risks, benefits, alternatives discussed with patient including nausea, GI upset, increased energy, exacerbation of irritability, insomnia, lowering of seizure threshold.  After discussion of these risks and benefits, the patient voiced understanding and agreed to proceed.Risks, benefits, alternatives discussed with patient including GI upset, nausea vomiting diarrhea, theoretical decrease of seizure threshold predisposing the patient to a slightly higher seizure risk, headaches, sexual dysfunction, serotonin syndrome, bleeding risk, increased suicidality in patients 24 years and younger.  After discussion of these risks and benefits, the patient voiced understanding and agreed to proceed.    Continue Remeron 45 mg by mouth nightly to target insomnia and depression.  Taking 1.5 tabs of the 30 mg to equal 45 mg.     Discontinued Clonazepam due to limited use from 5/2022-9/9/22, and filled once on 9/9/22.      Patient depression is chronic and ongoing, will increase Wellbutrin today and Buspar to help with anxiety as patient will no longer be given Klonopin.  Patient had limited use of tapered dose originally ordered per  5/19/22, patient did fill early Sept. However, due to limited use and tolerating taper over 4 months, will not reorder. Patient also has increased risk of falls, memory impairment and other comorbid conditions.  Encouraged patient to have a visual calendar for all family to see and to have all appointments listed to prevent further over scheduling as daughter transports patient to appointments. Patient current stressors  are environmental and situational, encourage use of coping mechanisms and frequent visits with therapist. Informed patient weekly visits were not appropriate with this provider as medications can take up to 4-6 weeks for effectiveness.  Will see patient back in 5 weeks. Patient to contact provider if symptoms worsen or fail to improve.     9/9/22:   Current with therapist, Sendy Downing  CHANGE Buspar to 15 mg by mouth twice daily to target anxiety.  Patient reports taking as needed, possibly daily, will remain at current dose and make routine twice daily instead of as previously prescribed as 3 times daily.  Risks, benefits, alternatives discussed with patient including nausea, GI upset, mild sedation, falls risk.  After discussion of these risks and benefits, the patient voiced understanding and agreed to proceed. Instructed to Place in pill planner for am and pm     INCREASE Wellbutrin SR from 100 mg to 150 mg by mouth daily in the morning.  Risks, benefits, alternatives discussed with patient including nausea, GI upset, increased energy, exacerbation of irritability, insomnia, lowering of seizure threshold.  After discussion of these risks and benefits, the patient voiced understanding and agreed to proceed.Risks, benefits, alternatives discussed with patient including GI upset, nausea vomiting diarrhea, theoretical decrease of seizure threshold predisposing the patient to a slightly higher seizure risk, headaches, sexual dysfunction, serotonin syndrome, bleeding risk, increased suicidality in patients 24 years and younger.  After discussion of these risks and benefits, the patient voiced understanding and agreed to proceed.    Remeron order is for 45 mg by mouth nightly to target insomnia and depression.  Patient reported only taking 30 mg and failure to read bottle.  INSTRUCTED PT  TO TAKE 1.5 tabs (break one tablet in half to equal 15 mg with the 1 whole tablet of 30 mg to equal 45 mg) Risks, benefits,  alternatives discussed with patient including GI upset, sedation, dizziness with falls risk, increased appetite.  After discussion of these risks and benefits, the patient voiced understanding and agreed to proceed.    Instructed patient : HAVE YOUR DAUGHTER PUT THE NEW WELLBUTRIN DOSE IN YOUR PLANNER,  AND THE BUSPAR IN BOTH MORNING AND NIGHT BOXES, AND the REMERON she or you will need to break some of the tablets in half to equal correct dose. Next time I will fill for the 45 mg tabs.     SUGGEST YOUR DAUGHTER MANAGE ALL OF YOUR MEDICATIONS TO PREVENT CONFUSION AND/OR MISSED DOSES, WANT YOU TO FEEL BETTER AND NEED YOUR MEDICATIONS TO BE TAKEN AS ORDERED.    Continue Clonazepam 1 mg by mouth daily as needed to target anxiety. Risks, benefits, and alternatives discussed with patient including sedation/falls risk, dizziness, disinhibition, headache, fatigue, dry mouth, blurred vision, constipation, nausea, diarrhea, heartburn, unusual taste in mouth, urinary retention, increased appetite, restlessness, sexual dysfunction, sweating, weight gain, cardiac arrhythmias, seizures, and fall risk.  After discussion of these risks and benefits, patient voiced understanding and agreed to proceed.  Delmer reviewed, NEW prescription per  5/19/22, tapering down dose, as patient was prescribed twice daily.  Last Dispensed 5/19/22 #30.    Plan to obtain UDS and sign CSA in future, although patient admits to taking, she has not filled since new prescription 5/19/22 which has 1 refill. Will plan to inquire further at next appointment, if patient is in fact rarely using will either discontinue or decrease to 0.5 mg.      Patient presentation seems most consistent with anxiety and depression.  Education provided regarding safety concerns with current medication management. Instructions provided to have daughter manage all medications due to memory impairment as patient was inconsistent with information regarding doses,  frequency, and adherence of medications today.   Patient to contact provider if symptoms worsen or fail to improve.        Patient screened positive for depression based on a PHQ-9 score of 16 on 10/4/24. Follow-up recommendations include: Prescribed antidepressant medication treatment and Suicide Risk Assessment performed.       TREATMENT PLAN/GOALS: Continue supportive psychotherapy efforts and medications as indicated. Treatment and medication options discussed during today's visit. Patient acknowledged and verbally consented to continue with current treatment plan and was educated on the importance of compliance with treatment and follow-up appointments.    MEDICATION ISSUES:  KOSTA reviewed as expected.   Discussed medication options and treatment plan of prescribed medication as well as the risks, benefits, and side effects including potential falls, possible impaired driving and metabolic adversities among others. Patient is agreeable to call the office with any worsening of symptoms or onset of side effects. Patient is agreeable to call 911 or go to the nearest ER should he/she begin having SI/HI. No medication side effects or related complaints today.     MEDS ORDERED DURING VISIT:  New Medications Ordered This Visit   Medications    busPIRone (BUSPAR) 10 MG tablet     Sig: Take 1 tablet by mouth 3 (Three) Times a Day for 7 days, THEN 1 tablet 2 (Two) Times a Day for 7 days. Indications: Anxiety Disorder       Return in about 7 weeks (around 1/9/2025) for Video visit, medication check.       I spent 42 minutes caring for Chasity on this date of service. This time includes time spent by me in the following activities: preparing for the visit, reviewing tests, obtaining and/or reviewing a separately obtained history, performing a medically appropriate examination and/or evaluation, counseling and educating the patient/family/caregiver, ordering medications, tests, or procedures, referring and communicating  with other health care professionals, documenting information in the medical record, care coordination, and scheduling    This document has been electronically signed by DIANE Sanches  November 21, 2024 12:48 EST      Part of this note may be an electronic transcription/translation of spoken language to printed text using the Dragon Dictation System.

## 2024-11-27 DIAGNOSIS — M54.50 CHRONIC BILATERAL LOW BACK PAIN, UNSPECIFIED WHETHER SCIATICA PRESENT: ICD-10-CM

## 2024-11-27 DIAGNOSIS — G89.29 CHRONIC BILATERAL LOW BACK PAIN, UNSPECIFIED WHETHER SCIATICA PRESENT: ICD-10-CM

## 2024-11-27 RX ORDER — DICLOFENAC SODIUM 75 MG/1
75 TABLET, DELAYED RELEASE ORAL 2 TIMES DAILY
Qty: 60 TABLET | Refills: 2 | OUTPATIENT
Start: 2024-11-27

## 2024-12-02 ENCOUNTER — TRANSCRIBE ORDERS (OUTPATIENT)
Dept: ADMINISTRATIVE | Facility: HOSPITAL | Age: 69
End: 2024-12-02
Payer: MEDICARE

## 2024-12-02 ENCOUNTER — TELEPHONE (OUTPATIENT)
Dept: BEHAVIORAL HEALTH | Facility: CLINIC | Age: 69
End: 2024-12-02
Payer: MEDICARE

## 2024-12-02 DIAGNOSIS — Z12.31 VISIT FOR SCREENING MAMMOGRAM: Primary | ICD-10-CM

## 2024-12-02 NOTE — TELEPHONE ENCOUNTER
"Neurologist Dr. Desean Mckeon is requesting Provider Trish Paz to call him back \"today\" to discuss care of patient.  Doctor would not give a detailed message. Trish please call.  "

## 2024-12-02 NOTE — TELEPHONE ENCOUNTER
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  Informed Neurologist this provider has been seeing patient primarily via video and has had recent requests to return to [facility] and will discuss with  prior to contacting patient to discuss treatment plan which is to taper off Vraylar and restart Prozac, patient began taper for Buspar at last visit 11/21/24 which will end in the next 2 days.  Current medication regimen: Vraylar 3 mg and Remeron 7.5 mg nightly.

## 2024-12-03 ENCOUNTER — OFFICE VISIT (OUTPATIENT)
Dept: CARDIOLOGY | Facility: CLINIC | Age: 69
End: 2024-12-03
Payer: MEDICARE

## 2024-12-03 ENCOUNTER — DOCUMENTATION (OUTPATIENT)
Dept: BEHAVIORAL HEALTH | Facility: CLINIC | Age: 69
End: 2024-12-03
Payer: MEDICARE

## 2024-12-03 VITALS
HEIGHT: 64 IN | BODY MASS INDEX: 47.25 KG/M2 | WEIGHT: 276.8 LBS | HEART RATE: 58 BPM | DIASTOLIC BLOOD PRESSURE: 79 MMHG | SYSTOLIC BLOOD PRESSURE: 123 MMHG

## 2024-12-03 DIAGNOSIS — F33.1 MAJOR DEPRESSIVE DISORDER, RECURRENT EPISODE, MODERATE: ICD-10-CM

## 2024-12-03 DIAGNOSIS — F41.1 GENERALIZED ANXIETY DISORDER: Primary | ICD-10-CM

## 2024-12-03 DIAGNOSIS — I50.32 CHRONIC DIASTOLIC HEART FAILURE: Primary | ICD-10-CM

## 2024-12-03 DIAGNOSIS — I10 ESSENTIAL HYPERTENSION: ICD-10-CM

## 2024-12-03 DIAGNOSIS — R00.2 PALPITATIONS: ICD-10-CM

## 2024-12-03 NOTE — PROGRESS NOTES
Patient stopped by office after seeing cardiology asking about  discussion with Neurologist, informed patient this provider was planning on calling her this afternoon, informed patient of discussion and plan of care moving forward which was also discussed with , instructed patient with the following which was also written on paper for patient and scheduled a follow up visit for 1/15/25 at 10am via video as patient daughter is taking her to initial eval for sleep medicine which is scheduled at 1030 am and will be able to conduct visit while daughter is driving, as daughter has to take off work on patient appointment days.   See separate encounter for orders:  DECREASE Vraylar FROM 3 mg TO 1.5 mg by mouth daily in the morning for 7 days, start Wed. 12/4/24, THEN STOP.      Starting tomorrow: Prozac 20 mg by mouth daily in the morning to target depression and anxiety.     Noted bilateral arm and hand tremors while patient was sitting in office.  Patient is agreeable with plan and verbalized appreciation with provider discussion.  Both prescriptions sent to [facility]ler in clinic for patient to  before leaving today. Patient to contact provider if symptoms worsen or fail to improve.

## 2024-12-03 NOTE — PROGRESS NOTES
Chief Complaint  Hypertension, Shortness of Breath, and Chest Pain (Tingling feeling across chest)    Subjective        History of Present Illness  Chasity Torres presents to [facility] CARDIOLOGY   Ms. Torres is a 69-year-old female patient coming in for 3-month cardiac follow-up.  She was seen 3 months ago her blood pressure was on the lower side, and dose of hydralazine was discontinued.  Dose of metoprolol recently decreased by PCP due to low blood pressures.  She is also having worsening swelling, and she did have increased dose of torsemide for 5 days before being instructed to resume her normal dose of 40 mg daily.  Per patient this did help.  Today she has no new concerns.  Overall from a cardiac standpoint she is doing fairly well, with no complaints of chest pains, palpitations, or increase with shortness of breath.        Past History:     1) Hypertension, difficult to control. Workup for secondary hypertension, including a renal angiogram, is negative; 2) Anxiety and depression; 3) Restless legs syndrome; 4) Chronic diastolic heart failure.        Past Medical History:   Diagnosis Date    Adverse effect of other viral vaccines, initial encounter     Allergic fungal sinusitis 07/29/2022    Allergic rhinitis     Anxiety disorder     Asthma     CHF (congestive heart failure)     COPD with acute exacerbation     CTS (carpal tunnel syndrome)     Difficulty walking 2022    Essential (primary) hypertension     Essential tremor     Hypercholesterolemia 10/18/2021    Irritable bowel syndrome     Low back pain     Major depressive disorder     Morbid (severe) obesity due to excess calories     Nasal congestion     Neuropathy in diabetes 2023    Obstructive sleep apnea (adult) (pediatric)     Other hereditary and idiopathic neuropathies     Pain in left hip     Pain in right toe(s)     Pain in thoracic spine     Peripheral neuropathy     Primary generalized (osteo)arthritis     Primary insomnia      Pulmonary emphysema 01/05/2023    Restless leg syndrome     SOBOE (shortness of breath on exertion)     Substance abuse     Type 2 diabetes mellitus without complication, without long-term current use of insulin 05/02/2022    Vitamin D deficiency        No Known Allergies     Past Surgical History:   Procedure Laterality Date    CARPAL TUNNEL RELEASE      COLONOSCOPY  2020    ENDOSCOPIC FUNCTIONAL SINUS SURGERY (FESS) Left 08/15/2022    Procedure: ENDOSCOPIC FUNCTIONAL SINUS SURGERY, left maxillary antrostomy with removal of contents, possible left ethmoidectomy;  Surgeon: Johnny Calderon MD;  Location: [facility] OR [facility] [place], [place];  Service: ENT;  Laterality: Left;    HYSTERECTOMY  2002    complete- ovarian cysts        Social History  She  reports that she quit smoking about 3 years ago. Her smoking use included cigarettes. She started smoking about 16 years ago. She has a 6 pack-year smoking history. She has never been exposed to tobacco smoke. She has never used smokeless tobacco. She reports that she does not currently use alcohol. She reports that she does not use drugs.    Family History  Her family history includes Anxiety disorder in her daughter; COPD in her father; Depression in her daughter; Heart failure in her father; Hypertension in her mother; Neuropathy in her sister.       Current Outpatient Medications on File Prior to Visit   Medication Sig    albuterol sulfate  (90 Base) MCG/ACT inhaler Inhale 2 puffs Every 4 (Four) Hours As Needed for Wheezing.    Blood Glucose Monitoring Suppl device Use 1 each Daily.    busPIRone (BUSPAR) 10 MG tablet Take 1 tablet by mouth 3 (Three) Times a Day for 7 days, THEN 1 tablet 2 (Two) Times a Day for 7 days. Indications: Anxiety Disorder    diclofenac (VOLTAREN) 75 MG EC tablet Take 1 tablet by mouth 2 (Two) Times a Day.    glucose blood test strip Use as instructed to check blood sugar daily    HYDROcodone-acetaminophen (NORCO)  MG per tablet      ipratropium-albuterol (DUO-NEB) 0.5-2.5 mg/3 ml nebulizer Nebulize and inhale 1 vial 4 (Four) Times a Day As Needed for Wheezing.    Lancet Device misc Use 1 each Daily. Use as directed; check blood sugar once daily    Lancets (OneTouch Delica Plus [id number]) misc Use 1 each Daily.    Magnesium Oxide -Mg Supplement 400 (240 Mg) MG tablet Take 1 tablet by mouth Every Night.    memantine (Namenda) 5 MG tablet Take 1 tablet by mouth Daily.    metoprolol succinate XL (TOPROL-XL) 25 MG 24 hr tablet Take 0.5 tablets by mouth Daily.    miconazole (Desenex) 2 % powder Apply topically to the appropriate area as directed 2 (Two) Times a Day.    mirtazapine (REMERON) 7.5 MG tablet Take 1 tablet by mouth Every Night.    naloxone (NARCAN) 4 MG/0.1ML nasal spray 1 spray As Needed. Has on hand    O2 (OXYGEN) Inhale 2 L/min Every Night.    pantoprazole (PROTONIX) 40 MG EC tablet TAKE 1 TABLET BY MOUTH DAILY    pramipexole (MIRAPEX) 0.5 MG tablet Take 1 tablet by mouth 3 (Three) Times a Day.    pregabalin (LYRICA) 75 MG capsule Take 1 capsule by mouth 2 (Two) Times a Day.    primidone (MYSOLINE) 50 MG tablet Take 1 tablet by mouth.    spironolactone (ALDACTONE) 25 MG tablet Take 1 tablet by mouth 2 (Two) Times a Day.    Tirzepatide 12.5 MG/0.5ML solution auto-injector Inject 12.5mg under the skin into the appropriate area as directed 1 (One) Time Per Week.    torsemide (DEMADEX) 20 MG tablet Take 1 tablet by mouth 2 (Two) Times a Day.    Calcium Carb-Cholecalciferol (Calcium 500 + D) 500-3.125 MG-MCG tablet Take 1 each by mouth 2 (Two) Times a Day.    Fluticasone-Umeclidin-Vilant (TRELEGY ELLIPTA) 200-62.5-25 MCG/ACT inhaler Inhale 1 puff Daily.    mupirocin (BACTROBAN) 2 % ointment APPLY TO AFFECTED AREA(S) TWO TIMES A DAY AS DIRECTED FOR 14 DAYS -    tacrolimus (PROTOPIC) 0.1 % ointment Apply topical ointment to affected area twice daily    triamcinolone (KENALOG) 0.025 % cream Apply 1 Application topically to the appropriate area  "as directed As Needed.     Current Facility-Administered Medications on File Prior to Visit   Medication    cyanocobalamin injection 1,000 mcg         Review of Systems   Constitutional:  Negative for fatigue.   Respiratory:  Positive for cough and shortness of breath. Negative for chest tightness.    Cardiovascular:  Negative for chest pain, palpitations and leg swelling.   Gastrointestinal:  Negative for nausea and vomiting.   Neurological:  Negative for dizziness and syncope.   Psychiatric/Behavioral:  The patient is nervous/anxious.         Objective   Vitals:    12/03/24 1322 12/03/24 1326   BP: (!) 155/111 123/79   BP Location: Right arm    Patient Position: Sitting    Cuff Size: Large Adult    Pulse:  58   Weight: 126 kg (276 lb 12.8 oz)    Height: 162.6 cm (64\")          Physical Exam  General : Alert, awake, no acute distress  Neck : Supple, no carotid bruit, no jugular venous distention  CVS : Regular rate and rhythm, no murmur, no rubs or gallops  Lungs: Clear to auscultation bilaterally, no crackles or rhonchi  Abdomen: Soft, nontender, bowel sounds active  Extremities: Warm, well-perfused, trace bilateral pedal edema      Result Review     The following data was reviewed by DIANE Barnes  proBNP   Date Value Ref Range Status   11/01/2024 93.7 0.0 - 900.0 pg/mL Final     CMP          6/5/2024    14:16 7/3/2024    12:18 11/1/2024    13:03   CMP   Glucose 104  87  102    BUN 11  17  14    Creatinine 1.21  1.03  1.26    EGFR 48.9  59.3  46.6    Sodium 142  142  142    Potassium 4.1  3.5  4.6    Chloride 101  99  102    Calcium 10.0  10.2  10.0    Total Protein 7.7   7.1    Albumin 4.2   3.8    Globulin 3.5   3.3    Total Bilirubin 0.2   0.3    Alkaline Phosphatase 113   123    AST (SGOT) 18   20    ALT (SGPT) 17   15    Albumin/Globulin Ratio 1.2   1.2    BUN/Creatinine Ratio 9.1  16.5  11.1    Anion Gap 10.9  10.0  10.6      CBC w/diff          3/4/2024    12:05 6/5/2024    14:16 11/1/2024    13:03 " "  CBC w/Diff   WBC 8.75  7.61  6.88    RBC 4.44  4.83  4.48    Hemoglobin 12.7  13.7  13.7    Hematocrit 40.5  43.6  42.6    MCV 91.2  90.3  95.1    MCH 28.6  28.4  30.6    MCHC 31.4  31.4  32.2    RDW 14.8  14.4  13.0    Platelets 302  314  265    Neutrophil Rel % 64.8  66.2     Immature Granulocyte Rel % 0.5  0.7     Lymphocyte Rel % 21.3  23.3     Monocyte Rel % 11.4  9.1     Eosinophil Rel % 1.8  0.3     Basophil Rel % 0.2  0.4        Lab Results   Component Value Date    TSH 0.774 02/21/2024      Lab Results   Component Value Date    FREET4 0.94 07/11/2022      D-Dimer, Quantitative   Date Value Ref Range Status   12/31/2022 0.78 (H) 0.00 - 0.67 MCGFEU/mL Final     Magnesium   Date Value Ref Range Status   11/21/2024 2.2 1.6 - 2.4 mg/dL Final      No results found for: \"DIGOXIN\"   Lab Results   Component Value Date    TROPONINT 26 (H) 12/12/2023           Lipid Panel          7/3/2024    12:18   Lipid Panel   Total Cholesterol 167    Triglycerides 105    HDL Cholesterol 59    VLDL Cholesterol 19    LDL Cholesterol  89    LDL/HDL Ratio 1.47          Results for orders placed during the hospital encounter of 06/30/22    Adult Transthoracic Echo Complete w/ Color, Spectral and Contrast if necessary per protocol    Interpretation Summary  · Left ventricular wall thickness is consistent with mild posterior asymmetric hypertrophy.  · Left ventricular ejection fraction appears to be 51 - 55%.  · Left ventricular diastolic function is consistent with (grade I) impaired relaxation.  · No significant valvular pathology.             Assessment and Plan   Diagnoses and all orders for this visit:    1. Chronic diastolic heart failure (Primary)  Assessment & Plan:  Volume wise she is back to her baseline, continue current regimen with furosemide 40 mg daily along with spironolactone and metoprolol .  Most recent creatinine is 1.26 which is stable for her.      2. Essential hypertension  Assessment & Plan:  Blood pressure " well-controlled, continue current medications.      3. Palpitations  Assessment & Plan:  No complaints palpitations at visit today.  Her previous Holter monitor study did not show any significant ectopic beats or arrhythmias.  Continue low-dose metoprolol.              Follow Up   Return for with Dr. George, As already scheduledin May .    Patient was given instructions and counseling regarding her condition or for health maintenance advice. Please see specific information pulled into the AVS if appropriate.     Signed,  Apolonia Weeks, APRN  12/03/2024     Dictated Utilizing Dragon Dictation: Please note that portions of this note were completed with a voice recognition program.  Part of this note may be an electronic transcription/translation of spoken language to printed text using the Dragon Dictation System.

## 2024-12-13 DIAGNOSIS — F41.9 ANXIETY: ICD-10-CM

## 2024-12-13 RX ORDER — CLONAZEPAM 0.5 MG/1
0.5 TABLET ORAL 2 TIMES DAILY PRN
Qty: 60 TABLET | Refills: 0 | Status: CANCELLED | OUTPATIENT
Start: 2024-12-13

## 2024-12-13 NOTE — TELEPHONE ENCOUNTER
This medication is managed per , and would need to be directed to covering provider in her absence.

## 2024-12-14 RX ORDER — CLONAZEPAM 0.5 MG/1
0.5 TABLET ORAL 2 TIMES DAILY PRN
Qty: 60 TABLET | Refills: 0 | Status: SHIPPED | OUTPATIENT
Start: 2024-12-14

## 2024-12-23 ENCOUNTER — HOSPITAL ENCOUNTER (OUTPATIENT)
Dept: GENERAL RADIOLOGY | Facility: HOSPITAL | Age: 69
Discharge: HOME | End: 2024-12-23
Payer: MEDICARE

## 2024-12-23 ENCOUNTER — LAB (OUTPATIENT)
Dept: LAB | Facility: HOSPITAL | Age: 69
End: 2024-12-23
Payer: MEDICARE

## 2024-12-23 ENCOUNTER — OFFICE VISIT (OUTPATIENT)
Dept: FAMILY MEDICINE CLINIC | Age: 69
End: 2024-12-23
Payer: MEDICARE

## 2024-12-23 DIAGNOSIS — R41.3 MEMORY LOSS: ICD-10-CM

## 2024-12-23 DIAGNOSIS — R06.02 SHORTNESS OF BREATH: ICD-10-CM

## 2024-12-23 DIAGNOSIS — J44.1 COPD WITH EXACERBATION: ICD-10-CM

## 2024-12-23 DIAGNOSIS — J18.9 PNEUMONIA OF RIGHT LOWER LOBE DUE TO INFECTIOUS ORGANISM: Primary | ICD-10-CM

## 2024-12-23 DIAGNOSIS — I50.32 CHRONIC DIASTOLIC HEART FAILURE: ICD-10-CM

## 2024-12-23 DIAGNOSIS — D50.9 IRON DEFICIENCY ANEMIA, UNSPECIFIED IRON DEFICIENCY ANEMIA TYPE: ICD-10-CM

## 2024-12-23 DIAGNOSIS — I10 ESSENTIAL HYPERTENSION: ICD-10-CM

## 2024-12-23 DIAGNOSIS — R09.81 SINUS CONGESTION: ICD-10-CM

## 2024-12-23 DIAGNOSIS — F41.9 ANXIETY: ICD-10-CM

## 2024-12-23 LAB
BASOPHILS # BLD AUTO: 0.02 10*3/MM3 (ref 0–0.2)
BASOPHILS NFR BLD AUTO: 0.3 % (ref 0–1.5)
DEPRECATED RDW RBC AUTO: 44.4 FL (ref 37–54)
EOSINOPHIL # BLD AUTO: 0.22 10*3/MM3 (ref 0–0.4)
EOSINOPHIL NFR BLD AUTO: 3.4 % (ref 0.3–6.2)
ERYTHROCYTE [DISTWIDTH] IN BLOOD BY AUTOMATED COUNT: 12.6 % (ref 12.3–15.4)
EXPIRATION DATE: NORMAL
FLUAV AG UPPER RESP QL IA.RAPID: NOT DETECTED
FLUBV AG UPPER RESP QL IA.RAPID: NOT DETECTED
HCT VFR BLD AUTO: 44.6 % (ref 34–46.6)
HGB BLD-MCNC: 14.6 G/DL (ref 12–15.9)
IMM GRANULOCYTES # BLD AUTO: 0.02 10*3/MM3 (ref 0–0.05)
IMM GRANULOCYTES NFR BLD AUTO: 0.3 % (ref 0–0.5)
INTERNAL CONTROL: NORMAL
LYMPHOCYTES # BLD AUTO: 2.21 10*3/MM3 (ref 0.7–3.1)
LYMPHOCYTES NFR BLD AUTO: 33.8 % (ref 19.6–45.3)
Lab: NORMAL
MCH RBC QN AUTO: 30.9 PG (ref 26.6–33)
MCHC RBC AUTO-ENTMCNC: 32.7 G/DL (ref 31.5–35.7)
MCV RBC AUTO: 94.5 FL (ref 79–97)
MONOCYTES # BLD AUTO: 0.85 10*3/MM3 (ref 0.1–0.9)
MONOCYTES NFR BLD AUTO: 13 % (ref 5–12)
NEUTROPHILS NFR BLD AUTO: 3.21 10*3/MM3 (ref 1.7–7)
NEUTROPHILS NFR BLD AUTO: 49.2 % (ref 42.7–76)
NT-PROBNP SERPL-MCNC: 88.8 PG/ML (ref 0–900)
PLATELET # BLD AUTO: 299 10*3/MM3 (ref 140–450)
PMV BLD AUTO: 9.5 FL (ref 6–12)
RBC # BLD AUTO: 4.72 10*6/MM3 (ref 3.77–5.28)
SARS-COV-2 AG UPPER RESP QL IA.RAPID: NOT DETECTED
WBC NRBC COR # BLD AUTO: 6.53 10*3/MM3 (ref 3.4–10.8)

## 2024-12-23 PROCEDURE — 36415 COLL VENOUS BLD VENIPUNCTURE: CPT

## 2024-12-23 PROCEDURE — 99214 OFFICE O/P EST MOD 30 MIN: CPT | Performed by: FAMILY MEDICINE

## 2024-12-23 PROCEDURE — 1126F AMNT PAIN NOTED NONE PRSNT: CPT | Performed by: FAMILY MEDICINE

## 2024-12-23 PROCEDURE — 83880 ASSAY OF NATRIURETIC PEPTIDE: CPT

## 2024-12-23 PROCEDURE — 71046 X-RAY EXAM CHEST 2 VIEWS: CPT

## 2024-12-23 PROCEDURE — 3078F DIAST BP <80 MM HG: CPT | Performed by: FAMILY MEDICINE

## 2024-12-23 PROCEDURE — 3077F SYST BP >= 140 MM HG: CPT | Performed by: FAMILY MEDICINE

## 2024-12-23 PROCEDURE — 3044F HG A1C LEVEL LT 7.0%: CPT | Performed by: FAMILY MEDICINE

## 2024-12-23 PROCEDURE — G2211 COMPLEX E/M VISIT ADD ON: HCPCS | Performed by: FAMILY MEDICINE

## 2024-12-23 PROCEDURE — 87428 SARSCOV & INF VIR A&B AG IA: CPT | Performed by: FAMILY MEDICINE

## 2024-12-23 PROCEDURE — 85025 COMPLETE CBC W/AUTO DIFF WBC: CPT

## 2024-12-23 RX ORDER — FERROUS SULFATE 325(65) MG
325 TABLET ORAL
Qty: 90 TABLET | Refills: 1 | Status: SHIPPED | OUTPATIENT
Start: 2024-12-23

## 2024-12-23 RX ORDER — DONEPEZIL HYDROCHLORIDE 10 MG/1
10 TABLET, FILM COATED ORAL NIGHTLY
Qty: 30 TABLET | Refills: 2 | Status: SHIPPED | OUTPATIENT
Start: 2024-12-23

## 2024-12-23 RX ORDER — CEFDINIR 300 MG/1
300 CAPSULE ORAL 2 TIMES DAILY
Qty: 14 CAPSULE | Refills: 0 | Status: SHIPPED | OUTPATIENT
Start: 2024-12-23

## 2024-12-23 NOTE — PROGRESS NOTES
Chasity Torres presents to [facility].    Chief Complaint:  SOA    Subjective     History of Present Illness:  HPI  Chasity presents today feeling more short of air.  She feels short of air at rest but is worse with activity.  She is not having any chest pain or heart palpitations.  She has more sinus congestion.  She denies fever or sore throat.  She sounds stopped up today.  She has underlying history of COPD and current treatment includes Trelegy, as needed albuterol.  She tolerates meds well.   This onset last Thursday.  She has generalized weakness and uses a walker.  Any ambulation causes her to have to stop and take a break to catch her breath.  She has underlying history of congestive heart failure.    Results for orders placed or performed in visit on 12/23/24   POCT SARS-CoV-2 Antigen DEANDRE + Flu    Collection Time: 12/23/24  3:15 PM    Specimen: Swab   Result Value Ref Range    SARS Antigen Not Detected Not Detected, Presumptive Negative    Influenza A Antigen DEANDRE Not Detected Not Detected    Influenza B Antigen DEANDRE Not Detected Not Detected    Internal Control Passed Passed    Lot Number [id number]     Expiration Date 7-11-25      *Note: Due to a large number of results and/or encounters for the requested time period, some results have not been displayed. A complete set of results can be found in Results Review.     XR Chest PA & Lateral    Result Date: 12/23/2024  Impression: Minimal patchy opacities noted within the right lower lung, which may be artifactual may represent pulmonary infiltrates. Electronically Signed: Connorepi Caicedo, DO  12/23/2024 4:11 PM EST  Workstation ID: [id number]      She presents with ongoing memory issues and is stable and improved with her increase and Aricept.  She needs a refill at the pharmacy today.  She tolerates well.     She has a history of diabetes.  She is currently on Mounjaro and tolerates med well.  She has lost 5 more pounds.  She is  "tolerating medication well.  Her last hemoglobin A1c was 5.8 %.  She rarely checks her home blood sugar.  She denies hypoglycemia symptoms.  She has peripheral neuropathy but no other acute foot issues.  She is aware she needs to see the eye doctor once a year.  She defers dietitian referral and is not following a diabetic diet.          Result Review   The following data was reviewed by Ami Diego MD on 12/23/2024.  Lab Results   Component Value Date    WBC 6.53 12/23/2024    HGB 14.6 12/23/2024    HCT 44.6 12/23/2024    MCV 94.5 12/23/2024     12/23/2024     Lab Results   Component Value Date    GLUCOSE 102 (H) 11/01/2024    BUN 14 11/01/2024    CREATININE 1.26 (H) 11/01/2024     11/01/2024    K 4.6 11/01/2024     11/01/2024    CALCIUM 10.0 11/01/2024    PROTEINTOT 7.1 11/01/2024    ALBUMIN 3.8 11/01/2024    ALT 15 11/01/2024    AST 20 11/01/2024    ALKPHOS 123 (H) 11/01/2024    BILITOT 0.3 11/01/2024    GLOB 3.3 11/01/2024    AGRATIO 1.2 11/01/2024    BCR 11.1 11/01/2024    ANIONGAP 10.6 11/01/2024    EGFR 46.6 (L) 11/01/2024     Lab Results   Component Value Date    CHOL 167 07/03/2024    CHLPL 166 12/16/2020    TRIG 105 07/03/2024    HDL 59 07/03/2024    LDL 89 07/03/2024     Lab Results   Component Value Date    TSH 0.774 02/21/2024     Lab Results   Component Value Date    HGBA1C 5.80 (H) 07/03/2024     No results found for: \"PSA\"  Lab Results   Component Value Date    Iron 104 11/21/2024    Iron Saturation (TSAT) 31 11/21/2024      Lab Results   Component Value Date    AYAJ76OB 41.6 07/03/2024               Assessment and Plan:   Diagnoses and all orders for this visit:    1. Pneumonia of right lower lobe due to infectious organism (Primary)  Comments:  Will treat with cefdinir given underlying pneumonia.She deferred cough syrup rx. Pending BNP to eval for CHF exacerbation F/u if no improvement/worsening sxs  Orders:  -     cefdinir (OMNICEF) 300 MG capsule; Take 1 capsule by " mouth 2 (Two) Times a Day.  Dispense: 14 capsule; Refill: 0    2. COPD with exacerbation  Comments:  Will treat with cefdinir given underlying pneumonia.She deferred cough syrup rx.  Pending BNP to eval for CHF exacerbation F/u if no improvement/worsening sxs    3. Shortness of breath  -     POCT SARS-CoV-2 Antigen DEANDRE + Flu  -     XR Chest PA & Lateral; Future  -     CBC w AUTO Differential; Future  -     proBNP; Future    4. Sinus congestion  Comments:  Recommend Flonase nasal spray and she defers    5. Anxiety  Comments:  Chronic and ongoing but stable.  She is to follow-up with psychiatry as directed    6. Essential hypertension  Comments:  Stable on current treatment plan.  No changes in current meds    7. Chronic diastolic heart failure  Comments:  I do not appreciate congestive heart failure symptoms.  Pending BNP to further eval  Orders:  -     proBNP; Future    8. Iron deficiency anemia, unspecified iron deficiency anemia type  Comments:  Continue iron supplementation. Will check labs and adjust Tx plan pending results  Orders:  -     ferrous sulfate 325 (65 FE) MG tablet; Take 1 tablet by mouth Daily With Breakfast.  Dispense: 90 tablet; Refill: 1    9. Memory loss  Comments:  Stable on Aricept.  Medication refilled for her today.  She tolerates med well  Orders:  -     donepezil (ARICEPT) 10 MG tablet; Take 1 tablet by mouth Every Night.  Dispense: 30 tablet; Refill: 2              Objective     Medications:  Current Outpatient Medications   Medication Instructions    albuterol sulfate  (90 Base) MCG/ACT inhaler 2 puffs, Inhalation, Every 4 Hours PRN    Blood Glucose Monitoring Suppl device 1 each, Not Applicable, Daily    busPIRone (BUSPAR) 10 MG tablet Take 1 tablet by mouth 3 (Three) Times a Day for 7 days, THEN 1 tablet 2 (Two) Times a Day for 7 days. Indications: Anxiety Disorder    Calcium Carb-Cholecalciferol (Calcium 500 + D) 500-3.125 MG-MCG tablet 1 each, Oral, 2 Times Daily     Cariprazine HCl (Vraylar) 1.5 MG capsule capsule Take 1 capsule by mouth Every Morning.    cefdinir (OMNICEF) 300 mg, Oral, 2 Times Daily    clonazePAM (KLONOPIN) 0.5 mg, Oral, 2 Times Daily PRN    diclofenac (VOLTAREN) 75 mg, Oral, 2 Times Daily    donepezil (ARICEPT) 10 mg, Oral, Nightly    FeroSul 325 mg, Oral, Daily With Breakfast    FLUoxetine (PROzac) 20 MG capsule Take 1 capsule by mouth Every Morning.    Fluticasone-Umeclidin-Vilant (TRELEGY ELLIPTA) 200-62.5-25 MCG/ACT inhaler 1 puff, Inhalation, Daily - RT    glucose blood test strip Use as instructed to check blood sugar daily    HYDROcodone-acetaminophen (NORCO)  MG per tablet     ipratropium-albuterol (DUO-NEB) 0.5-2.5 mg/3 ml nebulizer Nebulize and inhale 1 vial 4 (Four) Times a Day As Needed for Wheezing.    Lancet Device misc 1 each, Not Applicable, Daily, Use as directed; check blood sugar once daily    Lancets (OneTouch Delica Plus [id number]) misc 1 each, Not Applicable, Daily    Magnesium Oxide -Mg Supplement 400 mg, Oral, Nightly    memantine (NAMENDA) 5 mg, Oral, Daily    metoprolol succinate XL (TOPROL-XL) 12.5 mg, Oral, Daily    miconazole (Desenex) 2 % powder Apply topically to the appropriate area as directed 2 (Two) Times a Day.    mirtazapine (REMERON) 7.5 MG tablet Take 1 tablet by mouth Every Night.    mupirocin (BACTROBAN) 2 % ointment APPLY TO AFFECTED AREA(S) TWO TIMES A DAY AS DIRECTED FOR 14 DAYS -    naloxone (NARCAN) 4 MG/0.1ML nasal spray 1 spray, As Needed    O2 (OXYGEN) 2 L/min, Nightly    pantoprazole (PROTONIX) 40 mg, Oral, Daily    pramipexole (MIRAPEX) 0.5 mg, Oral, 3 Times Daily    pregabalin (LYRICA) 75 mg, Oral, 2 Times Daily    primidone (MYSOLINE) 50 mg    spironolactone (ALDACTONE) 25 mg, Oral, 2 Times Daily    tacrolimus (PROTOPIC) 0.1 % ointment Apply topical ointment to affected area twice daily    Tirzepatide 12.5 MG/0.5ML solution auto-injector Inject 12.5mg under the skin into the appropriate area as  "directed 1 (One) Time Per Week.    torsemide (DEMADEX) 20 mg, Oral, 2 Times Daily    triamcinolone (KENALOG) 0.025 % cream As Needed        Vital Signs:   /76 (BP Location: Right arm, Patient Position: Sitting, Cuff Size: Large Adult)   Pulse 71   Temp 97.6 °F (36.4 °C) (Oral)   Ht 162.6 cm (64.02\")   Wt 109 kg (241 lb)   SpO2 92%   BMI 41.35 kg/m²             Physical Exam:  Physical Exam      Review of Systems:  Review of Systems           Follow Up   Return if symptoms worsen or fail to improve.    Part of this note may be an electronic transcription/translation of spoken language to printed   text using the Dragon Dictation System.            Medical History:  Medications Discontinued During This Encounter   Medication Reason    ferrous sulfate 325 (65 FE) MG tablet Reorder    donepezil (ARICEPT) 10 MG tablet Reorder      Past Medical History:    Adverse effect of other viral vaccines, initial encounter    Covid vaccine    Allergic fungal sinusitis    Allergic rhinitis    Anxiety disorder    Asthma    CHF (congestive heart failure)    COPD with acute exacerbation    CTS (carpal tunnel syndrome)    left    Difficulty walking    Essential (primary) hypertension    Essential tremor    Hypercholesterolemia    Irritable bowel syndrome    Low back pain    Major depressive disorder    Morbid (severe) obesity due to excess calories    Nasal congestion    Neuropathy in diabetes    Obstructive sleep apnea (adult) (pediatric)    Other hereditary and idiopathic neuropathies    Pain in left hip    Pain in right toe(s)    Pain in thoracic spine    Peripheral neuropathy    Hands    Primary generalized (osteo)arthritis    Primary insomnia    Pulmonary emphysema    Restless leg syndrome    SOBOE (shortness of breath on exertion)    Substance abuse    Type 2 diabetes mellitus without complication, without long-term current use of insulin    Vitamin D deficiency     Past Surgical History:    CARPAL TUNNEL RELEASE    " COLONOSCOPY    ENDOSCOPIC FUNCTIONAL SINUS SURGERY (FESS)    Procedure: ENDOSCOPIC FUNCTIONAL SINUS SURGERY, left maxillary antrostomy with removal of contents, possible left ethmoidectomy;  Surgeon: Johnny Calderon MD;  Location: [facility] [place];  Service: ENT;  Laterality: Left;    HYSTERECTOMY    complete- ovarian cysts      Family History   Problem Relation Age of Onset    COPD Father     Heart failure Father     Hypertension Mother     Neuropathy Sister     Anxiety disorder Daughter     Depression Daughter     Malig Hyperthermia Neg Hx     Breast cancer Neg Hx     Ovarian cancer Neg Hx     Uterine cancer Neg Hx     Cervical cancer Neg Hx     Colon cancer Neg Hx     Stomach cancer Neg Hx     Skin cancer Neg Hx     Clotting disorder Neg Hx     Deep vein thrombosis Neg Hx     Pulmonary embolism Neg Hx      Social History     Tobacco Use    Smoking status: Former     Current packs/day: 0.00     Average packs/day: 0.5 packs/day for 12.0 years (6.0 ttl pk-yrs)     Types: Cigarettes     Start date: 1/1/2009     Quit date: 1/1/2021     Years since quitting: 3.9     Passive exposure: Never    Smokeless tobacco: Never   Substance Use Topics    Alcohol use: Not Currently       Health Maintenance Due   Topic Date Due    DIABETIC EYE EXAM  06/28/2024    HEMOGLOBIN A1C  01/03/2025    URINE MICROALBUMIN  02/19/2025        Immunization History   Administered Date(s) Administered    Arexvy (RSV, Adults 60+ yrs) 01/11/2024    COVID-19 (ANGELA) 12/28/2021    COVID-19 (MODERNA) 1st,2nd,3rd Dose Monovalent 03/17/2021    COVID-19 (PFIZER) 12YRS+ (COMIRNATY) 01/17/2024, 05/15/2024    Covid-19 (Pfizer) Gray Cap Monovalent 05/09/2022    Fluzone  >6mos 10/07/2013    Fluzone (or Fluarix & Flulaval for VFC) >6mos 10/10/2017    Fluzone High-Dose 65+YRS 11/21/2024    Fluzone High-Dose 65+yrs 03/31/2022, 09/30/2022, 10/18/2023    Influenza Seasonal Injectable 12/20/2012    Pneumococcal Conjugate 13-Valent (PCV13) 09/29/2020     Pneumococcal Polysaccharide (PPSV23) 11/30/2020    Shingrix 03/28/2023, 06/27/2023    Tdap 05/20/2015    Zostavax 09/16/2008       No Known Allergies

## 2024-12-26 VITALS
HEIGHT: 64 IN | HEART RATE: 71 BPM | DIASTOLIC BLOOD PRESSURE: 76 MMHG | TEMPERATURE: 97.6 F | SYSTOLIC BLOOD PRESSURE: 161 MMHG | BODY MASS INDEX: 46.26 KG/M2 | WEIGHT: 271 LBS | OXYGEN SATURATION: 92 %

## 2024-12-29 DIAGNOSIS — R41.3 MEMORY LOSS: ICD-10-CM

## 2024-12-29 RX ORDER — DONEPEZIL HYDROCHLORIDE 10 MG/1
10 TABLET, FILM COATED ORAL NIGHTLY
Qty: 30 TABLET | Refills: 2 | Status: CANCELLED | OUTPATIENT
Start: 2024-12-29

## 2024-12-29 NOTE — ASSESSMENT & PLAN NOTE
Volume wise she is back to her baseline, continue current regimen with furosemide 40 mg daily along with spironolactone and metoprolol .  Most recent creatinine is 1.26 which is stable for her.

## 2024-12-29 NOTE — ASSESSMENT & PLAN NOTE
No complaints palpitations at visit today.  Her previous Holter monitor study did not show any significant ectopic beats or arrhythmias.  Continue low-dose metoprolol.   Thank you.    Adderall Rx is pended for you approve. I can't send, or call it, to pharmacy since it is controlled.    Thank you!

## 2024-12-31 DIAGNOSIS — G89.29 CHRONIC BILATERAL LOW BACK PAIN, UNSPECIFIED WHETHER SCIATICA PRESENT: ICD-10-CM

## 2024-12-31 DIAGNOSIS — F51.05 INSOMNIA SECONDARY TO ANXIETY: ICD-10-CM

## 2024-12-31 DIAGNOSIS — F41.9 ANXIETY: ICD-10-CM

## 2024-12-31 DIAGNOSIS — F33.1 MAJOR DEPRESSIVE DISORDER, RECURRENT EPISODE, MODERATE: ICD-10-CM

## 2024-12-31 DIAGNOSIS — M54.50 CHRONIC BILATERAL LOW BACK PAIN, UNSPECIFIED WHETHER SCIATICA PRESENT: ICD-10-CM

## 2024-12-31 DIAGNOSIS — F41.1 GENERALIZED ANXIETY DISORDER: ICD-10-CM

## 2024-12-31 DIAGNOSIS — G25.81 RESTLESS LEG SYNDROME: ICD-10-CM

## 2024-12-31 DIAGNOSIS — I50.32 CHRONIC DIASTOLIC HEART FAILURE: ICD-10-CM

## 2024-12-31 DIAGNOSIS — F41.9 INSOMNIA SECONDARY TO ANXIETY: ICD-10-CM

## 2024-12-31 DIAGNOSIS — R41.3 MEMORY LOSS: ICD-10-CM

## 2024-12-31 RX ORDER — CLONAZEPAM 0.5 MG/1
0.5 TABLET ORAL 2 TIMES DAILY PRN
Qty: 60 TABLET | Refills: 0 | OUTPATIENT
Start: 2024-12-31

## 2024-12-31 RX ORDER — MIRTAZAPINE 7.5 MG/1
7.5 TABLET, FILM COATED ORAL NIGHTLY
Qty: 30 TABLET | Refills: 1 | OUTPATIENT
Start: 2024-12-31

## 2024-12-31 RX ORDER — LANCETS 33 GAUGE
1 EACH MISCELLANEOUS DAILY
Qty: 100 EACH | Refills: 0 | Status: SHIPPED | OUTPATIENT
Start: 2024-12-31

## 2024-12-31 RX ORDER — TORSEMIDE 20 MG/1
20 TABLET ORAL 2 TIMES DAILY
Qty: 60 TABLET | Refills: 5 | Status: CANCELLED | OUTPATIENT
Start: 2024-12-31

## 2024-12-31 NOTE — TELEPHONE ENCOUNTER
REFILL REQUEST:     mirtazapine (REMERON) 7.5 MG tablet (10/04/2024)     FLUoxetine (PROzac) 20 MG capsule (12/04/2024)     F/UP- 01/15/2025.  LOV: 11/21/2024.    PHARMACY IS REQUESTING FOR THESE MEDICATIONS TO BE REROUTED OVER TO THEM. [facility]

## 2024-12-31 NOTE — TELEPHONE ENCOUNTER
Rx Refill Note  Requested Prescriptions     Pending Prescriptions Disp Refills    Lancets (OneTouch Delica Plus [id number]) misc 100 each 0     Sig: Use 1 each Daily.      Last office visit with prescribing clinician: 9/5/2024   Last telemedicine visit with prescribing clinician: Visit date not found   Next office visit with prescribing clinician: 3/5/2025                         Would you like a call back once the refill request has been completed: [] Yes [] No    If the office needs to give you a call back, can they leave a voicemail: [] Yes [] No    Felicity Whipple MA  12/31/24, 14:35 EST

## 2025-01-02 ENCOUNTER — OFFICE VISIT (OUTPATIENT)
Dept: PODIATRY | Facility: CLINIC | Age: 70
End: 2025-01-02
Payer: MEDICARE

## 2025-01-02 VITALS
HEART RATE: 65 BPM | BODY MASS INDEX: 46.06 KG/M2 | WEIGHT: 269.8 LBS | SYSTOLIC BLOOD PRESSURE: 91 MMHG | OXYGEN SATURATION: 98 % | RESPIRATION RATE: 16 BRPM | HEIGHT: 64 IN | DIASTOLIC BLOOD PRESSURE: 60 MMHG

## 2025-01-02 DIAGNOSIS — L60.0 ONYCHOCRYPTOSIS: Primary | ICD-10-CM

## 2025-01-02 DIAGNOSIS — E11.8 DM FEET: ICD-10-CM

## 2025-01-02 DIAGNOSIS — R26.2 DIFFICULTY WALKING: ICD-10-CM

## 2025-01-02 DIAGNOSIS — B35.1 ONYCHOMYCOSIS: ICD-10-CM

## 2025-01-02 DIAGNOSIS — E11.9 NON-INSULIN DEPENDENT TYPE 2 DIABETES MELLITUS: ICD-10-CM

## 2025-01-02 DIAGNOSIS — M79.672 FOOT PAIN, BILATERAL: ICD-10-CM

## 2025-01-02 DIAGNOSIS — M79.671 FOOT PAIN, BILATERAL: ICD-10-CM

## 2025-01-02 RX ORDER — PRAMIPEXOLE DIHYDROCHLORIDE 0.5 MG/1
0.5 TABLET ORAL 3 TIMES DAILY
Qty: 270 TABLET | Refills: 0 | Status: SHIPPED | OUTPATIENT
Start: 2025-01-02

## 2025-01-02 RX ORDER — DONEPEZIL HYDROCHLORIDE 10 MG/1
10 TABLET, FILM COATED ORAL NIGHTLY
Qty: 30 TABLET | Refills: 2 | Status: SHIPPED | OUTPATIENT
Start: 2025-01-02

## 2025-01-02 RX ORDER — DICLOFENAC SODIUM 75 MG/1
75 TABLET, DELAYED RELEASE ORAL 2 TIMES DAILY
Qty: 60 TABLET | Refills: 2 | Status: SHIPPED | OUTPATIENT
Start: 2025-01-02

## 2025-01-02 RX ORDER — MEMANTINE HYDROCHLORIDE 5 MG/1
5 TABLET ORAL DAILY
Qty: 30 TABLET | Refills: 2 | Status: SHIPPED | OUTPATIENT
Start: 2025-01-02

## 2025-01-02 NOTE — PROGRESS NOTES
Psychiatric - PODIATRY    Today's Date: 01/02/25    Patient Name: Chasity Torres  MRN: [id number]  CSN: [id number]  PCP: Ami Diego MD, Last PCP Visit: 23 December 2024  Referring Provider: No ref. provider found    SUBJECTIVE     Chief Complaint   Patient presents with    Left Foot - Follow-up     rfc    Right Foot - Follow-up     rfc     HPI: Chasity Torres, a 69 y.o.female, presents to clinic for painful toenails:    New, Established, New Problem:  est  Location:  Toenails  Duration:   Greater than five years  Onset:  Gradual  Nature:  sore with palpation.  Stable, worsening, improving:   Stable  Aggravating factors:  Pain with shoe gear and ambulation.  Previous Treatment: debridement    Patient controlling diabetes via:  Oral meds    Patient denies any fevers, chills, nausea, vomiting, shortness of breath, nor any other constitutional signs nor symptoms.    Medical changes:  no changes.    I have reviewed/confirmed previously documented HPI with no changes.     Past Medical History:   Diagnosis Date    Adverse effect of other viral vaccines, initial encounter     Covid vaccine    Allergic fungal sinusitis 07/29/2022    Allergic rhinitis     Anxiety disorder     Asthma     CHF (congestive heart failure)     COPD with acute exacerbation     CTS (carpal tunnel syndrome)     left    Difficulty walking 2022    Essential (primary) hypertension     Essential tremor     Hypercholesterolemia 10/18/2021    Irritable bowel syndrome     Low back pain     Major depressive disorder     Morbid (severe) obesity due to excess calories     Nasal congestion     Neuropathy in diabetes 2023    Obstructive sleep apnea (adult) (pediatric)     Other hereditary and idiopathic neuropathies     Pain in left hip     Pain in right toe(s)     Pain in thoracic spine     Peripheral neuropathy     Hands    Primary generalized (osteo)arthritis     Primary insomnia     Pulmonary emphysema 01/05/2023    Restless leg syndrome      SOBOE (shortness of breath on exertion)     Substance abuse     Type 2 diabetes mellitus without complication, without long-term current use of insulin 2022    Vitamin D deficiency      Past Surgical History:   Procedure Laterality Date    CARPAL TUNNEL RELEASE      COLONOSCOPY      ENDOSCOPIC FUNCTIONAL SINUS SURGERY (FESS) Left 08/15/2022    Procedure: ENDOSCOPIC FUNCTIONAL SINUS SURGERY, left maxillary antrostomy with removal of contents, possible left ethmoidectomy;  Surgeon: Johnny Calderon MD;  Location: [facility] OR [facility] [place];  Service: ENT;  Laterality: Left;    HYSTERECTOMY      complete- ovarian cysts     Family History   Problem Relation Age of Onset    COPD Father     Heart failure Father     Hypertension Mother     Neuropathy Sister     Anxiety disorder Daughter     Depression Daughter     Malig Hyperthermia Neg Hx     Breast cancer Neg Hx     Ovarian cancer Neg Hx     Uterine cancer Neg Hx     Cervical cancer Neg Hx     Colon cancer Neg Hx     Stomach cancer Neg Hx     Skin cancer Neg Hx     Clotting disorder Neg Hx     Deep vein thrombosis Neg Hx     Pulmonary embolism Neg Hx      Social History     Socioeconomic History    Marital status:     Number of children: 2   Tobacco Use    Smoking status: Former     Current packs/day: 0.00     Average packs/day: 0.5 packs/day for 12.0 years (6.0 ttl pk-yrs)     Types: Cigarettes     Start date: 2009     Quit date: 2021     Years since quittin.0     Passive exposure: Never    Smokeless tobacco: Never   Vaping Use    Vaping status: Never Used   Substance and Sexual Activity    Alcohol use: Not Currently    Drug use: Never    Sexual activity: Not Currently     Partners: Male     Birth control/protection: Hysterectomy, Surgical     No Known Allergies  Current Outpatient Medications   Medication Sig Dispense Refill    albuterol sulfate  (90 Base) MCG/ACT inhaler Inhale 2 puffs Every 4 (Four) Hours As Needed for Wheezing. 90 g  0    Blood Glucose Monitoring Suppl device Use 1 each Daily. 1 each 0    Calcium Carb-Cholecalciferol (Calcium 500 + D) 500-3.125 MG-MCG tablet Take 1 each by mouth 2 (Two) Times a Day. 60 tablet 2    Cariprazine HCl (Vraylar) 1.5 MG capsule capsule Take 1 capsule by mouth Every Morning. 7 capsule 0    cefdinir (OMNICEF) 300 MG capsule Take 1 capsule by mouth 2 (Two) Times a Day. 14 capsule 0    clonazePAM (KlonoPIN) 0.5 MG tablet Take 1 tablet by mouth 2 (Two) Times a Day As Needed for Anxiety. 60 tablet 0    diclofenac (VOLTAREN) 75 MG EC tablet Take 1 tablet by mouth 2 (Two) Times a Day. 60 tablet 2    donepezil (ARICEPT) 10 MG tablet Take 1 tablet by mouth Every Night. 30 tablet 2    ferrous sulfate 325 (65 FE) MG tablet Take 1 tablet by mouth Daily With Breakfast. 90 tablet 1    FLUoxetine (PROzac) 20 MG capsule Take 1 capsule by mouth Every Morning. 30 capsule 1    Fluticasone-Umeclidin-Vilant (TRELEGY ELLIPTA) 200-62.5-25 MCG/ACT inhaler Inhale 1 puff Daily. 1 each 5    glucose blood test strip Use as instructed to check blood sugar daily 100 each 3    HYDROcodone-acetaminophen (NORCO)  MG per tablet       ipratropium-albuterol (DUO-NEB) 0.5-2.5 mg/3 ml nebulizer Nebulize and inhale 1 vial 4 (Four) Times a Day As Needed for Wheezing. 360 mL 3    Lancet Device misc Use 1 each Daily. Use as directed; check blood sugar once daily 100 each 3    Lancets (OneTouch Delica Plus [id number]) misc Use 1 each Daily. 100 each 0    Magnesium Oxide -Mg Supplement 400 (240 Mg) MG tablet Take 1 tablet by mouth Every Night. 30 tablet 11    memantine (Namenda) 5 MG tablet Take 1 tablet by mouth Daily. 30 tablet 2    metoprolol succinate XL (TOPROL-XL) 25 MG 24 hr tablet Take 0.5 tablets by mouth Daily.      miconazole (Desenex) 2 % powder Apply topically to the appropriate area as directed 2 (Two) Times a Day. 71 g 11    mirtazapine (REMERON) 7.5 MG tablet Take 1 tablet by mouth Every Night. 30 tablet 1    mupirocin  (BACTROBAN) 2 % ointment APPLY TO AFFECTED AREA(S) TWO TIMES A DAY AS DIRECTED FOR 14 DAYS - 22 g 0    naloxone (NARCAN) 4 MG/0.1ML nasal spray 1 spray As Needed. Has on hand      O2 (OXYGEN) Inhale 2 L/min Every Night.      pantoprazole (PROTONIX) 40 MG EC tablet TAKE 1 TABLET BY MOUTH DAILY 90 tablet 0    pramipexole (MIRAPEX) 0.5 MG tablet Take 1 tablet by mouth 3 (Three) Times a Day. 270 tablet 0    pregabalin (LYRICA) 75 MG capsule Take 1 capsule by mouth 2 (Two) Times a Day. 60 capsule 2    primidone (MYSOLINE) 50 MG tablet Take 1 tablet by mouth.      spironolactone (ALDACTONE) 25 MG tablet Take 1 tablet by mouth 2 (Two) Times a Day. 60 tablet 5    tacrolimus (PROTOPIC) 0.1 % ointment Apply topical ointment to affected area twice daily      Tirzepatide 12.5 MG/0.5ML solution auto-injector Inject 12.5mg under the skin into the appropriate area as directed 1 (One) Time Per Week. 2 mL 5    torsemide (DEMADEX) 20 MG tablet Take 1 tablet by mouth 2 (Two) Times a Day. 60 tablet 5    triamcinolone (KENALOG) 0.025 % cream Apply 1 Application topically to the appropriate area as directed As Needed.      busPIRone (BUSPAR) 10 MG tablet Take 1 tablet by mouth 3 (Three) Times a Day for 7 days, THEN 1 tablet 2 (Two) Times a Day for 7 days. Indications: Anxiety Disorder       Current Facility-Administered Medications   Medication Dose Route Frequency Provider Last Rate Last Admin    cyanocobalamin injection 1,000 mcg  1,000 mcg Intramuscular Q28 Days Ami Diego MD   1,000 mcg at 11/21/24 1217     Review of Systems   Constitutional: Negative.    Skin:         Painful toenails.  Athletes feet and forefoot bilaterally.   All other systems reviewed and are negative.      OBJECTIVE     Vitals:    01/02/25 1043   BP: 91/60   Pulse: 65   Resp: 16   SpO2: 98%           Body mass index is 46.29 kg/m².    Lab Results   Component Value Date    HGBA1C 5.80 (H) 07/03/2024       Lab Results   Component Value Date    GLUCOSE  102 (H) 11/01/2024    CALCIUM 10.0 11/01/2024     11/01/2024    K 4.6 11/01/2024    CO2 29.4 (H) 11/01/2024     11/01/2024    BUN 14 11/01/2024    CREATININE 1.26 (H) 11/01/2024    EGFRIFAFRI >60 05/10/2021    EGFRIFNONA 60 (L) 11/09/2021    BCR 11.1 11/01/2024    ANIONGAP 10.6 11/01/2024       Patient seen in no apparent distress.      PHYSICAL EXAM:     Foot/Ankle Exam    GENERAL  Appearance:  obese and elderly (Chronically ill)  Orientation:  AAOx3  Affect:  appropriate  Gait:  unimpaired  Assistance:  cane use  Right shoe gear: casual shoe  Left shoe gear: casual shoe    VASCULAR     Right Foot Vascularity   Dorsalis pedis:  1+  Posterior tibial:  1+  Skin temperature:  warm  Edema grading:  None  CFT:  < 3 seconds  Pedal hair growth:  Absent  Varicosities:  mild varicosities     Left Foot Vascularity   Dorsalis pedis:  1+  Posterior tibial:  1+  Skin temperature:  warm  Edema grading:  None  CFT:  < 3 seconds  Pedal hair growth:  Absent  Varicosities:  mild varicosities     NEUROLOGIC     Right Foot Neurologic   Normal sensation    Light touch sensation: normal  Vibratory sensation: normal  Hot/Cold sensation: normal  Protective Sensation using Cawood-Bobby Monofilament:   Sites intact: 10  Sites tested: 10     Left Foot Neurologic   Normal sensation    Light touch sensation: normal  Vibratory sensation: normal  Hot/Cold sensation:  normal  Protective Sensation using Cawood-Bobby Monofilament:   Sites intact: 10  Sites tested: 10    MUSCLE STRENGTH     Right Foot Muscle Strength   Foot dorsiflexion:  4-  Foot plantar flexion:  4-  Foot inversion:  4-  Foot eversion:  4-     Left Foot Muscle Strength   Foot dorsiflexion:  4-  Foot plantar flexion:  4-  Foot inversion:  4-  Foot eversion:  4-    RANGE OF MOTION     Right Foot Range of Motion   Foot and ankle ROM within normal limits       Left Foot Range of Motion   Foot and ankle ROM within normal limits      DERMATOLOGIC      Right Foot  Dermatologic   Skin  Positive for tinea (Forefoot).   Nails  1.  Positive for elongated, onychomycosis, abnormal thickness, subungual debris and ingrown toenail.  2.  Positive for elongated, onychomycosis, abnormal thickness, subungual debris and ingrown toenail.  3.  Positive for elongated, onychomycosis, abnormal thickness, subungual debris and ingrown toenail.     Left Foot Dermatologic   Skin  Positive for tinea (Forefoot).   Nails  1.  Positive for elongated, onychomycosis, abnormal thickness, subungual debris and ingrown toenail.  2.  Positive for elongated, onychomycosis, abnormal thickness, subungual debris and ingrown toenail.  3.  Positive for elongated, onychomycosis, abnormal thickness, subungual debris and ingrown toenail.    I have reexamined the patient the results are consistent with the previously documented exam.    ASSESSMENT/PLAN     Diagnoses and all orders for this visit:    1. Onychocryptosis (Primary)    2. Non-insulin dependent type 2 diabetes mellitus    3. Difficulty walking    4. Onychomycosis    5. Foot pain, bilateral    6. DM feet    Comprehensive lower extremity examination and evaluation was performed.    Discussed findings and treatment plan including risks, benefits, and treatment options with patient in detail. Patient agreed with treatment plan.    Medications and allergies reviewed.  Reviewed available blood glucose and HgB A1C lab values along with other pertinent labs.  These were discussed with the patient as to their importance of diabetic maintenance.    Toenails 1, 2, 3 on Right and 1, 2, 3 on Left were debrided with nail nippers then filed with a Dremel nail toby.  Patient tolerated procedure well without complications.    An After Visit Summary was printed and given to the patient at discharge, including (if requested) any available informative/educational handouts regarding diagnosis, treatment, or medications. All questions were answered to patient/family  satisfaction. Should symptoms fail to improve or worsen they agree to call or return to clinic or to go to the Emergency Department. Discussed the importance of following up with any needed screening tests/labs/specialist appointments and any requested follow-up recommended by me today. Importance of maintaining follow-up discussed and patient accepts that missed appointments can delay diagnosis and potentially lead to worsening of conditions.    Return in about 9 weeks (around 3/6/2025) for Toenail Care., or sooner if acute issues arise.    I have reviewed the assessment and plan and verified the accuracy of it. No changes to assessment and plan since the information was documented. Davin Sheffield DPM 01/02/25     I have dictated this note utilizing Dragon Dictation.  Please note that portions of this note were completed with a voice recognition program.  Part of this note may be an electronic transcription/translation of spoken language to printed text using the Dragon Dictation System.      This document has been electronically signed by Davin Sheffield DPM on January 2, 2025 10:50 EST

## 2025-01-09 NOTE — PROGRESS NOTES
Primary Care Provider  Ami Diego MD   Referring Provider  No ref. provider found      Patient Complaint  Follow-up (3 month follow up), Asthma, COPD, Shortness of Breath, and Cough      Subjective          Chasity Torres presents to [facility] PULMONARY & CRITICAL CARE MEDICINE      History of Presenting Illness  Chasity Torres is a 69 y.o. female patient of Dr. Houston with chronic hypoxic respiratory failure, asthma, mild COPD, chronic diastolic heart failure, anxiety/depression, MEGAN, chronic dyspnea, and tobacco use in remission, here for 3 month follow up.    Patient states she is doing pretty well since her last visit, is getting over pneumonia, recently completed antibiotic prescribed by her PCP.  Patient denies using any steroids for her lungs recently, denies any fevers or chills, no ER visits or hospitalizations for her breathing since she was last seen.  Her last hospitalization for her breathing was over 1 year ago.  Patient's baseline shortness of breath is usually mild in severity, improves with rest.  She was previously on Dupixent for frequent exacerbations, but discontinued.  She continues to use Trelegy 200 daily as well as her albuterol inhaler as needed.  Patient wears her CPAP at night and with naps with oxygen bled in.  She usually wears her 2 L supplemental oxygen continuously.  Patient is a former smoker, quit 4 years ago, 6 pack years.  She denies any hemoptysis, swollen lymph nodes, unintentional weight loss, or night sweats.  She continues to see Trish Paz with psychiatry, who is managing her psychiatric medications for anxiety and depression.  She also sees a therapist.  Patient continues to see Dr. George with cardiology for chronic diastolic heart failure.  Patient is able to perform ADLs with minor modifications, uses walker when out.  I have personally reviewed the review of systems, past family, social, medical and surgical histories; and agree with their  findings.      Review of Systems    Review of Systems   Constitutional:  Negative for activity change, chills, fatigue, fever, unexpected weight gain and unexpected weight loss.   HENT:  Negative for congestion, ear discharge, ear pain, mouth sores, postnasal drip, rhinorrhea, sinus pressure, sore throat, swollen glands and trouble swallowing.    Eyes:  Negative for blurred vision, pain, discharge, itching and visual disturbance.   Respiratory:  Positive for shortness of breath (with activity). Negative for apnea, cough, chest tightness, wheezing and stridor.    Cardiovascular:  Negative for chest pain, palpitations and leg swelling.   Gastrointestinal:  Negative for abdominal distention, abdominal pain, constipation, diarrhea, nausea, vomiting, GERD and indigestion.   Musculoskeletal:  Negative for arthralgias, joint swelling and myalgias.   Skin:  Negative for color change.   Neurological:  Negative for dizziness, weakness, light-headedness and headache.      Sleep: Negative for Excessive daytime sleepiness  Negative for morning headaches  Negative for Snoring      Family History   Problem Relation Age of Onset    COPD Father     Heart failure Father     Hypertension Mother     Neuropathy Sister     Anxiety disorder Daughter     Depression Daughter     Malig Hyperthermia Neg Hx     Breast cancer Neg Hx     Ovarian cancer Neg Hx     Uterine cancer Neg Hx     Cervical cancer Neg Hx     Colon cancer Neg Hx     Stomach cancer Neg Hx     Skin cancer Neg Hx     Clotting disorder Neg Hx     Deep vein thrombosis Neg Hx     Pulmonary embolism Neg Hx         Social History     Socioeconomic History    Marital status:     Number of children: 2   Tobacco Use    Smoking status: Former     Current packs/day: 0.00     Average packs/day: 0.5 packs/day for 12.0 years (6.0 ttl pk-yrs)     Types: Cigarettes     Start date: 2009     Quit date: 2021     Years since quittin.0     Passive exposure: Never    Smokeless  tobacco: Never   Vaping Use    Vaping status: Never Used   Substance and Sexual Activity    Alcohol use: Not Currently    Drug use: Never    Sexual activity: Not Currently     Partners: Male     Birth control/protection: Hysterectomy, Surgical        Past Medical History:   Diagnosis Date    Adverse effect of other viral vaccines, initial encounter     Covid vaccine    Allergic fungal sinusitis 07/29/2022    Allergic rhinitis     Anxiety disorder     Asthma     CHF (congestive heart failure)     COPD with acute exacerbation     CTS (carpal tunnel syndrome)     left    Difficulty walking 2022    Essential (primary) hypertension     Essential tremor     Hypercholesterolemia 10/18/2021    Irritable bowel syndrome     Low back pain     Major depressive disorder     Morbid (severe) obesity due to excess calories     Nasal congestion     Neuropathy in diabetes 2023    Obstructive sleep apnea (adult) (pediatric)     Other hereditary and idiopathic neuropathies     Pain in left hip     Pain in right toe(s)     Pain in thoracic spine     Peripheral neuropathy     Hands    Primary generalized (osteo)arthritis     Primary insomnia     Pulmonary emphysema 01/05/2023    Restless leg syndrome     SOBOE (shortness of breath on exertion)     Substance abuse     Type 2 diabetes mellitus without complication, without long-term current use of insulin 05/02/2022    Vitamin D deficiency         Immunization History   Administered Date(s) Administered    Arexvy (RSV, Adults 60+ yrs) 01/11/2024    COVID-19 (ANGELA) 12/28/2021    COVID-19 (MODERNA) 1st,2nd,3rd Dose Monovalent 03/17/2021    COVID-19 (PFIZER) 12YRS+ (COMIRNATY) 01/17/2024, 05/15/2024    Covid-19 (Pfizer) Gray Cap Monovalent 05/09/2022    Fluzone  >6mos 10/07/2013    Fluzone (or Fluarix & Flulaval for VFC) >6mos 10/10/2017    Fluzone High-Dose 65+YRS 11/21/2024    Fluzone High-Dose 65+yrs 03/31/2022, 09/30/2022, 10/18/2023    Influenza Seasonal Injectable 12/20/2012     Pneumococcal Conjugate 13-Valent (PCV13) 09/29/2020    Pneumococcal Polysaccharide (PPSV23) 11/30/2020    Shingrix 03/28/2023, 06/27/2023    Tdap 05/20/2015    Zostavax 09/16/2008       No Known Allergies       Current Outpatient Medications:     albuterol sulfate  (90 Base) MCG/ACT inhaler, Inhale 2 puffs Every 4 (Four) Hours As Needed for Wheezing or Shortness of Air., Disp: 18 g, Rfl: 11    Blood Glucose Monitoring Suppl device, Use 1 each Daily., Disp: 1 each, Rfl: 0    busPIRone (BUSPAR) 10 MG tablet, Take 1 tablet by mouth 3 (Three) Times a Day for 7 days, THEN 1 tablet 2 (Two) Times a Day for 7 days. Indications: Anxiety Disorder, Disp: , Rfl:     Calcium Carb-Cholecalciferol (Calcium 500 + D) 500-3.125 MG-MCG tablet, Take 1 each by mouth 2 (Two) Times a Day., Disp: 60 tablet, Rfl: 2    Cariprazine HCl (Vraylar) 1.5 MG capsule capsule, Take 1 capsule by mouth Every Morning., Disp: 7 capsule, Rfl: 0    cefdinir (OMNICEF) 300 MG capsule, Take 1 capsule by mouth 2 (Two) Times a Day., Disp: 14 capsule, Rfl: 0    clonazePAM (KlonoPIN) 0.5 MG tablet, Take 1 tablet by mouth 2 (Two) Times a Day As Needed for Anxiety., Disp: 60 tablet, Rfl: 0    diclofenac (VOLTAREN) 75 MG EC tablet, Take 1 tablet by mouth 2 (Two) Times a Day., Disp: 60 tablet, Rfl: 2    donepezil (ARICEPT) 10 MG tablet, Take 1 tablet by mouth Every Night., Disp: 30 tablet, Rfl: 2    ferrous sulfate 325 (65 FE) MG tablet, Take 1 tablet by mouth Daily With Breakfast., Disp: 90 tablet, Rfl: 1    FLUoxetine (PROzac) 20 MG capsule, Take 1 capsule by mouth Every Morning., Disp: 30 capsule, Rfl: 1    Fluticasone-Umeclidin-Vilant (TRELEGY ELLIPTA) 200-62.5-25 MCG/ACT inhaler, Inhale 1 puff Daily., Disp: 60 each, Rfl: 5    glucose blood test strip, Use as instructed to check blood sugar daily, Disp: 100 each, Rfl: 3    HYDROcodone-acetaminophen (NORCO)  MG per tablet, , Disp: , Rfl:     Lancet Device misc, Use 1 each Daily. Use as directed; check  blood sugar once daily, Disp: 100 each, Rfl: 3    Lancets (OneTouch Delica Plus [id number]) misc, Use 1 each Daily., Disp: 100 each, Rfl: 0    Magnesium Oxide -Mg Supplement 400 (240 Mg) MG tablet, Take 1 tablet by mouth Every Night., Disp: 30 tablet, Rfl: 11    memantine (Namenda) 5 MG tablet, Take 1 tablet by mouth Daily., Disp: 30 tablet, Rfl: 2    metoprolol succinate XL (TOPROL-XL) 25 MG 24 hr tablet, Take 0.5 tablets by mouth Daily., Disp: , Rfl:     miconazole (Desenex) 2 % powder, Apply topically to the appropriate area as directed 2 (Two) Times a Day., Disp: 71 g, Rfl: 11    mirtazapine (REMERON) 7.5 MG tablet, Take 1 tablet by mouth Every Night., Disp: 30 tablet, Rfl: 1    mupirocin (BACTROBAN) 2 % ointment, APPLY TO AFFECTED AREA(S) TWO TIMES A DAY AS DIRECTED FOR 14 DAYS -, Disp: 22 g, Rfl: 0    naloxone (NARCAN) 4 MG/0.1ML nasal spray, 1 spray As Needed. Has on hand, Disp: , Rfl:     O2 (OXYGEN), Inhale 2 L/min Every Night., Disp: , Rfl:     pantoprazole (PROTONIX) 40 MG EC tablet, TAKE 1 TABLET BY MOUTH DAILY, Disp: 90 tablet, Rfl: 0    pramipexole (MIRAPEX) 0.5 MG tablet, Take 1 tablet by mouth 3 (Three) Times a Day., Disp: 270 tablet, Rfl: 0    pregabalin (LYRICA) 75 MG capsule, Take 1 capsule by mouth 2 (Two) Times a Day., Disp: 60 capsule, Rfl: 2    primidone (MYSOLINE) 50 MG tablet, Take 1 tablet by mouth., Disp: , Rfl:     spironolactone (ALDACTONE) 25 MG tablet, Take 1 tablet by mouth 2 (Two) Times a Day., Disp: 60 tablet, Rfl: 5    tacrolimus (PROTOPIC) 0.1 % ointment, Apply topical ointment to affected area twice daily, Disp: , Rfl:     Tirzepatide 12.5 MG/0.5ML solution auto-injector, Inject 12.5mg under the skin into the appropriate area as directed 1 (One) Time Per Week., Disp: 2 mL, Rfl: 5    torsemide (DEMADEX) 20 MG tablet, Take 1 tablet by mouth 2 (Two) Times a Day., Disp: 60 tablet, Rfl: 5    triamcinolone (KENALOG) 0.025 % cream, Apply 1 Application topically to the appropriate area as  "directed As Needed., Disp: , Rfl:     ipratropium-albuterol (DUO-NEB) 0.5-2.5 mg/3 ml nebulizer, Nebulize and inhale 1 vial 4 (Four) Times a Day As Needed for Wheezing. (Patient not taking: Reported on 1/14/2025), Disp: 360 mL, Rfl: 3    predniSONE (DELTASONE) 10 MG tablet, Take 4 tabs daily x 3 days, then take 3 tabs daily x 3 days, then take 2 tabs daily x 3 days, then take 1 tab daily x 3 days, Disp: 31 tablet, Rfl: 0    Current Facility-Administered Medications:     cyanocobalamin injection 1,000 mcg, 1,000 mcg, Intramuscular, Q28 Days, Ami Diego MD, 1,000 mcg at 11/21/24 1217     Objective     Vital Signs:   /94 (BP Location: Left arm, Patient Position: Sitting, Cuff Size: Large Adult)   Pulse 68   Temp 97.5 °F (36.4 °C) (Oral)   Resp 18   Ht 162.6 cm (64\")   Wt 124 kg (274 lb)   SpO2 98% Comment: room air  BMI 47.03 kg/m²     Physical Exam  Constitutional:       General: She is not in acute distress.     Appearance: Normal appearance. She is obese. She is not ill-appearing.   HENT:      Right Ear: Tympanic membrane and ear canal normal.      Left Ear: Tympanic membrane and ear canal normal.      Nose: Nose normal.      Mouth/Throat:      Mouth: Mucous membranes are moist.      Pharynx: Oropharynx is clear.   Eyes:      Extraocular Movements: Extraocular movements intact.      Conjunctiva/sclera: Conjunctivae normal.      Pupils: Pupils are equal, round, and reactive to light.   Cardiovascular:      Rate and Rhythm: Normal rate and regular rhythm.      Pulses: Normal pulses.      Heart sounds: Normal heart sounds.   Pulmonary:      Effort: Pulmonary effort is normal. No respiratory distress.      Breath sounds: No stridor. Wheezing (faint) present. No rhonchi or rales.   Abdominal:      General: Bowel sounds are normal.      Palpations: Abdomen is soft.   Musculoskeletal:         General: No swelling. Normal range of motion.      Cervical back: Normal range of motion and neck supple.     "  Right lower leg: No edema.      Left lower leg: No edema.   Skin:     General: Skin is warm and dry.   Neurological:      General: No focal deficit present.      Mental Status: She is alert and oriented to person, place, and time.      Motor: No weakness.   Psychiatric:         Mood and Affect: Mood normal.         Behavior: Behavior normal.        Result Review :   I have personally reviewed patient's labs and images.  I also reviewed my last office note 10/7/2024.       Diagnoses and all orders for this visit:    1. Mild persistent asthma without complication (Primary)  -     Fluticasone-Umeclidin-Vilant (TRELEGY ELLIPTA) 200-62.5-25 MCG/ACT inhaler; Inhale 1 puff Daily.  Dispense: 60 each; Refill: 5  -     albuterol sulfate  (90 Base) MCG/ACT inhaler; Inhale 2 puffs Every 4 (Four) Hours As Needed for Wheezing or Shortness of Air.  Dispense: 18 g; Refill: 11  -     predniSONE (DELTASONE) 10 MG tablet; Take 4 tabs daily x 3 days, then take 3 tabs daily x 3 days, then take 2 tabs daily x 3 days, then take 1 tab daily x 3 days  Dispense: 31 tablet; Refill: 0    2. Chronic obstructive pulmonary disease, unspecified COPD type  -     Fluticasone-Umeclidin-Vilant (TRELEGY ELLIPTA) 200-62.5-25 MCG/ACT inhaler; Inhale 1 puff Daily.  Dispense: 60 each; Refill: 5  -     albuterol sulfate  (90 Base) MCG/ACT inhaler; Inhale 2 puffs Every 4 (Four) Hours As Needed for Wheezing or Shortness of Air.  Dispense: 18 g; Refill: 11  -     predniSONE (DELTASONE) 10 MG tablet; Take 4 tabs daily x 3 days, then take 3 tabs daily x 3 days, then take 2 tabs daily x 3 days, then take 1 tab daily x 3 days  Dispense: 31 tablet; Refill: 0    3. Chronic respiratory failure with hypoxia  -     Fluticasone-Umeclidin-Vilant (TRELEGY ELLIPTA) 200-62.5-25 MCG/ACT inhaler; Inhale 1 puff Daily.  Dispense: 60 each; Refill: 5  -     albuterol sulfate  (90 Base) MCG/ACT inhaler; Inhale 2 puffs Every 4 (Four) Hours As Needed for  Wheezing or Shortness of Air.  Dispense: 18 g; Refill: 11  -     predniSONE (DELTASONE) 10 MG tablet; Take 4 tabs daily x 3 days, then take 3 tabs daily x 3 days, then take 2 tabs daily x 3 days, then take 1 tab daily x 3 days  Dispense: 31 tablet; Refill: 0    4. Pulmonary emphysema, unspecified emphysema type  -     albuterol sulfate  (90 Base) MCG/ACT inhaler; Inhale 2 puffs Every 4 (Four) Hours As Needed for Wheezing or Shortness of Air.  Dispense: 18 g; Refill: 11  -     predniSONE (DELTASONE) 10 MG tablet; Take 4 tabs daily x 3 days, then take 3 tabs daily x 3 days, then take 2 tabs daily x 3 days, then take 1 tab daily x 3 days  Dispense: 31 tablet; Refill: 0    5. Dyspnea on exertion  -     Fluticasone-Umeclidin-Vilant (TRELEGY ELLIPTA) 200-62.5-25 MCG/ACT inhaler; Inhale 1 puff Daily.  Dispense: 60 each; Refill: 5  -     albuterol sulfate  (90 Base) MCG/ACT inhaler; Inhale 2 puffs Every 4 (Four) Hours As Needed for Wheezing or Shortness of Air.  Dispense: 18 g; Refill: 11  -     predniSONE (DELTASONE) 10 MG tablet; Take 4 tabs daily x 3 days, then take 3 tabs daily x 3 days, then take 2 tabs daily x 3 days, then take 1 tab daily x 3 days  Dispense: 31 tablet; Refill: 0    6. MEGAN on CPAP    7. Seasonal allergies    8. Chronic diastolic heart failure    9. Anxiety    10. Tobacco abuse, in remission    11. Morbid obesity with BMI of 45.0-49.9, adult      Impression and Plan    -PFTs 1/3/2024 showed mild obstruction FEV1 71% predicted, no significant response to bronchodilator, normal TLC, mild air trapping present, DLCO normal.  Compared to her previous PFTs almost 2 years ago, these are similar results.  -Methacholine challenge 2/3/2023 positive test for asthma/reactive airway disease  -CT chest 7/2/2024 negative for pulmonary embolism, lungs clear, no lymphadenopathy, there was evidence of calcified granulomatous disease  -Echocardiogram 6/30/2022 showed EF 51 to 55%  -Continue wearing 2 L  supplemental oxygen at night and with activity, since June 2024  -Continue wearing CPAP nightly and with naps with oxygen bled in  -Patient discontinued Dupixent injections  -Continue using Trelegy 200 daily, reminded patient to rinse mouth after each use.  -Continue using albuterol inhaler and DuoNeb treatments as needed  -Continue using incentive spirometer for atelectasis on chest CT  -Continue following up with Dr. George with cardiology for management for heart failure on beta-blocker, Lasix 40 mg twice daily, hydralazine  -Continue following up with DIANE Sanches with psychiatry and therapist  -Follow-up with Dr. Houston or myself in 3 months, may return sooner if needed     Smoking status: Reviewed  Vaccination status:  Patient reports she is up-to-date with her Covid vaccines.  She has also gotten the RSV vaccine.  Patient is advised to continue to follow CDC recommendations such as social distancing wearing a mask and washing hands for at least 20 seconds.  Medications personally reviewed      Follow Up   No follow-ups on file.  Patient was given instructions and counseling regarding her condition or for health maintenance advice. Please see specific information pulled into the AVS if appropriate.

## 2025-01-14 ENCOUNTER — OFFICE VISIT (OUTPATIENT)
Dept: PULMONOLOGY | Facility: CLINIC | Age: 70
End: 2025-01-14
Payer: MEDICARE

## 2025-01-14 VITALS
RESPIRATION RATE: 18 BRPM | HEART RATE: 68 BPM | OXYGEN SATURATION: 98 % | DIASTOLIC BLOOD PRESSURE: 94 MMHG | HEIGHT: 64 IN | WEIGHT: 274 LBS | BODY MASS INDEX: 46.78 KG/M2 | TEMPERATURE: 97.5 F | SYSTOLIC BLOOD PRESSURE: 135 MMHG

## 2025-01-14 DIAGNOSIS — F51.05 INSOMNIA SECONDARY TO ANXIETY: ICD-10-CM

## 2025-01-14 DIAGNOSIS — I50.32 CHRONIC DIASTOLIC HEART FAILURE: ICD-10-CM

## 2025-01-14 DIAGNOSIS — J30.2 SEASONAL ALLERGIES: ICD-10-CM

## 2025-01-14 DIAGNOSIS — E66.01 MORBID OBESITY WITH BMI OF 45.0-49.9, ADULT: ICD-10-CM

## 2025-01-14 DIAGNOSIS — F17.201 TOBACCO ABUSE, IN REMISSION: ICD-10-CM

## 2025-01-14 DIAGNOSIS — F41.1 GENERALIZED ANXIETY DISORDER: ICD-10-CM

## 2025-01-14 DIAGNOSIS — J44.9 CHRONIC OBSTRUCTIVE PULMONARY DISEASE, UNSPECIFIED COPD TYPE: ICD-10-CM

## 2025-01-14 DIAGNOSIS — J96.11 CHRONIC RESPIRATORY FAILURE WITH HYPOXIA: ICD-10-CM

## 2025-01-14 DIAGNOSIS — F33.1 MAJOR DEPRESSIVE DISORDER, RECURRENT EPISODE, MODERATE: ICD-10-CM

## 2025-01-14 DIAGNOSIS — J43.9 PULMONARY EMPHYSEMA, UNSPECIFIED EMPHYSEMA TYPE: ICD-10-CM

## 2025-01-14 DIAGNOSIS — J45.30 MILD PERSISTENT ASTHMA WITHOUT COMPLICATION: Primary | ICD-10-CM

## 2025-01-14 DIAGNOSIS — R06.09 DYSPNEA ON EXERTION: ICD-10-CM

## 2025-01-14 DIAGNOSIS — F41.9 INSOMNIA SECONDARY TO ANXIETY: ICD-10-CM

## 2025-01-14 DIAGNOSIS — F41.9 ANXIETY: ICD-10-CM

## 2025-01-14 DIAGNOSIS — G47.33 OSA ON CPAP: ICD-10-CM

## 2025-01-14 PROCEDURE — 3075F SYST BP GE 130 - 139MM HG: CPT

## 2025-01-14 PROCEDURE — 1160F RVW MEDS BY RX/DR IN RCRD: CPT

## 2025-01-14 PROCEDURE — 3080F DIAST BP >= 90 MM HG: CPT

## 2025-01-14 PROCEDURE — 99214 OFFICE O/P EST MOD 30 MIN: CPT

## 2025-01-14 PROCEDURE — 1159F MED LIST DOCD IN RCRD: CPT

## 2025-01-14 RX ORDER — ALBUTEROL SULFATE 90 UG/1
2 INHALANT RESPIRATORY (INHALATION) EVERY 4 HOURS PRN
Qty: 18 G | Refills: 11 | Status: SHIPPED | OUTPATIENT
Start: 2025-01-14

## 2025-01-14 RX ORDER — PREDNISONE 10 MG/1
TABLET ORAL
Qty: 31 TABLET | Refills: 0 | Status: SHIPPED | OUTPATIENT
Start: 2025-01-14

## 2025-01-15 ENCOUNTER — OFFICE VISIT (OUTPATIENT)
Dept: SLEEP MEDICINE | Facility: HOSPITAL | Age: 70
End: 2025-01-15
Payer: MEDICARE

## 2025-01-15 VITALS
HEIGHT: 64 IN | OXYGEN SATURATION: 96 % | DIASTOLIC BLOOD PRESSURE: 93 MMHG | BODY MASS INDEX: 46.44 KG/M2 | HEART RATE: 73 BPM | SYSTOLIC BLOOD PRESSURE: 134 MMHG | WEIGHT: 272 LBS

## 2025-01-15 DIAGNOSIS — J96.11 CHRONIC HYPOXIC RESPIRATORY FAILURE: ICD-10-CM

## 2025-01-15 DIAGNOSIS — F51.01 PRIMARY INSOMNIA: ICD-10-CM

## 2025-01-15 DIAGNOSIS — G47.33 OSA (OBSTRUCTIVE SLEEP APNEA): Primary | ICD-10-CM

## 2025-01-15 DIAGNOSIS — G47.8 NON-RESTORATIVE SLEEP: ICD-10-CM

## 2025-01-15 DIAGNOSIS — G47.33 OSA ON CPAP: ICD-10-CM

## 2025-01-15 DIAGNOSIS — R06.83 SNORING: ICD-10-CM

## 2025-01-15 DIAGNOSIS — N18.30 STAGE 3 CHRONIC KIDNEY DISEASE, UNSPECIFIED WHETHER STAGE 3A OR 3B CKD: ICD-10-CM

## 2025-01-15 DIAGNOSIS — I10 ESSENTIAL HYPERTENSION: ICD-10-CM

## 2025-01-15 DIAGNOSIS — I50.32 CHRONIC DIASTOLIC HEART FAILURE: ICD-10-CM

## 2025-01-15 PROBLEM — G47.19 EXCESSIVE DAYTIME SLEEPINESS: Status: ACTIVE | Noted: 2025-01-15

## 2025-01-15 PROBLEM — E66.01 CLASS 3 SEVERE OBESITY DUE TO EXCESS CALORIES WITHOUT SERIOUS COMORBIDITY WITH BODY MASS INDEX (BMI) OF 45.0 TO 49.9 IN ADULT: Status: ACTIVE | Noted: 2025-01-15

## 2025-01-15 PROBLEM — E66.813 CLASS 3 SEVERE OBESITY DUE TO EXCESS CALORIES WITHOUT SERIOUS COMORBIDITY WITH BODY MASS INDEX (BMI) OF 45.0 TO 49.9 IN ADULT: Status: ACTIVE | Noted: 2025-01-15

## 2025-01-15 PROCEDURE — G0463 HOSPITAL OUTPT CLINIC VISIT: HCPCS

## 2025-01-15 RX ORDER — MIRTAZAPINE 7.5 MG/1
7.5 TABLET, FILM COATED ORAL NIGHTLY
Qty: 30 TABLET | Refills: 1 | Status: SHIPPED | OUTPATIENT
Start: 2025-01-15

## 2025-01-15 NOTE — TELEPHONE ENCOUNTER
REFILL REQUEST:     mirtazapine (REMERON) 7.5 MG tablet (10/04/2024)     FLUoxetine (PROzac) 20 MG capsule (12/04/2024)    -PT SHOULD HAVE A REFILL FOR JANUARY AT THE PHARMACY.     F/UP- 01/17/2025.  LOV: 11/21/2024.

## 2025-01-15 NOTE — PROGRESS NOTES
[facility]  Sleep Medicine   [facility] [address]  [place]   [place] [address]  Phone: [phone]  Fax: [phone]      Chasity Torres  [id number]   1955  69 y.o.  female      Referring physician/provider   PCP Ami Diego MD    Type of service: Initial Sleep Medicine Consult.  Date of service: 1/15/2025      Chief Complaint   Patient presents with    Sleep Apnea    Obesity    Daytime Sleepiness       History of present illness;  Thank you for asking to see Chasity Torres, 69 y.o.. The patient was seen today on 1/15/2025 at [facility].  The patient presents today with symptoms of snoring, non-restorative sleep and witnessed apneas. The symptoms are present for more than 10 years and they are persistent in nature.  The snoring is present in all positions and it is loud.  Patient has no prior surgery namely tonsillectomy, nasal surgery and UPPP.     Patient reports that she had a sleep study more than 10 years ago and was given a CPAP.  During this time recently about a year ago she went on oxygen uses oxygen at 2 L with the CPAP but still does not feel rested.  I do not have a copy of her sleep study but her bicarb is elevated at 30 suggesting chronic hypercapnic respiratory failure also.  She also has a history of diastolic heart failure and complains of having swelling of the feet she is on medical therapy with diuretics.    She has failed CPAP therapy and has developed hypercapnic and hypoxic respiratory failure.    Patient gives the following sleep history.  Sleep schedule:  Bedtime: 11:30 PM  Wake time: 8 AM  Normally takes about 30 minutes to fall asleep  Average hours of sleep 7  Number of naps per day 1-2  Symptoms   Send has shortness of breath along with swelling of feet.  Has daytime excessive sleepiness and feels congested.    MEDICAL CONDITIONS (PMH)   Diastolic heart failure  Hypertension  /Leg syndrome  GERD  Anxiety and depression  Chronic hypoxic  "respiratory failure on oxygen  Chronic narcotic    Social history:  Do you drive a commercial vehicle:  No   Shift work:  No   Tobacco use:  No   Alcohol use: 0 per week  Caffeinated drinks: 1      Family Hx (parents and siblings) (pertaining to sleep medicine)  Sleep apnea Sister and brother  Restless leg syndrome    Medications: reviewed    Review of systems:  Positive symptoms are :  Snoring  Witnessed apnea  Daytime excessive sleepiness with [place] Sleepiness Scale of Total score: 11   Fatigue  Swelling of feet  Shortness of breath with exertion  Nasal congestion      Physical exam:  CONSTITUTINONAL:  Vitals:    01/15/25 0900   BP: 134/93   Pulse: 73   SpO2: 96%   Weight: 123 kg (272 lb)   Height: 162.6 cm (64\")    Body mass index is 46.69 kg/m².   NOSE:no nasal septal defects, nasal passages are clear, no nasal polyps,   THROAT: tonsils are nonenlarged, tongue normal size, oral airway Mallampati class 3  NECK:Neck Circumference: 16.5 inches, trachea is in the midline, thyroid not enlarged  RESPIRATORY SYSTEM: Breath sounds are normal, there are no wheezes  CARDIOVASULAR SYSTEM: Heart sounds are regular rhythm and normal rate, no edema  NEUROLOGICAL SYSTEM: Oriented x 3, No speech defect, gait is normal  PSYCHIATRIC SYSTEM: Mood is normal, thought content is normal    Office notes from care team reviewed. Office note dated December 23, 2024,reviewed  Labs reviewed.  TSH Results:  TSH          2/21/2024    13:44   TSH   TSH 0.774       Most Recent A1C          7/3/2024    12:18   HGBA1C Most Recent   Hemoglobin A1C 5.80    Bicarb 30           Assessment and plan:  Obstructive sleep apnea.  Patient has failed CPAP therapy.  Meantime she has developed hypoxic respiratory failure on oxygen for the past 1 year.  She also has a elevated bicarb suggesting chronic hypercapnic respiratory failure due to heart failure.  She needs a split-night study so that we can diagnose her sleep apnea and rule out central sleep apnea " and then do a BiPAP titration with oxygen protocol.  I talked to the patient in detail and she is willing to proceed with the test which will be scheduled.  The other risk factor for central sleep apnea is chronic narcotic use for pain  Snoring (R06.83), snoring is the sound created by turbulent airflow vibrating upper airway soft tissue due to limitation of inspiratory airflow. I have also discussed factors affecting snoring including sleep deprivation, sleeping on the back and alcohol ingestion. To minimize snoring, patient is advised to have adequate sleep, sleep on the side and avoid alcohol and sedative medications before bedtime  Daytime excessive sleepiness .  It was assessed with [place] Sleepiness Scale of Total score: 11.  There are many causes for daytime excessive sleepiness including sleep depression, shiftwork syndrome, depression and other medical disorders including heart, kidney and liver failure.  The most serious cause of excessive sleepiness is due to neurological conditions like narcolepsy/cataplexy.  But the most common cause of excessive sleepiness is due to sleep apnea with frequent awakenings during sleep time.  I have discussed safety of driving and to remain vigilant while driving.  Obesity 3, patient's BMI is Body mass index is 46.69 kg/m².. I have discussed the relationship between weight and sleep apnea.There is direct correlation between weight and severity of sleep apnea.  Weight reduction is encouraged, as it is going to reduce the severity of sleep apnea. I have also discussed with the patient diet and exercise to achieve ideal body weight.  Chronic hypoxic respiratory failure  Suspected hypercapnic respiratory failure with heart failure and obesity  Diastolic heart failure  Hypertension  Restless leg syndrome  Narcotic use,    Return for 31 to 90 days after PAP setup with down load..  Patient's questions were answered      I once again thank you for asking me to see this patient in  consultation and I have forwarded my opinion and treatment plan.  Please do not hesitate to call me if you have any questions.   1/15/2025  Chance De Leon MD  Sleep Medicine  Medical Director  [facility]: [facility]

## 2025-01-17 ENCOUNTER — TELEMEDICINE (OUTPATIENT)
Dept: BEHAVIORAL HEALTH | Facility: CLINIC | Age: 70
End: 2025-01-17
Payer: MEDICARE

## 2025-01-17 DIAGNOSIS — Z71.89 MEDICATION CARE PLAN DISCUSSED WITH PATIENT: ICD-10-CM

## 2025-01-17 DIAGNOSIS — F41.1 GENERALIZED ANXIETY DISORDER: ICD-10-CM

## 2025-01-17 DIAGNOSIS — Z79.899 MEDICATION MANAGEMENT: ICD-10-CM

## 2025-01-17 DIAGNOSIS — F33.1 MAJOR DEPRESSIVE DISORDER, RECURRENT EPISODE, MODERATE: Primary | ICD-10-CM

## 2025-01-17 DIAGNOSIS — Z63.79 STRESS DUE TO ILLNESS OF FAMILY MEMBER: ICD-10-CM

## 2025-01-17 RX ORDER — FLUOXETINE 10 MG/1
30 CAPSULE ORAL EVERY MORNING
Qty: 90 CAPSULE | Refills: 1 | Status: SHIPPED | OUTPATIENT
Start: 2025-01-17 | End: 2025-03-18

## 2025-01-17 NOTE — PROGRESS NOTES
"This provider is located at [address] [place] [address], [address], [place] [address]. The Patient is seen remotely using StyroPowerhart. Patient is being seen via telehealth and confirm that they are in a secure environment for this session. The patient's condition being diagnosed/treated is appropriate for telemedicine. The provider identified himself/herself: herself as well as her credentials.  The patient gave consent to be seen remotely, and when consent is given they understand that the consent allows for patient identifiable information to be sent to a third party as needed.  They may refuse to be seen remotely at any time. The electronic data is encrypted and password protected, and the patient has been advised of the potential risks to privacy not withstanding such measures.    You have chosen to receive care through a telehealth visit.  Do you consent to use a video/audio connection for your medical care today? Yes    Mode of Visit: Video  Location of patient: -HOME-  Location of provider: +[facility]+  You have chosen to receive care through a telehealth visit.  The patient has signed the video visit consent form.  The visit included audio and video interaction. No technical issues occurred during this visit.    Ciro Torres is a 69 y.o. female who presents today for follow up     Referring Provider:  No referring provider defined for this encounter.    Chief Complaint: depression, anxiety, stress due to illness of family member, medication management, medication care plan discussed      History of Present Illness:   1/17/25: Patient presents today via StyroPowerhart Video visit from home, located in [place], [place].   Since last visit patient was tapered off Buspar at last visit and later tapered off Vraylar starting 12/4/24, and restarted on Prozac due to Neurologist concerns of parkinsonism worsening tremors, for which patient reports overall feeling better, though \"I am still a little depressed and very " "anxious.\"    Patient reports she is going to [place] to see brother today with sister due to health decline, he  has [facility] and was given 2-3 weeks to live.  Brother has blood cancer, heart and kidney trouble.   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       11/21/24:    Patient presents today via nCrypted Cloudt Video visit from passenger of vehicle while daughter driving as patient woke up late and had to change today's appointment from in office to video, located in [place], [place].   At last visit Vraylar was increased from 1.5 mg to 3 mg daily, for which patient reports:  \"I really been depressed, I know your going to be mad at me, I been taking that Prozac 40 mg one every other day I started taking about 2 weeks ago.\" Reports she does feel better.  Anxiety and depressed mood are improving.     In regards to therapy, patient was advised to reschedule with therapist which patient reports had not scheduled yet, though plans to schedule today.  Patient reports \"I wasn't crazy about Becki, and she's the only one there to provide mental health.\"    Patient has been referred to another sleep medicine provider per PCP as of 11/5/24.  Reports sleeps for an hour, then awake, admits to adherence with CPAP. Though continues to struggle with sleep, which patient acknowledges contributes to mood.     Patient voices Buspar is no longer effective and would like to come off medication, though discussed previously patient expressed willingness to taper to discontinue.     Wt Readings from Last 3 Encounters:   10/30/24 130 kg (287 lb)   10/07/24 125 kg (275 lb 11.2 oz)   10/03/24 125 kg (276 lb)       10/4/24:  arrival 0954; compelted 0952    Answers submitted by the patient for this visit:  Primary Reason for Visit (Submitted on 10/4/2024)  What is the primary reason for your visit?: Depression  Depression (Submitted on " "10/4/2024)  Chief Complaint: Depression  Visit: follow-up  Frequency: constantly  Severity: severe  excessive worry: Yes  insomnia: Yes  irritability: Yes  malaise/fatigue: Yes  obsessions: Yes  hypersomnia: No  difficulty controlling mood: Yes  difficulty staying asleep: Yes  difficulty falling asleep: Yes  Hours of sleep per night: 3 Hours  Medication compliance: %  Side effects: fatigue  Aggravated by: family issues      Patient presents today via Tescohart Video visit from home, located in [place], [place]. At last visit patient was restarted on Vraylar at 1.5 mg due to self stopping for approximately 1 week, for which patient reports \"I am still depressed, I was gonna ask if we could go back to the 3 mg or put me back on Prozac.\" Patient reports the shakiness is due to stress and not actually the medication.   Patient reports waking up 2-3 times per night, \"I don't sleep hardly at all, then during the day all I want to do is sleep, I don't have any energy.\"  Patient reports adherence with CPAP, though sleeping in short intervals, and does nap throughout the day while sitting in chair watching TV, \"I can hardly keep my eyes open.\"    Sleep hygiene discussed with PCP on 9/30/24 as patient reported taking Remeron at 10 pm, going to bed at 11pm with TV on which patient has been turning the TV upon feeling sleepy. \"Usually\" has CPAP mask on while watching TV in bed.  \"Is there something else I can take to help me stay asleep longer?\" Patient reports taking Klonopin 0.5 mg 1 during the day and one in the evening, not at bedtime.     In regards to therapy patient has not seen in over one month, \"I was getting burnt out by the therapist. What we talked about I really wasn't doing what I needed to do, and last time I went there she said if your not going to do what we talked about  then your not going to get anywhere.\"     In regards to PHQ9 screening question of thoughts of being better off dead or hurting herself  in " "some way, patient reports  has days of  \"I wished I wouldn't wake up in the morning.\" However, denies actual thoughts of harming self or rumination nor planning,  Cssr low.    Depression: Patient complains of depression. She complains of anhedonia, depressed mood, difficulty concentrating, fatigue, feelings of worthlessness/guilt, and insomnia  Anxiety:  The patient endorses to the following symptoms of anxiety including: excessive anxiety and worry about a number of events or activities for more days than not, restlessness or feeling keyed up, being easily fatigued, difficulty concentrating or mind going blank, irritability, muscle tension, and sleep disturbance which have caused impairment in important areas of daily functioning.      8/26/24:  Patient presents today via DepotPointhart Video visit from home, located in [place], [place].  \"I can't take that Vraylar anymore, it makes me shake.  I stopped taking it about a week ago.\" Patient reports her arms are no longer shaking.    Patient reports Buspar no longer helps for anxiety. Asking for Prozac.  Upon informing patient of option to increase Remeron, patient began crying more intensely, \"I don't understand why you can't give me something for depression.  Can we go back down to the lower dose of Vraylar?\"  Patient is also asking for provider to manage Klonopin ordered per PCP recently.      In regards to therapy patient is still seeing \"I have to make an appointment, I don't see she is helping me.\"  Patient has tried to schedule Neuropsychological testing and places from list mailed to patient several do not take her insurance. Patient reports she lost the list.  Last seen therapist a few weeks ago.        7/17/24:  Patient presents today via MyChart Video visit from home, located in [place], [place].  At last visit Vraylar was increased from 1.5 to 3 mg daily, for which patient reports \"helped a little bit, I been crying a lot, I am still depressed.\"    Patient was also referred " "to [facility] and did not contact, had lost paperwork from last visit. \"My therapist said testing was only for schizophrenia or bipolar, I don't know if that's true?\" In regards to therapy, patient is seeing Becki with [facility], \"I like her okay, I don't know if I want to continue, we talk about the same thing, we talk more about my Becki, I don't know its hard.\"  Patient further explains therapist wants her to make a list of all the things she wants her daughter to do, \"she said it would be hard, but I haven't done it yet.\"     Patient has concerns of how her son treats her grand daughter whom will be 17 in Sept. Patient became tearful and then crying.    \"Does the Prozac come in 10 mg, could I get that?\"      6/7/24:  Patient presents today in office using Rolator Walker, \"I'm not good, I am really depressed again, jittery, I have to have something to calm me down.\"  Patient reports having to come to PCP office this past Wed, due to feeling sick. \"I need something to take the edge off. There are some nights I pray not to wake up in the morning.\"  Patient is tearful today. Denies SI and is convincing, but rather more frustrated.   Patient did start with therapist, Shanti, with [facility], \"I am not too crazy about her.\" Patient does attend in person.     Patient was also scheduled for Neuropsychological testing 5/24/24 which was [place] by [place] [facility] [place] to [place], \"they said they would call back when they got someone hired.\" Which was originally ordered by Neurology 2/2023 for mild cognitive disorder. And patient prefers a closer location.     Patient is asking for Zyprexa or Prozac.  Reminded patient of weight gain and prolonged QTI which is reason for discontinuation in addition to ineffectiveness.   Daughter will be going to [place] 7/19/24 for 10 days and patient utilizes daughter phone for telehealth visits and daughter assists with logging on to [employer].  As " "patient expresses fear of not being able to see this provider nor go for testing due to transportation.     Patient noted with weight loss of 5lb since last recorded weight which patient contributes to poor appetite due to depressed mood.   Sleep remains the same.      5/2/24:  Patient presents today via MyChart Video visit from home in chair, located in [place], [place]. Patient apologized for missing last visit.  At last visit Remeron was decreased from 15 to 7.5 mg, for which patient reports \"I woke up this morning at 419, I am sleeping worse now than I was.\" Patient will go to sleep around  pm and wakes up at 4 or 5 am. Denies day time naps, reported adherence with CPAP.  \"If I take another Mirtazapine I can, but I normally don't do that.\"     Depression: continues to take Vraylar 1.5 mg, \"its going good, but I am still depressed, can I go back on 20 mg of the Prozac, I still feel like there is something crawling in my head.\"   Anxiety: continues to tolerate Buspar 20 mg 3 times daily which has been somewhat effective.      Patient has an upcoming appointment for neuropsychological testing 5/24/24.   Therapy: patient [facility] tomorrow via telephone and will be further scheduled for an appointment.        3/14/24:    Answers submitted by the patient for this visit:  Other (Submitted on 3/14/2024)  Please describe your symptoms.: Depression  Have you had these symptoms before?: Yes  How long have you been having these symptoms?: Greater than 2 weeks  Primary Reason for Visit (Submitted on 3/14/2024)  What is the primary reason for your visit?: Other    Patient presents today via MyChart Video visit from home, located in [place], [place].   Patient has chosen to switch from [facility] to [facility] in clinic. Patient reports mood is better, \"I think that Vraylar is  helping, but I still feel there is something itching my head, I got these gummies,\" patient showed a bottle of Hemp " "Gummies to provider, \"they are helping a little bit too, I take 6-7 per day, it says take 1-2 per day.\" Hemp oil 200 mg, 175 mg of Omega 3,A,B,E, \"it curves my anxiety, I don't feel my heart coming out of my skin.\"     Therapy: has not called [facility] back to schedule.   Patient is asking about [facility] as patient grandson attends.   Patient still wakes up during the night, 2-3 times, \"I am still depressed.\"  Patient is asking about  restarting Prozac again.    In regards to PHQ9 screening question of thoughts of being better off dead or hurting himself/herself  in some way, patient reports  \"That was along time ago, I think Becki put that in there wrong, I havent' had those thoughts.\" CSSRS answers all \"none\".    Psych: Anxiety fatigue, insomnia  Depression Low mood for > 2 weeks, Decreased/Increased sleep, Loss of Interest, Feelings of guilt/worthlessness, and Low energy      1/31/24:  Patient presents today via MyChart Video visit from home, located in [place], [place].  At last visit Prozac was stopped and Remeron was decreased from 30 to 15 mg nightly due to ongoing weight gain which is suspected due to combination of medications and comorbid conditions, patient reports still waking up in the middle of the night, 2-3 times, though able to return back to sleep easily.  Continues to wear CPAP with 2L, denies mask falling off and just randomly wakes up at night.     On 1/23/24, patient was started on Vraylar 1.5 mg, reported the cost was 1700.00 and patient did not have to pay any out of pocket. Patient admits to some improvement in mood, \"I am still depressed, I was gonna ask if I could stay on maybe a 20 mg of the Prozac, I don't want to get like I was.\"    Patient reports son had spoke with friend and discussed anxiety, and took a fourth CBD gummy which helped with anxiety.     Patient continues to take Buspar 20 mg 3 times daily, which is effective. Patient would like to switch pharmacies due to " "difficulty with getting ahold of [facility] staff.     Patient reports having not scheduled with therapist, due to not having the phone number.      1/18/24: Patient presents today via MyChart Video visit from home, located in [place], [place].  \"I am not doing good, I am short of air, don't have any energy, the anxiety is getting the best of me.\"  At last visit Prozac was decreased from 80 to 60 mg for 21 days, then to decrease to 40 mg for 21 days (start 1/4/24) which planned to end 1/25/24.  Daughter confirmed in the background of current dose of Prozac is 40 mg.     Patient reports received a denied payment from [employer] plan which patient no longer has as of 1/1/24. Patient has not called therapist back to schedule another appointment at this time.     Anxiety and Depressive symptoms: \"stress related, not knowing what is really wrong with me, shortness of air, I can't hardly do anything, I don't feel like doing anything. I don't think that Buspar is helping anymore.\"    Coping strategies:  \"I just sit, my heart feels like its beating out of my chest.\" Reports heart rate will range from 50-70-80. \"Oxygen level is good most of the time.\"   Fears stopping Buspar though reports primarily ineffective.   Patient denies practicing coping techniques at this time.      12/13/23:  Patient presents today via MyChart Video visit from home, located in [place], [place].  \"I am not too good, I was in the hospital I have bronchitis.\"    At last visit Remeron was decreased from 45 mg to 30 mg nightly and changed Buspar from 30 mg twice daily to 20 mg 3 times daily. Patient reports \"I need my sleep medicine filled, I only have one left.\" Patient feels sleep is not good.  Anxiety-\"it's still bad.\" Continues to struggle with itching and picking at scalp, reports \"nothing helps.\" Continues to express feeling depressed.     Patient has started psychotherapy with [facility]. \"I really would like to see someone in person, it's " "hard to talk to someone on the phone.\" Clarified patient is seeing therapist via video and not via telephone. Does not wish to drive to [place] due to being so far away, and will consider.     Patient has set up a new insurance plan through [employer]- medicare advantage plan though has not received card to upload to EHR.     11/9/23:  Patient presents today via MyChart Video visit from home, located in [place], [place].  At last visit patient was tapered off Zyprexa, for which patient reports \"I still feel like theres something crawling in my head.\"    Patient continues to take Hydroxyzine 100 mg 3 times daily for itching and anxiety which is ineffective. Reports \"anxiety is a little better, though not a whole lot, still very anxious. Everything in general, my health.\" Mood has improved somewhat, though ongoing complaint of scalp itching, and anxiety.      Patient continues to wake up during the night 2-3 times and is able to return to sleep most nights. Admits to adherence with CPAP with 2 L.  Currently taking Buspar every morning and night, and Remeron at bedtime with addition of Melatonin recently, though minimal effectiveness.     Chart review: patient seen cardiology 11/7/23 as patient was seen in the ED 10/31/23 with SOA and 15 lbs weight gain and treated with one dose of IV Lasix and discharged. Seen by PCP 11/7/23 noting depression has worsened since tapering down from Zyprexa, and neurology was concerned psychotropic medications are causing tremors. Noted increase of Lasix and Aldactone, and added Melatonin 3 mg nightly. Referred to physical therapy as well for generalized weakness. Patient was seen by Pulmonary 11/6/23, noted report of a fall 2 weeks prior on porch. CT of chest without acute findings.     10/5/23:  Patient presents today via Pros[employer] Bio[employer]ienceshart Video visit from home, located in [place], [place].  \"Not to good, I am still depressed, I still feel like something is crawling in my head, I am anxious all the time, it is " "getting the best of me.\"     At last visit Prozac was increased from 60 to 80 mg daily for which patient reports \"can it be increased\".   Patient was also referred to [facility] for psychotherapy as patient was dissatisfied with therapist at [facility]. Patient reports she had to change phones and lost phone number and is asking for number today.     Reports adherence with medications and denies sleepiness with Zyprexa. Patient says she seen neurologist in [place] [facility] [place], and was advised to see a psychiatrist. Reports Dr. Alvarado will be returning to [facility].  Patient is uncertain of what she wants to do at this time,   \"I don't know what to do.\"    In regards to negative intrusive thoughts of being better off dead, patient denies SI, though admits to having some days of feeling so frustrated due to depression and anxiety.     Patient was asked again about therapy referral if she had received a voice mail, for which patient does report receiving a voice mail which was on other phone she no longer has access to, phone number has not changed.     At end of visit patient asking to take 2 tabs of Remeron for waking in the middle of night.     Wt Readings from Last 3 Encounters:   10/03/23 126 kg (278 lb)   09/06/23 128 kg (282 lb)   08/30/23 130 kg (287 lb)       Depression: Patient complains of depression. She complains of anhedonia, depressed mood, difficulty concentrating, fatigue, feelings of worthlessness/guilt, and insomnia  Anxiety:  The patient endorses significant symptoms of anxiety including: excessive anxiety and worry about a number of events or activities for more days than not, restlessness or feeling keyed up, being easily fatigued, difficulty concentrating or mind going blank, irritability, muscle tension, and sleep disturbance which have caused impairment in important areas of daily functioning.        8/17/23: Patient presents today via Dinetouchhart Video visit from " "home, located in [place], [place].  Patient called this morning to switch from in office to video due to daughter being ill.  \"I'm not doing too good, I am still depressed, I feel like something is crawling in my head.\" Admits to ongoing itching, despite taking Hydroxyzine 3 times daily.     In regards to noted report to PCP recently of SI, patient denies presently. Reports difficulty staying asleep, able to fall asleep without problems, reports last night did not wake up however, frequency of night time awakenings vary. Denies day time naps. Occasionally drifts to sleep while in recliner, though does not lay down or sleep very long during those times.     Noted per chart review of Weight loss of 13 lb on Trulicity, currently receiving Dupixent injections and topical antifungals per Dermatology.  \"I loose it and I gain it back, guess because I don't get much exercise.\"    Patient asking about alternate therapist as patient does not feel current treatment with current [facility] provider, Marie Conde, is going well. Currently see's every 2 weeks. \"I don't feel like it's helping me at all.\"   Patient asking for anxiety medication, \"can you prescribe the klonopin\".    Weight today:   Wt Readings from Last 3 Encounters:   08/02/23 124 kg (274 lb)   07/28/23 129 kg (283 lb 12.8 oz)   07/26/23 129 kg (285 lb)       7/13/23:  Patient presents today in office, with well groomed appearance, subtle make-up, which patient was complimented on appearance, at last visit Prozac was increased from 40 to 60 mg daily and Zyprexa increased from 5 to 7.5 mg daily, for which patient reports \"I am still depressed\"    Patient was to start therapy with [facility] in May, and has started, just seen Marie today prior to this appointment. \"I don't know if I like her or not.\" Expresses frustration with not working on coping strategies, \"she just sits there and lets me talk. All other places do not accept my insurance. I don't know who takes my insurance or " "not.\"     Patient continues to struggle with tactile hallucinations, feeling something is crawling on scalp.  Patient has been taking Dupixent from dermatologist which has not been effective. Patient feels due to these symptoms, depression exacerbates. Patient is also having difficulty with neck pain and minimal relief from pain medications. Denies drowsiness with Zyprexa. Denies Prozac helping with \"crawling sensation.\" Continues to take Hydroxyzine 100 mg 3 times daily and the Buspar, \"I wish I could have something else for the anxiety.\" Patient asking to come off Zyprexa due to weight gain.     Patient was referred to Bariatric surgery for gastric bypass per PCP note 7/11/23 with notes to eventually come off the Zyprexa, though due to chronic mental health conditions the medication is necessary at this time.     5/15/23:  Patient presents today in office, \"I am still picking at my head.\" Patient was started on Dupixent per Dermatology in Feb.  \"I am still depressed, the anxiety is getting the best of me.\"    At last visit Zyprexa was increased from 2.5 mg to 5 mg daily at 12 noon, for which patient reports ongoing tactile hallucinations.  Due to elevated anxiety, patient was instructed to increase dose of Hydroxyzine over this past weekend to 100 mg 3 times daily as needed anxiety, for which patient reports tolerating well, \"It works for 3 hrs, then I am right back picking at my head, very anxious, like I have pressure in my chest all the time.\" Denies increased drowsiness with dose.  Continues to have sensation of bugs crawling on head and around ears.      Continues to take Zyprexa 5 mg at noon, and reports mild drowsiness.  Patient will \"doze off\" during the day, denies taking a nap.  Reports waking up during the night, approximately twice and able to eventually return to sleep.      Depression: Patient complains of depression. She complains of anhedonia, depressed mood, difficulty concentrating, fatigue, " "hopelessness and insomnia   Anxiety:  The patient endorses significant symptoms of anxiety including: excessive anxiety and worry about a number of events or activities for more days than not, restlessness or feeling keyed up, being easily fatigued, difficulty concentrating or mind going blank, irritability, muscle tension and sleep disturbance which have caused impairment in important areas of daily functioning.      4/7/23: Patient presents today in office, at last visit patient was started on Zyprexa 2.5 mg daily at noon for which patient reports \"it's okay\". Admits to medication adherence, \"I am still depressed, still picking my head, and feel theres still something crawling in my head.\" reports with excessive picking areas will bleed.  Patient tried to use an ice pack to head, though had a headache, which was wrapped in a towel. Denies relief from ice pack.    Prozac was increased from 20 to 40 mg on 3/24/23, for which patient reports she is still depressed.     Denies drowsiness with Zyprexa, or naps. Denies scratching or feeling as if something is crawling on any other places of body, only the scalp.     Patient reports asking PCP for something for anxiety and was told to discuss with this provider.    Patient reports [facility] provider, Barbie, had left [facility], and was waiting to hear back due to locating a provider whom accepted patient insurance. And patient has not heard back at this time.       3/9/23:  Patient presents today in office, \"I am not any better, I can't hardly stand it. The feeling of something crawling in my head, and I am depressed again, can you put me back on the Cymbalta along with the Prozac.  I am so anxious.\"     Patient continues to experience crying spells, anxiety and depression ongoing with minimal relief.  Continues to feel as if something is crawling on scalp, complaint of itching is not voiced today.       2/23/23:  Patient presents today in office, at last visit Risperdal was " "stopped due to potential D2D interaction and short duration of use and started Prozac 10 mg was started for which patient reports \"I can't see a difference, I am so depressed, I am still scratching my head.\" Reports Hydroxyzine has helped with the itching, though, \"I can't help it, I can't stop scratching.\"  Patient wore gloves for short duration with minimal effectiveness, \"I still feel that sensation, like something is crawling in my head, then I go pick at it.\" Reports sensation does go down neck.    \"I get this tingling sensation through this upper part of my body.\" as patient pointing to upper chest, shoulders. Patient recalls this sensation has been going on for \"along time, several months, even when I was seeing .\"    Patient is no longer seeing therapist, patient has an appointment with [facility] in April with Barbie,  as Sendy with [facility] is on maternity leave, and was referred to Kayleigh and she lives in [place] and was only talking via phone.       1/30/23:  Patient presents today in office reports tolerating taper of Cymbalta though continues to feel depressed and continues to scratch at head, \"My anxiety is ridiculous, over the weekend I said to myself I don't even want to live.\" Patient was able to take some deep breaths and managed to deal with symptoms, though difficult.  Denies current SI.     Increase Hydroxyzine FROM 25 mg 4 times daily as needed TO 50 mg by mouth 3 times daily as needed at last visit, which patient feels itching has not improved.     Risperdal w/Prozac D2D discussed.   Patient is uncertain if Risperdal is helpful, \"I can stop taking it, if you can get me on something to stop tearing up my head.\" Patient reports times of dozing off during the day, though uncertain if dose was taken yesterday, denies recent falls. Patient reports trying to lose weight and prefers to not take any medications that can potentially increase weight as patient denies knowledge of Risperdal causing weight " "gain.     Patient informed of other potential D2D interaction with Wellbutrin and Prozac as dose of Prozac would be doubled essentially.  Patient wishes to proceed with switch to Prozac as previously discussed.      Patient asking about Gabapentin refill, uses for RLS and was told by PCP medication would need to come from psychiatry or pain management.      1/9/23:  Patient presents today in office complaining of severe itching and skin picking to head and scalp.  Since last visit, patient was started on Risperdal 1 mg twice daily 12/22/22 per PCP.  Noted PCP also weaning down from Hydrocodone and switched to Diclofenac.  Patient was admitted to [facility] 12/31/22-1/2/23 for chest pain, palpitations, acute exacerbation of COPD, anxiety disorder, chronic hypoxemic respiratory failure on home oxygen.     Started Hydroxyzine (Vistaril) 25 mg 4 times daily as needed, which patient takes 3-4 times daily which has not provided effectiveness, \"Nothing helps, I swear there is something crawling in my head, I can't stop scratching it, I been to a dermatologist.\"  Admits to medication calming the itching a little bit, \"not really helping with anxiety, just a little bit.\" Denies drowsiness, though reports ongoing low energy.      In regards to Risperdal, \"I don't know if it helps either.\"    Admits to having weight gain, has been walking in home due to living [place] a hill cannot walk outside, which has helped some in regards to exercise.  Patient states, \"I can feel the depression coming.\"   \"When I stand up I have to hold on to this and not take off right away.\"  Referring to rollater walker.  Admits to waking up in the middle of the night, at times goes to sleep at 1 am, uses CPAP.      11/21/22:  Patient presents today in office, \"I am still depressed and my anxiety is off the roof, my head is so sore from scratching.\"   Increased Wellbutrin SR to 200 mg daily and Buspar from 15 to 30 mg twice daily at last visit, for which patient " "reports has not been helpful.  \"I wonder if you can raise the Cymbalta or not?\"    \"There are some days I feel like I want to die.\"     Patient continues to report difficulty with living situation with daughter and grand children, \"I can't hardly get her to help me with the house cleaning, I just don't live like her.\"  Admits to ongoing stress due to current living environment.     Upon inquiring of amount of Cymbalta, patient is uncertain if there are 2 of the 60 mg in medi-planner, as the current order is for 2 of the 60 mg daily.      Patient expresses desire to speak with a therapist more often, as patient does not have another appointment for a few weeks with current therapist.     \"I know you won't give me the Diazepam, but is there something else you can give me for anxiety?\"      10/17/22:  Patient presents today in office for medication check as at last visit Wellbutrin SR was increased to 150 mg, changed Buspar frequency to twice daily as patient was not consistent with 3 times daily dosing, and due to not taking Remeron as prescribed patient was educated to start taking the 1.5 tabs to equal 45 mg.  Patient was also instructed to allow daughter to fill all medications to medi-planner as patient was administering some medications independently leading to inconsistency and inaccurate dosing.     Patient reports taking Remeron 45 mg and it is the only bottle at bedside which patient admits to adherence.  Buspar is now in prefilled medi planner filled by daughter.     Patient reports depression has worsened, \"my nerves I just get nervous all the time, my daughter lives with me, it's hard sometimes.\"  Patients 2 grand children almost 3 and 8, and \"Becki is suárez and is sometimes not nice to me.\"  Patient began to cry explaining this morning she kept son home from school and got upset because she had to change son's doctor appointment from 130 to 230 pm, as patient has appointment at 1230 with PCP today.  " "  Patient continues to work with Sendy, therapist from [facility].     Patient reports utilizing deep focused breathing to help with anxiety which isn't always effective.   Patient admits to taking Klonopin, \"the little blue pill\", \"I only got 30 so I can only take one a day.  I believe you gave me a prescription last time for 30 days.\"  Patient reports no Klonopin remain, as last pill was taken yesterday.  Patient reports taking upon feeling very anxious.  Denies falls or dizziness.       9/9/22: INITIAL EVAL/Transfer of care   Patient presents today in office for transfer of care within practice due to location, as patient residence is in [place].  Patient has been a patient of  in [place] since 10/2021.  Patient was last seen by  7/15/22.  Patient has a longstanding history of depression and anxiety which has been treatment resistant for the most part, as patient has failed numerous medications due to either minimal effectiveness, side effects, or induction/worsening of other comorbid conditions. 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  Patient had been advised numerous times in the last year to receive a higher level of care with inpatient mental health care-suggestions of ECT- due to minimal improvement with outpatient medication management, however, patient has either refused due to a multitude of reasons or failure to follow through after arrangements for treatment were finalized.  Unable to have TMS due to transportation issues, has had ECT in the past.     Patient has a history of CHF,HTN,HLD,DM,OA,chronic neck and back pain (L-spine), MEGAN with CPAP w/2L O2, IBS, Peripheral Neuropathy,COPD,RLS, essential tremor,2 and obesity.  Patient pain is managed by [facility].  Due to these chronic medical conditions she has physical limitations which also contribute to mood.      Patient had been living alone up until " "this past summer, as patient did not like living alone.  Patient daughter, Becki, and her 2 children age 2.5 and almost 8 yr old, moved in with patient in her mobile home in [place].     Patient complaint of \"very jittery all the time, I am still depressed, I didn't know if you could raise my Cymbalta or Wellbutrin to help me? Sometimes my nerves, I just feel like I am going to explode\".  \"The 2 little one's, it's just really hard on me.\"   Patient admits triggers of anxiety are primarily 2 grand children whom live in home, \"they live different than I do.\"   Reports daughter is also irritable which makes it difficult for patient to cope with anxiety.   Stress and anxiety has worsened since they moved into home.      Patient feels Buspar at 15 mg 3 times daily is not always effective, denies drowsiness though has little energy.  \"I get agitated very easily.  If kids make a mess, and she doesn't clean it up then I have to do it, I can't go behind her, but if I say something, she acts like it's nothing.\"     Patient denies sleep difficulty, adheres to nightly CPAP with Oxygen 2L, denies day time oxygen as patient did not qualify.  Denies recent falls.  Patient sleeps from 1130 pm until 8-9 am, denies night time awakenings nor naps.     Patient states, \"I think I have my days and nights mixed up.\"  Patient will try to eat dinner, at night snacks on pretzles or crackers.  Patient does not eat breakfast or lunch, will snack sometimes. Patient checks blood sugars daily, yesterday was 124 when nurse came, confirmed [facility]  services for SN only, though patient believes will be released soon, and would find out today if services would continue. Patient has been receiving services for approximately 4 months.     Patient uses a medi-planner which daughter fills weekly.  Currently taking Wellbutrin  mg once daily in the morning, \"I try to take the Buspar 15 mg 3 times daily, I will take it if I feel anxious.\" " "Denies 3 times daily dosing as prescribed. Patient reports keeping Buspar bottle out of routine medications which daughter fills.  Clonazepam 1 mg nightly with Remeron.  Reports upon last  of Remeron it was 30 mg and not 45 mg, denies reading bottle which instructs patient to take 1.5 tabs to equal 45 mg. \"when I take my night medications I don't always go to sleep at that time, so I wait until I get ready to go to sleep then take it.\"  \"It's in the bottle next to my night stand, I leave the pill box on the table in the kitchen.\"  Patient takes Cymbalta 120 mg in the morning.   Confirmed with patient both the Wellbutrin and Cymbalta are in the medi-planner.  And the Buspar, Clonazepam, and Remeron bottles are at patient bedside.  The medi-planner is for am and pm, and \"leg medicine and blood pressure medicine are in the night\". And patient takes those medications around 10 pm, and takes Remeron later at 11 pm.  Patient reports taking Clonazepam once daily, and does not take at night.  Confirmed as patient had stated differently earlier in visit.  \"I just want to feel better.\"     Denies Signs & Symptoms of Serotonin Syndrome: confusion, hallucinations, seizures, increased heart rate, fever, excessive sweating, shivering or shaking, blurred vision, muscle spasms or stiffness, tremors, incoordination, stomach cramps, nausea, & vomiting.  Though patient endorses to soa with minimal exertion with \"beads of sweat on my forehead while I got dressed this morning.\"  Patient tremors are chronic.        PHQ-9 Depression Screening  PHQ-9 Total Score:    10/4/24: 16  The PHQ has not been completed during this encounter.       DIVYA-7    10/4/24: 15    The following portions of the patient's history were reviewed and updated as appropriate: allergies, current medications, past family history, past medical history, past social history, and past surgical history.     Past Surgical History:  Past Surgical History:   Procedure " Laterality Date    CARPAL TUNNEL RELEASE      COLONOSCOPY  2020    ENDOSCOPIC FUNCTIONAL SINUS SURGERY (FESS) Left 08/15/2022    Procedure: ENDOSCOPIC FUNCTIONAL SINUS SURGERY, left maxillary antrostomy with removal of contents, possible left ethmoidectomy;  Surgeon: Johnny Calderon MD;  [place] [facility] [place]  Service: [place];  Laterality: Left;    HYSTERECTOMY  2002    complete- ovarian cysts       Problem List:  Patient Active Problem List   Diagnosis    Carpal tunnel syndrome of left wrist    Carpal tunnel syndrome of right wrist    Anxiety    Celiac artery stenosis    Cervical disc disorder    Chronic pain disorder    Degeneration of lumbar intervertebral disc    Depressive disorder    Diabetic neuropathy    Essential hypertension    Essential tremor    Hypercholesterolemia    Insomnia    Irritable bowel syndrome    Lumbar spondylosis    Obesity, morbid, BMI 50 or higher    Osteoarthritis    Sacroiliac joint pain    MEGAN (obstructive sleep apnea)    Chronic diastolic heart failure    Palpitations    Type 2 diabetes mellitus without complication, without long-term current use of insulin    Other chest pain    Generalized anxiety disorder    Paresthesia    Shortness of breath    Chronic obstructive pulmonary disease    Long-term current use of opiate analgesic    Cervical radiculopathy    Cervical spondylosis    Degeneration of thoracic intervertebral disc    Thoracic spondylosis    Coronary artery calcification seen on CT scan    Mild persistent asthma without complication    Sore throat    Acute pain of left knee    Stage 3 chronic kidney disease    B12 deficiency    COPD with asthma and status asthmaticus    Iron deficiency    Overweight    Vulvar cyst    Iron deficiency anemia    Vitamin D deficiency    Non-restorative sleep    Snoring    Excessive daytime sleepiness    Class 3 severe obesity due to excess calories without serious comorbidity with body mass index (BMI) of 45.0 to 49.9 in adult    MEGAN on  CPAP failed CPAP therapy    Chronic hypoxic respiratory failure on home oxygen with the CPAP at 2 L       Allergy:   No Known Allergies     Discontinued Medications:  Medications Discontinued During This Encounter   Medication Reason    busPIRone (BUSPAR) 10 MG tablet Historical Med - Therapy completed    Cariprazine HCl (Vraylar) 1.5 MG capsule capsule Historical Med - Therapy completed    FLUoxetine (PROzac) 20 MG capsule Dose adjustment                                 Current Medications:   Current Outpatient Medications   Medication Sig Dispense Refill    FLUoxetine (PROzac) 10 MG capsule Take 3 capsules by mouth Every Morning for 60 days. Indications: Generalized Anxiety Disorder, Major Depressive Disorder 90 capsule 1    albuterol sulfate  (90 Base) MCG/ACT inhaler Inhale 2 puffs Every 4 (Four) Hours As Needed for Wheezing or Shortness of Air. 18 g 11    Blood Glucose Monitoring Suppl device Use 1 each Daily. 1 each 0    Calcium Carb-Cholecalciferol (Calcium 500 + D) 500-3.125 MG-MCG tablet Take 1 each by mouth 2 (Two) Times a Day. 60 tablet 2    cefdinir (OMNICEF) 300 MG capsule Take 1 capsule by mouth 2 (Two) Times a Day. 14 capsule 0    clonazePAM (KlonoPIN) 0.5 MG tablet Take 1 tablet by mouth 2 (Two) Times a Day As Needed for Anxiety. 60 tablet 0    diclofenac (VOLTAREN) 75 MG EC tablet Take 1 tablet by mouth 2 (Two) Times a Day. 60 tablet 2    donepezil (ARICEPT) 10 MG tablet Take 1 tablet by mouth Every Night. 30 tablet 2    ferrous sulfate 325 (65 FE) MG tablet Take 1 tablet by mouth Daily With Breakfast. 90 tablet 1    Fluticasone-Umeclidin-Vilant (TRELEGY ELLIPTA) 200-62.5-25 MCG/ACT inhaler Inhale 1 puff Daily. 60 each 5    glucose blood test strip Use as instructed to check blood sugar daily 100 each 3    HYDROcodone-acetaminophen (NORCO)  MG per tablet       ipratropium-albuterol (DUO-NEB) 0.5-2.5 mg/3 ml nebulizer Nebulize and inhale 1 vial 4 (Four) Times a Day As Needed for  Wheezing. (Patient not taking: Reported on 1/15/2025) 360 mL 3    Lancet Device misc Use 1 each Daily. Use as directed; check blood sugar once daily 100 each 3    Lancets (OneTouch Delica Plus [id number]) misc Use 1 each Daily. 100 each 0    Magnesium Oxide -Mg Supplement 400 (240 Mg) MG tablet Take 1 tablet by mouth Every Night. 30 tablet 11    memantine (Namenda) 5 MG tablet Take 1 tablet by mouth Daily. 30 tablet 2    metoprolol succinate XL (TOPROL-XL) 25 MG 24 hr tablet Take 0.5 tablets by mouth Daily.      miconazole (Desenex) 2 % powder Apply topically to the appropriate area as directed 2 (Two) Times a Day. 71 g 11    mirtazapine (REMERON) 7.5 MG tablet Take 1 tablet by mouth Every Night. 30 tablet 1    mupirocin (BACTROBAN) 2 % ointment APPLY TO AFFECTED AREA(S) TWO TIMES A DAY AS DIRECTED FOR 14 DAYS - 22 g 0    naloxone (NARCAN) 4 MG/0.1ML nasal spray 1 spray As Needed. Has on hand      O2 (OXYGEN) Inhale 2 L/min Every Night.      pantoprazole (PROTONIX) 40 MG EC tablet TAKE 1 TABLET BY MOUTH DAILY 90 tablet 0    pramipexole (MIRAPEX) 0.5 MG tablet Take 1 tablet by mouth 3 (Three) Times a Day. 270 tablet 0    predniSONE (DELTASONE) 10 MG tablet Take 4 tabs daily x 3 days, then take 3 tabs daily x 3 days, then take 2 tabs daily x 3 days, then take 1 tab daily x 3 days 31 tablet 0    pregabalin (LYRICA) 75 MG capsule Take 1 capsule by mouth 2 (Two) Times a Day. 60 capsule 2    primidone (MYSOLINE) 50 MG tablet Take 1 tablet by mouth.      spironolactone (ALDACTONE) 25 MG tablet Take 1 tablet by mouth 2 (Two) Times a Day. 60 tablet 5    tacrolimus (PROTOPIC) 0.1 % ointment Apply topical ointment to affected area twice daily      Tirzepatide 12.5 MG/0.5ML solution auto-injector Inject 12.5mg under the skin into the appropriate area as directed 1 (One) Time Per Week. 2 mL 5    torsemide (DEMADEX) 20 MG tablet Take 1 tablet by mouth 2 (Two) Times a Day. 60 tablet 5    triamcinolone (KENALOG) 0.025 % cream Apply  1 Application topically to the appropriate area as directed As Needed.       Current Facility-Administered Medications   Medication Dose Route Frequency Provider Last Rate Last Admin    cyanocobalamin injection 1,000 mcg  1,000 mcg Intramuscular Q28 Days Ami Diego MD   1,000 mcg at 11/21/24 1217       Past Medical History:  Past Medical History:   Diagnosis Date    Adverse effect of other viral vaccines, initial encounter     Covid vaccine    Allergic fungal sinusitis 07/29/2022    Allergic rhinitis     Anxiety disorder     Asthma     CHF (congestive heart failure)     COPD with acute exacerbation     CTS (carpal tunnel syndrome)     left    Difficulty walking 2022    Essential (primary) hypertension     Essential tremor     Hypercholesterolemia 10/18/2021    Insomnia     Irritable bowel syndrome     Low back pain     Major depressive disorder     Morbid (severe) obesity due to excess calories     Nasal congestion     Neuropathy in diabetes 2023    Obstructive sleep apnea (adult) (pediatric)     Other hereditary and idiopathic neuropathies     Pain in left hip     Pain in right toe(s)     Pain in thoracic spine     Peripheral neuropathy     Hands    Primary generalized (osteo)arthritis     Primary insomnia     Pulmonary emphysema 01/05/2023    Restless leg syndrome     SOBOE (shortness of breath on exertion)     Substance abuse     Type 2 diabetes mellitus without complication, without long-term current use of insulin 05/02/2022    Vitamin D deficiency        Past Psychiatric History:  Began Treatment: while in her 20's  Diagnoses:Depression and Anxiety  Psychiatrist: -talked on phone-managed meds for 8 months prior to SOC with  in 10/2021; Also at [facility] for 2 yrs [id number]  Therapist: Sendy with [facility] (no in office visits) (telephone) next appt Oct. 2022 (for the last 8 months) prior seen Jessica at [facility] in [place] for 1-2 yrs  Admission History:Denies  Medication Trials: Several  for which pt unable to recall if effective: Lexapro(ineff.),Abilify(eff),Prozac,Zoloft,Trintellix,Paxil,Celexa, (can't remember/uncertain); SGA-worsened tremors-per neuro medication induced parkisonism-Latuda,Rexulti,Seroquel (wt gain,helped with insomnia&anxiety); Lamictal-ineff for anxiety; Xanax-weaned, Hydroxyzine(ineff 1 mo. 2022-2022)Trazodone 2021-2022 somewhat helpful for anxiety though made pt tired, Ambien [id number]?; retried Effexor XR, Wellbutrin,Cymbalta  Risperdal 1 mo approximately stopped due to starting Prozac; Hydroxyzine 100 mg 3 times daily ineffective for itching and anxiety; Zyprexa-ineffective; Prozac up to 80 mg-ineffective; Klonopin-weaned, effective; Vraylar up to 3 mg-parkinsonism tremors worsened per Neurologist recommendations; Buspar up to 20 mg 3 times daily-no longer effective  Self Harm: Denies  Suicide Attempts:Denies   Psychosis, Anxiety, Depression: Denies    Substance Abuse History:   Types:Denies all, including illicit  Withdrawal Symptoms:Denies  Longest Period Sober:Not Applicable   AA: Not applicable     Social History:  Martial Status:   Employed:No Retired from [employer], worked in [employer]  Kids:Yes or If so, how many 1 son, Simon; 1 dtr, Becki  House: mobile home  daughterBecki and her 2 young children    History: Denies  Access to Guns:  Denies (son has his guns locked in safe in patient home, which patient does not have access to)    Social History     Socioeconomic History    Marital status:     Number of children: 2   Tobacco Use    Smoking status: Former     Current packs/day: 0.00     Average packs/day: 0.5 packs/day for 12.0 years (6.0 ttl pk-yrs)     Types: Cigarettes     Start date: 2009     Quit date: 2021     Years since quittin.0     Passive exposure: Never    Smokeless tobacco: Never   Vaping Use    Vaping status: Never Used   Substance and Sexual Activity    Alcohol use: Not Currently    Drug use:  Never    Sexual activity: Not Currently     Partners: Male     Birth control/protection: Hysterectomy, Surgical       Family History:   Suicide Attempts: Denies  Suicide Completions:Denies      Family History   Problem Relation Age of Onset    Hypertension Mother     COPD Father     Heart failure Father     Neuropathy Sister     Sleep apnea Sister     Restless legs syndrome Sister     Sleep apnea Brother     Anxiety disorder Daughter     Depression Daughter     Malig Hyperthermia Neg Hx     Breast cancer Neg Hx     Ovarian cancer Neg Hx     Uterine cancer Neg Hx     Cervical cancer Neg Hx     Colon cancer Neg Hx     Stomach cancer Neg Hx     Skin cancer Neg Hx     Clotting disorder Neg Hx     Deep vein thrombosis Neg Hx     Pulmonary embolism Neg Hx        Developmental History:   Born: [place], [place]  Siblings:3 brothers, 6 sisters (1 )  Childhood: Denies Abuse  High School:Completed  College:Denies    Mental Status Exam:   Hygiene:   good  Cooperation:  Cooperative  Eye Contact:  Good  Psychomotor Behavior:  Appropriate   Affect:  Appropriate  Mood: depressed and anxious improving  Speech:  Normal  Thought Process:  Goal directed   Thought Content:  Mood congruent  Suicidal:  None  Homicidal:  None  Hallucinations:  None   Delusion:  None  Memory:  Intact   Orientation:  Person, Place, Time and Situation  Reliability:  good  Insight:  Good  Judgement:  Good  Impulse Control:  Good  Physical/Medical Issues:  Yes CHF,COPD,MEGAN,HTN,HLD,CKD3,RLS,IBS,OA,Peripheral Neuropathy,Chronic back&neck pain,DM,obesity,essential tremors to kush hands  Home Oxygen    Review of Systems:  Review of Systems   Constitutional:  Positive for fatigue. Negative for diaphoresis.   HENT:  Negative for drooling.    Eyes:  Negative for visual disturbance.   Respiratory:  Positive for apnea. Negative for cough and shortness of breath.         MEGAN with CPAP w/2L O2   Cardiovascular:  Negative for chest pain, palpitations and leg swelling.    Gastrointestinal:  Negative for nausea and vomiting.   Endocrine: Negative for cold intolerance and heat intolerance.   Genitourinary:  Negative for difficulty urinating.   Musculoskeletal:  Positive for back pain, gait problem and neck pain. Negative for joint swelling.        Use of Rolator walker   Allergic/Immunologic: Negative for immunocompromised state.   Neurological:  Positive for weakness. Negative for dizziness, seizures and speech difficulty.   Psychiatric/Behavioral:  Positive for decreased concentration and sleep disturbance. Negative for agitation, confusion, hallucinations, self-injury and suicidal ideas. The patient is nervous/anxious. The patient is not hyperactive.        Physical Exam:  Physical Exam  Psychiatric:         Attention and Perception: Attention and perception normal.         Mood and Affect: Mood is anxious and depressed.         Speech: Speech normal.         Behavior: Behavior normal. Behavior is cooperative.         Thought Content: Thought content normal. Thought content does not include suicidal ideation. Thought content does not include suicidal plan.         Cognition and Memory: Cognition normal.         Judgment: Judgment normal.         Vital Signs:   There were no vitals taken for this visit.     Office notes from care team reviewed:  Office Visit with Chance De Leon MD (01/15/2025)   Office Visit with Lucy Winchester APRN (01/14/2025)   Office Visit with Davin Sheffield DPM (01/02/2025)   Office Visit with Ami Diego MD (12/23/2024)       Lab Results: REVIEWED  Lab on 12/23/2024   Component Date Value Ref Range Status    proBNP 12/23/2024 88.8  0.0 - 900.0 pg/mL Final    WBC 12/23/2024 6.53  3.40 - 10.80 10*3/mm3 Final    RBC 12/23/2024 4.72  3.77 - 5.28 10*6/mm3 Final    Hemoglobin 12/23/2024 14.6  12.0 - 15.9 g/dL Final    Hematocrit 12/23/2024 44.6  34.0 - 46.6 % Final    MCV 12/23/2024 94.5  79.0 - 97.0 fL Final    MCH 12/23/2024 30.9  26.6 -  33.0 pg Final    MCHC 12/23/2024 32.7  31.5 - 35.7 g/dL Final    RDW 12/23/2024 12.6  12.3 - 15.4 % Final    RDW-SD 12/23/2024 44.4  37.0 - 54.0 fl Final    MPV 12/23/2024 9.5  6.0 - 12.0 fL Final    Platelets 12/23/2024 299  140 - 450 10*3/mm3 Final    Neutrophil % 12/23/2024 49.2  42.7 - 76.0 % Final    Lymphocyte % 12/23/2024 33.8  19.6 - 45.3 % Final    Monocyte % 12/23/2024 13.0 (H)  5.0 - 12.0 % Final    Eosinophil % 12/23/2024 3.4  0.3 - 6.2 % Final    Basophil % 12/23/2024 0.3  0.0 - 1.5 % Final    Immature Grans % 12/23/2024 0.3  0.0 - 0.5 % Final    Neutrophils, Absolute 12/23/2024 3.21  1.70 - 7.00 10*3/mm3 Final    Lymphocytes, Absolute 12/23/2024 2.21  0.70 - 3.10 10*3/mm3 Final    Monocytes, Absolute 12/23/2024 0.85  0.10 - 0.90 10*3/mm3 Final    Eosinophils, Absolute 12/23/2024 0.22  0.00 - 0.40 10*3/mm3 Final    Basophils, Absolute 12/23/2024 0.02  0.00 - 0.20 10*3/mm3 Final    Immature Grans, Absolute 12/23/2024 0.02  0.00 - 0.05 10*3/mm3 Final   Office Visit on 12/23/2024   Component Date Value Ref Range Status    SARS Antigen 12/23/2024 Not Detected  Not Detected, Presumptive Negative Final    Influenza A Antigen DEANDRE 12/23/2024 Not Detected  Not Detected Final    Influenza B Antigen DEANDRE 12/23/2024 Not Detected  Not Detected Final    Internal Control 12/23/2024 Passed  Passed Final    Lot Number 12/23/2024 [id number]   Final    Expiration Date 12/23/2024 7-11-25   Final   Lab on 11/21/2024   Component Date Value Ref Range Status    Iron 11/21/2024 104  37 - 145 mcg/dL Final    Iron Saturation (TSAT) 11/21/2024 31  20 - 50 % Final    Transferrin 11/21/2024 223  200 - 360 mg/dL Final    TIBC 11/21/2024 332  298 - 536 mcg/dL Final    Magnesium 11/21/2024 2.2  1.6 - 2.4 mg/dL Final   Office Visit on 11/21/2024   Component Date Value Ref Range Status    Amphetamine Screen, Urine 11/21/2024 Negative  Negative Final    Barbiturates Screen, Urine 11/21/2024 Positive (A)  Negative Final    Buprenorphine,  Screen, Urine 11/21/2024 Negative  Negative Final    Benzodiazepine Screen, Urine 11/21/2024 Positive (A)  Negative Final    Cocaine Screen, Urine 11/21/2024 Negative  Negative Final    MDMA (ECSTASY) 11/21/2024 Negative  Negative Final    Methamphetamine, Ur 11/21/2024 Negative  Negative Final    Morphine/Opiates Screen, Urine 11/21/2024 Positive (A)  Negative Final    Methadone Screen, Urine 11/21/2024 Negative  Negative Final    Oxycodone Screen, Urine 11/21/2024 Negative  Negative Final    Phencyclidine (PCP), Urine 11/21/2024 Negative  Negative Final    THC, Screen, Urine 11/21/2024 Negative  Negative Final    Lot Number 11/21/2024 [id number]   Final    Expiration Date 11/21/2024 7-4-26   Final   Lab on 11/01/2024   Component Date Value Ref Range Status    proBNP 11/01/2024 93.7  0.0 - 900.0 pg/mL Final    Glucose 11/01/2024 102 (H)  65 - 99 mg/dL Final    BUN 11/01/2024 14  8 - 23 mg/dL Final    Creatinine 11/01/2024 1.26 (H)  0.57 - 1.00 mg/dL Final    Sodium 11/01/2024 142  136 - 145 mmol/L Final    Potassium 11/01/2024 4.6  3.5 - 5.2 mmol/L Final    Chloride 11/01/2024 102  98 - 107 mmol/L Final    CO2 11/01/2024 29.4 (H)  22.0 - 29.0 mmol/L Final    Calcium 11/01/2024 10.0  8.6 - 10.5 mg/dL Final    Total Protein 11/01/2024 7.1  6.0 - 8.5 g/dL Final    Albumin 11/01/2024 3.8  3.5 - 5.2 g/dL Final    ALT (SGPT) 11/01/2024 15  1 - 33 U/L Final    AST (SGOT) 11/01/2024 20  1 - 32 U/L Final    Alkaline Phosphatase 11/01/2024 123 (H)  39 - 117 U/L Final    Total Bilirubin 11/01/2024 0.3  0.0 - 1.2 mg/dL Final    Globulin 11/01/2024 3.3  gm/dL Final    A/G Ratio 11/01/2024 1.2  g/dL Final    BUN/Creatinine Ratio 11/01/2024 11.1  7.0 - 25.0 Final    Anion Gap 11/01/2024 10.6  5.0 - 15.0 mmol/L Final    eGFR 11/01/2024 46.6 (L)  >60.0 mL/min/1.73 Final    WBC 11/01/2024 6.88  3.40 - 10.80 10*3/mm3 Final    RBC 11/01/2024 4.48  3.77 - 5.28 10*6/mm3 Final    Hemoglobin 11/01/2024 13.7  12.0 - 15.9 g/dL Final     Hematocrit 11/01/2024 42.6  34.0 - 46.6 % Final    MCV 11/01/2024 95.1  79.0 - 97.0 fL Final    MCH 11/01/2024 30.6  26.6 - 33.0 pg Final    MCHC 11/01/2024 32.2  31.5 - 35.7 g/dL Final    RDW 11/01/2024 13.0  12.3 - 15.4 % Final    RDW-SD 11/01/2024 46.3  37.0 - 54.0 fl Final    MPV 11/01/2024 9.2  6.0 - 12.0 fL Final    Platelets 11/01/2024 265  140 - 450 10*3/mm3 Final   Office Visit on 10/30/2024   Component Date Value Ref Range Status    SARS Antigen 10/30/2024 Not Detected  Not Detected, Presumptive Negative Final    Influenza A Antigen DEANDRE 10/30/2024 Not Detected  Not Detected Final    Influenza B Antigen DEANDRE 10/30/2024 Not Detected  Not Detected Final    Internal Control 10/30/2024 Passed  Passed Final    Lot Number 10/30/2024 [id number]   Final    Expiration Date 10/30/2024 6/28/24   Final    Color, UA 10/30/2024 Yellow  Yellow, Straw Final    Appearance, UA 10/30/2024 Slightly Cloudy (A)  Clear Final    pH, UA 10/30/2024 5.5  5.0 - 8.0 Final    Specific Gravity, UA 10/30/2024 1.025  1.005 - 1.030 Final    Glucose, UA 10/30/2024 Negative  Negative Final    Ketones, UA 10/30/2024 Negative  Negative Final    Bilirubin, UA 10/30/2024 Negative  Negative Final    Blood, UA 10/30/2024 Negative  Negative Final    Protein, UA 10/30/2024 Negative  Negative Final    Leuk Esterase, UA 10/30/2024 Negative  Negative Final    Nitrite, UA 10/30/2024 Negative  Negative Final    Urobilinogen, UA 10/30/2024 0.2 E.U./dL  0.2 - 1.0 E.U./dL Final   Office Visit on 08/13/2024   Component Date Value Ref Range Status    Amphetamine Screen, Urine 08/13/2024 Negative  Negative Final    Barbiturates Screen, Urine 08/13/2024 Positive (A)  Negative Final    Buprenorphine, Screen, Urine 08/13/2024 Negative  Negative Final    Benzodiazepine Screen, Urine 08/13/2024 Negative  Negative Final    Cocaine Screen, Urine 08/13/2024 Negative  Negative Final    MDMA (ECSTASY) 08/13/2024 Negative  Negative Final    Methamphetamine, Ur 08/13/2024  Negative  Negative Final    Morphine/Opiates Screen, Urine 08/13/2024 Positive (A)  Negative Final    Methadone Screen, Urine 08/13/2024 Negative  Negative Final    Oxycodone Screen, Urine 08/13/2024 Negative  Negative Final    Phencyclidine (PCP), Urine 08/13/2024 Negative  Negative Final    THC, Screen, Urine 08/13/2024 Negative  Negative Final    Lot Number 08/13/2024 [id number]   Final    Expiration Date 08/13/2024 11/12/2025   Final    Color, UA 08/13/2024 Yellow  Yellow, Straw Final    Appearance, UA 08/13/2024 Cloudy (A)  Clear Final    pH, UA 08/13/2024 5.5  5.0 - 8.0 Final    Specific Gravity, UA 08/13/2024 1.013  1.005 - 1.030 Final    Glucose, UA 08/13/2024 Negative  Negative Final    Ketones, UA 08/13/2024 Negative  Negative Final    Bilirubin, UA 08/13/2024 Negative  Negative Final    Blood, UA 08/13/2024 Negative  Negative Final    Protein, UA 08/13/2024 Negative  Negative Final    Leuk Esterase, UA 08/13/2024 Small (1+) (A)  Negative Final    Nitrite, UA 08/13/2024 Negative  Negative Final    Urobilinogen, UA 08/13/2024 0.2 E.U./dL  0.2 - 1.0 E.U./dL Final    Extra Tube 08/13/2024 Hold for add-ons.   Final    Auto resulted.    RBC, UA 08/13/2024 0-2  None Seen, 0-2 /HPF Final    WBC, UA 08/13/2024 11-20 (A)  None Seen, 0-2 /HPF Final    Bacteria, UA 08/13/2024 Trace (A)  None Seen /HPF Final    Squamous Epithelial Cells, UA 08/13/2024 31-50 (A)  None Seen, 0-2 /HPF Final    Hyaline Casts, UA 08/13/2024 3-6  None Seen /LPF Final    Methodology 08/13/2024 Automated Microscopy   Final    Urine Culture 08/13/2024 No growth   Final       EKG Results:  No orders to display       Imaging Results:  CT Head Without Contrast    Result Date: 6/7/2022    1. Generalized atrophy with no acute intracranial findings 2. Expansion of the left maxillary sinus with some thickening of the bony wall laterally suggesting a mucocele or mass.  Adjacent involvement of the left ethmoid air cells and left frontal sinus.   Recommend ENT consultation.  Mild mucosal disease in the sphenoid sinuses.     Mars Aden MD       Electronically Signed and Approved By: Mars Aden MD on 6/07/2022 at 14:20                 Assessment & Plan   Diagnoses and all orders for this visit:    1. Major depressive disorder, recurrent episode, moderate (Primary)  -     FLUoxetine (PROzac) 10 MG capsule; Take 3 capsules by mouth Every Morning for 60 days. Indications: Generalized Anxiety Disorder, Major Depressive Disorder  Dispense: 90 capsule; Refill: 1    2. Generalized anxiety disorder  -     FLUoxetine (PROzac) 10 MG capsule; Take 3 capsules by mouth Every Morning for 60 days. Indications: Generalized Anxiety Disorder, Major Depressive Disorder  Dispense: 90 capsule; Refill: 1    3. Stress due to illness of family member    4. Medication management    5. Medication care plan discussed with patient          Visit Diagnoses:    ICD-10-CM ICD-9-CM   1. Major depressive disorder, recurrent episode, moderate  F33.1 296.32   2. Generalized anxiety disorder  F41.1 300.02   3. Stress due to illness of family member  Z63.79 V61.49   4. Medication management  Z79.899 V58.69   5. Medication care plan discussed with patient  Z71.89 V65.49         PLAN:  Safety: No acute safety concerns  Therapy: Currently Seeing a Therapist Shanti with [facility],patient verbalized need for therapy and plans to schedule.   Risk Assessment: Risk of self-harm acutely is low.  Risk factors include anxiety disorder, mood disorder, and recent psychosocial stressors (pandemic). Protective factors include no family history, denies access to guns/weapons, no present SI, no history of suicide attempts or self-harm in the past, minimal AODA, healthcare seeking, future orientation, willingness to engage in care.  Risk of self-harm chronically is also low, but could be further elevated in the event of treatment noncompliance and/or AODA.  Meds:    -Continue  MIRTAZAPINE (REMERON) 7.5 mg by mouth nightly to target depression and insomnia. (No adjustment required for most recent eGFR of 46.6 11/1/24)    -INCREASE Prozac (Fluoxetine) FROM 20 mg TO 30 mg (instructed to start taking 3 of the 10 mg capsules ordered today) by mouth daily in the morning to target anxiety, depression.  Discussed all risks, benefits, alternatives, and side effects of Prozac (Fluoxetine) including but not limited to GI upset, decreased appetite, sexual dysfunction, bleeding risk, seizure risk, insomnia, anxiety, drowsiness, headache, tremor, nervousness, activation of hossein or hypomania, increased fragility fracture risk, hyponatremia, ocular effects, serotonin syndrome, hypersensitivity reaction, and activation of suicidal ideation and behavior. . Discussed the need for patient to immediately call the office for any new or worsening symptoms, such as worsening depression; feeling nervous or restless; suicidal thoughts or actions; or other changes in mood or behavior, and all other concerns. Patient educated on medication compliance.  Patient verbalized understanding and is agreeable to taking Prozac (Fluoxetine). Addressed all questions and concerns. New prescription sent today for 10 mg caps.     5.  Labs: n/a  6.  Updated  of POC   7.  Patient instructed to request refills when appropriate, when down to 5-7 days remaining via pharmacy or via MyChart.      Symptoms of anxiety, depression are improving since restarting Prozac and eliminating Vraylar and Buspar, although patient is not at complete resolution of symptoms, though patient behavior today appeared uplifted, and verbalized needing therapy on her own during discussion today.  Discussed upcoming visit with ill brother, and coached through some emotions. The plan was discussed with the patient. The patient was given time to ask questions and these questions were answered. At the conclusion of their visit they had no additional  questions or concerns and all questions were answered to their satisfaction.   Patient was given instructions and counseling regarding condition and for health maintenance advice. Please see specific information pulled into the AVS if appropriate.    Patient to contact provider if symptoms worsen or fail to improve.        12/3/24:   Patient stopped by office after seeing cardiology asking about  discussion with Neurologist, informed patient this provider was planning on calling her this afternoon, informed patient of discussion and plan of care moving forward which was also discussed with , instructed patient with the following which was also written on paper for patient and scheduled a follow up visit for 1/15/25 at 10am via video as patient daughter is taking her to initial eval for sleep medicine which is scheduled at 1030 am and will be able to conduct visit while daughter is driving, as daughter has to take off work on patient appointment days.   See separate encounter for orders:  -DECREASE Vraylar FROM 3 mg TO 1.5 mg by mouth daily in the morning for 7 days, start Wed. 12/4/24, THEN STOP.      -Starting tomorrow: Prozac 20 mg by mouth daily in the morning to target depression and anxiety.     Noted bilateral arm and hand tremors while patient was sitting in office.  Patient is agreeable with plan and verbalized appreciation with provider discussion.  Both prescriptions sent to JAGRUT[facility] Adler in clinic for patient to  before leaving today. Patient to contact provider if symptoms worsen or fail to improve.      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  Informed Neurologist this provider has been seeing patient primarily via video and has had recent requests to return to [facility] and will discuss with  prior to contacting patient to discuss treatment plan which is to taper off Vraylar and restart Prozac, patient began taper for Buspar at last visit 11/21/24 which will end in the next 2 days.  Current medication regimen: Vraylar 3 mg and Remeron 7.5 mg nightly.     11/21/24:   -Therapy: Currently Seeing a Therapist Shanti with [facility], and was again advised to reschedule an appointment today which patient was in agreement to do, though verbalized thinking the therapist was the only one in the office, which patient was advised there are other therapist and to voice concerns upon calling today.   -Decrease Buspar FROM 20 mg TO 10 mg 3 times daily without food FOR 7 days THEN DECREASE TO 10 mg by mouth for 7 days THEN STOP. Patient reported ineffectiveness and has been taking Klonopin per PCP.      -Continue MIRTAZAPINE (REMERON) 7.5 mg by mouth nightly to target depression and insomnia. (No adjustment required for most recent eGFR of 46.6 11/1/24)    -Continue Vraylar (Cariprazine) 3 mg by mouth daily in the morning to target unstable mood, depressive symptoms.  Can be taken with or without food.  Discussed all risks, benefits, alternatives, and side effects of Vraylar  including but not limited to GI upset, sedation, rare weight gain, dyslipidemia, extrapyramidal symptoms (dystonia, drug-induced parkinsonism, akathisia, tardive dyskinesia), lowering of seizure threshold, hematologic abnormalities, hyperglycemia, increased mortality in elderly patients with dementia-related psychosis, neuroleptic malignant syndrome, sexual dysfunction, orthostatic hypotension, (may enhance the effects of antihypertensive medications) falls risk in older  adults, and temperature dysregulation. Patient instructed to avoid driving and doing other tasks or actions that require to be alert until knowing how the drug affects them. Discussed the need for patient to immediately call the office for any new or worsening symptoms, such as worsening depression; feeling nervous or restless; suicidal thoughts or actions; or other changes changes in mood or behavior, and all other concerns. Patient educated on medication compliance and the risks of suddenly stopping this medication or missing doses. Patient verbalized understanding and is agreeable to taking Vraylar. Addressed all questions and concerns. After discussion of these risks and benefits, the patient voiced understanding and agreed to proceed. No adjustment required with current eGFR of 46.6.  No adverse effects of antipsychotic medications noted, such as EPS (Extrapyramidal side effects and TD (Tardive Dyskinesia), patient to contact provider immediately if experienced.      -Advised patient to stop Prozac 40 mg as she reported self starting approximately 2 weeks ago as every other day. Informed patient the goal is to decrease the amount of psychotropic medications and not add as we have discussed multiple times.   -Updated  of [facility]   -Instructed patient to stop by [place] office after seeing PCP today to schedule a follow up visit as patient daughter is now working and needs assistance with logging on to [employer] and prefers afternoon appointments.     Symptoms of anxiety, depression are under good control with current medication regimen.    The plan was discussed with the patient. The patient was given time to ask questions and these questions were answered. At the conclusion of their visit they had no additional questions or concerns and all questions were answered to their satisfaction.   Patient was given instructions and counseling regarding condition and for health maintenance advice. Please see specific  information pulled into the AVS if appropriate.    Patient to contact provider if symptoms worsen or fail to improve.        10/4/24:  -Therapy: [facility], which patient reported today of not seeing in over one month, and was again advised to reschedule an appointment.   -Continue Buspar 20 mg 3 times daily without food as patient has not been taking with food. Space times between doses of 6 hrs. Prescription ends 11/4/24  -Continue MIRTAZAPINE (REMERON) 7.5 mg by mouth nightly to target depression and insomnia.  Refilled today for 30 days with 1 refill.    -INCREASE Vraylar (Cariprazine) FROM 1.5 mg TO 3 mg by mouth daily in the morning to target unstable mood, depressive symptoms.  Can be taken with or without food.   No adverse effects of antipsychotic medications noted, such as EPS (Extrapyramidal side effects and TD (Tardive Dyskinesia), patient to contact provider immediately if experienced.    Instructed patient to take 2 of the 1.5 mg capsules, may take additional one now as patient took 1.5 mg this morning, however, new prescription is for the 3 mg capsule, 30 days with 1 refill.    -Updated  of POC   -Patient instructed to request refills when appropriate, when down to 5-7 days remaining via pharmacy or via [employer].    -Patient will return to office on same day as she is scheduled with PCP.   Discussed sleep hygiene measures including regular sleep schedule, optimal sleep environment, and relaxing presleep rituals.  Advised not to watch TV in the bed and to start going into bed at 930 pm to allow time to relax and place CPAP on at that time, avoid watching TV in bed, only watch in living room. Advised to avoid caffeinated beverages in the evening. Advised not to use electronic devices (tablet, ipad, Gerard and like) immediately before bedtime.    Symptoms of anxiety, depression, insomnia are under fair control with current medication  regimen. Patient advised to contact therapist to reschedule as patient has not seen in over one month due to dislike of actions needed on patient part, which discussed in length today of patient necessity to engage in psychotherapy to overall improve mental health.   The plan was discussed with the patient. The patient was given time to ask questions and these questions were answered. At the conclusion of their visit they had no additional questions or concerns and all questions were answered to their satisfaction.   Patient was given instructions and counseling regarding condition and for health maintenance advice. Please see specific information pulled into the AVS if appropriate.    Patient to contact provider if symptoms worsen or fail to improve.        8/26/24:    -Therapy: Currently Seeing a Therapist Shanti with [facility], has not seen in a few weeks, advised to schedule an appointment and to see at least weekly or every 2 weeks depending on provider schedule.  Advise to make list of goals and what you want to achieve or work on for session.    -Continue Buspar 20 mg 3 times daily without food as patient has not been taking with food. Space times between doses of 6 hrs.    -Continue MIRTAZAPINE (REMERON) 7.5 mg by mouth nightly to target depression and insomnia.   -RESTART Vraylar (Cariprazine)1.5 mg by mouth daily to target unstable mood, depressive symptoms.  Can be taken with or without food.    -Updated  of [facility]   -Patient instructed to request refills when appropriate, when down to 5-7 days remaining via pharmacy or via [employer].      -Patient given contact information for [facility]  Address: [address] [place] [id number]  Phone: [phone]  Other location: [address] [place] [id number]. [phone]  Website: [email or web address]  Services offered: Testing and assessments for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 depression. Refilled today  -Will update  of today's visit and changes to current medication regimen.    Symptoms of anxiety, depression, skin picking, factitious disorder, and insomnia are under fair control with current medication regimen.  Due to polypharmacy and ineffectiveness of hydroxyzine will eliminate from current regimen.  Due to ongoing anxiety, will try Buspar 3 times daily vs twice daily.  And decrease Remeron to help with frequent night time awakenings.     Patient was given instructions and counseling regarding condition and for health maintenance advice. Please see specific information pulled into the AVS if appropriate.    Patient to contact provider if symptoms worsen or fail to improve.      10/12/23:  TELEPHONE  Called patient to discuss medication adjustments after discussion with Dr. Newman yesterday, patient informed and instructed to decrease Zyprexa FROM 7.5 mg daily with lunch TO 5 mg for 14 days THEN decrease to 2.5 mg (instructed to break 5 mg tab in  half) for 14 days THEN STOP. Informed patient a new prescription for Zyprexa 5 mg tabs has been sent in to the pharmacy today. Patient verbalized understanding and agreeable to plan.   Informed patient plan going forward is to decrease current psychotropic medications due to polypharmacy, and will be consulting with Dr. Newman after each office visit.   Patient reports has an upcoming appointment with [facility] next week, noted appointment scheduled for 10/25/23 with Shital Horowitz LCSW, praised patient for scheduling appointment.   Patient to contact provider if needed in between now and upcoming appointment on 11/9/23.     10/9/23: TELEPHONE  Returned call to patient as planned, informed patient that  was given an update of current treatment plan and concerns and would plan to reconvene this Wed. And if there were any changes in treatment plan provider would notify patient on Thurs. This week. Patient voiced understanding and thanked provider for the call.     10/5/23:   Therapy: New referral sent to [facility] at last visit.  Patient given number again today as patient had lost phone with contacts, informed patient referral had been closed due to not responding to voice mails. Patient wrote down both numbers listed: [phone] and [phone]    -Continue Buspar 30 mg by mouth twice daily to target anxiety.    -Continue Remeron 45 mg by mouth nightly to target insomnia and depression.  Taking 1.5 tabs of the 30 mg to equal 45 mg. Refilled today  -Continue Hydroxyzine 100 mg by mouth 3 times daily as needed to target itching and anxiety.    -Continue Prozac 80 mg by mouth daily in the morning to target skin picking disorder, OCD, anxiety, and depression.   -Continue Zyprexa 7.5 mg by mouth daily at 12 noon with food to target tactile hallucinations, depression, and anxiety.  Risks, benefits, alternatives discussed with patient including nausea and vomiting,  GI upset, sedation, dizziness/falls risk, akathisia, hypotension, increased appetite, lowering of seizure threshold, theoretical risk of tardive dyskinesia. After discussion of these risks and benefits, the patient voiced understanding and agreed to proceed.  Slight tremor noted around mouth when mouth closed which are suspected related to medication vs anxiety.   Refilled today    -All medications are good through mid Nov.   -Will plan on contacting Dr. Newman to discuss case as patient has had minimal improvement with outpatient medication management.  And calling patient by early next week.   Patient had been advised numerous times in the past to receive a higher level of care with inpatient mental health care-suggestions of ECT- due to minimal improvement with outpatient medication management, however, patient has either refused due to a multitude of reasons or failure to follow through after arrangements for treatment were finalized.  Symptoms of anxiety, depression, insomnia, tactile hallucinations, and factitious disorder are under fair control with current medication regimen.  Lengthy discussion with patient today regarding plan of care, past discussion and treatment recommendations, which had been advised by Dr. Newman prior to start of care with this provider 1 yr ago.  Patient wishes to remain with this provider.   Patient was given instructions and counseling regarding condition and for health maintenance advice. Please see specific information pulled into the AVS if appropriate.    Patient to contact provider if symptoms worsen or fail to improve.        8/17/23:   Therapy: [facility] provider -Marie Dorsey referral sent to [facility], the office will contact you to set up the appointment. Please contact them at [phone] if you do not hear from them by next week.     -Continue Buspar 30 mg by mouth twice daily to target anxiety.    -Continue Remeron 45 mg by mouth nightly to  target insomnia and depression.  Taking 1.5 tabs of the 30 mg to equal 45 mg. Refilled today  -Continue Hydroxyzine 100 mg by mouth 3 times daily as needed to target itching and anxiety.      Increase Prozac FROM 60 mg TO 80 mg by mouth daily in the morning to target skin picking disorder, OCD, anxiety, and depression. Instructed to start taking 2 of the 40 mg caps prescribed today, and may take 4 of the 20 mg on hand until new dose is picked up.    Continue Zyprexa 7.5 mg by mouth daily at 12 noon with food to target tactile hallucinations, depression, and anxiety.  No signs of EPS or TD noted.     Patient instructed to request refills when appropriate, when down to 5-7 days remaining via  pharmacy or via [employer].       Symptoms of anxiety, depression, insomnia, skin picking are under fair control with current medication regimen.  Continues to request Klonopin for anxiety which has been denied due to patient with long term and current use of opioids, comorbid conditions, placing patient at higher risks, and has been off of Benzodiazepines for over 1 yr. CBT advised, agreeable to see therapist via telehealth only.  Patient with both physical and psychological signs and symptoms of factitious disorder.   Patient was given instructions and counseling regarding condition and for health maintenance advice. Please see specific information pulled into the AVS if appropriate.    Patient to contact provider if symptoms worsen or fail to improve.      7/13/23:   Therapy: Chang Gipson     -Continue Buspar 30 mg by mouth twice daily to target anxiety.    -Continue Remeron 45 mg by mouth nightly to target insomnia and depression.  Taking 1.5 tabs of the 30 mg to equal 45 mg.   -Continue Hydroxyzine 100 mg by mouth 3 times daily as needed to target itching and anxiety.    Continue Prozac 60 mg by mouth daily in the morning to target skin picking disorder, OCD, anxiety, and depression. Instructed to start taking 3 of the  20 mg caps prescribed.   Continue Zyprexa 7.5 mg by mouth daily at 12 noon with food to target tactile hallucinations, depression, and anxiety.  Risks, benefits, alternatives discussed with patient including nausea and vomiting, GI upset, sedation, dizziness/falls risk, akathisia, hypotension, increased appetite, lowering of seizure threshold, theoretical risk of tardive dyskinesia. After discussion of these risks and benefits, the patient voiced understanding and agreed to proceed.   No signs of EPS or TD noted. Refilled today for 30 days with 1 refill    Symptoms of anxiety, depression, skin picking, and tactile hallucinations are under good to fair control with current medication regimen.  Suspect ongoing problem with skin picking negatively impacts mood. Explained to patient again that due to several comorbid conditions and current medications that weight gain has been noted and reviewed for several years, however, due to ongoing weight gain, plan to continue current medications along with therapy and determine at next visit if alternative medications are needed vs tapering down from Zyprexa.  Patient continues to request alternative medication for anxiety, which have been declined.     Patient has been advised to do the following at next therapy session due to displeased recent session, which is listed on AVS:   Recommend writing down a list of a few things you want to discuss at next appointment, perhaps have 2 things that you definitely want to discuss to help guide the visit. What you want to accomplish or discuss more?  Such as developing coping strategies to help with anxiety, AND practice those techniques during the visit with your therapist.  Also, ask for those coping strategies to be written down or what resources she can give you.   Patient was given instructions and counseling regarding condition and for health maintenance advice. Please see specific information pulled into the AVS if appropriate.   "  Patient to contact provider if symptoms worsen or fail to improve.      5/15/23:   Therapy: [facility] provider scheduled end of May   -Continue Buspar 30 mg by mouth twice daily to target anxiety.  Refilled today  -Continue Remeron 45 mg by mouth nightly to target insomnia and depression.  Taking 1.5 tabs of the 30 mg to equal 45 mg. Refilled today  -Continue Hydroxyzine 100 mg by mouth 3 times daily as needed to target itching and anxiety.   Refilled today, tolerated well without reported increase in drowsiness.    -Increase Prozac FROM 40 mg TO 60 mg by mouth daily in the morning to target skin picking disorder, OCD, anxiety, and depression. Instructed to start taking 3 of the 20 mg caps prescribed today.   Dose increased to 40 mg ordered 3/24/23.   -Increase Zyprexa FROM 5 mg TO 7.5 mg  by mouth daily at 12 noon with food to target tactile hallucinations, depression, and anxiety.  Risks, benefits, alternatives discussed with patient including nausea and vomiting, GI upset, sedation, dizziness/falls risk, akathisia, hypotension, increased appetite, lowering of seizure threshold, theoretical risk of tardive dyskinesia. After discussion of these risks and benefits, the patient voiced understanding and agreed to proceed.    No signs of EPS or TD noted.  AIMS-0; Informed patient a new prescription was sent for the 7.5 mg tabs.     Symptoms of anxiety, depression, insomnia, OCD, tactile hallucinations are under fair control presently, medications dose adjustments made today, patient instructed to contact provider if the Zyprexa 7.5 mg causes increased drowsiness as the dose may be switched to nightly.  Suspect anxiety is worsened by tactile hallucinations, which aim to control with Zyprexa.   Patient to contact provider if symptoms worsen or fail to improve.     5/11/23:   Patient LMVM that she is needing something other than the Buspar for her Anxiety.  Patient reports \"My Anxiety if through the roof\"  Please call me " "back\"    Returned call to patient as requested, patient reports \"I can hardly go on living like this, I just don't feel right, I just got out of the hospital, mostly anxiety and stress, they did a Cat scan and MRI and everything was all okay, I just don't feel right.\" Patient admits to adherence to medications. Patient is uncertain if the Zyprexa is effective, \"I still feel like there's something crawling in my head, I need something else.\" Reports taking Hydroxyzine 50 mg 2-3 times daily, which is somewhat helpful, denies drowsiness. Instructed patient to start taking routinely 3 times daily. Patient has an appointment with [facility] therapist end of May.    \"The only thing I took that helped was the Xanax.\" Patient asking if Buspar can be changed to another medication.  \"I been battling this for over a year.\" Patient requesting refill for Remeron, instructed patient to check bottle to determine if refill is available as patient only has 4 tabs and 2 half tabs remaining, patient instructed to request refill from pharmacy if one remains, otherwise, patient to inform provider or MA upon return call later this afternoon and a refill can be sent.  Informed patient will call back later this afternoon after further review of other alternatives for anxiety. Patient verbalized understanding.    Returned call to patient as planned, left vm instructing patient to start taking 2 of the Hydroxyzine 50 mg to equal 100 mg by mouth 3 times daily as needed anxiety/itching temporarily until seen in office at scheduled appointment Monday 5/15/23, and to return to 50 mg dose if excessive grogginess, drowsiness occurs as there is an increased fall risk with higher dose.  Will plan to discuss further at appointment.   Patient called re: the missed call from last evening.  Patient states she did Not check her voicemail.  I read patient Trish's exact message verbatim about temporary med increase of Hydroxyzine until Monday appt.  Patient " voiced understanding      4/7/23:  Therapy: [facility] provider no longer working at [facility], and [facility] List of counseling services given to patient today, Patient to contact provider and set up appointment.  And to contact office if unable to get an appointment scheduled.   Continue Buspar 30 mg by mouth twice daily to target anxiety.     Continue Remeron 45 mg by mouth nightly to target insomnia and depression.  Taking 1.5 tabs of the 30 mg to equal 45 mg.    Continue Hydroxyzine 50 mg by mouth 3 times daily as needed to target itching and anxiety.    Continue Prozac 40 mg by mouth daily in the morning to target skin picking disorder, anxiety, and depression. Dose increased to 40 mg ordered 3/24/23.     Increase Zyprexa FROM 2.5 mg TO 5 mg by mouth daily at 12 noon with food to target tactile hallucinations, depression, and anxiety.  Risks, benefits, alternatives discussed with patient including nausea and vomiting, GI upset, sedation, dizziness/falls risk, akathisia, hypotension, increased appetite, lowering of seizure threshold, theoretical risk of tardive dyskinesia. After discussion of these risks and benefits, the patient voiced understanding and agreed to proceed.  Instructed patient to ensure accurate dose is picked up with pharmacy, new order with notes to pharmacy informing to not fill 2.5 mg dose previously requested.  No signs of EPS or TD noted.     Reminded patient of refill process for all medications     Symptoms of anxiety, tactile hallucinations, and depression continue to be present, advised for patient to get set up with therapy, as this will provide great benefit for patient and help establish coping strategies to help manage chronic symptoms.  Patient somewhat anxious regarding advising to return in 5 weeks vs earlier.  Reiterated to patient  Antidepressants can take 4-6 weeks to start working and up to 2-3 months for the full benefits. Due to toleration of  "Zyprexa, will increase to help with tactile hallucinations. Patient to contact provider if symptoms worsen or fail to improve.     3/24/23:   Patient called stating she is needing a refill for her Prozac and you had spoke with her about increasing the dose at last visit.  Patient would like for you to increase her Prozac because she says she is still having \"increased depression and still feels like something is crawling in my head\"  Please advise  Per discussion at last visit, Prozac dose can be increased from 20 to 40 mg by mouth daily in the morning, order sent to pharmacy.   Called patient and let her know that the Prozac has been increased at which time she said she is also needing a refill on her Hydroxyzine.  Please refill  Med pended    3/9/23:   Therapy: Currently Seeing a Therapist scheduled with new therapist at [facility], as prior provider-[facility] went on maternity leave, and Kayleigh lives in [place] and was providing telephone therapy support.  To see new therapist in April.  -Continue Buspar 30 mg by mouth twice daily to target anxiety.   -Continue Remeron 45 mg by mouth nightly to target insomnia and depression.  Taking 1.5 tabs of the 30 mg to equal 45 mg.  -Continue Hydroxyzine 50 mg by mouth 3 times daily as needed to target itching and anxiety.    -Continue Prozac 20 mg by mouth daily in the morning to target skin picking disorder, anxiety, and depression.   Will plan on increasing in 2-3 weeks if needed.    Start Zyprexa 2.5 mg by mouth daily at 12 noon with food to target tactile hallucinations, depression, and anxiety.  Risks, benefits, alternatives discussed with patient including nausea and vomiting, GI upset, sedation, dizziness/falls risk, akathisia, hypotension, increased appetite, lowering of seizure threshold, theoretical risk of tardive dyskinesia. After discussion of these risks and benefits, the patient voiced understanding and agreed to proceed.  Instructed patient to contact provider " next Tues. 3/14/23 to inform of toleration of afternoon dose, plan to have patient take without any other medications to determine if drowsiness occurs and effectiveness. Informed patient dose may need to be given later in the evening depending on drowsiness.     Symptoms of depression, anxiety, skin picking disorder and tactile hallucinations are not under good control, will add low dose Zyprexa. Due to patient not able to come early in the morning for an appointment she will return in approximately 3 weeks.  Patient to contact provider if symptoms worsen or fail to improve.         2/23/23:   Therapy: Currently Seeing a Therapist scheduled with new therapist at [facility], as prior provider-Sendy went on maternity leave, and Kayleigh lives in [place] and was providing telephone therapy support.  To see new therapist in April.  Continue Buspar 30 mg by mouth twice daily to target anxiety. Refilled today    Decrease Wellbutrin SR FROM 200 mg daily  mg by mouth daily in the morning FOR 7 DAYS (2/24-3/2/23) THEN take every other day on the following days: 3/4, 3/6, and 3/8 to target depression and anxiety, THEN STOP.  New order sent to pharmacy. Informed patient of the D2D interaction with Wellbutrin and Prozac, and as the dose is decreased the Prozac will be increased.  Discussed Symptoms of withdrawal:  GI flu-like symptoms, paresthesias (prickling or tingling sensation, like pins and needs), irritability, insomnia, dizziness, or vivid dreams    Continue Remeron 45 mg by mouth nightly to target insomnia and depression.  Taking 1.5 tabs of the 30 mg to equal 45 mg. Refilled today    Continue Hydroxyzine 50 mg by mouth 3 times daily as needed to target itching and anxiety.  Risks, benefits, alternatives discussed with patient including sedation, dizziness, fall risk, GI upset, and risk of increased CNS depression and elevated heart rate if taken with other antihistamines.  After discussion of these risks and  benefits, the patient voiced understanding and agreed to proceed.    Increase Prozac FROM 10 mg TO 20 mg by mouth daily in the morning to target skin picking disorder, anxiety, and depression.  Discussed all risks, benefits, alternatives, and side effects of Fluoxetine including but not limited to GI upset, decreased appetite, sexual dysfunction, bleeding risk, seizure risk, insomnia, anxiety, drowsiness, headache, tremor, nervousness, activation of hossein or hypomania, increased fragility fracture risk, hyponatremia, ocular effects, serotonin syndrome, hypersensitivity reaction, and activation of suicidal ideation and behavior.  Discussed the need for patient to immediately call the office for any new or worsening symptoms, such as worsening depression; feeling nervous or restless; suicidal thoughts or actions; or other changes in mood or behavior, and all other concerns. Patient educated on medication compliance.  Patient verbalized understanding and is agreeable to taking Fluoxetine. Addressed all questions and concerns.  Provider informed patient had not requested refill of 10 mg dose ordered previously, therefore, a new prescription was sent for the 20 mg cap, MA informed patient.     Symptoms of depression, anxiety, and skin picking is ongoing, as expected patient mood is down due to medication adjustments, which was reiterated to patient today while switching medications to expect a decreased mood, increased irritability, etc.  Will see patient back in office in 2 weeks to determine toleration of Wellbutrin wean.  Patient was asking for Valium today as a relative takes opioids with Valium, again reiterated to patient the increased risk of overdose death, suicide, worse treatment outcomes, and increased health service use when co-prescribing of opioids and benzodiazepines regardless of whether prescribed for comorbid chronic pain, anxiety, or insomnia.  Patient to contact provider if symptoms worsen or fail to  improve.       1/30/23:   Continue Buspar 30 mg by mouth twice daily to target anxiety.     Continue Wellbutrin  mg by mouth daily in the morning to target depression and anxiety.      Continue Remeron 45 mg by mouth nightly to target insomnia and depression.  Taking 1.5 tabs of the 30 mg to equal 45 mg.     Stop Risperdal due to D2D interaction with Prozac. And ineffectiveness, patient has been taking for less than 2 months and missed doses reported.     Continue Hydroxyzine 50 mg by mouth 3 times daily as needed to target itching and anxiety.  Refilled today, able to receive 2/6/23. Risks, benefits, alternatives discussed with patient including sedation, dizziness, fall risk, GI upset, and risk of increased CNS depression and elevated heart rate if taken with other antihistamines.  After discussion of these risks and benefits, the patient voiced understanding and agreed to proceed.    Start Prozac 10 mg by mouth daily in the morning to target skin picking disorder, anxiety, and depression.  Informed patient due to potential D2D with Wellbutrin as dose of Prozac would be doubled. Canceled initial order for 20 mg due to this potential D2D interaction.  Discussed all risks, benefits, alternatives, and side effects of Fluoxetine including but not limited to GI upset, decreased appetite, sexual dysfunction, bleeding risk, seizure risk, insomnia, anxiety, drowsiness, headache, tremor, nervousness, activation of hossein or hypomania, increased fragility fracture risk, hyponatremia, ocular effects, serotonin syndrome, hypersensitivity reaction, and activation of suicidal ideation and behavior.  Discussed the need for patient to immediately call the office for any new or worsening symptoms, such as worsening depression; feeling nervous or restless; suicidal thoughts or actions; or other changes in mood or behavior, and all other concerns. Patient educated on medication compliance.  Patient verbalized understanding and  is agreeable to taking Fluoxetine. Addressed all questions and concerns.     Patient tolerated Cymbalta taper, continues to struggle with skin picking to scalp which has worsened mood.  Will start Prozac as discussed previously, due to potential D2D interaction with Wellbutrin, the dose of Wellbutrin may need to be decreased before increasing Prozac dose, which was discussed today. Patient instructed to discuss Gabapentin with Pain management at upcoming appointment 2/23/23 and to update provider regarding current provider for mental health and medications as past note did not have accurate information. Patient to contact provider if symptoms worsen or fail to improve.       1/9/23:   Continue Buspar 30 mg by mouth twice daily to target anxiety.     Continue Wellbutrin  mg by mouth daily in the morning to target depression and anxiety.      Continue Remeron 45 mg by mouth nightly to target insomnia and depression.  Taking 1.5 tabs of the 30 mg to equal 45 mg.     Continue Risperdal 1 mg by mouth twice daily to target delusional thoughts as patient believed there was a bug infestation on scalp. No signs of TD noted today.     Start Taper of Cymbalta FROM 120 mg by mouth daily in the morning TO the following:  Cymbalta 30 mg caps ordered-take 3 to equal 90 mg daily for 7 days 1/10-1/16 THEN  Decrease to 60 mg (2 of the 30 mg cap) daily for 7 days 1/17-1/23/23  THEN  Decrease 30 mg (1 cap of 30 mg) daily for 7 days 1/24-1/30/23. THEN STOP    Symptoms of withdrawal:  GI flu-like symptoms, paresthesias (prickling or tingling sensation, like pins and needs), irritability, insomnia, dizziness, or vivid dreams     Increase Hydroxyzine FROM 25 mg 4 times daily as needed TO 50 mg by mouth 3 times daily as needed to target itching and anxiety.  Risks, benefits, alternatives discussed with patient including sedation, dizziness, fall risk, GI upset, and risk of increased CNS depression and elevated heart rate if taken with  other antihistamines.  After discussion of these risks and benefits, the patient voiced understanding and agreed to proceed.    Due to chronic skin picking to head causing significant distress to patient, will plan on tapering down from Cymbalta and switching to Prozac 10 mg.  Discussed risks of withdrawal today with patient.  Also, discussed plans to taper down from Wellbutrin as there is a potential D2D interaction with combination of Wellbutrin with Prozac.  Patient agreeable to plan.    Current symptoms of depression and anxiety are under fair control with current medication regimen, however, due to severe tactile hallucinations her anxiety has been elevated.    Patient to contact provider if symptoms worsen or fail to improve.       11/21/22:   Therapy: Currently Seeing a Therapist Sendy Downing via telephone; Patient would like to seek a therapist in [place] due to current therapist is only via phone and has to wait until Oct. For next appointment. Advised patient to contact insurance company to determine available therapist in area and/or check [facility] and filter results based on needs. And to contact provider if a referral order is needed.  Continue Buspar 30 mg by mouth twice daily to target anxiety.  Instructed to take 2 of the 15 mg on hand to equal 30 mg.     Continue Wellbutrin  mg by mouth daily in the morning to target depression and anxiety.      Continue Remeron 45 mg by mouth nightly to target insomnia and depression.  Taking 1.5 tabs of the 30 mg to equal 45 mg.     Advised patient to inquire with therapist or search on own about  local support groups, Sikh groups, library, and/or adult day a few days per week which will get patient out of the house and develop relationships with other people her age.    Patient continues with ongoing stressors in home, which have not improved, highly encouraged patient to get out of home several days per week which I believe will provide  benefit for patient due to ongoing stress she complains of in the home. Patient continues to ask for other medication or an increase in medications.  Patient is uncertain of amount of Cymbalta she is taking and is to check with daughter.  Patient is on chronic opioids and benzodiazepines will not be added.  Currently on max daily dose of Buspar, Cymbalta, and recently increased both Buspar and Wellbutrin SR at last appointment.  May consider increasing Wellbutrin SR to an additional dose in the evening, however, due to suspected worsening insomnia with Wellbutrin SR, this will have to be revisited.    Patient to contact provider if symptoms worsen or fail to improve.       11/11/22: TELEPHONE  Patient called back and stated her Depression and Anxiety are bad and she don't know why Trish doesn't understand that.  I told patient that Trish is trying to help the patient but giving her Benzodiazepine's is not an option.  I told her that Trish had said she needs to go to therapy and find other ways of coping rather than just taking a pill.  Patient said she is seeing a Therapist at [facility].  I explained to her that at her age there is more chance of falling with the Benzodiazepine's and I am sure she doesn't want to fall and break a hip.  Patient said OK I told her to have a good weekend she said you too and hung up      10/17/22:   Current with therapy, Sendy with [facility] via telephone; Patient would like to seek a therapist in [place] due to current therapist is only via phone and has to wait until Oct. For next appointment. Advised patient to contact insurance company to determine available therapist in area and/or check [facility] and filter results based on needs. And to contact provider if a referral order is needed.    Increase Buspar from 15 mg to 30 mg by mouth twice daily to target anxiety.  Instructed to take 2 of the 15 mg on hand to equal 30 mg.     Increase Wellbutrin SR from 150 mg to 200 mg by  mouth daily in the morning.  Risks, benefits, alternatives discussed with patient including nausea, GI upset, increased energy, exacerbation of irritability, insomnia, lowering of seizure threshold.  After discussion of these risks and benefits, the patient voiced understanding and agreed to proceed.Risks, benefits, alternatives discussed with patient including GI upset, nausea vomiting diarrhea, theoretical decrease of seizure threshold predisposing the patient to a slightly higher seizure risk, headaches, sexual dysfunction, serotonin syndrome, bleeding risk, increased suicidality in patients 24 years and younger.  After discussion of these risks and benefits, the patient voiced understanding and agreed to proceed.    Continue Remeron 45 mg by mouth nightly to target insomnia and depression.  Taking 1.5 tabs of the 30 mg to equal 45 mg.     Discontinued Clonazepam due to limited use from 5/2022-9/9/22, and filled once on 9/9/22.      Patient depression is chronic and ongoing, will increase Wellbutrin today and Buspar to help with anxiety as patient will no longer be given Klonopin.  Patient had limited use of tapered dose originally ordered per  5/19/22, patient did fill early Sept. However, due to limited use and tolerating taper over 4 months, will not reorder. Patient also has increased risk of falls, memory impairment and other comorbid conditions.  Encouraged patient to have a visual calendar for all family to see and to have all appointments listed to prevent further over scheduling as daughter transports patient to appointments. Patient current stressors are environmental and situational, encourage use of coping mechanisms and frequent visits with therapist. Informed patient weekly visits were not appropriate with this provider as medications can take up to 4-6 weeks for effectiveness.  Will see patient back in 5 weeks. Patient to contact provider if symptoms worsen or fail to improve.     9/9/22:    Current with therapist, Sendy Downing  CHANGE Buspar to 15 mg by mouth twice daily to target anxiety.  Patient reports taking as needed, possibly daily, will remain at current dose and make routine twice daily instead of as previously prescribed as 3 times daily.  Risks, benefits, alternatives discussed with patient including nausea, GI upset, mild sedation, falls risk.  After discussion of these risks and benefits, the patient voiced understanding and agreed to proceed. Instructed to Place in pill planner for am and pm     INCREASE Wellbutrin SR from 100 mg to 150 mg by mouth daily in the morning.  Risks, benefits, alternatives discussed with patient including nausea, GI upset, increased energy, exacerbation of irritability, insomnia, lowering of seizure threshold.  After discussion of these risks and benefits, the patient voiced understanding and agreed to proceed.Risks, benefits, alternatives discussed with patient including GI upset, nausea vomiting diarrhea, theoretical decrease of seizure threshold predisposing the patient to a slightly higher seizure risk, headaches, sexual dysfunction, serotonin syndrome, bleeding risk, increased suicidality in patients 24 years and younger.  After discussion of these risks and benefits, the patient voiced understanding and agreed to proceed.    Remeron order is for 45 mg by mouth nightly to target insomnia and depression.  Patient reported only taking 30 mg and failure to read bottle.  INSTRUCTED PT  TO TAKE 1.5 tabs (break one tablet in half to equal 15 mg with the 1 whole tablet of 30 mg to equal 45 mg) Risks, benefits, alternatives discussed with patient including GI upset, sedation, dizziness with falls risk, increased appetite.  After discussion of these risks and benefits, the patient voiced understanding and agreed to proceed.    Instructed patient : HAVE YOUR DAUGHTER PUT THE NEW WELLBUTRIN DOSE IN YOUR PLANNER,  AND THE BUSPAR IN BOTH MORNING AND NIGHT BOXES,  AND the REMERON she or you will need to break some of the tablets in half to equal correct dose. Next time I will fill for the 45 mg tabs.     SUGGEST YOUR DAUGHTER MANAGE ALL OF YOUR MEDICATIONS TO PREVENT CONFUSION AND/OR MISSED DOSES, WANT YOU TO FEEL BETTER AND NEED YOUR MEDICATIONS TO BE TAKEN AS ORDERED.    Continue Clonazepam 1 mg by mouth daily as needed to target anxiety. Risks, benefits, and alternatives discussed with patient including sedation/falls risk, dizziness, disinhibition, headache, fatigue, dry mouth, blurred vision, constipation, nausea, diarrhea, heartburn, unusual taste in mouth, urinary retention, increased appetite, restlessness, sexual dysfunction, sweating, weight gain, cardiac arrhythmias, seizures, and fall risk.  After discussion of these risks and benefits, patient voiced understanding and agreed to proceed.  Delmer reviewed, NEW prescription per  5/19/22, tapering down dose, as patient was prescribed twice daily.  Last Dispensed 5/19/22 #30.    Plan to obtain UDS and sign CSA in future, although patient admits to taking, she has not filled since new prescription 5/19/22 which has 1 refill. Will plan to inquire further at next appointment, if patient is in fact rarely using will either discontinue or decrease to 0.5 mg.      Patient presentation seems most consistent with anxiety and depression.  Education provided regarding safety concerns with current medication management. Instructions provided to have daughter manage all medications due to memory impairment as patient was inconsistent with information regarding doses, frequency, and adherence of medications today.   Patient to contact provider if symptoms worsen or fail to improve.        Patient screened positive for depression based on a PHQ-9 score of 16 on 10/4/24. Follow-up recommendations include: Prescribed antidepressant medication treatment and Suicide Risk Assessment performed.       TREATMENT PLAN/GOALS:  Continue supportive psychotherapy efforts and medications as indicated. Treatment and medication options discussed during today's visit. Patient acknowledged and verbally consented to continue with current treatment plan and was educated on the importance of compliance with treatment and follow-up appointments.    MEDICATION ISSUES:  KOSTA reviewed as expected.   Discussed medication options and treatment plan of prescribed medication as well as the risks, benefits, and side effects including potential falls, possible impaired driving and metabolic adversities among others. Patient is agreeable to call the office with any worsening of symptoms or onset of side effects. Patient is agreeable to call 911 or go to the nearest ER should he/she begin having SI/HI. No medication side effects or related complaints today.     MEDS ORDERED DURING VISIT:  New Medications Ordered This Visit   Medications    FLUoxetine (PROzac) 10 MG capsule     Sig: Take 3 capsules by mouth Every Morning for 60 days. Indications: Generalized Anxiety Disorder, Major Depressive Disorder     Dispense:  90 capsule     Refill:  1       Return in about 7 weeks (around 3/7/2025) for Video visit, medication check.       I spent 35 minutes caring for Chasity on this date of service. This time includes time spent by me in the following activities: preparing for the visit, reviewing tests, obtaining and/or reviewing a separately obtained history, performing a medically appropriate examination and/or evaluation, counseling and educating the patient/family/caregiver, ordering medications, tests, or procedures, referring and communicating with other health care professionals, documenting information in the medical record, care coordination, and scheduling    This document has been electronically signed by DIANE Sanches  January 18, 2025 07:57 EST      Part of this note may be an electronic transcription/translation of spoken language to printed text using  the Dragon Dictation System.

## 2025-01-17 NOTE — PATIENT INSTRUCTIONS
"SPECIFIC RECOMMENDATIONS:     1.      Medications discussed at this encounter:                   -  INCREASE Prozac FROM 20 mg TO 30 mg (new prescription is for 10 mg capsules, START TAKING 3 of the 10 mg to equal 30 mg)    2.      Psychotherapy recommendations: Continue with current therapist. Schedule appointment      3.     Return to clinic: 7 weeks Friday 3/7/25 at 10am via video    Please arrive at least 15 minutes before your scheduled appointment time to complete check in process.      IF you are scheduled for a Video [facility] VIDEO visit, YOU MUST COMPLETE THE \"E-CHECK IN\" PROCESS PRIOR TO BEGINNING THE VISIT, You may still complete the E-Check in for in office visits prior to appointment, you will receive multiple text/email reminders which will direct you further if needed.            "

## 2025-01-21 DIAGNOSIS — F41.9 ANXIETY: ICD-10-CM

## 2025-01-21 RX ORDER — CLONAZEPAM 0.5 MG/1
0.5 TABLET ORAL 2 TIMES DAILY PRN
Qty: 60 TABLET | Refills: 0 | OUTPATIENT
Start: 2025-01-21

## 2025-01-21 RX ORDER — CLONAZEPAM 0.5 MG/1
0.5 TABLET ORAL 2 TIMES DAILY PRN
Qty: 60 TABLET | Refills: 0 | Status: CANCELLED | OUTPATIENT
Start: 2025-01-21

## 2025-01-22 DIAGNOSIS — F41.9 ANXIETY: ICD-10-CM

## 2025-01-23 RX ORDER — CLONAZEPAM 0.5 MG/1
0.5 TABLET ORAL 2 TIMES DAILY PRN
Qty: 60 TABLET | Refills: 0 | Status: SHIPPED | OUTPATIENT
Start: 2025-01-23

## 2025-01-26 DIAGNOSIS — K21.9 GASTROESOPHAGEAL REFLUX DISEASE, UNSPECIFIED WHETHER ESOPHAGITIS PRESENT: ICD-10-CM

## 2025-01-27 ENCOUNTER — TELEPHONE (OUTPATIENT)
Dept: PSYCHIATRY | Facility: CLINIC | Age: 70
End: 2025-01-27
Payer: MEDICARE

## 2025-01-27 ENCOUNTER — OFFICE VISIT (OUTPATIENT)
Dept: FAMILY MEDICINE CLINIC | Age: 70
End: 2025-01-27
Payer: MEDICARE

## 2025-01-27 VITALS
BODY MASS INDEX: 47.12 KG/M2 | DIASTOLIC BLOOD PRESSURE: 58 MMHG | HEIGHT: 64 IN | HEART RATE: 71 BPM | WEIGHT: 276 LBS | SYSTOLIC BLOOD PRESSURE: 119 MMHG | TEMPERATURE: 97.7 F | OXYGEN SATURATION: 95 %

## 2025-01-27 DIAGNOSIS — I50.32 CHRONIC DIASTOLIC HEART FAILURE: ICD-10-CM

## 2025-01-27 DIAGNOSIS — E11.9 TYPE 2 DIABETES MELLITUS WITHOUT COMPLICATION, WITHOUT LONG-TERM CURRENT USE OF INSULIN: ICD-10-CM

## 2025-01-27 DIAGNOSIS — N18.30 STAGE 3 CHRONIC KIDNEY DISEASE, UNSPECIFIED WHETHER STAGE 3A OR 3B CKD: ICD-10-CM

## 2025-01-27 DIAGNOSIS — I10 ESSENTIAL HYPERTENSION: ICD-10-CM

## 2025-01-27 DIAGNOSIS — E55.9 VITAMIN D DEFICIENCY: ICD-10-CM

## 2025-01-27 DIAGNOSIS — E78.00 HYPERCHOLESTEROLEMIA: ICD-10-CM

## 2025-01-27 DIAGNOSIS — J06.9 UPPER RESPIRATORY TRACT INFECTION, UNSPECIFIED TYPE: Primary | ICD-10-CM

## 2025-01-27 DIAGNOSIS — K21.9 GASTROESOPHAGEAL REFLUX DISEASE, UNSPECIFIED WHETHER ESOPHAGITIS PRESENT: ICD-10-CM

## 2025-01-27 DIAGNOSIS — E83.42 HYPOMAGNESEMIA: ICD-10-CM

## 2025-01-27 LAB
EXPIRATION DATE: NORMAL
FLUAV AG UPPER RESP QL IA.RAPID: NOT DETECTED
FLUBV AG UPPER RESP QL IA.RAPID: NOT DETECTED
INTERNAL CONTROL: NORMAL
Lab: NORMAL
SARS-COV-2 AG UPPER RESP QL IA.RAPID: NOT DETECTED

## 2025-01-27 PROCEDURE — 99214 OFFICE O/P EST MOD 30 MIN: CPT | Performed by: FAMILY MEDICINE

## 2025-01-27 PROCEDURE — 3078F DIAST BP <80 MM HG: CPT | Performed by: FAMILY MEDICINE

## 2025-01-27 PROCEDURE — 3074F SYST BP LT 130 MM HG: CPT | Performed by: FAMILY MEDICINE

## 2025-01-27 PROCEDURE — 1126F AMNT PAIN NOTED NONE PRSNT: CPT | Performed by: FAMILY MEDICINE

## 2025-01-27 PROCEDURE — 96372 THER/PROPH/DIAG INJ SC/IM: CPT | Performed by: FAMILY MEDICINE

## 2025-01-27 PROCEDURE — 87428 SARSCOV & INF VIR A&B AG IA: CPT | Performed by: FAMILY MEDICINE

## 2025-01-27 RX ORDER — PANTOPRAZOLE SODIUM 40 MG/1
40 TABLET, DELAYED RELEASE ORAL DAILY
Qty: 90 TABLET | Refills: 0 | Status: SHIPPED | OUTPATIENT
Start: 2025-01-27

## 2025-01-27 RX ORDER — DIPHENHYDRAMINE HYDROCHLORIDE 25 MG/1
1 CAPSULE, LIQUID FILLED ORAL DAILY
Qty: 1 EACH | Refills: 0 | Status: SHIPPED | OUTPATIENT
Start: 2025-01-27

## 2025-01-27 RX ORDER — FLUTICASONE PROPIONATE 50 MCG
2 SPRAY, SUSPENSION (ML) NASAL DAILY
Qty: 16 G | Refills: 0 | Status: SHIPPED | OUTPATIENT
Start: 2025-01-27

## 2025-01-27 RX ADMIN — CYANOCOBALAMIN 1000 MCG: 1000 INJECTION, SOLUTION INTRAMUSCULAR; SUBCUTANEOUS at 14:30

## 2025-01-27 NOTE — PROGRESS NOTES
Chasity Torres presents to be seen at [facility]      Chief Complaint:  stress, insomnia    Subjective     History of Present Illness:  HPI  I am following up with Chasity today for acute respiratory infection.  She was recently seen by pulmonology and treated for COPD exacerbation.  She is currently on Trelegy and albuterol inhaler and they put her on a tapering dose of steroids.  She still complains of chronic ongoing fatigue and shortness of air.  She is not very active and does not exercise at all and I know that deconditioning is a part of this.  She also has underlying congestive heart failure with no current signs of acute on chronic CHF. Cardiologist is  Dr. George. Last echo showed LV ejection fraction 65 to 70% with normal valvular structure and function. She is on torsemide and spironolactone and tolerates meds well and has minimal LE edema.  Her lungs are clear on exam today.  She has no cough but mild rhinorrhea and sinus congestion.  Denies fever/myalgias.  Her blood pressure is looking stable again with changes in medication from last visit.  She presents with chronic depression and sadness and is under management with psychiatry and overall is managing better.  Her  psychiatrist is Best Paz.  She has no suicidal or homicidal thoughts.    She has sleep apnea and she wears CPAP at night    She is on Aricept for chronic ongoing memory issues    She has a history of diabetes with morbid obesity and PN pain.  She is currently on Mounjaro and tolerates med well.  She has lost approximately 37 pounds but has gained back 4 pounds in the last couple months her last hemoglobin A1c was 5.8 %.  She rarely checks her home blood sugar.  She denies hypoglycemia symptoms.  She has peripheral neuropathy but no other acute foot issues.  She is aware she needs to see the eye doctor once a year.  She defers dietitian referral and is not following a diabetic diet.      She also has chronic hypercholesterolemia, she is  "overall stable on low-cholesterol diet due for cholesterol.  Defer statin treatment      She has underlying history of CHF, on torsemide 20 mg twice daily.  She tolerates med well.  No acute symptoms.  No lower extremity edema or shortness of air.  She does she has not had a [facility]     She has a history of diabetes.  She is currently on Mounjaro and tolerates med well.  She has lost approximately 30 pounds and recently lost another 7 pounds this past month.  Her last hemoglobin A1c was 5.8 %.  She rarely checks her home blood sugar.  She denies hypoglycemia symptoms.  She has peripheral neuropathy but no other acute foot issues.  She is aware she needs to see the eye doctor once a year.  She defers dietitian referral and is not following a diabetic diet.      Result Review   The following data was reviewed by Ami Diego MD on 09/30/2024.  Lab Results   Component Value Date    WBC 6.53 12/23/2024    HGB 14.6 12/23/2024    HCT 44.6 12/23/2024    MCV 94.5 12/23/2024     12/23/2024     Lab Results   Component Value Date    GLUCOSE 102 (H) 11/01/2024    BUN 14 11/01/2024    CREATININE 1.26 (H) 11/01/2024     11/01/2024    K 4.6 11/01/2024     11/01/2024    CALCIUM 10.0 11/01/2024    PROTEINTOT 7.1 11/01/2024    ALBUMIN 3.8 11/01/2024    ALT 15 11/01/2024    AST 20 11/01/2024    ALKPHOS 123 (H) 11/01/2024    BILITOT 0.3 11/01/2024    GLOB 3.3 11/01/2024    AGRATIO 1.2 11/01/2024    BCR 11.1 11/01/2024    ANIONGAP 10.6 11/01/2024    EGFR 46.6 (L) 11/01/2024     Lab Results   Component Value Date    CHOL 167 07/03/2024    CHLPL 166 12/16/2020    TRIG 105 07/03/2024    HDL 59 07/03/2024    LDL 89 07/03/2024     Lab Results   Component Value Date    TSH 0.774 02/21/2024     Lab Results   Component Value Date    HGBA1C 5.80 (H) 07/03/2024     No results found for: \"PSA\"  Lab Results   Component Value Date    Iron 104 11/21/2024    Iron Saturation (TSAT) 31 11/21/2024      Lab Results "   Component Value Date    ZPCA17FC 41.6 07/03/2024               Assessment and Plan:   Diagnoses and all orders for this visit:    1. Upper respiratory tract infection, unspecified type (Primary)  Comments:  COVID and flu negative.  Appears to be viral URI.  Flonase nasal spray called in for symptoms  Orders:  -     POCT SARS-CoV-2 Antigen DEANDRE + Flu    2. Type 2 diabetes mellitus without complication, without long-term current use of insulin  Comments:  stable and well controlled, she lost her BS monitor, will reorder, her last hgba1c was 5. 8 %-due for repeat today, Eye exam UTD (6 mo ago-new cataracts).  Orders:  -     Blood Glucose Monitoring Suppl (Blood Glucose Monitor System) w/Device kit; Use Daily to test blood glucose.  Dispense: 1 each; Refill: 0  -     Tirzepatide 15 MG/0.5ML solution auto-injector; Inject 15 mg under the skin into the appropriate area as directed 1 (One) Time Per Week.  Dispense: 2 mL; Refill: 2  -     Hemoglobin A1c; Future    3. Essential hypertension  Comments:  BP is stable and well-controlled.  No acute issues.  Continue current treatment plan    4. Chronic diastolic heart failure  Comments:  She has shortness of air but no other findings of diastolic heart failure.  Will continue current treatment plan    5. Gastroesophageal reflux disease, unspecified whether esophagitis present  Comments:  Overall stable.  No changes in current treatment plan    6. Hypomagnesemia  Comments:  She is on magnesium supplementation and due for repeat labs to adjust Tx plan  Orders:  -     Magnesium; Future    7. Hypercholesterolemia  Comments:  Due for repeat labs.  She is on low-cholesterol diet and trying to lose weight.  Orders:  -     Lipid Panel; Future    8. Stage 3 chronic kidney disease, unspecified whether stage 3a or 3b CKD  Comments:  She is to avoid NSAIDs and push fluids.  64 ounces a day.    9. Vitamin D deficiency  Comments:  Stable on vitamin D supplementation.  Will check labs and  adjust Tx plan pending results  Orders:  -     Vitamin D,25-Hydroxy; Future    Other orders  -     fluticasone (FLONASE) 50 MCG/ACT nasal spray; Administer 2 sprays into the nostril(s) as directed by provider Daily.  Dispense: 16 g; Refill: 0            Objective     Medications:  Current Outpatient Medications   Medication Instructions    albuterol sulfate  (90 Base) MCG/ACT inhaler 2 puffs, Inhalation, Every 4 Hours PRN    Blood Glucose Monitoring Suppl (Blood Glucose Monitor System) w/Device kit Use Daily to test blood glucose.    Calcium Carb-Cholecalciferol (Calcium 500 + D) 500-3.125 MG-MCG tablet 1 each, Oral, 2 Times Daily    cefdinir (OMNICEF) 300 mg, Oral, 2 Times Daily    clonazePAM (KLONOPIN) 0.5 mg, Oral, 2 Times Daily PRN    diclofenac (VOLTAREN) 75 mg, Oral, 2 Times Daily    donepezil (ARICEPT) 10 mg, Oral, Nightly    ferrous sulfate 325 mg, Oral, Daily With Breakfast    FLUoxetine (PROzac) 10 MG capsule Take 3 capsules by mouth Every Morning    fluticasone (FLONASE) 50 MCG/ACT nasal spray 2 sprays, Nasal, Daily    Fluticasone-Umeclidin-Vilant (TRELEGY ELLIPTA) 200-62.5-25 MCG/ACT inhaler 1 puff, Inhalation, Daily - RT    glucose blood test strip Use as instructed to check blood sugar daily    HYDROcodone-acetaminophen (NORCO)  MG per tablet     ipratropium-albuterol (DUO-NEB) 0.5-2.5 mg/3 ml nebulizer Nebulize and inhale 1 vial 4 (Four) Times a Day As Needed for Wheezing.    Lancet Device misc 1 each, Not Applicable, Daily, Use as directed; check blood sugar once daily    Lancets (OneTouch Delica Plus [id number]) misc 1 each, Not Applicable, Daily    Magnesium Oxide -Mg Supplement 400 mg, Oral, Nightly    memantine (NAMENDA) 5 mg, Oral, Daily    metoprolol succinate XL (TOPROL-XL) 12.5 mg, Oral, Daily    miconazole (Desenex) 2 % powder Apply topically to the appropriate area as directed 2 (Two) Times a Day.    mirtazapine (REMERON) 7.5 MG tablet Take 1 tablet by mouth Every Night.     "Mounjaro 15 mg, Subcutaneous, Weekly    mupirocin (BACTROBAN) 2 % ointment APPLY TO AFFECTED AREA(S) TWO TIMES A DAY AS DIRECTED FOR 14 DAYS -    naloxone (NARCAN) 4 MG/0.1ML nasal spray 1 spray, As Needed    O2 (OXYGEN) 2 L/min, Nightly    pantoprazole (PROTONIX) 40 mg, Oral, Daily    pramipexole (MIRAPEX) 0.5 mg, Oral, 3 Times Daily    predniSONE (DELTASONE) 10 MG tablet Take 4 tabs daily x 3 days, then take 3 tabs daily x 3 days, then take 2 tabs daily x 3 days, then take 1 tab daily x 3 days    pregabalin (LYRICA) 75 mg, Oral, 2 Times Daily    primidone (MYSOLINE) 50 mg    spironolactone (ALDACTONE) 25 mg, Oral, 2 Times Daily    tacrolimus (PROTOPIC) 0.1 % ointment Apply topical ointment to affected area twice daily    torsemide (DEMADEX) 20 mg, Oral, 2 Times Daily    triamcinolone (KENALOG) 0.025 % cream As Needed        Vital Signs:   /58 (BP Location: Right arm, Patient Position: Sitting, Cuff Size: Large Adult)   Pulse 71   Temp 97.7 °F (36.5 °C) (Oral)   Ht 162.6 cm (64.02\")   Wt 125 kg (276 lb)   SpO2 95%   BMI 47.35 kg/m²             Physical Exam:  Physical Exam  Vitals and nursing note reviewed.   Constitutional:       General: She is not in acute distress.     Appearance: Normal appearance. She is obese. She is not ill-appearing, toxic-appearing or diaphoretic.   HENT:      Head: Normocephalic and atraumatic.      Right Ear: Tympanic membrane, ear canal and external ear normal.      Left Ear: Tympanic membrane, ear canal and external ear normal.      Nose: Congestion and rhinorrhea present.      Mouth/Throat:      Mouth: Mucous membranes are moist.      Pharynx: Oropharynx is clear. No oropharyngeal exudate or posterior oropharyngeal erythema.   Eyes:      Extraocular Movements: Extraocular movements intact.      Conjunctiva/sclera: Conjunctivae normal.      Pupils: Pupils are equal, round, and reactive to light.   Cardiovascular:      Rate and Rhythm: Normal rate and regular rhythm.      " Heart sounds: Normal heart sounds. No murmur heard.  Pulmonary:      Effort: Pulmonary effort is normal.      Breath sounds: Normal breath sounds. No wheezing, rhonchi or rales.   Abdominal:      General: Abdomen is flat.      Palpations: Abdomen is soft. There is no mass.      Tenderness: There is no abdominal tenderness.      Hernia: No hernia is present.   Musculoskeletal:      Cervical back: Neck supple. No rigidity.      Right lower leg: No edema.      Left lower leg: No edema.   Lymphadenopathy:      Cervical: No cervical adenopathy.   Skin:     General: Skin is warm and dry.   Neurological:      General: No focal deficit present.      Mental Status: She is alert and oriented to person, place, and time. Mental status is at baseline.      Motor: Weakness present.      Comments: She has a mild tremor at rest with her hands and occasionally with her head   Psychiatric:         Mood and Affect: Mood is depressed. Affect is flat.         Speech: Speech normal.         Behavior: Behavior normal. Behavior is cooperative.         Thought Content: Thought content normal.         Cognition and Memory: Memory is impaired.         Judgment: Judgment normal.           Review of Systems:  Review of Systems   Constitutional:  Positive for fatigue. Negative for chills and fever.   HENT:  Positive for congestion, postnasal drip and rhinorrhea. Negative for ear pain, sinus pressure and sore throat.    Eyes:  Negative for blurred vision and double vision.   Respiratory:  Negative for cough, shortness of breath and wheezing.    Cardiovascular:  Negative for chest pain, palpitations and leg swelling.   Gastrointestinal:  Negative for abdominal pain, blood in stool, constipation, diarrhea, nausea and vomiting.   Neurological:  Positive for tremors and weakness. Negative for dizziness and headache.   Psychiatric/Behavioral:  Positive for sleep disturbance and depressed mood. Negative for suicidal ideas. The patient is  nervous/anxious.               Follow Up   Return in about 1 month (around 2/27/2025), or if symptoms worsen or fail to improve, for Recheck.    Part of this note may be an electronic transcription/translation of spoken language to printed   text using the Dragon Dictation System.            Medical History:  Medications Discontinued During This Encounter   Medication Reason    Blood Glucose Monitoring Suppl device Reorder    Tirzepatide 12.5 MG/0.5ML solution auto-injector         Past Medical History:    Adverse effect of other viral vaccines, initial encounter    Covid vaccine    Allergic fungal sinusitis    Allergic rhinitis    Anxiety disorder    Asthma    CHF (congestive heart failure)    COPD with acute exacerbation    CTS (carpal tunnel syndrome)    left    Difficulty walking    Essential (primary) hypertension    Essential tremor    Hypercholesterolemia    Insomnia    Irritable bowel syndrome    Low back pain    Major depressive disorder    Morbid (severe) obesity due to excess calories    Nasal congestion    Neuropathy in diabetes    Obstructive sleep apnea (adult) (pediatric)    Other hereditary and idiopathic neuropathies    Pain in left hip    Pain in right toe(s)    Pain in thoracic spine    Peripheral neuropathy    Hands    Primary generalized (osteo)arthritis    Primary insomnia    Pulmonary emphysema    Restless leg syndrome    SOBOE (shortness of breath on exertion)    Substance abuse    Type 2 diabetes mellitus without complication, without long-term current use of insulin    Vitamin D deficiency     Past Surgical History:    CARPAL TUNNEL RELEASE    COLONOSCOPY    ENDOSCOPIC FUNCTIONAL SINUS SURGERY (FESS)    Procedure: ENDOSCOPIC FUNCTIONAL SINUS SURGERY, left maxillary antrostomy with removal of contents, possible left ethmoidectomy;  Surgeon: Johnny Calderon MD;  Location: [facility] [place];  Service: ENT;  Laterality: Left;    HYSTERECTOMY    complete- ovarian cysts      Family History    Problem Relation Age of Onset    Hypertension Mother     COPD Father     Heart failure Father     Neuropathy Sister     Sleep apnea Sister     Restless legs syndrome Sister     Sleep apnea Brother     Anxiety disorder Daughter     Depression Daughter     Malig Hyperthermia Neg Hx     Breast cancer Neg Hx     Ovarian cancer Neg Hx     Uterine cancer Neg Hx     Cervical cancer Neg Hx     Colon cancer Neg Hx     Stomach cancer Neg Hx     Skin cancer Neg Hx     Clotting disorder Neg Hx     Deep vein thrombosis Neg Hx     Pulmonary embolism Neg Hx      Social History     Tobacco Use    Smoking status: Former     Current packs/day: 0.00     Average packs/day: 0.5 packs/day for 12.0 years (6.0 ttl pk-yrs)     Types: Cigarettes     Start date: 2009     Quit date: 2021     Years since quittin.0     Passive exposure: Never    Smokeless tobacco: Never   Substance Use Topics    Alcohol use: Not Currently       Health Maintenance Due   Topic Date Due    DIABETIC EYE EXAM  2024    HEMOGLOBIN A1C  2025    URINE MICROALBUMIN  2025        Immunization History   Administered Date(s) Administered    Arexvy (RSV, Adults 60+ yrs) 2024    COVID-19 (ANGELA) 2021    COVID-19 (MODERNA) 1st,2nd,3rd Dose Monovalent 2021    COVID-19 (PFIZER) 12YRS+ (COMIRNATY) 2024, 05/15/2024    Covid-19 (Pfizer) Gray Cap Monovalent 2022    Fluzone  >6mos 10/07/2013    Fluzone (or Fluarix & Flulaval for VFC) >6mos 10/10/2017    Fluzone High-Dose 65+YRS 2024    Fluzone High-Dose 65+yrs 2022, 2022, 10/18/2023    Influenza Seasonal Injectable 2012    Pneumococcal Conjugate 13-Valent (PCV13) 2020    Pneumococcal Polysaccharide (PPSV23) 2020    Shingrix 2023, 2023    Tdap 2015    Zostavax 2008       No Known Allergies

## 2025-01-27 NOTE — PATIENT INSTRUCTIONS
"Exercises to do While Sitting    Exercises that you do while sitting (chair exercises) can give you many of the same benefits as full exercise. Benefits include strengthening your heart, burning calories, and keeping muscles and joints healthy. Exercise can also improve your mood and help with depression and anxiety.  You may benefit from chair exercises if you are unable to do standing exercises due to:  Diabetic foot pain.  Obesity.  Illness.  Arthritis.  Recovery from surgery or injury.  Breathing problems.  Balance problems.  Another type of disability.  Before starting chair exercises, check with your health care provider or a physical therapist to find out how much exercise you can tolerate and which exercises are safe for you. If your health care provider approves:  Start out slowly and build up over time. Aim to work up to about 10-20 minutes for each exercise session.  Make exercise part of your daily routine.  Drink water when you exercise. Do not wait until you are thirsty. Drink every 10-15 minutes.  Stop exercising right away if you have pain, nausea, shortness of breath, or dizziness.  If you are exercising in a wheelchair, make sure to lock the wheels.  Ask your health care provider whether you can do roger chi or yoga. Many positions in these mind-body exercises can be modified to do while seated.  Warm-up  Before starting other exercises:  Sit up as straight as you can. Have your knees bent at 90 degrees, which is the shape of the capital letter \"L.\" Keep your feet flat on the floor.  Sit at the front edge of your chair, if you can.  Pull in (tighten) the muscles in your abdomen and stretch your spine and neck as straight as you can. Hold this position for a few minutes.  Breathe in and out evenly. Try to concentrate on your breathing, and relax your mind.  Stretching  Exercise A: Arm stretch  Hold your arms out straight in front of your body.  Bend your hands at the wrist with your fingers pointing " "up, as if signaling someone to stop. Notice the slight tension in your forearms as you hold the position.  Keeping your arms out and your hands bent, rotate your hands outward as far as you can and hold this stretch. Aim to have your thumbs pointing up and your pinkie fingers pointing down.  Slowly repeat arm stretches for one minute as tolerated.  Exercise B: Leg stretch  If you can move your legs, try to \"draw\" letters on the floor with the toes of your foot. Write your name with one foot.  Write your name with the toes of your other foot.  Slowly repeat the movements for one minute as tolerated.  Exercise C: Reach for the suzanne  Reach your hands as far over your head as you can to stretch your spine.  Move your hands and arms as if you are climbing a rope.  Slowly repeat the movements for one minute as tolerated.  Range of motion exercises  Exercise A: Shoulder roll  Let your arms hang loosely at your sides.  Lift just your shoulders up toward your ears, then let them relax back down.  When your shoulders feel loose, rotate your shoulders in backward and forward circles.  Do shoulder rolls slowly for one minute as tolerated.  Exercise B: March in place  As if you are marching, pump your arms and lift your legs up and down. Lift your knees as high as you can.  If you are unable to lift your knees, just pump your arms and move your ankles and feet up and down.  March in place for one minute as tolerated.  Exercise C: Seated jumping jacks  Let your arms hang down straight.  Keeping your arms straight, lift them up over your head. Aim to point your fingers to the ceiling.  While you lift your arms, straighten your legs and slide your heels along the floor to your sides, as wide as you can.  As you bring your arms back down to your sides, slide your legs back together.  If you are unable to use your legs, just move your arms.  Slowly repeat seated jumping jacks for one minute as tolerated.  Strengthening " exercises  Exercise A: Shoulder squeeze  Hold your arms straight out from your body to your sides, with your elbows bent and your fists pointed at the ceiling.  Keeping your arms in the bent position, move them forward so your elbows and forearms meet in front of your face.  Open your arms back out as wide as you can with your elbows still bent, until you feel your shoulder blades squeezing together. Hold for 5 seconds.  Slowly repeat the movements forward and backward for one minute as tolerated.  Contact a health care provider if:  You have to stop exercising due to any of the following:  Pain.  Nausea.  Shortness of breath.  Dizziness.  Fatigue.  You have significant pain or soreness after exercising.  Get help right away if:  You have chest pain.  You have difficulty breathing.  These symptoms may represent a serious problem that is an emergency. Do not wait to see if the symptoms will go away. Get medical help right away. Call your local emergency services (911 in the [place] Do not drive yourself to the hospital.  Summary  Exercises that you do while sitting (chair exercises) can strengthen your heart, burn calories, and keep muscles and joints healthy.  You may benefit from chair exercises if you are unable to do standing exercises due to diabetic foot pain, obesity, recovery from surgery or injury, or other conditions.  Before starting chair exercises, check with your health care provider or a physical therapist to find out how much exercise you can tolerate and which exercises are safe for you.  This information is not intended to replace advice given to you by your health care provider. Make sure you discuss any questions you have with your health care provider.  Document Revised: 02/13/2022 Document Reviewed: 02/13/2022  Elsevier Patient Education © 2023 [employer]

## 2025-01-29 ENCOUNTER — TELEPHONE (OUTPATIENT)
Dept: FAMILY MEDICINE CLINIC | Age: 70
End: 2025-01-29

## 2025-01-29 NOTE — TELEPHONE ENCOUNTER
Yes I agree.  Her lungs were clear on exam yesterday with no signs of pneumonia or heart failure.  She should call back if she needs to be seen, shortness of air does not improve or worsens.  Dr. Diego

## 2025-01-29 NOTE — TELEPHONE ENCOUNTER
Pt called back and she said that she does not have a ride and she put on her oxygen and it has helped some I still advised her to call pulmonologist to ask about the steroids she did not complete . Pt said that she has not been using albuterol HFA every 4 hours I reminded her to do so . I advised if no improvement urgent care or ER

## 2025-01-30 NOTE — PROGRESS NOTES
Primary Care Provider  Ami Diego MD   Referring Provider  No ref. provider found      Patient Complaint  Acute, Shortness of Breath, and Runny Nose      Subjective          Chasity Torres presents to [facility] PULMONARY [facility]ARE MEDICINE      History of Presenting Illness  Chasity Torres is a 69 y.o. female patient of Dr. Houston with chronic hypoxic respiratory failure, asthma, mild COPD, chronic diastolic heart failure, anxiety/depression, MEGAN, chronic dyspnea, and tobacco use in remission, here for acute visit.    Patient presents with increased shortness of breath, runny nose.  Her symptoms have been present for about 5 days, was seen by her PCP earlier in the week and tested for flu and COVID which were both negative.  Patient denies using any antibiotics or steroids for her lungs recently, denies any fevers or chills, no ER visits or hospitalizations for her breathing since she was last seen.  Her last hospitalization for her breathing was in 2023.  Patient's baseline shortness of breath is usually mild in severity, improves with rest.  She was previously on Dupixent for frequent exacerbations, but discontinued.  She continues to use Trelegy 200 daily as well as her albuterol inhaler as needed.  Patient has DuoNeb treatments at home as well, but rarely uses them.  Patient wears her CPAP at night and with naps with oxygen bled in.  She usually wears her 2 L supplemental oxygen continuously.  Patient is a former smoker, quit 4 years ago, 6 pack years.  She denies any hemoptysis, swollen lymph nodes, unintentional weight loss, or night sweats.  She continues to see Trish Paz with psychiatry, who is managing her psychiatric medications for anxiety and depression.  She also sees a therapist.  Patient continues to see Dr. George with cardiology for chronic diastolic heart failure.  Patient is able to perform ADLs with minor modifications, uses walker when out.  I have personally  reviewed the review of systems, past family, social, medical and surgical histories; and agree with their findings.      Review of Systems    Review of Systems   Constitutional:  Negative for activity change, chills, fatigue, fever, unexpected weight gain and unexpected weight loss.   HENT:  Positive for rhinorrhea. Negative for congestion, ear discharge, ear pain, mouth sores, postnasal drip, sinus pressure, sore throat, swollen glands and trouble swallowing.    Eyes:  Negative for blurred vision, pain, discharge, itching and visual disturbance.   Respiratory:  Positive for shortness of breath (worse than baseline). Negative for apnea, cough, chest tightness, wheezing and stridor.    Cardiovascular:  Negative for chest pain, palpitations and leg swelling.   Gastrointestinal:  Negative for abdominal distention, abdominal pain, constipation, diarrhea, nausea, vomiting, GERD and indigestion.   Musculoskeletal:  Negative for arthralgias, joint swelling and myalgias.   Skin:  Negative for color change.   Neurological:  Negative for dizziness, weakness, light-headedness and headache.      Sleep: Negative for Excessive daytime sleepiness  Negative for morning headaches  Negative for Snoring      Family History   Problem Relation Age of Onset    Hypertension Mother     COPD Father     Heart failure Father     Neuropathy Sister     Sleep apnea Sister     Restless legs syndrome Sister     Sleep apnea Brother     Anxiety disorder Daughter     Depression Daughter     Malig Hyperthermia Neg Hx     Breast cancer Neg Hx     Ovarian cancer Neg Hx     Uterine cancer Neg Hx     Cervical cancer Neg Hx     Colon cancer Neg Hx     Stomach cancer Neg Hx     Skin cancer Neg Hx     Clotting disorder Neg Hx     Deep vein thrombosis Neg Hx     Pulmonary embolism Neg Hx         Social History     Socioeconomic History    Marital status:     Number of children: 2   Tobacco Use    Smoking status: Former     Current packs/day: 0.00      Average packs/day: 0.5 packs/day for 12.0 years (6.0 ttl pk-yrs)     Types: Cigarettes     Start date: 2009     Quit date: 2021     Years since quittin.0     Passive exposure: Never    Smokeless tobacco: Never   Vaping Use    Vaping status: Never Used   Substance and Sexual Activity    Alcohol use: Not Currently    Drug use: Never    Sexual activity: Not Currently     Partners: Male     Birth control/protection: Hysterectomy, Surgical        Past Medical History:   Diagnosis Date    Adverse effect of other viral vaccines, initial encounter     Covid vaccine    Allergic fungal sinusitis 2022    Allergic rhinitis     Anxiety disorder     Asthma     CHF (congestive heart failure)     COPD with acute exacerbation     CTS (carpal tunnel syndrome)     left    Difficulty walking     Essential (primary) hypertension     Essential tremor     Hypercholesterolemia 10/18/2021    Insomnia     Irritable bowel syndrome     Low back pain     Major depressive disorder     Morbid (severe) obesity due to excess calories     Nasal congestion     Neuropathy in diabetes     Obstructive sleep apnea (adult) (pediatric)     Other hereditary and idiopathic neuropathies     Pain in left hip     Pain in right toe(s)     Pain in thoracic spine     Peripheral neuropathy     Hands    Primary generalized (osteo)arthritis     Primary insomnia     Pulmonary emphysema 2023    Restless leg syndrome     SOBOE (shortness of breath on exertion)     Substance abuse     Type 2 diabetes mellitus without complication, without long-term current use of insulin 2022    Vitamin D deficiency         Immunization History   Administered Date(s) Administered    Arexvy (RSV, Adults 60+ yrs) 2024    COVID-19 (ANGELA) 2021    COVID-19 (MODERNA) 1st,2nd,3rd Dose Monovalent 2021    COVID-19 (PFIZER) 12YRS+ (COMIRNATY) 2024, 05/15/2024    Covid-19 (Pfizer) Gray Cap Monovalent 2022    Fluzone  >6mos  10/07/2013    Fluzone (or Fluarix & Flulaval for VFC) >6mos 10/10/2017    Fluzone High-Dose 65+YRS 11/21/2024    Fluzone High-Dose 65+yrs 03/31/2022, 09/30/2022, 10/18/2023    Influenza Seasonal Injectable 12/20/2012    Pneumococcal Conjugate 13-Valent (PCV13) 09/29/2020    Pneumococcal Polysaccharide (PPSV23) 11/30/2020    Shingrix 03/28/2023, 06/27/2023    Tdap 05/20/2015    Zostavax 09/16/2008       No Known Allergies       Current Outpatient Medications:     albuterol sulfate  (90 Base) MCG/ACT inhaler, Inhale 2 puffs Every 4 (Four) Hours As Needed for Wheezing or Shortness of Air., Disp: 18 g, Rfl: 11    Blood Glucose Monitoring Suppl (Blood Glucose Monitor System) w/Device kit, Use Daily to test blood glucose., Disp: 1 each, Rfl: 0    Calcium Carb-Cholecalciferol (Calcium 500 + D) 500-3.125 MG-MCG tablet, Take 1 each by mouth 2 (Two) Times a Day., Disp: 60 tablet, Rfl: 2    clonazePAM (KlonoPIN) 0.5 MG tablet, Take 1 tablet by mouth 2 (Two) Times a Day As Needed for Anxiety., Disp: 60 tablet, Rfl: 0    diclofenac (VOLTAREN) 75 MG EC tablet, Take 1 tablet by mouth 2 (Two) Times a Day., Disp: 60 tablet, Rfl: 2    donepezil (ARICEPT) 10 MG tablet, Take 1 tablet by mouth Every Night., Disp: 30 tablet, Rfl: 2    ferrous sulfate 325 (65 FE) MG tablet, Take 1 tablet by mouth Daily With Breakfast., Disp: 90 tablet, Rfl: 1    FLUoxetine (PROzac) 10 MG capsule, Take 3 capsules by mouth Every Morning, Disp: 90 capsule, Rfl: 1    fluticasone (FLONASE) 50 MCG/ACT nasal spray, Administer 2 sprays into the nostril(s) as directed by provider Daily., Disp: 16 g, Rfl: 0    Fluticasone-Umeclidin-Vilant (TRELEGY ELLIPTA) 200-62.5-25 MCG/ACT inhaler, Inhale 1 puff Daily., Disp: 60 each, Rfl: 5    glucose blood test strip, Use as instructed to check blood sugar daily, Disp: 100 each, Rfl: 3    HYDROcodone-acetaminophen (NORCO)  MG per tablet, , Disp: , Rfl:     ipratropium-albuterol (DUO-NEB) 0.5-2.5 mg/3 ml nebulizer,  Nebulize and inhale 1 vial 4 (Four) Times a Day As Needed for Wheezing., Disp: 360 mL, Rfl: 3    Lancet Device misc, Use 1 each Daily. Use as directed; check blood sugar once daily, Disp: 100 each, Rfl: 3    Lancets (OneTouch Delica Plus [id number]) misc, Use 1 each Daily., Disp: 100 each, Rfl: 0    Magnesium Oxide -Mg Supplement 400 (240 Mg) MG tablet, Take 1 tablet by mouth Every Night., Disp: 30 tablet, Rfl: 11    memantine (Namenda) 5 MG tablet, Take 1 tablet by mouth Daily., Disp: 30 tablet, Rfl: 2    metoprolol succinate XL (TOPROL-XL) 25 MG 24 hr tablet, Take 0.5 tablets by mouth Daily., Disp: , Rfl:     miconazole (Desenex) 2 % powder, Apply topically to the appropriate area as directed 2 (Two) Times a Day., Disp: 71 g, Rfl: 11    mirtazapine (REMERON) 7.5 MG tablet, Take 1 tablet by mouth Every Night., Disp: 30 tablet, Rfl: 1    mupirocin (BACTROBAN) 2 % ointment, APPLY TO AFFECTED AREA(S) TWO TIMES A DAY AS DIRECTED FOR 14 DAYS -, Disp: 22 g, Rfl: 0    naloxone (NARCAN) 4 MG/0.1ML nasal spray, 1 spray As Needed. Has on hand, Disp: , Rfl:     O2 (OXYGEN), Inhale 2 L/min Every Night., Disp: , Rfl:     pantoprazole (PROTONIX) 40 MG EC tablet, TAKE 1 TABLET BY MOUTH DAILY, Disp: 90 tablet, Rfl: 0    pramipexole (MIRAPEX) 0.5 MG tablet, Take 1 tablet by mouth 3 (Three) Times a Day., Disp: 270 tablet, Rfl: 0    pregabalin (LYRICA) 75 MG capsule, Take 1 capsule by mouth 2 (Two) Times a Day., Disp: 60 capsule, Rfl: 2    primidone (MYSOLINE) 50 MG tablet, Take 1 tablet by mouth., Disp: , Rfl:     spironolactone (ALDACTONE) 25 MG tablet, Take 1 tablet by mouth 2 (Two) Times a Day., Disp: 60 tablet, Rfl: 5    tacrolimus (PROTOPIC) 0.1 % ointment, Apply topical ointment to affected area twice daily, Disp: , Rfl:     Tirzepatide 15 MG/0.5ML solution auto-injector, Inject 15 mg under the skin into the appropriate area as directed 1 (One) Time Per Week., Disp: 2 mL, Rfl: 2    torsemide (DEMADEX) 20 MG tablet, Take 1  "tablet by mouth 2 (Two) Times a Day., Disp: 60 tablet, Rfl: 5    triamcinolone (KENALOG) 0.025 % cream, Apply 1 Application topically to the appropriate area as directed As Needed., Disp: , Rfl:     cefdinir (OMNICEF) 300 MG capsule, Take 1 capsule by mouth 2 (Two) Times a Day., Disp: 14 capsule, Rfl: 0    predniSONE (DELTASONE) 10 MG tablet, Take 4 tabs daily x 3 days, then take 3 tabs daily x 3 days, then take 2 tabs daily x 3 days, then take 1 tab daily x 3 days, Disp: 31 tablet, Rfl: 0    Current Facility-Administered Medications:     cyanocobalamin injection 1,000 mcg, 1,000 mcg, Intramuscular, Q28 Days, Ami Diego MD, 1,000 mcg at 01/27/25 1430    methylPREDNISolone sodium succinate (SOLU-Medrol) injection 62.5 mg, 62.5 mg, Intramuscular, Once, Lucy Winchester APRN     Objective     Vital Signs:   BP (!) 164/107 (BP Location: Right arm, Patient Position: Sitting, Cuff Size: Large Adult)   Pulse 80   Temp 97.7 °F (36.5 °C) (Oral)   Resp 18   Ht 162.6 cm (64.02\")   Wt 127 kg (279 lb 3.2 oz)   SpO2 97% Comment: room air  BMI 47.89 kg/m²     Physical Exam  Constitutional:       General: She is not in acute distress.     Appearance: Normal appearance. She is obese. She is ill-appearing.   HENT:      Right Ear: Tympanic membrane and ear canal normal.      Left Ear: Tympanic membrane and ear canal normal.      Nose: Nose normal.      Mouth/Throat:      Mouth: Mucous membranes are moist.      Pharynx: Oropharynx is clear.   Eyes:      Extraocular Movements: Extraocular movements intact.      Conjunctiva/sclera: Conjunctivae normal.      Pupils: Pupils are equal, round, and reactive to light.   Cardiovascular:      Rate and Rhythm: Normal rate and regular rhythm.      Pulses: Normal pulses.      Heart sounds: Normal heart sounds.   Pulmonary:      Effort: Pulmonary effort is normal. No respiratory distress.      Breath sounds: No stridor. Wheezing present. No rhonchi or rales.   Abdominal:      General: " Bowel sounds are normal.      Palpations: Abdomen is soft.   Musculoskeletal:         General: No swelling. Normal range of motion.      Cervical back: Normal range of motion and neck supple.      Right lower leg: No edema.      Left lower leg: No edema.   Skin:     General: Skin is warm and dry.   Neurological:      General: No focal deficit present.      Mental Status: She is alert and oriented to person, place, and time.      Motor: No weakness.   Psychiatric:         Mood and Affect: Mood normal.         Behavior: Behavior normal.        Result Review :   I have personally reviewed patient's labs and images.  I also reviewed my last office note 10/7/2024.       Diagnoses and all orders for this visit:    1. Chronic obstructive pulmonary disease, unspecified COPD type (Primary)    2. Mild persistent asthma without complication    3. Chronic respiratory failure with hypoxia    4. Pulmonary emphysema, unspecified emphysema type    5. Dyspnea on exertion  -     XR Chest 2 View; Future  -     methylPREDNISolone sodium succinate (SOLU-Medrol) injection 62.5 mg    6. Wheezing  -     methylPREDNISolone sodium succinate (SOLU-Medrol) injection 62.5 mg    7. MEGAN on CPAP    8. Seasonal allergies    9. Chronic diastolic heart failure    10. Anxiety    11. Tobacco abuse, in remission    12. Morbid obesity with BMI of 45.0-49.9, adult      Impression and Plan    -PFTs 1/3/2024 showed mild obstruction FEV1 71% predicted, no significant response to bronchodilator, normal TLC, mild air trapping present, DLCO normal.  Compared to her previous PFTs almost 2 years ago, these are similar results.  -Methacholine challenge 2/3/2023 positive test for asthma/reactive airway disease  -CT chest 7/2/2024 negative for pulmonary embolism, lungs clear, no lymphadenopathy, there was evidence of calcified granulomatous disease  -Echocardiogram 6/30/2022 showed EF 51 to 55%  -Solu-Medrol injection administered in clinic today, patient instructed  to continue with prednisone taper that she has at home.  Encouraged patient to use her home oxygen and DuoNeb treatments more frequently during periods of increased shortness of breath.  CXR ordered to rule out pneumonia, will prescribe antibiotic if pneumonia seen on chest imaging.  -Continue wearing 2 L supplemental oxygen at night and with activity, since June 2024  -Continue wearing CPAP nightly and with naps with oxygen bled in  -Patient discontinued Dupixent injections  -Continue using Trelegy 200 daily, reminded patient to rinse mouth after each use.  -Continue using albuterol inhaler and DuoNeb treatments as needed  -Continue using incentive spirometer for atelectasis on chest CT  -Continue following up with Dr. George with cardiology for management for heart failure on beta-blocker, Lasix 40 mg twice daily, hydralazine  -Continue following up with DIANE Sanches with psychiatry and therapist  -Keep regularly scheduled appointment with myself 4/17/2025     Smoking status: Reviewed  Vaccination status:  Patient reports she is up-to-date with her Covid vaccines.  She has also gotten the RSV vaccine.  Patient is advised to continue to follow CDC recommendations such as social distancing wearing a mask and washing hands for at least 20 seconds.  Medications personally reviewed      Follow Up   No follow-ups on file.  Patient was given instructions and counseling regarding her condition or for health maintenance advice. Please see specific information pulled into the AVS if appropriate.

## 2025-01-31 ENCOUNTER — HOSPITAL ENCOUNTER (OUTPATIENT)
Facility: HOSPITAL | Age: 70
Discharge: HOME | End: 2025-01-31
Payer: MEDICARE

## 2025-01-31 ENCOUNTER — OFFICE VISIT (OUTPATIENT)
Dept: PULMONOLOGY | Facility: CLINIC | Age: 70
End: 2025-01-31
Payer: MEDICARE

## 2025-01-31 VITALS
HEART RATE: 80 BPM | SYSTOLIC BLOOD PRESSURE: 164 MMHG | WEIGHT: 279.2 LBS | DIASTOLIC BLOOD PRESSURE: 107 MMHG | RESPIRATION RATE: 18 BRPM | OXYGEN SATURATION: 97 % | HEIGHT: 64 IN | TEMPERATURE: 97.7 F | BODY MASS INDEX: 47.66 KG/M2

## 2025-01-31 DIAGNOSIS — E66.01 MORBID OBESITY WITH BMI OF 45.0-49.9, ADULT: ICD-10-CM

## 2025-01-31 DIAGNOSIS — J45.30 MILD PERSISTENT ASTHMA WITHOUT COMPLICATION: ICD-10-CM

## 2025-01-31 DIAGNOSIS — R06.2 WHEEZING: ICD-10-CM

## 2025-01-31 DIAGNOSIS — R06.09 DYSPNEA ON EXERTION: ICD-10-CM

## 2025-01-31 DIAGNOSIS — J96.11 CHRONIC RESPIRATORY FAILURE WITH HYPOXIA: ICD-10-CM

## 2025-01-31 DIAGNOSIS — J43.9 PULMONARY EMPHYSEMA, UNSPECIFIED EMPHYSEMA TYPE: ICD-10-CM

## 2025-01-31 DIAGNOSIS — I50.32 CHRONIC DIASTOLIC HEART FAILURE: ICD-10-CM

## 2025-01-31 DIAGNOSIS — F41.9 ANXIETY: ICD-10-CM

## 2025-01-31 DIAGNOSIS — F17.201 TOBACCO ABUSE, IN REMISSION: ICD-10-CM

## 2025-01-31 DIAGNOSIS — J30.2 SEASONAL ALLERGIES: ICD-10-CM

## 2025-01-31 DIAGNOSIS — G47.33 OSA ON CPAP: ICD-10-CM

## 2025-01-31 DIAGNOSIS — J44.9 CHRONIC OBSTRUCTIVE PULMONARY DISEASE, UNSPECIFIED COPD TYPE: Primary | ICD-10-CM

## 2025-01-31 PROCEDURE — 71046 X-RAY EXAM CHEST 2 VIEWS: CPT

## 2025-01-31 RX ORDER — METHYLPREDNISOLONE SODIUM SUCCINATE 125 MG/2ML
62.5 INJECTION INTRAMUSCULAR; INTRAVENOUS ONCE
Status: COMPLETED | OUTPATIENT
Start: 2025-01-31 | End: 2025-01-31

## 2025-01-31 RX ADMIN — METHYLPREDNISOLONE SODIUM SUCCINATE 62.5 MG: 125 INJECTION INTRAMUSCULAR; INTRAVENOUS at 11:35

## 2025-02-07 ENCOUNTER — LAB (OUTPATIENT)
Dept: LAB | Facility: HOSPITAL | Age: 70
End: 2025-02-07
Payer: MEDICARE

## 2025-02-07 DIAGNOSIS — E55.9 VITAMIN D DEFICIENCY: ICD-10-CM

## 2025-02-07 DIAGNOSIS — E78.00 HYPERCHOLESTEROLEMIA: ICD-10-CM

## 2025-02-07 DIAGNOSIS — E83.42 HYPOMAGNESEMIA: ICD-10-CM

## 2025-02-07 DIAGNOSIS — E11.9 TYPE 2 DIABETES MELLITUS WITHOUT COMPLICATION, WITHOUT LONG-TERM CURRENT USE OF INSULIN: ICD-10-CM

## 2025-02-07 LAB
25(OH)D3 SERPL-MCNC: 33.3 NG/ML (ref 30–100)
CHOLEST SERPL-MCNC: 226 MG/DL (ref 0–200)
HBA1C MFR BLD: 5.6 % (ref 4.8–5.6)
HDLC SERPL-MCNC: 48 MG/DL (ref 40–60)
LDLC SERPL CALC-MCNC: 146 MG/DL (ref 0–100)
LDLC/HDLC SERPL: 2.96 {RATIO}
MAGNESIUM SERPL-MCNC: 2.3 MG/DL (ref 1.6–2.4)
TRIGL SERPL-MCNC: 180 MG/DL (ref 0–150)
VLDLC SERPL-MCNC: 32 MG/DL (ref 5–40)

## 2025-02-07 PROCEDURE — 83036 HEMOGLOBIN GLYCOSYLATED A1C: CPT

## 2025-02-07 PROCEDURE — 80061 LIPID PANEL: CPT

## 2025-02-07 PROCEDURE — 83735 ASSAY OF MAGNESIUM: CPT

## 2025-02-07 PROCEDURE — 82306 VITAMIN D 25 HYDROXY: CPT

## 2025-02-07 PROCEDURE — 36415 COLL VENOUS BLD VENIPUNCTURE: CPT

## 2025-02-14 ENCOUNTER — LAB (OUTPATIENT)
Dept: LAB | Facility: HOSPITAL | Age: 70
End: 2025-02-14
Payer: MEDICARE

## 2025-02-14 ENCOUNTER — OFFICE VISIT (OUTPATIENT)
Dept: FAMILY MEDICINE CLINIC | Age: 70
End: 2025-02-14
Payer: MEDICARE

## 2025-02-14 VITALS
DIASTOLIC BLOOD PRESSURE: 68 MMHG | HEIGHT: 64 IN | HEART RATE: 64 BPM | TEMPERATURE: 97.4 F | SYSTOLIC BLOOD PRESSURE: 122 MMHG | WEIGHT: 293 LBS | BODY MASS INDEX: 50.02 KG/M2 | OXYGEN SATURATION: 99 %

## 2025-02-14 DIAGNOSIS — R60.0 BILATERAL LOWER EXTREMITY EDEMA: ICD-10-CM

## 2025-02-14 DIAGNOSIS — R05.1 ACUTE COUGH: ICD-10-CM

## 2025-02-14 DIAGNOSIS — R06.02 SHORTNESS OF BREATH: ICD-10-CM

## 2025-02-14 DIAGNOSIS — I50.32 CHRONIC DIASTOLIC HEART FAILURE: Primary | ICD-10-CM

## 2025-02-14 LAB
ALBUMIN SERPL-MCNC: 3.2 G/DL (ref 3.5–5.2)
ALBUMIN/GLOB SERPL: 0.8 G/DL
ALP SERPL-CCNC: 130 U/L (ref 39–117)
ALT SERPL W P-5'-P-CCNC: 19 U/L (ref 1–33)
ANION GAP SERPL CALCULATED.3IONS-SCNC: 7.3 MMOL/L (ref 5–15)
AST SERPL-CCNC: 16 U/L (ref 1–32)
BILIRUB SERPL-MCNC: <0.2 MG/DL (ref 0–1.2)
BUN SERPL-MCNC: 17 MG/DL (ref 8–23)
BUN/CREAT SERPL: 10.7 (ref 7–25)
CALCIUM SPEC-SCNC: 9.5 MG/DL (ref 8.6–10.5)
CHLORIDE SERPL-SCNC: 104 MMOL/L (ref 98–107)
CO2 SERPL-SCNC: 32.7 MMOL/L (ref 22–29)
CREAT SERPL-MCNC: 1.59 MG/DL (ref 0.57–1)
EGFRCR SERPLBLD CKD-EPI 2021: 35 ML/MIN/1.73
EXPIRATION DATE: NORMAL
FLUAV AG UPPER RESP QL IA.RAPID: NOT DETECTED
FLUBV AG UPPER RESP QL IA.RAPID: NOT DETECTED
GLOBULIN UR ELPH-MCNC: 4 GM/DL
GLUCOSE SERPL-MCNC: 85 MG/DL (ref 65–99)
INTERNAL CONTROL: NORMAL
Lab: NORMAL
NT-PROBNP SERPL-MCNC: 357 PG/ML (ref 0–900)
POTASSIUM SERPL-SCNC: 4.5 MMOL/L (ref 3.5–5.2)
PROT SERPL-MCNC: 7.2 G/DL (ref 6–8.5)
SARS-COV-2 AG UPPER RESP QL IA.RAPID: NOT DETECTED
SODIUM SERPL-SCNC: 144 MMOL/L (ref 136–145)

## 2025-02-14 PROCEDURE — 3044F HG A1C LEVEL LT 7.0%: CPT | Performed by: STUDENT IN AN ORGANIZED HEALTH CARE EDUCATION/TRAINING PROGRAM

## 2025-02-14 PROCEDURE — 99214 OFFICE O/P EST MOD 30 MIN: CPT | Performed by: STUDENT IN AN ORGANIZED HEALTH CARE EDUCATION/TRAINING PROGRAM

## 2025-02-14 PROCEDURE — 80053 COMPREHEN METABOLIC PANEL: CPT

## 2025-02-14 PROCEDURE — 3078F DIAST BP <80 MM HG: CPT | Performed by: STUDENT IN AN ORGANIZED HEALTH CARE EDUCATION/TRAINING PROGRAM

## 2025-02-14 PROCEDURE — 83880 ASSAY OF NATRIURETIC PEPTIDE: CPT

## 2025-02-14 PROCEDURE — 87428 SARSCOV & INF VIR A&B AG IA: CPT | Performed by: STUDENT IN AN ORGANIZED HEALTH CARE EDUCATION/TRAINING PROGRAM

## 2025-02-14 PROCEDURE — 1126F AMNT PAIN NOTED NONE PRSNT: CPT | Performed by: STUDENT IN AN ORGANIZED HEALTH CARE EDUCATION/TRAINING PROGRAM

## 2025-02-14 PROCEDURE — 36415 COLL VENOUS BLD VENIPUNCTURE: CPT

## 2025-02-14 PROCEDURE — 3074F SYST BP LT 130 MM HG: CPT | Performed by: STUDENT IN AN ORGANIZED HEALTH CARE EDUCATION/TRAINING PROGRAM

## 2025-02-14 NOTE — PROGRESS NOTES
Chief Complaint     Leg Swelling (Pt c/o bilateral leg swelling./Onset since Monday) and Cough (Pt c/o cough, SOB since Monday.)    History of Present Illness     Chasity Torres is a 69 y.o. female who presents to [facility] with complaints of lower extremity swelling and shortness of air that started this past Monday (2/10/25). Is also having HA, nasal congestion, body aches and cough these started about a week or more ago.      Denies any chest pain, fever, chills, nausea, vomiting, diarrhea, dizziness.     History      Past Medical History:   Diagnosis Date    Adverse effect of other viral vaccines, initial encounter     Covid vaccine    Allergic fungal sinusitis 07/29/2022    Allergic rhinitis     Anxiety disorder     Asthma     CHF (congestive heart failure)     COPD with acute exacerbation     CTS (carpal tunnel syndrome)     left    Difficulty walking 2022    Essential (primary) hypertension     Essential tremor     Hypercholesterolemia 10/18/2021    Insomnia     Irritable bowel syndrome     Low back pain     Major depressive disorder     Morbid (severe) obesity due to excess calories     Nasal congestion     Neuropathy in diabetes 2023    Obstructive sleep apnea (adult) (pediatric)     Other hereditary and idiopathic neuropathies     Pain in left hip     Pain in right toe(s)     Pain in thoracic spine     Peripheral neuropathy     Hands    Primary generalized (osteo)arthritis     Primary insomnia     Pulmonary emphysema 01/05/2023    Restless leg syndrome     SOBOE (shortness of breath on exertion)     Substance abuse     Type 2 diabetes mellitus without complication, without long-term current use of insulin 05/02/2022    Vitamin D deficiency        Past Surgical History:   Procedure Laterality Date    CARPAL TUNNEL RELEASE      COLONOSCOPY  2020    ENDOSCOPIC FUNCTIONAL SINUS SURGERY (FESS) Left 08/15/2022    Procedure: ENDOSCOPIC FUNCTIONAL SINUS SURGERY, left maxillary  antrostomy with removal of contents, possible left ethmoidectomy;  Surgeon: Johnny Calderon MD;  [place] [facility] [place] [facility] [place]  Service: ENT;  Laterality: Left;    HYSTERECTOMY  2002    complete- ovarian cysts       Family History   Problem Relation Age of Onset    Hypertension Mother     COPD Father     Heart failure Father     Neuropathy Sister     Sleep apnea Sister     Restless legs syndrome Sister     Sleep apnea Brother     Anxiety disorder Daughter     Depression Daughter     Malig Hyperthermia Neg Hx     Breast cancer Neg Hx     Ovarian cancer Neg Hx     Uterine cancer Neg Hx     Cervical cancer Neg Hx     Colon cancer Neg Hx     Stomach cancer Neg Hx     Skin cancer Neg Hx     Clotting disorder Neg Hx     Deep vein thrombosis Neg Hx     Pulmonary embolism Neg Hx         Current Medications        Current Outpatient Medications:     albuterol sulfate  (90 Base) MCG/ACT inhaler, Inhale 2 puffs Every 4 (Four) Hours As Needed for Wheezing or Shortness of Air., Disp: 18 g, Rfl: 11    Blood Glucose Monitoring Suppl (Blood Glucose Monitor System) w/Device kit, Use Daily to test blood glucose., Disp: 1 each, Rfl: 0    Calcium Carb-Cholecalciferol (Calcium 500 + D) 500-3.125 MG-MCG tablet, Take 1 each by mouth 2 (Two) Times a Day., Disp: 60 tablet, Rfl: 2    clonazePAM (KlonoPIN) 0.5 MG tablet, Take 1 tablet by mouth 2 (Two) Times a Day As Needed for Anxiety., Disp: 60 tablet, Rfl: 0    diclofenac (VOLTAREN) 75 MG EC tablet, Take 1 tablet by mouth 2 (Two) Times a Day., Disp: 60 tablet, Rfl: 2    donepezil (ARICEPT) 10 MG tablet, Take 1 tablet by mouth Every Night., Disp: 30 tablet, Rfl: 2    ferrous sulfate 325 (65 FE) MG tablet, Take 1 tablet by mouth Daily With Breakfast., Disp: 90 tablet, Rfl: 1    FLUoxetine (PROzac) 10 MG capsule, Take 3 capsules by mouth Every Morning, Disp: 90 capsule, Rfl: 1    fluticasone (FLONASE) 50 MCG/ACT nasal spray, Administer 2 sprays into the nostril(s) as directed by provider  Daily., Disp: 16 g, Rfl: 0    Fluticasone-Umeclidin-Vilant (TRELEGY ELLIPTA) 200-62.5-25 MCG/ACT inhaler, Inhale 1 puff Daily., Disp: 60 each, Rfl: 5    glucose blood test strip, Use as instructed to check blood sugar daily, Disp: 100 each, Rfl: 3    HYDROcodone-acetaminophen (NORCO)  MG per tablet, , Disp: , Rfl:     ipratropium-albuterol (DUO-NEB) 0.5-2.5 mg/3 ml nebulizer, Nebulize and inhale 1 vial 4 (Four) Times a Day As Needed for Wheezing., Disp: 360 mL, Rfl: 3    Lancet Device misc, Use 1 each Daily. Use as directed; check blood sugar once daily, Disp: 100 each, Rfl: 3    Lancets (OneTouch Delica Plus Yfidic23C) misc, Use 1 each Daily., Disp: 100 each, Rfl: 0    Magnesium Oxide -Mg Supplement 400 (240 Mg) MG tablet, Take 1 tablet by mouth Every Night., Disp: 30 tablet, Rfl: 11    memantine (Namenda) 5 MG tablet, Take 1 tablet by mouth Daily., Disp: 30 tablet, Rfl: 2    metoprolol succinate XL (TOPROL-XL) 25 MG 24 hr tablet, Take 0.5 tablets by mouth Daily., Disp: , Rfl:     miconazole (Desenex) 2 % powder, Apply topically to the appropriate area as directed 2 (Two) Times a Day., Disp: 71 g, Rfl: 11    mirtazapine (REMERON) 7.5 MG tablet, Take 1 tablet by mouth Every Night., Disp: 30 tablet, Rfl: 1    mupirocin (BACTROBAN) 2 % ointment, APPLY TO AFFECTED AREA(S) TWO TIMES A DAY AS DIRECTED FOR 14 DAYS -, Disp: 22 g, Rfl: 0    naloxone (NARCAN) 4 MG/0.1ML nasal spray, 1 spray As Needed. Has on hand, Disp: , Rfl:     O2 (OXYGEN), Inhale 2 L/min Every Night., Disp: , Rfl:     pantoprazole (PROTONIX) 40 MG EC tablet, TAKE 1 TABLET BY MOUTH DAILY, Disp: 90 tablet, Rfl: 0    pramipexole (MIRAPEX) 0.5 MG tablet, Take 1 tablet by mouth 3 (Three) Times a Day., Disp: 270 tablet, Rfl: 0    pregabalin (LYRICA) 75 MG capsule, Take 1 capsule by mouth 2 (Two) Times a Day., Disp: 60 capsule, Rfl: 2    primidone (MYSOLINE) 50 MG tablet, Take 1 tablet by mouth., Disp: , Rfl:     spironolactone (ALDACTONE) 25 MG tablet,  "Take 1 tablet by mouth 2 (Two) Times a Day., Disp: 60 tablet, Rfl: 5    tacrolimus (PROTOPIC) 0.1 % ointment, Apply topical ointment to affected area twice daily, Disp: , Rfl:     Tirzepatide 15 MG/0.5ML solution auto-injector, Inject 15 mg under the skin into the appropriate area as directed 1 (One) Time Per Week., Disp: 2 mL, Rfl: 2    torsemide (DEMADEX) 20 MG tablet, Take 1 tablet by mouth 2 (Two) Times a Day., Disp: 60 tablet, Rfl: 5    triamcinolone (KENALOG) 0.025 % cream, Apply 1 Application topically to the appropriate area as directed As Needed., Disp: , Rfl:     Current Facility-Administered Medications:     cyanocobalamin injection 1,000 mcg, 1,000 mcg, Intramuscular, Q28 Days, Ami Diego MD, 1,000 mcg at 01/27/25 1430     Allergies     No Known Allergies    Social History       Social History     Social History Narrative    Not on file       Immunizations     Immunization:  Immunization History   Administered Date(s) Administered    Arexvy (RSV, Adults 60+ yrs) 01/11/2024    COVID-19 (ANGELA) 12/28/2021    COVID-19 (MODERNA) 1st,2nd,3rd Dose Monovalent 03/17/2021    COVID-19 (PFIZER) 12YRS+ (COMIRNATY) 01/17/2024, 05/15/2024    Covid-19 (Pfizer) Gray Cap Monovalent 05/09/2022    Fluzone  >6mos 10/07/2013    Fluzone (or Fluarix & Flulaval for VFC) >6mos 10/10/2017    Fluzone High-Dose 65+YRS 11/21/2024    Fluzone High-Dose 65+yrs 03/31/2022, 09/30/2022, 10/18/2023    Influenza Seasonal Injectable 12/20/2012    Pneumococcal Conjugate 13-Valent (PCV13) 09/29/2020    Pneumococcal Polysaccharide (PPSV23) 11/30/2020    Shingrix 03/28/2023, 06/27/2023    Tdap 05/20/2015    Zostavax 09/16/2008          Objective     Objective     Vital Signs:   /68 (BP Location: Left arm, Patient Position: Sitting)   Pulse 64   Temp 97.4 °F (36.3 °C) (Oral)   Ht 162.6 cm (64.02\")   Wt 134 kg (295 lb)   SpO2 99% Comment: room air  BMI 50.61 kg/m²       Physical Exam  Vitals and nursing note reviewed. "   Constitutional:       Appearance: Normal appearance. She is morbidly obese.   HENT:      Head: Normocephalic.      Right Ear: Tympanic membrane, ear canal and external ear normal.      Left Ear: Tympanic membrane, ear canal and external ear normal.      Nose: Nose normal.      Mouth/Throat:      Lips: Pink.      Mouth: Mucous membranes are moist.      Pharynx: Oropharynx is clear. Uvula midline.   Eyes:      Conjunctiva/sclera: Conjunctivae normal.      Pupils: Pupils are equal, round, and reactive to light.   Cardiovascular:      Rate and Rhythm: Normal rate and regular rhythm.      Pulses: Normal pulses.      Heart sounds: Normal heart sounds.      Comments: Has had a 16 pound weight gain since 1/31/25  Pulmonary:      Effort: Pulmonary effort is normal.      Breath sounds: Examination of the right-lower field reveals decreased breath sounds. Examination of the left-lower field reveals decreased breath sounds. No decreased breath sounds.   Abdominal:      General: Bowel sounds are normal.      Palpations: Abdomen is soft.   Musculoskeletal:         General: Normal range of motion.      Cervical back: Normal range of motion and neck supple.      Right lower leg: 3+ Pitting Edema present.      Left lower leg: 3+ Pitting Edema present.   Lymphadenopathy:      Cervical: No cervical adenopathy.   Skin:     General: Skin is warm and dry.   Neurological:      General: No focal deficit present.      Mental Status: She is alert and oriented to person, place, and time.   Psychiatric:         Attention and Perception: Attention normal.         Mood and Affect: Mood and affect normal.         Behavior: Behavior normal. Behavior is cooperative.         Results    The following data was reviewed by: DIANE Sommers on 02/14/25             POCT SARS-CoV-2 Antigen DEANDRE + Flu (02/14/2025 11:59)  proBNP (02/14/2025 12:22)  Comprehensive Metabolic Panel (02/14/2025 12:22)     Assessment and Plan        Assessment and Plan        Chronic diastolic heart failure    Orders:    proBNP; Future    Comprehensive Metabolic Panel; Future  Collaborated with Dr. George, instructed patient to increase torsemide to 40 mg in the morning and continue with 20 mg at night, continue spironolactone 25 mg twice daily.  Dr. George is going to get her in the office in the next week or two to be seen.  Will also check a BMP and a CMP.  Bilateral lower extremity edema    Orders:    proBNP; Future    Comprehensive Metabolic Panel; Future  Educated on elevating feet and the use of compression stockings.  Shortness of breath    Orders:    proBNP; Future    Comprehensive Metabolic Panel; Future  Same as above.  Continue to use albuterol HFA as needed.  Acute cough    Orders:    POCT SARS-CoV-2 Antigen DEANDRE + Flu  Rapid COVID and flu negative     Educated on hospital precautions.      I spent 35 minutes caring for Chasity on this date of service. This time includes time spent by me in the following activities:preparing for the visit, reviewing tests, obtaining and/or reviewing a separately obtained history, performing a medically appropriate examination and/or evaluation , counseling and educating the patient/family/caregiver, ordering medications, tests, or procedures, referring and communicating with other health care professionals , documenting information in the medical record, independently interpreting results and communicating that information with the patient/family/caregiver, and care coordination  Follow Up        Follow Up   Return for With PCP, Next scheduled follow up, sooner if condition worsens.  Patient was given instructions and counseling regarding her condition or for health maintenance advice. Please see specific information pulled into the AVS if appropriate.      Part of this note may be an electronic transcription/translation of spoken language to printed text using the Dragon dictation system.
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
## 2025-02-14 NOTE — ASSESSMENT & PLAN NOTE
Orders:    proBNP; Future    Comprehensive Metabolic Panel; Future  Collaborated with Dr. George, instructed patient to increase torsemide to 40 mg in the morning and continue with 20 mg at night, continue spironolactone 25 mg twice daily.  Dr. George is going to get her in the office in the next week or two to be seen.  Will also check a BMP and a CMP.

## 2025-02-19 DIAGNOSIS — M54.50 CHRONIC BILATERAL LOW BACK PAIN, UNSPECIFIED WHETHER SCIATICA PRESENT: ICD-10-CM

## 2025-02-19 DIAGNOSIS — G89.29 CHRONIC BILATERAL LOW BACK PAIN, UNSPECIFIED WHETHER SCIATICA PRESENT: ICD-10-CM

## 2025-02-19 DIAGNOSIS — I50.32 CHRONIC DIASTOLIC HEART FAILURE: ICD-10-CM

## 2025-02-19 DIAGNOSIS — G62.9 PERIPHERAL POLYNEUROPATHY: ICD-10-CM

## 2025-02-20 RX ORDER — TORSEMIDE 20 MG/1
20 TABLET ORAL 2 TIMES DAILY
Qty: 180 TABLET | Refills: 3 | Status: SHIPPED | OUTPATIENT
Start: 2025-02-20 | End: 2025-02-21

## 2025-02-20 NOTE — TELEPHONE ENCOUNTER
Controlled refill request:  Requested Prescriptions     Pending Prescriptions Disp Refills    pregabalin (LYRICA) 75 MG capsule 60 capsule 2     Sig: Take 1 capsule by mouth 2 (Two) Times a Day.      Last OV:  1/27/2025  Next OV:  2/24/2025  Last fill:  8- #60 with 2   Last tox:  11-  Consent 6-5-2024

## 2025-02-21 ENCOUNTER — OFFICE VISIT (OUTPATIENT)
Age: 70
End: 2025-02-21
Payer: MEDICARE

## 2025-02-21 VITALS
OXYGEN SATURATION: 96 % | DIASTOLIC BLOOD PRESSURE: 85 MMHG | BODY MASS INDEX: 48.83 KG/M2 | HEART RATE: 62 BPM | SYSTOLIC BLOOD PRESSURE: 108 MMHG | HEIGHT: 64 IN | WEIGHT: 286 LBS

## 2025-02-21 DIAGNOSIS — R00.2 PALPITATIONS: ICD-10-CM

## 2025-02-21 DIAGNOSIS — N18.9 CHRONIC KIDNEY DISEASE, UNSPECIFIED CKD STAGE: ICD-10-CM

## 2025-02-21 DIAGNOSIS — I50.32 CHRONIC DIASTOLIC HEART FAILURE: Primary | ICD-10-CM

## 2025-02-21 DIAGNOSIS — I25.10 CORONARY ARTERY CALCIFICATION: ICD-10-CM

## 2025-02-21 DIAGNOSIS — I10 ESSENTIAL HYPERTENSION: ICD-10-CM

## 2025-02-21 RX ORDER — PREGABALIN 75 MG/1
75 CAPSULE ORAL 2 TIMES DAILY
Qty: 60 CAPSULE | Refills: 2 | Status: SHIPPED | OUTPATIENT
Start: 2025-02-21

## 2025-02-21 RX ORDER — TORSEMIDE 20 MG/1
40 TABLET ORAL 2 TIMES DAILY
Qty: 180 TABLET | Refills: 3 | Status: SHIPPED | OUTPATIENT
Start: 2025-02-21

## 2025-02-21 RX ORDER — ATORVASTATIN CALCIUM 10 MG/1
10 TABLET, FILM COATED ORAL DAILY
Qty: 90 TABLET | Refills: 3 | Status: SHIPPED | OUTPATIENT
Start: 2025-02-21

## 2025-02-21 NOTE — ASSESSMENT & PLAN NOTE
She is stable without symptoms of angina. She had SPECT stress test which was negative for any reversible ischemia in May 2023. CT of chest 7/2/2024 showed Multivessel coronary calcification .Most recent lipid panel 2/7/2025 , HDL 48, triglycerides 180.   Patient is not currently on statin, patient is aware of coronary risk and agreeable to start statin. Start atorvastatin 10 mg daily.

## 2025-02-21 NOTE — ASSESSMENT & PLAN NOTE
Patient reports shortness of breath and bilateral leg edema at visit minimal relief with increasing torsemide to 40 mg in am and 20 mg in pm.  However she lost 9 pounds of weight in the past 2 weeks.  Recent proBNP on 2/24/2025 was 357 , which was in normal range. Will increase torsemide to 40 mg twice daily, continue spirolactone 25 mg daily .Will recheck BMP in 1 week. Educated on elevating feet and the use of compression stockings. Patient requested to use ace bandages for compression, advised that would be appropriate.      Because of progressively worsening renal functions, we will make a referral to nephrologist.

## 2025-02-21 NOTE — ASSESSMENT & PLAN NOTE
Blood pressure stable and well-controlled.  Continue metoprolol XL 25 mg 1/2 tablet daily and spironolactone 25 mg twice daily.  Encouraged to continue to monitor blood pressure at home.

## 2025-02-21 NOTE — PROGRESS NOTES
Chief Complaint  Follow-up and Shortness of Breath (Bilateral leg swelling x 1 wk)    Subjective        History of Present Illness  Chasity Torres presents to [facility] CARDIOLOGY     Ms. Torres 69-year-old female here for follow-up hypertension, chronic diastolic heart failure, coronary artery calcification ,and palpitations.  She is reporting increasing shortness of breath and swelling of the feet.  Patient recently saw PCP on 2/14/2025 for shortness of breath and bilateral leg edema.  PCP increased torsemide to 40 mg in the morning and 20 mg at night and to continue the spironolactone 25 mg twice a day.  Patient reports she has had minimal relief with the increase in medication.  Patient does have a 9 pound decrease in her weight since her visit with the PCP.  She reports she has not been using compression stockings but has been elevating her legs.  Recent labs showed her her recent kidney functions were abnormal and look to be worsening.  Her recent pro BNP was normal.     She does report that due to the shortness of breath she is not active.  She does not report chest pain, palpitations,  or syncope episodes.        Past Medical History:   Diagnosis Date    Adverse effect of other viral vaccines, initial encounter     Allergic fungal sinusitis 07/29/2022    Allergic rhinitis     Anxiety disorder     Asthma     CHF (congestive heart failure)     COPD with acute exacerbation     CTS (carpal tunnel syndrome)     Difficulty walking 2022    Essential (primary) hypertension     Essential tremor     Hypercholesterolemia 10/18/2021    Insomnia     Irritable bowel syndrome     Low back pain     Major depressive disorder     Morbid (severe) obesity due to excess calories     Nasal congestion     Neuropathy in diabetes 2023    Obstructive sleep apnea (adult) (pediatric)     Other hereditary and idiopathic neuropathies     Pain in left hip     Pain in right toe(s)     Pain in thoracic spine     Peripheral  neuropathy     Primary generalized (osteo)arthritis     Primary insomnia     Pulmonary emphysema 01/05/2023    Restless leg syndrome     SOBOE (shortness of breath on exertion)     Substance abuse     Type 2 diabetes mellitus without complication, without long-term current use of insulin 05/02/2022    Vitamin D deficiency        No Known Allergies         Social History  She  reports that she quit smoking about 4 years ago. Her smoking use included cigarettes. She started smoking about 16 years ago. She has a 6 pack-year smoking history. She has been exposed to tobacco smoke. She has never used smokeless tobacco. She reports that she does not currently use alcohol. She reports that she does not use drugs.    Family History  Her family history includes Anxiety disorder in her daughter; COPD in her father; Depression in her daughter; Heart failure in her father; Hypertension in her mother; Neuropathy in her sister; Restless legs syndrome in her sister; Sleep apnea in her brother and sister.       Current Outpatient Medications on File Prior to Visit   Medication Sig    albuterol sulfate  (90 Base) MCG/ACT inhaler Inhale 2 puffs Every 4 (Four) Hours As Needed for Wheezing or Shortness of Air.    Blood Glucose Monitoring Suppl (Blood Glucose Monitor System) w/Device kit Use Daily to test blood glucose.    Calcium Carb-Cholecalciferol (Calcium 500 + D) 500-3.125 MG-MCG tablet Take 1 each by mouth 2 (Two) Times a Day.    clonazePAM (KlonoPIN) 0.5 MG tablet Take 1 tablet by mouth 2 (Two) Times a Day As Needed for Anxiety.    diclofenac (VOLTAREN) 75 MG EC tablet Take 1 tablet by mouth 2 (Two) Times a Day.    donepezil (ARICEPT) 10 MG tablet Take 1 tablet by mouth Every Night.    ferrous sulfate 325 (65 FE) MG tablet Take 1 tablet by mouth Daily With Breakfast.    FLUoxetine (PROzac) 10 MG capsule Take 3 capsules by mouth Every Morning    fluticasone (FLONASE) 50 MCG/ACT nasal spray Administer 2 sprays into the  nostril(s) as directed by provider Daily.    Fluticasone-Umeclidin-Vilant (TRELEGY ELLIPTA) 200-62.5-25 MCG/ACT inhaler Inhale 1 puff Daily.    glucose blood test strip Use as instructed to check blood sugar daily    HYDROcodone-acetaminophen (NORCO)  MG per tablet     ipratropium-albuterol (DUO-NEB) 0.5-2.5 mg/3 ml nebulizer Nebulize and inhale 1 vial 4 (Four) Times a Day As Needed for Wheezing.    Lancet Device misc Use 1 each Daily. Use as directed; check blood sugar once daily    Lancets (OneTouch Delica Plus [id number]) misc Use 1 each Daily.    Magnesium Oxide -Mg Supplement 400 (240 Mg) MG tablet Take 1 tablet by mouth Every Night.    memantine (Namenda) 5 MG tablet Take 1 tablet by mouth Daily.    metoprolol succinate XL (TOPROL-XL) 25 MG 24 hr tablet Take 0.5 tablets by mouth Daily.    miconazole (Desenex) 2 % powder Apply topically to the appropriate area as directed 2 (Two) Times a Day.    mirtazapine (REMERON) 7.5 MG tablet Take 1 tablet by mouth Every Night.    mupirocin (BACTROBAN) 2 % ointment APPLY TO AFFECTED AREA(S) TWO TIMES A DAY AS DIRECTED FOR 14 DAYS -    naloxone (NARCAN) 4 MG/0.1ML nasal spray 1 spray As Needed. Has on hand    O2 (OXYGEN) Inhale 2 L/min Every Night.    pantoprazole (PROTONIX) 40 MG EC tablet TAKE 1 TABLET BY MOUTH DAILY    pramipexole (MIRAPEX) 0.5 MG tablet Take 1 tablet by mouth 3 (Three) Times a Day.    pregabalin (LYRICA) 75 MG capsule Take 1 capsule by mouth 2 (Two) Times a Day.    primidone (MYSOLINE) 50 MG tablet Take 1 tablet by mouth.    spironolactone (ALDACTONE) 25 MG tablet Take 1 tablet by mouth 2 (Two) Times a Day.    tacrolimus (PROTOPIC) 0.1 % ointment Apply topical ointment to affected area twice daily    Tirzepatide 15 MG/0.5ML solution auto-injector Inject 15 mg under the skin into the appropriate area as directed 1 (One) Time Per Week.    triamcinolone (KENALOG) 0.025 % cream Apply 1 Application topically to the appropriate area as directed As  "Needed.    [DISCONTINUED] torsemide (DEMADEX) 20 MG tablet Take 1 tablet by mouth 2 (Two) Times a Day. (Patient taking differently: Take 1 tablet by mouth 2 (Two) Times a Day. Pt is taking 40 mg in the morning and 20 mg at night)     Current Facility-Administered Medications on File Prior to Visit   Medication    cyanocobalamin injection 1,000 mcg         Review of Systems   Respiratory:  Positive for shortness of breath. Negative for chest tightness and wheezing.    Cardiovascular:  Positive for leg swelling. Negative for chest pain and palpitations.   Gastrointestinal:  Negative for abdominal pain, nausea and vomiting.   Neurological:  Negative for dizziness, syncope and light-headedness.        Objective   Vitals:    02/21/25 1354   BP: 108/85   BP Location: Left arm   Patient Position: Sitting   Cuff Size: Large Adult   Pulse: 62   SpO2: 96%  Comment: 2 liters at night   Weight: 130 kg (286 lb)   Height: 162.6 cm (64.02\")         Physical Exam  General : Alert, awake, no acute distress  Neck : Supple, no carotid bruit, no jugular venous distention  CVS : Regular rate and rhythm, no murmur, no rubs or gallops  Lungs: Clear to auscultation bilaterally, no crackles or rhonchi  Abdomen: Soft, nontender, bowel sounds active  Extremities: Warm, well-perfused, bilateral leg 2+ pitting edema      Result Review     The following data was reviewed by Jorge George MD  proBNP   Date Value Ref Range Status   02/14/2025 357.0 0.0 - 900.0 pg/mL Final     CMP          7/3/2024    12:18 11/1/2024    13:03 2/14/2025    12:22   CMP   Glucose 87  102  85    BUN 17  14  17    Creatinine 1.03  1.26  1.59    EGFR 59.3  46.6  35.0    Sodium 142  142  144    Potassium 3.5  4.6  4.5    Chloride 99  102  104    Calcium 10.2  10.0  9.5    Total Protein  7.1  7.2    Albumin  3.8  3.2    Globulin  3.3  4.0    Total Bilirubin  0.3  <0.2    Alkaline Phosphatase  123  130    AST (SGOT)  20  16    ALT (SGPT)  15  19    Albumin/Globulin " Ratio  1.2  0.8    BUN/Creatinine Ratio 16.5  11.1  10.7    Anion Gap 10.0  10.6  7.3      CBC w/diff          6/5/2024    14:16 11/1/2024    13:03 12/23/2024    15:54   CBC w/Diff   WBC 7.61  6.88  6.53    RBC 4.83  4.48  4.72    Hemoglobin 13.7  13.7  14.6    Hematocrit 43.6  42.6  44.6    MCV 90.3  95.1  94.5    MCH 28.4  30.6  30.9    MCHC 31.4  32.2  32.7    RDW 14.4  13.0  12.6    Platelets 314  265  299    Neutrophil Rel % 66.2   49.2    Immature Granulocyte Rel % 0.7   0.3    Lymphocyte Rel % 23.3   33.8    Monocyte Rel % 9.1   13.0    Eosinophil Rel % 0.3   3.4    Basophil Rel % 0.4   0.3          Lipid Panel          7/3/2024    12:18 2/7/2025    10:28   Lipid Panel   Total Cholesterol 167  226    Triglycerides 105  180    HDL Cholesterol 59  48    VLDL Cholesterol 19  32    LDL Cholesterol  89  146    LDL/HDL Ratio 1.47  2.96                   Assessment and Plan   Diagnoses and all orders for this visit:    1. Chronic diastolic heart failure (Primary)  Assessment & Plan:  Patient reports shortness of breath and bilateral leg edema at visit minimal relief with increasing torsemide to 40 mg in am and 20 mg in pm.  However she lost 9 pounds of weight in the past 2 weeks.  Recent proBNP on 2/24/2025 was 357 , which was in normal range. Will increase torsemide to 40 mg twice daily, continue spirolactone 25 mg daily .Will recheck BMP in 1 week. Educated on elevating feet and the use of compression stockings. Patient requested to use ace bandages for compression, advised that would be appropriate.      Because of progressively worsening renal functions, we will make a referral to nephrologist.    Orders:  -     torsemide (DEMADEX) 20 MG tablet; Take 2 tablets by mouth 2 (Two) Times a Day.  Dispense: 180 tablet; Refill: 3  -     Basic Metabolic Panel; Future    2. Essential hypertension  Assessment & Plan:  Blood pressure stable and well-controlled.  Continue metoprolol XL 25 mg 1/2 tablet daily and  spironolactone 25 mg twice daily.  Encouraged to continue to monitor blood pressure at home.      3. Palpitations  Assessment & Plan:  No complaints of palpitations at visit today. Continue metoprolol XL 25 mg 1/2 tablet daily.       4. Coronary artery calcification  Assessment & Plan:  She is stable without symptoms of angina. She had SPECT stress test which was negative for any reversible ischemia in May 2023. CT of chest 7/2/2024 showed Multivessel coronary calcification .Most recent lipid panel 2/7/2025 , HDL 48, triglycerides 180.   Patient is not currently on statin, patient is aware of coronary risk and agreeable to start statin. Start atorvastatin 10 mg daily.     Orders:  -     atorvastatin (LIPITOR) 10 MG tablet; Take 1 tablet by mouth Daily.  Dispense: 90 tablet; Refill: 3    5. Chronic kidney disease, unspecified CKD stage  Assessment & Plan:  Recent BMP 2/24/2025 creatinine was elevated at 1.59, alkaline phos was elevated at 130, GFR was decreased at 35. These have slowly worsened . Will refer to nephrology for evaluation.    Orders:  -     Ambulatory Referral to Nephrology                Follow Up   Return in about 12 days (around 3/5/2025) for with DIANE Pacheco. Also cancel appointment next week.    Patient was seen and evaluated with DIANE Park.  I did history and examined the patient.  Discussed management plans with the patient and APRN.           Patient was given instructions and counseling regarding her condition or for health maintenance advice. Please see specific information pulled into the AVS if appropriate.     Signed,  Jorge George MD  02/21/2025     Dictated Utilizing Dragon Dictation: Please note that portions of this note were completed with a voice recognition program.  Part of this note may be an electronic transcription/translation of spoken language to printed text using the Dragon Dictation System.

## 2025-02-21 NOTE — ASSESSMENT & PLAN NOTE
Recent BMP 2/24/2025 creatinine was elevated at 1.59, alkaline phos was elevated at 130, GFR was decreased at 35. These have slowly worsened . Will refer to nephrology for evaluation.

## 2025-02-24 ENCOUNTER — LAB (OUTPATIENT)
Dept: LAB | Facility: HOSPITAL | Age: 70
End: 2025-02-24
Payer: MEDICARE

## 2025-02-24 ENCOUNTER — OFFICE VISIT (OUTPATIENT)
Dept: FAMILY MEDICINE CLINIC | Age: 70
End: 2025-02-24
Payer: MEDICARE

## 2025-02-24 VITALS
SYSTOLIC BLOOD PRESSURE: 132 MMHG | OXYGEN SATURATION: 98 % | HEART RATE: 61 BPM | BODY MASS INDEX: 48.83 KG/M2 | TEMPERATURE: 97.5 F | HEIGHT: 64 IN | DIASTOLIC BLOOD PRESSURE: 81 MMHG | WEIGHT: 286 LBS

## 2025-02-24 DIAGNOSIS — F41.9 ANXIETY: ICD-10-CM

## 2025-02-24 DIAGNOSIS — R60.0 BILATERAL LEG EDEMA: ICD-10-CM

## 2025-02-24 DIAGNOSIS — G89.29 CHRONIC BILATERAL LOW BACK PAIN, UNSPECIFIED WHETHER SCIATICA PRESENT: ICD-10-CM

## 2025-02-24 DIAGNOSIS — N18.30 STAGE 3 CHRONIC KIDNEY DISEASE, UNSPECIFIED WHETHER STAGE 3A OR 3B CKD: ICD-10-CM

## 2025-02-24 DIAGNOSIS — F41.9 ANXIETY AND DEPRESSION: ICD-10-CM

## 2025-02-24 DIAGNOSIS — I50.32 CHRONIC DIASTOLIC HEART FAILURE: ICD-10-CM

## 2025-02-24 DIAGNOSIS — F32.A ANXIETY AND DEPRESSION: ICD-10-CM

## 2025-02-24 DIAGNOSIS — M54.50 CHRONIC BILATERAL LOW BACK PAIN, UNSPECIFIED WHETHER SCIATICA PRESENT: ICD-10-CM

## 2025-02-24 DIAGNOSIS — R06.02 SHORTNESS OF BREATH: Primary | ICD-10-CM

## 2025-02-24 LAB
ANION GAP SERPL CALCULATED.3IONS-SCNC: 12 MMOL/L (ref 5–15)
BUN SERPL-MCNC: 13 MG/DL (ref 8–23)
BUN/CREAT SERPL: 7.9 (ref 7–25)
CALCIUM SPEC-SCNC: 9.6 MG/DL (ref 8.6–10.5)
CHLORIDE SERPL-SCNC: 97 MMOL/L (ref 98–107)
CO2 SERPL-SCNC: 29 MMOL/L (ref 22–29)
CREAT SERPL-MCNC: 1.65 MG/DL (ref 0.57–1)
EGFRCR SERPLBLD CKD-EPI 2021: 33.5 ML/MIN/1.73
GLUCOSE SERPL-MCNC: 77 MG/DL (ref 65–99)
POTASSIUM SERPL-SCNC: 3.9 MMOL/L (ref 3.5–5.2)
SODIUM SERPL-SCNC: 138 MMOL/L (ref 136–145)

## 2025-02-24 PROCEDURE — 36415 COLL VENOUS BLD VENIPUNCTURE: CPT

## 2025-02-24 PROCEDURE — 80048 BASIC METABOLIC PNL TOTAL CA: CPT

## 2025-02-24 NOTE — ASSESSMENT & PLAN NOTE
Vital signs look good, improving with the increase in her diuresis, lungs are clear on exam.  Pt reassured to cont with higher dose of lasix

## 2025-02-24 NOTE — ASSESSMENT & PLAN NOTE
STOP DICLOFENAC, WILL RECHECH RENAL FUNCTION SINCE 9# wt loss with DIURESIS  Orders:    Basic metabolic panel; Future

## 2025-02-24 NOTE — PROGRESS NOTES
Chasity Torres presents to [facility].    Chief Complaint:  SOA    Subjective     History of Present Illness:  HPI     She came in and saw Moraima Whipple with acute LE edema and shortness of air on Feb 14th.  She was also having HA, nasal congestion, body aches and cough and her symptoms all onset a week or more ago.  She denied any chest pain, fever, chills, nausea, vomiting, diarrhea, dizziness. COVID and FLU tests were negative, BNP in normal range at 357, kidney function showed a creatinine that was mildly worse at 1.59 and her GFR was down to 35 from 46.6. She then followed up with her cardiologist Dr. George on 2/21/2025 for her underlying history of hypertension, chronic diastolic heart failure, coronary artery calcification ,and palpitations.  Evidently Moraima Whipple increased her torsemide to 40 mg in the morning and 20 at night and she was to continue the spironolactone 25 mg twice a day.  She reported to have a 9 pound decrease in her weight on this new regimen (295 to 286).  She reports she has not been using compression stockings but has been elevating her legs. Dr George increased her torosemide to 40 mg bid, and spironolactone to 25 mg (pt is unsure how she is taking this.  She is still having sinus congestion and drainage and c/o moderate SOA, karla with activity. She also had nausea. She has f/u with Dr George March 5th.     Of note she does have 2 sisters with chronic lymphedema who been to a clinic in  [facility] lymphedema clinic to help with her chronic LE edema and she would like referral if her symptoms are ongoing            Result Review   The following data was reviewed by Ami Diego MD on 02/24/2025.  Lab Results   Component Value Date    WBC 6.53 12/23/2024    HGB 14.6 12/23/2024    HCT 44.6 12/23/2024    MCV 94.5 12/23/2024     12/23/2024     Lab Results   Component Value Date    GLUCOSE 77 02/24/2025    BUN 13 02/24/2025    CREATININE 1.65 (H)  "02/24/2025     02/24/2025    K 3.9 02/24/2025    CL 97 (L) 02/24/2025    CALCIUM 9.6 02/24/2025    PROTEINTOT 7.2 02/14/2025    ALBUMIN 3.2 (L) 02/14/2025    ALT 19 02/14/2025    AST 16 02/14/2025    ALKPHOS 130 (H) 02/14/2025    BILITOT <0.2 02/14/2025    GLOB 4.0 02/14/2025    AGRATIO 0.8 02/14/2025    BCR 7.9 02/24/2025    ANIONGAP 12.0 02/24/2025    EGFR 33.5 (L) 02/24/2025     Lab Results   Component Value Date    CHOL 226 (H) 02/07/2025    CHLPL 166 12/16/2020    TRIG 180 (H) 02/07/2025    HDL 48 02/07/2025     (H) 02/07/2025     Lab Results   Component Value Date    TSH 0.774 02/21/2024     Lab Results   Component Value Date    HGBA1C 5.60 02/07/2025     No results found for: \"PSA\"  Lab Results   Component Value Date    Iron 104 11/21/2024    Iron Saturation (TSAT) 31 11/21/2024      Lab Results   Component Value Date    LPJZ81TC 33.3 02/07/2025               Assessment and Plan:   Assessment & Plan  Shortness of breath  Vital signs look good, improving with the increase in her diuresis, lungs are clear on exam.  Pt reassured to cont with higher dose of lasix.  Stop diclofenac and recheck kidney function with labs       Bilateral leg edema  Vital signs look good, improving with the increase in her diuresis, lungs are clear on exam.  Pt reassured to cont with higher dose of lasix  Orders:    Basic metabolic panel; Future    Chronic bilateral low back pain, unspecified whether sciatica present  Chronic and ongoing but functional with pain medication.         Chronic diastolic heart failure  Vital signs look good, improving with the increase in her diuresis, lungs are clear on exam.  Pt reassured to cont with higher dose of lasix       Stage 3 chronic kidney disease, unspecified whether stage 3a or 3b CKD    STOP DICLOFENAC, WILL RECHECH RENAL FUNCTION SINCE 9# wt loss with DIURESIS  Orders:    Basic metabolic panel; Future    Anxiety and depression    Ongoing anxiety, needs her clonazepam refilled, " no HI/SI, f/u with psychiatry for her other psychiatric meds to re eval                   Objective     Medications:  Current Outpatient Medications   Medication Instructions    albuterol sulfate  (90 Base) MCG/ACT inhaler 2 puffs, Inhalation, Every 4 Hours PRN    atorvastatin (LIPITOR) 10 mg, Oral, Daily    Blood Glucose Monitoring Suppl (Blood Glucose Monitor System) w/Device kit Use Daily to test blood glucose.    Calcium Carb-Cholecalciferol (Calcium 500 + D) 500-3.125 MG-MCG tablet 1 each, Oral, 2 Times Daily    clonazePAM (KLONOPIN) 0.5 mg, Oral, 2 Times Daily PRN    diclofenac (VOLTAREN) 75 mg, Oral, 2 Times Daily    donepezil (ARICEPT) 10 mg, Oral, Nightly    ferrous sulfate 325 mg, Oral, Daily With Breakfast    FLUoxetine (PROzac) 10 MG capsule Take 3 capsules by mouth Every Morning    fluticasone (FLONASE) 50 MCG/ACT nasal spray 2 sprays, Nasal, Daily    Fluticasone-Umeclidin-Vilant (TRELEGY ELLIPTA) 200-62.5-25 MCG/ACT inhaler 1 puff, Inhalation, Daily - RT    glucose blood test strip Use as instructed to check blood sugar daily    HYDROcodone-acetaminophen (NORCO)  MG per tablet     ipratropium-albuterol (DUO-NEB) 0.5-2.5 mg/3 ml nebulizer Nebulize and inhale 1 vial 4 (Four) Times a Day As Needed for Wheezing.    Lancet Device misc 1 each, Not Applicable, Daily, Use as directed; check blood sugar once daily    Lancets (OneTouch Delica Plus Puzlej86H) misc 1 each, Not Applicable, Daily    Magnesium Oxide -Mg Supplement 400 mg, Oral, Nightly    memantine (NAMENDA) 5 mg, Oral, Daily    metoprolol succinate XL (TOPROL-XL) 12.5 mg, Oral, Daily    miconazole (Desenex) 2 % powder Apply topically to the appropriate area as directed 2 (Two) Times a Day.    mirtazapine (REMERON) 7.5 MG tablet Take 1 tablet by mouth Every Night.    Mounjaro 15 mg, Subcutaneous, Weekly    mupirocin (BACTROBAN) 2 % ointment APPLY TO AFFECTED AREA(S) TWO TIMES A DAY AS DIRECTED FOR 14 DAYS -    naloxone (NARCAN) 4  "MG/0.1ML nasal spray 1 spray, As Needed    O2 (OXYGEN) 2 L/min, Nightly    pantoprazole (PROTONIX) 40 mg, Oral, Daily    pramipexole (MIRAPEX) 0.5 mg, Oral, 3 Times Daily    pregabalin (LYRICA) 75 mg, Oral, 2 Times Daily    primidone (MYSOLINE) 50 mg    spironolactone (ALDACTONE) 25 mg, Oral, 2 Times Daily    tacrolimus (PROTOPIC) 0.1 % ointment Apply topical ointment to affected area twice daily    torsemide (DEMADEX) 40 mg, Oral, 2 Times Daily    triamcinolone (KENALOG) 0.025 % cream As Needed        Vital Signs:   /81 (BP Location: Left arm, Patient Position: Sitting, Cuff Size: Large Adult)   Pulse 61   Temp 97.5 °F (36.4 °C) (Temporal)   Ht 162.6 cm (64.02\")   Wt 130 kg (286 lb)   SpO2 98%   BMI 49.07 kg/m²           BP Readings from Last 3 Encounters:   02/24/25 132/81   02/21/25 108/85   02/14/25 122/68      Wt Readings from Last 3 Encounters:   02/24/25 130 kg (286 lb)   02/21/25 130 kg (286 lb)   02/14/25 134 kg (295 lb)        Physical Exam:  Physical Exam  Vitals and nursing note reviewed.   Constitutional:       General: She is not in acute distress.     Appearance: Normal appearance. She is obese. She is not ill-appearing, toxic-appearing or diaphoretic.   HENT:      Head: Normocephalic and atraumatic.      Right Ear: Tympanic membrane, ear canal and external ear normal.      Left Ear: Tympanic membrane, ear canal and external ear normal.      Nose: No congestion or rhinorrhea.      Mouth/Throat:      Mouth: Mucous membranes are moist.      Pharynx: Oropharynx is clear. No oropharyngeal exudate or posterior oropharyngeal erythema.   Eyes:      Extraocular Movements: Extraocular movements intact.      Conjunctiva/sclera: Conjunctivae normal.      Pupils: Pupils are equal, round, and reactive to light.   Cardiovascular:      Rate and Rhythm: Normal rate and regular rhythm.      Heart sounds: Normal heart sounds.   Pulmonary:      Effort: Pulmonary effort is normal.      Breath sounds: Normal " breath sounds. No wheezing, rhonchi or rales.   Abdominal:      General: Abdomen is flat.      Palpations: Abdomen is soft. There is no mass.      Tenderness: There is no abdominal tenderness.      Hernia: No hernia is present.   Musculoskeletal:      Cervical back: Neck supple. No rigidity.      Right lower leg: No edema.      Left lower leg: No edema.   Lymphadenopathy:      Cervical: No cervical adenopathy.   Skin:     General: Skin is warm and dry.   Neurological:      General: No focal deficit present.      Mental Status: She is alert and oriented to person, place, and time. Mental status is at baseline.   Psychiatric:         Mood and Affect: Mood normal.         Behavior: Behavior normal.         Thought Content: Thought content normal.         Judgment: Judgment normal.           Review of Systems:  Review of Systems   Constitutional:  Negative for chills, fatigue and fever.   HENT:  Negative for ear pain, sinus pressure and sore throat.    Eyes:  Negative for blurred vision and double vision.   Respiratory:  Positive for shortness of breath. Negative for cough and wheezing.    Cardiovascular:  Positive for leg swelling. Negative for chest pain and palpitations.   Gastrointestinal:  Negative for abdominal pain, blood in stool, constipation, diarrhea, nausea and vomiting.   Musculoskeletal:  Positive for back pain.   Skin:  Negative for rash.   Neurological:  Negative for dizziness and headache.   Psychiatric/Behavioral:  Positive for sleep disturbance. Negative for suicidal ideas.               Follow Up   Return in about 1 month (around 3/24/2025), or if symptoms worsen or fail to improve, for Recheck.    Part of this note may be an electronic transcription/translation of spoken language to printed   text using the Dragon Dictation System.              Health Maintenance   Topic Date Due    URINE MICROALBUMIN-CREATININE RATIO (uACR)  Never done    COVID-19 Vaccine (6 - 2024-25 season) 04/05/2025 (Originally  9/1/2024)    BMI FOLLOWUP  04/25/2025    TDAP/TD VACCINES (2 - Td or Tdap) 05/20/2025    PAP SMEAR  06/09/2025    ANNUAL WELLNESS VISIT  07/01/2025    DIABETIC EYE EXAM  07/03/2025    HEMOGLOBIN A1C  08/07/2025    MAMMOGRAM  11/13/2025    LIPID PANEL  02/07/2026    DXA SCAN  08/02/2026    COLORECTAL CANCER SCREENING  10/12/2030    HEPATITIS C SCREENING  Completed    INFLUENZA VACCINE  Completed    Pneumococcal Vaccine 50+  Completed    ZOSTER VACCINE  Completed          Medical History:  There are no discontinued medications.   Past Medical History:    Adverse effect of other viral vaccines, initial encounter    Covid vaccine    Allergic fungal sinusitis    Allergic rhinitis    Anxiety disorder    Asthma    CHF (congestive heart failure)    COPD with acute exacerbation    CTS (carpal tunnel syndrome)    left    Difficulty walking    Essential (primary) hypertension    Essential tremor    Hypercholesterolemia    Insomnia    Irritable bowel syndrome    Low back pain    Major depressive disorder    Morbid (severe) obesity due to excess calories    Nasal congestion    Neuropathy in diabetes    Obstructive sleep apnea (adult) (pediatric)    Other hereditary and idiopathic neuropathies    Pain in left hip    Pain in right toe(s)    Pain in thoracic spine    Peripheral neuropathy    Hands    Primary generalized (osteo)arthritis    Primary insomnia    Pulmonary emphysema    Restless leg syndrome    SOBOE (shortness of breath on exertion)    Substance abuse    Type 2 diabetes mellitus without complication, without long-term current use of insulin    Vitamin D deficiency     Past Surgical History:    CARPAL TUNNEL RELEASE    COLONOSCOPY    ENDOSCOPIC FUNCTIONAL SINUS SURGERY (FESS)    Procedure: ENDOSCOPIC FUNCTIONAL SINUS SURGERY, left maxillary antrostomy with removal of contents, possible left ethmoidectomy;  Surgeon: Johnny Calderon MD;  Location: [facility] [place];  Service: ENT;  Laterality: Left;    HYSTERECTOMY     complete- ovarian cysts      Family History   Problem Relation Age of Onset    Hypertension Mother     COPD Father     Heart failure Father     Neuropathy Sister     Sleep apnea Sister     Restless legs syndrome Sister     Sleep apnea Brother     Anxiety disorder Daughter     Depression Daughter     Malig Hyperthermia Neg Hx     Breast cancer Neg Hx     Ovarian cancer Neg Hx     Uterine cancer Neg Hx     Cervical cancer Neg Hx     Colon cancer Neg Hx     Stomach cancer Neg Hx     Skin cancer Neg Hx     Clotting disorder Neg Hx     Deep vein thrombosis Neg Hx     Pulmonary embolism Neg Hx      Social History     Tobacco Use    Smoking status: Former     Current packs/day: 0.00     Average packs/day: 0.5 packs/day for 12.0 years (6.0 ttl pk-yrs)     Types: Cigarettes     Start date: 2009     Quit date: 2021     Years since quittin.1     Passive exposure: Past    Smokeless tobacco: Never   Substance Use Topics    Alcohol use: Not Currently       Health Maintenance Due   Topic Date Due    URINE MICROALBUMIN-CREATININE RATIO (uACR)  Never done        Immunization History   Administered Date(s) Administered    Arexvy (RSV, Adults 60+ yrs) 2024    COVID-19 (ANGELA) 2021    COVID-19 (MODERNA) 1st,2nd,3rd Dose Monovalent 2021    COVID-19 (PFIZER) 12YRS+ (COMIRNATY) 2024, 05/15/2024    Covid-19 (Pfizer) Gray Cap Monovalent 2022    Fluzone  >6mos 10/07/2013    Fluzone (or Fluarix & Flulaval for VFC) >6mos 10/10/2017    Fluzone High-Dose 65+YRS 2024    Fluzone High-Dose 65+yrs 2022, 2022, 10/18/2023    Influenza Seasonal Injectable 2012    Pneumococcal Conjugate 13-Valent (PCV13) 2020    Pneumococcal Polysaccharide (PPSV23) 2020    Shingrix 2023, 2023    Tdap 2015    Zostavax 2008       No Known Allergies

## 2025-02-24 NOTE — ASSESSMENT & PLAN NOTE
Vital signs look good, improving with the increase in her diuresis, lungs are clear on exam.  Pt reassured to cont with higher dose of lasix.  Stop diclofenac and recheck kidney function with labs

## 2025-02-25 RX ORDER — CLONAZEPAM 0.5 MG/1
0.5 TABLET ORAL 2 TIMES DAILY PRN
Qty: 60 TABLET | Refills: 0 | Status: SHIPPED | OUTPATIENT
Start: 2025-02-25

## 2025-02-25 NOTE — TELEPHONE ENCOUNTER
Controlled refill request:  Requested Prescriptions     Pending Prescriptions Disp Refills    clonazePAM (KlonoPIN) 0.5 MG tablet 60 tablet 0     Sig: Take 1 tablet by mouth 2 (Two) Times a Day As Needed for Anxiety.      Last OV:  2/24/2025  Next OV:  4/10/2025  Last fill:  1/23/25 #60 no refills   Last tox:  11/21/24

## 2025-02-26 ENCOUNTER — TELEPHONE (OUTPATIENT)
Dept: CARDIOLOGY | Facility: CLINIC | Age: 70
End: 2025-02-26
Payer: MEDICARE

## 2025-02-26 ENCOUNTER — TELEPHONE (OUTPATIENT)
Dept: FAMILY MEDICINE CLINIC | Age: 70
End: 2025-02-26
Payer: MEDICARE

## 2025-02-26 NOTE — TELEPHONE ENCOUNTER
Caller: Chasity Torres    Relationship: Self    Best call back number: [phone]    What is the best time to reach you: ANYTIME    Who are you requesting to speak with (clinical staff, provider,  specific staff member): NURSE       What was the call regarding: PATIENT IS CALLING WANTING TO CHECK AND SEE IF DR. HARRIS IS STILL WANTING HER TO GO TO THE KIDNEY DOCTOR. PLEASE ADVISE

## 2025-02-26 NOTE — TELEPHONE ENCOUNTER
Caller: Chasity Torres    Relationship to patient: Self    Best call back number: [phone]    Chief complaint: LEG SWELLING, RAPID HEARTBEAT    Type of visit: F/U    Requested date: NEXT AVAIL     If rescheduling, when is the original appointment: 3.25.25     Additional notes: PT CALLED IN TO REQUEST A SOONER APPT WITH DR. LEE OR KAYLAH FLORES FOR HER DX. HER ORIGINAL APPT WAS R/S BY PROVIDER

## 2025-02-27 ENCOUNTER — HOSPITAL ENCOUNTER (OUTPATIENT)
Dept: SLEEP MEDICINE | Facility: HOSPITAL | Age: 70
Discharge: HOME | End: 2025-02-27
Admitting: INTERNAL MEDICINE
Payer: MEDICARE

## 2025-02-27 ENCOUNTER — OFFICE VISIT (OUTPATIENT)
Dept: NEUROLOGY | Facility: CLINIC | Age: 70
End: 2025-02-27
Payer: MEDICARE

## 2025-02-27 ENCOUNTER — PATIENT ROUNDING (BHMG ONLY) (OUTPATIENT)
Dept: NEUROLOGY | Facility: CLINIC | Age: 70
End: 2025-02-27
Payer: MEDICARE

## 2025-02-27 ENCOUNTER — PRIOR AUTHORIZATION (OUTPATIENT)
Dept: NEUROLOGY | Facility: CLINIC | Age: 70
End: 2025-02-27

## 2025-02-27 VITALS
BODY MASS INDEX: 47.41 KG/M2 | HEART RATE: 86 BPM | SYSTOLIC BLOOD PRESSURE: 105 MMHG | DIASTOLIC BLOOD PRESSURE: 62 MMHG | HEIGHT: 64 IN | WEIGHT: 277.7 LBS

## 2025-02-27 DIAGNOSIS — J96.11 CHRONIC HYPOXIC RESPIRATORY FAILURE: ICD-10-CM

## 2025-02-27 DIAGNOSIS — G24.3 CERVICAL DYSTONIA: Primary | ICD-10-CM

## 2025-02-27 DIAGNOSIS — F51.01 PRIMARY INSOMNIA: ICD-10-CM

## 2025-02-27 DIAGNOSIS — R06.83 SNORING: ICD-10-CM

## 2025-02-27 DIAGNOSIS — I10 ESSENTIAL HYPERTENSION: ICD-10-CM

## 2025-02-27 DIAGNOSIS — G25.2 DYSTONIC TREMOR: ICD-10-CM

## 2025-02-27 DIAGNOSIS — G47.33 OSA ON CPAP: ICD-10-CM

## 2025-02-27 DIAGNOSIS — I50.32 CHRONIC DIASTOLIC HEART FAILURE: ICD-10-CM

## 2025-02-27 DIAGNOSIS — N18.30 STAGE 3 CHRONIC KIDNEY DISEASE, UNSPECIFIED WHETHER STAGE 3A OR 3B CKD: ICD-10-CM

## 2025-02-27 DIAGNOSIS — G47.33 OSA (OBSTRUCTIVE SLEEP APNEA): ICD-10-CM

## 2025-02-27 DIAGNOSIS — G47.8 NON-RESTORATIVE SLEEP: ICD-10-CM

## 2025-02-27 PROCEDURE — 95810 POLYSOM 6/> YRS 4/> PARAM: CPT

## 2025-02-27 RX ORDER — LOSARTAN POTASSIUM 25 MG/1
1 TABLET ORAL DAILY
COMMUNITY

## 2025-02-27 NOTE — PROGRESS NOTES
"Chief Complaint  Neurologic Problem    Subjective          Chasity Torres presents to [facility]  History of Present Illness    History of Present Illness  A 69-year-old female presents for an initial consult for tremor and transfer of care from  neurology. Botox injections for cervical dystonia have been administered for several years, with the last procedure on 12/02/2024, receiving 350 units. Reports include head tremors, occasional right arm tremors, and neck pain localized at the back of the neck. A slight improvement in neck pain and head tilt has been noted following Botox injections. Primidone is taken to manage head tremors. A reduction in hand tremors has been observed since discontinuing Vraylar. Additionally, numbness in the leg occurs when sitting in a recliner.        MEDICATIONS  CURRENT MEDS:  Mirtazapine  Lyrica  Primidone As needed  PREVIOUS MEDS:  Vraylar  Reason for Discontinuation: Caused atypical parkinsonism       Objective   Vital Signs:   /62   Pulse 86   Ht 162.6 cm (64\")   Wt 126 kg (277 lb 11.2 oz)   BMI 47.67 kg/m²     Physical Exam  HENT:      Head: Normocephalic.   Neck:      Comments: Laterocollis, right shoulder elevation. Mild bilateral intention tremor of hands   Pulmonary:      Effort: Pulmonary effort is normal.   Musculoskeletal:      Cervical back: Torticollis present.   Neurological:      Mental Status: She is alert and oriented to person, place, and time.      Sensory: Sensation is intact.      Motor: Motor function is intact.      Coordination: Coordination is intact.      Deep Tendon Reflexes: Reflexes are normal and symmetric.        Neurological Exam  Mental Status  Alert. Oriented to person, place, and time.    Sensory  Normal sensation.    Reflexes  Deep tendon reflexes are 2+ and symmetric in all four extremities.    Coordination    Finger-to-nose, rapid alternating movements and heel-to-shin normal bilaterally " without dysmetria.      Result Review :               Assessment and Plan    There are no diagnoses linked to this encounter.    Assessment & Plan  1. Cervical Dystonia.  The patient's tremor, head tilt, and shoulder elevation are likely secondary to cervical dystonia. Despite previous Botox injections, she continues to exhibit laterocollis, torticollis, and shoulder elevation. The current medication regimen, including mirtazapine and Lyrica, may be exacerbating the tremors. The parkinsonism symptoms observed during the last visit have improved, suggesting they were drug-induced by Vraylar. The patient will be scheduled for Botox injections once insurance authorization is transferred to our clinic. The EMG will be used to guide the injections to maximize their effectiveness. A follow-up appointment will be scheduled 6 weeks post-injection to assess the outcome and adjust the dosing pattern as necessary.    2. Lumbar Spine Degeneration.  The numbness in the patient's leg is likely due to significant narrowing at L1-2, L2-3, L3-4, L4-5, and S1, causing a nerve pinch from the low back. This condition is not related to the tremor and is being managed by neurosurgery.    3. Medication Management.  The patient is currently taking mirtazapine and Lyrica, which may be contributing to the tremors. She is also taking primidone for tremor management.    Follow-up  The patient will follow up 6 weeks after the Botox injections.         Follow Up   No follow-ups on file.  Patient was given instructions and counseling regarding her condition or for health maintenance advice. Please see specific information pulled into the AVS if appropriate.       Patient or patient representative verbalized consent for the use of Ambient Listening during the visit with  DIANE Schafer for chart documentation. 2/28/2025  12:36 EST

## 2025-02-28 ENCOUNTER — TELEPHONE (OUTPATIENT)
Dept: CARDIOLOGY | Facility: CLINIC | Age: 70
End: 2025-02-28
Payer: MEDICARE

## 2025-02-28 NOTE — TELEPHONE ENCOUNTER
Caller: Chasiyt Torres    Relationship: Self    Best call back number: [phone]    Who are you requesting to speak with (clinical staff, provider,  specific staff member): VICTOR M AT [facility]    Do you know the name of the person who called: VICTOR M    What was the call regarding: PT BELIEVES IT WAS VICTOR M WHO CALLED OFFERING HER AN APPT ON WEDNESDAY WITH DR LEE. SHE IS ASKING FOR HER TO CALL HER RIGHT BACK IF POSSIBLE.

## 2025-03-03 ENCOUNTER — CLINICAL SUPPORT (OUTPATIENT)
Dept: FAMILY MEDICINE CLINIC | Age: 70
End: 2025-03-03
Payer: MEDICARE

## 2025-03-03 DIAGNOSIS — E53.8 B12 DEFICIENCY: Primary | ICD-10-CM

## 2025-03-03 PROCEDURE — 96372 THER/PROPH/DIAG INJ SC/IM: CPT | Performed by: FAMILY MEDICINE

## 2025-03-03 RX ADMIN — CYANOCOBALAMIN 1000 MCG: 1000 INJECTION, SOLUTION INTRAMUSCULAR; SUBCUTANEOUS at 13:33

## 2025-03-04 ENCOUNTER — PATIENT MESSAGE (OUTPATIENT)
Dept: NEUROLOGY | Facility: CLINIC | Age: 70
End: 2025-03-04
Payer: MEDICARE

## 2025-03-04 ENCOUNTER — TELEPHONE (OUTPATIENT)
Age: 70
End: 2025-03-04
Payer: MEDICARE

## 2025-03-04 NOTE — TELEPHONE ENCOUNTER
Caller: Chasity Torres    Relationship: Self    Best call back number: [phone]     What was the call regarding: PT CALLING TO FU ON ADVISEMENT FROM CLINICAL - SHE STATES HER LEGS HAVE DECREASED IN SWELLING AND SHE IS NOT HAVING ANY NEW SYMPTOMS - SHE HAS TAKEN 20MG OF MEDICINE THIS MORNING AS THAT WAS HER ORIGINAL DOSE, INSTEAD OF THE INCREASED 40MG DOSAGE BUT SHE WANTS TO CONFIRM ON HOW TO CONTINUE - PLEASE ADVISE ASAP

## 2025-03-04 NOTE — TELEPHONE ENCOUNTER
Caller: Chasity Torres    Relationship: Self    Best call back number:   Telephone Information:   Mobile [phone]       What is the best time to reach you: ANY    Who are you requesting to speak with (clinical staff, provider,  specific staff member): ANY    What was the call regarding: WANTING TO KNOW IF SHE STILL CONTINUES TO TAKE THE TORSEMIDE 2 TIMES IN THE MORNING AND 2 TIMES AT NIGHT?  PLEASE REACH OUT TO LET HER KNOW.   SHE IS IN THE PROCESS OF FILLING HER MEDICATION

## 2025-03-04 NOTE — TELEPHONE ENCOUNTER
The [facility] received a fax that requires your attention. The document has been indexed to the patient’s chart for your review.      Reason for sending: EXTERNAL MEDICAL RECORD NOTIFICATION     Documents Description: PT HAS BEEN SCHEDULED FOR 3.6.25 AT 2:45PM WITH NAYELI QUILES NP AT [facility] LOCATION     Name of Sender: [facility]     Date Indexed: 3.4.25

## 2025-03-05 NOTE — TELEPHONE ENCOUNTER
Please clarify with the patient exactly how she has been taking the medication, it looks like at the time of her last office visit, he had recommended taking 40 mg twice daily.  If her swelling has resolved, she could step back to 40 mg in the morning and 20 mg in the afternoon.  She should still keep her upcoming appointment next week with DIANE Pacheco

## 2025-03-05 NOTE — TELEPHONE ENCOUNTER
PARRIS patient. Patient states she is taking lasix 40 mg in am and 40 mg in afternoon. Went over recommendations. Patient verbalized understanding and appreciation.

## 2025-03-06 ENCOUNTER — TELEPHONE (OUTPATIENT)
Dept: SLEEP MEDICINE | Facility: HOSPITAL | Age: 70
End: 2025-03-06
Payer: MEDICARE

## 2025-03-07 ENCOUNTER — TRANSCRIBE ORDERS (OUTPATIENT)
Dept: ADMINISTRATIVE | Facility: HOSPITAL | Age: 70
End: 2025-03-07
Payer: MEDICARE

## 2025-03-07 ENCOUNTER — TELEMEDICINE (OUTPATIENT)
Dept: BEHAVIORAL HEALTH | Facility: CLINIC | Age: 70
End: 2025-03-07
Payer: MEDICARE

## 2025-03-07 DIAGNOSIS — Z79.899 POLYPHARMACY: ICD-10-CM

## 2025-03-07 DIAGNOSIS — F41.1 GENERALIZED ANXIETY DISORDER: Primary | ICD-10-CM

## 2025-03-07 DIAGNOSIS — N17.9 ACUTE NONTRAUMATIC KIDNEY INJURY: Primary | ICD-10-CM

## 2025-03-07 DIAGNOSIS — Z79.899 MEDICATION MANAGEMENT: ICD-10-CM

## 2025-03-07 DIAGNOSIS — Z71.89 MEDICATION CARE PLAN DISCUSSED WITH PATIENT: ICD-10-CM

## 2025-03-07 DIAGNOSIS — F33.9 RECURRENT MAJOR DEPRESSION RESISTANT TO TREATMENT: ICD-10-CM

## 2025-03-07 DIAGNOSIS — F68.13: ICD-10-CM

## 2025-03-07 NOTE — PATIENT INSTRUCTIONS
"Should you want to get in touch with your provider, DIANE Sanches, please contact MY Medical Assistant, Marian, directly at [phone].  Recommend saving Marian's direct number in phone as this is the PREFERRED & EASIEST way to get in contact with your provider.  Please leave a voice mail if you do not get an answer and she will return your call within 24 hrs. You will NOT be able to contact provider [facility], as Behavioral Health Providers are restricted. YOU MUST CALL [phone]  If you need to speak with the on call provider after hours or on weekends, please Contact the [facility] ([phone]) and staff will be able to page the provider on call directly.     SPECIFIC RECOMMENDATIONS:     1.      Medications discussed at this encounter:                   -  Patient instructed to request refills when appropriate, when down to 5-7 days remaining via pharmacy or via [facility].     2.      Psychotherapy recommendations: Referral placed  [facility] with Lila Lockhart LCSW, patient informed provider is located in [facility] on the 2nd floor. Office will call to schedule appointment.  Patient given direct contact number to call if have not received a call to schedule within 1 week, [phone].      3.     Return to clinic: 2 months, Friday 5/9/25 at 10 am via video    Please arrive at least 15 minutes before your scheduled appointment time to complete check in process.      IF you are scheduled for a [facility] V[facility] visit, YOU MUST COMPLETE THE \"E-CHECK IN\" PROCESS PRIOR TO BEGINNING THE VISIT, You may still complete the E-Check in for in office visits prior to appointment, you will receive multiple text/email reminders which will direct you further if needed.            "

## 2025-03-07 NOTE — PROGRESS NOTES
"  Mode of Visit: Video  Location of patient: -HOME-  Location of provider: +[facility]+  You have chosen to receive care through a telehealth visit.  The patient has signed the video visit consent form.  The visit included audio and video interaction. No technical issues occurred during this visit.    Ciro Torres is a 69 y.o. female who presents today for follow up     Referring Provider:  No referring provider defined for this encounter.    Chief Complaint: anxiety, treatment resistant depression, factious disorder, medication management, medication care plan discussed      History of Present Illness:   3/7/25:  Patient presents today via [employer] Video visit from home, located in [place], [place].  At last visit Prozac was increased from 20 to 30 mg daily, for which patient reports \"I still feel down, I have a lot of anxiety.\" Patient unable to pinpoint trigger for anxiety, \"I just don't feel right all the time, I just figured it was my anxiety.\" Patient kept closing eyes and nodding head during interview. \"I just need something to calm me down. I have tingling sensation across the top of my body for along time.\" Patient is taking prescribed Klonopin 0.5 mg twice daily routinely, \"its such a low dose.\" Patient reports taking dose around 9am.     In regards to therapy, patient was advised to schedule a follow up appointment at last visit and is currently on a waiting list to see a new therapist. \"All she told me I am on a waiting list.\" Confirmed patient is not scheduled with prior therapist. Patient is agreeable to see therapist in [facility] through [facility].     Patient reports she received a message from the referral dept regarding neuropsychological testing referral by this provider 6/2024, which patient further recalls she did got to [place] and had several memory tests, was there for 3.5 hrs.  Though denies receiving testing results, and plans to call to inquire about receiving a " "copy.     Patient is asking if the Prozac can be increased due to complaints of ongoing depression.      1/17/25: Patient presents today via [employer] Video visit from home, located in [place], [place].   Since last visit patient was tapered off Buspar at last visit and later tapered off Vraylar starting 12/4/24, and restarted on Prozac due to Neurologist concerns of parkinsonism worsening tremors, for which patient reports overall feeling better, though \"I am still a little depressed and very anxious.\"    Patient reports she is going to [place] to see brother today with sister due to health decline, he  has [facility] and was given 2-3 weeks to live.  Brother has blood cancer, heart and kidney trouble.   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       11/21/24:    Patient presents today via [employer] Video visit from passenger of vehicle while daughter driving as patient woke up late and had to change today's appointment from in office to video, located in [place], [place].   At last visit Vraylar was increased from 1.5 mg to 3 mg daily, for which patient reports:  \"I really been depressed, I know your going to be mad at me, I been taking that Prozac 40 mg one every other day I started taking about 2 weeks ago.\" Reports she does feel better.  Anxiety and depressed mood are improving.     In regards to therapy, patient was advised to reschedule with therapist which patient reports had not scheduled yet, though plans to schedule today.  Patient reports \"I wasn't crazy about Becki, and she's the only one there to provide mental health.\"    Patient has been referred to another sleep medicine provider per PCP as of 11/5/24.  Reports sleeps for an hour, then awake, admits to adherence with CPAP. Though continues to struggle with sleep, which patient acknowledges contributes to mood.     Patient voices Buspar is no longer " "effective and would like to come off medication, though discussed previously patient expressed willingness to taper to discontinue.     Wt Readings from Last 3 Encounters:   10/30/24 130 kg (287 lb)   10/07/24 125 kg (275 lb 11.2 oz)   10/03/24 125 kg (276 lb)       10/4/24:  arrival 0954; compelted 0952    Answers submitted by the patient for this visit:  Primary Reason for Visit (Submitted on 10/4/2024)  What is the primary reason for your visit?: Depression  Depression (Submitted on 10/4/2024)  Chief Complaint: Depression  Visit: follow-up  Frequency: constantly  Severity: severe  excessive worry: Yes  insomnia: Yes  irritability: Yes  malaise/fatigue: Yes  obsessions: Yes  hypersomnia: No  difficulty controlling mood: Yes  difficulty staying asleep: Yes  difficulty falling asleep: Yes  Hours of sleep per night: 3 Hours  Medication compliance: %  Side effects: fatigue  Aggravated by: family issues      Patient presents today via [employer] Video visit from home, located in [place], [place]. At last visit patient was restarted on Vraylar at 1.5 mg due to self stopping for approximately 1 week, for which patient reports \"I am still depressed, I was gonna ask if we could go back to the 3 mg or put me back on Prozac.\" Patient reports the shakiness is due to stress and not actually the medication.   Patient reports waking up 2-3 times per night, \"I don't sleep hardly at all, then during the day all I want to do is sleep, I don't have any energy.\"  Patient reports adherence with CPAP, though sleeping in short intervals, and does nap throughout the day while sitting in chair watching TV, \"I can hardly keep my eyes open.\"    Sleep hygiene discussed with PCP on 9/30/24 as patient reported taking Remeron at 10 pm, going to bed at 11pm with TV on which patient has been turning the TV upon feeling sleepy. \"Usually\" has CPAP mask on while watching TV in bed.  \"Is there something else I can take to help me stay asleep longer?\" " "Patient reports taking Klonopin 0.5 mg 1 during the day and one in the evening, not at bedtime.     In regards to therapy patient has not seen in over one month, \"I was getting burnt out by the therapist. What we talked about I really wasn't doing what I needed to do, and last time I went there she said if your not going to do what we talked about  then your not going to get anywhere.\"     In regards to PHQ9 screening question of thoughts of being better off dead or hurting herself  in some way, patient reports  has days of  \"I wished I wouldn't wake up in the morning.\" However, denies actual thoughts of harming self or rumination nor planning,  Cssr low.    Depression: Patient complains of depression. She complains of anhedonia, depressed mood, difficulty concentrating, fatigue, feelings of worthlessness/guilt, and insomnia  Anxiety:  The patient endorses to the following symptoms of anxiety including: excessive anxiety and worry about a number of events or activities for more days than not, restlessness or feeling keyed up, being easily fatigued, difficulty concentrating or mind going blank, irritability, muscle tension, and sleep disturbance which have caused impairment in important areas of daily functioning.      8/26/24:  Patient presents today via [employer] Video visit from home, located in [place], [place].  \"I can't take that Vraylar anymore, it makes me shake.  I stopped taking it about a week ago.\" Patient reports her arms are no longer shaking.    Patient reports Buspar no longer helps for anxiety. Asking for Prozac.  Upon informing patient of option to increase Remeron, patient began crying more intensely, \"I don't understand why you can't give me something for depression.  Can we go back down to the lower dose of Vraylar?\"  Patient is also asking for provider to manage Klonopin ordered per PCP recently.      In regards to therapy patient is still seeing \"I have to make an appointment, I don't see she is " "helping me.\"  Patient has tried to schedule Neuropsychological testing and places from list mailed to patient several do not take her insurance. Patient reports she lost the list.  Last seen therapist a few weeks ago.        7/17/24:  Patient presents today via MyChart Video visit from home, located in [place], [place].  At last visit Vraylar was increased from 1.5 to 3 mg daily, for which patient reports \"helped a little bit, I been crying a lot, I am still depressed.\"    Patient was also referred to [facility] and did not contact, had lost paperwork from last visit. \"My therapist said testing was only for schizophrenia or bipolar, I don't know if that's true?\" In regards to therapy, patient is seeing Becki with [facility], \"I like her okay, I don't know if I want to continue, we talk about the same thing, we talk more about my Becki, I don't know its hard.\"  Patient further explains therapist wants her to make a list of all the things she wants her daughter to do, \"she said it would be hard, but I haven't done it yet.\"     Patient has concerns of how her son treats her grand daughter whom will be 17 in Sept. Patient became tearful and then crying.    \"Does the Prozac come in 10 mg, could I get that?\"      6/7/24:  Patient presents today in office using [employer], \"I'm not good, I am really depressed again, jittery, I have to have something to calm me down.\"  Patient reports having to come to PCP office this past Wed, due to feeling sick. \"I need something to take the edge off. There are some nights I pray not to wake up in the morning.\"  Patient is tearful today. Denies SI and is convincing, but rather more frustrated.   Patient did start with therapist, Shanti, with [facility], \"I am not too crazy about her.\" Patient does attend in person.     Patient was also scheduled for Neuropsychological testing 5/24/24 which was canceled by provider office at [facility] due to short staff, \"they said they " "would call back when they got someone hired.\" Which was originally ordered by Neurology 2/2023 for mild cognitive disorder. And patient prefers a closer location.     Patient is asking for Zyprexa or Prozac.  Reminded patient of weight gain and prolonged QTI which is reason for discontinuation in addition to ineffectiveness.   Daughter will be going to [place] 7/19/24 for 10 days and patient utilizes daughter phone for telehealth visits and daughter assists with logging on to [employer].  As patient expresses fear of not being able to see this provider nor go for testing due to transportation.     Patient noted with weight loss of 5lb since last recorded weight which patient contributes to poor appetite due to depressed mood.   Sleep remains the same.      5/2/24:  Patient presents today via [employer] Video visit from home in [facility], located in [place], [place]. Patient apologized for missing last visit.  At last visit Remeron was decreased from 15 to 7.5 mg, for which patient reports \"I woke up this morning at 419, I am sleeping worse now than I was.\" Patient will go to sleep around  pm and wakes up at 4 or 5 am. Denies day time naps, reported adherence with CPAP.  \"If I take another Mirtazapine I can, but I normally don't do that.\"     Depression: continues to take Vraylar 1.5 mg, \"its going good, but I am still depressed, can I go back on 20 mg of the Prozac, I still feel like there is something crawling in my head.\"   Anxiety: continues to tolerate Buspar 20 mg 3 times daily which has been somewhat effective.      Patient has an upcoming appointment for neuropsychological testing 5/24/24.   Therapy: patient has an appointment with [facility] tomorrow via telephone and will be further scheduled for an appointment.        3/14/24:    Answers submitted by the patient for this visit:  Other (Submitted on 3/14/2024)  Please describe your symptoms.: Depression  Have you had these symptoms before?: Yes  How long " "have you been having these symptoms?: Greater than 2 weeks  Primary Reason for Visit (Submitted on 3/14/2024)  What is the primary reason for your visit?: Other    Patient presents today via MyChart Video visit from home, located in [place], [place].   Patient has chosen to switch from [facility] to [facility] in clinic. Patient reports mood is better, \"I think that Vraylar is  helping, but I still feel there is something itching my head, I got these gummies,\" patient showed a bottle of Hemp Gummies to provider, \"they are helping a little bit too, I take 6-7 per day, it says take 1-2 per day.\" Hemp oil 200 mg, 175 mg of Omega 3,A,B,E, \"it curves my anxiety, I don't feel my heart coming out of my skin.\"     The[facility]: [facility].   Patient is asking about [facility] as patient grandson attends.   Patient still wakes up during the night, 2-3 times, \"I am still depressed.\"  Patient is asking about  restarting Prozac again.    In regards to PHQ9 screening question of thoughts of being better off dead or hurting himself/herself  in some way, patient reports  \"That was along time ago, I think [employer] put that in there wrong, I havent' had those thoughts.\" CSSRS answers all \"none\".    Psych: Anxiety fatigue, insomnia  Depression Low mood for > 2 weeks, Decreased/Increased sleep, Loss of Interest, Feelings of guilt/worthlessness, and Low energy      1/31/24:  Patient presents today via MyChart Video visit from home, located in [place], [place].  At last visit Prozac was stopped and Remeron was decreased from 30 to 15 mg nightly due to ongoing weight gain which is suspected due to combination of medications and comorbid conditions, patient reports still waking up in the middle of the night, 2-3 times, though able to return back to sleep easily.  Continues to wear CPAP with 2L, denies mask falling off and just randomly wakes up at night.     On 1/23/24, patient was started on Vraylar 1.5 mg, " "reported the cost was 1700.00 and patient did not have to pay any out of pocket. Patient admits to some improvement in mood, \"I am still depressed, I was gonna ask if I could stay on maybe a 20 mg of the Prozac, I don't want to get like I was.\"    Patient reports son had spoke with friend and discussed anxiety, and took a fourth CBD gummy which helped with anxiety.     Patient continues to take Buspar 20 mg 3 times daily, which is effective. Patient would like to switch pharmacies due to difficulty with getting ahold of [facility] staff.     Patient reports having not scheduled with therapist, due to not having the phone number.      1/18/24: Patient presents today via MyChart Video visit from home, located in [place], [place].  \"I am not doing good, I am short of air, don't have any energy, the anxiety is getting the best of me.\"  At last visit Prozac was decreased from 80 to 60 mg for 21 days, then to decrease to 40 mg for 21 days (start 1/4/24) which planned to end 1/25/24.  Daughter confirmed in the background of current dose of Prozac is 40 mg.     Patient reports received a denied payment from [employer] plan which patient no longer has as of 1/1/24. Patient has not called therapist back to schedule another appointment at this time.     Anxiety and Depressive symptoms: \"stress related, not knowing what is really wrong with me, shortness of air, I can't hardly do anything, I don't feel like doing anything. I don't think that Buspar is helping anymore.\"    Coping strategies:  \"I just sit, my heart feels like its beating out of my chest.\" Reports heart rate will range from 50-70-80. \"Oxygen level is good most of the time.\"   Fears stopping Buspar though reports primarily ineffective.   Patient denies practicing coping techniques at this time.      12/13/23:  Patient presents today via MyChart Video visit from home, located in [place], [place].  \"I am not too good, I was in the hospital I have bronchitis.\"    At last visit " "Remeron was decreased from 45 mg to 30 mg nightly and changed Buspar from 30 mg twice daily to 20 mg 3 times daily. Patient reports \"I need my sleep medicine filled, I only have one left.\" Patient feels sleep is not good.  Anxiety-\"it's still bad.\" Continues to struggle with itching and picking at scalp, reports \"nothing helps.\" Continues to express feeling depressed.     Patient has started psychotherapy with [facility]. \"I really would like to see someone in person, it's hard to talk to someone on the phone.\" Clarified patient is seeing therapist via video and not via telephone. Does not wish to drive to [place] due to being so far away, and will consider.     Patient has set up a new insurance plan through [employer]- medicare advantage plan though has not received card to upload to EHR.     11/9/23:  Patient presents today via [employer] Video visit from home, located in [place], [place].  At last visit patient was tapered off Zyprexa, for which patient reports \"I still feel like theres something crawling in my head.\"    Patient continues to take Hydroxyzine 100 mg 3 times daily for itching and anxiety which is ineffective. Reports \"anxiety is a little better, though not a whole lot, still very anxious. Everything in general, my health.\" Mood has improved somewhat, though ongoing complaint of scalp itching, and anxiety.      Patient continues to wake up during the night 2-3 times and is able to return to sleep most nights. Admits to adherence with CPAP with 2 L.  Currently taking Buspar every morning and night, and Remeron at bedtime with addition of Melatonin recently, though minimal effectiveness.     Chart review: patient seen cardiology 11/7/23 as patient was seen in the ED 10/31/23 with SOA and 15 lbs weight gain and treated with one dose of IV Lasix and discharged. Seen by PCP 11/7/23 noting depression has worsened since tapering down from Zyprexa, and neurology was concerned psychotropic medications " "are causing tremors. Noted increase of Lasix and Aldactone, and added Melatonin 3 mg nightly. Referred to physical therapy as well for generalized weakness. Patient was seen by Pulmonary 11/6/23, noted report of a fall 2 weeks prior on porch. CT of chest without acute findings.     10/5/23:  Patient presents today via MyChart Video visit from home, located in [place], [place].  \"Not to good, I am still depressed, I still feel like something is crawling in my head, I am anxious all the time, it is getting the best of me.\"     At last visit Prozac was increased from 60 to 80 mg daily for which patient reports \"can it be increased\".   Patient was also referred to [facility] for psychotherapy as patient was dissatisfied with therapist at [facility]. Patient reports she had to change phones and lost phone number and is asking for number today.     Reports adherence with medications and denies sleepiness with Zyprexa. Patient says she seen neurologist in [place] at [facility] ,, and was advised to see a psychiatrist. Reports Dr. Alvarado will be returning to [place] clinic.  Patient is uncertain of what she wants to do at this time,   \"I don't know what to do.\"    In regards to negative intrusive thoughts of being better off dead, patient denies SI, though admits to having some days of feeling so frustrated due to depression and anxiety.     Patient was asked again about therapy referral if she had received a voice mail, for which patient does report receiving a voice mail which was on other phone she no longer has access to, phone number has not changed.     At end of visit patient asking to take 2 tabs of Remeron for waking in the middle of night.     Wt Readings from Last 3 Encounters:   10/03/23 126 kg (278 lb)   09/06/23 128 kg (282 lb)   08/30/23 130 kg (287 lb)       Depression: Patient complains of depression. She complains of anhedonia, depressed mood, difficulty concentrating, fatigue, feelings of " "worthlessness/guilt, and insomnia  Anxiety:  The patient endorses significant symptoms of anxiety including: excessive anxiety and worry about a number of events or activities for more days than not, restlessness or feeling keyed up, being easily fatigued, difficulty concentrating or mind going blank, irritability, muscle tension, and sleep disturbance which have caused impairment in important areas of daily functioning.        8/17/23: Patient presents today via [employer] Video visit from home, located in [place], [place].  Patient called this morning to switch from in office to video due to daughter being ill.  \"I'm not doing too good, I am still depressed, I feel like something is crawling in my head.\" Admits to ongoing itching, despite taking Hydroxyzine 3 times daily.     In regards to noted report to PCP recently of SI, patient denies presently. Reports difficulty staying asleep, able to fall asleep without problems, reports last night did not wake up however, frequency of night time awakenings vary. Denies day time naps. Occasionally drifts to sleep while in recliner, though does not lay down or sleep very long during those times.     Noted per chart review of Weight loss of 13 lb on Trulicity, currently receiving Dupixent injections and topical antifungals per Dermatology.  \"I loose it and I gain it back, guess because I don't get much exercise.\"    Patient asking about alternate therapist as patient does not feel current treatment with current [facility] provider, Marie Conde, is going well. Currently see's every 2 weeks. \"I don't feel like it's helping me at all.\"   Patient asking for anxiety medication, \"can you prescribe the klonopin\".    Weight today:   Wt Readings from Last 3 Encounters:   08/02/23 124 kg (274 lb)   07/28/23 129 kg (283 lb 12.8 oz)   07/26/23 129 kg (285 lb)       7/13/23:  Patient presents today in office, with well groomed appearance, subtle make-up, which patient was complimented on appearance, " "at last visit Prozac was increased from 40 to 60 mg daily and Zyprexa increased from 5 to 7.5 mg daily, for which patient reports \"I am still depressed\"    Patient was to start therapy with [facility] in May, and has started, just seen Marie today prior to this appointment. \"I don't know if I like her or not.\" Expresses frustration with not working on coping strategies, \"she just sits there and lets me talk. All other places do not accept my insurance. I don't know who takes my insurance or not.\"     Patient continues to struggle with tactile hallucinations, feeling something is crawling on scalp.  Patient has been taking Dupixent from dermatologist which has not been effective. Patient feels due to these symptoms, depression exacerbates. Patient is also having difficulty with neck pain and minimal relief from pain medications. Denies drowsiness with Zyprexa. Denies Prozac helping with \"crawling sensation.\" Continues to take Hydroxyzine 100 mg 3 times daily and the Buspar, \"I wish I could have something else for the anxiety.\" Patient asking to come off Zyprexa due to weight gain.     Patient was referred to Bariatric surgery for gastric bypass per PCP note 7/11/23 with notes to eventually come off the Zyprexa, though due to chronic mental health conditions the medication is necessary at this time.     5/15/23:  Patient presents today in office, \"I am still picking at my head.\" Patient was started on Dupixent per Dermatology in Feb.  \"I am still depressed, the anxiety is getting the best of me.\"    At last visit Zyprexa was increased from 2.5 mg to 5 mg daily at 12 noon, for which patient reports ongoing tactile hallucinations.  Due to elevated anxiety, patient was instructed to increase dose of Hydroxyzine over this past weekend to 100 mg 3 times daily as needed anxiety, for which patient reports tolerating well, \"It works for 3 hrs, then I am right back picking at my head, very anxious, like I have pressure in my chest " "all the time.\" Denies increased drowsiness with dose.  Continues to have sensation of bugs crawling on head and around ears.      Continues to take Zyprexa 5 mg at noon, and reports mild drowsiness.  Patient will \"doze off\" during the day, denies taking a nap.  Reports waking up during the night, approximately twice and able to eventually return to sleep.      Depression: Patient complains of depression. She complains of anhedonia, depressed mood, difficulty concentrating, fatigue, hopelessness and insomnia   Anxiety:  The patient endorses significant symptoms of anxiety including: excessive anxiety and worry about a number of events or activities for more days than not, restlessness or feeling keyed up, being easily fatigued, difficulty concentrating or mind going blank, irritability, muscle tension and sleep disturbance which have caused impairment in important areas of daily functioning.      4/7/23: Patient presents today in office, at last visit patient was started on Zyprexa 2.5 mg daily at noon for which patient reports \"it's okay\". Admits to medication adherence, \"I am still depressed, still picking my head, and feel theres still something crawling in my head.\" reports with excessive picking areas will bleed.  Patient tried to use an ice pack to head, though had a headache, which was wrapped in a towel. Denies relief from ice pack.    Prozac was increased from 20 to 40 mg on 3/24/23, for which patient reports she is still depressed.     Denies drowsiness with Zyprexa, or naps. Denies scratching or feeling as if something is crawling on any other places of body, only the scalp.     Patient reports asking PCP for something for anxiety and was told to discuss with this provider.    Patient reports [facility] provider, Barbie, had left [facility], and was waiting to hear back due to locating a provider whom accepted patient insurance. And patient has not heard back at this time.       3/9/23:  Patient presents today in " "office, \"I am not any better, I can't hardly stand it. The feeling of something crawling in my head, and I am depressed again, can you put me back on the Cymbalta along with the Prozac.  I am so anxious.\"     Patient continues to experience crying spells, anxiety and depression ongoing with minimal relief.  Continues to feel as if something is crawling on scalp, complaint of itching is not voiced today.       2/23/23:  Patient presents today in office, at last visit Risperdal was stopped due to potential D2D interaction and short duration of use and started Prozac 10 mg was started for which patient reports \"I can't see a difference, I am so depressed, I am still scratching my head.\" Reports Hydroxyzine has helped with the itching, though, \"I can't help it, I can't stop scratching.\"  Patient wore gloves for short duration with minimal effectiveness, \"I still feel that sensation, like something is crawling in my head, then I go pick at it.\" Reports sensation does go down neck.    \"I get this tingling sensation through this upper part of my body.\" as patient pointing to upper chest, shoulders. Patient recalls this sensation has been going on for \"along time, several months, even when I was seeing .\"    Patient is no longer seeing therapist, patient has an appointment with [facility] in April with Barbie,  as Sendy with [facility] is on maternity leave, and was referred to Kayleigh and she lives in [place] and was only talking via phone.       1/30/23:  Patient presents today in office reports tolerating taper of Cymbalta though continues to feel depressed and continues to scratch at head, \"My anxiety is ridiculous, over the weekend I said to myself I don't even want to live.\" Patient was able to take some deep breaths and managed to deal with symptoms, though difficult.  Denies current SI.     Increase Hydroxyzine FROM 25 mg 4 times daily as needed TO 50 mg by mouth 3 times daily as needed at last visit, which patient feels " "itching has not improved.     Risperdal w/Prozac D2D discussed.   Patient is uncertain if Risperdal is helpful, \"I can stop taking it, if you can get me on something to stop tearing up my head.\" Patient reports times of dozing off during the day, though uncertain if dose was taken yesterday, denies recent falls. Patient reports trying to lose weight and prefers to not take any medications that can potentially increase weight as patient denies knowledge of Risperdal causing weight gain.     Patient informed of other potential D2D interaction with Wellbutrin and Prozac as dose of Prozac would be doubled essentially.  Patient wishes to proceed with switch to Prozac as previously discussed.      Patient asking about Gabapentin refill, uses for RLS and was told by PCP medication would need to come from psychiatry or pain management.      1/9/23:  Patient presents today in office complaining of severe itching and skin picking to head and scalp.  Since last visit, patient was started on Risperdal 1 mg twice daily 12/22/22 per PCP.  Noted PCP also weaning down from Hydrocodone and switched to Diclofenac.  Patient was admitted to [facility] 12/31/22-1/2/23 for chest pain, palpitations, acute exacerbation of COPD, anxiety disorder, chronic hypoxemic respiratory failure on home oxygen.     Started Hydroxyzine (Vistaril) 25 mg 4 times daily as needed, which patient takes 3-4 times daily which has not provided effectiveness, \"Nothing helps, I swear there is something crawling in my head, I can't stop scratching it, I been to a dermatologist.\"  Admits to medication calming the itching a little bit, \"not really helping with anxiety, just a little bit.\" Denies drowsiness, though reports ongoing low energy.      In regards to Risperdal, \"I don't know if it helps either.\"    Admits to having weight gain, has been walking in home due to living [place] a [place] cannot walk outside, which has helped some in regards to exercise.  Patient states, \"I " "can feel the depression coming.\"   \"When I stand up I have to hold on to this and not take off right away.\"  Referring to rollater walker.  Admits to waking up in the middle of the night, at times goes to sleep at 1 am, uses CPAP.      11/21/22:  Patient presents today in office, \"I am still depressed and my anxiety is off the roof, my head is so sore from scratching.\"   Increased Wellbutrin SR to 200 mg daily and Buspar from 15 to 30 mg twice daily at last visit, for which patient reports has not been helpful.  \"I wonder if you can raise the Cymbalta or not?\"    \"There are some days I feel like I want to die.\"     Patient continues to report difficulty with living situation with daughter and grand children, \"I can't hardly get her to help me with the house cleaning, I just don't live like her.\"  Admits to ongoing stress due to current living environment.     Upon inquiring of amount of Cymbalta, patient is uncertain if there are 2 of the 60 mg in medi-planner, as the current order is for 2 of the 60 mg daily.      Patient expresses desire to speak with a therapist more often, as patient does not have another appointment for a few weeks with current therapist.     \"I know you won't give me the Diazepam, but is there something else you can give me for anxiety?\"      10/17/22:  Patient presents today in office for medication check as at last visit Wellbutrin SR was increased to 150 mg, changed Buspar frequency to twice daily as patient was not consistent with 3 times daily dosing, and due to not taking Remeron as prescribed patient was educated to start taking the 1.5 tabs to equal 45 mg.  Patient was also instructed to allow daughter to fill all medications to medi-planner as patient was administering some medications independently leading to inconsistency and inaccurate dosing.     Patient reports taking Remeron 45 mg and it is the only bottle at bedside which patient admits to adherence.  Buspar is now in prefilled " "medi planner filled by daughter.     Patient reports depression has worsened, \"my nerves I just get nervous all the time, my daughter lives with me, it's hard sometimes.\"  Patients 2 grand children almost 3 and 8, and \"Becki is suárez and is sometimes not nice to me.\"  Patient began to cry explaining this morning she kept son home from school and got upset because she had to change son's doctor appointment from 130 to 230 pm, as patient has appointment at 1230 with PCP today.    Patient continues to work with Sendy, therapist from [facility].     Patient reports utilizing deep focused breathing to help with anxiety which isn't always effective.   Patient admits to taking Klonopin, \"the little blue pill\", \"I only got 30 so I can only take one a day.  I believe you gave me a prescription last time for 30 days.\"  Patient reports no Klonopin remain, as last pill was taken yesterday.  Patient reports taking upon feeling very anxious.  Denies falls or dizziness.       9/9/22: INITIAL EVAL/Transfer of care   Patient presents today in office for transfer of care within practice due to location, as patient residence is in [place].  Patient has been a patient of  in [place] since 10/2021.  Patient was last seen by  7/15/22.  Patient has a longstanding history of depression and anxiety which has been treatment resistant for the most part, as patient has failed numerous medications due to either minimal effectiveness, side effects, or induction/worsening of other comorbid conditions. 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  Patient had been advised numerous times in the last year to receive a higher level of care with inpatient mental health care-suggestions of ECT- due to minimal improvement with outpatient medication management, however, patient has either refused due to a multitude of reasons or failure to follow through after " "arrangements for treatment were finalized.  Unable to have TMS due to transportation issues, has had ECT in the past.     Patient has a history of CHF,HTN,HLD,DM,OA,chronic neck and back pain (L-spine), MEGAN with CPAP w/2L O2, IBS, Peripheral Neuropathy,COPD,RLS, essential tremor,2 and obesity.  Patient pain is managed by [facility].  Due to these chronic medical conditions she has physical limitations which also contribute to mood.      Patient had been living alone up until this past summer, as patient did not like living alone.  Patient daughter, Becki, and her 2 children age 2.5 and almost 8 yr old, moved in with patient in her mobile home in [place].     Patient complaint of \"very jittery all the time, I am still depressed, I didn't know if you could raise my Cymbalta or Wellbutrin to help me? Sometimes my nerves, I just feel like I am going to explode\".  \"The 2 little one's, it's just really hard on me.\"   Patient admits triggers of anxiety are primarily 2 grand children whom live in home, \"they live different than I do.\"   Reports daughter is also irritable which makes it difficult for patient to cope with anxiety.   Stress and anxiety has worsened since they moved into home.      Patient feels Buspar at 15 mg 3 times daily is not always effective, denies drowsiness though has little energy.  \"I get agitated very easily.  If kids make a mess, and she doesn't clean it up then I have to do it, I can't go behind her, but if I say something, she acts like it's nothing.\"     Patient denies sleep difficulty, adheres to nightly CPAP with Oxygen 2L, denies day time oxygen as patient did not qualify.  Denies recent falls.  Patient sleeps from 1130 pm until 8-9 am, denies night time awakenings nor naps.     Patient states, \"I think I have my days and nights mixed up.\"  Patient will try to eat dinner, at night snacks on pretzles or crackers.  Patient does not eat breakfast or lunch, will snack sometimes. " "Patient checks blood sugars daily, yesterday was 124 when nurse came, confirmed [facility]  services for SN only, though patient believes will be released soon, and would find out today if services would continue. Patient has been receiving services for approximately 4 months.     Patient uses a medi-planner which daughter fills weekly.  Currently taking Wellbutrin  mg once daily in the morning, \"I try to take the Buspar 15 mg 3 times daily, I will take it if I feel anxious.\" Denies 3 times daily dosing as prescribed. Patient reports keeping Buspar bottle out of routine medications which daughter fills.  Clonazepam 1 mg nightly with Remeron.  Reports upon last  of Remeron it was 30 mg and not 45 mg, denies reading bottle which instructs patient to take 1.5 tabs to equal 45 mg. \"when I take my night medications I don't always go to sleep at that time, so I wait until I get ready to go to sleep then take it.\"  \"It's in the bottle next to my night stand, I leave the pill box on the table in the kitchen.\"  Patient takes Cymbalta 120 mg in the morning.   Confirmed with patient both the Wellbutrin and Cymbalta are in the medi-planner.  And the Buspar, Clonazepam, and Remeron bottles are at patient bedside.  The medi-planner is for am and pm, and \"leg medicine and blood pressure medicine are in the night\". And patient takes those medications around 10 pm, and takes Remeron later at 11 pm.  Patient reports taking Clonazepam once daily, and does not take at night.  Confirmed as patient had stated differently earlier in visit.  \"I just want to feel better.\"     Denies Signs & Symptoms of Serotonin Syndrome: confusion, hallucinations, seizures, increased heart rate, fever, excessive sweating, shivering or shaking, blurred vision, muscle spasms or stiffness, tremors, incoordination, stomach cramps, nausea, & vomiting.  Though patient endorses to soa with minimal exertion with \"beads of sweat on my forehead while I " "got dressed this morning.\"  Patient tremors are chronic.        PHQ-9 Depression Screening  PHQ-9 Total Score:    10/4/24: 16  The PHQ has not been completed during this encounter.       DIVYA-7    10/4/24: 15    The following portions of the patient's history were reviewed and updated as appropriate: allergies, current medications, past family history, past medical history, past social history, and past surgical history.     Past Surgical History:  Past Surgical History:   Procedure Laterality Date    CARPAL TUNNEL RELEASE      COLONOSCOPY  2020    ENDOSCOPIC FUNCTIONAL SINUS SURGERY (FESS) Left 08/15/2022    Procedure: ENDOSCOPIC FUNCTIONAL SINUS SURGERY, left maxillary antrostomy with removal of contents, possible left ethmoidectomy;  Surgeon: Johnny Calderon MD;  [place] [facility] [place] [facility] [place];  Laterality: Left;    HYSTERECTOMY  2002    complete- ovarian cysts       Problem List:  Patient Active Problem List   Diagnosis    Carpal tunnel syndrome of left wrist    Carpal tunnel syndrome of right wrist    Anxiety    Celiac artery stenosis    Cervical disc disorder    Chronic pain disorder    Degeneration of lumbar intervertebral disc    Depressive disorder    Diabetic neuropathy    Essential hypertension    Essential tremor    Hypercholesterolemia    Insomnia    Irritable bowel syndrome    Lumbar spondylosis    Obesity, morbid, BMI 50 or higher    Osteoarthritis    Sacroiliac joint pain    MEGAN (obstructive sleep apnea)    Chronic diastolic heart failure    Palpitations    Type 2 diabetes mellitus without complication, without long-term current use of insulin    Other chest pain    Generalized anxiety disorder    Paresthesia    Shortness of breath    Chronic obstructive pulmonary disease    Long-term current use of opiate analgesic    Cervical radiculopathy    Cervical spondylosis    Degeneration of thoracic intervertebral disc    Thoracic spondylosis    Coronary artery calcification    Mild persistent " asthma without complication    Sore throat    Acute pain of left knee    CKD (chronic kidney disease)    B12 deficiency    COPD with asthma and status asthmaticus    Iron deficiency    Overweight    Vulvar cyst    Iron deficiency anemia    Vitamin D deficiency    Non-restorative sleep    Snoring    Excessive daytime sleepiness    Class 3 severe obesity due to excess calories without serious comorbidity with body mass index (BMI) of 45.0 to 49.9 in adult    MEGAN on CPAP failed CPAP therapy    Chronic hypoxic respiratory failure on home oxygen with the CPAP at 2 L       Allergy:   No Known Allergies     Discontinued Medications:  There are no discontinued medications.                                Current Medications:   Current Outpatient Medications   Medication Sig Dispense Refill    albuterol sulfate  (90 Base) MCG/ACT inhaler Inhale 2 puffs Every 4 (Four) Hours As Needed for Wheezing or Shortness of Air. 18 g 11    atorvastatin (LIPITOR) 10 MG tablet Take 1 tablet by mouth Daily. 90 tablet 3    Blood Glucose Monitoring Suppl (Blood Glucose Monitor System) w/Device kit Use Daily to test blood glucose. 1 each 0    Calcium Carb-Cholecalciferol (Calcium 500 + D) 500-3.125 MG-MCG tablet Take 1 each by mouth 2 (Two) Times a Day. 60 tablet 2    clonazePAM (KlonoPIN) 0.5 MG tablet Take 1 tablet by mouth 2 (Two) Times a Day As Needed for Anxiety. 60 tablet 0    diclofenac (VOLTAREN) 75 MG EC tablet Take 1 tablet by mouth 2 (Two) Times a Day. (Patient not taking: Reported on 2/27/2025) 60 tablet 2    donepezil (ARICEPT) 10 MG tablet Take 1 tablet by mouth Every Night. 30 tablet 2    ferrous sulfate 325 (65 FE) MG tablet Take 1 tablet by mouth Daily With Breakfast. 90 tablet 1    FLUoxetine (PROzac) 10 MG capsule Take 3 capsules by mouth Every Morning 90 capsule 1    fluticasone (FLONASE) 50 MCG/ACT nasal spray Administer 2 sprays into the nostril(s) as directed by provider Daily. 16 g 0     Fluticasone-Umeclidin-Vilant (TRELEGY ELLIPTA) 200-62.5-25 MCG/ACT inhaler Inhale 1 puff Daily. 60 each 5    glucose blood test strip Use as instructed to check blood sugar daily 100 each 3    HYDROcodone-acetaminophen (NORCO)  MG per tablet       ipratropium-albuterol (DUO-NEB) 0.5-2.5 mg/3 ml nebulizer Nebulize and inhale 1 vial 4 (Four) Times a Day As Needed for Wheezing. 360 mL 3    Lancet Device misc Use 1 each Daily. Use as directed; check blood sugar once daily 100 each 3    Lancets (OneTouch Delica Plus [id number]) misc Use 1 each Daily. 100 each 0    losartan (COZAAR) 25 MG tablet Take 1 tablet by mouth Daily.      Magnesium Oxide -Mg Supplement 400 (240 Mg) MG tablet Take 1 tablet by mouth Every Night. 30 tablet 11    memantine (Namenda) 5 MG tablet Take 1 tablet by mouth Daily. 30 tablet 2    metoprolol succinate XL (TOPROL-XL) 25 MG 24 hr tablet Take 0.5 tablets by mouth Daily.      miconazole (Desenex) 2 % powder Apply topically to the appropriate area as directed 2 (Two) Times a Day. 71 g 11    mirtazapine (REMERON) 7.5 MG tablet Take 1 tablet by mouth Every Night. 30 tablet 1    mupirocin (BACTROBAN) 2 % ointment APPLY TO AFFECTED AREA(S) TWO TIMES A DAY AS DIRECTED FOR 14 DAYS - 22 g 0    naloxone (NARCAN) 4 MG/0.1ML nasal spray 1 spray As Needed. Has on hand      O2 (OXYGEN) Inhale 2 L/min Every Night.      pantoprazole (PROTONIX) 40 MG EC tablet TAKE 1 TABLET BY MOUTH DAILY 90 tablet 0    pramipexole (MIRAPEX) 0.5 MG tablet Take 1 tablet by mouth 3 (Three) Times a Day. 270 tablet 0    pregabalin (LYRICA) 75 MG capsule Take 1 capsule by mouth 2 (Two) Times a Day. 60 capsule 2    primidone (MYSOLINE) 50 MG tablet Take 1 tablet by mouth.      spironolactone (ALDACTONE) 25 MG tablet Take 1 tablet by mouth 2 (Two) Times a Day. 60 tablet 5    tacrolimus (PROTOPIC) 0.1 % ointment Apply topical ointment to affected area twice daily      Tirzepatide 15 MG/0.5ML solution auto-injector Inject 15 mg under  the skin into the appropriate area as directed 1 (One) Time Per Week. 2 mL 2    torsemide (DEMADEX) 20 MG tablet Take 2 tablets by mouth 2 (Two) Times a Day. 180 tablet 3    triamcinolone (KENALOG) 0.025 % cream Apply 1 Application topically to the appropriate area as directed As Needed.       Current Facility-Administered Medications   Medication Dose Route Frequency Provider Last Rate Last Admin    cyanocobalamin injection 1,000 mcg  1,000 mcg Intramuscular Q28 Days Ami Diego MD   1,000 mcg at 03/03/25 1333       Past Medical History:  Past Medical History:   Diagnosis Date    Adverse effect of other viral vaccines, initial encounter     Covid vaccine    Allergic fungal sinusitis 07/29/2022    Allergic rhinitis     Anxiety disorder     Asthma     CHF (congestive heart failure)     COPD with acute exacerbation     CTS (carpal tunnel syndrome)     left    Difficulty walking 2022    Essential (primary) hypertension     Essential tremor     Hypercholesterolemia 10/18/2021    Insomnia     Irritable bowel syndrome     Low back pain     Major depressive disorder     Morbid (severe) obesity due to excess calories     Nasal congestion     Neuropathy in diabetes 2023    Obstructive sleep apnea (adult) (pediatric)     Other hereditary and idiopathic neuropathies     Pain in left hip     Pain in right toe(s)     Pain in thoracic spine     Peripheral neuropathy     Hands    Primary generalized (osteo)arthritis     Primary insomnia     Pulmonary emphysema 01/05/2023    Restless leg syndrome     SOBOE (shortness of breath on exertion)     Substance abuse     Type 2 diabetes mellitus without complication, without long-term current use of insulin 05/02/2022    Vitamin D deficiency        Past Psychiatric History:  Began Treatment: while in her 20's  Diagnoses:Depression and Anxiety  Psychiatrist: -talked on phone-managed meds for 8 months prior to SOC with  in 10/2021; Also at [facility] for 2 yrs  [id number]  Therapist: Sendy with [facility] (no in office visits) (telephone) next appt Oct. 2022 (for the last 8 months) prior seen Jessica at [facility] in [place] for 1-2 yrs  Admission History:Denies  Medication Trials: Several for which pt unable to recall if effective: Lexapro(ineff.),Abilify(eff),Prozac,Zoloft,Trintellix,Paxil,Celexa, (can't remember/uncertain); SGA-worsened tremors-per neuro medication induced parkisonism-Latuda,Rexulti,Seroquel (wt gain,helped with insomnia&anxiety); Lamictal-ineff for anxiety; Xanax-weaned, Hydroxyzine(ineff 1 mo. 2022-2022)Trazodone 2021-2022 somewhat helpful for anxiety though made pt tired, Ambien [id number]?; retried Effexor XR, Wellbutrin,Cymbalta  Risperdal 1 mo approximately stopped due to starting Prozac; Hydroxyzine 100 mg 3 times daily ineffective for itching and anxiety; Zyprexa-ineffective; Prozac up to 80 mg-ineffective; Klonopin-weaned, effective; Vraylar up to 3 mg-parkinsonism tremors worsened per Neurologist recommendations; Buspar up to 20 mg 3 times daily-no longer effective  Self Harm: Denies  Suicide Attempts:Denies   Psychosis, Anxiety, Depression: Denies    Substance Abuse History:   Types:Denies all, including illicit  Withdrawal Symptoms:Denies  Longest Period Sober:Not Applicable   AA: Not applicable     Social History:  Martial Status:   Employed:No Retired from [employer], worked in [employer]  Kids:Yes or If so, how many 1 son, Simon; 1 dtr, Becki  House: mobile home  daughter, Becki and her 2 young children    History: Denies  Access to Guns:  Denies (son has his guns locked in safe in patient home, which patient does not have access to)    Social History     Socioeconomic History    Marital status:     Number of children: 2   Tobacco Use    Smoking status: Former     Current packs/day: 0.00     Average packs/day: 0.5 packs/day for 12.0 years (6.0 ttl pk-yrs)     Types: Cigarettes     Start date: 2009      Quit date: 2021     Years since quittin.1     Passive exposure: Past    Smokeless tobacco: Never   Vaping Use    Vaping status: Never Used   Substance and Sexual Activity    Alcohol use: Not Currently    Drug use: Never    Sexual activity: Not Currently     Partners: Male     Birth control/protection: Hysterectomy, Surgical       Family History:   Suicide Attempts: Denies  Suicide Completions:Denies      Family History   Problem Relation Age of Onset    Hypertension Mother     COPD Father     Heart failure Father     Neuropathy Sister     Sleep apnea Sister     Restless legs syndrome Sister     Sleep apnea Brother     Anxiety disorder Daughter     Depression Daughter     Malig Hyperthermia Neg Hx     Breast cancer Neg Hx     Ovarian cancer Neg Hx     Uterine cancer Neg Hx     Cervical cancer Neg Hx     Colon cancer Neg Hx     Stomach cancer Neg Hx     Skin cancer Neg Hx     Clotting disorder Neg Hx     Deep vein thrombosis Neg Hx     Pulmonary embolism Neg Hx        Developmental History:   Born: [place], [place]  Siblings:3 brothers, 6 sisters (1 )  Childhood: Denies Abuse  High School:Completed  College:Denies    Mental Status Exam:   Hygiene:   good  Cooperation:  Cooperative  Eye Contact:  Good though kept closing eyes drifting during visit today  Psychomotor Behavior:  Appropriate   Affect:  Appropriate  Mood: euthymic   Speech:  Normal  Thought Process:  Goal directed   Thought Content:  Mood congruent  Suicidal:  None  Homicidal:  None  Hallucinations:  None   Delusion:  None  Memory:  Intact   Orientation:  Person, Place, Time and Situation  Reliability:  good  Insight:  Good  Judgement:  Good  Impulse Control:  Good  Physical/Medical Issues:  Yes CHF,COPD,MEGAN,HTN,HLD,CKD3,RLS,IBS,OA,Peripheral Neuropathy,Chronic back&neck pain,DM,obesity,essential tremors to kush hands  Home Oxygen, cervical dystonia    Review of Systems:  Review of Systems   Constitutional:  Positive for fatigue. Negative for  diaphoresis.   HENT:  Negative for drooling.    Eyes:  Negative for visual disturbance.   Respiratory:  Positive for apnea. Negative for cough and shortness of breath.          2L O2 nightly, not significant for CPAP per sleep study 2/27/25   Cardiovascular:  Negative for chest pain, palpitations and leg swelling.   Gastrointestinal:  Negative for nausea and vomiting.   Endocrine: Negative for cold intolerance and heat intolerance.   Genitourinary:  Negative for difficulty urinating.   Musculoskeletal:  Positive for back pain, gait problem and neck pain. Negative for joint swelling.        Use of Rolator walker   Allergic/Immunologic: Negative for immunocompromised state.   Neurological:  Positive for weakness. Negative for dizziness, seizures and speech difficulty.   Psychiatric/Behavioral:  Positive for decreased concentration and sleep disturbance. Negative for agitation, confusion, hallucinations, self-injury and suicidal ideas. The patient is nervous/anxious. The patient is not hyperactive.        Physical Exam:  Physical Exam  Psychiatric:         Attention and Perception: Attention and perception normal.         Mood and Affect: Mood and affect normal.         Speech: Speech normal.         Behavior: Behavior normal. Behavior is slowed. Behavior is cooperative.         Thought Content: Thought content normal. Thought content does not include suicidal ideation. Thought content does not include suicidal plan.         Cognition and Memory: Cognition normal.         Judgment: Judgment normal.      Comments: Patient drifting with eyes closing intermittently throughout the visit, reports taking Klonopin 0.5 mg at 9am         Vital Signs:   There were no vitals taken for this visit.     Office notes from care team reviewed:  Polysomnogram with Chance De Leon MD (02/27/2025)   Office Visit with Amaya Dwyer APRN (02/27/2025)   Office Visit with Ami Diego MD (02/24/2025)     Lab Results:  REVIEWED  Lab on 02/24/2025   Component Date Value Ref Range Status    Glucose 02/24/2025 77  65 - 99 mg/dL Final    BUN 02/24/2025 13  8 - 23 mg/dL Final    Creatinine 02/24/2025 1.65 (H)  0.57 - 1.00 mg/dL Final    Sodium 02/24/2025 138  136 - 145 mmol/L Final    Potassium 02/24/2025 3.9  3.5 - 5.2 mmol/L Final    Chloride 02/24/2025 97 (L)  98 - 107 mmol/L Final    CO2 02/24/2025 29.0  22.0 - 29.0 mmol/L Final    Calcium 02/24/2025 9.6  8.6 - 10.5 mg/dL Final    BUN/Creatinine Ratio 02/24/2025 7.9  7.0 - 25.0 Final    Anion Gap 02/24/2025 12.0  5.0 - 15.0 mmol/L Final    eGFR 02/24/2025 33.5 (L)  >60.0 mL/min/1.73 Final   Lab on 02/14/2025   Component Date Value Ref Range Status    proBNP 02/14/2025 357.0  0.0 - 900.0 pg/mL Final    Glucose 02/14/2025 85  65 - 99 mg/dL Final    BUN 02/14/2025 17  8 - 23 mg/dL Final    Creatinine 02/14/2025 1.59 (H)  0.57 - 1.00 mg/dL Final    Sodium 02/14/2025 144  136 - 145 mmol/L Final    Potassium 02/14/2025 4.5  3.5 - 5.2 mmol/L Final    Chloride 02/14/2025 104  98 - 107 mmol/L Final    CO2 02/14/2025 32.7 (H)  22.0 - 29.0 mmol/L Final    Calcium 02/14/2025 9.5  8.6 - 10.5 mg/dL Final    Total Protein 02/14/2025 7.2  6.0 - 8.5 g/dL Final    Albumin 02/14/2025 3.2 (L)  3.5 - 5.2 g/dL Final    ALT (SGPT) 02/14/2025 19  1 - 33 U/L Final    AST (SGOT) 02/14/2025 16  1 - 32 U/L Final    Alkaline Phosphatase 02/14/2025 130 (H)  39 - 117 U/L Final    Total Bilirubin 02/14/2025 <0.2  0.0 - 1.2 mg/dL Final    Globulin 02/14/2025 4.0  gm/dL Final    A/G Ratio 02/14/2025 0.8  g/dL Final    BUN/Creatinine Ratio 02/14/2025 10.7  7.0 - 25.0 Final    Anion Gap 02/14/2025 7.3  5.0 - 15.0 mmol/L Final    eGFR 02/14/2025 35.0 (L)  >60.0 mL/min/1.73 Final   Office Visit on 02/14/2025   Component Date Value Ref Range Status    SARS Antigen 02/14/2025 Not Detected  Not Detected, Presumptive Negative Final    Influenza A Antigen DEANDRE 02/14/2025 Not Detected  Not Detected Final    Influenza B  Antigen DEANDRE 02/14/2025 Not Detected  Not Detected Final    Internal Control 02/14/2025 Passed  Passed Final    Lot Number 02/14/2025 [id number]   Final    Expiration Date 02/14/2025 7-   Final   Lab on 02/07/2025   Component Date Value Ref Range Status    Magnesium 02/07/2025 2.3  1.6 - 2.4 mg/dL Final    Total Cholesterol 02/07/2025 226 (H)  0 - 200 mg/dL Final    Triglycerides 02/07/2025 180 (H)  0 - 150 mg/dL Final    HDL Cholesterol 02/07/2025 48  40 - 60 mg/dL Final    LDL Cholesterol  02/07/2025 146 (H)  0 - 100 mg/dL Final    VLDL Cholesterol 02/07/2025 32  5 - 40 mg/dL Final    LDL/HDL Ratio 02/07/2025 2.96   Final    Hemoglobin A1C 02/07/2025 5.60  4.80 - 5.60 % Final    25 Hydroxy, Vitamin D 02/07/2025 33.3  30.0 - 100.0 ng/ml Final   Office Visit on 01/27/2025   Component Date Value Ref Range Status    SARS Antigen 01/27/2025 Not Detected  Not Detected, Presumptive Negative Final    Influenza A Antigen DEANDRE 01/27/2025 Not Detected  Not Detected Final    Influenza B Antigen DEANDRE 01/27/2025 Not Detected  Not Detected Final    Internal Control 01/27/2025 Passed  Passed Final    Lot Number 01/27/2025 [id number]   Final    Expiration Date 01/27/2025 7-30-25   Final   Lab on 12/23/2024   Component Date Value Ref Range Status    proBNP 12/23/2024 88.8  0.0 - 900.0 pg/mL Final    WBC 12/23/2024 6.53  3.40 - 10.80 10*3/mm3 Final    RBC 12/23/2024 4.72  3.77 - 5.28 10*6/mm3 Final    Hemoglobin 12/23/2024 14.6  12.0 - 15.9 g/dL Final    Hematocrit 12/23/2024 44.6  34.0 - 46.6 % Final    MCV 12/23/2024 94.5  79.0 - 97.0 fL Final    MCH 12/23/2024 30.9  26.6 - 33.0 pg Final    MCHC 12/23/2024 32.7  31.5 - 35.7 g/dL Final    RDW 12/23/2024 12.6  12.3 - 15.4 % Final    RDW-SD 12/23/2024 44.4  37.0 - 54.0 fl Final    MPV 12/23/2024 9.5  6.0 - 12.0 fL Final    Platelets 12/23/2024 299  140 - 450 10*3/mm3 Final    Neutrophil % 12/23/2024 49.2  42.7 - 76.0 % Final    Lymphocyte % 12/23/2024 33.8  19.6 - 45.3 % Final     Monocyte % 12/23/2024 13.0 (H)  5.0 - 12.0 % Final    Eosinophil % 12/23/2024 3.4  0.3 - 6.2 % Final    Basophil % 12/23/2024 0.3  0.0 - 1.5 % Final    Immature Grans % 12/23/2024 0.3  0.0 - 0.5 % Final    Neutrophils, Absolute 12/23/2024 3.21  1.70 - 7.00 10*3/mm3 Final    Lymphocytes, Absolute 12/23/2024 2.21  0.70 - 3.10 10*3/mm3 Final    Monocytes, Absolute 12/23/2024 0.85  0.10 - 0.90 10*3/mm3 Final    Eosinophils, Absolute 12/23/2024 0.22  0.00 - 0.40 10*3/mm3 Final    Basophils, Absolute 12/23/2024 0.02  0.00 - 0.20 10*3/mm3 Final    Immature Grans, Absolute 12/23/2024 0.02  0.00 - 0.05 10*3/mm3 Final   Office Visit on 12/23/2024   Component Date Value Ref Range Status    SARS Antigen 12/23/2024 Not Detected  Not Detected, Presumptive Negative Final    Influenza A Antigen DEANDRE 12/23/2024 Not Detected  Not Detected Final    Influenza B Antigen DEANDRE 12/23/2024 Not Detected  Not Detected Final    Internal Control 12/23/2024 Passed  Passed Final    Lot Number 12/23/2024 [id number]   Final    Expiration Date 12/23/2024 7-11-25   Final   Lab on 11/21/2024   Component Date Value Ref Range Status    Iron 11/21/2024 104  37 - 145 mcg/dL Final    Iron Saturation (TSAT) 11/21/2024 31  20 - 50 % Final    Transferrin 11/21/2024 223  200 - 360 mg/dL Final    TIBC 11/21/2024 332  298 - 536 mcg/dL Final    Magnesium 11/21/2024 2.2  1.6 - 2.4 mg/dL Final   Office Visit on 11/21/2024   Component Date Value Ref Range Status    Amphetamine Screen, Urine 11/21/2024 Negative  Negative Final    Barbiturates Screen, Urine 11/21/2024 Positive (A)  Negative Final    Buprenorphine, Screen, Urine 11/21/2024 Negative  Negative Final    Benzodiazepine Screen, Urine 11/21/2024 Positive (A)  Negative Final    Cocaine Screen, Urine 11/21/2024 Negative  Negative Final    MDMA (ECSTASY) 11/21/2024 Negative  Negative Final    Methamphetamine, Ur 11/21/2024 Negative  Negative Final    Morphine/Opiates Screen, Urine 11/21/2024 Positive (A)  Negative  Final    Methadone Screen, Urine 11/21/2024 Negative  Negative Final    Oxycodone Screen, Urine 11/21/2024 Negative  Negative Final    Phencyclidine (PCP), Urine 11/21/2024 Negative  Negative Final    THC, Screen, Urine 11/21/2024 Negative  Negative Final    Lot Number 11/21/2024 [id number]   Final    Expiration Date 11/21/2024 7-4-26   Final   Lab on 11/01/2024   Component Date Value Ref Range Status    proBNP 11/01/2024 93.7  0.0 - 900.0 pg/mL Final    Glucose 11/01/2024 102 (H)  65 - 99 mg/dL Final    BUN 11/01/2024 14  8 - 23 mg/dL Final    Creatinine 11/01/2024 1.26 (H)  0.57 - 1.00 mg/dL Final    Sodium 11/01/2024 142  136 - 145 mmol/L Final    Potassium 11/01/2024 4.6  3.5 - 5.2 mmol/L Final    Chloride 11/01/2024 102  98 - 107 mmol/L Final    CO2 11/01/2024 29.4 (H)  22.0 - 29.0 mmol/L Final    Calcium 11/01/2024 10.0  8.6 - 10.5 mg/dL Final    Total Protein 11/01/2024 7.1  6.0 - 8.5 g/dL Final    Albumin 11/01/2024 3.8  3.5 - 5.2 g/dL Final    ALT (SGPT) 11/01/2024 15  1 - 33 U/L Final    AST (SGOT) 11/01/2024 20  1 - 32 U/L Final    Alkaline Phosphatase 11/01/2024 123 (H)  39 - 117 U/L Final    Total Bilirubin 11/01/2024 0.3  0.0 - 1.2 mg/dL Final    Globulin 11/01/2024 3.3  gm/dL Final    A/G Ratio 11/01/2024 1.2  g/dL Final    BUN/Creatinine Ratio 11/01/2024 11.1  7.0 - 25.0 Final    Anion Gap 11/01/2024 10.6  5.0 - 15.0 mmol/L Final    eGFR 11/01/2024 46.6 (L)  >60.0 mL/min/1.73 Final    WBC 11/01/2024 6.88  3.40 - 10.80 10*3/mm3 Final    RBC 11/01/2024 4.48  3.77 - 5.28 10*6/mm3 Final    Hemoglobin 11/01/2024 13.7  12.0 - 15.9 g/dL Final    Hematocrit 11/01/2024 42.6  34.0 - 46.6 % Final    MCV 11/01/2024 95.1  79.0 - 97.0 fL Final    MCH 11/01/2024 30.6  26.6 - 33.0 pg Final    MCHC 11/01/2024 32.2  31.5 - 35.7 g/dL Final    RDW 11/01/2024 13.0  12.3 - 15.4 % Final    RDW-SD 11/01/2024 46.3  37.0 - 54.0 fl Final    MPV 11/01/2024 9.2  6.0 - 12.0 fL Final    Platelets 11/01/2024 265  140 - 450 10*3/mm3  Final   There may be more visits with results that are not included.       EKG Results:  No orders to display       Imaging Results:  CT Head Without Contrast    Result Date: 6/7/2022    1. Generalized atrophy with no acute intracranial findings 2. Expansion of the left maxillary sinus with some thickening of the bony wall laterally suggesting a mucocele or mass.  Adjacent involvement of the left ethmoid air cells and left frontal sinus.  Recommend ENT consultation.  Mild mucosal disease in the sphenoid sinuses.     Mars Aden MD       Electronically Signed and Approved By: Mars Aden MD on 6/07/2022 at 14:20                 Assessment & Plan   Diagnoses and all orders for this visit:    1. Generalized anxiety disorder (Primary)  -     Ambulatory Referral to Psychotherapy    2. Recurrent major depression resistant to treatment  -     Ambulatory Referral to Psychotherapy    3. 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 Polypharmacy    5. Medication management    6. Medication care plan discussed with patient            Visit Diagnoses:    ICD-10-CM ICD-9-CM   1. Generalized anxiety disorder  F41.1 300.02   2. 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 Polypharmacy  Z79.899 V58.69   5. Medication management  Z79.899 V58.69   6. Medication care plan discussed with patient  Z71.89 V65.49           PLAN:  Safety: No acute safety concerns  Therapy: Referral Made [facility] with Lila Lockhart LCSW, patient informed provider is located in [facility] on the 2nd floor. Office will call to schedule appointment.  Patient given direct contact number to call if have not received a call to schedule within 1 week, [phone].  Note on referral of patient preference for telehealth due to transportation.   Risk Assessment: Risk of  self-harm acutely is low.  Risk factors include anxiety disorder, mood disorder, and recent psychosocial stressors (pandemic). Protective factors include no family history, denies access to guns/weapons, no present SI, no history of suicide attempts or self-harm in the past, minimal AODA, healthcare seeking, future orientation, willingness to engage in care.  Risk of self-harm chronically is also low, but could be further elevated in the event of treatment noncompliance and/or AODA.  Meds:    -Continue MIRTAZAPINE (REMERON) 7.5 mg by mouth nightly to target depression and insomnia. (No adjustment required for most recent eGFR on 2/24/25- 33.5, down from 46.6 on 11/1/24)    -Continue Prozac (Fluoxetine) 30 mg (taking 3 of the 10 mg capsules ordered today) by mouth daily in the morning to target anxiety, depression.  Discussed all risks, benefits, alternatives, and side effects of Prozac (Fluoxetine) including but not limited to GI upset, decreased appetite, sexual dysfunction, bleeding risk, seizure risk, insomnia, anxiety, drowsiness, headache, tremor, nervousness, activation of hossein or hypomania, increased fragility fracture risk, hyponatremia, ocular effects, serotonin syndrome, hypersensitivity reaction, and activation of suicidal ideation and behavior. . Discussed the need for patient to immediately call the office for any new or worsening symptoms, such as worsening depression; feeling nervous or restless; suicidal thoughts or actions; or other changes in mood or behavior, and all other concerns. Patient educated on medication compliance.  Patient verbalized understanding and is agreeable to taking Prozac (Fluoxetine). Addressed all questions and concerns.   5.  Labs: n/a  6.  Updated  of POC   7.  Patient instructed to request refills when appropriate, when down to 5-7 days remaining via pharmacy or via [employer].  Patient reports she is signed up for auto refills with pharmacy.     Symptoms of anxiety,  depression are under good control with current medication regimen.  Patient did ask for a dose increase of Prozac, however, it was declined as patient has been advised to resume therapy several times and would like for patient to start therapy, which is essential for chronic depression, factious disorder, anxiety, and long term health conditions.  Patient was drifting with eyes closing multiple times throughout the visit while complaining of anxiety voicing the Klonopin was not effective.  Which was notably causing drowsiness as patient had reported taking approximately 1 hour prior to start of visit today.     The plan was discussed with the patient. The patient was given time to ask questions and these questions were answered. At the conclusion of their visit they had no additional questions or concerns and all questions were answered to their satisfaction.   Patient was given instructions and counseling regarding condition and for health maintenance advice. Please see specific information pulled into the AVS if appropriate.    Patient to contact provider if symptoms worsen or fail to improve.        1/17/25:   -Therapy: Currently Seeing a Therapist Shanti with [facility],patient verbalized need for therapy and plans to schedule.   -Continue MIRTAZAPINE (REMERON) 7.5 mg by mouth nightly to target depression and insomnia. (No adjustment required for most recent eGFR of 46.6 11/1/24)    -INCREASE Prozac (Fluoxetine) FROM 20 mg TO 30 mg (instructed to start taking 3 of the 10 mg capsules ordered today) by mouth daily in the morning to target anxiety, depression.  Discussed all risks, benefits, alternatives, and side effects of Prozac (Fluoxetine) including but not limited to GI upset, decreased appetite, sexual dysfunction, bleeding risk, seizure risk, insomnia, anxiety, drowsiness, headache, tremor, nervousness, activation of hossein or hypomania, increased fragility fracture risk, hyponatremia,  ocular effects, serotonin syndrome, hypersensitivity reaction, and activation of suicidal ideation and behavior. . Discussed the need for patient to immediately call the office for any new or worsening symptoms, such as worsening depression; feeling nervous or restless; suicidal thoughts or actions; or other changes in mood or behavior, and all other concerns. Patient educated on medication compliance.  Patient verbalized understanding and is agreeable to taking Prozac (Fluoxetine). Addressed all questions and concerns. New prescription sent today for 10 mg caps.     Symptoms of anxiety, depression are improving since restarting Prozac and eliminating Vraylar and Buspar, although patient is not at complete resolution of symptoms, though patient behavior today appeared uplifted, and verbalized needing therapy on her own during discussion today.  Discussed upcoming visit with ill brother, and coached through some emotions. The plan was discussed with the patient. The patient was given time to ask questions and these questions were answered. At the conclusion of their visit they had no additional questions or concerns and all questions were answered to their satisfaction.   Patient was given instructions and counseling regarding condition and for health maintenance advice. Please see specific information pulled into the AVS if appropriate.    Patient to contact provider if symptoms worsen or fail to improve.        12/3/24:   Patient stopped by office after seeing cardiology asking about  discussion with Neurologist, informed patient this provider was planning on calling her this afternoon, informed patient of discussion and plan of care moving forward which was also discussed with , instructed patient with the following which was also written on paper for patient and scheduled a follow up visit for 1/15/25 at 10am via video as patient daughter is taking her to initial eval for sleep medicine which is scheduled  at 1030 am and will be able to conduct visit while daughter is driving, as daughter has to take off work on patient appointment days.   See separate encounter for orders:  -DECREASE Vraylar FROM 3 mg TO 1.5 mg by mouth daily in the morning for 7 days, start Wed. 12/4/24, THEN STOP.      -Starting tomorrow: Prozac 20 mg by mouth daily in the morning to target depression and anxiety.     Noted bilateral arm and hand tremors while patient was sitting in office.  Patient is agreeable with plan and verbalized appreciation with provider discussion.  Both prescriptions sent to JAGRUT[facility] Adler in clinic for patient to  before leaving today. Patient to contact provider if symptoms worsen or fail to improve.      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  Informed Neurologist this provider has been seeing patient primarily via video and has had recent requests to return to [facility] and will discuss with  prior to contacting patient to discuss treatment plan which is to taper off Vraylar and restart Prozac, patient began taper for Buspar at last visit 11/21/24 which will end in the next 2 days.  Current medication regimen: Vraylar 3 mg and Remeron 7.5 mg nightly.     11/21/24:   -Therapy: Currently Seeing a Therapist Shanti with [facility], and was again advised to reschedule an appointment today which patient was in agreement to do, though verbalized thinking the therapist was the only one in the office, which patient was advised there are other  therapist and to voice concerns upon calling today.   -Decrease Buspar FROM 20 mg TO 10 mg 3 times daily without food FOR 7 days THEN DECREASE TO 10 mg by mouth for 7 days THEN STOP. Patient reported ineffectiveness and has been taking Klonopin per PCP.      -Continue MIRTAZAPINE (REMERON) 7.5 mg by mouth nightly to target depression and insomnia. (No adjustment required for most recent eGFR of 46.6 11/1/24)    -Continue Vraylar (Cariprazine) 3 mg by mouth daily in the morning to target unstable mood, depressive symptoms.  Can be taken with or without food.  Discussed all risks, benefits, alternatives, and side effects of Vraylar  including but not limited to GI upset, sedation, rare weight gain, dyslipidemia, extrapyramidal symptoms (dystonia, drug-induced parkinsonism, akathisia, tardive dyskinesia), lowering of seizure threshold, hematologic abnormalities, hyperglycemia, increased mortality in elderly patients with dementia-related psychosis, neuroleptic malignant syndrome, sexual dysfunction, orthostatic hypotension, (may enhance the effects of antihypertensive medications) falls risk in older adults, and temperature dysregulation. Patient instructed to avoid driving and doing other tasks or actions that require to be alert until knowing how the drug affects them. Discussed the need for patient to immediately call the office for any new or worsening symptoms, such as worsening depression; feeling nervous or restless; suicidal thoughts or actions; or other changes changes in mood or behavior, and all other concerns. Patient educated on medication compliance and the risks of suddenly stopping this medication or missing doses. Patient verbalized understanding and is agreeable to taking Vraylar. Addressed all questions and concerns. After discussion of these risks and benefits, the patient voiced understanding and agreed to proceed. No adjustment required with current eGFR of 46.6.  No adverse effects of  antipsychotic medications noted, such as EPS (Extrapyramidal side effects and TD (Tardive Dyskinesia), patient to contact provider immediately if experienced.      -Advised patient to stop Prozac 40 mg as she reported self starting approximately 2 weeks ago as every other day. Informed patient the goal is to decrease the amount of psychotropic medications and not add as we have discussed multiple times.   -Updated  of [facility]   -Instructed patient to stop by [place] office after seeing PCP today to schedule a follow up visit as patient daughter is now working and needs assistance with logging on to [employer] and prefers afternoon appointments.     Symptoms of anxiety, depression are under good control with current medication regimen.    The plan was discussed with the patient. The patient was given time to ask questions and these questions were answered. At the conclusion of their visit they had no additional questions or concerns and all questions were answered to their satisfaction.   Patient was given instructions and counseling regarding condition and for health maintenance advice. Please see specific information pulled into the AVS if appropriate.    Patient to contact provider if symptoms worsen or fail to improve.        10/4/24:  -Therapy: [facility], which patient reported today of not seeing in over one month, and was again advised to reschedule an appointment.   -Continue Buspar 20 mg 3 times daily without food as patient has not been taking with food. Space times between doses of 6 hrs. Prescription ends 11/4/24  -Continue MIRTAZAPINE (REMERON) 7.5 mg by mouth nightly to target depression and insomnia.  Refilled today for 30 days with 1 refill.    -INCREASE Vraylar (Cariprazine) FROM 1.5 mg TO 3 mg by mouth daily in the morning to target unstable mood, depressive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 [place] with a new provider in their family health clinic, however, due to distance from home prefers to think about it, advised patient to continue with current therapist.    Continue Buspar 20 mg 3 times daily 3 without food as patient has not been taking with food. Space times between doses of 6 hrs.     Continue MIRTAZAPINE (REMERON) 30 mg (1 tablet) by mouth nightly as lower doses can be more sedating, due to sleep difficulty reported of frequent night time awakenings. Instructed patient to contact pharmacy for refills, as current prescription has 2 refills remaining, for which patient was not aware.    -DECREASE Prozac FROM 80 mg TO 60 mg by mouth daily in the morning FOR 21 days, THEN DECREASE TO 40 mg by mouth daily in the morning FOR 21 days to target skin picking disorder, OCD, anxiety, and depression. Refilled today as a 20 mg capsule.  Note to pharmacy to remove current 80 mg order from system.     -Will update  of today's visit and changes to current  medication regimen.    -Patient instructed to upload new insurance card with [facility] plan once received and to contact MA directly in [place] to schedule follow up appointment for 5 weeks as current insurance [employer] HMO plan which is no longer accepted by [facility] and system will not allow staff to schedule, informed patient scheduled appointment with Endocrine in Jan. Had also noted to be cancelled.     Symptoms of anxiety, depression, insomnia, skin picking, and factitious disorder are under fair control with current medication regimen. Patient continues to express ongoing symptoms despite efforts to add psychotherapy which patient is now disliking telehealth mode, though due to transportation difficulty this was the best option for patient based on past failures with attending therapy.  Despite numerous medication variations, patient chronically reports ineffectiveness.  Plan to continue to taper down Prozac with goal to only take 3 psychotropic medications per discussion with  today.    Patient was given instructions and counseling regarding condition and for health maintenance advice. Please see specific information pulled into the AVS if appropriate.    Patient to contact provider if symptoms worsen or fail to improve.       11/9/23:  Therapy:   Jania Pagan with [facility] 11/14/23 at 1:30 pm (patient informed today as patient was scheduled with another therapist previously and due to scheduling error patient noted to be seeing Ming)  -Stop Hydroxyzine as it has been ineffective.  -CHANGE BUSPAR from 30 mg twice daily TO 20 mg (start taking 2 of the 10 mg ordered today) 3 TIMES DAILY without food as patient has not been taking with food. Space times between doses of 6 hrs.   -DECREASE MIRTAZAPINE (REMERON) from 45 mg TO 30 mg (1 tablet) nightly as lower doses can be more sedating, due to sleep difficulty reported of frequent night time awakenings.    -Continue Prozac 80 mg by mouth daily in the morning to target skin picking disorder, OCD, anxiety, and depression. Refilled today  -Will update  of today's visit and changes to current medication regimen.    Symptoms of anxiety, depression, skin picking, factitious disorder, and insomnia are under fair control with current medication regimen.  Due to polypharmacy and ineffectiveness of hydroxyzine will eliminate from current regimen.  Due to ongoing anxiety, will try Buspar 3 times daily vs twice daily.  And decrease Remeron to help with frequent night time awakenings.     Patient was given instructions and counseling regarding condition and for health maintenance advice. Please see specific information pulled into the AVS if appropriate.    Patient to contact provider if symptoms worsen or fail to improve.      10/12/23:  TELEPHONE  Called patient to discuss medication adjustments after discussion with Dr. Newman yesterday, patient informed and instructed to decrease Zyprexa FROM 7.5 mg daily with lunch TO 5 mg for 14 days THEN decrease to 2.5 mg (instructed to break 5 mg tab in half) for 14 days THEN STOP. Informed patient a new prescription for Zyprexa 5 mg tabs has been sent in to the pharmacy today. Patient verbalized understanding and agreeable to plan.   Informed patient plan going forward is to decrease current psychotropic medications due to polypharmacy, and will be consulting with Dr. Newman after each office visit.   Patient reports has an upcoming appointment with [facility] next week, noted appointment scheduled for 10/25/23 with Shital Horowitz LCSW, praised patient for scheduling appointment.   Patient to contact provider if needed in between now and upcoming appointment on 11/9/23.     10/9/23: TELEPHONE  Returned call to patient as planned, informed patient that  was given an update of current treatment plan and concerns and would plan to reconvene this Wed. And if  there were any changes in treatment plan provider would notify patient on Thurs. This week. Patient voiced understanding and thanked provider for the call.     10/5/23:   Therapy: New referral sent to [facility] at last visit.  Patient given number again today as patient had lost phone with contacts, informed patient referral had been closed due to not responding to voice mails. Patient wrote down both numbers listed: [phone] and [phone]    -Continue Buspar 30 mg by mouth twice daily to target anxiety.    -Continue Remeron 45 mg by mouth nightly to target insomnia and depression.  Taking 1.5 tabs of the 30 mg to equal 45 mg. Refilled today  -Continue Hydroxyzine 100 mg by mouth 3 times daily as needed to target itching and anxiety.    -Continue Prozac 80 mg by mouth daily in the morning to target skin picking disorder, OCD, anxiety, and depression.   -Continue Zyprexa 7.5 mg by mouth daily at 12 noon with food to target tactile hallucinations, depression, and anxiety.  Risks, benefits, alternatives discussed with patient including nausea and vomiting, GI upset, sedation, dizziness/falls risk, akathisia, hypotension, increased appetite, lowering of seizure threshold, theoretical risk of tardive dyskinesia. After discussion of these risks and benefits, the patient voiced understanding and agreed to proceed.  Slight tremor noted around mouth when mouth closed which are suspected related to medication vs anxiety.   Refilled today    -All medications are good through mid Nov.   -Will plan on contacting Dr. Newman to discuss case as patient has had minimal improvement with outpatient medication management.  And calling patient by early next week.   Patient had been advised numerous times in the past to receive a higher level of care with inpatient mental health care-suggestions of ECT- due to minimal improvement with outpatient medication management, however, patient has either  refused due to a multitude of reasons or failure to follow through after arrangements for treatment were finalized.  Symptoms of anxiety, depression, insomnia, tactile hallucinations, and factitious disorder are under fair control with current medication regimen.  Lengthy discussion with patient today regarding plan of care, past discussion and treatment recommendations, which had been advised by Dr. Newman prior to start of care with this provider 1 yr ago.  Patient wishes to remain with this provider.   Patient was given instructions and counseling regarding condition and for health maintenance advice. Please see specific information pulled into the AVS if appropriate.    Patient to contact provider if symptoms worsen or fail to improve.        8/17/23:   Therapy: [facility] provider -Marie Dorsey referral sent to [facility], the office will contact you to set up the appointment. Please contact them at [phone] if you do not hear from them by next week.     -Continue Buspar 30 mg by mouth twice daily to target anxiety.    -Continue Remeron 45 mg by mouth nightly to target insomnia and depression.  Taking 1.5 tabs of the 30 mg to equal 45 mg. Refilled today  -Continue Hydroxyzine 100 mg by mouth 3 times daily as needed to target itching and anxiety.      Increase Prozac FROM 60 mg TO 80 mg by mouth daily in the morning to target skin picking disorder, OCD, anxiety, and depression. Instructed to start taking 2 of the 40 mg caps prescribed today, and may take 4 of the 20 mg on hand until new dose is picked up.    Continue Zyprexa 7.5 mg by mouth daily at 12 noon with food to target tactile hallucinations, depression, and anxiety.  No signs of EPS or TD noted.     Patient instructed to request refills when appropriate, when down to 5-7 days remaining via  pharmacy or via [facility].       Symptoms of anxiety, depression, insomnia, skin picking are under fair control with current medication  regimen.  Continues to request Klonopin for anxiety which has been denied due to patient with long term and current use of opioids, comorbid conditions, placing patient at higher risks, and has been off of Benzodiazepines for over 1 yr. CBT advised, agreeable to see therapist via telehealth only.  Patient with both physical and psychological signs and symptoms of factitious disorder.   Patient was given instructions and counseling regarding condition and for health maintenance advice. Please see specific information pulled into the AVS if appropriate.    Patient to contact provider if symptoms worsen or fail to improve.      7/13/23:   Therapy: [facility] Gip[facility]     -Continue Buspar 30 mg by mouth twice daily to target anxiety.    -Continue Remeron 45 mg by mouth nightly to target insomnia and depression.  Taking 1.5 tabs of the 30 mg to equal 45 mg.   -Continue Hydroxyzine 100 mg by mouth 3 times daily as needed to target itching and anxiety.    Continue Prozac 60 mg by mouth daily in the morning to target skin picking disorder, OCD, anxiety, and depression. Instructed to start taking 3 of the 20 mg caps prescribed.   Continue Zyprexa 7.5 mg by mouth daily at 12 noon with food to target tactile hallucinations, depression, and anxiety.  Risks, benefits, alternatives discussed with patient including nausea and vomiting, GI upset, sedation, dizziness/falls risk, akathisia, hypotension, increased appetite, lowering of seizure threshold, theoretical risk of tardive dyskinesia. After discussion of these risks and benefits, the patient voiced understanding and agreed to proceed.   No signs of EPS or TD noted. Refilled today for 30 days with 1 refill    Symptoms of anxiety, depression, skin picking, and tactile hallucinations are under good to fair control with current medication regimen.  Suspect ongoing problem with skin picking negatively impacts mood. Explained to patient again that due to several comorbid  conditions and current medications that weight gain has been noted and reviewed for several years, however, due to ongoing weight gain, plan to continue current medications along with therapy and determine at next visit if alternative medications are needed vs tapering down from Zyprexa.  Patient continues to request alternative medication for anxiety, which have been declined.     Patient has been advised to do the following at next therapy session due to displeased recent session, which is listed on AVS:   Recommend writing down a list of a few things you want to discuss at next appointment, perhaps have 2 things that you definitely want to discuss to help guide the visit. What you want to accomplish or discuss more?  Such as developing coping strategies to help with anxiety, AND practice those techniques during the visit with your therapist.  Also, ask for those coping strategies to be written down or what resources she can give you.   Patient was given instructions and counseling regarding condition and for health maintenance advice. Please see specific information pulled into the AVS if appropriate.    Patient to contact provider if symptoms worsen or fail to improve.      5/15/23:   Therapy: [facility] provider scheduled end of May   -Continue Buspar 30 mg by mouth twice daily to target anxiety.  Refilled today  -Continue Remeron 45 mg by mouth nightly to target insomnia and depression.  Taking 1.5 tabs of the 30 mg to equal 45 mg. Refilled today  -Continue Hydroxyzine 100 mg by mouth 3 times daily as needed to target itching and anxiety.   Refilled today, tolerated well without reported increase in drowsiness.    -Increase Prozac FROM 40 mg TO 60 mg by mouth daily in the morning to target skin picking disorder, OCD, anxiety, and depression. Instructed to start taking 3 of the 20 mg caps prescribed today.   Dose increased to 40 mg ordered 3/24/23.   -Increase Zyprexa FROM 5 mg TO 7.5 mg  by mouth daily at 12 noon  "with food to target tactile hallucinations, depression, and anxiety.  Risks, benefits, alternatives discussed with patient including nausea and vomiting, GI upset, sedation, dizziness/falls risk, akathisia, hypotension, increased appetite, lowering of seizure threshold, theoretical risk of tardive dyskinesia. After discussion of these risks and benefits, the patient voiced understanding and agreed to proceed.    No signs of EPS or TD noted.  AIMS-0; Informed patient a new prescription was sent for the 7.5 mg tabs.     Symptoms of anxiety, depression, insomnia, OCD, tactile hallucinations are under fair control presently, medications dose adjustments made today, patient instructed to contact provider if the Zyprexa 7.5 mg causes increased drowsiness as the dose may be switched to nightly.  Suspect anxiety is worsened by tactile hallucinations, which aim to control with Zyprexa.   Patient to contact provider if symptoms worsen or fail to improve.     5/11/23:   Patient LMVM that she is needing something other than the Buspar for her Anxiety.  Patient reports \"My Anxiety if through the roof\"  Please call me back\"    Returned call to patient as requested, patient reports \"I can hardly go on living like this, I just don't feel right, I just got out of the hospital, mostly anxiety and stress, they did a Cat scan and MRI and everything was all okay, I just don't feel right.\" Patient admits to adherence to medications. Patient is uncertain if the Zyprexa is effective, \"I still feel like there's something crawling in my head, I need something else.\" Reports taking Hydroxyzine 50 mg 2-3 times daily, which is somewhat helpful, denies drowsiness. Instructed patient to start taking routinely 3 times daily. Patient has an appointment with [facility] therapist end of May.    \"The only thing I took that helped was the Xanax.\" Patient asking if Buspar can be changed to another medication.  \"I been battling this for over a year.\" Patient " requesting refill for Remeron, instructed patient to check bottle to determine if refill is available as patient only has 4 tabs and 2 half tabs remaining, patient instructed to request refill from pharmacy if one remains, otherwise, patient to inform provider or MA upon return call later this afternoon and a refill can be sent.  Informed patient will call back later this afternoon after further review of other alternatives for anxiety. Patient verbalized understanding.    Returned call to patient as planned, left vm instructing patient to start taking 2 of the Hydroxyzine 50 mg to equal 100 mg by mouth 3 times daily as needed anxiety/itching temporarily until seen in office at scheduled appointment Monday 5/15/23, and to return to 50 mg dose if excessive grogginess, drowsiness occurs as there is an increased fall risk with higher dose.  Will plan to discuss further at appointment.   Patient called re: the missed call from last evening.  Patient states she did Not check her voicemail.  I read patient Trish's exact message verbatim about temporary med increase of Hydroxyzine until Monday appt.  Patient voiced understanding      4/7/23:  Therapy: [facility] provider no longer working at [facility], and waiting for [facility] to contact pt with new provider List of counseling services given to patient today, Patient to contact provider and set up appointment.  And to contact office if unable to get an appointment scheduled.   Continue Buspar 30 mg by mouth twice daily to target anxiety.     Continue Remeron 45 mg by mouth nightly to target insomnia and depression.  Taking 1.5 tabs of the 30 mg to equal 45 mg.    Continue Hydroxyzine 50 mg by mouth 3 times daily as needed to target itching and anxiety.    Continue Prozac 40 mg by mouth daily in the morning to target skin picking disorder, anxiety, and depression. Dose increased to 40 mg ordered 3/24/23.     Increase Zyprexa FROM 2.5 mg TO 5 mg by mouth daily at 12 noon with food to  "target tactile hallucinations, depression, and anxiety.  Risks, benefits, alternatives discussed with patient including nausea and vomiting, GI upset, sedation, dizziness/falls risk, akathisia, hypotension, increased appetite, lowering of seizure threshold, theoretical risk of tardive dyskinesia. After discussion of these risks and benefits, the patient voiced understanding and agreed to proceed.  Instructed patient to ensure accurate dose is picked up with pharmacy, new order with notes to pharmacy informing to not fill 2.5 mg dose previously requested.  No signs of EPS or TD noted.     Reminded patient of refill process for all medications     Symptoms of anxiety, tactile hallucinations, and depression continue to be present, advised for patient to get set up with therapy, as this will provide great benefit for patient and help establish coping strategies to help manage chronic symptoms.  Patient somewhat anxious regarding advising to return in 5 weeks vs earlier.  Reiterated to patient  Antidepressants can take 4-6 weeks to start working and up to 2-3 months for the full benefits. Due to toleration of Zyprexa, will increase to help with tactile hallucinations. Patient to contact provider if symptoms worsen or fail to improve.     3/24/23:   Patient called stating she is needing a refill for her Prozac and you had spoke with her about increasing the dose at last visit.  Patient would like for you to increase her Prozac because she says she is still having \"increased depression and still feels like something is crawling in my head\"  Please advise  Per discussion at last visit, Prozac dose can be increased from 20 to 40 mg by mouth daily in the morning, order sent to pharmacy.   Called patient and let her know that the Prozac has been increased at which time she said she is also needing a refill on her Hydroxyzine.  Please refill  Med pended    3/9/23:   Therapy: Currently Seeing a Therapist scheduled with new " therapist at [facility], as prior provider-Sendy went on maternity leave, and Kayleigh lives in [place] and was providing telephone therapy support.  To see new therapist in April.  -Continue Buspar 30 mg by mouth twice daily to target anxiety.   -Continue Remeron 45 mg by mouth nightly to target insomnia and depression.  Taking 1.5 tabs of the 30 mg to equal 45 mg.  -Continue Hydroxyzine 50 mg by mouth 3 times daily as needed to target itching and anxiety.    -Continue Prozac 20 mg by mouth daily in the morning to target skin picking disorder, anxiety, and depression.   Will plan on increasing in 2-3 weeks if needed.    Start Zyprexa 2.5 mg by mouth daily at 12 noon with food to target tactile hallucinations, depression, and anxiety.  Risks, benefits, alternatives discussed with patient including nausea and vomiting, GI upset, sedation, dizziness/falls risk, akathisia, hypotension, increased appetite, lowering of seizure threshold, theoretical risk of tardive dyskinesia. After discussion of these risks and benefits, the patient voiced understanding and agreed to proceed.  Instructed patient to contact provider next Tues. 3/14/23 to inform of toleration of afternoon dose, plan to have patient take without any other medications to determine if drowsiness occurs and effectiveness. Informed patient dose may need to be given later in the evening depending on drowsiness.     Symptoms of depression, anxiety, skin picking disorder and tactile hallucinations are not under good control, will add low dose Zyprexa. Due to patient not able to come early in the morning for an appointment she will return in approximately 3 weeks.  Patient to contact provider if symptoms worsen or fail to improve.         2/23/23:   Therapy: Currently Seeing a Therapist scheduled with new therapist at [facility], as prior provider-Sendy went on maternity leave, and Kayleigh lives in [place] and was providing telephone therapy support.  To see new  therapist in April.  Continue Buspar 30 mg by mouth twice daily to target anxiety. Refilled today    Decrease Wellbutrin SR FROM 200 mg daily  mg by mouth daily in the morning FOR 7 DAYS (2/24-3/2/23) THEN take every other day on the following days: 3/4, 3/6, and 3/8 to target depression and anxiety, THEN STOP.  New order sent to pharmacy. Informed patient of the D2D interaction with Wellbutrin and Prozac, and as the dose is decreased the Prozac will be increased.  Discussed Symptoms of withdrawal:  GI flu-like symptoms, paresthesias (prickling or tingling sensation, like pins and needs), irritability, insomnia, dizziness, or vivid dreams    Continue Remeron 45 mg by mouth nightly to target insomnia and depression.  Taking 1.5 tabs of the 30 mg to equal 45 mg. Refilled today    Continue Hydroxyzine 50 mg by mouth 3 times daily as needed to target itching and anxiety.  Risks, benefits, alternatives discussed with patient including sedation, dizziness, fall risk, GI upset, and risk of increased CNS depression and elevated heart rate if taken with other antihistamines.  After discussion of these risks and benefits, the patient voiced understanding and agreed to proceed.    Increase Prozac FROM 10 mg TO 20 mg by mouth daily in the morning to target skin picking disorder, anxiety, and depression.  Discussed all risks, benefits, alternatives, and side effects of Fluoxetine including but not limited to GI upset, decreased appetite, sexual dysfunction, bleeding risk, seizure risk, insomnia, anxiety, drowsiness, headache, tremor, nervousness, activation of hossein or hypomania, increased fragility fracture risk, hyponatremia, ocular effects, serotonin syndrome, hypersensitivity reaction, and activation of suicidal ideation and behavior.  Discussed the need for patient to immediately call the office for any new or worsening symptoms, such as worsening depression; feeling nervous or restless; suicidal thoughts or actions;  or other changes in mood or behavior, and all other concerns. Patient educated on medication compliance.  Patient verbalized understanding and is agreeable to taking Fluoxetine. Addressed all questions and concerns.  Provider informed patient had not requested refill of 10 mg dose ordered previously, therefore, a new prescription was sent for the 20 mg cap, MA informed patient.     Symptoms of depression, anxiety, and skin picking is ongoing, as expected patient mood is down due to medication adjustments, which was reiterated to patient today while switching medications to expect a decreased mood, increased irritability, etc.  Will see patient back in office in 2 weeks to determine toleration of Wellbutrin wean.  Patient was asking for Valium today as a relative takes opioids with Valium, again reiterated to patient the increased risk of overdose death, suicide, worse treatment outcomes, and increased health service use when co-prescribing of opioids and benzodiazepines regardless of whether prescribed for comorbid chronic pain, anxiety, or insomnia.  Patient to contact provider if symptoms worsen or fail to improve.       1/30/23:   Continue Buspar 30 mg by mouth twice daily to target anxiety.     Continue Wellbutrin  mg by mouth daily in the morning to target depression and anxiety.      Continue Remeron 45 mg by mouth nightly to target insomnia and depression.  Taking 1.5 tabs of the 30 mg to equal 45 mg.     Stop Risperdal due to D2D interaction with Prozac. And ineffectiveness, patient has been taking for less than 2 months and missed doses reported.     Continue Hydroxyzine 50 mg by mouth 3 times daily as needed to target itching and anxiety.  Refilled today, able to receive 2/6/23. Risks, benefits, alternatives discussed with patient including sedation, dizziness, fall risk, GI upset, and risk of increased CNS depression and elevated heart rate if taken with other antihistamines.  After discussion of  these risks and benefits, the patient voiced understanding and agreed to proceed.    Start Prozac 10 mg by mouth daily in the morning to target skin picking disorder, anxiety, and depression.  Informed patient due to potential D2D with Wellbutrin as dose of Prozac would be doubled. Canceled initial order for 20 mg due to this potential D2D interaction.  Discussed all risks, benefits, alternatives, and side effects of Fluoxetine including but not limited to GI upset, decreased appetite, sexual dysfunction, bleeding risk, seizure risk, insomnia, anxiety, drowsiness, headache, tremor, nervousness, activation of hossein or hypomania, increased fragility fracture risk, hyponatremia, ocular effects, serotonin syndrome, hypersensitivity reaction, and activation of suicidal ideation and behavior.  Discussed the need for patient to immediately call the office for any new or worsening symptoms, such as worsening depression; feeling nervous or restless; suicidal thoughts or actions; or other changes in mood or behavior, and all other concerns. Patient educated on medication compliance.  Patient verbalized understanding and is agreeable to taking Fluoxetine. Addressed all questions and concerns.     Patient tolerated Cymbalta taper, continues to struggle with skin picking to scalp which has worsened mood.  Will start Prozac as discussed previously, due to potential D2D interaction with Wellbutrin, the dose of Wellbutrin may need to be decreased before increasing Prozac dose, which was discussed today. Patient instructed to discuss Gabapentin with Pain management at upcoming appointment 2/23/23 and to update provider regarding current provider for mental health and medications as past note did not have accurate information. Patient to contact provider if symptoms worsen or fail to improve.       1/9/23:   Continue Buspar 30 mg by mouth twice daily to target anxiety.     Continue Wellbutrin  mg by mouth daily in the morning  to target depression and anxiety.      Continue Remeron 45 mg by mouth nightly to target insomnia and depression.  Taking 1.5 tabs of the 30 mg to equal 45 mg.     Continue Risperdal 1 mg by mouth twice daily to target delusional thoughts as patient believed there was a bug infestation on scalp. No signs of TD noted today.     Start Taper of Cymbalta FROM 120 mg by mouth daily in the morning TO the following:  Cymbalta 30 mg caps ordered-take 3 to equal 90 mg daily for 7 days 1/10-1/16 THEN  Decrease to 60 mg (2 of the 30 mg cap) daily for 7 days 1/17-1/23/23  THEN  Decrease 30 mg (1 cap of 30 mg) daily for 7 days 1/24-1/30/23. THEN STOP    Symptoms of withdrawal:  GI flu-like symptoms, paresthesias (prickling or tingling sensation, like pins and needs), irritability, insomnia, dizziness, or vivid dreams     Increase Hydroxyzine FROM 25 mg 4 times daily as needed TO 50 mg by mouth 3 times daily as needed to target itching and anxiety.  Risks, benefits, alternatives discussed with patient including sedation, dizziness, fall risk, GI upset, and risk of increased CNS depression and elevated heart rate if taken with other antihistamines.  After discussion of these risks and benefits, the patient voiced understanding and agreed to proceed.    Due to chronic skin picking to head causing significant distress to patient, will plan on tapering down from Cymbalta and switching to Prozac 10 mg.  Discussed risks of withdrawal today with patient.  Also, discussed plans to taper down from Wellbutrin as there is a potential D2D interaction with combination of Wellbutrin with Prozac.  Patient agreeable to plan.    Current symptoms of depression and anxiety are under fair control with current medication regimen, however, due to severe tactile hallucinations her anxiety has been elevated.    Patient to contact provider if symptoms worsen or fail to improve.       11/21/22:   Therapy: Currently Seeing a Therapist Sendy Downing via  telephone; Patient would like to seek a therapist in [place] due to current therapist is only via phone and has to wait until Oct. For next appointment. Advised patient to contact insurance company to determine available therapist in area and/or check [employer] and filter results based on needs. And to contact provider if a referral order is needed.  Continue Buspar 30 mg by mouth twice daily to target anxiety.  Instructed to take 2 of the 15 mg on hand to equal 30 mg.     Continue Wellbutrin  mg by mouth daily in the morning to target depression and anxiety.      Continue Remeron 45 mg by mouth nightly to target insomnia and depression.  Taking 1.5 tabs of the 30 mg to equal 45 mg.     Advised patient to inquire with therapist or search on own about  local support groups, Sikh groups, library, and/or adult day a few days per week which will get patient out of the house and develop relationships with other people her age.    Patient continues with ongoing stressors in home, which have not improved, highly encouraged patient to get out of home several days per week which I believe will provide benefit for patient due to ongoing stress she complains of in the home. Patient continues to ask for other medication or an increase in medications.  Patient is uncertain of amount of Cymbalta she is taking and is to check with daughter.  Patient is on chronic opioids and benzodiazepines will not be added.  Currently on max daily dose of Buspar, Cymbalta, and recently increased both Buspar and Wellbutrin SR at last appointment.  May consider increasing Wellbutrin SR to an additional dose in the evening, however, due to suspected worsening insomnia with Wellbutrin SR, this will have to be revisited.    Patient to contact provider if symptoms worsen or fail to improve.       11/11/22: TELEPHONE  Patient called back and stated her Depression and Anxiety are bad and she don't know why Trish doesn't understand  that.  I told patient that Trish is trying to help the patient but giving her Benzodiazepine's is not an option.  I told her that Trish had said she needs to go to therapy and find other ways of coping rather than just taking a pill.  Patient said she is seeing a Therapist at [facility].  I explained to her that at her age there is more chance of falling with the Benzodiazepine's and I am sure she doesn't want to fall and break a hip.  Patient said OK I told her to have a good weekend she said you too and hung up      10/17/22:   Current with therapy, Sendy with [facility] via telephone; Patient would like to seek a therapist in [place] due to current therapist is only via phone and has to wait until Oct. For next appointment. Advised patient to contact insurance company to determine available therapist in area and/or check [employer] and filter results based on needs. And to contact provider if a referral order is needed.    Increase Buspar from 15 mg to 30 mg by mouth twice daily to target anxiety.  Instructed to take 2 of the 15 mg on hand to equal 30 mg.     Increase Wellbutrin SR from 150 mg to 200 mg by mouth daily in the morning.  Risks, benefits, alternatives discussed with patient including nausea, GI upset, increased energy, exacerbation of irritability, insomnia, lowering of seizure threshold.  After discussion of these risks and benefits, the patient voiced understanding and agreed to proceed.Risks, benefits, alternatives discussed with patient including GI upset, nausea vomiting diarrhea, theoretical decrease of seizure threshold predisposing the patient to a slightly higher seizure risk, headaches, sexual dysfunction, serotonin syndrome, bleeding risk, increased suicidality in patients 24 years and younger.  After discussion of these risks and benefits, the patient voiced understanding and agreed to proceed.    Continue Remeron 45 mg by mouth nightly to target insomnia and depression.  Taking 1.5  tabs of the 30 mg to equal 45 mg.     Discontinued Clonazepam due to limited use from 5/2022-9/9/22, and filled once on 9/9/22.      Patient depression is chronic and ongoing, will increase Wellbutrin today and Buspar to help with anxiety as patient will no longer be given Klonopin.  Patient had limited use of tapered dose originally ordered per  5/19/22, patient did fill early Sept. However, due to limited use and tolerating taper over 4 months, will not reorder. Patient also has increased risk of falls, memory impairment and other comorbid conditions.  Encouraged patient to have a visual calendar for all family to see and to have all appointments listed to prevent further over scheduling as daughter transports patient to appointments. Patient current stressors are environmental and situational, encourage use of coping mechanisms and frequent visits with therapist. Informed patient weekly visits were not appropriate with this provider as medications can take up to 4-6 weeks for effectiveness.  Will see patient back in 5 weeks. Patient to contact provider if symptoms worsen or fail to improve.     9/9/22:   Current with therapist, Sendy Downing  CHANGE Buspar to 15 mg by mouth twice daily to target anxiety.  Patient reports taking as needed, possibly daily, will remain at current dose and make routine twice daily instead of as previously prescribed as 3 times daily.  Risks, benefits, alternatives discussed with patient including nausea, GI upset, mild sedation, falls risk.  After discussion of these risks and benefits, the patient voiced understanding and agreed to proceed. Instructed to Place in pill planner for am and pm     INCREASE Wellbutrin SR from 100 mg to 150 mg by mouth daily in the morning.  Risks, benefits, alternatives discussed with patient including nausea, GI upset, increased energy, exacerbation of irritability, insomnia, lowering of seizure threshold.  After discussion of these risks and  benefits, the patient voiced understanding and agreed to proceed.Risks, benefits, alternatives discussed with patient including GI upset, nausea vomiting diarrhea, theoretical decrease of seizure threshold predisposing the patient to a slightly higher seizure risk, headaches, sexual dysfunction, serotonin syndrome, bleeding risk, increased suicidality in patients 24 years and younger.  After discussion of these risks and benefits, the patient voiced understanding and agreed to proceed.    Remeron order is for 45 mg by mouth nightly to target insomnia and depression.  Patient reported only taking 30 mg and failure to read bottle.  INSTRUCTED PT  TO TAKE 1.5 tabs (break one tablet in half to equal 15 mg with the 1 whole tablet of 30 mg to equal 45 mg) Risks, benefits, alternatives discussed with patient including GI upset, sedation, dizziness with falls risk, increased appetite.  After discussion of these risks and benefits, the patient voiced understanding and agreed to proceed.    Instructed patient : HAVE YOUR DAUGHTER PUT THE NEW WELLBUTRIN DOSE IN YOUR PLANNER,  AND THE BUSPAR IN BOTH MORNING AND NIGHT BOXES, AND the REMERON she or you will need to break some of the tablets in half to equal correct dose. Next time I will fill for the 45 mg tabs.     SUGGEST YOUR DAUGHTER MANAGE ALL OF YOUR MEDICATIONS TO PREVENT CONFUSION AND/OR MISSED DOSES, WANT YOU TO FEEL BETTER AND NEED YOUR MEDICATIONS TO BE TAKEN AS ORDERED.    Continue Clonazepam 1 mg by mouth daily as needed to target anxiety. Risks, benefits, and alternatives discussed with patient including sedation/falls risk, dizziness, disinhibition, headache, fatigue, dry mouth, blurred vision, constipation, nausea, diarrhea, heartburn, unusual taste in mouth, urinary retention, increased appetite, restlessness, sexual dysfunction, sweating, weight gain, cardiac arrhythmias, seizures, and fall risk.  After discussion of these risks and benefits, patient voiced  understanding and agreed to proceed.  [place] reviewed, NEW prescription per  5/19/22, tapering down dose, as patient was prescribed twice daily.  Last Dispensed 5/19/22 #30.    Plan to obtain UDS and sign CSA in future, although patient admits to taking, she has not filled since new prescription 5/19/22 which has 1 refill. Will plan to inquire further at next appointment, if patient is in fact rarely using will either discontinue or decrease to 0.5 mg.      Patient presentation seems most consistent with anxiety and depression.  Education provided regarding safety concerns with current medication management. Instructions provided to have daughter manage all medications due to memory impairment as patient was inconsistent with information regarding doses, frequency, and adherence of medications today.   Patient to contact provider if symptoms worsen or fail to improve.        Patient screened positive for depression based on a PHQ-9 score of 16 on 10/4/24. Follow-up recommendations include: Prescribed antidepressant medication treatment and Suicide Risk Assessment performed.       TREATMENT PLAN/GOALS: Continue supportive psychotherapy efforts and medications as indicated. Treatment and medication options discussed during today's visit. Patient acknowledged and verbally consented to continue with current treatment plan and was educated on the importance of compliance with treatment and follow-up appointments.    MEDICATION ISSUES:  KOSTA reviewed as expected.   Discussed medication options and treatment plan of prescribed medication as well as the risks, benefits, and side effects including potential falls, possible impaired driving and metabolic adversities among others. Patient is agreeable to call the office with any worsening of symptoms or onset of side effects. Patient is agreeable to call 911 or go to the nearest ER should he/she begin having SI/HI. No medication side effects or related complaints  today.     MEDS ORDERED DURING VISIT:  No orders of the defined types were placed in this encounter.      Return in about 2 months (around 5/7/2025) for Video visit, medication check.       I spent 40 minutes caring for Chasity on this date of service. This time includes time spent by me in the following activities: preparing for the visit, reviewing tests, obtaining and/or reviewing a separately obtained history, performing a medically appropriate examination and/or evaluation, counseling and educating the patient/family/caregiver, referring and communicating with other health care professionals, documenting information in the medical record, care coordination, and scheduling    This document has been electronically signed by DIANE Sanches  March 7, 2025 12:03 EST      Part of this note may be an electronic transcription/translation of spoken language to printed text using the Dragon Dictation System.

## 2025-03-11 ENCOUNTER — TELEPHONE (OUTPATIENT)
Dept: SLEEP MEDICINE | Facility: HOSPITAL | Age: 70
End: 2025-03-11
Payer: MEDICARE

## 2025-03-11 NOTE — TELEPHONE ENCOUNTER
Spoke with patient regarding sleep study results.  I told patient that Dr. De Leon's reccomendations were to wear her O2 at night and to follow up in the office to discuss the results with him. Follow up made

## 2025-03-12 ENCOUNTER — OFFICE VISIT (OUTPATIENT)
Age: 70
End: 2025-03-12
Payer: MEDICARE

## 2025-03-12 VITALS
HEIGHT: 64 IN | WEIGHT: 276 LBS | BODY MASS INDEX: 47.12 KG/M2 | DIASTOLIC BLOOD PRESSURE: 60 MMHG | HEART RATE: 64 BPM | SYSTOLIC BLOOD PRESSURE: 105 MMHG

## 2025-03-12 DIAGNOSIS — I10 ESSENTIAL HYPERTENSION: ICD-10-CM

## 2025-03-12 DIAGNOSIS — I25.10 CORONARY ARTERY CALCIFICATION: ICD-10-CM

## 2025-03-12 DIAGNOSIS — N18.32 STAGE 3B CHRONIC KIDNEY DISEASE: ICD-10-CM

## 2025-03-12 DIAGNOSIS — I50.32 CHRONIC DIASTOLIC HEART FAILURE: Primary | ICD-10-CM

## 2025-03-12 NOTE — ASSESSMENT & PLAN NOTE
Recent BMP showed creatinine level increased 1.65 and GFR was decreased 33.5.  Continue to follow-up with nephrologist tomorrow.

## 2025-03-12 NOTE — ASSESSMENT & PLAN NOTE
Patients  shortness of breath has resolved  and bilateral leg edema has improved.  She has had a 10 pound weight loss since her last visit. Lungs were clear on exam and slight non pitting edema in bilateral legs Continue torsemide 40 mg in the morning 20 mg in the p.m and spironolactone 25 mg BID .  Encouraged to elevate legs when sitting and use of compression stockings.

## 2025-03-12 NOTE — PROGRESS NOTES
Chief Complaint  Follow-up, Hypertension, Chronic Diastolic Heart Failure, and Palpitations    Subjective        Chasity Torres presents to [facility] CARDIOLOGY     Ms. Torres is a 69-year-old female here for follow-up on her increased shortness of breath in bilateral leg edema.  She has history of chronic systolic heart failure.  At last visit patient had her torsemide increased to take 40 mg twice daily along with continuing to take her spironolactone twice daily.  She reports that she has had relief with the medication, her edema and shortness of breath resolved .  She does have chronic edema but today she has slight swelling/edema in her bilateral legs but they are not pitting.  She reports she has not been using her compression stockings or elevating her legs as suggested.  Due to her symptoms improving she did cut back on her torsemide and is currently taking 40 mg in the morning and 20 mg in afternoon.  She has had a 10 pound weight loss since her last visit on 2/21/2025 with cardiology.  She does have an appointment with nephrology tomorrow.  She denies chest pain, palpitations, shortness of breath, dizziness, or syncope episodes.      Past Medical History:   Diagnosis Date    Adverse effect of other viral vaccines, initial encounter     Allergic fungal sinusitis 07/29/2022    Allergic rhinitis     Anxiety disorder     Asthma     CHF (congestive heart failure)     COPD with acute exacerbation     CTS (carpal tunnel syndrome)     Difficulty walking 2022    Essential (primary) hypertension     Essential tremor     Hypercholesterolemia 10/18/2021    Insomnia     Irritable bowel syndrome     Low back pain     Major depressive disorder     Morbid (severe) obesity due to excess calories     Nasal congestion     Neuropathy in diabetes 2023    Obstructive sleep apnea (adult) (pediatric)     Other hereditary and idiopathic neuropathies     Pain in left hip     Pain in right toe(s)     Pain in thoracic  spine     Peripheral neuropathy     Primary generalized (osteo)arthritis     Primary insomnia     Pulmonary emphysema 01/05/2023    Restless leg syndrome     SOBOE (shortness of breath on exertion)     Substance abuse     Type 2 diabetes mellitus without complication, without long-term current use of insulin 05/02/2022    Vitamin D deficiency        No Known Allergies     Past Surgical History:   Procedure Laterality Date    CARPAL TUNNEL RELEASE      COLONOSCOPY  2020    ENDOSCOPIC FUNCTIONAL SINUS SURGERY (FESS) Left 08/15/2022    Procedure: ENDOSCOPIC FUNCTIONAL SINUS SURGERY, left maxillary antrostomy with removal of contents, possible left ethmoidectomy;  Surgeon: Johnny Calderon MD;  Location: [facility];  Service: ENT;  Laterality: Left;    HYSTERECTOMY  2002    complete- ovarian cysts        Social History  She  reports that she quit smoking about 4 years ago. Her smoking use included cigarettes. She started smoking about 16 years ago. She has a 6 pack-year smoking history. She has been exposed to tobacco smoke. She has never used smokeless tobacco. She reports that she does not currently use alcohol. She reports that she does not use drugs.    Family History  Her family history includes Anxiety disorder in her daughter; COPD in her father; Depression in her daughter; Heart failure in her father; Hypertension in her mother; Neuropathy in her sister; Restless legs syndrome in her sister; Sleep apnea in her brother and sister.       Current Outpatient Medications on File Prior to Visit   Medication Sig    albuterol sulfate  (90 Base) MCG/ACT inhaler Inhale 2 puffs Every 4 (Four) Hours As Needed for Wheezing or Shortness of Air.    atorvastatin (LIPITOR) 10 MG tablet Take 1 tablet by mouth Daily.    Blood Glucose Monitoring Suppl (Blood Glucose Monitor System) w/Device kit Use Daily to test blood glucose.    Calcium Carb-Cholecalciferol (Calcium 500 + D) 500-3.125 MG-MCG tablet Take 1 each by  mouth 2 (Two) Times a Day.    clonazePAM (KlonoPIN) 0.5 MG tablet Take 1 tablet by mouth 2 (Two) Times a Day As Needed for Anxiety.    diclofenac (VOLTAREN) 75 MG EC tablet Take 1 tablet by mouth 2 (Two) Times a Day.    donepezil (ARICEPT) 10 MG tablet Take 1 tablet by mouth Every Night.    ferrous sulfate 325 (65 FE) MG tablet Take 1 tablet by mouth Daily With Breakfast.    FLUoxetine (PROzac) 10 MG capsule Take 3 capsules by mouth Every Morning    fluticasone (FLONASE) 50 MCG/ACT nasal spray Administer 2 sprays into the nostril(s) as directed by provider Daily.    Fluticasone-Umeclidin-Vilant (TRELEGY ELLIPTA) 200-62.5-25 MCG/ACT inhaler Inhale 1 puff Daily.    glucose blood test strip Use as instructed to check blood sugar daily    HYDROcodone-acetaminophen (NORCO)  MG per tablet     ipratropium-albuterol (DUO-NEB) 0.5-2.5 mg/3 ml nebulizer Nebulize and inhale 1 vial 4 (Four) Times a Day As Needed for Wheezing.    Lancet Device misc Use 1 each Daily. Use as directed; check blood sugar once daily    Lancets (OneTouch Delica Plus [id number]) misc Use 1 each Daily.    losartan (COZAAR) 25 MG tablet Take 1 tablet by mouth Daily.    Magnesium Oxide -Mg Supplement 400 (240 Mg) MG tablet Take 1 tablet by mouth Every Night.    memantine (Namenda) 5 MG tablet Take 1 tablet by mouth Daily.    metoprolol succinate XL (TOPROL-XL) 25 MG 24 hr tablet Take 0.5 tablets by mouth Daily.    miconazole (Desenex) 2 % powder Apply topically to the appropriate area as directed 2 (Two) Times a Day.    mirtazapine (REMERON) 7.5 MG tablet Take 1 tablet by mouth Every Night.    mupirocin (BACTROBAN) 2 % ointment APPLY TO AFFECTED AREA(S) TWO TIMES A DAY AS DIRECTED FOR 14 DAYS -    naloxone (NARCAN) 4 MG/0.1ML nasal spray 1 spray As Needed. Has on hand    O2 (OXYGEN) Inhale 2 L/min Every Night.    pantoprazole (PROTONIX) 40 MG EC tablet TAKE 1 TABLET BY MOUTH DAILY    pramipexole (MIRAPEX) 0.5 MG tablet Take 1 tablet by mouth 3  "(Three) Times a Day.    pregabalin (LYRICA) 75 MG capsule Take 1 capsule by mouth 2 (Two) Times a Day.    primidone (MYSOLINE) 50 MG tablet Take 1 tablet by mouth.    spironolactone (ALDACTONE) 25 MG tablet Take 1 tablet by mouth 2 (Two) Times a Day.    tacrolimus (PROTOPIC) 0.1 % ointment Apply topical ointment to affected area twice daily    Tirzepatide 15 MG/0.5ML solution auto-injector Inject 15 mg under the skin into the appropriate area as directed 1 (One) Time Per Week.    torsemide (DEMADEX) 20 MG tablet Take 2 tablets by mouth 2 (Two) Times a Day.    triamcinolone (KENALOG) 0.025 % cream Apply 1 Application topically to the appropriate area as directed As Needed.     Current Facility-Administered Medications on File Prior to Visit   Medication    cyanocobalamin injection 1,000 mcg         Review of Systems   Respiratory:  Negative for chest tightness, shortness of breath and wheezing.    Cardiovascular:  Positive for leg swelling. Negative for chest pain and palpitations.   Gastrointestinal:  Negative for nausea, vomiting and indigestion.   Neurological:  Negative for dizziness and syncope.        Objective   Vitals:    03/12/25 1340   BP: 105/60   Pulse: 64   Weight: 125 kg (276 lb)   Height: 162.6 cm (64.02\")         Physical Exam  General : Alert, awake, no acute distress  Neck : Supple, no carotid bruit, no jugular venous distention  CVS : Regular rate and rhythm, no murmur, no rubs or gallops  Lungs: Clear to auscultation bilaterally, no crackles or rhonchi  Abdomen: Soft, nontender, bowel sounds active  Extremities: Warm, well-perfused, non pitting bilateral leg edema       Result Review     The following data was reviewed by DIANE Pacheco  proBNP   Date Value Ref Range Status   02/14/2025 357.0 0.0 - 900.0 pg/mL Final     CMP          11/1/2024    13:03 2/14/2025    12:22 2/24/2025    17:06   CMP   Glucose 102  85  77    BUN 14  17  13    Creatinine 1.26  1.59  1.65    EGFR 46.6  35.0  33.5  " "  Sodium 142  144  138    Potassium 4.6  4.5  3.9    Chloride 102  104  97    Calcium 10.0  9.5  9.6    Total Protein 7.1  7.2     Albumin 3.8  3.2     Globulin 3.3  4.0     Total Bilirubin 0.3  <0.2     Alkaline Phosphatase 123  130     AST (SGOT) 20  16     ALT (SGPT) 15  19     Albumin/Globulin Ratio 1.2  0.8     BUN/Creatinine Ratio 11.1  10.7  7.9    Anion Gap 10.6  7.3  12.0      CBC w/diff          6/5/2024    14:16 11/1/2024    13:03 12/23/2024    15:54   CBC w/Diff   WBC 7.61  6.88  6.53    RBC 4.83  4.48  4.72    Hemoglobin 13.7  13.7  14.6    Hematocrit 43.6  42.6  44.6    MCV 90.3  95.1  94.5    MCH 28.4  30.6  30.9    MCHC 31.4  32.2  32.7    RDW 14.4  13.0  12.6    Platelets 314  265  299    Neutrophil Rel % 66.2   49.2    Immature Granulocyte Rel % 0.7   0.3    Lymphocyte Rel % 23.3   33.8    Monocyte Rel % 9.1   13.0    Eosinophil Rel % 0.3   3.4    Basophil Rel % 0.4   0.3       Lab Results   Component Value Date    TSH 0.774 02/21/2024      Lab Results   Component Value Date    FREET4 0.94 07/11/2022      D-Dimer, Quantitative   Date Value Ref Range Status   12/31/2022 0.78 (H) 0.00 - 0.67 MCGFEU/mL Final     Magnesium   Date Value Ref Range Status   02/07/2025 2.3 1.6 - 2.4 mg/dL Final      No results found for: \"DIGOXIN\"   Lab Results   Component Value Date    TROPONINT 26 (H) 12/12/2023           Lipid Panel          7/3/2024    12:18 2/7/2025    10:28   Lipid Panel   Total Cholesterol 167  226    Triglycerides 105  180    HDL Cholesterol 59  48    VLDL Cholesterol 19  32    LDL Cholesterol  89  146    LDL/HDL Ratio 1.47  2.96                     Assessment and Plan   Diagnoses and all orders for this visit:    1. Chronic diastolic heart failure (Primary)  Assessment & Plan:  Patients  shortness of breath has resolved  and bilateral leg edema has improved.  She has had a 10 pound weight loss since her last visit. Lungs were clear on exam and slight non pitting edema in bilateral legs Continue " torsemide 40 mg in the morning 20 mg in the p.m and spironolactone 25 mg BID .  Encouraged to elevate legs when sitting and use of compression stockings.      2. Essential hypertension  Assessment & Plan:  Blood pressure stable and well-controlled.  Continue metoprolol XL 25 mg 1/2 tablet daily and spironolactone 25 mg twice daily.  Encouraged to continue to monitor blood pressure at home.         3. Coronary artery calcification  Assessment & Plan:  She is stable without symptoms of angina. She had SPECT stress test which was negative for any reversible ischemia in May 2023. CT of chest 7/2/2024 showed Multivessel coronary calcification .Most recent lipid panel 2/7/2025 , HDL 48, triglycerides 180.   Patient is not currently on statin, patient is aware of coronary risk and agreeable to start statin. Start atorvastatin 10 mg daily.       4. Stage 3b chronic kidney disease  Assessment & Plan:  Recent BMP showed creatinine level increased 1.65 and GFR was decreased 33.5.  Continue to follow-up with nephrologist tomorrow.                  Follow Up   Return for Follow-up with Dr. George in May 2025 as scheduled.           Patient was given instructions and counseling regarding her condition or for health maintenance advice. Please see specific information pulled into the AVS if appropriate.     Signed,  Becki Bolaños, APRN  03/12/2025     Dictated Utilizing Dragon Dictation: Please note that portions of this note were completed with a voice recognition program.  Part of this note may be an electronic transcription/translation of spoken language to printed text using the Dragon Dictation System.

## 2025-03-13 ENCOUNTER — LAB (OUTPATIENT)
Dept: LAB | Facility: HOSPITAL | Age: 70
End: 2025-03-13
Payer: MEDICARE

## 2025-03-13 ENCOUNTER — HOSPITAL ENCOUNTER (OUTPATIENT)
Dept: ULTRASOUND IMAGING | Facility: HOSPITAL | Age: 70
Discharge: HOME | End: 2025-03-13
Payer: MEDICARE

## 2025-03-13 ENCOUNTER — TRANSCRIBE ORDERS (OUTPATIENT)
Dept: ADMINISTRATIVE | Facility: HOSPITAL | Age: 70
End: 2025-03-13
Payer: MEDICARE

## 2025-03-13 DIAGNOSIS — N17.9 ACUTE RENAL FAILURE, UNSPECIFIED ACUTE RENAL FAILURE TYPE: ICD-10-CM

## 2025-03-13 DIAGNOSIS — N17.9 ACUTE RENAL FAILURE, UNSPECIFIED ACUTE RENAL FAILURE TYPE: Primary | ICD-10-CM

## 2025-03-13 DIAGNOSIS — N17.9 ACUTE NONTRAUMATIC KIDNEY INJURY: ICD-10-CM

## 2025-03-13 LAB
ALBUMIN SERPL-MCNC: 3.8 G/DL (ref 3.5–5.2)
ANION GAP SERPL CALCULATED.3IONS-SCNC: 8.4 MMOL/L (ref 5–15)
BACTERIA UR QL AUTO: ABNORMAL /HPF
BASOPHILS # BLD AUTO: 0.04 10*3/MM3 (ref 0–0.2)
BASOPHILS NFR BLD AUTO: 0.8 % (ref 0–1.5)
BILIRUB UR QL STRIP: NEGATIVE
BUN SERPL-MCNC: 15 MG/DL (ref 8–23)
BUN/CREAT SERPL: 11.2 (ref 7–25)
CALCIUM SPEC-SCNC: 9.3 MG/DL (ref 8.6–10.5)
CHLORIDE SERPL-SCNC: 99 MMOL/L (ref 98–107)
CLARITY UR: ABNORMAL
CO2 SERPL-SCNC: 32.6 MMOL/L (ref 22–29)
COLOR UR: YELLOW
CREAT SERPL-MCNC: 1.34 MG/DL (ref 0.57–1)
CREAT UR-MCNC: 42.8 MG/DL
DEPRECATED RDW RBC AUTO: 46.2 FL (ref 37–54)
EGFRCR SERPLBLD CKD-EPI 2021: 43 ML/MIN/1.73
EOSINOPHIL # BLD AUTO: 0.17 10*3/MM3 (ref 0–0.4)
EOSINOPHIL NFR BLD AUTO: 3.2 % (ref 0.3–6.2)
ERYTHROCYTE [DISTWIDTH] IN BLOOD BY AUTOMATED COUNT: 13 % (ref 12.3–15.4)
GLUCOSE SERPL-MCNC: 87 MG/DL (ref 65–99)
GLUCOSE UR STRIP-MCNC: NEGATIVE MG/DL
HCT VFR BLD AUTO: 43.9 % (ref 34–46.6)
HGB BLD-MCNC: 14 G/DL (ref 12–15.9)
HGB UR QL STRIP.AUTO: NEGATIVE
IMM GRANULOCYTES # BLD AUTO: 0.01 10*3/MM3 (ref 0–0.05)
IMM GRANULOCYTES NFR BLD AUTO: 0.2 % (ref 0–0.5)
KETONES UR QL STRIP: NEGATIVE
LEUKOCYTE ESTERASE UR QL STRIP.AUTO: NEGATIVE
LYMPHOCYTES # BLD AUTO: 1.7 10*3/MM3 (ref 0.7–3.1)
LYMPHOCYTES NFR BLD AUTO: 32.2 % (ref 19.6–45.3)
MCH RBC QN AUTO: 30.6 PG (ref 26.6–33)
MCHC RBC AUTO-ENTMCNC: 31.9 G/DL (ref 31.5–35.7)
MCV RBC AUTO: 96.1 FL (ref 79–97)
MONOCYTES # BLD AUTO: 0.84 10*3/MM3 (ref 0.1–0.9)
MONOCYTES NFR BLD AUTO: 15.9 % (ref 5–12)
NEUTROPHILS NFR BLD AUTO: 2.52 10*3/MM3 (ref 1.7–7)
NEUTROPHILS NFR BLD AUTO: 47.7 % (ref 42.7–76)
NITRITE UR QL STRIP: NEGATIVE
PH UR STRIP.AUTO: 5.5 [PH] (ref 5–8)
PHOSPHATE SERPL-MCNC: 3.7 MG/DL (ref 2.5–4.5)
PLATELET # BLD AUTO: 286 10*3/MM3 (ref 140–450)
PMV BLD AUTO: 9.2 FL (ref 6–12)
POTASSIUM SERPL-SCNC: 4 MMOL/L (ref 3.5–5.2)
PROT ?TM UR-MCNC: 5 MG/DL
PROT UR QL STRIP: NEGATIVE
PROT/CREAT UR: 0.12 MG/G{CREAT}
RBC # BLD AUTO: 4.57 10*6/MM3 (ref 3.77–5.28)
RBC # UR STRIP: ABNORMAL /HPF
REF LAB TEST METHOD: ABNORMAL
SODIUM SERPL-SCNC: 140 MMOL/L (ref 136–145)
SP GR UR STRIP: 1.01 (ref 1–1.03)
SQUAMOUS #/AREA URNS HPF: ABNORMAL /HPF
UROBILINOGEN UR QL STRIP: ABNORMAL
WBC # UR STRIP: ABNORMAL /HPF
WBC NRBC COR # BLD AUTO: 5.28 10*3/MM3 (ref 3.4–10.8)

## 2025-03-13 PROCEDURE — 82570 ASSAY OF URINE CREATININE: CPT

## 2025-03-13 PROCEDURE — 80069 RENAL FUNCTION PANEL: CPT

## 2025-03-13 PROCEDURE — 85025 COMPLETE CBC W/AUTO DIFF WBC: CPT

## 2025-03-13 PROCEDURE — 76775 US EXAM ABDO BACK WALL LIM: CPT

## 2025-03-13 PROCEDURE — 36415 COLL VENOUS BLD VENIPUNCTURE: CPT

## 2025-03-13 PROCEDURE — 81001 URINALYSIS AUTO W/SCOPE: CPT

## 2025-03-13 PROCEDURE — 84156 ASSAY OF PROTEIN URINE: CPT

## 2025-03-17 NOTE — PROGRESS NOTES
"Progress Note    Date: 03/18/2025  Time In: 1:00  Time Out: 1:57    Patient Legal Name: Chasity Torres  Patient Age: 69 y.o.  Referring Provider:   Trish Paz Aprn  [address]  [address]  [place] [address]     Mode of visit: In person  Location of provider: [facility] [address], [address] [address]  Location of patient: Office    CHIEF COMPLAINT: anxiety, treatment resistant depression, factitious disorder     Subjective   History of Present Illness     Chasity is a 69 y.o. female presenting for initial session with this therapist. Patient reported she has seen therapists in the past and has worked on her anxiety and depression.  Patient stated she lives with her daughter and her 2 children and she feels \"a lot of stress\" from that. Patient advised she feels her daughter could help her more than she does.  Patient reported she does not really argue with her daughter but she does get frustrated because she does not help out around the house. Patient shared she does not get to see her other grandchildren as much as she would like. Patient shared her expectations about therapy, noting she wants help with her relationship with her daughter and managing anxiety and depression symptoms. Patient described being compliant with medications. Patient explained she does not have any hobbies and mainly stays at home. Patient reported feeling \"down, no energy\", and not wanting to do things. Patient reported a hx of passive SI but denies any thoughts at this time. Patient described her anxiety as feeling \"like I'm going to explode\" Patient is voluntarily requesting to participate in outpatient therapy at [facility].  Patient rated anxiety at 7 and depression at 8-9  today on a 0-10 scale where 0= no symptoms.    History obtained from referring provider's note on 3/7/25:  Past Psychiatric History:  Began Treatment: while in her 20's  Diagnoses:Depression and Anxiety  Psychiatrist: " -talked on phone-managed meds for 8 months prior to SOC with  in 10/2021; Also at [facility] for 2 yrs [phone]  Therapist: Sendy with [facility] (no in office visits) (telephone) next appt Oct. 2022 (for the last 8 months) prior seen Jessica at [facility] in [place] for 1-2 yrs  Admission History:Denies  Medication Trials: Several for which pt unable to recall if effective: Lexapro(ineff.),Abilify(eff),Prozac,Zoloft,Trintellix,Paxil,Celexa, (can't remember/uncertain); SGA-worsened tremors-per neuro medication induced parkisonism-Latuda,Rexulti,Seroquel (wt gain,helped with insomnia&anxiety); Lamictal-ineff for anxiety; Xanax-weaned, Hydroxyzine(ineff 1 mo. 2022-2022)Trazodone 2021-2022 somewhat helpful for anxiety though made pt tired, Ambien [phone]?; retried Effexor XR, Wellbutrin,Cymbalta  Risperdal 1 mo approximately stopped due to starting Prozac; Hydroxyzine 100 mg 3 times daily ineffective for itching and anxiety; Zyprexa-ineffective; Prozac up to 80 mg-ineffective; Klonopin-weaned, effective; Vraylar up to 3 mg-parkinsonism tremors worsened per Neurologist recommendations; Buspar up to 20 mg 3 times daily-no longer effective  Self Harm: Denies  Suicide Attempts:Denies   Psychosis, Anxiety, Depression: Denies      Assessment    Mental Status Exam     Appearance: good hygiene and dressed appropriately for the weather  Behavior: calm  Cooperation:  engaged, cooperative, attentive, and friendly  Eye Contact:  good  Affect:  congruent  Mood: depressed and anxious  Speech: talkative  Thought Process:  organized  Thought Content: appropriate  Suicidal: denies  Homicidal:  denies  Hallucinations:  denies  Memory:  intact  Orientation:  person, place, time, and situation  Reliability:  reliable  Insight:  good  Judgment:  good     Clinical Intervention       ICD-10-CM ICD-9-CM   1. Generalized anxiety disorder  F41.1 300.02   2. 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 Therapist provided psychoeducation on and practiced deep breathing techniques for relaxation. Patient was encouraged to practice deep breathing at least once a day, for 5-10 minutes.   Therapist provided psychoeducation on grounding techniques and patient was encouraged to practice these daily to improve mood. Resources were provided.   Allowed patient to freely discuss issues without interruption or judgement with unconditional positive regard, active listening skills, and empathy. Therapist utilized open-ended questions to encourage the development of a positive therapeutic relationship and open communication.  Therapist normalized/validated patient’s thoughts and feelings as appropriate. Patient rated anxiety at 7 and depression at 8-9  today on a 0-10 scale where 0= no symptoms. Gave brief explanation of CBT and what to expect in future sessions.   Plan   Plan & Goals     Moving forward, we will continue to build rapport and reinforce and build upon effective coping strategies utilizing CBT and person-centered techniques. Follow up on use of breathing and grounding techniques. Continue with CBT psychoeducation.     Patient acknowledged and verbally consented to continue working toward resolving current treatment plan goals and was educated on the importance of participation in the therapeutic process.  Patient will remain compliant with medication regimen as prescribed. Discuss any medication side effects, questions or concerns with prescribing provider.  Call 911 or present to the nearest emergency room in an emergency situation.   National Suicide  Prevention Lifeline: Call 988. The Lifeline provides 24/7, free and confidential support for people in distress, prevention and crisis resources.  Crisis Text Line  Text HOME To 563736    Return in about 2 weeks (around 4/1/2025).    ____________________  This document has been electronically signed by Lila Lockhart LCSW  March 18, 2025 15:37 EDT    Part of this note may be an electronic transcription/translation of spoken language to printed text using the Dragon Dictation System.

## 2025-03-18 ENCOUNTER — OFFICE VISIT (OUTPATIENT)
Age: 70
End: 2025-03-18
Payer: MEDICARE

## 2025-03-18 DIAGNOSIS — F51.05 INSOMNIA SECONDARY TO ANXIETY: ICD-10-CM

## 2025-03-18 DIAGNOSIS — F33.9 RECURRENT MAJOR DEPRESSION RESISTANT TO TREATMENT: ICD-10-CM

## 2025-03-18 DIAGNOSIS — F41.9 INSOMNIA SECONDARY TO ANXIETY: ICD-10-CM

## 2025-03-18 DIAGNOSIS — F41.1 GENERALIZED ANXIETY DISORDER: Primary | ICD-10-CM

## 2025-03-18 DIAGNOSIS — F33.1 MAJOR DEPRESSIVE DISORDER, RECURRENT EPISODE, MODERATE: ICD-10-CM

## 2025-03-18 RX ORDER — MIRTAZAPINE 7.5 MG/1
7.5 TABLET, FILM COATED ORAL NIGHTLY
Qty: 30 TABLET | Refills: 2 | Status: SHIPPED | OUTPATIENT
Start: 2025-03-18

## 2025-03-18 NOTE — TELEPHONE ENCOUNTER
REFILL REQUEST:     mirtazapine (REMERON) 7.5 MG tablet (01/15/2025)     F/UP- 05/09/2025.  LOV: 03/07/2025.

## 2025-03-21 ENCOUNTER — PROCEDURE VISIT (OUTPATIENT)
Dept: NEUROLOGY | Facility: CLINIC | Age: 70
End: 2025-03-21
Payer: MEDICARE

## 2025-03-21 DIAGNOSIS — G25.2 DYSTONIC TREMOR: ICD-10-CM

## 2025-03-21 DIAGNOSIS — G24.3 CERVICAL DYSTONIA: Primary | ICD-10-CM

## 2025-03-21 PROCEDURE — 95874 GUIDE NERV DESTR NEEDLE EMG: CPT | Performed by: NURSE PRACTITIONER

## 2025-03-21 PROCEDURE — 64616 CHEMODENERV MUSC NECK DYSTON: CPT | Performed by: NURSE PRACTITIONER

## 2025-03-24 NOTE — PROGRESS NOTES
Cervical Dystonia Botox Injection:    With written consent obtained and risks and benefits explained to patient.     We have discussed risk and benefits of this Botox procedure and common side effects including headache, neck pain, neck stiffness or weakness, ptosis, flu-like symptoms as well as more serious possible adverse effects including possible dysphagia, respiratory distress or even death. Verbalizes understanding, accepts risks and agrees with moving forward with Botox injections for treatment of cervical dystonia.     Injected using EMG guidance     Clinical Presentation:     Approved Muscle:   Levator scapulae ( units): Left 0 units; Right 0 units  Longissimus ( units): Left 0 units; Right 0 units  Scalene Complex (15-50 units): Left 0 units; Right 0 units  Semispinalis Capitis ( units): Left 0 units; Right 0 units  Splenius Capitis ( units): Left 30 units; Right 15 units  Splenius Cervivis (20-60 units): Left 0 units; Right 0 units  Sternocleidomastoid ( units): Left 45 units; Right 30 units  Trapezius ( units): Left 90 units; Right 90 units    Total Given:300 units  Total Wasted: 0 units

## 2025-03-25 ENCOUNTER — TRANSCRIBE ORDERS (OUTPATIENT)
Dept: ADMINISTRATIVE | Facility: HOSPITAL | Age: 70
End: 2025-03-25
Payer: MEDICARE

## 2025-03-25 DIAGNOSIS — N17.9 ACUTE RENAL FAILURE, UNSPECIFIED ACUTE RENAL FAILURE TYPE: Primary | ICD-10-CM

## 2025-03-26 ENCOUNTER — LAB (OUTPATIENT)
Dept: LAB | Facility: HOSPITAL | Age: 70
End: 2025-03-26
Payer: MEDICARE

## 2025-03-26 DIAGNOSIS — F41.9 ANXIETY: ICD-10-CM

## 2025-03-26 DIAGNOSIS — N17.9 ACUTE RENAL FAILURE, UNSPECIFIED ACUTE RENAL FAILURE TYPE: ICD-10-CM

## 2025-03-26 LAB
ALBUMIN SERPL-MCNC: 4 G/DL (ref 3.5–5.2)
ANION GAP SERPL CALCULATED.3IONS-SCNC: 10 MMOL/L (ref 5–15)
BACTERIA UR QL AUTO: ABNORMAL /HPF
BASOPHILS # BLD AUTO: 0.03 10*3/MM3 (ref 0–0.2)
BASOPHILS NFR BLD AUTO: 0.5 % (ref 0–1.5)
BILIRUB UR QL STRIP: NEGATIVE
BUN SERPL-MCNC: 14 MG/DL (ref 8–23)
BUN/CREAT SERPL: 11.3 (ref 7–25)
CALCIUM SPEC-SCNC: 9.9 MG/DL (ref 8.6–10.5)
CHLORIDE SERPL-SCNC: 100 MMOL/L (ref 98–107)
CLARITY UR: ABNORMAL
CO2 SERPL-SCNC: 32 MMOL/L (ref 22–29)
COLOR UR: YELLOW
CREAT SERPL-MCNC: 1.24 MG/DL (ref 0.57–1)
CREAT UR-MCNC: 88.3 MG/DL
DEPRECATED RDW RBC AUTO: 46.6 FL (ref 37–54)
EGFRCR SERPLBLD CKD-EPI 2021: 47.2 ML/MIN/1.73
EOSINOPHIL # BLD AUTO: 0.14 10*3/MM3 (ref 0–0.4)
EOSINOPHIL NFR BLD AUTO: 2.4 % (ref 0.3–6.2)
ERYTHROCYTE [DISTWIDTH] IN BLOOD BY AUTOMATED COUNT: 12.9 % (ref 12.3–15.4)
GLUCOSE SERPL-MCNC: 94 MG/DL (ref 65–99)
GLUCOSE UR STRIP-MCNC: NEGATIVE MG/DL
HCT VFR BLD AUTO: 45.6 % (ref 34–46.6)
HGB BLD-MCNC: 14.5 G/DL (ref 12–15.9)
HGB UR QL STRIP.AUTO: NEGATIVE
HYALINE CASTS UR QL AUTO: ABNORMAL /LPF
IMM GRANULOCYTES # BLD AUTO: 0.01 10*3/MM3 (ref 0–0.05)
IMM GRANULOCYTES NFR BLD AUTO: 0.2 % (ref 0–0.5)
KETONES UR QL STRIP: NEGATIVE
LEUKOCYTE ESTERASE UR QL STRIP.AUTO: NEGATIVE
LYMPHOCYTES # BLD AUTO: 2.09 10*3/MM3 (ref 0.7–3.1)
LYMPHOCYTES NFR BLD AUTO: 35.7 % (ref 19.6–45.3)
MCH RBC QN AUTO: 30.7 PG (ref 26.6–33)
MCHC RBC AUTO-ENTMCNC: 31.8 G/DL (ref 31.5–35.7)
MCV RBC AUTO: 96.4 FL (ref 79–97)
MONOCYTES # BLD AUTO: 0.65 10*3/MM3 (ref 0.1–0.9)
MONOCYTES NFR BLD AUTO: 11.1 % (ref 5–12)
NEUTROPHILS NFR BLD AUTO: 2.94 10*3/MM3 (ref 1.7–7)
NEUTROPHILS NFR BLD AUTO: 50.1 % (ref 42.7–76)
NITRITE UR QL STRIP: NEGATIVE
PH UR STRIP.AUTO: <=5 [PH] (ref 5–8)
PHOSPHATE SERPL-MCNC: 3.3 MG/DL (ref 2.5–4.5)
PLATELET # BLD AUTO: 309 10*3/MM3 (ref 140–450)
PMV BLD AUTO: 9.4 FL (ref 6–12)
POTASSIUM SERPL-SCNC: 3.9 MMOL/L (ref 3.5–5.2)
PROT ?TM UR-MCNC: 9.2 MG/DL
PROT UR QL STRIP: NEGATIVE
PROT/CREAT UR: 0.1 MG/G{CREAT}
RBC # BLD AUTO: 4.73 10*6/MM3 (ref 3.77–5.28)
RBC # UR STRIP: ABNORMAL /HPF
REF LAB TEST METHOD: ABNORMAL
SODIUM SERPL-SCNC: 142 MMOL/L (ref 136–145)
SP GR UR STRIP: 1.02 (ref 1–1.03)
SQUAMOUS #/AREA URNS HPF: ABNORMAL /HPF
UROBILINOGEN UR QL STRIP: ABNORMAL
WBC # UR STRIP: ABNORMAL /HPF
WBC NRBC COR # BLD AUTO: 5.86 10*3/MM3 (ref 3.4–10.8)

## 2025-03-26 PROCEDURE — 84156 ASSAY OF PROTEIN URINE: CPT

## 2025-03-26 PROCEDURE — 36415 COLL VENOUS BLD VENIPUNCTURE: CPT

## 2025-03-26 PROCEDURE — 80069 RENAL FUNCTION PANEL: CPT

## 2025-03-26 PROCEDURE — 85025 COMPLETE CBC W/AUTO DIFF WBC: CPT

## 2025-03-26 PROCEDURE — 81001 URINALYSIS AUTO W/SCOPE: CPT

## 2025-03-26 PROCEDURE — 82570 ASSAY OF URINE CREATININE: CPT

## 2025-03-26 NOTE — TELEPHONE ENCOUNTER
Controlled refill request:  Requested Prescriptions     Pending Prescriptions Disp Refills    clonazePAM (KlonoPIN) 0.5 MG tablet 60 tablet 0     Sig: Take 1 tablet by mouth 2 (Two) Times a Day As Needed for Anxiety.      Last OV:  2/24/2025  Next OV:  4/10/2025  Last fill:  2-# 60  Last tox:  11-

## 2025-03-27 RX ORDER — CLONAZEPAM 0.5 MG/1
0.5 TABLET ORAL 2 TIMES DAILY PRN
Qty: 60 TABLET | Refills: 0 | Status: SHIPPED | OUTPATIENT
Start: 2025-03-27

## 2025-03-28 DIAGNOSIS — F41.1 GENERALIZED ANXIETY DISORDER: ICD-10-CM

## 2025-03-28 DIAGNOSIS — F33.1 MAJOR DEPRESSIVE DISORDER, RECURRENT EPISODE, MODERATE: ICD-10-CM

## 2025-03-28 RX ORDER — FLUTICASONE PROPIONATE 50 MCG
2 SPRAY, SUSPENSION (ML) NASAL DAILY
Qty: 16 G | Refills: 0 | Status: SHIPPED | OUTPATIENT
Start: 2025-03-28

## 2025-03-28 RX ORDER — FLUOXETINE 10 MG/1
30 CAPSULE ORAL EVERY MORNING
Qty: 90 CAPSULE | Refills: 2 | Status: SHIPPED | OUTPATIENT
Start: 2025-03-28 | End: 2025-06-26

## 2025-03-28 NOTE — PROGRESS NOTES
"Progress Note    Date: 04/02/2025  Time In: 10:01  Time Out: 10:59    Patient Legal Name: Chasity Torres  Patient Age: 69 y.o.    Mode of visit: In person  Location of provider: [facility] [address], [address], [place] [address]  Location of patient: Office      CHIEF COMPLAINT: anxiety, treatment resistant depression, factitious disorder     Subjective   History of Present Illness   Chasity is a 69 y.o. female who presents today as a follow-up for continued psychotherapy. Patient reported \"doing ok but still depressed.\"  Patient stated her 18 y/o granddaughter is going to be living with her soon.  Patient shared her living situation drives her depression and anxiety and she is hoping adding her older granddaughter will improve her living situation. Patient advised she \"sometimes feels a lot of pressure\" when having conflict with her daughter. Patient described having increased heart rate and \"tingling\" in her chest as anxiety increases.  Patient reported feeling that she does not ask for what she needs to avoid conflict in the home. Patient is voluntarily requesting to participate in outpatient therapy at [facility]. Patient rated anxiety at 7 and depression at 8-9  today on a 0-10 scale where 0= no symptoms (same as last visit).     History obtained from referring provider's note on 3/7/25:  Past Psychiatric History:  Began Treatment: while in her 20's  Diagnoses:Depression and Anxiety  Psychiatrist: -talked on phone-managed meds for 8 months prior to SOC with  in 10/2021; Also at [facility] for 2 yrs [id number]  Therapist: Sendy with [facility] (no in office visits) (telephone) next appt Oct. 2022 (for the last 8 months) prior seen Jessica at [facility] in [place] for 1-2 yrs  Admission History:Denies  Medication Trials: Several for which pt unable to recall if effective: Lexapro(ineff.),Abilify(eff),Prozac,Zoloft,Trintellix,Paxil,Celexa, (can't remember/uncertain); SGA-worsened " tremors-per neuro medication induced parkisonism-Latuda,Rexulti,Seroquel (wt gain,helped with insomnia&anxiety); Lamictal-ineff for anxiety; Xanax-weaned, Hydroxyzine(ineff 1 mo. 2022-2022)Trazodone 2021-2022 somewhat helpful for anxiety though made pt tired, Ambien [phone]?; retried Effexor XR, Wellbutrin,Cymbalta  Risperdal 1 mo approximately stopped due to starting Prozac; Hydroxyzine 100 mg 3 times daily ineffective for itching and anxiety; Zyprexa-ineffective; Prozac up to 80 mg-ineffective; Klonopin-weaned, effective; Vraylar up to 3 mg-parkinsonism tremors worsened per Neurologist recommendations; Buspar up to 20 mg 3 times daily-no longer effective  Self Harm: Denies  Suicide Attempts:Denies   Psychosis, Anxiety, Depression: Denies    Assessment    Mental Status Exam     Appearance: good hygiene and dressed appropriately for the weather  Behavior: calm  Cooperation:  engaged, cooperative, attentive, and friendly  Eye Contact:  good  Affect:  congruent  Mood: depressed and anxious  Speech: talkative  Thought Process:  organized  Thought Content: appropriate  Suicidal: denies  Homicidal:  denies  Hallucinations:  denies  Memory:  intact  Orientation:  person, place, time, and situation  Reliability:  reliable  Insight:  good  Judgment:  good    Clinical Intervention       ICD-10-CM ICD-9-CM   1. Major depressive disorder, recurrent episode, moderate  F33.1 296.32   2. Generalized anxiety disorder  F41.1 300.02        Individual psychotherapy was provided utilizing CBT and person-centered techniques to build rapport, encourage expression of thoughts and feelings, support self-esteem, establish new coping skills, promote emotion regulation, identify triggers, acknowledge sources of feelings and behaviors, build confidence, assess symptoms, gather history, and provide psychoeducation. Patient was given a list of cognitive distortions and was encouraged to begin recognizing these patterns of  thinking which could cause decline in mood.  Therapist utilized open-ended questions to encourage the development of a positive therapeutic relationship and open communication.  Therapist normalized/validated patient’s thoughts and feelings as appropriate. Reviewed CBT and introduced circles of control. Shared personal bill of rights handout with patient to review for next session. Asked patient to practice recognizing/tracking use of cognitive distortions over next 2 weeks. Patient rated anxiety at 7 and depression at 8-9  today on a 0-10 scale where 0= no symptoms (same as last visit).    Plan   Plan & Goals     Moving forward, we will continue to build rapport and reinforce and build upon effective coping strategies utilizing CBT and person-centered techniques. Review personal bill of rights and check in on progress with recognizing cognitive distortions.    Patient acknowledged and verbally consented to continue working toward resolving current treatment plan goals and was educated on the importance of participation in the therapeutic process.  Patient will remain compliant with medication regimen as prescribed. Discuss any medication side effects, questions or concerns with prescribing provider.  Call 911 or present to the nearest emergency room in an emergency situation.  National Suicide Prevention Lifeline: Call 988. The Lifeline provides 24/7, free and confidential support for people in distress, prevention and crisis resources.  Crisis Text Line  Text HOME To 691428    Return in about 2 weeks (around 4/16/2025).    ____________________  This document has been electronically signed by Lila Lockhart LCSW  April 2, 2025 12:20 EDT    Part of this note may be an electronic transcription/translation of spoken language to printed text using the Dragon Dictation System.

## 2025-03-28 NOTE — TELEPHONE ENCOUNTER
REFILL REQUEST:     FLUoxetine (PROzac) 10 MG capsule (01/17/2025)     F/UP- 05/09/2025.  LOV: 03/07/2025.

## 2025-04-02 ENCOUNTER — OFFICE VISIT (OUTPATIENT)
Age: 70
End: 2025-04-02
Payer: MEDICARE

## 2025-04-02 DIAGNOSIS — F33.1 MAJOR DEPRESSIVE DISORDER, RECURRENT EPISODE, MODERATE: Primary | ICD-10-CM

## 2025-04-02 DIAGNOSIS — F41.1 GENERALIZED ANXIETY DISORDER: ICD-10-CM

## 2025-04-03 ENCOUNTER — OFFICE VISIT (OUTPATIENT)
Dept: PODIATRY | Facility: CLINIC | Age: 70
End: 2025-04-03
Payer: MEDICARE

## 2025-04-03 VITALS
DIASTOLIC BLOOD PRESSURE: 64 MMHG | HEIGHT: 64 IN | SYSTOLIC BLOOD PRESSURE: 121 MMHG | OXYGEN SATURATION: 96 % | BODY MASS INDEX: 46.26 KG/M2 | HEART RATE: 65 BPM | WEIGHT: 271 LBS

## 2025-04-03 DIAGNOSIS — L60.0 ONYCHOCRYPTOSIS: ICD-10-CM

## 2025-04-03 DIAGNOSIS — M79.671 FOOT PAIN, BILATERAL: ICD-10-CM

## 2025-04-03 DIAGNOSIS — E11.8 DM FEET: ICD-10-CM

## 2025-04-03 DIAGNOSIS — E11.9 NON-INSULIN DEPENDENT TYPE 2 DIABETES MELLITUS: ICD-10-CM

## 2025-04-03 DIAGNOSIS — M79.672 FOOT PAIN, BILATERAL: ICD-10-CM

## 2025-04-03 DIAGNOSIS — R26.2 DIFFICULTY WALKING: ICD-10-CM

## 2025-04-03 DIAGNOSIS — B35.1 ONYCHOMYCOSIS: Primary | ICD-10-CM

## 2025-04-03 NOTE — PROGRESS NOTES
[facility] - PODIATRY    Today's Date: 25    Patient Name: Chasity Torres  MRN: [id number]  CSN: [id number]  PCP: Ami Diego MD, Last PCP Visit: 2024  Referring Provider: No ref. provider found    SUBJECTIVE     Chief Complaint   Patient presents with    Left Foot - Follow-up, Nail Problem    Right Foot - Follow-up, Nail Problem     HPI: Chasity Torres, a 69 y.o.female, presents to clinic for painful toenails:    New, Established, New Problem:  est  Location:  Toenails  Duration:   Greater than five years  Onset:  Gradual  Nature:  sore with palpation.  Stable, worsening, improving:   Stable  Aggravating factors:  Pain with shoe gear and ambulation.  Previous Treatment: debridement    Patient controlling diabetes via:  Oral meds    Patient denies any fevers, chills, nausea, vomiting, shortness of breath, nor any other constitutional signs nor symptoms.    Medical changes:  increase in diuretics    Patient states their most recent blood glucose readin.      I have reviewed/confirmed previously documented HPI with no changes.     Past Medical History:   Diagnosis Date    Adverse effect of other viral vaccines, initial encounter     Covid vaccine    Allergic fungal sinusitis 2022    Allergic rhinitis     Anxiety disorder     Asthma     CHF (congestive heart failure)     Congenital heart disease 2021    COPD with acute exacerbation     CTS (carpal tunnel syndrome)     left    Difficulty walking     Essential (primary) hypertension     Essential tremor     Hypercholesterolemia 10/18/2021    Insomnia     Irritable bowel syndrome     Low back pain     Major depressive disorder     Morbid (severe) obesity due to excess calories     Nasal congestion     Neuropathy in diabetes     Obstructive sleep apnea (adult) (pediatric)     Other hereditary and idiopathic neuropathies     Pain in left hip     Pain in right toe(s)     Pain in thoracic spine     Peripheral  neuropathy     Hands    Primary generalized (osteo)arthritis     Primary insomnia     Pulmonary emphysema 2023    Restless leg syndrome     SOBOE (shortness of breath on exertion)     Substance abuse     Type 2 diabetes mellitus without complication, without long-term current use of insulin 2022    Vitamin D deficiency      Past Surgical History:   Procedure Laterality Date    CARPAL TUNNEL RELEASE      COLONOSCOPY      ENDOSCOPIC FUNCTIONAL SINUS SURGERY (FESS) Left 08/15/2022    Procedure: ENDOSCOPIC FUNCTIONAL SINUS SURGERY, left maxillary antrostomy with removal of contents, possible left ethmoidectomy;  Surgeon: Johnny Calderon MD;  [facility];  Service: ENT;  Laterality: Left;    HYSTERECTOMY      complete- ovarian cysts     Family History   Problem Relation Age of Onset    Hypertension Mother     COPD Father     Heart failure Father     Neuropathy Sister     Sleep apnea Sister     Restless legs syndrome Sister     Sleep apnea Brother     Anxiety disorder Daughter     Depression Daughter     Malig Hyperthermia Neg Hx     Breast cancer Neg Hx     Ovarian cancer Neg Hx     Uterine cancer Neg Hx     Cervical cancer Neg Hx     Colon cancer Neg Hx     Stomach cancer Neg Hx     Skin cancer Neg Hx     Clotting disorder Neg Hx     Deep vein thrombosis Neg Hx     Pulmonary embolism Neg Hx      Social History     Socioeconomic History    Marital status:     Number of children: 2   Tobacco Use    Smoking status: Former     Current packs/day: 0.00     Average packs/day: 0.5 packs/day for 12.0 years (6.0 ttl pk-yrs)     Types: Cigarettes     Start date: 2009     Quit date: 2021     Years since quittin.2     Passive exposure: Past    Smokeless tobacco: Never   Vaping Use    Vaping status: Never Used   Substance and Sexual Activity    Alcohol use: Not Currently    Drug use: Never    Sexual activity: Not Currently     Partners: Male     Birth control/protection:  Hysterectomy, Surgical     No Known Allergies  Current Outpatient Medications   Medication Sig Dispense Refill    albuterol sulfate  (90 Base) MCG/ACT inhaler Inhale 2 puffs Every 4 (Four) Hours As Needed for Wheezing or Shortness of Air. 18 g 11    atorvastatin (LIPITOR) 10 MG tablet Take 1 tablet by mouth Daily. 90 tablet 3    Blood Glucose Monitoring Suppl (Blood Glucose Monitor System) w/Device kit Use Daily to test blood glucose. 1 each 0    Calcium Carb-Cholecalciferol (Calcium 500 + D) 500-3.125 MG-MCG tablet Take 1 each by mouth 2 (Two) Times a Day. 60 tablet 2    clonazePAM (KlonoPIN) 0.5 MG tablet Take 1 tablet by mouth 2 (Two) Times a Day As Needed for Anxiety. 60 tablet 0    donepezil (ARICEPT) 10 MG tablet Take 1 tablet by mouth Every Night. 30 tablet 2    ferrous sulfate 325 (65 FE) MG tablet Take 1 tablet by mouth Daily With Breakfast. 90 tablet 1    FLUoxetine (PROzac) 10 MG capsule Take 3 capsules by mouth Every Morning for 90 days. Indications: Generalized Anxiety Disorder, Major Depressive Disorder 90 capsule 2    fluticasone (FLONASE) 50 MCG/ACT nasal spray Administer 2 sprays into the nostril(s) as directed by provider Daily. 16 g 0    Fluticasone-Umeclidin-Vilant (TRELEGY ELLIPTA) 200-62.5-25 MCG/ACT inhaler Inhale 1 puff Daily. 60 each 5    glucose blood test strip Use as instructed to check blood sugar daily 100 each 3    HYDROcodone-acetaminophen (NORCO)  MG per tablet       ipratropium-albuterol (DUO-NEB) 0.5-2.5 mg/3 ml nebulizer Nebulize and inhale 1 vial 4 (Four) Times a Day As Needed for Wheezing. 360 mL 3    Lancet Device misc Use 1 each Daily. Use as directed; check blood sugar once daily 100 each 3    Lancets (OneTouch Delica Plus Fwxpev42M) misc Use 1 each Daily. 100 each 0    losartan (COZAAR) 25 MG tablet Take 1 tablet by mouth Daily.      Magnesium Oxide -Mg Supplement 400 (240 Mg) MG tablet Take 1 tablet by mouth Every Night. 30 tablet 11    memantine (Namenda) 5 MG  tablet Take 1 tablet by mouth Daily. 30 tablet 2    metoprolol succinate XL (TOPROL-XL) 25 MG 24 hr tablet Take 0.5 tablets by mouth Daily.      miconazole (Desenex) 2 % powder Apply topically to the appropriate area as directed 2 (Two) Times a Day. 71 g 11    mirtazapine (REMERON) 7.5 MG tablet Take 1 tablet by mouth Every Night. 30 tablet 2    mupirocin (BACTROBAN) 2 % ointment APPLY TO AFFECTED AREA(S) TWO TIMES A DAY AS DIRECTED FOR 14 DAYS - 22 g 0    naloxone (NARCAN) 4 MG/0.1ML nasal spray 1 spray As Needed. Has on hand      O2 (OXYGEN) Inhale 2 L/min Every Night.      pantoprazole (PROTONIX) 40 MG EC tablet TAKE 1 TABLET BY MOUTH DAILY 90 tablet 0    pramipexole (MIRAPEX) 0.5 MG tablet Take 1 tablet by mouth 3 (Three) Times a Day. 270 tablet 0    pregabalin (LYRICA) 75 MG capsule Take 1 capsule by mouth 2 (Two) Times a Day. 60 capsule 2    primidone (MYSOLINE) 50 MG tablet Take 1 tablet by mouth.      spironolactone (ALDACTONE) 25 MG tablet Take 1 tablet by mouth 2 (Two) Times a Day. 60 tablet 5    tacrolimus (PROTOPIC) 0.1 % ointment Apply topical ointment to affected area twice daily      Tirzepatide 15 MG/0.5ML solution auto-injector Inject 15 mg under the skin into the appropriate area as directed 1 (One) Time Per Week. 2 mL 2    torsemide (DEMADEX) 20 MG tablet Take 2 tablets by mouth 2 (Two) Times a Day. 180 tablet 3    triamcinolone (KENALOG) 0.025 % cream Apply 1 Application topically to the appropriate area as directed As Needed.       Current Facility-Administered Medications   Medication Dose Route Frequency Provider Last Rate Last Admin    cyanocobalamin injection 1,000 mcg  1,000 mcg Intramuscular Q28 Days Ami Diego MD   1,000 mcg at 03/03/25 1333     Review of Systems   Constitutional: Negative.    Skin:         Painful toenails.  Athletes feet and forefoot bilaterally.   All other systems reviewed and are negative.      OBJECTIVE     Vitals:    04/03/25 1351   BP: 121/64   Pulse: 65    SpO2: 96%           Body mass index is 46.52 kg/m².    Lab Results   Component Value Date    HGBA1C 5.60 02/07/2025       Lab Results   Component Value Date    GLUCOSE 94 03/26/2025    CALCIUM 9.9 03/26/2025     03/26/2025    K 3.9 03/26/2025    CO2 32.0 (H) 03/26/2025     03/26/2025    BUN 14 03/26/2025    CREATININE 1.24 (H) 03/26/2025    EGFRIFAFRI >60 05/10/2021    EGFRIFNONA 60 (L) 11/09/2021    BCR 11.3 03/26/2025    ANIONGAP 10.0 03/26/2025       Patient seen in no apparent distress.      PHYSICAL EXAM:     Foot/Ankle Exam    GENERAL  Appearance:  obese and elderly (Chronically ill)  Orientation:  AAOx3  Affect:  appropriate  Gait:  unimpaired  Assistance:  walker  Right shoe gear: casual shoe  Left shoe gear: casual shoe    VASCULAR     Right Foot Vascularity   Dorsalis pedis:  1+  Posterior tibial:  1+  Skin temperature:  warm  Edema grading:  None  CFT:  < 3 seconds  Pedal hair growth:  Absent  Varicosities:  mild varicosities     Left Foot Vascularity   Dorsalis pedis:  1+  Posterior tibial:  1+  Skin temperature:  warm  Edema grading:  None  CFT:  < 3 seconds  Pedal hair growth:  Absent  Varicosities:  mild varicosities     NEUROLOGIC     Right Foot Neurologic   Normal sensation    Light touch sensation: normal  Vibratory sensation: normal  Hot/Cold sensation: normal  Protective Sensation using Dover-Bobby Monofilament:   Sites intact: 10  Sites tested: 10     Left Foot Neurologic   Normal sensation    Light touch sensation: normal  Vibratory sensation: normal  Hot/Cold sensation:  normal  Protective Sensation using Dover-Bobby Monofilament:   Sites intact: 10  Sites tested: 10    MUSCLE STRENGTH     Right Foot Muscle Strength   Foot dorsiflexion:  4-  Foot plantar flexion:  4-  Foot inversion:  4-  Foot eversion:  4-     Left Foot Muscle Strength   Foot dorsiflexion:  4-  Foot plantar flexion:  4-  Foot inversion:  4-  Foot eversion:  4-    RANGE OF MOTION     Right Foot Range of  Motion   Foot and ankle ROM within normal limits       Left Foot Range of Motion   Foot and ankle ROM within normal limits      DERMATOLOGIC      Right Foot Dermatologic   Skin  Positive for tinea (Forefoot).   Nails  1.  Positive for elongated, onychomycosis, abnormal thickness, subungual debris and ingrown toenail.  2.  Positive for elongated, onychomycosis, abnormal thickness, subungual debris and ingrown toenail.  3.  Positive for elongated, onychomycosis, abnormal thickness, subungual debris and ingrown toenail.     Left Foot Dermatologic   Skin  Positive for tinea (Forefoot).   Nails  1.  Positive for elongated, onychomycosis, abnormal thickness, subungual debris and ingrown toenail.  2.  Positive for elongated, onychomycosis, abnormal thickness, subungual debris and ingrown toenail.  3.  Positive for elongated, onychomycosis, abnormal thickness, subungual debris and ingrown toenail.    I have reexamined the patient the results are consistent with the previously documented exam.    ASSESSMENT/PLAN     Diagnoses and all orders for this visit:    1. Onychomycosis (Primary)    2. Difficulty walking    3. Non-insulin dependent type 2 diabetes mellitus    4. DM feet    5. Foot pain, bilateral    6. Onychocryptosis    Comprehensive lower extremity examination and evaluation was performed.    Discussed findings and treatment plan including risks, benefits, and treatment options with patient in detail. Patient agreed with treatment plan.    Medications and allergies reviewed.  Reviewed available blood glucose and HgB A1C lab values along with other pertinent labs.  These were discussed with the patient as to their importance of diabetic maintenance.    Toenails 1, 2, 3 on Right and 1, 2, 3 on Left were debrided with nail nippers then filed with a Silasmel nail toby.  Patient tolerated procedure well without complications.    An After Visit Summary was printed and given to the patient at discharge, including (if  requested) any available informative/educational handouts regarding diagnosis, treatment, or medications. All questions were answered to patient/family satisfaction. Should symptoms fail to improve or worsen they agree to call or return to clinic or to go to the Emergency Department. Discussed the importance of following up with any needed screening tests/labs/specialist appointments and any requested follow-up recommended by me today. Importance of maintaining follow-up discussed and patient accepts that missed appointments can delay diagnosis and potentially lead to worsening of conditions.    Return in about 9 weeks (around 6/5/2025) for Toenail Care., or sooner if acute issues arise.    I have reviewed the assessment and plan and verified the accuracy of it. No changes to assessment and plan since the information was documented. Davin Sheffield DPM 04/03/25     I have dictated this note utilizing Dragon Dictation.  Please note that portions of this note were completed with a voice recognition program.  Part of this note may be an electronic transcription/translation of spoken language to printed text using the Dragon Dictation System.      This document has been electronically signed by Davin Sheffield DPM on April 3, 2025 14:10 EDT

## 2025-04-10 ENCOUNTER — OFFICE VISIT (OUTPATIENT)
Dept: FAMILY MEDICINE CLINIC | Age: 70
End: 2025-04-10
Payer: MEDICARE

## 2025-04-10 VITALS
TEMPERATURE: 98 F | WEIGHT: 275.6 LBS | OXYGEN SATURATION: 92 % | DIASTOLIC BLOOD PRESSURE: 73 MMHG | SYSTOLIC BLOOD PRESSURE: 107 MMHG | HEIGHT: 64 IN | HEART RATE: 59 BPM | BODY MASS INDEX: 47.05 KG/M2

## 2025-04-10 DIAGNOSIS — J44.9 CHRONIC OBSTRUCTIVE PULMONARY DISEASE, UNSPECIFIED COPD TYPE: ICD-10-CM

## 2025-04-10 DIAGNOSIS — G62.9 PERIPHERAL POLYNEUROPATHY: ICD-10-CM

## 2025-04-10 DIAGNOSIS — E11.9 TYPE 2 DIABETES MELLITUS WITHOUT COMPLICATION, WITHOUT LONG-TERM CURRENT USE OF INSULIN: ICD-10-CM

## 2025-04-10 DIAGNOSIS — R06.02 SHORTNESS OF BREATH: ICD-10-CM

## 2025-04-10 DIAGNOSIS — I50.32 CHRONIC DIASTOLIC HEART FAILURE: ICD-10-CM

## 2025-04-10 DIAGNOSIS — G25.81 RESTLESS LEG SYNDROME: ICD-10-CM

## 2025-04-10 DIAGNOSIS — M54.50 CHRONIC BILATERAL LOW BACK PAIN, UNSPECIFIED WHETHER SCIATICA PRESENT: ICD-10-CM

## 2025-04-10 DIAGNOSIS — Z12.31 ENCOUNTER FOR SCREENING MAMMOGRAM FOR BREAST CANCER: ICD-10-CM

## 2025-04-10 DIAGNOSIS — R41.3 MEMORY LOSS: ICD-10-CM

## 2025-04-10 DIAGNOSIS — Z23 ENCOUNTER FOR IMMUNIZATION: ICD-10-CM

## 2025-04-10 DIAGNOSIS — R60.0 BILATERAL LOWER EXTREMITY EDEMA: ICD-10-CM

## 2025-04-10 DIAGNOSIS — F41.9 ANXIETY AND DEPRESSION: ICD-10-CM

## 2025-04-10 DIAGNOSIS — K21.9 GASTROESOPHAGEAL REFLUX DISEASE, UNSPECIFIED WHETHER ESOPHAGITIS PRESENT: ICD-10-CM

## 2025-04-10 DIAGNOSIS — F32.A ANXIETY AND DEPRESSION: ICD-10-CM

## 2025-04-10 DIAGNOSIS — E78.00 HYPERCHOLESTEROLEMIA: ICD-10-CM

## 2025-04-10 DIAGNOSIS — N18.30 STAGE 3 CHRONIC KIDNEY DISEASE, UNSPECIFIED WHETHER STAGE 3A OR 3B CKD: ICD-10-CM

## 2025-04-10 DIAGNOSIS — E55.9 VITAMIN D DEFICIENCY: ICD-10-CM

## 2025-04-10 DIAGNOSIS — Z00.00 ENCOUNTER FOR ANNUAL WELLNESS EXAM IN MEDICARE PATIENT: Primary | ICD-10-CM

## 2025-04-10 DIAGNOSIS — Z12.11 COLON CANCER SCREENING: ICD-10-CM

## 2025-04-10 DIAGNOSIS — E53.8 B12 DEFICIENCY: ICD-10-CM

## 2025-04-10 DIAGNOSIS — G89.29 CHRONIC BILATERAL LOW BACK PAIN, UNSPECIFIED WHETHER SCIATICA PRESENT: ICD-10-CM

## 2025-04-10 DIAGNOSIS — Z78.0 POSTMENOPAUSAL STATE: ICD-10-CM

## 2025-04-10 LAB
EXPIRATION DATE: NORMAL
HETEROPH AB SER QL LA: NEGATIVE
INTERNAL CONTROL: NORMAL
Lab: NORMAL

## 2025-04-10 RX ORDER — TORSEMIDE 20 MG/1
40 TABLET ORAL 2 TIMES DAILY
Qty: 360 TABLET | Refills: 0 | Status: SHIPPED | OUTPATIENT
Start: 2025-04-10 | End: 2025-04-24

## 2025-04-10 RX ORDER — CYANOCOBALAMIN 1000 UG/ML
1000 INJECTION, SOLUTION INTRAMUSCULAR; SUBCUTANEOUS
Status: SHIPPED | OUTPATIENT
Start: 2025-04-11

## 2025-04-10 NOTE — ASSESSMENT & PLAN NOTE
Extremely well-controlled on Mounjaro.  Continue low-cholesterol diet.  Continue yearly eye exams

## 2025-04-10 NOTE — PROGRESS NOTES
ABCs of the Annual Wellness Visit  Medicare Wellness Visit    Subjective    Chasity Torres is a 69 y.o. female who presents for a Medicare Wellness Visit.    The following portions of the patient's history were reviewed and   updated as appropriate: allergies, current medications, past family history, past medical history, past social history, past surgical history, and problem list    Compared to one year ago, the patient feels her physical   health is worse. Due to ongoing fatigue    Compared to one year ago, the patient feels her mental   health is the same.    Recent Hospitalizations:  She was not admitted to the hospital during the last year.       Advance Care Planning  Advance Directive is not on file.  ACP discussion was held with the patient during this visit. Patient does not have an advance directive, information provided.    Does the patient have evidence of cognitive impairment? Yes    Aspirin is not on active medication list.  Aspirin use is not indicated based on review of current medical condition/s. Risk of harm outweighs potential benefits.  .    Patient Active Problem List   Diagnosis    Carpal tunnel syndrome of left wrist    Carpal tunnel syndrome of right wrist    Anxiety    Celiac artery stenosis    Cervical disc disorder    Chronic pain disorder    Degeneration of lumbar intervertebral disc    Depressive disorder    Diabetic neuropathy    Essential hypertension    Essential tremor    Hypercholesterolemia    Insomnia    Irritable bowel syndrome    Lumbar spondylosis    Obesity, morbid, BMI 50 or higher    Osteoarthritis    Sacroiliac joint pain    MEGAN (obstructive sleep apnea)    Chronic diastolic heart failure    Palpitations    Type 2 diabetes mellitus without complication, without long-term current use of insulin    Other chest pain    Generalized anxiety disorder    Paresthesia    Shortness of breath    Chronic obstructive pulmonary disease    Long-term current use of opiate analgesic     Cervical radiculopathy    Cervical spondylosis    Degeneration of thoracic intervertebral disc    Thoracic spondylosis    Coronary artery calcification    Mild persistent asthma without complication    Sore throat    Acute pain of left knee    CKD (chronic kidney disease)    B12 deficiency    COPD with asthma and status asthmaticus    Iron deficiency    Overweight    Vulvar cyst    Iron deficiency anemia    Vitamin D deficiency    Non-restorative sleep    Snoring    Excessive daytime sleepiness    Class 3 severe obesity due to excess calories without serious comorbidity with body mass index (BMI) of 45.0 to 49.9 in adult    Chronic hypoxic respiratory failure on home oxygen with the CPAP at 2 L    Sleep related hypoxia       Current Medical Providers:  Patient Care Team:  Ami Diego MD as PCP - General (Family Medicine)  Jorge George MD as Consulting Physician (Cardiology)  Desean Mckeon MD (Psychiatry)  Trish Paz APRN as Nurse Practitioner (Psychiatry)  Marian Deleon MA as Medical Assistant  Tereza Danielson MD as Consulting Physician (Urology)  Khang Houston MD as Consulting Physician (Pulmonary Disease)  Fabio Shaver PA-C as Physician Assistant (Physician Assistant)  Dale Alvarado DO as Consulting Physician (Neurology)  Jordan Antonio MD as Consulting Physician (Pain Medicine)  Lucy Winchester APRN as Nurse Practitioner (Nurse Practitioner)    Opioid medication/s are on active medication list.  and I have evaluated her active treatment plan and pain score trends (see table).  There were no vitals filed for this visit.  I have reviewed the chart for potential of high risk medication and harmful drug interactions in the elderly.               Objective    Vitals:    04/10/25 1247   BP: 107/73   BP Location: Right arm   Patient Position: Sitting   Cuff Size: Adult   Pulse: 59   Temp: 98 °F (36.7 °C)   TempSrc: Oral   SpO2: 92%  Comment: room air   Weight: 125 kg (275 lb  "9.6 oz)   Height: 162.6 cm (64\")     Estimated body mass index is 47.31 kg/m² as calculated from the following:    Height as of this encounter: 162.6 cm (64\").    Weight as of this encounter: 125 kg (275 lb 9.6 oz).    Class 3 Severe Obesity (BMI >=40). Obesity-related health conditions include the following: hypertension and dyslipidemias. Obesity is improving with treatment. BMI is is above average; BMI management plan is completed. We discussed portion control and increasing exercise.      Gait and Balance Evaluation: Loss of Balance and uses a cane    Lab Results   Component Value Date    TRIG 180 (H) 2025    HDL 48 2025     (H) 2025    VLDL 32 2025    HGBA1C 5.60 2025        HEALTH RISK ASSESSMENT    Smoking Status:  Social History     Tobacco Use   Smoking Status Former    Current packs/day: 0.00    Average packs/day: 0.5 packs/day for 12.0 years (6.0 ttl pk-yrs)    Types: Cigarettes    Start date: 2009    Quit date: 2021    Years since quittin.3    Passive exposure: Past   Smokeless Tobacco Never     Alcohol Consumption:  Social History     Substance and Sexual Activity   Alcohol Use Not Currently     Fall Risk Screen:    FLORENTINADI Fall Risk Assessment was completed, and patient is at MODERATE risk for falls. Assessment completed on:4/10/2025    Depression Screenin/10/2025     1:00 PM   PHQ-2/PHQ-9 Depression Screening   Little interest or pleasure in doing things Not at all   Feeling down, depressed, or hopeless Several days   How difficult have these problems made it for you to do your work, take care of things at home, or get along with other people? Somewhat difficult       Health Habits and Functional and Cognitive Screenin/10/2025     1:00 PM   Functional & Cognitive Status   Do you have difficulty preparing food and eating? No   Do you have difficulty bathing yourself, getting dressed or grooming yourself? Yes   Do you have difficulty " using the toilet? No   Do you have difficulty moving around from place to place? No   Do you have trouble with steps or getting out of a bed or a chair? No   Current Diet Well Balanced Diet   Dental Exam Other   Eye Exam Up to date   Exercise (times per week) 0 times per week   Do you need help using the phone?  No   Are you deaf or do you have serious difficulty hearing?  No   Do you need help to go to places out of walking distance? Yes   Do you need help shopping? No   Do you need help preparing meals?  No   Do you need help with housework?  No   Do you need help with laundry? No   Do you need help taking your medications? No   Do you need help managing money? No   Do you ever drive or ride in a car without wearing a seat belt? Yes   Have you felt unusual stress, anger or loneliness in the last month? No   Who do you live with? Child   If you need help, do you have trouble finding someone available to you? No   Have you been bothered in the last four weeks by sexual problems? No   Do you have difficulty concentrating, remembering or making decisions? Yes       Visual Acuity:             Age-appropriate Screening Schedule:  Refer to the list below for future screening recommendations based on patient's age, sex and/or medical conditions. Orders for these recommended tests are listed in the plan section. The patient has been provided with a written plan.    Health Maintenance   Topic Date Due    URINE MICROALBUMIN-CREATININE RATIO (uACR)  Never done    DIABETIC FOOT EXAM  03/19/2025    COVID-19 Vaccine (6 - 2024-25 season) 05/01/2025 (Originally 9/1/2024)    TDAP/TD VACCINES (2 - Td or Tdap) 05/20/2025    INFLUENZA VACCINE  07/01/2025    DIABETIC EYE EXAM  07/03/2025    HEMOGLOBIN A1C  08/07/2025    LIPID PANEL  02/07/2026    ANNUAL WELLNESS VISIT  04/10/2026    DXA SCAN  08/02/2026    MAMMOGRAM  04/16/2027    COLORECTAL CANCER SCREENING  10/12/2030    HEPATITIS C SCREENING  Completed    Pneumococcal Vaccine 50+   Completed    ZOSTER VACCINE  Completed              Outpatient Medications Prior to Visit   Medication Sig Dispense Refill    albuterol sulfate  (90 Base) MCG/ACT inhaler Inhale 2 puffs Every 4 (Four) Hours As Needed for Wheezing or Shortness of Air. 18 g 11    atorvastatin (LIPITOR) 10 MG tablet Take 1 tablet by mouth Daily. 90 tablet 3    Blood Glucose Monitoring Suppl (Blood Glucose Monitor System) w/Device kit Use Daily to test blood glucose. 1 each 0    Calcium Carb-Cholecalciferol (Calcium 500 + D) 500-3.125 MG-MCG tablet Take 1 each by mouth 2 (Two) Times a Day. 60 tablet 2    clonazePAM (KlonoPIN) 0.5 MG tablet Take 1 tablet by mouth 2 (Two) Times a Day As Needed for Anxiety. 60 tablet 0    donepezil (ARICEPT) 10 MG tablet Take 1 tablet by mouth Every Night. 30 tablet 2    ferrous sulfate 325 (65 FE) MG tablet Take 1 tablet by mouth Daily With Breakfast. 90 tablet 1    FLUoxetine (PROzac) 10 MG capsule Take 3 capsules by mouth Every Morning for 90 days. Indications: Generalized Anxiety Disorder, Major Depressive Disorder 90 capsule 2    fluticasone (FLONASE) 50 MCG/ACT nasal spray Administer 2 sprays into the nostril(s) as directed by provider Daily. 16 g 0    Fluticasone-Umeclidin-Vilant (TRELEGY ELLIPTA) 200-62.5-25 MCG/ACT inhaler Inhale 1 puff Daily. 60 each 5    glucose blood test strip Use as instructed to check blood sugar daily 100 each 3    HYDROcodone-acetaminophen (NORCO)  MG per tablet       ipratropium-albuterol (DUO-NEB) 0.5-2.5 mg/3 ml nebulizer Nebulize and inhale 1 vial 4 (Four) Times a Day As Needed for Wheezing. 360 mL 3    Lancet Device misc Use 1 each Daily. Use as directed; check blood sugar once daily 100 each 3    Lancets (OneTouch Delica Plus [id number]) misc Use 1 each Daily. 100 each 0    Magnesium Oxide -Mg Supplement 400 (240 Mg) MG tablet Take 1 tablet by mouth Every Night. 30 tablet 11    metoprolol succinate XL (TOPROL-XL) 25 MG 24 hr tablet Take 0.5 tablets by  mouth Daily.      miconazole (Desenex) 2 % powder Apply topically to the appropriate area as directed 2 (Two) Times a Day. 71 g 11    mirtazapine (REMERON) 7.5 MG tablet Take 1 tablet by mouth Every Night. 30 tablet 2    mupirocin (BACTROBAN) 2 % ointment APPLY TO AFFECTED AREA(S) TWO TIMES A DAY AS DIRECTED FOR 14 DAYS - 22 g 0    naloxone (NARCAN) 4 MG/0.1ML nasal spray 1 spray As Needed. Has on hand      O2 (OXYGEN) Inhale 2 L/min Every Night.      pramipexole (MIRAPEX) 0.5 MG tablet Take 1 tablet by mouth 3 (Three) Times a Day. 270 tablet 0    pregabalin (LYRICA) 75 MG capsule Take 1 capsule by mouth 2 (Two) Times a Day. 60 capsule 2    primidone (MYSOLINE) 50 MG tablet Take 1 tablet by mouth.      spironolactone (ALDACTONE) 25 MG tablet Take 1 tablet by mouth 2 (Two) Times a Day. 60 tablet 5    tacrolimus (PROTOPIC) 0.1 % ointment Apply topical ointment to affected area twice daily      Tirzepatide 15 MG/0.5ML solution auto-injector Inject 15 mg under the skin into the appropriate area as directed 1 (One) Time Per Week. 2 mL 2    triamcinolone (KENALOG) 0.025 % cream Apply 1 Application topically to the appropriate area as directed As Needed.      losartan (COZAAR) 25 MG tablet Take 1 tablet by mouth Daily.      memantine (Namenda) 5 MG tablet Take 1 tablet by mouth Daily. 30 tablet 2    pantoprazole (PROTONIX) 40 MG EC tablet TAKE 1 TABLET BY MOUTH DAILY 90 tablet 0    torsemide (DEMADEX) 20 MG tablet Take 2 tablets by mouth 2 (Two) Times a Day. 180 tablet 3     Facility-Administered Medications Prior to Visit   Medication Dose Route Frequency Provider Last Rate Last Admin    cyanocobalamin injection 1,000 mcg  1,000 mcg Intramuscular Q28 Days Ami Diego MD   1,000 mcg at 03/03/25 1333       CMS Preventative Services Quick Reference  Risk Factors Identified During Encounter  Chronic Pain: Home exercise plan outlined.  OTC analgesics as needed. Proper dosing schedule discussed.   Fall Risk-High or  Moderate: Discussed Fall Prevention in the home  Immunizations Discussed/Encouraged: COVID19  Inactivity/Sedentary: Patient was advised to exercise at least 150 minutes a week per CDC recommendations.  Dental Screening Recommended  Vision Screening Recommended  The above risks/problems have been discussed with the patient.  Pertinent information has been shared with the patient in the After Visit Summary.  An After Visit Summary and PPPS were made available to the patient.    Follow Up:   Next Medicare Wellness visit to be scheduled in 1 year.       Additional E&M Note during same encounter follows:  Patient has multiple medical problems which are significant and separately identifiable that require additional work above and beyond the Medicare Wellness Visit.         Chasity Torres presents to [facility].    Chief Complaint: Creased lower extremity edema and fatigue        Subjective   History of Present Illness:    I am seeing Chasity for ongoing fatigue.  She was exposed to mono and like a mono check.  Her granddaughter has it.  Of note her blood pressure is low today.  It was also low last visit.  She had been taken off her losartan but it looks like she has continued to take it.  In addition she is on metoprolol.  I think with her blood pressure is low as it is today this would cause extreme fatigue.  She also has ongoing depression and sadness.  She lives with chronic anxiety and in her household.  Today is her F2 F for refill of her clonazepam.  This helps keep her calm and helps with her tremor. She is also on Vryalar and BuSpar and continues to see her psychiatrist  Best Paz.  She has no suicidal or homicidal thoughts.  She is sleeping okay at night.  This weight gain is quite surprising and I am wondering if she has got fluid retention going on.  She has underlying history of congestive heart failure and sees cardiologist Dr. George.  She does have mild lower extremity edema  but has no chest pain and is not currently short of air.  She is on torsemide 20 mg once daily.   Last echo showed LV ejection fraction 65 to 70% with normal valvular structure and function    She is concerned about ongoing swelling.  She is now on torosemide and has gained 6 pounds past 2 weeks and has LE edema after her torosemide was decreased to 40 mg in a.m. and 20 mg in the afternoon.  She sees Dr George.I reached out to him about her current symptoms and he was agreeable to increasing her torsemide back up to 40 mg twice daily of note she does have 2 sisters with chronic lymphedema who been to a clinic in  [facility] lymphedema clinic to help with her chronic LE edema and she would like referral if her symptoms are ongoing    She presents with ongoing memory issues and is stable and improved with her increase and Aricept.  She needs a refill at the pharmacy today.  She tolerates well.     She has a history of diabetes type II.  She is currently on Mounjaro and tolerates med well.  She has lost 5 more pounds.  She is tolerating medication well.  Her last hemoglobin A1c was 5. 6 %.  She rarely checks her home blood sugar.  She denies hypoglycemia symptoms.  She has peripheral neuropathy but no other acute foot issues.  She is aware she needs to see the eye doctor once a year.  She defers dietitian referral and is not following a diabetic diet.       Her breathing and COPD today are stable.  Current treatment includes albuterol, Trelegy, DuoNebs and O2 as needed she wears CPAP at night    She presents with ongoing memory issues and feels like she is improved on the increase of her Aricept.      She also has chronic hypercholesterolemia, she is now on atorvastatin and tolerating medication well.  She is also supposed be following a low-cholesterol diet              Result Review   The following data was reviewed by Ami Diego MD on 04/10/2025.  Lab Results   Component Value Date    WBC 5.86 03/26/2025     "HGB 14.5 03/26/2025    HCT 45.6 03/26/2025    MCV 96.4 03/26/2025     03/26/2025     Lab Results   Component Value Date    GLUCOSE 94 04/16/2025    BUN 13 04/16/2025    CREATININE 1.18 (H) 04/16/2025     04/16/2025    K 4.1 04/16/2025    CL 98 04/16/2025    CALCIUM 9.9 04/16/2025    PROTEINTOT 7.2 02/14/2025    ALBUMIN 4.0 03/26/2025    ALT 19 02/14/2025    AST 16 02/14/2025    ALKPHOS 130 (H) 02/14/2025    BILITOT <0.2 02/14/2025    GLOB 4.0 02/14/2025    AGRATIO 0.8 02/14/2025    BCR 11.0 04/16/2025    ANIONGAP 11.0 04/16/2025    EGFR 50.1 (L) 04/16/2025     Lab Results   Component Value Date    CHOL 226 (H) 02/07/2025    CHLPL 166 12/16/2020    TRIG 180 (H) 02/07/2025    HDL 48 02/07/2025     (H) 02/07/2025     Lab Results   Component Value Date    TSH 0.774 02/21/2024     Lab Results   Component Value Date    HGBA1C 5.60 02/07/2025     No results found for: \"PSA\"  Lab Results   Component Value Date    Iron 104 11/21/2024    Iron Saturation (TSAT) 31 11/21/2024      Lab Results   Component Value Date    MKHR59ZF 33.3 02/07/2025             Assessment and Plan:   Assessment & Plan  Encounter for annual wellness exam in Medicare patient         Encounter for screening mammogram for breast cancer  Screening mammogram ordered and scheduled for April 16, 2025       Encounter for immunization  Recommend COVID-vaccine and she defers       Colon cancer screening  Colonoscopy up-to-date       Postmenopausal state  DEXA is up-to-date       Chronic bilateral low back pain, unspecified whether sciatica present  Overall stable.  Continue work on chair exercises and weight loss       Chronic diastolic heart failure  Increase edema.  Will increase torsemide to 40 mg twice daily       Stage 3 chronic kidney disease, unspecified whether stage 3a or 3b CKD           Shortness of breath  Will increase turosemide to 40 mg twice daily  Orders:    POCT Infectious mononucleosis antibody    Anxiety and " depression  Overall stable.  She is to continue close follow-up with psychiatry.         B12 deficiency  Continue monthly B12 injections  Orders:    cyanocobalamin injection 1,000 mcg    Peripheral polyneuropathy  Secondary to diabetes and chronic low back neuropathy.  Improved with B12 injections, she takes hydrocodone for pain.  She follows up for routine face-to-face and urine tox evaluations       Bilateral lower extremity edema  Increase edema.  Will increase torsemide to 40 mg twice daily       Type 2 diabetes mellitus without complication, without long-term current use of insulin  Extremely well-controlled on Mounjaro.  Continue low-cholesterol diet.  Continue yearly eye exams         Gastroesophageal reflux disease, unspecified whether esophagitis present  GERD is stable on pantoprazole, she tolerates med well.  She is to avoid spicy acidic foods       Hypercholesterolemia  Cholesterol is well-controlled on Lipitor.  No changes to current meds or treatment plan         Vitamin D deficiency  Stable on vitamin D supplementation       Restless leg syndrome  Stable with Lyrica and Mirapex.  No changes to current treatment plan       Memory loss  Improved with increase of Aricept.  No changes in current treatment       Chronic obstructive pulmonary disease, unspecified COPD type  No COPD symptoms.  Will increase Lasix to help with CHF excess exacerbation                          Medications:      Current Outpatient Medications:     albuterol sulfate  (90 Base) MCG/ACT inhaler, Inhale 2 puffs Every 4 (Four) Hours As Needed for Wheezing or Shortness of Air., Disp: 18 g, Rfl: 11    atorvastatin (LIPITOR) 10 MG tablet, Take 1 tablet by mouth Daily., Disp: 90 tablet, Rfl: 3    Blood Glucose Monitoring Suppl (Blood Glucose Monitor System) w/Device kit, Use Daily to test blood glucose., Disp: 1 each, Rfl: 0    Calcium Carb-Cholecalciferol (Calcium 500 + D) 500-3.125 MG-MCG tablet, Take 1 each by mouth 2 (Two)  Times a Day., Disp: 60 tablet, Rfl: 2    clonazePAM (KlonoPIN) 0.5 MG tablet, Take 1 tablet by mouth 2 (Two) Times a Day As Needed for Anxiety., Disp: 60 tablet, Rfl: 0    donepezil (ARICEPT) 10 MG tablet, Take 1 tablet by mouth Every Night., Disp: 30 tablet, Rfl: 2    ferrous sulfate 325 (65 FE) MG tablet, Take 1 tablet by mouth Daily With Breakfast., Disp: 90 tablet, Rfl: 1    FLUoxetine (PROzac) 10 MG capsule, Take 3 capsules by mouth Every Morning for 90 days. Indications: Generalized Anxiety Disorder, Major Depressive Disorder, Disp: 90 capsule, Rfl: 2    fluticasone (FLONASE) 50 MCG/ACT nasal spray, Administer 2 sprays into the nostril(s) as directed by provider Daily., Disp: 16 g, Rfl: 0    Fluticasone-Umeclidin-Vilant (TRELEGY ELLIPTA) 200-62.5-25 MCG/ACT inhaler, Inhale 1 puff Daily., Disp: 60 each, Rfl: 5    glucose blood test strip, Use as instructed to check blood sugar daily, Disp: 100 each, Rfl: 3    HYDROcodone-acetaminophen (NORCO)  MG per tablet, , Disp: , Rfl:     ipratropium-albuterol (DUO-NEB) 0.5-2.5 mg/3 ml nebulizer, Nebulize and inhale 1 vial 4 (Four) Times a Day As Needed for Wheezing., Disp: 360 mL, Rfl: 3    Lancet Device misc, Use 1 each Daily. Use as directed; check blood sugar once daily, Disp: 100 each, Rfl: 3    Lancets (OneTouch Delica Plus [id number]) misc, Use 1 each Daily., Disp: 100 each, Rfl: 0    Magnesium Oxide -Mg Supplement 400 (240 Mg) MG tablet, Take 1 tablet by mouth Every Night., Disp: 30 tablet, Rfl: 11    metoprolol succinate XL (TOPROL-XL) 25 MG 24 hr tablet, Take 0.5 tablets by mouth Daily., Disp: , Rfl:     miconazole (Desenex) 2 % powder, Apply topically to the appropriate area as directed 2 (Two) Times a Day., Disp: 71 g, Rfl: 11    mirtazapine (REMERON) 7.5 MG tablet, Take 1 tablet by mouth Every Night., Disp: 30 tablet, Rfl: 2    mupirocin (BACTROBAN) 2 % ointment, APPLY TO AFFECTED AREA(S) TWO TIMES A DAY AS DIRECTED FOR 14 DAYS -, Disp: 22 g, Rfl: 0     "naloxone (NARCAN) 4 MG/0.1ML nasal spray, 1 spray As Needed. Has on hand, Disp: , Rfl:     O2 (OXYGEN), Inhale 2 L/min Every Night., Disp: , Rfl:     pramipexole (MIRAPEX) 0.5 MG tablet, Take 1 tablet by mouth 3 (Three) Times a Day., Disp: 270 tablet, Rfl: 0    pregabalin (LYRICA) 75 MG capsule, Take 1 capsule by mouth 2 (Two) Times a Day., Disp: 60 capsule, Rfl: 2    primidone (MYSOLINE) 50 MG tablet, Take 1 tablet by mouth., Disp: , Rfl:     spironolactone (ALDACTONE) 25 MG tablet, Take 1 tablet by mouth 2 (Two) Times a Day., Disp: 60 tablet, Rfl: 5    tacrolimus (PROTOPIC) 0.1 % ointment, Apply topical ointment to affected area twice daily, Disp: , Rfl:     Tirzepatide 15 MG/0.5ML solution auto-injector, Inject 15 mg under the skin into the appropriate area as directed 1 (One) Time Per Week., Disp: 2 mL, Rfl: 2    triamcinolone (KENALOG) 0.025 % cream, Apply 1 Application topically to the appropriate area as directed As Needed., Disp: , Rfl:     memantine (Namenda) 5 MG tablet, Take 1 tablet by mouth 2 (Two) Times a Day., Disp: 60 tablet, Rfl: 2    pantoprazole (PROTONIX) 40 MG EC tablet, TAKE 1 TABLET BY MOUTH DAILY, Disp: 90 tablet, Rfl: 0    torsemide (DEMADEX) 20 MG tablet, Take 1 tablet by mouth Daily., Disp: , Rfl:     Current Facility-Administered Medications:     cyanocobalamin injection 1,000 mcg, 1,000 mcg, Intramuscular, Q28 Days, Ami Diego MD       Objective   Vital Signs:   /73 (BP Location: Right arm, Patient Position: Sitting, Cuff Size: Adult)   Pulse 59   Temp 98 °F (36.7 °C) (Oral)   Ht 162.6 cm (64\")   Wt 125 kg (275 lb 9.6 oz)   SpO2 92% Comment: room air  BMI 47.31 kg/m²       Physical Exam:  Physical Exam  Vitals and nursing note reviewed.   Constitutional:       General: She is not in acute distress.     Appearance: Normal appearance. She is obese. She is not ill-appearing, toxic-appearing or diaphoretic.   HENT:      Head: Normocephalic and atraumatic.      Right " Ear: Tympanic membrane, ear canal and external ear normal.      Left Ear: Tympanic membrane, ear canal and external ear normal.      Nose: Nose normal. No congestion or rhinorrhea.      Mouth/Throat:      Pharynx: Oropharynx is clear. No oropharyngeal exudate or posterior oropharyngeal erythema.   Eyes:      Conjunctiva/sclera: Conjunctivae normal.   Cardiovascular:      Rate and Rhythm: Normal rate.      Pulses: Normal pulses.           Dorsalis pedis pulses are 2+ on the right side and 2+ on the left side.   Pulmonary:      Effort: Pulmonary effort is normal. No respiratory distress.      Breath sounds: Normal breath sounds. No stridor. No wheezing, rhonchi or rales.   Musculoskeletal:         General: Swelling present. Normal range of motion.      Cervical back: Normal range of motion and neck supple. No rigidity.      Right lower le+ Edema present.      Left lower le+ Edema present.   Feet:      Right foot:      Protective Sensation: 7 sites tested.  7 sites sensed.      Skin integrity: Skin integrity normal. No ulcer or blister.      Toenail Condition: Right toenails are normal.      Left foot:      Protective Sensation: 7 sites tested.  7 sites sensed.      Skin integrity: Skin integrity normal. No ulcer or blister.      Toenail Condition: Left toenails are normal.      Comments: Diabetic Foot Exam Performed and Monofilament Test Performed     Lymphadenopathy:      Cervical: No cervical adenopathy.   Skin:     General: Skin is warm and dry.      Capillary Refill: Capillary refill takes less than 2 seconds.   Neurological:      Mental Status: She is alert and oriented to person, place, and time.      Motor: Weakness present.      Gait: Gait abnormal.   Psychiatric:         Attention and Perception: Attention normal.         Mood and Affect: Affect is flat.         Behavior: Behavior normal.         Thought Content: Thought content normal.         Cognition and Memory: Memory is impaired.          Judgment: Judgment normal.                     Review of Systems:  Review of Systems   Constitutional:  Positive for fatigue. Negative for chills and fever.   HENT:  Negative for ear pain, sinus pressure and sore throat.    Eyes:  Negative for blurred vision and double vision.   Respiratory:  Positive for shortness of breath. Negative for cough and wheezing.    Cardiovascular:  Positive for leg swelling. Negative for chest pain and palpitations.   Gastrointestinal:  Negative for abdominal pain, blood in stool, constipation, diarrhea, nausea and vomiting.   Skin:  Negative for rash.   Neurological:  Negative for dizziness and headache.   Psychiatric/Behavioral:  Negative for sleep disturbance, suicidal ideas and depressed mood. The patient is not nervous/anxious.             Follow Up   Return in about 2 months (around 6/10/2025) for Recheck.    Part of this note may be an electronic transcription/translation of spoken language to printed   text using the Dragon Dictation System.            Medical History:  Past Medical History:    Adverse effect of other viral vaccines, initial encounter    Covid vaccine    Allergic fungal sinusitis    Allergic rhinitis    Anxiety disorder    Asthma    CHF (congestive heart failure)    Congenital heart disease    COPD with acute exacerbation    CTS (carpal tunnel syndrome)    left    Difficulty walking    Essential (primary) hypertension    Essential tremor    Hypercholesterolemia    Insomnia    Irritable bowel syndrome    Low back pain    Major depressive disorder    Morbid (severe) obesity due to excess calories    Nasal congestion    Neuropathy in diabetes    Obstructive sleep apnea (adult) (pediatric)    Other hereditary and idiopathic neuropathies    Pain in left hip    Pain in right toe(s)    Pain in thoracic spine    Peripheral neuropathy    Hands    Primary generalized (osteo)arthritis    Primary insomnia    Pulmonary emphysema    Restless leg syndrome    SOBOE (shortness  of breath on exertion)    Substance abuse    Type 2 diabetes mellitus without complication, without long-term current use of insulin    Vitamin D deficiency     Past Surgical History:    CARPAL TUNNEL RELEASE    COLONOSCOPY    ENDOSCOPIC FUNCTIONAL SINUS SURGERY (FESS)    Procedure: ENDOSCOPIC FUNCTIONAL SINUS SURGERY, left maxillary antrostomy with removal of contents, possible left ethmoidectomy;  Surgeon: Johnny Calderon MD;  Location: [facility] [place];  Service: ENT;  Laterality: Left;    HYSTERECTOMY    complete- ovarian cysts      Family History   Problem Relation Age of Onset    Hypertension Mother     COPD Father     Heart failure Father     Neuropathy Sister     Sleep apnea Sister     Restless legs syndrome Sister     Sleep apnea Brother     Anxiety disorder Daughter     Depression Daughter     Malig Hyperthermia Neg Hx     Breast cancer Neg Hx     Ovarian cancer Neg Hx     Uterine cancer Neg Hx     Cervical cancer Neg Hx     Colon cancer Neg Hx     Stomach cancer Neg Hx     Skin cancer Neg Hx     Clotting disorder Neg Hx     Deep vein thrombosis Neg Hx     Pulmonary embolism Neg Hx      Social History     Tobacco Use    Smoking status: Former     Current packs/day: 0.00     Average packs/day: 0.5 packs/day for 12.0 years (6.0 ttl pk-yrs)     Types: Cigarettes     Start date: 2009     Quit date: 2021     Years since quittin.3     Passive exposure: Past    Smokeless tobacco: Never   Substance Use Topics    Alcohol use: Not Currently       Health Maintenance Due   Topic Date Due    URINE MICROALBUMIN-CREATININE RATIO (uACR)  Never done    DIABETIC FOOT EXAM  2025        Immunization History   Administered Date(s) Administered    Arexvy (RSV, Adults 60+ yrs) 2024    COVID-19 (ANGELA) 2021    COVID-19 (MODERNA) 1st,2nd,3rd Dose Monovalent 2021    COVID-19 (PFIZER) 12YRS+ (COMIRNATY) 2024, 05/15/2024    Covid-19 (Pfizer) Gray Cap Monovalent 2022    Fluzone  >6mos  10/07/2013    Fluzone (or Fluarix & Flulaval for VFC) >6mos 10/10/2017    Fluzone High-Dose 65+YRS 11/21/2024    Fluzone High-Dose 65+yrs 03/31/2022, 09/30/2022, 10/18/2023    Influenza Seasonal Injectable 12/20/2012    Pneumococcal Conjugate 13-Valent (PCV13) 09/29/2020    Pneumococcal Polysaccharide (PPSV23) 11/30/2020    Shingrix 03/28/2023, 06/27/2023    Tdap 05/20/2015    Zostavax 09/16/2008       No Known Allergies

## 2025-04-10 NOTE — ASSESSMENT & PLAN NOTE
Will increase turosemide to 40 mg twice daily  Orders:    POCT Infectious mononucleosis antibody

## 2025-04-11 ENCOUNTER — TELEPHONE (OUTPATIENT)
Dept: CARDIOLOGY | Facility: CLINIC | Age: 70
End: 2025-04-11
Payer: MEDICARE

## 2025-04-11 ENCOUNTER — TELEPHONE (OUTPATIENT)
Dept: NEUROLOGY | Facility: CLINIC | Age: 70
End: 2025-04-11
Payer: MEDICARE

## 2025-04-11 DIAGNOSIS — I50.32 CHRONIC DIASTOLIC HEART FAILURE: Primary | ICD-10-CM

## 2025-04-11 NOTE — TELEPHONE ENCOUNTER
Caller: Chasity Torres    Relationship to patient: Self    Best call back number: Mobile phone: [phone]      Chief complaint: PATIENT IS WANTING TO SCHEDULE HER NEXT BOTOX INJECTION. LAST APPOINT INJECTION ON 03/21/24    Type of visit: BOTOX    Requested date: IN JUNE AROUND

## 2025-04-11 NOTE — TELEPHONE ENCOUNTER
Lvm to call office:  CASSIE ALEX TO RELAY:   She is daly for a follow up on 4/30, next botox will be daly at that time.

## 2025-04-11 NOTE — TELEPHONE ENCOUNTER
Patient saw PCP yesterday and was told she was fluid overloaded. Patient denies increased SOA, patient states she can push on her legs and see some swelling.     Patient states she was told to cut her torsemide to 2 tablets in the am and 1 tablet in afternoon by cardiology at last appointment.     Please advise

## 2025-04-15 NOTE — PROGRESS NOTES
Primary Care Provider  Ami Diego MD   Referring Provider  No ref. provider found      Patient Complaint  Apnea, COPD, Follow-up (3 MONTH), and Asthma      Subjective          Chasity Torres presents to [facility]      History of Presenting Illness  Chasity Torres is a 69 y.o. female patient of Dr. Houston with chronic hypoxic respiratory failure, asthma, mild COPD, chronic diastolic heart failure, anxiety/depression, MEGAN, chronic dyspnea, and tobacco use in remission, here for 3 month follow up.    Patient states she is doing well since her last visit.  Patient denies using any antibiotics or steroids for her lungs recently, denies any fevers or chills, no ER visits or hospitalizations for her breathing since she was last seen.  Her last hospitalization for her breathing was in 2023.  Patient's baseline shortness of breath is usually mild in severity, improves with rest.  She was previously on Dupixent for frequent exacerbations, but discontinued.  Patient is a former smoker, quit 4 years ago, 6 pack years.  She denies any hemoptysis, swollen lymph nodes, unintentional weight loss, or night sweats.  She continues to see Trish Paz with psychiatry, who is managing her psychiatric medications for anxiety and depression.  She also sees a therapist.  Patient continues to see Dr. George with cardiology for chronic diastolic heart failure.  Patient is able to perform ADLs with minor modifications, uses walker when out.  I have personally reviewed the review of systems, past family, social, medical and surgical histories; and agree with their findings.    Pulmonary Meds  Trelegy 200  Albuterol inhaler  DuoNebs    Pulmonary equipment  Oxygen therapy  CPAP      Review of Systems    Review of Systems   Constitutional:  Negative for activity change, chills, fatigue, fever, unexpected weight gain and unexpected weight loss.   HENT:  Negative for congestion, ear discharge,  ear pain, mouth sores, postnasal drip, rhinorrhea, sinus pressure, sore throat, swollen glands and trouble swallowing.    Eyes:  Negative for blurred vision, pain, discharge, itching and visual disturbance.   Respiratory:  Positive for shortness of breath (with exertion). Negative for apnea, cough, chest tightness, wheezing and stridor.    Cardiovascular:  Negative for chest pain, palpitations and leg swelling.   Gastrointestinal:  Negative for abdominal distention, abdominal pain, constipation, diarrhea, nausea, vomiting, GERD and indigestion.   Musculoskeletal:  Negative for arthralgias, joint swelling and myalgias.   Skin:  Negative for color change.   Neurological:  Negative for dizziness, weakness, light-headedness and headache.      Sleep: Negative for Excessive daytime sleepiness  Negative for morning headaches  Negative for Snoring      Family History   Problem Relation Age of Onset    Hypertension Mother     COPD Father     Heart failure Father     Neuropathy Sister     Sleep apnea Sister     Restless legs syndrome Sister     Sleep apnea Brother     Anxiety disorder Daughter     Depression Daughter     Malig Hyperthermia Neg Hx     Breast cancer Neg Hx     Ovarian cancer Neg Hx     Uterine cancer Neg Hx     Cervical cancer Neg Hx     Colon cancer Neg Hx     Stomach cancer Neg Hx     Skin cancer Neg Hx     Clotting disorder Neg Hx     Deep vein thrombosis Neg Hx     Pulmonary embolism Neg Hx         Social History     Socioeconomic History    Marital status:     Number of children: 2   Tobacco Use    Smoking status: Former     Current packs/day: 0.00     Average packs/day: 0.5 packs/day for 12.0 years (6.0 ttl pk-yrs)     Types: Cigarettes     Start date: 2009     Quit date: 2021     Years since quittin.2     Passive exposure: Past    Smokeless tobacco: Never   Vaping Use    Vaping status: Never Used   Substance and Sexual Activity    Alcohol use: Not Currently    Drug use: Never     Sexual activity: Not Currently     Partners: Male     Birth control/protection: Hysterectomy, Surgical        Past Medical History:   Diagnosis Date    Adverse effect of other viral vaccines, initial encounter     Covid vaccine    Allergic fungal sinusitis 07/29/2022    Allergic rhinitis     Anxiety disorder     Asthma     CHF (congestive heart failure)     Congenital heart disease 05 2021    COPD with acute exacerbation     CTS (carpal tunnel syndrome)     left    Difficulty walking 2022    Essential (primary) hypertension     Essential tremor     Hypercholesterolemia 10/18/2021    Insomnia     Irritable bowel syndrome     Low back pain     Major depressive disorder     Morbid (severe) obesity due to excess calories     Nasal congestion     Neuropathy in diabetes 2023    Obstructive sleep apnea (adult) (pediatric)     Other hereditary and idiopathic neuropathies     Pain in left hip     Pain in right toe(s)     Pain in thoracic spine     Peripheral neuropathy     Hands    Primary generalized (osteo)arthritis     Primary insomnia     Pulmonary emphysema 01/05/2023    Restless leg syndrome     SOBOE (shortness of breath on exertion)     Substance abuse     Type 2 diabetes mellitus without complication, without long-term current use of insulin 05/02/2022    Vitamin D deficiency         Immunization History   Administered Date(s) Administered    Arexvy (RSV, Adults 60+ yrs) 01/11/2024    COVID-19 (ANGELA) 12/28/2021    COVID-19 (MODERNA) 1st,2nd,3rd Dose Monovalent 03/17/2021    COVID-19 (PFIZER) 12YRS+ (COMIRNATY) 01/17/2024, 05/15/2024    Covid-19 (Pfizer) Gray Cap Monovalent 05/09/2022    Fluzone  >6mos 10/07/2013    Fluzone (or Fluarix & Flulaval for VFC) >6mos 10/10/2017    Fluzone High-Dose 65+YRS 11/21/2024    Fluzone High-Dose 65+yrs 03/31/2022, 09/30/2022, 10/18/2023    Influenza Seasonal Injectable 12/20/2012    Pneumococcal Conjugate 13-Valent (PCV13) 09/29/2020    Pneumococcal Polysaccharide (PPSV23)  11/30/2020    Shingrix 03/28/2023, 06/27/2023    Tdap 05/20/2015    Zostavax 09/16/2008       No Known Allergies       Current Outpatient Medications:     albuterol sulfate  (90 Base) MCG/ACT inhaler, Inhale 2 puffs Every 4 (Four) Hours As Needed for Wheezing or Shortness of Air., Disp: 18 g, Rfl: 11    atorvastatin (LIPITOR) 10 MG tablet, Take 1 tablet by mouth Daily., Disp: 90 tablet, Rfl: 3    Blood Glucose Monitoring Suppl (Blood Glucose Monitor System) w/Device kit, Use Daily to test blood glucose., Disp: 1 each, Rfl: 0    Calcium Carb-Cholecalciferol (Calcium 500 + D) 500-3.125 MG-MCG tablet, Take 1 each by mouth 2 (Two) Times a Day., Disp: 60 tablet, Rfl: 2    clonazePAM (KlonoPIN) 0.5 MG tablet, Take 1 tablet by mouth 2 (Two) Times a Day As Needed for Anxiety., Disp: 60 tablet, Rfl: 0    donepezil (ARICEPT) 10 MG tablet, Take 1 tablet by mouth Every Night., Disp: 30 tablet, Rfl: 2    ferrous sulfate 325 (65 FE) MG tablet, Take 1 tablet by mouth Daily With Breakfast., Disp: 90 tablet, Rfl: 1    FLUoxetine (PROzac) 10 MG capsule, Take 3 capsules by mouth Every Morning for 90 days. Indications: Generalized Anxiety Disorder, Major Depressive Disorder, Disp: 90 capsule, Rfl: 2    fluticasone (FLONASE) 50 MCG/ACT nasal spray, Administer 2 sprays into the nostril(s) as directed by provider Daily., Disp: 16 g, Rfl: 0    Fluticasone-Umeclidin-Vilant (TRELEGY ELLIPTA) 200-62.5-25 MCG/ACT inhaler, Inhale 1 puff Daily., Disp: 60 each, Rfl: 5    glucose blood test strip, Use as instructed to check blood sugar daily, Disp: 100 each, Rfl: 3    HYDROcodone-acetaminophen (NORCO)  MG per tablet, , Disp: , Rfl:     ipratropium-albuterol (DUO-NEB) 0.5-2.5 mg/3 ml nebulizer, Nebulize and inhale 1 vial 4 (Four) Times a Day As Needed for Wheezing., Disp: 360 mL, Rfl: 3    Lancet Device [facility], Use 1 each Daily. Use as directed; check blood sugar once daily, Disp: 100 each, Rfl: 3    Lancets (OneTouch Delica Plus  [id number]) misc, Use 1 each Daily., Disp: 100 each, Rfl: 0    lisinopril (PRINIVIL,ZESTRIL) 2.5 MG tablet, Take 1 tablet by mouth Daily., Disp: 90 tablet, Rfl: 3    Magnesium Oxide -Mg Supplement 400 (240 Mg) MG tablet, Take 1 tablet by mouth Every Night., Disp: 30 tablet, Rfl: 11    memantine (Namenda) 5 MG tablet, Take 1 tablet by mouth Daily., Disp: 30 tablet, Rfl: 2    metoprolol succinate XL (TOPROL-XL) 25 MG 24 hr tablet, Take 0.5 tablets by mouth Daily., Disp: , Rfl:     miconazole (Desenex) 2 % powder, Apply topically to the appropriate area as directed 2 (Two) Times a Day., Disp: 71 g, Rfl: 11    mirtazapine (REMERON) 7.5 MG tablet, Take 1 tablet by mouth Every Night., Disp: 30 tablet, Rfl: 2    mupirocin (BACTROBAN) 2 % ointment, APPLY TO AFFECTED AREA(S) TWO TIMES A DAY AS DIRECTED FOR 14 DAYS -, Disp: 22 g, Rfl: 0    O2 (OXYGEN), Inhale 2 L/min Every Night., Disp: , Rfl:     pantoprazole (PROTONIX) 40 MG EC tablet, TAKE 1 TABLET BY MOUTH DAILY, Disp: 90 tablet, Rfl: 0    pramipexole (MIRAPEX) 0.5 MG tablet, Take 1 tablet by mouth 3 (Three) Times a Day., Disp: 270 tablet, Rfl: 0    pregabalin (LYRICA) 75 MG capsule, Take 1 capsule by mouth 2 (Two) Times a Day., Disp: 60 capsule, Rfl: 2    primidone (MYSOLINE) 50 MG tablet, Take 1 tablet by mouth., Disp: , Rfl:     spironolactone (ALDACTONE) 25 MG tablet, Take 1 tablet by mouth 2 (Two) Times a Day., Disp: 60 tablet, Rfl: 5    tacrolimus (PROTOPIC) 0.1 % ointment, Apply topical ointment to affected area twice daily, Disp: , Rfl:     Tirzepatide 15 MG/0.5ML solution auto-injector, Inject 15 mg under the skin into the appropriate area as directed 1 (One) Time Per Week., Disp: 2 mL, Rfl: 2    torsemide (DEMADEX) 20 MG tablet, Take 2 tablets by mouth 2 (Two) Times a Day., Disp: 360 tablet, Rfl: 0    triamcinolone (KENALOG) 0.025 % cream, Apply 1 Application topically to the appropriate area as directed As Needed., Disp: , Rfl:     naloxone (NARCAN) 4 MG/0.1ML  "nasal spray, 1 spray As Needed. Has on hand (Patient not taking: Reported on 4/17/2025), Disp: , Rfl:     Current Facility-Administered Medications:     cyanocobalamin injection 1,000 mcg, 1,000 mcg, Intramuscular, Q28 Days, Ami Diego MD     Objective     Vital Signs:   BP (!) 88/61 (BP Location: Left arm, Patient Position: Sitting, Cuff Size: Large Adult)   Pulse 58   Temp 97.8 °F (36.6 °C) (Tympanic)   Resp 18   Ht 162.6 cm (64\")   Wt 124 kg (272 lb 12.8 oz)   SpO2 92% Comment: ROOM AIR  BMI 46.83 kg/m²     Physical Exam  Constitutional:       General: She is not in acute distress.     Appearance: Normal appearance. She is obese. She is not ill-appearing.   HENT:      Right Ear: Tympanic membrane and ear canal normal.      Left Ear: Tympanic membrane and ear canal normal.      Nose: Nose normal.      Mouth/Throat:      Mouth: Mucous membranes are moist.      Pharynx: Oropharynx is clear.   Eyes:      Extraocular Movements: Extraocular movements intact.      Conjunctiva/sclera: Conjunctivae normal.      Pupils: Pupils are equal, round, and reactive to light.   Cardiovascular:      Rate and Rhythm: Normal rate and regular rhythm.      Pulses: Normal pulses.      Heart sounds: Normal heart sounds.   Pulmonary:      Effort: Pulmonary effort is normal. No respiratory distress.      Breath sounds: Normal breath sounds. No stridor. No wheezing, rhonchi or rales.   Abdominal:      General: Bowel sounds are normal.      Palpations: Abdomen is soft.   Musculoskeletal:         General: No swelling. Normal range of motion.      Cervical back: Normal range of motion and neck supple.      Right lower leg: No edema.      Left lower leg: No edema.   Skin:     General: Skin is warm and dry.   Neurological:      General: No focal deficit present.      Mental Status: She is alert and oriented to person, place, and time.      Motor: No weakness.   Psychiatric:         Mood and Affect: Mood normal.         Behavior: " Behavior normal.        Result Review :   I have personally reviewed patient's labs and images.  I also reviewed my last progress note 1/31/2025.    -PFTs 1/3/2024 showed mild obstruction FEV1 71% predicted, no significant response to bronchodilator, normal TLC, mild air trapping present, DLCO normal.  Compared to her previous PFTs almost 2 years ago, these are similar results.  -Methacholine challenge 2/3/2023 positive test for asthma/reactive airway disease  -CT chest 7/2/2024 negative for pulmonary embolism, lungs clear, no lymphadenopathy, there was evidence of calcified granulomatous disease  -Echocardiogram 6/30/2022 showed EF 51 to 55%       Diagnoses and all orders for this visit:    1. Chronic obstructive pulmonary disease, unspecified COPD type (Primary)    2. Mild persistent asthma without complication    3. Chronic respiratory failure with hypoxia    4. Pulmonary emphysema, unspecified emphysema type    5. Dyspnea on exertion    6. MEGAN on CPAP    7. Seasonal allergies    8. Chronic diastolic heart failure    9. Anxiety    10. Tobacco abuse, in remission    11. Morbid obesity with BMI of 45.0-49.9, adult      Assessment & Plan    -Continue wearing 2 L supplemental oxygen at night and with activity, since June 2024  -Continue wearing CPAP nightly and with naps with oxygen bled in  -Patient previously on Dupixent injections, discontinued  -Continue using Trelegy 200 daily, reminded patient to rinse mouth after each use.  -Continue using albuterol inhaler and DuoNeb treatments as needed  -Continue using incentive spirometer for atelectasis on chest CT  -Continue following up with Dr. George with cardiology for management for heart failure on beta-blocker, Lasix 40 mg twice daily, hydralazine  -Continue following up with DIANE Sanches with psychiatry and therapist  -Regarding hypotension today, patient is asymptomatic.  She was encouraged to reach out to her PCP if this continues as her blood pressure  medication may need to be adjusted.  -Follow-up in [place] in 3 months, may return sooner if needed     Smoking status: Reviewed  Vaccination status:  Patient reports she is up-to-date with her Covid, flu, and pneumonia vaccines.  She has also gotten the RSV vaccine.  Patient is advised to continue to follow CDC recommendations such as social distancing wearing a mask and washing hands for at least 20 seconds.  Medications personally reviewed      Follow Up   No follow-ups on file.  Patient was given instructions and counseling regarding her condition or for health maintenance advice. Please see specific information pulled into the AVS if appropriate.

## 2025-04-16 ENCOUNTER — HOSPITAL ENCOUNTER (OUTPATIENT)
Dept: ULTRASOUND IMAGING | Facility: HOSPITAL | Age: 70
Discharge: HOME | End: 2025-04-16
Payer: MEDICARE

## 2025-04-16 ENCOUNTER — HOSPITAL ENCOUNTER (OUTPATIENT)
Dept: MAMMOGRAPHY | Facility: HOSPITAL | Age: 70
Discharge: HOME | End: 2025-04-16
Payer: MEDICARE

## 2025-04-16 ENCOUNTER — LAB (OUTPATIENT)
Dept: LAB | Facility: HOSPITAL | Age: 70
End: 2025-04-16
Payer: MEDICARE

## 2025-04-16 ENCOUNTER — OFFICE VISIT (OUTPATIENT)
Dept: FAMILY MEDICINE CLINIC | Age: 70
End: 2025-04-16
Payer: MEDICARE

## 2025-04-16 VITALS
HEART RATE: 71 BPM | HEIGHT: 64 IN | OXYGEN SATURATION: 94 % | SYSTOLIC BLOOD PRESSURE: 135 MMHG | TEMPERATURE: 97.4 F | DIASTOLIC BLOOD PRESSURE: 64 MMHG | WEIGHT: 274 LBS | BODY MASS INDEX: 46.78 KG/M2

## 2025-04-16 DIAGNOSIS — F32.A ANXIETY AND DEPRESSION: ICD-10-CM

## 2025-04-16 DIAGNOSIS — N18.30 STAGE 3 CHRONIC KIDNEY DISEASE, UNSPECIFIED WHETHER STAGE 3A OR 3B CKD: ICD-10-CM

## 2025-04-16 DIAGNOSIS — F41.9 ANXIETY AND DEPRESSION: ICD-10-CM

## 2025-04-16 DIAGNOSIS — M79.662 PAIN OF LEFT CALF: ICD-10-CM

## 2025-04-16 DIAGNOSIS — I10 ESSENTIAL HYPERTENSION: ICD-10-CM

## 2025-04-16 DIAGNOSIS — I50.32 CHRONIC DIASTOLIC HEART FAILURE: ICD-10-CM

## 2025-04-16 DIAGNOSIS — M54.50 CHRONIC BILATERAL LOW BACK PAIN, UNSPECIFIED WHETHER SCIATICA PRESENT: ICD-10-CM

## 2025-04-16 DIAGNOSIS — Z12.31 VISIT FOR SCREENING MAMMOGRAM: ICD-10-CM

## 2025-04-16 DIAGNOSIS — R60.0 LOCALIZED EDEMA: Primary | ICD-10-CM

## 2025-04-16 DIAGNOSIS — R41.3 MEMORY LOSS: ICD-10-CM

## 2025-04-16 DIAGNOSIS — G89.29 CHRONIC BILATERAL LOW BACK PAIN, UNSPECIFIED WHETHER SCIATICA PRESENT: ICD-10-CM

## 2025-04-16 DIAGNOSIS — J44.9 CHRONIC OBSTRUCTIVE PULMONARY DISEASE, UNSPECIFIED COPD TYPE: ICD-10-CM

## 2025-04-16 DIAGNOSIS — E11.9 TYPE 2 DIABETES MELLITUS WITHOUT COMPLICATION, WITHOUT LONG-TERM CURRENT USE OF INSULIN: ICD-10-CM

## 2025-04-16 DIAGNOSIS — R60.0 LOCALIZED EDEMA: ICD-10-CM

## 2025-04-16 LAB
ANION GAP SERPL CALCULATED.3IONS-SCNC: 11 MMOL/L (ref 5–15)
BUN SERPL-MCNC: 13 MG/DL (ref 8–23)
BUN/CREAT SERPL: 11 (ref 7–25)
CALCIUM SPEC-SCNC: 9.9 MG/DL (ref 8.6–10.5)
CHLORIDE SERPL-SCNC: 98 MMOL/L (ref 98–107)
CO2 SERPL-SCNC: 32 MMOL/L (ref 22–29)
CREAT SERPL-MCNC: 1.18 MG/DL (ref 0.57–1)
EGFRCR SERPLBLD CKD-EPI 2021: 50.1 ML/MIN/1.73
GLUCOSE SERPL-MCNC: 94 MG/DL (ref 65–99)
POTASSIUM SERPL-SCNC: 4.1 MMOL/L (ref 3.5–5.2)
SODIUM SERPL-SCNC: 141 MMOL/L (ref 136–145)

## 2025-04-16 PROCEDURE — 36415 COLL VENOUS BLD VENIPUNCTURE: CPT

## 2025-04-16 PROCEDURE — 77067 SCR MAMMO BI INCL CAD: CPT

## 2025-04-16 PROCEDURE — 80048 BASIC METABOLIC PNL TOTAL CA: CPT

## 2025-04-16 PROCEDURE — 93971 EXTREMITY STUDY: CPT

## 2025-04-16 PROCEDURE — 77063 BREAST TOMOSYNTHESIS BI: CPT

## 2025-04-16 NOTE — ASSESSMENT & PLAN NOTE
Avoid sodium in her diet.  Continue to torosemide 40 mg twice daily, follow-up with cardiology as directed

## 2025-04-16 NOTE — PATIENT INSTRUCTIONS
Kegel Exercises    Kegel exercises can help strengthen your pelvic floor muscles. The pelvic floor is a group of muscles that support your rectum, small intestine, and bladder. In females, pelvic floor muscles also help support the uterus. These muscles help you control the flow of urine and stool (feces).  Kegel exercises are painless and simple. They do not require any equipment. Your provider may suggest Kegel exercises to:  Improve bladder and bowel control.  Improve sexual response.  Improve weak pelvic floor muscles after surgery to remove the uterus (hysterectomy) or after pregnancy, in females.  Improve weak pelvic floor muscles after prostate gland removal or surgery, in males.  Kegel exercises involve squeezing your pelvic floor muscles. These are the same muscles you squeeze when you try to stop the flow of urine or keep from passing gas. The exercises can be done while sitting, standing, or lying down, but it is best to vary your position.  Ask your health care provider which exercises are safe for you. Do exercises exactly as told by your health care provider and adjust them as directed. Do not begin these exercises until told by your health care provider.  Exercises  How to do Kegel exercises:  Squeeze your pelvic floor muscles tight. You should feel a tight lift in your rectal area. If you are a female, you should also feel a tightness in your vaginal area. Keep your stomach, buttocks, and legs relaxed.  Hold the muscles tight for up to 10 seconds.  Breathe normally.  Relax your muscles for up to 10 seconds.  Repeat as told by your health care provider.  Repeat this exercise daily as told by your health care provider. Continue to do this exercise for at least 4-6 weeks, or for as long as told by your health care provider.  You may be referred to a physical therapist who can help you learn more about how to do Kegel exercises.  Depending on your condition, your health care provider may  recommend:  Varying how long you squeeze your muscles.  Doing several sets of exercises every day.  Doing exercises for several weeks.  Making Kegel exercises a part of your regular exercise routine.  This information is not intended to replace advice given to you by your health care provider. Make sure you discuss any questions you have with your health care provider.  Document Revised: 04/28/2022 Document Reviewed: 04/28/2022  [employer] Patient Education © 2023 [employer] Exercises to do While Sitting    Exercises that you do while sitting (chair exercises) can give you many of the same benefits as full exercise. Benefits include strengthening your heart, burning calories, and keeping muscles and joints healthy. Exercise can also improve your mood and help with depression and anxiety.  You may benefit from chair exercises if you are unable to do standing exercises due to:  Diabetic foot pain.  Obesity.  Illness.  Arthritis.  Recovery from surgery or injury.  Breathing problems.  Balance problems.  Another type of disability.  Before starting chair exercises, check with your health care provider or a physical therapist to find out how much exercise you can tolerate and which exercises are safe for you. If your health care provider approves:  Start out slowly and build up over time. Aim to work up to about 10-20 minutes for each exercise session.  Make exercise part of your daily routine.  Drink water when you exercise. Do not wait until you are thirsty. Drink every 10-15 minutes.  Stop exercising right away if you have pain, nausea, shortness of breath, or dizziness.  If you are exercising in a wheelchair, make sure to lock the wheels.  Ask your health care provider whether you can do roger chi or yoga. Many positions in these mind-body exercises can be modified to do while seated.  Warm-up  Before starting other exercises:  Sit up as straight as you can. Have your knees bent at 90 degrees, which is the shape of  "the capital letter \"L.\" Keep your feet flat on the floor.  Sit at the front edge of your chair, if you can.  Pull in (tighten) the muscles in your abdomen and stretch your spine and neck as straight as you can. Hold this position for a few minutes.  Breathe in and out evenly. Try to concentrate on your breathing, and relax your mind.  Stretching  Exercise A: Arm stretch  Hold your arms out straight in front of your body.  Bend your hands at the wrist with your fingers pointing up, as if signaling someone to stop. Notice the slight tension in your forearms as you hold the position.  Keeping your arms out and your hands bent, rotate your hands outward as far as you can and hold this stretch. Aim to have your thumbs pointing up and your pinkie fingers pointing down.  Slowly repeat arm stretches for one minute as tolerated.  Exercise B: Leg stretch  If you can move your legs, try to \"draw\" letters on the floor with the toes of your foot. Write your name with one foot.  Write your name with the toes of your other foot.  Slowly repeat the movements for one minute as tolerated.  Exercise C: Reach for the suzanne  Reach your hands as far over your head as you can to stretch your spine.  Move your hands and arms as if you are climbing a rope.  Slowly repeat the movements for one minute as tolerated.  Range of motion exercises  Exercise A: Shoulder roll  Let your arms hang loosely at your sides.  Lift just your shoulders up toward your ears, then let them relax back down.  When your shoulders feel loose, rotate your shoulders in backward and forward circles.  Do shoulder rolls slowly for one minute as tolerated.  Exercise B: March in place  As if you are marching, pump your arms and lift your legs up and down. Lift your knees as high as you can.  If you are unable to lift your knees, just pump your arms and move your ankles and feet up and down.  March in place for one minute as tolerated.  Exercise C: Seated jumping jacks  Let " your arms hang down straight.  Keeping your arms straight, lift them up over your head. Aim to point your fingers to the ceiling.  While you lift your arms, straighten your legs and slide your heels along the floor to your sides, as wide as you can.  As you bring your arms back down to your sides, slide your legs back together.  If you are unable to use your legs, just move your arms.  Slowly repeat seated jumping jacks for one minute as tolerated.  Strengthening exercises  Exercise A: Shoulder squeeze  Hold your arms straight out from your body to your sides, with your elbows bent and your fists pointed at the ceiling.  Keeping your arms in the bent position, move them forward so your elbows and forearms meet in front of your face.  Open your arms back out as wide as you can with your elbows still bent, until you feel your shoulder blades squeezing together. Hold for 5 seconds.  Slowly repeat the movements forward and backward for one minute as tolerated.  Contact a health care provider if:  You have to stop exercising due to any of the following:  Pain.  Nausea.  Shortness of breath.  Dizziness.  Fatigue.  You have significant pain or soreness after exercising.  Get help right away if:  You have chest pain.  You have difficulty breathing.  These symptoms may represent a serious problem that is an emergency. Do not wait to see if the symptoms will go away. Get medical help right away. Call your local emergency services (911 in the [place] Do not drive yourself to the hospital.  Summary  Exercises that you do while sitting (chair exercises) can strengthen your heart, burn calories, and keep muscles and joints healthy.  You may benefit from chair exercises if you are unable to do standing exercises due to diabetic foot pain, obesity, recovery from surgery or injury, or other conditions.  Before starting chair exercises, check with your health care provider or a physical therapist to find out how much exercise you can  tolerate and which exercises are safe for you.  This information is not intended to replace advice given to you by your health care provider. Make sure you discuss any questions you have with your health care provider.  Document Revised: 02/13/2022 Document Reviewed: 02/13/2022  [employer] Patient Education © 2023 [employer] Kegel Exercises    Kegel exercises can help strengthen your pelvic floor muscles. The pelvic floor is a group of muscles that support your rectum, small intestine, and bladder. In females, pelvic floor muscles also help support the uterus. These muscles help you control the flow of urine and stool (feces).  Kegel exercises are painless and simple. They do not require any equipment. Your provider may suggest Kegel exercises to:  Improve bladder and bowel control.  Improve sexual response.  Improve weak pelvic floor muscles after surgery to remove the uterus (hysterectomy) or after pregnancy, in females.  Improve weak pelvic floor muscles after prostate gland removal or surgery, in males.  Kegel exercises involve squeezing your pelvic floor muscles. These are the same muscles you squeeze when you try to stop the flow of urine or keep from passing gas. The exercises can be done while sitting, standing, or lying down, but it is best to vary your position.  Ask your health care provider which exercises are safe for you. Do exercises exactly as told by your health care provider and adjust them as directed. Do not begin these exercises until told by your health care provider.  Exercises  How to do Kegel exercises:  Squeeze your pelvic floor muscles tight. You should feel a tight lift in your rectal area. If you are a female, you should also feel a tightness in your vaginal area. Keep your stomach, buttocks, and legs relaxed.  Hold the muscles tight for up to 10 seconds.  Breathe normally.  Relax your muscles for up to 10 seconds.  Repeat as told by your health care provider.  Repeat this exercise  daily as told by your health care provider. Continue to do this exercise for at least 4-6 weeks, or for as long as told by your health care provider.  You may be referred to a physical therapist who can help you learn more about how to do Kegel exercises.  Depending on your condition, your health care provider may recommend:  Varying how long you squeeze your muscles.  Doing several sets of exercises every day.  Doing exercises for several weeks.  Making Kegel exercises a part of your regular exercise routine.  This information is not intended to replace advice given to you by your health care provider. Make sure you discuss any questions you have with your health care provider.  Document Revised: 04/28/2022 Document Reviewed: 04/28/2022  Elsevier Patient Education © 2023 [employer].

## 2025-04-16 NOTE — ASSESSMENT & PLAN NOTE
Blood pressure stable and well-controlled today.  No changes to current medications.  Avoid sodium in diet and continue to work hard on healthy diet and weight loss

## 2025-04-17 ENCOUNTER — OFFICE VISIT (OUTPATIENT)
Dept: PULMONOLOGY | Facility: CLINIC | Age: 70
End: 2025-04-17
Payer: MEDICARE

## 2025-04-17 VITALS
RESPIRATION RATE: 18 BRPM | HEART RATE: 58 BPM | OXYGEN SATURATION: 92 % | TEMPERATURE: 97.8 F | BODY MASS INDEX: 46.57 KG/M2 | HEIGHT: 64 IN | SYSTOLIC BLOOD PRESSURE: 88 MMHG | DIASTOLIC BLOOD PRESSURE: 61 MMHG | WEIGHT: 272.8 LBS

## 2025-04-17 DIAGNOSIS — J45.30 MILD PERSISTENT ASTHMA WITHOUT COMPLICATION: ICD-10-CM

## 2025-04-17 DIAGNOSIS — J43.9 PULMONARY EMPHYSEMA, UNSPECIFIED EMPHYSEMA TYPE: ICD-10-CM

## 2025-04-17 DIAGNOSIS — J96.11 CHRONIC RESPIRATORY FAILURE WITH HYPOXIA: ICD-10-CM

## 2025-04-17 DIAGNOSIS — R06.09 DYSPNEA ON EXERTION: ICD-10-CM

## 2025-04-17 DIAGNOSIS — F17.201 TOBACCO ABUSE, IN REMISSION: ICD-10-CM

## 2025-04-17 DIAGNOSIS — F41.9 ANXIETY: ICD-10-CM

## 2025-04-17 DIAGNOSIS — J44.9 CHRONIC OBSTRUCTIVE PULMONARY DISEASE, UNSPECIFIED COPD TYPE: Primary | ICD-10-CM

## 2025-04-17 DIAGNOSIS — E66.01 MORBID OBESITY WITH BMI OF 45.0-49.9, ADULT: ICD-10-CM

## 2025-04-17 DIAGNOSIS — G47.33 OSA ON CPAP: ICD-10-CM

## 2025-04-17 DIAGNOSIS — J30.2 SEASONAL ALLERGIES: ICD-10-CM

## 2025-04-17 DIAGNOSIS — I50.32 CHRONIC DIASTOLIC HEART FAILURE: ICD-10-CM

## 2025-04-18 ENCOUNTER — RESULTS FOLLOW-UP (OUTPATIENT)
Dept: CARDIOLOGY | Facility: CLINIC | Age: 70
End: 2025-04-18
Payer: MEDICARE

## 2025-04-18 NOTE — TELEPHONE ENCOUNTER
----- Message from Becki Bolaños sent at 4/17/2025  1:24 PM EDT -----  Please advise patient that her kidney function lab work that she just had is improving.  ----- Message -----  From: Lab, Background User  Sent: 4/16/2025  11:30 PM EDT  To: DIANE Pacheco     Progress Notes by Kami Marin MD at 09/07/18 04:26 PM     Author:  Kami Marin MD Service:  (none) Author Type:  Physician     Filed:  09/10/18 10:57 PM Encounter Date:  9/7/2018 Status:  Signed     :  Kami Marin MD (Physician)              FOLLOW-UP                  9/7/2018    Accompanied by:[JE1.1T]  Mother Atiya[JE1.1M] [phone] (home)[JE1.1T] ok to leave a message[JE1.1M]     Roomed by: Bolivar Moeller CMA 4:26 PM      SUBJECTIVE:  Seth Lai is in for a follow-up on[JE1.1T] Mono[JE1.1M]. [JE1.1T] SYMPTOMS: The patient is still having the following symptoms: GI:    No diarrhea, constipation, abdominal pain or other complaints noted and to recheck spleen  TREATMENT REGIMEN: has been completed.[RT1.1M]  Patient has been seen at[JE1.1T] peds[RT1.1M]  ADDITIONAL COMPLAINTS (not related to followup):[JE1.1T] fatigue[RT1.1M]  Tests completed include[JE1.1T] ebv positive but also cmv IGm and Gm is positive[RT1.1M]    Allergies:   Omnicef; Amoxicillin; Bactrim; and Sulfa antibiotics    Current Outpatient Prescriptions     Medication  Sig   â¢ Acyclovir (ZOVIRAX) 800 MG tablet Take 1 Tab by mouth daily. â¢ escitalopram (LEXAPRO) 20 MG tablet Take 1 Tab by mouth every evening. â¢ Cholecalciferol (VITAMIN D) 2000 UNITS tablet Take 2,000 Units by mouth daily. Per Dr. Roz Simpson desonide (DESOWEN) 0.05 % ointment Apply to aa BID   â¢ ValACYclovir HCl (VALTREX) 1 G TABS Take 2 tabs at onset of cold sore and 2 tabs 12 hours later   â¢ triamcinolone (KENALOG) 0.025 % ointment Use bid on arm 2 weeks   â¢ LamoTRIgine 300 MG TB24 Take 300 mg by mouth nightly. OBJECTIVE:  Physical Exam[JE1.1T]  Pulse 72  Temp 96.6 Â°F (35.9 Â°C) (Temporal)   Resp 16  Wt 150 lb 6.4 oz (68.2 kg)  BMI 27.73 kg/m2[JE1.2T]      GENERAL:[JE1.1T] Normal- alert and no distress noted HEAD & SCALP:    No lesions, swelling, tenderness or abnormalites. EYES:    No redness, swelling, drainage or abnormalities.   EARS:    No abnormalities of external ears, canals or tm's. NOSE:    No swelling or drainage. Kassi Flavors MOUTH:    No abnormalities of tongue or mucosal membranes. THROAT:    No redness or lesions. NECK:    No masses, swelling or tenderness. CHEST:    Lungs are clear to auscultation and no retractions. CARDIO:    No murmur or cardiac rhythm irregularities. ABDOMEN:    Soft, normal bowel sounds, no organomegally, masses or tenderness. :    No abnormalities found  SKIN:    Site of previous problem has cleared. 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 [JE1.1T] No.  Follow-up in 6 weeks to recheck spleen[RT1.1M]    Electronically Signed by:    Jake Wheeler MD , 9/10/2018[RT1.2T]        Revision History        User Key Date/Time User Provider Type Action    > RT1.2 09/10/18 10:57 PM Jake Wheeler MD Physician Sign     RT1.1 09/10/18 10:53 PM Jake Wheeler MD Physician      JE1.2 09/07/18 04:28 PM Larry Corbin, [address] Assistant Sign at close encounter     JE1.1 09/07/18 04:26 PM Larry Corbin, [address], T - Template

## 2025-04-21 ENCOUNTER — TELEPHONE (OUTPATIENT)
Dept: FAMILY MEDICINE CLINIC | Age: 70
End: 2025-04-21
Payer: MEDICARE

## 2025-04-21 DIAGNOSIS — M71.22 POPLITEAL CYST, LEFT: Primary | ICD-10-CM

## 2025-04-21 NOTE — TELEPHONE ENCOUNTER
With the pain being in the back of her legs yes a popliteal cyst can cause this pain.  I would recommend we get her back in with Dr. Hernandez.  Her x-rays in the past have shown osteoarthritis.  Let me know if she needs a referral placed.  Dr. Diego

## 2025-04-22 DIAGNOSIS — I50.32 CHRONIC DIASTOLIC HEART FAILURE: ICD-10-CM

## 2025-04-22 RX ORDER — TORSEMIDE 20 MG/1
40 TABLET ORAL 2 TIMES DAILY
Qty: 360 TABLET | Refills: 0 | OUTPATIENT
Start: 2025-04-22

## 2025-04-22 RX ORDER — MEMANTINE HYDROCHLORIDE 5 MG/1
5 TABLET ORAL 2 TIMES DAILY
Qty: 60 TABLET | Refills: 2 | Status: SHIPPED | OUTPATIENT
Start: 2025-04-22

## 2025-04-23 ENCOUNTER — OFFICE VISIT (OUTPATIENT)
Dept: SLEEP MEDICINE | Facility: HOSPITAL | Age: 70
End: 2025-04-23
Payer: MEDICARE

## 2025-04-23 VITALS
HEIGHT: 64 IN | WEIGHT: 271 LBS | HEART RATE: 71 BPM | BODY MASS INDEX: 46.26 KG/M2 | DIASTOLIC BLOOD PRESSURE: 56 MMHG | SYSTOLIC BLOOD PRESSURE: 90 MMHG | OXYGEN SATURATION: 95 %

## 2025-04-23 DIAGNOSIS — I10 ESSENTIAL HYPERTENSION: ICD-10-CM

## 2025-04-23 DIAGNOSIS — E66.01 CLASS 3 SEVERE OBESITY DUE TO EXCESS CALORIES WITHOUT SERIOUS COMORBIDITY WITH BODY MASS INDEX (BMI) OF 45.0 TO 49.9 IN ADULT: ICD-10-CM

## 2025-04-23 DIAGNOSIS — E66.813 CLASS 3 SEVERE OBESITY DUE TO EXCESS CALORIES WITHOUT SERIOUS COMORBIDITY WITH BODY MASS INDEX (BMI) OF 45.0 TO 49.9 IN ADULT: ICD-10-CM

## 2025-04-23 DIAGNOSIS — I50.32 CHRONIC DIASTOLIC HEART FAILURE: ICD-10-CM

## 2025-04-23 DIAGNOSIS — G47.34 SLEEP RELATED HYPOXIA: ICD-10-CM

## 2025-04-23 DIAGNOSIS — G47.33 OSA (OBSTRUCTIVE SLEEP APNEA): Primary | ICD-10-CM

## 2025-04-23 DIAGNOSIS — E11.9 TYPE 2 DIABETES MELLITUS WITHOUT COMPLICATION, WITHOUT LONG-TERM CURRENT USE OF INSULIN: ICD-10-CM

## 2025-04-23 PROCEDURE — G0463 HOSPITAL OUTPT CLINIC VISIT: HCPCS

## 2025-04-23 RX ORDER — LOSARTAN POTASSIUM 25 MG/1
TABLET ORAL
COMMUNITY
End: 2025-04-24

## 2025-04-23 NOTE — PROGRESS NOTES
"  [facility]  Sleep Medicine   [address] [place] [address]  [place]   [place] [address]  Phone: [phone]  Fax: [phone]      SLEEP CLINIC FOLLOW UP PROGRESS NOTE.    Chasity Torres  [id number]   1955  69 y.o.  female      PCP: Ami Diego MD      Date of visit: 4/23/2025    Chief Complaint   Patient presents with    Sleep Apnea    Obesity       HPI:  This is a 69 y.o. years old patient is here for the management of obstructive sleep apnea.  Sleep apnea is mild in severity with a AHI of 6/hr. Patient is using positive airway pressure therapy with auto CPAP and the symptoms of sleep apnea have improved significantly on the therapy. Normally patient goes to bed at 11 PM and wakes up at 730 AM .  The patient wakes up 2 time(s) during the night and has no problem going back to sleep.  Feels refreshed after waking up.     Also uses oxygen at 2 L at nighttime.  She recently had a another polysomnography to requalify for her CPAP.    Medications and allergies are reviewed by me and documented in the encounter.     SOCIAL (habits pertaining to sleep medicine)  History tobacco use:No   History of alcohol use: 0 per week  Caffeine use: 4     REVIEW OF SYSTEMS:   Pertaining positive symptoms are:  [place] Sleepiness Scale :Total score: 8   Nasal congestion      PHYSICAL EXAMINATION:  CONSTITUTIONAL:  Vitals:    04/23/25 1300   BP: 90/56   BP Location: Left arm   Patient Position: Sitting   Pulse: 71   SpO2: 95%   Weight: 123 kg (271 lb)   Height: 162.6 cm (64.02\")    Body mass index is 46.49 kg/m².   NOSE: nasal passages are clear, No deformities noted   RESP SYSTEM: Not in any respiratory distress, no chest deformities noted,   CARDIOVASULAR: No edema noted  NEURO: Oriented x 3, gait normal,  Mood and affect appeared appropriate            ASSESSMENT AND PLAN:  Obstructive sleep apnea ( G 47.33).  Mild sleep apnea with sleep-related hypoxia and hypertension diabetes mellitus.  I have ordered a " new CPAP from [facility] and she will continue to use oxygen with the CPAP.  The device is benefiting the patient and the device is medically necessary.  Without proper control of sleep apnea and good compliance there is a increased risk for hypertension, diabetes mellitus and nonrestorative sleep with hypersomnia which can increase risk for motor vehicle accidents.  Untreated sleep apnea is also a risk factor for development of atrial fibrillation, pulmonary hypertension, insulin resistance and stroke.   Chronic hypoxic respiratory failure.  Continue oxygen with the CPAP  Hypertension  Diastolic heart failure  Diabetes type II  Obesity  3 with BMI is Body mass index is 46.49 kg/m².. I have discuss the relationship between the weight and sleep apnea. The benefit of weight loss in reducing severity of sleep apnea was discussed. Discussed diet and exercise with the patient to achieve ideal BMI.  Return for 31 to 90 days after PAP setup with down load. . Patient's questions were answered.    4/23/2025  Chance De Leon MD  Sleep Medicine.  Medical Director,   [facility], [facility].

## 2025-04-24 ENCOUNTER — TELEPHONE (OUTPATIENT)
Dept: FAMILY MEDICINE CLINIC | Age: 70
End: 2025-04-24
Payer: MEDICARE

## 2025-04-24 DIAGNOSIS — I50.32 CHRONIC DIASTOLIC HEART FAILURE: ICD-10-CM

## 2025-04-24 RX ORDER — TORSEMIDE 20 MG/1
20 TABLET ORAL DAILY
Start: 2025-04-24

## 2025-04-24 NOTE — TELEPHONE ENCOUNTER
What is her current weight?  Take losartan out of her med list and stop lisinopril.  I am going to decrease her torsemide to 20 mg in a.m. only.  Dr. Diego

## 2025-04-24 NOTE — TELEPHONE ENCOUNTER
Pt said that her b/p was 97/50 again today at an appt so it is remaining low she is not on losartan she is taking lisinopril  2.5 mg by DR Brown for kidney protection and demadex 40 mg in the morning and 20 mg in the afternoon . Med list reveals demadex 40 mg bid please advise

## 2025-04-26 DIAGNOSIS — E11.9 TYPE 2 DIABETES MELLITUS WITHOUT COMPLICATION, WITHOUT LONG-TERM CURRENT USE OF INSULIN: ICD-10-CM

## 2025-04-26 DIAGNOSIS — F41.9 ANXIETY: ICD-10-CM

## 2025-04-28 RX ORDER — CLONAZEPAM 0.5 MG/1
0.5 TABLET ORAL 2 TIMES DAILY PRN
Qty: 60 TABLET | Refills: 0 | Status: SHIPPED | OUTPATIENT
Start: 2025-04-28

## 2025-04-28 RX ORDER — TIRZEPATIDE 15 MG/.5ML
15 INJECTION, SOLUTION SUBCUTANEOUS WEEKLY
Qty: 2 ML | Refills: 2 | Status: SHIPPED | OUTPATIENT
Start: 2025-04-28

## 2025-04-28 RX ORDER — FLUTICASONE PROPIONATE 50 MCG
2 SPRAY, SUSPENSION (ML) NASAL DAILY
Qty: 16 G | Refills: 0 | Status: SHIPPED | OUTPATIENT
Start: 2025-04-28

## 2025-04-28 NOTE — TELEPHONE ENCOUNTER
Controlled refill request:  Requested Prescriptions     Pending Prescriptions Disp Refills    Tirzepatide (Mounjaro) 15 MG/0.5ML solution auto-injector 2 mL 2     Sig: Inject 15 mg under the skin into the appropriate area as directed 1 (One) Time Per Week.    clonazePAM (KlonoPIN) 0.5 MG tablet 60 tablet 0     Sig: Take 1 tablet by mouth 2 (Two) Times a Day As Needed for Anxiety.    fluticasone (FLONASE) 50 MCG/ACT nasal spray 16 g 0     Sig: Administer 2 sprays into the nostril(s) as directed by provider Daily.      Last OV:  4/16/2025  Next OV:  6/12/2025  Last fill:  3-  Last tox:  11-   Dosing change to 5mg

## 2025-04-30 ENCOUNTER — OFFICE VISIT (OUTPATIENT)
Dept: NEUROLOGY | Facility: CLINIC | Age: 70
End: 2025-04-30
Payer: MEDICARE

## 2025-04-30 VITALS
HEART RATE: 153 BPM | DIASTOLIC BLOOD PRESSURE: 46 MMHG | HEIGHT: 64 IN | BODY MASS INDEX: 46.76 KG/M2 | WEIGHT: 273.9 LBS | SYSTOLIC BLOOD PRESSURE: 78 MMHG

## 2025-04-30 DIAGNOSIS — G25.0 ESSENTIAL TREMOR: Primary | ICD-10-CM

## 2025-05-02 ENCOUNTER — OFFICE VISIT (OUTPATIENT)
Dept: ORTHOPEDIC SURGERY | Facility: CLINIC | Age: 70
End: 2025-05-02
Payer: MEDICARE

## 2025-05-02 VITALS
SYSTOLIC BLOOD PRESSURE: 108 MMHG | HEART RATE: 65 BPM | OXYGEN SATURATION: 95 % | WEIGHT: 273 LBS | HEIGHT: 64 IN | DIASTOLIC BLOOD PRESSURE: 69 MMHG | BODY MASS INDEX: 46.61 KG/M2

## 2025-05-02 DIAGNOSIS — M17.12 OSTEOARTHRITIS OF LEFT KNEE, UNSPECIFIED OSTEOARTHRITIS TYPE: ICD-10-CM

## 2025-05-02 DIAGNOSIS — M25.562 LEFT KNEE PAIN, UNSPECIFIED CHRONICITY: Primary | ICD-10-CM

## 2025-05-02 NOTE — PROGRESS NOTES
"Chief Complaint  Initial Evaluation of the Left Leg     Subjective      Chasity Torres presents to [facility] for initial evaluation of the left leg. She had an ultrasound done and found a bakers cyst of the left knee.  She has had pain in the left leg for awhile.  She ambulates with a walker.  She had an injection in her knee a couple of months ago.      No Known Allergies     Social History     Socioeconomic History    Marital status:     Number of children: 2   Tobacco Use    Smoking status: Former     Current packs/day: 0.00     Average packs/day: 0.5 packs/day for 12.0 years (6.0 ttl pk-yrs)     Types: Cigarettes     Start date: 2009     Quit date: 2021     Years since quittin.3     Passive exposure: Past    Smokeless tobacco: Never   Vaping Use    Vaping status: Never Used   Substance and Sexual Activity    Alcohol use: Not Currently    Drug use: Never    Sexual activity: Not Currently     Partners: Male     Birth control/protection: Hysterectomy, Surgical        I reviewed the patient's chief complaint, history of present illness, review of systems, past medical history, surgical history, family history, social history, medications, and allergy list.     Review of Systems     Constitutional: Denies fevers, chills, weight loss  Cardiovascular: Denies chest pain, shortness of breath  Skin: Denies rashes, acute skin changes  Neurologic: Denies headache, loss of consciousness        Vital Signs:   /69   Pulse 65   Ht 162.6 cm (64\")   Wt 124 kg (273 lb)   SpO2 95%   BMI 46.86 kg/m²          Physical Exam  General: Alert. No acute distress    Ortho Exam        LEFT LEG Flexion 105. Extension - 3. Stable to varus/valgus stress. Stable to anterior/posterior drawer. Neurovascularly intact. Negative Jai. Negative Lachman. Positive EHL, FHL, HS and TA. Sensation intact to light touch all 5 nerves of the foot. Ambulates with Antalgic gait. Patella is well " tracking. Calf supple, non-tender. Positive tenderness to the medial joint line. Positive tenderness to the lateral joint line. Positive Crepitus. Mild swelling.  Tender posterior of the patella.  Knee Extensor Mechanism intact         Procedures      Imaging Results (Most Recent)       None             Result Review          US Venous Doppler Lower Extremity Left (duplex)  Result Date: 4/16/2025  Narrative: US VENOUS DOPPLER LOWER EXTREMITY LEFT (DUPLEX) Date of Exam: 4/16/2025 2:57 PM EDT Indication: LE edema and pain. Comparison: None available. Technique:  Routine gray scale, color flow and spectral Doppler analysis of the left lower extremity. A complete venous study was performed with image documentation obtained per protocol. Findings: Lower extremity deep veins including the common femoral vein, femoral vein, proximal deep femoral vein, popliteal vein, and visualized calf veins demonstrate normal flow, compressibility, and augmentation.   No evidence of luminal echogenic thrombus. There is a 3.0 x 2.9 x 1.4 cm popliteal cyst.     Impression: Impression: No evidence of left lower extremity deep venous thrombosis. Electronically Signed: Soledad Bernard MD  4/16/2025 3:30 PM EDT  Workstation ID: [id number]             Assessment and Plan     Diagnoses and all orders for this visit:    1. Left knee pain, unspecified chronicity (Primary)    2. Osteoarthritis of left knee, unspecified osteoarthritis type        Discussed the treatment plan with the patient.     Discussed conservative measures of the left lower extremity as injections and anti inflammatories.      Prescribed topical cream.       Call or return if worsening symptoms.    Follow Up     PRN      Patient was given instructions and counseling regarding her condition or for health maintenance advice. Please see specific information pulled into the AVS if appropriate.     Scribed for Genet Jeong MD by Becki Stern MA.  05/02/25   09:34 EDT    I have  personally performed the services described in this document as scribed by the above individual and it is both accurate and complete. Genet Jeong MD 05/02/25

## 2025-05-05 NOTE — PROGRESS NOTES
"Chief Complaint  Neurologic Problem    Subjective          Chasity Torres presents to [facility]  History of Present Illness    History of Present Illness  The patient is a 69-year-old female who presents to the office for follow-up of cervical dystonia.    Botox injections were previously administered at  for cervical dystonia, with the most recent injection at our office on 03/21/2025, where a total of 300 units was given. A slight improvement in her condition is reported, with a minor decrease in head tilt and increased neck comfort. However, tremors continue to persist. She is currently using primidone for tremor management.    INTERVAL: Since last visit, she reports a slight improvement in her condition, with a minor decrease in head tilt and increased neck comfort. However, tremors continue to persist.    MEDICATIONS  CURRENT MEDS:  Primidone       Objective   Vital Signs:   BP (!) 78/46   Pulse (!) 153   Ht 162.6 cm (64\")   Wt 124 kg (273 lb 14.4 oz)   BMI 47.01 kg/m²     Physical Exam  HENT:      Head: Normocephalic.   Neck:      Comments: Laterocollis, right shoulder elevation. Mild bilateral intention tremor of hands   Pulmonary:      Effort: Pulmonary effort is normal.   Musculoskeletal:      Cervical back: Torticollis present.   Neurological:      Mental Status: She is alert and oriented to person, place, and time.      Sensory: Sensation is intact.      Motor: Motor function is intact.      Coordination: Coordination is intact.      Deep Tendon Reflexes: Reflexes are normal and symmetric.        Neurological Exam  Mental Status  Alert. Oriented to person, place, and time.    Sensory  Normal sensation.    Reflexes  Deep tendon reflexes are 2+ and symmetric in all four extremities.    Coordination    Finger-to-nose, rapid alternating movements and heel-to-shin normal bilaterally without dysmetria.      Result Review :               Assessment and Plan  "   Diagnoses and all orders for this visit:    1. Essential tremor (Primary)        Assessment & Plan  1. Cervical dystonia.  Her posture appears more aligned compared to the initial consultation, indicating a positive response to the treatment. However, it is anticipated that several rounds of Botox injections will be necessary to achieve optimal results. The next session of Botox injections is scheduled for 06/13/2025. She is advised to persist with the primidone regimen for tremor management.         Follow Up   Return in 6 weeks (on 6/13/2025) for Botox.  Patient was given instructions and counseling regarding her condition or for health maintenance advice. Please see specific information pulled into the AVS if appropriate.       Patient or patient representative verbalized consent for the use of Ambient Listening during the visit with  DIANE Schafer for chart documentation. 5/5/2025  10:42 EDT

## 2025-05-06 NOTE — PROGRESS NOTES
Progress Note    Date: 05/08/2025  Time In: 11:01  Time Out: 12:00    Patient Legal Name: Chasity Torres  Patient Age: 69 y.o.    Mode of visit: In person  Location of provider: [facility] [address], [place] [address]  Location of patient: Office      CHIEF COMPLAINT: anxiety, treatment resistant depression, conflict in the home    Subjective   History of Present Illness   Chasity is a 69 y.o. female who presents today as a follow-up for continued psychotherapy. Patient reported things are working out well with her oldest granddaughter moving in.  Patient stated her daughter has been suárez and getting on her nerves.  Patient shared she is going to share with Trish that she is still feeling depressed. Patient advised she wants to improve communication with her daughter. Patient in reviewing personal bill of rights, she reported she feels she does not exercise many of them regularly.  Patient shared she often does not express her needs or get treated with respect. Patient described feeling that her children do no display respect to her. Patient explained feeling frustrated at home. Patient is voluntarily requesting to participate in outpatient therapy at [facility].  Patient rated anxiety at 8 and depression at 8 today on a 0-10 scale where 0= no symptoms. Therapist and patient collaborated to develop an initial care plan today.     History obtained from referring provider's note on 3/7/25:  Past Psychiatric History:  Began Treatment: while in her 20's  Diagnoses:Depression and Anxiety  Psychiatrist: -talked on phone-managed meds for 8 months prior to SOC with  in 10/2021; Also at [facility] for 2 yrs [id number]  Therapist: Sendy with [facility] (no in office visits) (telephone) next appt Oct. 2022 (for the last 8 months) prior seen Jessica at [facility] in [place] for 1-2 yrs  Admission History:Denies  Medication Trials: Several for which pt unable to recall if effective:  Lexapro(ineff.),Abilify(eff),Prozac,Zoloft,Trintellix,Paxil,Celexa, (can't remember/uncertain); SGA-worsened tremors-per neuro medication induced parkisonism-Latuda,Rexulti,Seroquel (wt gain,helped with insomnia&anxiety); Lamictal-ineff for anxiety; Xanax-weaned, Hydroxyzine(ineff 1 mo. 2022-2022)Trazodone 2021-2022 somewhat helpful for anxiety though made pt tired, Ambien [id number]?; retried Effexor XR, Wellbutrin,Cymbalta  Risperdal 1 mo approximately stopped due to starting Prozac; Hydroxyzine 100 mg 3 times daily ineffective for itching and anxiety; Zyprexa-ineffective; Prozac up to 80 mg-ineffective; Klonopin-weaned, effective; Vraylar up to 3 mg-parkinsonism tremors worsened per Neurologist recommendations; Buspar up to 20 mg 3 times daily-no longer effective  Self Harm: Denies  Suicide Attempts:Denies   Psychosis, Anxiety, Depression: Denies    Assessment    Mental Status Exam     Appearance: good hygiene and dressed appropriately for the weather  Behavior: calm  Cooperation:  engaged, cooperative, attentive, and friendly  Eye Contact:  good  Affect:  congruent  Mood: depressed and anxious  Speech: talkative  Thought Process:  organized  Thought Content: appropriate  Suicidal: denies  Homicidal:  denies  Hallucinations:  denies  Memory:  intact  Orientation:  person, place, time, and situation  Reliability:  reliable  Insight:  good  Judgment:  good    Clinical Intervention       ICD-10-CM ICD-9-CM   1. Major depressive disorder, recurrent episode, moderate  F33.1 296.32   2. Generalized anxiety disorder  F41.1 300.02   3. Family conflict  Z63.8 V61.9        Individual psychotherapy was provided utilizing CBT and person-centered techniques to provide symptom relief, build rapport, encourage expression of thoughts and feelings, support self-esteem, establish new coping skills, identify goals for treatment, acknowledge sources of feelings and behaviors, build confidence, assess symptoms,  challenge negative thinking patterns, recognize cognitive distortions, discuss interpersonal conflicts, and provide psychoeducation.  Therapist utilized open-ended questions to encourage the development of a positive therapeutic relationship and open communication.  Therapist normalized/validated patient’s thoughts and feelings as appropriate. Therapist and patient collaborated to develop an initial care plan today. Reviewed personal bill of rights handout and discussed her thoughts/feeling about the rights. Patient rated anxiety at 8 and depression at 8 today on a 0-10 scale where 0= no symptoms. Reviewed cognitive distortions and circles of control and reissued accompanying handouts. Began discussing setting boundaries and assisted patient in recognizing feelings associated with not having boundaries.    Plan   Plan & Goals     Moving forward, we will continue to build rapport and reinforce and build upon effective coping strategies utilizing CBT and person-centered techniques.    Patient acknowledged and verbally consented to continue working toward resolving current treatment plan goals and was educated on the importance of participation in the therapeutic process.  Patient will remain compliant with medication regimen as prescribed. Discuss any medication side effects, questions or concerns with prescribing provider.  Call 911 or present to the nearest emergency room in an emergency situation.  National Suicide Prevention Lifeline: Call 988. The Lifeline provides 24/7, free and confidential support for people in distress, prevention and crisis resources.  Crisis Text Line  Text HOME To 510144    Return in about 2 weeks (around 5/22/2025).    ____________________  This document has been electronically signed by Lila Lockhart LCSW  May 8, 2025 12:14 EDT    Part of this note may be an electronic transcription/translation of spoken language to printed text using the Dragon Dictation System.

## 2025-05-07 ENCOUNTER — OFFICE VISIT (OUTPATIENT)
Age: 70
End: 2025-05-07
Payer: MEDICARE

## 2025-05-07 VITALS
DIASTOLIC BLOOD PRESSURE: 85 MMHG | HEART RATE: 61 BPM | SYSTOLIC BLOOD PRESSURE: 135 MMHG | BODY MASS INDEX: 46.95 KG/M2 | HEIGHT: 64 IN | WEIGHT: 275 LBS

## 2025-05-07 DIAGNOSIS — I10 ESSENTIAL HYPERTENSION: Primary | ICD-10-CM

## 2025-05-07 DIAGNOSIS — I25.10 CORONARY ARTERY CALCIFICATION: ICD-10-CM

## 2025-05-07 DIAGNOSIS — I50.32 CHRONIC DIASTOLIC HEART FAILURE: ICD-10-CM

## 2025-05-07 RX ORDER — TORSEMIDE 20 MG/1
20 TABLET ORAL 2 TIMES DAILY
Qty: 90 TABLET | Refills: 1
Start: 2025-05-07 | End: 2025-05-09

## 2025-05-07 NOTE — ASSESSMENT & PLAN NOTE
Blood pressure controlled.  Recent labs showed improvement in renal functions.  Continue current regimen.

## 2025-05-07 NOTE — PROGRESS NOTES
CARDIOLOGY FOLLOW-UP PROGRESS NOTE        Chief Complaint  Follow-up, Shortness of Breath, and Edema    Subjective            Chasity Torrse presents to [facility] CARDIOLOGY  History of Present Illness      Ms. Torres is here for a follow-up visit.  Recently, she was started on lisinopril from nephrology office, however it resulted in low blood pressure.  She is currently not taking it.  She is also on a lower dose of diuretics due to acute kidney injury.  Currently taking torsemide 20 mg twice daily.  She is requiring increasing swelling and some weight gain over the past 1 week.  Palpitations are better.  She continues to have tremor.  Denies any chest pain.    Past History:    1) Hypertension, difficult to control. Workup for secondary hypertension, including a renal angiogram, is negative; 2) Anxiety and depression; 3) Restless legs syndrome; 4) Chronic diastolic heart failure.  5) Essential tremor, on Botox injections    Medical History:  Past Medical History:   Diagnosis Date    Adverse effect of other viral vaccines, initial encounter     Covid vaccine    Allergic fungal sinusitis 07/29/2022    Allergic rhinitis     Anxiety disorder     Asthma     CHF (congestive heart failure)     Congenital heart disease 05 2021    COPD with acute exacerbation     CTS (carpal tunnel syndrome)     left    Difficulty walking 2022    Essential (primary) hypertension     Essential tremor     Hypercholesterolemia 10/18/2021    Insomnia     Irritable bowel syndrome     Low back pain     Major depressive disorder     Morbid (severe) obesity due to excess calories     Nasal congestion     Neuropathy in diabetes 2023    Obstructive sleep apnea (adult) (pediatric)     Other hereditary and idiopathic neuropathies     Pain in left hip     Pain in right toe(s)     Pain in thoracic spine     Peripheral neuropathy     Hands    Primary generalized (osteo)arthritis     Primary insomnia     Pulmonary emphysema 01/05/2023     Restless leg syndrome     SOBOE (shortness of breath on exertion)     Substance abuse     Type 2 diabetes mellitus without complication, without long-term current use of insulin 05/02/2022    Vitamin D deficiency        Family History: family history includes Anxiety disorder in her daughter; COPD in her father; Depression in her daughter; Heart failure in her father; Hypertension in her mother; Neuropathy in her sister; Restless legs syndrome in her sister; Sleep apnea in her brother and sister.     Social History: reports that she quit smoking about 4 years ago. Her smoking use included cigarettes. She started smoking about 16 years ago. She has a 6 pack-year smoking history. She has been exposed to tobacco smoke. She has never used smokeless tobacco. She reports that she does not currently use alcohol. She reports that she does not use drugs.    Allergies: Patient has no known allergies.    Current Outpatient Medications on File Prior to Visit   Medication Sig    albuterol sulfate  (90 Base) MCG/ACT inhaler Inhale 2 puffs Every 4 (Four) Hours As Needed for Wheezing or Shortness of Air.    atorvastatin (LIPITOR) 10 MG tablet Take 1 tablet by mouth Daily.    Blood Glucose Monitoring Suppl (Blood Glucose Monitor System) w/Device kit Use Daily to test blood glucose.    Calcium Carb-Cholecalciferol (Calcium 500 + D) 500-3.125 MG-MCG tablet Take 1 each by mouth 2 (Two) Times a Day.    clonazePAM (KlonoPIN) 0.5 MG tablet Take 1 tablet by mouth 2 (Two) Times a Day As Needed for Anxiety.    Diclofenac Sodium (VOLTAREN) 1 % gel gel Apply 4 g topically to the appropriate area as directed 4 (Four) Times a Day.    donepezil (ARICEPT) 10 MG tablet Take 1 tablet by mouth Every Night.    ferrous sulfate 325 (65 FE) MG tablet Take 1 tablet by mouth Daily With Breakfast.    FLUoxetine (PROzac) 10 MG capsule Take 3 capsules by mouth Every Morning for 90 days. Indications: Generalized Anxiety Disorder, Major Depressive  Disorder    fluticasone (FLONASE) 50 MCG/ACT nasal spray Administer 2 sprays into the nostril(s) as directed by provider Daily.    Fluticasone-Umeclidin-Vilant (TRELEGY ELLIPTA) 200-62.5-25 MCG/ACT inhaler Inhale 1 puff Daily.    glucose blood test strip Use as instructed to check blood sugar daily    HYDROcodone-acetaminophen (NORCO)  MG per tablet     ipratropium-albuterol (DUO-NEB) 0.5-2.5 mg/3 ml nebulizer Nebulize and inhale 1 vial 4 (Four) Times a Day As Needed for Wheezing.    Lancet Device misc Use 1 each Daily. Use as directed; check blood sugar once daily    Lancets (OneTouch Delica Plus [id number]) misc Use 1 each Daily.    Magnesium Oxide -Mg Supplement 400 (240 Mg) MG tablet Take 1 tablet by mouth Every Night.    memantine (Namenda) 5 MG tablet Take 1 tablet by mouth 2 (Two) Times a Day.    metoprolol succinate XL (TOPROL-XL) 25 MG 24 hr tablet Take 0.5 tablets by mouth Daily.    miconazole (Desenex) 2 % powder Apply topically to the appropriate area as directed 2 (Two) Times a Day.    mirtazapine (REMERON) 7.5 MG tablet Take 1 tablet by mouth Every Night.    mupirocin (BACTROBAN) 2 % ointment APPLY TO AFFECTED AREA(S) TWO TIMES A DAY AS DIRECTED FOR 14 DAYS -    naloxone (NARCAN) 4 MG/0.1ML nasal spray 1 spray As Needed. Has on hand    O2 (OXYGEN) Inhale 2 L/min Every Night.    pantoprazole (PROTONIX) 40 MG EC tablet TAKE 1 TABLET BY MOUTH DAILY    pramipexole (MIRAPEX) 0.5 MG tablet Take 1 tablet by mouth 3 (Three) Times a Day.    pregabalin (LYRICA) 75 MG capsule Take 1 capsule by mouth 2 (Two) Times a Day.    primidone (MYSOLINE) 50 MG tablet Take 1 tablet by mouth.    spironolactone (ALDACTONE) 25 MG tablet Take 1 tablet by mouth 2 (Two) Times a Day.    tacrolimus (PROTOPIC) 0.1 % ointment Apply topical ointment to affected area twice daily    Tirzepatide (Mounjaro) 15 MG/0.5ML solution auto-injector Inject 15 mg under the skin into the appropriate area as directed 1 (One) Time Per Week.     "triamcinolone (KENALOG) 0.025 % cream Apply 1 Application topically to the appropriate area as directed As Needed.    [DISCONTINUED] torsemide (DEMADEX) 20 MG tablet Take 1 tablet by mouth Daily.     Current Facility-Administered Medications on File Prior to Visit   Medication    cyanocobalamin injection 1,000 mcg          Review of Systems   Respiratory:  Positive for shortness of breath. Negative for cough and wheezing.    Cardiovascular:  Positive for palpitations and leg swelling. Negative for chest pain.   Gastrointestinal:  Negative for nausea and vomiting.   Neurological:  Negative for dizziness and syncope.        Objective     /85   Pulse 61   Ht 162.6 cm (64\")   Wt 125 kg (275 lb)   BMI 47.20 kg/m²       Physical Exam    General : Alert, awake, no acute distress  Neck : Supple, no carotid bruit, no jugular venous distention  CVS : Regular rate and rhythm, no murmur, rubs or gallops  Lungs: Clear to auscultation bilaterally, no crackles or rhonchi  Abdomen: Soft, nontender, bowel sounds heard in all 4 quadrants  Extremities: Warm, well-perfused, 1+ edema    Result Review :     The following data was reviewed by: Jorge George MD on 05/07/2025:    CMP          3/13/2025    10:28 3/26/2025    12:05 4/16/2025    11:30   CMP   Glucose 87  94  94    BUN 15  14  13    Creatinine 1.34  1.24  1.18    EGFR 43.0  47.2  50.1    Sodium 140  142  141    Potassium 4.0  3.9  4.1    Chloride 99  100  98    Calcium 9.3  9.9  9.9    Albumin 3.8  4.0     BUN/Creatinine Ratio 11.2  11.3  11.0    Anion Gap 8.4  10.0  11.0      CBC          12/23/2024    15:54 3/13/2025    10:28 3/26/2025    12:05   CBC   WBC 6.53  5.28  5.86    RBC 4.72  4.57  4.73    Hemoglobin 14.6  14.0  14.5    Hematocrit 44.6  43.9  45.6    MCV 94.5  96.1  96.4    MCH 30.9  30.6  30.7    MCHC 32.7  31.9  31.8    RDW 12.6  13.0  12.9    Platelets 299  286  309        Lipid Panel          7/3/2024    12:18 2/7/2025    10:28   Lipid Panel "   Total Cholesterol 167  226    Triglycerides 105  180    HDL Cholesterol 59  48    VLDL Cholesterol 19  32    LDL Cholesterol  89  146    LDL/HDL Ratio 1.47  2.96                  Assessment and Plan        Diagnoses and all orders for this visit:    1. Essential hypertension (Primary)  Assessment & Plan:  Blood pressure controlled.  Recent labs showed improvement in renal functions.  Continue current regimen.      2. Chronic diastolic heart failure  Assessment & Plan:  Per patient, swelling is worse with the lower dose of torsemide.  Will increase torsemide dose to 40 mg in the morning and 20 in the afternoon for next 5 days, after which she will go back to 20 twice a day.  Continue spironolactone.    Orders:  -     torsemide (DEMADEX) 20 MG tablet; Take 1 tablet by mouth 2 (Two) Times a Day.  Dispense: 90 tablet; Refill: 1    3. Coronary artery calcification  Assessment & Plan:  No chest pain.  Recently started on statins, continue same.  Will repeat lipid panel before next visit.                Follow Up     Return in about 4 months (around 9/7/2025) for Next scheduled follow up, with DIANE Pacheco.    Patient was given instructions and counseling regarding her condition or for health maintenance advice. Please see specific information pulled into the AVS if appropriate.

## 2025-05-07 NOTE — ASSESSMENT & PLAN NOTE
Per patient, swelling is worse with the lower dose of torsemide.  Will increase torsemide dose to 40 mg in the morning and 20 in the afternoon for next 5 days, after which she will go back to 20 twice a day.  Continue spironolactone.

## 2025-05-07 NOTE — ASSESSMENT & PLAN NOTE
No chest pain.  Recently started on statins, continue same.  Will repeat lipid panel before next visit.

## 2025-05-08 ENCOUNTER — OFFICE VISIT (OUTPATIENT)
Age: 70
End: 2025-05-08
Payer: MEDICARE

## 2025-05-08 ENCOUNTER — TELEPHONE (OUTPATIENT)
Dept: CARDIOLOGY | Facility: CLINIC | Age: 70
End: 2025-05-08
Payer: MEDICARE

## 2025-05-08 DIAGNOSIS — F41.1 GENERALIZED ANXIETY DISORDER: ICD-10-CM

## 2025-05-08 DIAGNOSIS — F33.1 MAJOR DEPRESSIVE DISORDER, RECURRENT EPISODE, MODERATE: Primary | ICD-10-CM

## 2025-05-08 DIAGNOSIS — Z63.8 FAMILY CONFLICT: ICD-10-CM

## 2025-05-08 SDOH — SOCIAL STABILITY - SOCIAL INSECURITY: OTHER SPECIFIED PROBLEMS RELATED TO PRIMARY SUPPORT GROUP: Z63.8

## 2025-05-08 NOTE — TREATMENT PLAN
Multi-Disciplinary Problems (from Behavioral Health Treatment Plan)      Active Problems       Problem: Anxiety  Start Date: 05/08/25      Problem Details: The patient self-scales this problem as a 8 with 10 being the worst.          Goal Priority Start Date Expected End Date End Date    Patient will develop and implement behavioral and cognitive strategies to reduce anxiety and irrational fears. -- 05/08/25 11/06/25 --    Goal Details: Progress toward goal:  Not appropriate to rate progress toward goal since this is the initial treatment plan.        Goal Intervention Frequency Start Date End Date    Help patient explore past emotional issues in relation to present anxiety. Each Visit 05/08/25 --    Intervention Details: Duration of treatment until remission of symptoms.  Patient will learn and implement at least 2 effective communication skills to reduce conflict/anxiety in interacting with her daughter.        Goal Intervention Frequency Start Date End Date    Help patient develop an awareness of their cognitive and physical responses to anxiety. Each Visit 05/08/25 --    Intervention Details: Duration of treatment until remission of symptoms.  Patient will learn and utilize at least 2 anxiety management strategies over the next 3 months.                         Reviewed By       Lila Lockhart LCSW 05/08/25 1122                     I have discussed and reviewed this treatment plan with the patient.

## 2025-05-08 NOTE — TELEPHONE ENCOUNTER
Caller: Chasity Torres    Relationship: Self    Best call back number: [phone]     Which medication are you concerned about: TORSEMIDE 20 MG     Who prescribed you this medication: DR. LEE     When did you start taking this medication: AWHILE     What are your concerns: DR. LEE PRESCRIBED TORSEMIDE BUT THE REQUEST WAS NOT SENT TO A PHARMACY. PATIENT IS ASKING DR. LEE TO SEND IT TO [facility] [place]. PATIENT IS OUT OF MEDICATION. PATIENT STATES SHE IS EXPERIENCING SWELLING AND NEEDS THIS MEDICATION SENT OVER.     How long have you had these concerns: RECENTLY

## 2025-05-09 ENCOUNTER — TELEPHONE (OUTPATIENT)
Dept: BEHAVIORAL HEALTH | Facility: CLINIC | Age: 70
End: 2025-05-09

## 2025-05-09 ENCOUNTER — TELEMEDICINE (OUTPATIENT)
Dept: BEHAVIORAL HEALTH | Facility: CLINIC | Age: 70
End: 2025-05-09
Payer: MEDICARE

## 2025-05-09 DIAGNOSIS — F43.29 STRESS AND ADJUSTMENT REACTION: ICD-10-CM

## 2025-05-09 DIAGNOSIS — Z79.899 MEDICATION DOSE INCREASED: ICD-10-CM

## 2025-05-09 DIAGNOSIS — Z79.899 MEDICATION MANAGEMENT: ICD-10-CM

## 2025-05-09 DIAGNOSIS — F41.1 GENERALIZED ANXIETY DISORDER: ICD-10-CM

## 2025-05-09 DIAGNOSIS — Z71.89 MEDICATION CARE PLAN DISCUSSED WITH PATIENT: ICD-10-CM

## 2025-05-09 DIAGNOSIS — Z63.79 OTHER STRESSFUL LIFE EVENTS AFFECTING FAMILY AND HOUSEHOLD: ICD-10-CM

## 2025-05-09 DIAGNOSIS — F33.1 MAJOR DEPRESSIVE DISORDER, RECURRENT EPISODE, MODERATE: Primary | ICD-10-CM

## 2025-05-09 RX ORDER — FLUOXETINE HYDROCHLORIDE 40 MG/1
40 CAPSULE ORAL EVERY MORNING
Qty: 90 CAPSULE | Refills: 0 | Status: SHIPPED | OUTPATIENT
Start: 2025-05-09 | End: 2025-08-07

## 2025-05-09 RX ORDER — TORSEMIDE 20 MG/1
20 TABLET ORAL DAILY
Qty: 90 TABLET | Refills: 1 | Status: SHIPPED | OUTPATIENT
Start: 2025-05-09

## 2025-05-09 RX ORDER — MIRTAZAPINE 7.5 MG/1
7.5 TABLET, FILM COATED ORAL NIGHTLY
Qty: 1 TABLET | Refills: 0 | Status: SHIPPED | OUTPATIENT
Start: 2025-05-09

## 2025-05-09 RX ORDER — MIRTAZAPINE 7.5 MG/1
7.5 TABLET, FILM COATED ORAL NIGHTLY
Qty: 30 TABLET | Refills: 2 | Status: SHIPPED | OUTPATIENT
Start: 2025-05-09 | End: 2025-05-09

## 2025-05-09 NOTE — PATIENT INSTRUCTIONS
"Should you want to get in touch with your provider, DIANE Sanches, please contact MY Medical Assistant, Marian, directly at [phone].  Recommend saving Marian's direct number in phone as this is the PREFERRED & EASIEST way to get in contact with your provider.  Please leave a voice mail if you do not get an answer and she will return your call within 24 hrs. You will NOT be able to contact provider [facility], as Behavioral Health Providers are restricted. YOU MUST CALL [phone]  If you need to speak with the on call provider after hours or on weekends, please Contact the [facility] ([phone]) and staff will be able to page the provider on call directly.     SPECIFIC RECOMMENDATIONS:     1.      Medications discussed at this encounter:                   - INCREASE Prozac FROM 30 mg TO 40 mg, ordered the 40 mg capsule today, may start taking 4 of the 10 mg on hand until new prescription arrives    2.      Psychotherapy recommendations: Continue with current therapist.      3.     Return to clinic: 6 weeks-will have Marian call you after lunch today to schedule so you can check with Becki about her schedule.    Please arrive at least 15 minutes before your scheduled appointment time to complete check in process.      IF you are scheduled for a x.ai VIDEO visit, YOU MUST COMPLETE THE \"E-CHECK IN\" PROCESS PRIOR TO BEGINNING THE VISIT, You may still complete the E-Check in for in office visits prior to appointment, you will receive multiple text/email reminders which will direct you further if needed.            "

## 2025-05-09 NOTE — PROGRESS NOTES
"  Mode of Visit: Video  Location of patient: -HOME-  Location of provider: +[facility]+  You have chosen to receive care through a telehealth visit.  The patient has signed the video visit consent form.  The visit included audio and video interaction. No technical issues occurred during this visit.    Ciro Torres is a 69 y.o. female who presents today for follow up     Referring Provider:  No referring provider defined for this encounter.    Chief Complaint: depression, anxiety, stress and adjustment reaction, other stressful life events affecting family & household, medication management, medication care plan discussed, medication dose increased    Answers submitted by the patient for this visit:  Depression (Submitted on 5/9/2025)  Chief Complaint: Depression  Visit: follow-up  Frequency: rarely  Severity: moderate  excessive worry: Yes  insomnia: Yes  irritability: Yes  malaise/fatigue: Yes  obsessions: No  hypersomnia: No  difficulty controlling mood: Yes  difficulty staying asleep: Yes  difficulty falling asleep: No  Hours of sleep per night: 6 Hours  Medication compliance: %  Side effects: fatigue  Aggravated by: family issues    History of Present Illness:   5/9/25: Patient presents today via [employer] Video visit from parked vehicle in [facility] parking lot as daughter is having a MRI, located in [place], [place].  At last visit patient was referred to therapy within [facility], \"its ok, I am still depressed and anxious all the time.\"  Patient 17 yr old grand daughter, Laina, moved in recently which is not a new stressor.  Worries about son always wanting to know what Laina is doing. Son is still with his girlfriend and is trying to move out and move to patient property, \"he's trying to get a camper from a friend.\" Which has been stressful, \"of what he is going to do and how he is going to do it, its a mess.\"Patient does not necessarily want son to move to property. Son " "would have to run water off of patient water, and electricity with extension cords. Son is working and making good money, though uncertain if he will be able to afford as he hasn't started paying back patient for the money she has lent him, \"very stressful the last couple months, more so than usual.\" Reports daughter is not helping her like she should, though Laina helps, patient referring to house work. Laina is now sleeping on a mattress in a corner in the living room and plan to set up a divider to give her privacy, and has to get rid of the bookshelf to give her more room. Has 3 bedrooms-one for daughter, one for patient, and one for 2 younger grandchildren whom have bunk beds. Patient is struggling with allowing son to reside on property, and his 7 and 6 yr old children will come every other weekend which is an added stress.     Patient continues to take Prozac 30 mg daily and Remeron 7.5 mg nightly. Reports current dose of Klonopin 0.5 mg is not very effective at twice daily as needed and is asking to increase to 3 times per day, though admits PCP had informed her previously of short term use.     Depression: Patient complains of depression. She complains of anhedonia, depressed mood, difficulty concentrating, fatigue, feelings of worthlessness/guilt, hopelessness, and insomnia  Anxiety:  The patient endorses to the following symptoms of anxiety including: excessive anxiety and worry about a number of events or activities for more days than not, being easily fatigued, difficulty concentrating or mind going blank, irritability, and sleep disturbance which have caused impairment in important areas of daily functioning.  The patient has had symptoms of anxiety for several years, which have worsened over the last 3-4 months.    3/7/25:  Patient presents today via [employer] Video visit from home, located in [place], [place].  At last visit Prozac was increased from 20 to 30 mg daily, for which patient reports \"I still " "feel down, I have a lot of anxiety.\" Patient unable to pinpoint trigger for anxiety, \"I just don't feel right all the time, I just figured it was my anxiety.\" Patient kept closing eyes and nodding head during interview. \"I just need something to calm me down. I have tingling sensation across the top of my body for along time.\" Patient is taking prescribed Klonopin 0.5 mg twice daily routinely, \"its such a low dose.\" Patient reports taking dose around 9am.     In regards to therapy, patient was advised to schedule a follow up appointment at last visit and is currently on a waiting list to see a new therapist. \"All she told me I am on a waiting list.\" Confirmed patient is not scheduled with prior therapist. Patient is agreeable to see therapist in [facility] through [facility].     Patient reports she received a message from the referral dept regarding neuropsychological testing referral by this provider 6/2024, which patient further recalls she did got to [facility] and had several memory tests, was there for 3.5 hrs.  Though denies receiving testing results, and plans to call to inquire about receiving a copy.     Patient is asking if the Prozac can be increased due to complaints of ongoing depression.      1/17/25: Patient presents today via [employer] Video visit from home, located in [place], [place].   Since last visit patient was tapered off Buspar at last visit and later tapered off Vraylar starting 12/4/24, and restarted on Prozac due to Neurologist concerns of parkinsonism worsening tremors, for which patient reports overall feeling better, though \"I am still a little depressed and very anxious.\"    Patient reports she is going to [place] to see brother today with sister due to health decline, he  has [facility] and was given 2-3 weeks to live.  Brother has blood cancer, heart and kidney trouble.   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       11/21/24:    Patient presents today via [employer] Video visit from passenger of vehicle while daughter driving as patient woke up late and had to change today's appointment from in office to video, located in .   At last visit Vraylar was increased from 1.5 mg to 3 mg daily, for which patient reports:  \"I really been depressed, I know your going to be mad at me, I been taking that Prozac 40 mg one every other day I started taking about 2 weeks ago.\" Reports she does feel better.  Anxiety and depressed mood are improving.     In regards to therapy, patient was advised to reschedule with therapist which patient reports had not scheduled yet, though plans to schedule today.  Patient reports \"I wasn't crazy about Becki, and she's the only one there to provide mental health.\"    Patient has been referred to another sleep medicine provider per PCP as of 11/5/24.  Reports sleeps for an hour, then awake, admits to adherence with CPAP. Though continues to struggle with sleep, which patient acknowledges contributes to mood.     Patient voices Buspar is no longer effective and would like to come off medication, though discussed previously patient expressed willingness to taper to discontinue.     Wt Readings from Last 3 Encounters:   10/30/24 130 kg (287 lb)   10/07/24 125 kg (275 lb 11.2 oz)   10/03/24 125 kg (276 lb)       10/4/24:  arrival 0954; compelted 0952    Answers submitted by the patient for this visit:  Primary Reason for Visit (Submitted on 10/4/2024)  What is the primary reason for your visit?: Depression  Depression (Submitted on 10/4/2024)  Chief Complaint: Depression  Visit: follow-up  Frequency: constantly  Severity: severe  excessive worry: Yes  insomnia: Yes  irritability: Yes  malaise/fatigue: Yes  obsessions: Yes  hypersomnia: No  difficulty controlling mood: Yes  difficulty staying asleep: Yes  difficulty falling asleep: Yes  Hours of " "sleep per night: 3 Hours  Medication compliance: %  Side effects: fatigue  Aggravated by: family issues      Patient presents today via [employer] Video visit from home, located in [place], [place]. At last visit patient was restarted on Vraylar at 1.5 mg due to self stopping for approximately 1 week, for which patient reports \"I am still depressed, I was gonna ask if we could go back to the 3 mg or put me back on Prozac.\" Patient reports the shakiness is due to stress and not actually the medication.   Patient reports waking up 2-3 times per night, \"I don't sleep hardly at all, then during the day all I want to do is sleep, I don't have any energy.\"  Patient reports adherence with CPAP, though sleeping in short intervals, and does nap throughout the day while sitting in chair watching TV, \"I can hardly keep my eyes open.\"    Sleep hygiene discussed with PCP on 9/30/24 as patient reported taking Remeron at 10 pm, going to bed at 11pm with TV on which patient has been turning the TV upon feeling sleepy. \"Usually\" has CPAP mask on while watching TV in bed.  \"Is there something else I can take to help me stay asleep longer?\" Patient reports taking Klonopin 0.5 mg 1 during the day and one in the evening, not at bedtime.     In regards to therapy patient has not seen in over one month, \"I was getting burnt out by the therapist. What we talked about I really wasn't doing what I needed to do, and last time I went there she said if your not going to do what we talked about  then your not going to get anywhere.\"     In regards to PHQ9 screening question of thoughts of being better off dead or hurting herself  in some way, patient reports  has days of  \"I wished I wouldn't wake up in the morning.\" However, denies actual thoughts of harming self or rumination nor planning,  Cssr low.    Depression: Patient complains of depression. She complains of anhedonia, depressed mood, difficulty concentrating, fatigue, feelings of " "worthlessness/guilt, and insomnia  Anxiety:  The patient endorses to the following symptoms of anxiety including: excessive anxiety and worry about a number of events or activities for more days than not, restlessness or feeling keyed up, being easily fatigued, difficulty concentrating or mind going blank, irritability, muscle tension, and sleep disturbance which have caused impairment in important areas of daily functioning.      8/26/24:  Patient presents today via MyChart Video visit from home, located in [place], [place].  \"I can't take that Vraylar anymore, it makes me shake.  I stopped taking it about a week ago.\" Patient reports her arms are no longer shaking.    Patient reports Buspar no longer helps for anxiety. Asking for Prozac.  Upon informing patient of option to increase Remeron, patient began crying more intensely, \"I don't understand why you can't give me something for depression.  Can we go back down to the lower dose of Vraylar?\"  Patient is also asking for provider to manage Klonopin ordered per PCP recently.      In regards to therapy patient is still seeing \"I have to make an appointment, I don't see she is helping me.\"  Patient has tried to schedule Neuropsychological testing and places from list mailed to patient several do not take her insurance. Patient reports she lost the list.  Last seen therapist a few weeks ago.        7/17/24:  Patient presents today via MyChart Video visit from home, located in [place], [place].  At last visit Vraylar was increased from 1.5 to 3 mg daily, for which patient reports \"helped a little bit, I been crying a lot, I am still depressed.\"    Patient was also referred to [facility] and did not contact, had lost paperwork from last visit. \"My therapist said testing was only for schizophrenia or bipolar, I don't know if that's true?\" In regards to therapy, patient is seeing Becki with [facility], \"I like her okay, I don't know if I want to continue, we talk " "about the same thing, we talk more about my Becki, I don't know its hard.\"  Patient further explains therapist wants her to make a list of all the things she wants her daughter to do, \"she said it would be hard, but I haven't done it yet.\"     Patient has concerns of how her son treats her grand daughter whom will be 17 in Sept. Patient became tearful and then crying.    \"Does the Prozac come in 10 mg, could I get that?\"      6/7/24:  Patient presents today in office using [employer], \"I'm not good, I am really depressed again, jittery, I have to have something to calm me down.\"  Patient reports having to come to PCP office this past Wed, due to feeling sick. \"I need something to take the edge off. There are some nights I pray not to wake up in the morning.\"  Patient is tearful today. Denies SI and is convincing, but rather more frustrated.   Patient did start with therapist, Shanti, with [facility], \"I am not too crazy about her.\" Patient does attend in person.     Patient was also scheduled for Neuropsychological testing 5/24/24 which was canceled by provider office at [facility] due to short staff, \"they said they would call back when they got someone hired.\" Which was originally ordered by Neurology 2/2023 for mild cognitive disorder. And patient prefers a closer location.     Patient is asking for Zyprexa or Prozac.  Reminded patient of weight gain and prolonged QTI which is reason for discontinuation in addition to ineffectiveness.   Daughter will be going to [place] 7/19/24 for 10 days and patient utilizes daughter phone for telehealth visits and daughter assists with logging on to [employer].  As patient expresses fear of not being able to see this provider nor go for testing due to transportation.     Patient noted with weight loss of 5lb since last recorded weight which patient contributes to poor appetite due to depressed mood.   Sleep remains the same.      5/2/24:  Patient presents today via " "MyChart Video visit from home in chair, located in [place], [place]. Patient apologized for missing last visit.  At last visit Remeron was decreased from 15 to 7.5 mg, for which patient reports \"I woke up this morning at 419, I am sleeping worse now than I was.\" Patient will go to sleep around  pm and wakes up at 4 or 5 am. Denies day time naps, reported adherence with CPAP.  \"If I take another Mirtazapine I can, but I normally don't do that.\"     Depression: continues to take Vraylar 1.5 mg, \"its going good, but I am still depressed, can I go back on 20 mg of the Prozac, I still feel like there is something crawling in my head.\"   Anxiety: continues to tolerate Buspar 20 mg 3 times daily which has been somewhat effective.      Patient has an upcoming appointment for neuropsychological testing 5/24/24.   Therapy: patient has an appointment with [facility] tomorrow via telephone and will be further scheduled for an appointment.        3/14/24:    Answers submitted by the patient for this visit:  Other (Submitted on 3/14/2024)  Please describe your symptoms.: Depression  Have you had these symptoms before?: Yes  How long have you been having these symptoms?: Greater than 2 weeks  Primary Reason for Visit (Submitted on 3/14/2024)  What is the primary reason for your visit?: Other    Patient presents today via MyChart Video visit from home, located in [place], [place].   Patient has chosen to switch from [facility] to [facility] in clinic. Patient reports mood is better, \"I think that Vraylar is  helping, but I still feel there is something itching my head, I got these gummies,\" patient showed a bottle of Hemp Gummies to provider, \"they are helping a little bit too, I take 6-7 per day, it says take 1-2 per day.\" Hemp oil 200 mg, 175 mg of Omega 3,A,B,E, \"it curves my anxiety, I don't feel my heart coming out of my skin.\"     Therapy: has not called [facility] back to schedule.   Patient is asking about [facility] [facility] as patient grandson attends.   Patient still wakes up during the night, 2-3 times, \"I am still depressed.\"  Patient is asking about  restarting Prozac again.    In regards to PHQ9 screening question of thoughts of being better off dead or hurting himself/herself  in some way, patient reports  \"That was along time ago, I think Becki put that in there wrong, I havent' had those thoughts.\" CSSRS answers all \"none\".    Psych: Anxiety fatigue, insomnia  Depression Low mood for > 2 weeks, Decreased/Increased sleep, Loss of Interest, Feelings of guilt/worthlessness, and Low energy      1/31/24:  Patient presents today via MyChart Video visit from home, located in [place], [place].  At last visit Prozac was stopped and Remeron was decreased from 30 to 15 mg nightly due to ongoing weight gain which is suspected due to combination of medications and comorbid conditions, patient reports still waking up in the middle of the night, 2-3 times, though able to return back to sleep easily.  Continues to wear CPAP with 2L, denies mask falling off and just randomly wakes up at night.     On 1/23/24, patient was started on Vraylar 1.5 mg, reported the cost was 1700.00 and patient did not have to pay any out of pocket. Patient admits to some improvement in mood, \"I am still depressed, I was gonna ask if I could stay on maybe a 20 mg of the Prozac, I don't want to get like I was.\"    Patient reports son had spoke with friend and discussed anxiety, and took a fourth CBD gummy which helped with anxiety.     Patient continues to take Buspar 20 mg 3 times daily, which is effective. Patient would like to switch pharmacies due to difficulty with getting ahold of [facility] staff.     Patient reports having not scheduled with therapist, due to not having the phone number.      1/18/24: Patient presents today via MyChart Video visit from home, located in [place], [place].  \"I am not doing good, I am short of air, don't have any energy, the anxiety is " "getting the best of me.\"  At last visit Prozac was decreased from 80 to 60 mg for 21 days, then to decrease to 40 mg for 21 days (start 1/4/24) which planned to end 1/25/24.  Daughter confirmed in the background of current dose of Prozac is 40 mg.     Patient reports received a denied payment from [employer] plan which patient no longer has as of 1/1/24. Patient has not called therapist back to schedule another appointment at this time.     Anxiety and Depressive symptoms: \"stress related, not knowing what is really wrong with me, shortness of air, I can't hardly do anything, I don't feel like doing anything. I don't think that Buspar is helping anymore.\"    Coping strategies:  \"I just sit, my heart feels like its beating out of my chest.\" Reports heart rate will range from 50-70-80. \"Oxygen level is good most of the time.\"   Fears stopping Buspar though reports primarily ineffective.   Patient denies practicing coping techniques at this time.      12/13/23:  Patient presents today via [employer] Video visit from home, located in [place], [place].  \"I am not too good, I was in the hospital I have bronchitis.\"    At last visit Remeron was decreased from 45 mg to 30 mg nightly and changed Buspar from 30 mg twice daily to 20 mg 3 times daily. Patient reports \"I need my sleep medicine filled, I only have one left.\" Patient feels sleep is not good.  Anxiety-\"it's still bad.\" Continues to struggle with itching and picking at scalp, reports \"nothing helps.\" Continues to express feeling depressed.     Patient has started psychotherapy with LUIS Santamaria with [facility]. \"I really would like to see someone in person, it's hard to talk to someone on the phone.\" Clarified patient is seeing therapist via video and not via telephone. Does not wish to drive to [place] due to being so far away, and will consider.     Patient has set up a new insurance plan through [employer]- medicare advantage plan though has not received card to upload to " "EHR.     11/9/23:  Patient presents today via MyChart Video visit from home, located in [place], [place].  At last visit patient was tapered off Zyprexa, for which patient reports \"I still feel like theres something crawling in my head.\"    Patient continues to take Hydroxyzine 100 mg 3 times daily for itching and anxiety which is ineffective. Reports \"anxiety is a little better, though not a whole lot, still very anxious. Everything in general, my health.\" Mood has improved somewhat, though ongoing complaint of scalp itching, and anxiety.      Patient continues to wake up during the night 2-3 times and is able to return to sleep most nights. Admits to adherence with CPAP with 2 L.  Currently taking Buspar every morning and night, and Remeron at bedtime with addition of Melatonin recently, though minimal effectiveness.     Chart review: patient seen cardiology 11/7/23 as patient was seen in the ED 10/31/23 with SOA and 15 lbs weight gain and treated with one dose of IV Lasix and discharged. Seen by PCP 11/7/23 noting depression has worsened since tapering down from Zyprexa, and neurology was concerned psychotropic medications are causing tremors. Noted increase of Lasix and Aldactone, and added Melatonin 3 mg nightly. Referred to physical therapy as well for generalized weakness. Patient was seen by Pulmonary 11/6/23, noted report of a fall 2 weeks prior on porch. CT of chest without acute findings.     10/5/23:  Patient presents today via MyChart Video visit from home, located in [place], [place].  \"Not to good, I am still depressed, I still feel like something is crawling in my head, I am anxious all the time, it is getting the best of me.\"     At last visit Prozac was increased from 60 to 80 mg daily for which patient reports \"can it be increased\".   Patient was also referred to [facility] for psychotherapy as patient was dissatisfied with therapist at [facility]. Patient reports she had to change phones and lost " "phone number and is asking for number today.     Reports adherence with medications and denies sleepiness with Zyprexa. Patient says she seen neurologist in [place] at [facility], and was advised to see a psychiatrist. Reports Dr. Alvarado will be returning to [place] clinic.  Patient is uncertain of what she wants to do at this time,   \"I don't know what to do.\"    In regards to negative intrusive thoughts of being better off dead, patient denies SI, though admits to having some days of feeling so frustrated due to depression and anxiety.     Patient was asked again about therapy referral if she had received a voice mail, for which patient does report receiving a voice mail which was on other phone she no longer has access to, phone number has not changed.     At end of visit patient asking to take 2 tabs of Remeron for waking in the middle of night.     Wt Readings from Last 3 Encounters:   10/03/23 126 kg (278 lb)   09/06/23 128 kg (282 lb)   08/30/23 130 kg (287 lb)       Depression: Patient complains of depression. She complains of anhedonia, depressed mood, difficulty concentrating, fatigue, feelings of worthlessness/guilt, and insomnia  Anxiety:  The patient endorses significant symptoms of anxiety including: excessive anxiety and worry about a number of events or activities for more days than not, restlessness or feeling keyed up, being easily fatigued, difficulty concentrating or mind going blank, irritability, muscle tension, and sleep disturbance which have caused impairment in important areas of daily functioning.        8/17/23: [facility], located in [place], [place].  Patient called this morning to switch from in office to video due to daughter being ill.  \"I'm not doing too good, I am still depressed, I feel like something is crawling in my head.\" Admits to ongoing itching, despite taking Hydroxyzine 3 times daily.     In regards to noted report to PCP " "recently of SI, patient denies presently. Reports difficulty staying asleep, able to fall asleep without problems, reports last night did not wake up however, frequency of night time awakenings vary. Denies day time naps. Occasionally drifts to sleep while in recliner, though does not lay down or sleep very long during those times.     Noted per chart review of Weight loss of 13 lb on Trulicity, currently receiving Dupixent injections and topical antifungals per Dermatology.  \"I loose it and I gain it back, guess because I don't get much exercise.\"    Patient asking about alternate therapist as patient does not feel current treatment with current [facility] provider, Marie Conde, is going well. Currently see's every 2 weeks. \"I don't feel like it's helping me at all.\"   Patient asking for anxiety medication, \"can you prescribe the klonopin\".    Weight today:   Wt Readings from Last 3 Encounters:   08/02/23 124 kg (274 lb)   07/28/23 129 kg (283 lb 12.8 oz)   07/26/23 129 kg (285 lb)       7/13/23:  Patient presents today in office, with well groomed appearance, subtle make-up, which patient was complimented on appearance, at last visit Prozac was increased from 40 to 60 mg daily and Zyprexa increased from 5 to 7.5 mg daily, for which patient reports \"I am still depressed\"    Patient was to start therapy with [facility] in May, and has started, just seen Marie today prior to this appointment. \"I don't know if I like her or not.\" Expresses frustration with not working on coping strategies, \"she just sits there and lets me talk. All other places do not accept my insurance. I don't know who takes my insurance or not.\"     Patient continues to struggle with tactile hallucinations, feeling something is crawling on scalp.  Patient has been taking Dupixent from dermatologist which has not been effective. Patient feels due to these symptoms, depression exacerbates. Patient is also having difficulty with neck pain and minimal relief " "from pain medications. Denies drowsiness with Zyprexa. Denies Prozac helping with \"crawling sensation.\" Continues to take Hydroxyzine 100 mg 3 times daily and the Buspar, \"I wish I could have something else for the anxiety.\" Patient asking to come off Zyprexa due to weight gain.     Patient was referred to Bariatric surgery for gastric bypass per PCP note 7/11/23 with notes to eventually come off the Zyprexa, though due to chronic mental health conditions the medication is necessary at this time.     5/15/23:  Patient presents today in office, \"I am still picking at my head.\" Patient was started on Dupixent per Dermatology in Feb.  \"I am still depressed, the anxiety is getting the best of me.\"    At last visit Zyprexa was increased from 2.5 mg to 5 mg daily at 12 noon, for which patient reports ongoing tactile hallucinations.  Due to elevated anxiety, patient was instructed to increase dose of Hydroxyzine over this past weekend to 100 mg 3 times daily as needed anxiety, for which patient reports tolerating well, \"It works for 3 hrs, then I am right back picking at my head, very anxious, like I have pressure in my chest all the time.\" Denies increased drowsiness with dose.  Continues to have sensation of bugs crawling on head and around ears.      Continues to take Zyprexa 5 mg at noon, and reports mild drowsiness.  Patient will \"doze off\" during the day, denies taking a nap.  Reports waking up during the night, approximately twice and able to eventually return to sleep.      Depression: Patient complains of depression. She complains of anhedonia, depressed mood, difficulty concentrating, fatigue, hopelessness and insomnia   Anxiety:  The patient endorses significant symptoms of anxiety including: excessive anxiety and worry about a number of events or activities for more days than not, restlessness or feeling keyed up, being easily fatigued, difficulty concentrating or mind going blank, irritability, muscle tension " "and sleep disturbance which have caused impairment in important areas of daily functioning.      4/7/23: Patient presents today in office, at last visit patient was started on Zyprexa 2.5 mg daily at noon for which patient reports \"it's okay\". Admits to medication adherence, \"I am still depressed, still picking my head, and feel theres still something crawling in my head.\" reports with excessive picking areas will bleed.  Patient tried to use an ice pack to head, though had a headache, which was wrapped in a towel. Denies relief from ice pack.    Prozac was increased from 20 to 40 mg on 3/24/23, for which patient reports she is still depressed.     Denies drowsiness with Zyprexa, or naps. Denies scratching or feeling as if something is crawling on any other places of body, only the scalp.     Patient reports asking PCP for something for anxiety and was told to discuss with this provider.    Patient reports [facility] provider, Barbie, had left [facility], and was waiting to hear back due to locating a provider whom accepted patient insurance. And patient has not heard back at this time.       3/9/23:  Patient presents today in office, \"I am not any better, I can't hardly stand it. The feeling of something crawling in my head, and I am depressed again, can you put me back on the Cymbalta along with the Prozac.  I am so anxious.\"     Patient continues to experience crying spells, anxiety and depression ongoing with minimal relief.  Continues to feel as if something is crawling on scalp, complaint of itching is not voiced today.       2/23/23:  Patient presents today in office, at last visit Risperdal was stopped due to potential D2D interaction and short duration of use and started Prozac 10 mg was started for which patient reports \"I can't see a difference, I am so depressed, I am still scratching my head.\" Reports Hydroxyzine has helped with the itching, though, \"I can't help it, I can't stop scratching.\"  Patient wore gloves " "for short duration with minimal effectiveness, \"I still feel that sensation, like something is crawling in my head, then I go pick at it.\" Reports sensation does go down neck.    \"I get this tingling sensation through this upper part of my body.\" as patient pointing to upper chest, shoulders. Patient recalls this sensation has been going on for \"along time, several months, even when I was seeing .\"    Patient is no longer seeing therapist, patient has an appointment with [facility] in April with Barbie,  as Sendy with [facility] is on maternity leave, and was referred to Kayleigh and she lives in [place] and was only talking via phone.       1/30/23:  Patient presents today in office reports tolerating taper of Cymbalta though continues to feel depressed and continues to scratch at head, \"My anxiety is ridiculous, over the weekend I said to myself I don't even want to live.\" Patient was able to take some deep breaths and managed to deal with symptoms, though difficult.  Denies current SI.     Increase Hydroxyzine FROM 25 mg 4 times daily as needed TO 50 mg by mouth 3 times daily as needed at last visit, which patient feels itching has not improved.     Risperdal w/Prozac D2D discussed.   Patient is uncertain if Risperdal is helpful, \"I can stop taking it, if you can get me on something to stop tearing up my head.\" Patient reports times of dozing off during the day, though uncertain if dose was taken yesterday, denies recent falls. Patient reports trying to lose weight and prefers to not take any medications that can potentially increase weight as patient denies knowledge of Risperdal causing weight gain.     Patient informed of other potential D2D interaction with Wellbutrin and Prozac as dose of Prozac would be doubled essentially.  Patient wishes to proceed with switch to Prozac as previously discussed.      Patient asking about Gabapentin refill, uses for RLS and was told by PCP medication would need to come from " "psychiatry or pain management.      1/9/23:  Patient presents today in office complaining of severe itching and skin picking to head and scalp.  Since last visit, patient was started on Risperdal 1 mg twice daily 12/22/22 per PCP.  Noted PCP also weaning down from Hydrocodone and switched to Diclofenac.  Patient was admitted to [facility] 12/31/22-1/2/23 for chest pain, palpitations, acute exacerbation of COPD, anxiety disorder, chronic hypoxemic respiratory failure on home oxygen.     Started Hydroxyzine (Vistaril) 25 mg 4 times daily as needed, which patient takes 3-4 times daily which has not provided effectiveness, \"Nothing helps, I swear there is something crawling in my head, I can't stop scratching it, I been to a dermatologist.\"  Admits to medication calming the itching a little bit, \"not really helping with anxiety, just a little bit.\" Denies drowsiness, though reports ongoing low energy.      In regards to Risperdal, \"I don't know if it helps either.\"    Admits to having weight gain, has been walking in home due to living [place] a [place] cannot walk outside, which has helped some in regards to exercise.  Patient states, \"I can feel the depression coming.\"   \"When I stand up I have to hold on to this and not take off right away.\"  Referring to rollater walker.  Admits to waking up in the middle of the night, at times goes to sleep at 1 am, uses CPAP.      11/21/22:  Patient presents today in office, \"I am still depressed and my anxiety is off the roof, my head is so sore from scratching.\"   Increased Wellbutrin SR to 200 mg daily and Buspar from 15 to 30 mg twice daily at last visit, for which patient reports has not been helpful.  \"I wonder if you can raise the Cymbalta or not?\"    \"There are some days I feel like I want to die.\"     Patient continues to report difficulty with living situation with daughter and grand children, \"I can't hardly get her to help me with the house cleaning, I just don't live like her.\"  " "Admits to ongoing stress due to current living environment.     Upon inquiring of amount of Cymbalta, patient is uncertain if there are 2 of the 60 mg in medi-planner, as the current order is for 2 of the 60 mg daily.      Patient expresses desire to speak with a therapist more often, as patient does not have another appointment for a few weeks with current therapist.     \"I know you won't give me the Diazepam, but is there something else you can give me for anxiety?\"      10/17/22:  Patient presents today in office for medication check as at last visit Wellbutrin SR was increased to 150 mg, changed Buspar frequency to twice daily as patient was not consistent with 3 times daily dosing, and due to not taking Remeron as prescribed patient was educated to start taking the 1.5 tabs to equal 45 mg.  Patient was also instructed to allow daughter to fill all medications to medi-planner as patient was administering some medications independently leading to inconsistency and inaccurate dosing.     Patient reports taking Remeron 45 mg and it is the only bottle at bedside which patient admits to adherence.  Buspar is now in prefilled medi planner filled by daughter.     Patient reports depression has worsened, \"my nerves I just get nervous all the time, my daughter lives with me, it's hard sometimes.\"  Patients 2 grand children almost 3 and 8, and \"Becki is suárez and is sometimes not nice to me.\"  Patient began to cry explaining this morning she kept son home from school and got upset because she had to change son's doctor appointment from 130 to 230 pm, as patient has appointment at 1230 with PCP today.    Patient continues to work with Sendy, therapist from [facility].     Patient reports utilizing deep focused breathing to help with anxiety which isn't always effective.   Patient admits to taking Klonopin, \"the little blue pill\", \"I only got 30 so I can only take one a day.  I believe you gave me a prescription last time for " "30 days.\"  Patient reports no Klonopin remain, as last pill was taken yesterday.  Patient reports taking upon feeling very anxious.  Denies falls or dizziness.       9/9/22: INITIAL EVAL/Transfer of care   Patient presents today in office for transfer of care within practice due to location, as patient residence is in [place].  Patient has been a patient of  in [place] since 10/2021.  Patient was last seen by  7/15/22.  Patient has a longstanding history of depression and anxiety which has been treatment resistant for the most part, as patient has failed numerous medications due to either minimal effectiveness, side effects, or induction/worsening of other comorbid conditions. 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  Patient had been advised numerous times in the last year to receive a higher level of care with inpatient mental health care-suggestions of ECT- due to minimal improvement with outpatient medication management, however, patient has either refused due to a multitude of reasons or failure to follow through after arrangements for treatment were finalized.  Unable to have TMS due to transportation issues, has had ECT in the past.     Patient has a history of CHF,HTN,HLD,DM,OA,chronic neck and back pain (L-spine), MEGAN with CPAP w/2L O2, IBS, Peripheral Neuropathy,COPD,RLS, essential tremor,2 and obesity.  Patient pain is managed by [facility].  Due to these chronic medical conditions she has physical limitations which also contribute to mood.      Patient had been living alone up until this past summer, as patient did not like living alone.  Patient daughter, Becki, and her 2 children age 2.5 and almost 8 yr old, moved in with patient in her mobile home in [place].     Patient complaint of \"very jittery all the time, I am still depressed, I didn't know if you could raise my Cymbalta or Wellbutrin to " "help me? Sometimes my nerves, I just feel like I am going to explode\".  \"The 2 little one's, it's just really hard on me.\"   Patient admits triggers of anxiety are primarily 2 grand children whom live in home, \"they live different than I do.\"   Reports daughter is also irritable which makes it difficult for patient to cope with anxiety.   Stress and anxiety has worsened since they moved into home.      Patient feels Buspar at 15 mg 3 times daily is not always effective, denies drowsiness though has little energy.  \"I get agitated very easily.  If kids make a mess, and she doesn't clean it up then I have to do it, I can't go behind her, but if I say something, she acts like it's nothing.\"     Patient denies sleep difficulty, adheres to nightly CPAP with Oxygen 2L, denies day time oxygen as patient did not qualify.  Denies recent falls.  Patient sleeps from 1130 pm until 8-9 am, denies night time awakenings nor naps.     Patient states, \"I think I have my days and nights mixed up.\"  Patient will try to eat dinner, at night snacks on pretzles or crackers.  Patient does not eat breakfast or lunch, will snack sometimes. Patient checks blood sugars daily, yesterday was 124 when nurse came, confirmed [facility]  services for SN only, though patient believes will be released soon, and would find out today if services would continue. Patient has been receiving services for approximately 4 months.     Patient uses a medi-planner which daughter fills weekly.  Currently taking Wellbutrin  mg once daily in the morning, \"I try to take the Buspar 15 mg 3 times daily, I will take it if I feel anxious.\" Denies 3 times daily dosing as prescribed. Patient reports keeping Buspar bottle out of routine medications which daughter fills.  Clonazepam 1 mg nightly with Remeron.  Reports upon last  of Remeron it was 30 mg and not 45 mg, denies reading bottle which instructs patient to take 1.5 tabs to equal 45 mg. \"when I take my " "night medications I don't always go to sleep at that time, so I wait until I get ready to go to sleep then take it.\"  \"It's in the bottle next to my night stand, I leave the pill box on the table in the kitchen.\"  Patient takes Cymbalta 120 mg in the morning.   Confirmed with patient both the Wellbutrin and Cymbalta are in the medi-planner.  And the Buspar, Clonazepam, and Remeron bottles are at patient bedside.  The medi-planner is for am and pm, and \"leg medicine and blood pressure medicine are in the night\". And patient takes those medications around 10 pm, and takes Remeron later at 11 pm.  Patient reports taking Clonazepam once daily, and does not take at night.  Confirmed as patient had stated differently earlier in visit.  \"I just want to feel better.\"     Denies Signs & Symptoms of Serotonin Syndrome: confusion, hallucinations, seizures, increased heart rate, fever, excessive sweating, shivering or shaking, blurred vision, muscle spasms or stiffness, tremors, incoordination, stomach cramps, nausea, & vomiting.  Though patient endorses to soa with minimal exertion with \"beads of sweat on my forehead while I got dressed this morning.\"  Patient tremors are chronic.        PHQ-9 Depression Screening  PHQ-9 Total Score:   5/9/2025 16   Little interest or pleasure in doing things? Nearly every day   Feeling down, depressed, or hopeless? Nearly every day   PHQ-2 Total Score 6   Trouble falling or staying asleep, or sleeping too much? More than half the days   Feeling tired or having little energy? Nearly every day   Poor appetite or overeating? More than half the days   Feeling bad about yourself - or that you are a failure or have let yourself or your family down? Several days   Trouble concentrating on things, such as reading the newspaper or watching television? Several days   Moving or speaking so slowly that other people could have noticed? Or the opposite - being so fidgety or restless that you have been " moving around a lot more than usual? Several days     Thoughts that you would be better off dead, or of hurting yourself in some way? Not at all   PHQ-9 Total Score 16   If you checked off any problems, how difficult have these problems made it for you to do your work, take care of things at home, or get along with other people? Very difficult          DIVYA-7  Feeling nervous, anxious or on edge: (Patient-Rptd) Nearly every day  Not being able to stop or control worrying: (Patient-Rptd) Several days  Worrying too much about different things: (Patient-Rptd) More than half the days  Trouble Relaxing: (Patient-Rptd) Several days  Being so restless that it is hard to sit still: (Patient-Rptd) Not at all  Feeling afraid as if something awful might happen: (Patient-Rptd) More than half the days  Becoming easily annoyed or irritable: (Patient-Rptd) Nearly every day  DIVYA 7 Total Score: (Patient-Rptd) 12  If you checked any problems, how difficult have these problems made it for you to do your work, take care of things at home, or get along with other people: (Patient-Rptd) Somewhat difficult     The following portions of the patient's history were reviewed and updated as appropriate: allergies, current medications, past family history, past medical history, past social history, and past surgical history.     Past Surgical History:  Past Surgical History:   Procedure Laterality Date    CARPAL TUNNEL RELEASE      COLONOSCOPY  2020    ENDOSCOPIC FUNCTIONAL SINUS SURGERY (FESS) Left 08/15/2022    Procedure: ENDOSCOPIC FUNCTIONAL SINUS SURGERY, left maxillary antrostomy with removal of contents, possible left ethmoidectomy;  Surgeon: Johnny Calderon MD;  [facility]: [facility];  Service: ENT;  Laterality: Left;    HYSTERECTOMY  2002    complete- ovarian cysts       Problem List:  Patient Active Problem List   Diagnosis    Carpal tunnel syndrome of left wrist    Carpal tunnel syndrome of right wrist    Anxiety    Celiac  artery stenosis    Cervical disc disorder    Chronic pain disorder    Degeneration of lumbar intervertebral disc    Depressive disorder    Diabetic neuropathy    Essential hypertension    Essential tremor    Hypercholesterolemia    Insomnia    Irritable bowel syndrome    Lumbar spondylosis    Obesity, morbid, BMI 50 or higher    Osteoarthritis    Sacroiliac joint pain    MEGAN (obstructive sleep apnea)    Chronic diastolic heart failure    Palpitations    Type 2 diabetes mellitus without complication, without long-term current use of insulin    Other chest pain    Generalized anxiety disorder    Paresthesia    Shortness of breath    Chronic obstructive pulmonary disease    Long-term current use of opiate analgesic    Cervical radiculopathy    Cervical spondylosis    Degeneration of thoracic intervertebral disc    Thoracic spondylosis    Coronary artery calcification    Mild persistent asthma without complication    Sore throat    Acute pain of left knee    CKD (chronic kidney disease)    B12 deficiency    COPD with asthma and status asthmaticus    Iron deficiency    Overweight    Vulvar cyst    Iron deficiency anemia    Vitamin D deficiency    Non-restorative sleep    Snoring    Excessive daytime sleepiness    Class 3 severe obesity due to excess calories without serious comorbidity with body mass index (BMI) of 45.0 to 49.9 in adult    Chronic hypoxic respiratory failure on home oxygen with the CPAP at 2 L    Sleep related hypoxia       Allergy:   No Known Allergies     Discontinued Medications:  Medications Discontinued During This Encounter   Medication Reason    mirtazapine (REMERON) 7.5 MG tablet Reorder    mirtazapine (REMERON) 7.5 MG tablet *Error    FLUoxetine (PROzac) 10 MG capsule Dose adjustment                                   Current Medications:   Current Outpatient Medications   Medication Sig Dispense Refill    FLUoxetine (PROzac) 40 MG capsule Take 1 capsule by mouth Every Morning for 90 days.  Indications: Generalized Anxiety Disorder, Major Depressive Disorder 90 capsule 0    mirtazapine (REMERON) 7.5 MG tablet Take 1 tablet by mouth Every Night. 1 tablet 0    albuterol sulfate  (90 Base) MCG/ACT inhaler Inhale 2 puffs Every 4 (Four) Hours As Needed for Wheezing or Shortness of Air. 18 g 11    atorvastatin (LIPITOR) 10 MG tablet Take 1 tablet by mouth Daily. 90 tablet 3    Blood Glucose Monitoring Suppl (Blood Glucose Monitor System) w/Device kit Use Daily to test blood glucose. 1 each 0    Calcium Carb-Cholecalciferol (Calcium 500 + D) 500-3.125 MG-MCG tablet Take 1 each by mouth 2 (Two) Times a Day. 60 tablet 2    clonazePAM (KlonoPIN) 0.5 MG tablet Take 1 tablet by mouth 2 (Two) Times a Day As Needed for Anxiety. 60 tablet 0    Diclofenac Sodium (VOLTAREN) 1 % gel gel Apply 4 g topically to the appropriate area as directed 4 (Four) Times a Day. 100 g 1    donepezil (ARICEPT) 10 MG tablet Take 1 tablet by mouth Every Night. 30 tablet 2    ferrous sulfate 325 (65 FE) MG tablet Take 1 tablet by mouth Daily With Breakfast. 90 tablet 1    fluticasone (FLONASE) 50 MCG/ACT nasal spray Administer 2 sprays into the nostril(s) as directed by provider Daily. 16 g 0    Fluticasone-Umeclidin-Vilant (TRELEGY ELLIPTA) 200-62.5-25 MCG/ACT inhaler Inhale 1 puff Daily. 60 each 5    glucose blood test strip Use as instructed to check blood sugar daily 100 each 3    HYDROcodone-acetaminophen (NORCO)  MG per tablet       ipratropium-albuterol (DUO-NEB) 0.5-2.5 mg/3 ml nebulizer Nebulize and inhale 1 vial 4 (Four) Times a Day As Needed for Wheezing. 360 mL 3    Lancet Device misc Use 1 each Daily. Use as directed; check blood sugar once daily 100 each 3    Lancets (OneTouch Delica Plus [id number]) misc Use 1 each Daily. 100 each 0    Magnesium Oxide -Mg Supplement 400 (240 Mg) MG tablet Take 1 tablet by mouth Every Night. 30 tablet 11    memantine (Namenda) 5 MG tablet Take 1 tablet by mouth 2 (Two) Times a  Day. 60 tablet 2    metoprolol succinate XL (TOPROL-XL) 25 MG 24 hr tablet Take 0.5 tablets by mouth Daily.      miconazole (Desenex) 2 % powder Apply topically to the appropriate area as directed 2 (Two) Times a Day. 71 g 11    mupirocin (BACTROBAN) 2 % ointment APPLY TO AFFECTED AREA(S) TWO TIMES A DAY AS DIRECTED FOR 14 DAYS - 22 g 0    naloxone (NARCAN) 4 MG/0.1ML nasal spray 1 spray As Needed. Has on hand      O2 (OXYGEN) Inhale 2 L/min Every Night.      pantoprazole (PROTONIX) 40 MG EC tablet TAKE 1 TABLET BY MOUTH DAILY 90 tablet 0    pramipexole (MIRAPEX) 0.5 MG tablet Take 1 tablet by mouth 3 (Three) Times a Day. 270 tablet 0    pregabalin (LYRICA) 75 MG capsule Take 1 capsule by mouth 2 (Two) Times a Day. 60 capsule 2    primidone (MYSOLINE) 50 MG tablet Take 1 tablet by mouth.      spironolactone (ALDACTONE) 25 MG tablet Take 1 tablet by mouth 2 (Two) Times a Day. 60 tablet 5    tacrolimus (PROTOPIC) 0.1 % ointment Apply topical ointment to affected area twice daily      Tirzepatide (Mounjaro) 15 MG/0.5ML solution auto-injector Inject 15 mg under the skin into the appropriate area as directed 1 (One) Time Per Week. 2 mL 2    torsemide (DEMADEX) 20 MG tablet Take 1 tablet by mouth Daily. 90 tablet 1    triamcinolone (KENALOG) 0.025 % cream Apply 1 Application topically to the appropriate area as directed As Needed.       Current Facility-Administered Medications   Medication Dose Route Frequency Provider Last Rate Last Admin    cyanocobalamin injection 1,000 mcg  1,000 mcg Intramuscular Q28 Days Ami Diego MD           Past Medical History:  Past Medical History:   Diagnosis Date    Adverse effect of other viral vaccines, initial encounter     Covid vaccine    Allergic fungal sinusitis 07/29/2022    Allergic rhinitis     Anxiety disorder     Asthma     CHF (congestive heart failure)     Congenital heart disease 05 2021    COPD with acute exacerbation     CTS (carpal tunnel syndrome)     left     Difficulty walking 2022    Essential (primary) hypertension     Essential tremor     Hypercholesterolemia 10/18/2021    Insomnia     Irritable bowel syndrome     Low back pain     Major depressive disorder     Morbid (severe) obesity due to excess calories     Nasal congestion     Neuropathy in diabetes 2023    Obstructive sleep apnea (adult) (pediatric)     Other hereditary and idiopathic neuropathies     Pain in left hip     Pain in right toe(s)     Pain in thoracic spine     Peripheral neuropathy     Hands    Primary generalized (osteo)arthritis     Primary insomnia     Pulmonary emphysema 01/05/2023    Restless leg syndrome     SOBOE (shortness of breath on exertion)     Substance abuse     Type 2 diabetes mellitus without complication, without long-term current use of insulin 05/02/2022    Vitamin D deficiency        Past Psychiatric History:  Began Treatment: while in her 20's  Diagnoses:Depression and Anxiety  Psychiatrist: -talked on phone-managed meds for 8 months prior to SOC with  in 10/2021; Also at [facility] [place] for 2 yrs [id number]  Therapist: Sendy with [facility] (no in office visits) (telephone) next appt Oct. 2022 (for the last 8 months) prior seen Jessica at [facility] [place] in [place] for 1-2 yrs  Admission History:Denies  Medication Trials: Several for which pt unable to recall if effective: Lexapro(ineff.),Abilify(eff),Prozac,Zoloft,Trintellix,Paxil,Celexa, (can't remember/uncertain); SGA-worsened tremors-per neuro medication induced parkisonism-Latuda,Rexulti,Seroquel (wt gain,helped with insomnia&anxiety); Lamictal-ineff for anxiety; Xanax-weaned, Hydroxyzine(ineff 1 mo. 6/2022-7/2022)Trazodone 8/2021-8/2022 somewhat helpful for anxiety though made pt tired, Ambien [id number]?; retried Effexor XR, Wellbutrin,Cymbalta  Risperdal 1 mo approximately stopped due to starting Prozac; Hydroxyzine 100 mg 3 times daily ineffective for itching and anxiety; Zyprexa-ineffective; Prozac up to 80  mg-ineffective; Klonopin-weaned, effective; Vraylar up to 3 mg-parkinsonism tremors worsened per Neurologist recommendations; Buspar up to 20 mg 3 times daily-no longer effective  Self Harm: Denies  Suicide Attempts:Denies   Psychosis, Anxiety, Depression: Denies    Substance Abuse History:   Types:Denies all, including illicit  Withdrawal Symptoms:Denies  Longest Period Sober:Not Applicable   AA: Not applicable     Social History:  Martial Status:   Employed:No Retired from [employer], worked in [place]  Kids:Yes or If so, how many 1 son, Simon; 1 dtr, Becki  House: mobile home  daughter, Becki and her 2 young children    History: Denies  Access to Guns:  Denies (son has his guns locked in safe in patient home, which patient does not have access to)    Social History     Socioeconomic History    Marital status:     Number of children: 2   Tobacco Use    Smoking status: Former     Current packs/day: 0.00     Average packs/day: 0.5 packs/day for 12.0 years (6.0 ttl pk-yrs)     Types: Cigarettes     Start date: 2009     Quit date: 2021     Years since quittin.3     Passive exposure: Past    Smokeless tobacco: Never   Vaping Use    Vaping status: Never Used   Substance and Sexual Activity    Alcohol use: Not Currently    Drug use: Never    Sexual activity: Not Currently     Partners: Male     Birth control/protection: Hysterectomy, Surgical       Family History:   Suicide Attempts: Denies  Suicide Completions:Denies      Family History   Problem Relation Age of Onset    Hypertension Mother     COPD Father     Heart failure Father     Neuropathy Sister     Sleep apnea Sister     Restless legs syndrome Sister     Sleep apnea Brother     Anxiety disorder Daughter     Depression Daughter     Malig Hyperthermia Neg Hx     Breast cancer Neg Hx     Ovarian cancer Neg Hx     Uterine cancer Neg Hx     Cervical cancer Neg Hx     Colon cancer Neg Hx     Stomach cancer Neg Hx     Skin  cancer Neg Hx     Clotting disorder Neg Hx     Deep vein thrombosis Neg Hx     Pulmonary embolism Neg Hx        Developmental History:   Born: [place], [place]  Siblings:3 brothers, 6 sisters (1 )  Childhood: Denies Abuse  High School:Completed  College:Denies    Mental Status Exam:   Hygiene:   good  Cooperation:  Cooperative  Eye Contact:  Good though kept closing eyes drifting during visit today  Psychomotor Behavior:  Appropriate   Affect:  Appropriate  Mood: depressed and anxious   Speech:  Normal  Thought Process:  Goal directed   Thought Content:  Mood congruent  Suicidal:  None  Homicidal:  None  Hallucinations:  None   Delusion:  None  Memory:  Intact   Orientation:  Person, Place, Time and Situation  Reliability:  good  Insight:  Good  Judgement:  Good  Impulse Control:  Good  Physical/Medical Issues:  Yes CHF,COPD,MEGAN,HTN,HLD,CKD3,RLS,IBS,OA,Peripheral Neuropathy,Chronic back&neck pain,DM,obesity,essential tremors to kush hands  Home Oxygen, cervical dystonia    Review of Systems:  Review of Systems   Constitutional:  Positive for fatigue. Negative for diaphoresis.   HENT:  Negative for drooling.    Eyes:  Negative for visual disturbance.   Respiratory:  Positive for apnea. Negative for cough and shortness of breath.          2L O2 nightly, not significant for CPAP per sleep study 25   Cardiovascular:  Negative for chest pain, palpitations and leg swelling.   Gastrointestinal:  Negative for nausea and vomiting.   Endocrine: Negative for cold intolerance and heat intolerance.   Genitourinary:  Negative for difficulty urinating.   Musculoskeletal:  Positive for back pain, gait problem and neck pain. Negative for joint swelling.        Use of Rolator walker   Allergic/Immunologic: Negative for immunocompromised state.   Neurological:  Positive for weakness. Negative for dizziness, seizures and speech difficulty.   Psychiatric/Behavioral:  Positive for decreased concentration and sleep disturbance.  Negative for agitation, confusion, hallucinations, self-injury and suicidal ideas. The patient is nervous/anxious. The patient is not hyperactive.        Physical Exam:  Physical Exam  Psychiatric:         Attention and Perception: Attention and perception normal.         Mood and Affect: Mood is anxious and depressed.         Speech: Speech normal.         Behavior: Behavior normal. Behavior is cooperative.         Thought Content: Thought content normal. Thought content does not include suicidal ideation. Thought content does not include suicidal plan.         Cognition and Memory: Cognition normal.         Judgment: Judgment normal.         Vital Signs:   There were no vitals taken for this visit.     Office notes from care team reviewed:  Office Visit with Jorge George MD (05/07/2025)   Office Visit with Genet Jeong MD (05/02/2025)   Office Visit with Amaya Dwyer APRN (04/30/2025)   Office Visit with Chance De Leon MD (04/23/2025)     Lab Results: REVIEWED  Lab on 04/16/2025   Component Date Value Ref Range Status    Glucose 04/16/2025 94  65 - 99 mg/dL Final    BUN 04/16/2025 13  8 - 23 mg/dL Final    Creatinine 04/16/2025 1.18 (H)  0.57 - 1.00 mg/dL Final    Sodium 04/16/2025 141  136 - 145 mmol/L Final    Potassium 04/16/2025 4.1  3.5 - 5.2 mmol/L Final    Chloride 04/16/2025 98  98 - 107 mmol/L Final    CO2 04/16/2025 32.0 (H)  22.0 - 29.0 mmol/L Final    Calcium 04/16/2025 9.9  8.6 - 10.5 mg/dL Final    BUN/Creatinine Ratio 04/16/2025 11.0  7.0 - 25.0 Final    Anion Gap 04/16/2025 11.0  5.0 - 15.0 mmol/L Final    eGFR 04/16/2025 50.1 (L)  >60.0 mL/min/1.73 Final   Office Visit on 04/10/2025   Component Date Value Ref Range Status    Monospot 04/10/2025 Negative  Negative Final    Internal Control 04/10/2025 Passed  Passed Final    Lot Number 04/10/2025 [id number]   Final    Expiration Date 04/10/2025 1-31-26   Final   Lab on 03/26/2025   Component Date Value Ref Range Status    Glucose  03/26/2025 94  65 - 99 mg/dL Final    BUN 03/26/2025 14  8 - 23 mg/dL Final    Creatinine 03/26/2025 1.24 (H)  0.57 - 1.00 mg/dL Final    Sodium 03/26/2025 142  136 - 145 mmol/L Final    Potassium 03/26/2025 3.9  3.5 - 5.2 mmol/L Final    Chloride 03/26/2025 100  98 - 107 mmol/L Final    CO2 03/26/2025 32.0 (H)  22.0 - 29.0 mmol/L Final    Calcium 03/26/2025 9.9  8.6 - 10.5 mg/dL Final    Albumin 03/26/2025 4.0  3.5 - 5.2 g/dL Final    Phosphorus 03/26/2025 3.3  2.5 - 4.5 mg/dL Final    Anion Gap 03/26/2025 10.0  5.0 - 15.0 mmol/L Final    BUN/Creatinine Ratio 03/26/2025 11.3  7.0 - 25.0 Final    eGFR 03/26/2025 47.2 (L)  >60.0 mL/min/1.73 Final    Creatinine, Urine 03/26/2025 88.3  mg/dL Final    Total Protein, Urine 03/26/2025 9.2  mg/dL Final    Protein/Creatinine Ratio, Urine 03/26/2025 0.10   Final    Color, UA 03/26/2025 Yellow  Yellow, Straw Final    Appearance, UA 03/26/2025 Slightly Cloudy (A)  Clear Final    pH, UA 03/26/2025 <=5.0  5.0 - 8.0 Final    Specific Gravity, UA 03/26/2025 1.020  1.005 - 1.030 Final    Glucose, UA 03/26/2025 Negative  Negative Final    Ketones, UA 03/26/2025 Negative  Negative Final    Bilirubin, UA 03/26/2025 Negative  Negative Final    Blood, UA 03/26/2025 Negative  Negative Final    Protein, UA 03/26/2025 Negative  Negative Final    Leuk Esterase, UA 03/26/2025 Negative  Negative Final    Nitrite, UA 03/26/2025 Negative  Negative Final    Urobilinogen, UA 03/26/2025 0.2 E.U./dL  0.2 - 1.0 E.U./dL Final    RBC, UA 03/26/2025 None Seen  None Seen, 0-2 /HPF Final    WBC, UA 03/26/2025 0-2  None Seen, 0-2 /HPF Final    Bacteria, UA 03/26/2025 2+ (A)  None Seen /HPF Final    Squamous Epithelial Cells, UA 03/26/2025 13-20 (A)  None Seen, 0-2 /HPF Final    Hyaline Casts, UA 03/26/2025 3-6  None Seen /LPF Final    Methodology 03/26/2025 Manual Light Microscopy   Final    WBC 03/26/2025 5.86  3.40 - 10.80 10*3/mm3 Final    RBC 03/26/2025 4.73  3.77 - 5.28 10*6/mm3 Final    Hemoglobin  03/26/2025 14.5  12.0 - 15.9 g/dL Final    Hematocrit 03/26/2025 45.6  34.0 - 46.6 % Final    MCV 03/26/2025 96.4  79.0 - 97.0 fL Final    MCH 03/26/2025 30.7  26.6 - 33.0 pg Final    MCHC 03/26/2025 31.8  31.5 - 35.7 g/dL Final    RDW 03/26/2025 12.9  12.3 - 15.4 % Final    RDW-SD 03/26/2025 46.6  37.0 - 54.0 fl Final    MPV 03/26/2025 9.4  6.0 - 12.0 fL Final    Platelets 03/26/2025 309  140 - 450 10*3/mm3 Final    Neutrophil % 03/26/2025 50.1  42.7 - 76.0 % Final    Lymphocyte % 03/26/2025 35.7  19.6 - 45.3 % Final    Monocyte % 03/26/2025 11.1  5.0 - 12.0 % Final    Eosinophil % 03/26/2025 2.4  0.3 - 6.2 % Final    Basophil % 03/26/2025 0.5  0.0 - 1.5 % Final    Immature Grans % 03/26/2025 0.2  0.0 - 0.5 % Final    Neutrophils, Absolute 03/26/2025 2.94  1.70 - 7.00 10*3/mm3 Final    Lymphocytes, Absolute 03/26/2025 2.09  0.70 - 3.10 10*3/mm3 Final    Monocytes, Absolute 03/26/2025 0.65  0.10 - 0.90 10*3/mm3 Final    Eosinophils, Absolute 03/26/2025 0.14  0.00 - 0.40 10*3/mm3 Final    Basophils, Absolute 03/26/2025 0.03  0.00 - 0.20 10*3/mm3 Final    Immature Grans, Absolute 03/26/2025 0.01  0.00 - 0.05 10*3/mm3 Final   Lab on 03/13/2025   Component Date Value Ref Range Status    Creatinine, Urine 03/13/2025 42.8  mg/dL Final    Total Protein, Urine 03/13/2025 5.0  mg/dL Final    Protein/Creatinine Ratio, Urine 03/13/2025 0.12   Final    Glucose 03/13/2025 87  65 - 99 mg/dL Final    BUN 03/13/2025 15  8 - 23 mg/dL Final    Creatinine 03/13/2025 1.34 (H)  0.57 - 1.00 mg/dL Final    Sodium 03/13/2025 140  136 - 145 mmol/L Final    Potassium 03/13/2025 4.0  3.5 - 5.2 mmol/L Final    Chloride 03/13/2025 99  98 - 107 mmol/L Final    CO2 03/13/2025 32.6 (H)  22.0 - 29.0 mmol/L Final    Calcium 03/13/2025 9.3  8.6 - 10.5 mg/dL Final    Albumin 03/13/2025 3.8  3.5 - 5.2 g/dL Final    Phosphorus 03/13/2025 3.7  2.5 - 4.5 mg/dL Final    Anion Gap 03/13/2025 8.4  5.0 - 15.0 mmol/L Final    BUN/Creatinine Ratio 03/13/2025  11.2  7.0 - 25.0 Final    eGFR 03/13/2025 43.0 (L)  >60.0 mL/min/1.73 Final    WBC 03/13/2025 5.28  3.40 - 10.80 10*3/mm3 Final    RBC 03/13/2025 4.57  3.77 - 5.28 10*6/mm3 Final    Hemoglobin 03/13/2025 14.0  12.0 - 15.9 g/dL Final    Hematocrit 03/13/2025 43.9  34.0 - 46.6 % Final    MCV 03/13/2025 96.1  79.0 - 97.0 fL Final    MCH 03/13/2025 30.6  26.6 - 33.0 pg Final    MCHC 03/13/2025 31.9  31.5 - 35.7 g/dL Final    RDW 03/13/2025 13.0  12.3 - 15.4 % Final    RDW-SD 03/13/2025 46.2  37.0 - 54.0 fl Final    MPV 03/13/2025 9.2  6.0 - 12.0 fL Final    Platelets 03/13/2025 286  140 - 450 10*3/mm3 Final    Neutrophil % 03/13/2025 47.7  42.7 - 76.0 % Final    Lymphocyte % 03/13/2025 32.2  19.6 - 45.3 % Final    Monocyte % 03/13/2025 15.9 (H)  5.0 - 12.0 % Final    Eosinophil % 03/13/2025 3.2  0.3 - 6.2 % Final    Basophil % 03/13/2025 0.8  0.0 - 1.5 % Final    Immature Grans % 03/13/2025 0.2  0.0 - 0.5 % Final    Neutrophils, Absolute 03/13/2025 2.52  1.70 - 7.00 10*3/mm3 Final    Lymphocytes, Absolute 03/13/2025 1.70  0.70 - 3.10 10*3/mm3 Final    Monocytes, Absolute 03/13/2025 0.84  0.10 - 0.90 10*3/mm3 Final    Eosinophils, Absolute 03/13/2025 0.17  0.00 - 0.40 10*3/mm3 Final    Basophils, Absolute 03/13/2025 0.04  0.00 - 0.20 10*3/mm3 Final    Immature Grans, Absolute 03/13/2025 0.01  0.00 - 0.05 10*3/mm3 Final    Color, UA 03/13/2025 Yellow  Yellow, Straw Final    Appearance, UA 03/13/2025 Cloudy (A)  Clear Final    pH, UA 03/13/2025 5.5  5.0 - 8.0 Final    Specific Gravity, UA 03/13/2025 1.010  1.005 - 1.030 Final    Glucose, UA 03/13/2025 Negative  Negative Final    Ketones, UA 03/13/2025 Negative  Negative Final    Bilirubin, UA 03/13/2025 Negative  Negative Final    Blood, UA 03/13/2025 Negative  Negative Final    Protein, UA 03/13/2025 Negative  Negative Final    Leuk Esterase, UA 03/13/2025 Negative  Negative Final    Nitrite, UA 03/13/2025 Negative  Negative Final    Urobilinogen, UA 03/13/2025 0.2  E.U./dL  0.2 - 1.0 E.U./dL Final    RBC, UA 03/13/2025 None Seen  None Seen, 0-2 /HPF Final    WBC, UA 03/13/2025 None Seen  None Seen, 0-2 /HPF Final    Bacteria, UA 03/13/2025 1+ (A)  None Seen /HPF Final    Squamous Epithelial Cells, UA 03/13/2025 13-20 (A)  None Seen, 0-2 /HPF Final    Methodology 03/13/2025 Manual Light Microscopy   Final   Lab on 02/24/2025   Component Date Value Ref Range Status    Glucose 02/24/2025 77  65 - 99 mg/dL Final    BUN 02/24/2025 13  8 - 23 mg/dL Final    Creatinine 02/24/2025 1.65 (H)  0.57 - 1.00 mg/dL Final    Sodium 02/24/2025 138  136 - 145 mmol/L Final    Potassium 02/24/2025 3.9  3.5 - 5.2 mmol/L Final    Chloride 02/24/2025 97 (L)  98 - 107 mmol/L Final    CO2 02/24/2025 29.0  22.0 - 29.0 mmol/L Final    Calcium 02/24/2025 9.6  8.6 - 10.5 mg/dL Final    BUN/Creatinine Ratio 02/24/2025 7.9  7.0 - 25.0 Final    Anion Gap 02/24/2025 12.0  5.0 - 15.0 mmol/L Final    eGFR 02/24/2025 33.5 (L)  >60.0 mL/min/1.73 Final   Lab on 02/14/2025   Component Date Value Ref Range Status    proBNP 02/14/2025 357.0  0.0 - 900.0 pg/mL Final    Glucose 02/14/2025 85  65 - 99 mg/dL Final    BUN 02/14/2025 17  8 - 23 mg/dL Final    Creatinine 02/14/2025 1.59 (H)  0.57 - 1.00 mg/dL Final    Sodium 02/14/2025 144  136 - 145 mmol/L Final    Potassium 02/14/2025 4.5  3.5 - 5.2 mmol/L Final    Chloride 02/14/2025 104  98 - 107 mmol/L Final    CO2 02/14/2025 32.7 (H)  22.0 - 29.0 mmol/L Final    Calcium 02/14/2025 9.5  8.6 - 10.5 mg/dL Final    Total Protein 02/14/2025 7.2  6.0 - 8.5 g/dL Final    Albumin 02/14/2025 3.2 (L)  3.5 - 5.2 g/dL Final    ALT (SGPT) 02/14/2025 19  1 - 33 U/L Final    AST (SGOT) 02/14/2025 16  1 - 32 U/L Final    Alkaline Phosphatase 02/14/2025 130 (H)  39 - 117 U/L Final    Total Bilirubin 02/14/2025 <0.2  0.0 - 1.2 mg/dL Final    Globulin 02/14/2025 4.0  gm/dL Final    A/G Ratio 02/14/2025 0.8  g/dL Final    BUN/Creatinine Ratio 02/14/2025 10.7  7.0 - 25.0 Final    Anion  Gap 02/14/2025 7.3  5.0 - 15.0 mmol/L Final    eGFR 02/14/2025 35.0 (L)  >60.0 mL/min/1.73 Final   Office Visit on 02/14/2025   Component Date Value Ref Range Status    SARS Antigen 02/14/2025 Not Detected  Not Detected, Presumptive Negative Final    Influenza A Antigen DEANDRE 02/14/2025 Not Detected  Not Detected Final    Influenza B Antigen DEANDRE 02/14/2025 Not Detected  Not Detected Final    Internal Control 02/14/2025 Passed  Passed Final    Lot Number 02/14/2025 [id number]   Final    Expiration Date 02/14/2025 7-   Final   Lab on 02/07/2025   Component Date Value Ref Range Status    Magnesium 02/07/2025 2.3  1.6 - 2.4 mg/dL Final    Total Cholesterol 02/07/2025 226 (H)  0 - 200 mg/dL Final    Triglycerides 02/07/2025 180 (H)  0 - 150 mg/dL Final    HDL Cholesterol 02/07/2025 48  40 - 60 mg/dL Final    LDL Cholesterol  02/07/2025 146 (H)  0 - 100 mg/dL Final    VLDL Cholesterol 02/07/2025 32  5 - 40 mg/dL Final    LDL/HDL Ratio 02/07/2025 2.96   Final    Hemoglobin A1C 02/07/2025 5.60  4.80 - 5.60 % Final    25 Hydroxy, Vitamin D 02/07/2025 33.3  30.0 - 100.0 ng/ml Final   Office Visit on 01/27/2025   Component Date Value Ref Range Status    SARS Antigen 01/27/2025 Not Detected  Not Detected, Presumptive Negative Final    Influenza A Antigen DEANDRE 01/27/2025 Not Detected  Not Detected Final    Influenza B Antigen DEANDRE 01/27/2025 Not Detected  Not Detected Final    Internal Control 01/27/2025 Passed  Passed Final    Lot Number 01/27/2025 [id number]   Final    Expiration Date 01/27/2025 7-30-25   Final   Lab on 12/23/2024   Component Date Value Ref Range Status    proBNP 12/23/2024 88.8  0.0 - 900.0 pg/mL Final    WBC 12/23/2024 6.53  3.40 - 10.80 10*3/mm3 Final    RBC 12/23/2024 4.72  3.77 - 5.28 10*6/mm3 Final    Hemoglobin 12/23/2024 14.6  12.0 - 15.9 g/dL Final    Hematocrit 12/23/2024 44.6  34.0 - 46.6 % Final    MCV 12/23/2024 94.5  79.0 - 97.0 fL Final    MCH 12/23/2024 30.9  26.6 - 33.0 pg Final    MCHC  12/23/2024 32.7  31.5 - 35.7 g/dL Final    RDW 12/23/2024 12.6  12.3 - 15.4 % Final    RDW-SD 12/23/2024 44.4  37.0 - 54.0 fl Final    MPV 12/23/2024 9.5  6.0 - 12.0 fL Final    Platelets 12/23/2024 299  140 - 450 10*3/mm3 Final    Neutrophil % 12/23/2024 49.2  42.7 - 76.0 % Final    Lymphocyte % 12/23/2024 33.8  19.6 - 45.3 % Final    Monocyte % 12/23/2024 13.0 (H)  5.0 - 12.0 % Final    Eosinophil % 12/23/2024 3.4  0.3 - 6.2 % Final    Basophil % 12/23/2024 0.3  0.0 - 1.5 % Final    Immature Grans % 12/23/2024 0.3  0.0 - 0.5 % Final    Neutrophils, Absolute 12/23/2024 3.21  1.70 - 7.00 10*3/mm3 Final    Lymphocytes, Absolute 12/23/2024 2.21  0.70 - 3.10 10*3/mm3 Final    Monocytes, Absolute 12/23/2024 0.85  0.10 - 0.90 10*3/mm3 Final    Eosinophils, Absolute 12/23/2024 0.22  0.00 - 0.40 10*3/mm3 Final    Basophils, Absolute 12/23/2024 0.02  0.00 - 0.20 10*3/mm3 Final    Immature Grans, Absolute 12/23/2024 0.02  0.00 - 0.05 10*3/mm3 Final   There may be more visits with results that are not included.       EKG Results:  No orders to display       Imaging Results:  CT Head Without Contrast    Result Date: 6/7/2022    1. Generalized atrophy with no acute intracranial findings 2. Expansion of the left maxillary sinus with some thickening of the bony wall laterally suggesting a mucocele or mass.  Adjacent involvement of the left ethmoid air cells and left frontal sinus.  Recommend ENT consultation.  Mild mucosal disease in the sphenoid sinuses.     Mars Aden MD       Electronically Signed and Approved By: Mars Aden MD on 6/07/2022 at 14:20                 Assessment & Plan   Diagnoses and all orders for this visit:    1. Major depressive disorder, recurrent episode, moderate (Primary)  -     Discontinue: mirtazapine (REMERON) 7.5 MG tablet; Take 1 tablet by mouth Every Night.  Dispense: 30 tablet; Refill: 2  -     mirtazapine (REMERON) 7.5 MG tablet; Take 1 tablet by mouth Every Night.  Dispense: 1 tablet;  Refill: 0  -     FLUoxetine (PROzac) 40 MG capsule; Take 1 capsule by mouth Every Morning for 90 days. Indications: Generalized Anxiety Disorder, Major Depressive Disorder  Dispense: 90 capsule; Refill: 0    2. Generalized anxiety disorder  -     FLUoxetine (PROzac) 40 MG capsule; Take 1 capsule by mouth Every Morning for 90 days. Indications: Generalized Anxiety Disorder, Major Depressive Disorder  Dispense: 90 capsule; Refill: 0    3. Stress and adjustment reaction  -     FLUoxetine (PROzac) 40 MG capsule; Take 1 capsule by mouth Every Morning for 90 days. Indications: Generalized Anxiety Disorder, Major Depressive Disorder  Dispense: 90 capsule; Refill: 0    4. Other stressful life events affecting family and household    5. Medication management    6. Medication care plan discussed with patient    7. Medication dose increased              Visit Diagnoses:    ICD-10-CM ICD-9-CM   1. Major depressive disorder, recurrent episode, moderate  F33.1 296.32   2. Generalized anxiety disorder  F41.1 300.02   3. Stress and adjustment reaction  F43.29 309.89   4. Other stressful life events affecting family and household  Z63.79 V61.09   5. Medication management  Z79.899 V58.69   6. Medication care plan discussed with patient  Z71.89 V65.49   7. Medication dose increased  Z79.899 V58.69             PLAN:  Safety: No acute safety concerns  Therapy: Currently Seeing a Therapist [facility] with Lila Lockhart LCSW  Risk Assessment: Risk of self-harm acutely is low.  Risk factors include anxiety disorder, mood disorder, and recent psychosocial stressors (pandemic). Protective factors include no family history, denies access to guns/weapons, no present SI, no history of suicide attempts or self-harm in the past, minimal AODA, healthcare seeking, future orientation, willingness to engage in care.  Risk of self-harm chronically is also low, but could be further elevated in the event of treatment noncompliance and/or  AODA.  Meds:    -Continue MIRTAZAPINE (REMERON) 7.5 mg by mouth nightly to target depression and insomnia. (No adjustment required for most recent eGFR on 4/16/25- 50.0) Last dispensed 3/19/25 #90/90 days. (Reordered in error today and reentered with a dose of 1 with last dispense date)    -INCREASE Prozac (Fluoxetine) FROM 30 mg (taking 3 of the 10 mg capsules) TO 40 mg (ordered 40 mg capsules today, and instructed patient she may start taking 4 of the 10 mg to equal to 40 mg until new prescription arrives) by mouth daily in the morning to target anxiety, depression.  Discussed all risks, benefits, alternatives, and side effects of Prozac (Fluoxetine) including but not limited to GI upset, decreased appetite, sexual dysfunction, bleeding risk, seizure risk, insomnia, anxiety, drowsiness, headache, tremor, nervousness, activation of hossein or hypomania, increased fragility fracture risk, hyponatremia, ocular effects, serotonin syndrome, hypersensitivity reaction, and activation of suicidal ideation and behavior. . Discussed the need for patient to immediately call the office for any new or worsening symptoms, such as worsening depression; feeling nervous or restless; suicidal thoughts or actions; or other changes in mood or behavior, and all other concerns. Patient educated on medication compliance.  Patient verbalized understanding and is agreeable to taking Prozac (Fluoxetine). Addressed all questions and concerns. Last dispensed 3/28/25 #270/90 days. New prescription sent for 40 mg for 90 days.   5.  Labs: n/a  6.  Updated  of POC     Symptoms of anxiety, depression are under fair control with current medication regimen.  There are several stressors patient is struggling with which are negatively impacting mood, highly advised patient to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 patient today. And reminded patient of improvement thus far with addition of Vraylar.   Patient was given instructions and counseling regarding condition and for health maintenance advice. Please see specific information pulled into the AVS if appropriate.    Patient to contact provider if symptoms worsen or fail to improve.        1/31/24:   Therapy: Currently Seeing a Therapist  [facility]- patient advised to schedule follow up appointment and inquire about insurance acceptance at last appointment; patient given number to schedule as patient did not have the number [phone]   -Continue Buspar 20 mg 3 times daily 3 without food as patient has not been taking with food. Space times between doses of 6 hrs.  Refilled today as patient wishes to continue.   -Continue MIRTAZAPINE (REMERON) 15 mg by mouth nightly as lower doses can be more sedating, due to sleep difficulty reported of frequent night time awakenings.  New order sent for the 15 mg, patient informed of new tablet dose and no longer needed to break pill in half. Plan to continue at this time, and decrease to 7.5 mg next  visit.  -Continue Vraylar (Cariprazine)1.5 mg by mouth daily to target unstable mood, cognitive symptoms, aggression, bipolar hossein and depressive symptoms.  Can be taken with or without food. No adverse effects of antipsychotic medications noted, such as EPS (Extrapyramidal side effects and TD (Tardive Dyskinesia), patient to contact provider immediately if experienced.  NR needed, current script will end 2/22/24 and patient was advised to request via Remark when needed.    -Patient advised to contact [facility] pharmacy to discuss switching all prescriptions over from [facility] as patient is displeased with current pharmacy. Both Buspar and Remeron were sent to [facility] per patient request today with note indicating patient wishing to switch.  -Updated  of POC     Symptoms of anxiety, depression, insomnia are under better control with current medication regimen.  Overall mood has improved since starting Vraylar 1 week ago. Patient asking about restarting Prozac, which was denied as Vraylar will target depressed mood, which is improving.  Informed patient that CBD is not regulated by the FDA therefore, there may be variations of the amount of THC in its contents, which could show positive on UDS which would hinder ability to obtain routine pain medications and would not prescribe, patient verbalized understanding and will plan to discuss with pain management.   Patient was given instructions and counseling regarding condition and for health maintenance advice. Please see specific information pulled into the AVS if appropriate.    Patient to contact provider if symptoms worsen or fail to improve.        1/23/24:  Received message this morning from PCP office forwarding message sent by daughter via [employer] voicing patient ongoing struggle with anxiety, fears of dying, and frustration with no relief of symptoms.  And reports of looking for another provider if a medication was not given for the ongoing anxiety.  Informed  "PCP office staff of plans to contact patient this afternoon as received message yesterday from patient and MA contacted patient with further instructions.     This morning patient had came to the office to see another provider and when patient was seen in the hallway, this provider informed the patient of plan to call her this afternoon during lunch, however, since both patient and her daughter were here, they could stop by the office to further discuss, which patient was in agreement.    Patient stopped by this provider office prior to leaving building without daughter, informed patient of recent messages from her and her daughter this morning, and reminded patient of plan of care since last visit, Thurs., 1/18/24, with plans to start the Vraylar this Thurs. 1/25/24 at scheduled visit, and inquired about patient daughter reports of patient feeling as she is dying, as this was new information.  Patient expresses frustration with anxiety, having difficulty breathing during times of high anxiety, and becomes further worried during this time of not being able to breath.  Patient asked several times during conversation if either Ativan or Xanax could be ordered, as patient received Ativan in the ED in the past. Patient reminded of risks involved with benzodiazepines and would refrain from starting that medication as this was declined in the past.  Discussed option to proceed with starting Vraylar today and switch appointment from 1/25/24 to 1/31/24 at 11 am via video, for which patient was in agreement. Patient denies calling therapist to reschedule an appointment, \"I think I may need to find someone else.\"  Patient was advised again to reschedule with therapist as this is an essential part of care and treatment outcome. Patient was asking to restart Prozac due to depression. Which patient was informed that would be considered and discussed at next visit, as goal for patient to have a limited amount of medications due " "to polypharmacy.   Patient admits to adherence with decrease dose of Remeron from 30 to 15 mg.     Start Vraylar (Cariprazine)1.5 mg by mouth daily to target unstable mood, cognitive symptoms, aggression, bipolar hossein and depressive symptoms.  Can be taken with or without food.  Discussed all risks, benefits, alternatives, and side effects of Vraylar  including but not limited to GI upset, sedation, rare weight gain, dyslipidemia, extrapyramidal symptoms (dystonia, drug-induced parkinsonism, akathisia, tardive dyskinesia), lowering of seizure threshold, hematologic abnormalities, hyperglycemia, increased mortality in elderly patients with dementia-related psychosis, neuroleptic malignant syndrome, sexual dysfunction, orthostatic hypotension, (may enhance the effects of antihypertensive medications) falls risk in older adults, and temperature dysregulation. Patient instructed to avoid driving and doing other tasks or actions that require to be alert until knowing how the drug affects them. Discussed the need for patient to immediately call the office for any new or worsening symptoms, such as worsening depression; feeling nervous or restless; suicidal thoughts or actions; or other changes changes in mood or behavior, and all other concerns. Patient educated on medication compliance and the risks of suddenly stopping this medication or missing doses. Patient verbalized understanding and is agreeable to taking Vraylar. Addressed all questions and concerns. After discussion of these risks and benefits, the patient voiced understanding and agreed to proceed.       Patient instructed to start dose of Vryalar this evening, and if experienced GI upset to call provider by Friday and could switch to every other day dosing.  Patient verbalized understanding.   Instructed MA to switch appointment times/dates as discussed today in office.     1/22/24: TELEPHONE  Patient [facility] advising \"I want something for my Anxiety, I had to " "go to the Emergency room because I couldn't breath because of my anxiety\".  Patient is requesting a callback.    At last visit, 1/18/24, we discussed various coping strategies to help manage anxiety, she was also to reschedule with therapist, the ED note reviewed and patient was instructed to stop Hydroxyzine due to a prolonged QTI, which was discontinued 11/9/23.  Will see patient this Thurs. 1/25/24 at scheduled appointment.     Called and spoke to patient and relayed Trish's message to her verbatim.  Patient advised \"Trish is gonna have to do something for me for this anxiety I can't breath!\"  I advised her to breath slowly and keep her appointment with Trish for this that is for this Thursday 01/25/2024.  Patient advised \"If I am still alive by then.\"  I told patient she will be ok and I will have Trish call her. Patient seemed to calm down and voiced understanding.       1/18/24:   -Therapy: Currently Seeing a Therapist  Jania Pagan [facility] with [facility]- patient advised to schedule follow up appointment and inquire about insurance acceptance.   -Continue Buspar 20 mg 3 times daily 3 without food as patient has not been taking with food. Space times between doses of 6 hrs. Planned to discontinue due to ineffectiveness reported, however, patient expressed fear of not having \"something\" for the anxiety, will continue at this time.    DECREASE MIRTAZAPINE (REMERON) FROM 30 mg TO 15 mg (instructed to take 1.5 tablet of the 30 mg tabs) by mouth nightly as lower doses can be more sedating, due to sleep difficulty reported of frequent night time awakenings. Updated order as a NO PRINT.    -DISCONTINUE Prozac 40 mg     Discussed started Vraylar, stopping Prozac and Remeron due to weight gain which is suspected due to several comorbid conditions, which are medically improving per review of notes with  prior to start of visit today and agreed to Stop Prozac 40 mg dose as this " medication will taper itself due to long half life, wean down or discontinue Remeron, and then Add Vraylar.  Which was discussed with patient today and is agreeable to plan, however, expressed anxiety about no longer taking Prozac, in which patient was reassured and reiterated discussion with  with patient.  Will update  of today's visit and changes to current medication regimen.    Coping mechanisms discussed with patient today as a way to gain control over feelings to prevent situation or panic attack from getting worse: grounding techniques using 5 senses (name 3 things you can see, smell, touch, etc.), slow paced breathing techniques- focus on door/book/magazine (something with 4 corners) inhaling and exhaling at each corner, application of cold compress/ice pack/water on face or opening freezer door to allow cold air to be breathed in taking slow deep breaths, closing eyes and focus on positive thoughts.    Advise to have stress ball within reach to use at times of anxiety.    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  Patient continues to take multiple medications for diagnosis, and plan to continue to eliminate as much as feasible to maintain mental health stability.   Patient was given instructions and counseling regarding condition and for health maintenance advice. Please see specific information pulled into the AVS if appropriate.    Patient to contact provider if symptoms worsen or fail to improve.      1/15/24: TELEPHONE  Patient daughter had reached out to PCP office regarding mental health, will have MA contact patient to schedule a follow up appointment as last appointment 12/13/24, patient did not have insurance information and prior insurance would not allow office to schedule, patient has not reached out to schedule an  appointment.   Called [facility] for patient to callback to schedule a follow up appt with Trish.  Called patient back on 1/16/24 and she is now scheduled for a video with Trish Paz on Thursday 01/18/2024.    12/13/23:  Therapy: Currently Seeing a Therapist  Jania Pagan [facility] with [facility] - patient inquired about switching to a therapist that offers in office, patient was offered to go to [place] with a new provider in their family [facility] clinic, however, due to distance from home prefers to think about it, advised patient to continue with current therapist.    Continue Buspar 20 mg 3 times daily 3 without food as patient has not been taking with food. Space times between doses of 6 hrs.     Continue MIRTAZAPINE (REMERON) 30 mg (1 tablet) by mouth nightly as lower doses can be more sedating, due to sleep difficulty reported of frequent night time awakenings. Instructed patient to contact pharmacy for refills, as current prescription has 2 refills remaining, for which patient was not aware.    -DECREASE Prozac FROM 80 mg TO 60 mg by mouth daily in the morning FOR 21 days, THEN DECREASE TO 40 mg by mouth daily in the morning FOR 21 days to target skin picking disorder, OCD, anxiety, and depression. Refilled today as a 20 mg capsule.  Note to pharmacy to remove current 80 mg order from system.     -Will update  of today's visit and changes to current medication regimen.    -Patient instructed to upload new insurance card with [employer] plan once received and to contact MA directly in [place] to schedule follow up appointment for 5 weeks as current insurance [employer] HMO plan which [place] [facility] [place] will not allow staff to schedule, informed patient scheduled appointment with Endocrine in Jan. Had also noted to be cancelled.     Symptoms of anxiety, depression, insomnia, skin picking, and factitious disorder are under fair control with current  medication regimen. Patient continues to express ongoing symptoms despite efforts to add psychotherapy which patient is now disliking telehealth mode, though due to transportation difficulty this was the best option for patient based on past failures with attending therapy.  Despite numerous medication variations, patient chronically reports ineffectiveness.  Plan to continue to taper down Prozac with goal to only take 3 psychotropic medications per discussion with  today.    Patient was given instructions and counseling regarding condition and for health maintenance advice. Please see specific information pulled into the AVS if appropriate.    Patient to contact provider if symptoms worsen or fail to improve.       11/9/23:  Therapy:   Jania Pagan with [facility] 11/14/23 at 1:30 pm (patient informed today as patient was scheduled with another therapist previously and due to scheduling error patient noted to be seeing Ming)  -Stop Hydroxyzine as it has been ineffective.  -CHANGE BUSPAR from 30 mg twice daily TO 20 mg (start taking 2 of the 10 mg ordered today) 3 TIMES DAILY without food as patient has not been taking with food. Space times between doses of 6 hrs.   -DECREASE MIRTAZAPINE (REMERON) from 45 mg TO 30 mg (1 tablet) nightly as lower doses can be more sedating, due to sleep difficulty reported of frequent night time awakenings.   -Continue Prozac 80 mg by mouth daily in the morning to target skin picking disorder, OCD, anxiety, and depression. Refilled today  -Will update  of today's visit and changes to current medication regimen.    Symptoms of anxiety, depression, skin picking, factitious disorder, and insomnia are under fair control with current medication regimen.  Due to polypharmacy and ineffectiveness of hydroxyzine will eliminate from current regimen.  Due to ongoing anxiety, will try Buspar 3 times daily vs twice daily.  And decrease Remeron to  help with frequent night time awakenings.     Patient was given instructions and counseling regarding condition and for health maintenance advice. Please see specific information pulled into the AVS if appropriate.    Patient to contact provider if symptoms worsen or fail to improve.      10/12/23:  TELEPHONE  Called patient to discuss medication adjustments after discussion with Dr. Newman yesterday, patient informed and instructed to decrease Zyprexa FROM 7.5 mg daily with lunch TO 5 mg for 14 days THEN decrease to 2.5 mg (instructed to break 5 mg tab in half) for 14 days THEN STOP. Informed patient a new prescription for Zyprexa 5 mg tabs has been sent in to the pharmacy today. Patient verbalized understanding and agreeable to plan.   Informed patient plan going forward is to decrease current psychotropic medications due to polypharmacy, and will be consulting with Dr. Newman after each office visit.   Patient reports has an upcoming appointment with [facility] next week, noted appointment scheduled for 10/25/23 with Shital Horowitz LCSW, praised patient for scheduling appointment.   Patient to contact provider if needed in between now and upcoming appointment on 11/9/23.     10/9/23: TELEPHONE  Returned call to patient as planned, informed patient that  was given an update of current treatment plan and concerns and would plan to reconvene this Wed. And if there were any changes in treatment plan provider would notify patient on Thurs. This week. Patient voiced understanding and thanked provider for the call.     10/5/23:   Therapy: New referral sent to [facility] at last visit.  Patient given number again today as patient had lost phone with contacts, informed patient referral had been closed due to not responding to voice mails. Patient wrote down both numbers listed: [phone] and [phone]    -Continue Buspar 30 mg by mouth twice daily to target anxiety.     -Continue Remeron 45 mg by mouth nightly to target insomnia and depression.  Taking 1.5 tabs of the 30 mg to equal 45 mg. Refilled today  -Continue Hydroxyzine 100 mg by mouth 3 times daily as needed to target itching and anxiety.    -Continue Prozac 80 mg by mouth daily in the morning to target skin picking disorder, OCD, anxiety, and depression.   -Continue Zyprexa 7.5 mg by mouth daily at 12 noon with food to target tactile hallucinations, depression, and anxiety.  Risks, benefits, alternatives discussed with patient including nausea and vomiting, GI upset, sedation, dizziness/falls risk, akathisia, hypotension, increased appetite, lowering of seizure threshold, theoretical risk of tardive dyskinesia. After discussion of these risks and benefits, the patient voiced understanding and agreed to proceed.  Slight tremor noted around mouth when mouth closed which are suspected related to medication vs anxiety.   Refilled today    -All medications are good through mid Nov.   -Will plan on contacting Dr. Newman to discuss case as patient has had minimal improvement with outpatient medication management.  And calling patient by early next week.   Patient had been advised numerous times in the past to receive a higher level of care with inpatient mental health care-suggestions of ECT- due to minimal improvement with outpatient medication management, however, patient has either refused due to a multitude of reasons or failure to follow through after arrangements for treatment were finalized.  Symptoms of anxiety, depression, insomnia, tactile hallucinations, and factitious disorder are under fair control with current medication regimen.  Lengthy discussion with patient today regarding plan of care, past discussion and treatment recommendations, which had been advised by Dr. Newman prior to start of care with this provider 1 yr ago.  Patient wishes to remain with this provider.   Patient was given instructions and  counseling regarding condition and for health maintenance advice. Please see specific information pulled into the AVS if appropriate.    Patient to contact provider if symptoms worsen or fail to improve.        8/17/23:   Therapy: [facility] provider -Marie   New referral sent to [facility], the office will contact you to set up the appointment. Please contact them at [phone] if you do not hear from them by next week.     -Continue Buspar 30 mg by mouth twice daily to target anxiety.    -Continue Remeron 45 mg by mouth nightly to target insomnia and depression.  Taking 1.5 tabs of the 30 mg to equal 45 mg. Refilled today  -Continue Hydroxyzine 100 mg by mouth 3 times daily as needed to target itching and anxiety.      Increase Prozac FROM 60 mg TO 80 mg by mouth daily in the morning to target skin picking disorder, OCD, anxiety, and depression. Instructed to start taking 2 of the 40 mg caps prescribed today, and may take 4 of the 20 mg on hand until new dose is picked up.    Continue Zyprexa 7.5 mg by mouth daily at 12 noon with food to target tactile hallucinations, depression, and anxiety.  No signs of EPS or TD noted.     Patient instructed to request refills when appropriate, when down to 5-7 days remaining via  pharmacy or via MyChart.       Symptoms of anxiety, depression, insomnia, skin picking are under fair control with current medication regimen.  Continues to request Klonopin for anxiety which has been denied due to patient with long term and current use of opioids, comorbid conditions, placing patient at higher risks, and has been off of Benzodiazepines for over 1 yr. CBT advised, agreeable to see therapist via telehealth only.  Patient with both physical and psychological signs and symptoms of factitious disorder.   Patient was given instructions and counseling regarding condition and for health maintenance advice. Please see specific information pulled into the AVS if  appropriate.    Patient to contact provider if symptoms worsen or fail to improve.      7/13/23:   Therapy: [facility] provider [facility]     -Continue Buspar 30 mg by mouth twice daily to target anxiety.    -Continue Remeron 45 mg by mouth nightly to target insomnia and depression.  Taking 1.5 tabs of the 30 mg to equal 45 mg.   -Continue Hydroxyzine 100 mg by mouth 3 times daily as needed to target itching and anxiety.    Continue Prozac 60 mg by mouth daily in the morning to target skin picking disorder, OCD, anxiety, and depression. Instructed to start taking 3 of the 20 mg caps prescribed.   Continue Zyprexa 7.5 mg by mouth daily at 12 noon with food to target tactile hallucinations, depression, and anxiety.  Risks, benefits, alternatives discussed with patient including nausea and vomiting, GI upset, sedation, dizziness/falls risk, akathisia, hypotension, increased appetite, lowering of seizure threshold, theoretical risk of tardive dyskinesia. After discussion of these risks and benefits, the patient voiced understanding and agreed to proceed.   No signs of EPS or TD noted. Refilled today for 30 days with 1 refill    Symptoms of anxiety, depression, skin picking, and tactile hallucinations are under good to fair control with current medication regimen.  Suspect ongoing problem with skin picking negatively impacts mood. Explained to patient again that due to several comorbid conditions and current medications that weight gain has been noted and reviewed for several years, however, due to ongoing weight gain, plan to continue current medications along with therapy and determine at next visit if alternative medications are needed vs tapering down from Zyprexa.  Patient continues to request alternative medication for anxiety, which have been declined.     Patient has been advised to do the following at next therapy session due to displeased recent session, which is listed on AVS:   Recommend writing down a list of a few  things you want to discuss at next appointment, perhaps have 2 things that you definitely want to discuss to help guide the visit. What you want to accomplish or discuss more?  Such as developing coping strategies to help with anxiety, AND practice those techniques during the visit with your therapist.  Also, ask for those coping strategies to be written down or what resources she can give you.   Patient was given instructions and counseling regarding condition and for health maintenance advice. Please see specific information pulled into the AVS if appropriate.    Patient to contact provider if symptoms worsen or fail to improve.      5/15/23:   Therapy: [facility] provider scheduled end of May   -Continue Buspar 30 mg by mouth twice daily to target anxiety.  Refilled today  -Continue Remeron 45 mg by mouth nightly to target insomnia and depression.  Taking 1.5 tabs of the 30 mg to equal 45 mg. Refilled today  -Continue Hydroxyzine 100 mg by mouth 3 times daily as needed to target itching and anxiety.   Refilled today, tolerated well without reported increase in drowsiness.    -Increase Prozac FROM 40 mg TO 60 mg by mouth daily in the morning to target skin picking disorder, OCD, anxiety, and depression. Instructed to start taking 3 of the 20 mg caps prescribed today.   Dose increased to 40 mg ordered 3/24/23.   -Increase Zyprexa FROM 5 mg TO 7.5 mg  by mouth daily at 12 noon with food to target tactile hallucinations, depression, and anxiety.  Risks, benefits, alternatives discussed with patient including nausea and vomiting, GI upset, sedation, dizziness/falls risk, akathisia, hypotension, increased appetite, lowering of seizure threshold, theoretical risk of tardive dyskinesia. After discussion of these risks and benefits, the patient voiced understanding and agreed to proceed.    No signs of EPS or TD noted.  AIMS-0; Informed patient a new prescription was sent for the 7.5 mg tabs.     Symptoms of anxiety,  "depression, insomnia, OCD, tactile hallucinations are under fair control presently, medications dose adjustments made today, patient instructed to contact provider if the Zyprexa 7.5 mg causes increased drowsiness as the dose may be switched to nightly.  Suspect anxiety is worsened by tactile hallucinations, which aim to control with Zyprexa.   Patient to contact provider if symptoms worsen or fail to improve.     5/11/23:   Patient LMVM that she is needing something other than the Buspar for her Anxiety.  Patient reports \"My Anxiety if through the roof\"  Please call me back\"    Returned call to patient as requested, patient reports \"I can hardly go on living like this, I just don't feel right, I just got out of the hospital, mostly anxiety and stress, they did a Cat scan and MRI and everything was all okay, I just don't feel right.\" Patient admits to adherence to medications. Patient is uncertain if the Zyprexa is effective, \"I still feel like there's something crawling in my head, I need something else.\" Reports taking Hydroxyzine 50 mg 2-3 times daily, which is somewhat helpful, denies drowsiness. Instructed patient to start taking routinely 3 times daily. Patient has an appointment with [facility] therapist end of May.    \"The only thing I took that helped was the Xanax.\" Patient asking if Buspar can be changed to another medication.  \"I been battling this for over a year.\" Patient requesting refill for Remeron, instructed patient to check bottle to determine if refill is available as patient only has 4 tabs and 2 half tabs remaining, patient instructed to request refill from pharmacy if one remains, otherwise, patient to inform provider or MA upon return call later this afternoon and a refill can be sent.  Informed patient will call back later this afternoon after further review of other alternatives for anxiety. Patient verbalized understanding.    Returned call to patient as planned, left  instructing patient to " start taking 2 of the Hydroxyzine 50 mg to equal 100 mg by mouth 3 times daily as needed anxiety/itching temporarily until seen in office at scheduled appointment Monday 5/15/23, and to return to 50 mg dose if excessive grogginess, drowsiness occurs as there is an increased fall risk with higher dose.  Will plan to discuss further at appointment.   Patient called re: the missed call from last evening.  Patient states she did Not check her voicemail.  I read patient Trish's exact message verbatim about temporary med increase of Hydroxyzine until Monday appt.  Patient voiced understanding      4/7/23:  Therapy: [facility] provider no longer working at [facility], and waiting for [facility] to contact pt with new provider List of counseling services given to patient today, Patient to contact provider and set up appointment.  And to contact office if unable to get an appointment scheduled.   Continue Buspar 30 mg by mouth twice daily to target anxiety.     Continue Remeron 45 mg by mouth nightly to target insomnia and depression.  Taking 1.5 tabs of the 30 mg to equal 45 mg.    Continue Hydroxyzine 50 mg by mouth 3 times daily as needed to target itching and anxiety.    Continue Prozac 40 mg by mouth daily in the morning to target skin picking disorder, anxiety, and depression. Dose increased to 40 mg ordered 3/24/23.     Increase Zyprexa FROM 2.5 mg TO 5 mg by mouth daily at 12 noon with food to target tactile hallucinations, depression, and anxiety.  Risks, benefits, alternatives discussed with patient including nausea and vomiting, GI upset, sedation, dizziness/falls risk, akathisia, hypotension, increased appetite, lowering of seizure threshold, theoretical risk of tardive dyskinesia. After discussion of these risks and benefits, the patient voiced understanding and agreed to proceed.  Instructed patient to ensure accurate dose is picked up with pharmacy, new order with notes to pharmacy informing to not fill 2.5 mg dose  "previously requested.  No signs of EPS or TD noted.     Reminded patient of refill process for all medications     Symptoms of anxiety, tactile hallucinations, and depression continue to be present, advised for patient to get set up with therapy, as this will provide great benefit for patient and help establish coping strategies to help manage chronic symptoms.  Patient somewhat anxious regarding advising to return in 5 weeks vs earlier.  Reiterated to patient  Antidepressants can take 4-6 weeks to start working and up to 2-3 months for the full benefits. Due to toleration of Zyprexa, will increase to help with tactile hallucinations. Patient to contact provider if symptoms worsen or fail to improve.     3/24/23:   Patient called stating she is needing a refill for her Prozac and you had spoke with her about increasing the dose at last visit.  Patient would like for you to increase her Prozac because she says she is still having \"increased depression and still feels like something is crawling in my head\"  Please advise  Per discussion at last visit, Prozac dose can be increased from 20 to 40 mg by mouth daily in the morning, order sent to pharmacy.   Called patient and let her know that the Prozac has been increased at which time she said she is also needing a refill on her Hydroxyzine.  Please refill  Med pended    3/9/23:   Therapy: Currently Seeing a Therapist scheduled with new [facility] at [facility], as prior provider-[facility] went on maternity leave, and Kayleigh lives in [place] and was providing telephone therapy support.  To see new therapist in April.  -Continue Buspar 30 mg by mouth twice daily to target anxiety.   -Continue Remeron 45 mg by mouth nightly to target insomnia and depression.  Taking 1.5 tabs of the 30 mg to equal 45 mg.  -Continue Hydroxyzine 50 mg by mouth 3 times daily as needed to target itching and anxiety.    -Continue Prozac 20 mg by mouth daily in the morning to target skin picking " disorder, anxiety, and depression.   Will plan on increasing in 2-3 weeks if needed.    Start Zyprexa 2.5 mg by mouth daily at 12 noon with food to target tactile hallucinations, depression, and anxiety.  Risks, benefits, alternatives discussed with patient including nausea and vomiting, GI upset, sedation, dizziness/falls risk, akathisia, hypotension, increased appetite, lowering of seizure threshold, theoretical risk of tardive dyskinesia. After discussion of these risks and benefits, the patient voiced understanding and agreed to proceed.  Instructed patient to contact provider next Tues. 3/14/23 to inform of toleration of afternoon dose, plan to have patient take without any other medications to determine if drowsiness occurs and effectiveness. Informed patient dose may need to be given later in the evening depending on drowsiness.     Symptoms of depression, anxiety, skin picking disorder and tactile hallucinations are not under good control, will add low dose Zyprexa. Due to patient not able to come early in the morning for an appointment she will return in approximately 3 weeks.  Patient to contact provider if symptoms worsen or fail to improve.         2/23/23:   Therapy: Currently Seeing a Therapist scheduled with new therapist at [facility], as prior provider-[facility] went on maternity leave, and Kayleigh lives in [place] and was providing telephone therapy support.  To see new therapist in April.  Continue Buspar 30 mg by mouth twice daily to target anxiety. Refilled today    Decrease Wellbutrin SR FROM 200 mg daily  mg by mouth daily in the morning FOR 7 DAYS (2/24-3/2/23) THEN take every other day on the following days: 3/4, 3/6, and 3/8 to target depression and anxiety, THEN STOP.  New order sent to pharmacy. Informed patient of the D2D interaction with Wellbutrin and Prozac, and as the dose is decreased the Prozac will be increased.  Discussed Symptoms of withdrawal:  GI flu-like symptoms,  paresthesias (prickling or tingling sensation, like pins and needs), irritability, insomnia, dizziness, or vivid dreams    Continue Remeron 45 mg by mouth nightly to target insomnia and depression.  Taking 1.5 tabs of the 30 mg to equal 45 mg. Refilled today    Continue Hydroxyzine 50 mg by mouth 3 times daily as needed to target itching and anxiety.  Risks, benefits, alternatives discussed with patient including sedation, dizziness, fall risk, GI upset, and risk of increased CNS depression and elevated heart rate if taken with other antihistamines.  After discussion of these risks and benefits, the patient voiced understanding and agreed to proceed.    Increase Prozac FROM 10 mg TO 20 mg by mouth daily in the morning to target skin picking disorder, anxiety, and depression.  Discussed all risks, benefits, alternatives, and side effects of Fluoxetine including but not limited to GI upset, decreased appetite, sexual dysfunction, bleeding risk, seizure risk, insomnia, anxiety, drowsiness, headache, tremor, nervousness, activation of hossein or hypomania, increased fragility fracture risk, hyponatremia, ocular effects, serotonin syndrome, hypersensitivity reaction, and activation of suicidal ideation and behavior.  Discussed the need for patient to immediately call the office for any new or worsening symptoms, such as worsening depression; feeling nervous or restless; suicidal thoughts or actions; or other changes in mood or behavior, and all other concerns. Patient educated on medication compliance.  Patient verbalized understanding and is agreeable to taking Fluoxetine. Addressed all questions and concerns.  Provider informed patient had not requested refill of 10 mg dose ordered previously, therefore, a new prescription was sent for the 20 mg cap, MA informed patient.     Symptoms of depression, anxiety, and skin picking is ongoing, as expected patient mood is down due to medication adjustments, which was reiterated  to patient today while switching medications to expect a decreased mood, increased irritability, etc.  Will see patient back in office in 2 weeks to determine toleration of Wellbutrin wean.  Patient was asking for Valium today as a relative takes opioids with Valium, again reiterated to patient the increased risk of overdose death, suicide, worse treatment outcomes, and increased health service use when co-prescribing of opioids and benzodiazepines regardless of whether prescribed for comorbid chronic pain, anxiety, or insomnia.  Patient to contact provider if symptoms worsen or fail to improve.       1/30/23:   Continue Buspar 30 mg by mouth twice daily to target anxiety.     Continue Wellbutrin  mg by mouth daily in the morning to target depression and anxiety.      Continue Remeron 45 mg by mouth nightly to target insomnia and depression.  Taking 1.5 tabs of the 30 mg to equal 45 mg.     Stop Risperdal due to D2D interaction with Prozac. And ineffectiveness, patient has been taking for less than 2 months and missed doses reported.     Continue Hydroxyzine 50 mg by mouth 3 times daily as needed to target itching and anxiety.  Refilled today, able to receive 2/6/23. Risks, benefits, alternatives discussed with patient including sedation, dizziness, fall risk, GI upset, and risk of increased CNS depression and elevated heart rate if taken with other antihistamines.  After discussion of these risks and benefits, the patient voiced understanding and agreed to proceed.    Start Prozac 10 mg by mouth daily in the morning to target skin picking disorder, anxiety, and depression.  Informed patient due to potential D2D with Wellbutrin as dose of Prozac would be doubled. Canceled initial order for 20 mg due to this potential D2D interaction.  Discussed all risks, benefits, alternatives, and side effects of Fluoxetine including but not limited to GI upset, decreased appetite, sexual dysfunction, bleeding risk, seizure  risk, insomnia, anxiety, drowsiness, headache, tremor, nervousness, activation of hossein or hypomania, increased fragility fracture risk, hyponatremia, ocular effects, serotonin syndrome, hypersensitivity reaction, and activation of suicidal ideation and behavior.  Discussed the need for patient to immediately call the office for any new or worsening symptoms, such as worsening depression; feeling nervous or restless; suicidal thoughts or actions; or other changes in mood or behavior, and all other concerns. Patient educated on medication compliance.  Patient verbalized understanding and is agreeable to taking Fluoxetine. Addressed all questions and concerns.     Patient tolerated Cymbalta taper, continues to struggle with skin picking to scalp which has worsened mood.  Will start Prozac as discussed previously, due to potential D2D interaction with Wellbutrin, the dose of Wellbutrin may need to be decreased before increasing Prozac dose, which was discussed today. Patient instructed to discuss Gabapentin with Pain management at upcoming appointment 2/23/23 and to update provider regarding current provider for mental health and medications as past note did not have accurate information. Patient to contact provider if symptoms worsen or fail to improve.       1/9/23:   Continue Buspar 30 mg by mouth twice daily to target anxiety.     Continue Wellbutrin  mg by mouth daily in the morning to target depression and anxiety.      Continue Remeron 45 mg by mouth nightly to target insomnia and depression.  Taking 1.5 tabs of the 30 mg to equal 45 mg.     Continue Risperdal 1 mg by mouth twice daily to target delusional thoughts as patient believed there was a bug infestation on scalp. No signs of TD noted today.     Start Taper of Cymbalta FROM 120 mg by mouth daily in the morning TO the following:  Cymbalta 30 mg caps ordered-take 3 to equal 90 mg daily for 7 days 1/10-1/16 THEN  Decrease to 60 mg (2 of the 30 mg cap)  daily for 7 days 1/17-1/23/23  THEN  Decrease 30 mg (1 cap of 30 mg) daily for 7 days 1/24-1/30/23. THEN STOP    Symptoms of withdrawal:  GI flu-like symptoms, paresthesias (prickling or tingling sensation, like pins and needs), irritability, insomnia, dizziness, or vivid dreams     Increase Hydroxyzine FROM 25 mg 4 times daily as needed TO 50 mg by mouth 3 times daily as needed to target itching and anxiety.  Risks, benefits, alternatives discussed with patient including sedation, dizziness, fall risk, GI upset, and risk of increased CNS depression and elevated heart rate if taken with other antihistamines.  After discussion of these risks and benefits, the patient voiced understanding and agreed to proceed.    Due to chronic skin picking to head causing significant distress to patient, will plan on tapering down from Cymbalta and switching to Prozac 10 mg.  Discussed risks of withdrawal today with patient.  Also, discussed plans to taper down from Wellbutrin as there is a potential D2D interaction with combination of Wellbutrin with Prozac.  Patient agreeable to plan.    Current symptoms of depression and anxiety are under fair control with current medication regimen, however, due to severe tactile hallucinations her anxiety has been elevated.    Patient to contact provider if symptoms worsen or fail to improve.       11/21/22:   Therapy: Currently Seeing a Therapist [facility] via telephone; Patient would like to seek a therapist in [place] due to current therapist is only via phone and has to wait until Oct. For next appointment. Advised patient to contact insurance company to determine available therapist in area and/or check [employer] and filter results based on needs. And to contact provider if a referral order is needed.  Continue Buspar 30 mg by mouth twice daily to target anxiety.  Instructed to take 2 of the 15 mg on hand to equal 30 mg.     Continue Wellbutrin  mg by mouth daily  in the morning to target depression and anxiety.      Continue Remeron 45 mg by mouth nightly to target insomnia and depression.  Taking 1.5 tabs of the 30 mg to equal 45 mg.     Advised patient to inquire with therapist or search on own about  local support groups, Yars[employer] groups, library, and/or adult day a few days per week which will get patient out of the house and develop relationships with other people her age.    Patient continues with ongoing stressors in home, which have not improved, highly encouraged patient to get out of home several days per week which I believe will provide benefit for patient due to ongoing stress she complains of in the home. Patient continues to ask for other medication or an increase in medications.  Patient is uncertain of amount of Cymbalta she is taking and is to check with daughter.  Patient is on chronic opioids and benzodiazepines will not be added.  Currently on max daily dose of Buspar, Cymbalta, and recently increased both Buspar and Wellbutrin SR at last appointment.  May consider increasing Wellbutrin SR to an additional dose in the evening, however, due to suspected worsening insomnia with Wellbutrin SR, this will have to be revisited.    Patient to contact provider if symptoms worsen or fail to improve.       11/11/22: TELEPHONE  Patient called back and stated her Depression and Anxiety are bad and she don't know why Trish doesn't understand that.  I told patient that Trish is trying to help the patient but giving her Benzodiazepine's is not an option.  I told her that Trish had said she needs to go to therapy and find other ways of coping rather than just taking a pill.  Patient said she is seeing a Therapist at [facility] (formerly [facility]  I explained to her that at her age there is more chance of falling with the Benzodiazepine's and I am sure she doesn't want to fall and break a hip.  Patient said OK I told her to have a good weekend she said you too and hung up      10/17/22:    Current with therapy, [facility] with [facility] via telephone; Patient would like to seek a therapist in [place] due to current therapist is only via phone and has to wait until Oct. For next appointment. Advised patient to contact insurance company to determine available therapist in area and/or check [facility] [facility] and filter results based on needs. And to contact provider if a referral order is needed.    Increase Buspar from 15 mg to 30 mg by mouth twice daily to target anxiety.  Instructed to take 2 of the 15 mg on hand to equal 30 mg.     Increase Wellbutrin SR from 150 mg to 200 mg by mouth daily in the morning.  Risks, benefits, alternatives discussed with patient including nausea, GI upset, increased energy, exacerbation of irritability, insomnia, lowering of seizure threshold.  After discussion of these risks and benefits, the patient voiced understanding and agreed to proceed.Risks, benefits, alternatives discussed with patient including GI upset, nausea vomiting diarrhea, theoretical decrease of seizure threshold predisposing the patient to a slightly higher seizure risk, headaches, sexual dysfunction, serotonin syndrome, bleeding risk, increased suicidality in patients 24 years and younger.  After discussion of these risks and benefits, the patient voiced understanding and agreed to proceed.    Continue Remeron 45 mg by mouth nightly to target insomnia and depression.  Taking 1.5 tabs of the 30 mg to equal 45 mg.     Discontinued Clonazepam due to limited use from 5/2022-9/9/22, and filled once on 9/9/22.      Patient depression is chronic and ongoing, will increase Wellbutrin today and Buspar to help with anxiety as patient will no longer be given Klonopin.  Patient had limited use of tapered dose originally ordered per  5/19/22, patient did fill early Sept. However, due to limited use and tolerating taper over 4 months, will not reorder. Patient also has increased risk of falls, memory  impairment and other comorbid conditions.  Encouraged patient to have a visual calendar for all family to see and to have all appointments listed to prevent further over scheduling as daughter transports patient to appointments. Patient current stressors are environmental and situational, encourage use of coping mechanisms and frequent visits with therapist. Informed patient weekly visits were not appropriate with this provider as medications can take up to 4-6 weeks for effectiveness.  Will see patient back in 5 weeks. Patient to contact provider if symptoms worsen or fail to improve.     9/9/22:   Current with therapist, [facility]  CHANGE Buspar to 15 mg by mouth twice daily to target anxiety.  Patient reports taking as needed, possibly daily, will remain at current dose and make routine twice daily instead of as previously prescribed as 3 times daily.  Risks, benefits, alternatives discussed with patient including nausea, GI upset, mild sedation, falls risk.  After discussion of these risks and benefits, the patient voiced understanding and agreed to proceed. Instructed to Place in pill planner for am and pm     INCREASE Wellbutrin SR from 100 mg to 150 mg by mouth daily in the morning.  Risks, benefits, alternatives discussed with patient including nausea, GI upset, increased energy, exacerbation of irritability, insomnia, lowering of seizure threshold.  After discussion of these risks and benefits, the patient voiced understanding and agreed to proceed.Risks, benefits, alternatives discussed with patient including GI upset, nausea vomiting diarrhea, theoretical decrease of seizure threshold predisposing the patient to a slightly higher seizure risk, headaches, sexual dysfunction, serotonin syndrome, bleeding risk, increased suicidality in patients 24 years and younger.  After discussion of these risks and benefits, the patient voiced understanding and agreed to proceed.    Remeron order is for 45 mg by  mouth nightly to target insomnia and depression.  Patient reported only taking 30 mg and failure to read bottle.  INSTRUCTED PT  TO TAKE 1.5 tabs (break one tablet in half to equal 15 mg with the 1 whole tablet of 30 mg to equal 45 mg) Risks, benefits, alternatives discussed with patient including GI upset, sedation, dizziness with falls risk, increased appetite.  After discussion of these risks and benefits, the patient voiced understanding and agreed to proceed.    Instructed patient : HAVE YOUR DAUGHTER PUT THE NEW WELLBUTRIN DOSE IN YOUR PLANNER,  AND THE BUSPAR IN BOTH MORNING AND NIGHT BOXES, AND the REMERON she or you will need to break some of the tablets in half to equal correct dose. Next time I will fill for the 45 mg tabs.     SUGGEST YOUR DAUGHTER MANAGE ALL OF YOUR MEDICATIONS TO PREVENT CONFUSION AND/OR MISSED DOSES, WANT YOU TO FEEL BETTER AND NEED YOUR MEDICATIONS TO BE TAKEN AS ORDERED.    Continue Clonazepam 1 mg by mouth daily as needed to target anxiety. Risks, benefits, and alternatives discussed with patient including sedation/falls risk, dizziness, disinhibition, headache, fatigue, dry mouth, blurred vision, constipation, nausea, diarrhea, heartburn, unusual taste in mouth, urinary retention, increased appetite, restlessness, sexual dysfunction, sweating, weight gain, cardiac arrhythmias, seizures, and fall risk.  After discussion of these risks and benefits, patient voiced understanding and agreed to proceed.  Delmer reviewed, NEW prescription per  5/19/22, tapering down dose, as patient was prescribed twice daily.  Last Dispensed 5/19/22 #30.    Plan to obtain UDS and sign CSA in future, although patient admits to taking, she has not filled since new prescription 5/19/22 which has 1 refill. Will plan to inquire further at next appointment, if patient is in fact rarely using will either discontinue or decrease to 0.5 mg.      Patient presentation seems most consistent with anxiety  and depression.  Education provided regarding safety concerns with current medication management. Instructions provided to have daughter manage all medications due to memory impairment as patient was inconsistent with information regarding doses, frequency, and adherence of medications today.   Patient to contact provider if symptoms worsen or fail to improve.        Patient screened positive for depression based on a PHQ-9 score of 16 on 5/9/2025. Follow-up recommendations include: Prescribed antidepressant medication treatment and Suicide Risk Assessment performed.       TREATMENT PLAN/GOALS: Continue supportive psychotherapy efforts and medications as indicated. Treatment and medication options discussed during today's visit. Patient acknowledged and verbally consented to continue with current treatment plan and was educated on the importance of compliance with treatment and follow-up appointments.    MEDICATION ISSUES:  KOSTA reviewed as expected.   Discussed medication options and treatment plan of prescribed medication as well as the risks, benefits, and side effects including potential falls, possible impaired driving and metabolic adversities among others. Patient is agreeable to call the office with any worsening of symptoms or onset of side effects. Patient is agreeable to call 911 or go to the nearest ER should he/she begin having SI/HI. No medication side effects or related complaints today.     MEDS ORDERED DURING VISIT:  New Medications Ordered This Visit   Medications    mirtazapine (REMERON) 7.5 MG tablet     Sig: Take 1 tablet by mouth Every Night.     Dispense:  1 tablet     Refill:  0     Error does not need refilled current prescription ends at the end of June.    FLUoxetine (PROzac) 40 MG capsule     Sig: Take 1 capsule by mouth Every Morning for 90 days. Indications: Generalized Anxiety Disorder, Major Depressive Disorder     Dispense:  90 capsule     Refill:  0     Increasing from 30 to 40 mg        Return in about 6 weeks (around 6/20/2025) for Video visit, medication check.       I spent 41 minutes caring for Chasity on this date of service. This time includes time spent by me in the following activities: preparing for the visit, reviewing tests, obtaining and/or reviewing a separately obtained history, performing a medically appropriate examination and/or evaluation, counseling and educating the patient/family/caregiver, ordering medications, tests, or procedures, referring and communicating with other health care professionals, documenting information in the medical record, care coordination, and scheduling    This document has been electronically signed by DIANE Sanches  May 9, 2025 11:57 EDT      Part of this note may be an electronic transcription/translation of spoken language to printed text using the Dragon Dictation System.

## 2025-05-09 NOTE — TELEPHONE ENCOUNTER
Called [facility] for patient to return call to schedule follow up video for 6 weeks from now.  Advised also if she is unable to reach me to call our [place] office and they will gladly schedule her.

## 2025-05-22 DIAGNOSIS — I50.32 CHRONIC DIASTOLIC HEART FAILURE: ICD-10-CM

## 2025-05-22 DIAGNOSIS — M79.89 LEG SWELLING: ICD-10-CM

## 2025-05-22 DIAGNOSIS — G25.81 RESTLESS LEG SYNDROME: ICD-10-CM

## 2025-05-22 NOTE — PROGRESS NOTES
"Progress Note    Date: 05/30/2025  Time In: 11:01  Time Out: 12:00    Patient Legal Name: Chasity Torres  Patient Age: 69 y.o.    Mode of visit: In person  Location of provider: [facility] [address], [place] [address]  Location of patient: Office      CHIEF COMPLAINT: anxiety, treatment resistant depression, conflict in the home     Subjective   History of Present Illness   Chasity is a 69 y.o. female who presents today as a follow-up for continued psychotherapy. Patient reported she struggles with telling her son 'no' and is afraid if she does tell him no that he will make his daughter move back with him.  Patient stated her daughter that lives with her has to use her car because hers is broken.  Patient shared her daughter continues to be \"suárez\" and get on her nerves. Patient advised it frustrates her when she asks her daughter to help out and she says something like not right now or maybe later and does not end up helping.  Patient reported she is tired of things being the way they are with her family and wants to make changes. Patient reported high anxiety and depression levels today due to stress regarding her children.  Patient is voluntarily requesting to participate in outpatient therapy at [facility].  Patient rated anxiety at 9 and depression at 8 today on a 0-10 scale where 0= no symptoms.     History obtained from referring provider's note on 3/7/25:  Past Psychiatric History:  Began Treatment: while in her 20's  Diagnoses:Depression and Anxiety  Psychiatrist: -talked on phone-managed meds for 8 months prior to SOC with  in 10/2021; Also at [facility] for 2 yrs [id number]  Therapist: Sendy with [facility] (no in office visits) (telephone) next appt Oct. 2022 (for the last 8 months) prior seen Jessica at [facility] in [place] for 1-2 yrs  Admission History:Denies  Medication Trials: Several for which pt unable to recall if effective: " Lexapro(ineff.),Abilify(eff),Prozac,Zoloft,Trintellix,Paxil,Celexa, (can't remember/uncertain); SGA-worsened tremors-per neuro medication induced parkisonism-Latuda,Rexulti,Seroquel (wt gain,helped with insomnia&anxiety); Lamictal-ineff for anxiety; Xanax-weaned, Hydroxyzine(ineff 1 mo. 2022-2022)Trazodone 2021-2022 somewhat helpful for anxiety though made pt tired, Ambien [id number]?; retried Effexor XR, Wellbutrin,Cymbalta  Risperdal 1 mo approximately stopped due to starting Prozac; Hydroxyzine 100 mg 3 times daily ineffective for itching and anxiety; Zyprexa-ineffective; Prozac up to 80 mg-ineffective; Klonopin-weaned, effective; Vraylar up to 3 mg-parkinsonism tremors worsened per Neurologist recommendations; Buspar up to 20 mg 3 times daily-no longer effective  Self Harm: Denies  Suicide Attempts:Denies   Psychosis, Anxiety, Depression: Denies    Assessment    Mental Status Exam     Appearance: good hygiene and dressed appropriately for the weather  Behavior: calm  Cooperation:  engaged, cooperative, attentive, and friendly  Eye Contact:  good  Affect:  congruent  Mood: depressed and anxious  Speech: talkative  Thought Process:  organized  Thought Content: appropriate  Suicidal: denies  Homicidal:  denies  Hallucinations:  denies  Memory:  intact  Orientation:  person, place, time, and situation  Reliability:  reliable  Insight:  good  Judgment:  good    Clinical Intervention       ICD-10-CM ICD-9-CM   1. Major depressive disorder, recurrent episode, moderate  F33.1 296.32   2. Generalized anxiety disorder  F41.1 300.02   3. Stress and adjustment reaction  F43.29 309.89        Individual psychotherapy was provided utilizing CBT and person-centered techniques to provide symptom relief, build rapport, encourage expression of thoughts and feelings, support self-esteem, establish new coping skills, promote emotion regulation, develop healthy communication skills, manage stress, recognize patterns  of behavior, acknowledge sources of feelings and behaviors, build confidence, assess symptoms, gather history, establish therapeutic alliance, and provide psychoeducation.  Therapist utilized open-ended questions to encourage the development of a positive therapeutic relationship and open communication.  Therapist normalized/validated patient’s thoughts and feelings as appropriate. Patient rated anxiety at 9 and depression at 8 today on a 0-10 scale where 0= no symptoms.Processed patient's feelings/thoughts regarding her daughter's bx that bothers her and wanting to start telling her son 'no'. Provided handouts regarding setting boundaries and saying no and modeled ways to say no assertively. Encouraged patient to practice saying no to help make it easier when a situation arises where she wants to say no.    Plan   Plan & Goals     Moving forward, we will continue to build rapport and reinforce and build upon effective coping strategies utilizing CBT and person-centered techniques. Follow up on handouts and check in to see if patient has been able to say no when she wanted to.    Patient acknowledged and verbally consented to continue working toward resolving current treatment plan goals and was educated on the importance of participation in the therapeutic process.  Patient will remain compliant with medication regimen as prescribed. Discuss any medication side effects, questions or concerns with prescribing provider.  Call 911 or present to the nearest emergency room in an emergency situation.  National Suicide Prevention Lifeline: Call 988. The Lifeline provides 24/7, free and confidential support for people in distress, prevention and crisis resources.  Crisis Text Line  Text HOME To 667093    Return in about 2 weeks (around 6/13/2025).    ____________________  This document has been electronically signed by Lila Lockhart LCSW  May 30, 2025 12:21 EDT    Part of this note may be an electronic  transcription/translation of spoken language to printed text using the Dragon Dictation System.

## 2025-05-26 RX ORDER — FLUTICASONE PROPIONATE 50 MCG
2 SPRAY, SUSPENSION (ML) NASAL DAILY
Qty: 16 G | Refills: 0 | Status: SHIPPED | OUTPATIENT
Start: 2025-05-26

## 2025-05-26 RX ORDER — PRAMIPEXOLE DIHYDROCHLORIDE 0.5 MG/1
0.5 TABLET ORAL 3 TIMES DAILY
Qty: 270 TABLET | Refills: 0 | Status: SHIPPED | OUTPATIENT
Start: 2025-05-26

## 2025-05-26 RX ORDER — SPIRONOLACTONE 25 MG/1
25 TABLET ORAL 2 TIMES DAILY
Qty: 60 TABLET | Refills: 5 | Status: SHIPPED | OUTPATIENT
Start: 2025-05-26

## 2025-05-30 ENCOUNTER — CLINICAL SUPPORT (OUTPATIENT)
Dept: FAMILY MEDICINE CLINIC | Age: 70
End: 2025-05-30
Payer: MEDICARE

## 2025-05-30 ENCOUNTER — OFFICE VISIT (OUTPATIENT)
Age: 70
End: 2025-05-30
Payer: MEDICARE

## 2025-05-30 DIAGNOSIS — F43.29 STRESS AND ADJUSTMENT REACTION: ICD-10-CM

## 2025-05-30 DIAGNOSIS — F33.1 MAJOR DEPRESSIVE DISORDER, RECURRENT EPISODE, MODERATE: Primary | ICD-10-CM

## 2025-05-30 DIAGNOSIS — F41.1 GENERALIZED ANXIETY DISORDER: ICD-10-CM

## 2025-05-30 PROCEDURE — 96372 THER/PROPH/DIAG INJ SC/IM: CPT | Performed by: FAMILY MEDICINE

## 2025-05-30 RX ADMIN — CYANOCOBALAMIN 1000 MCG: 1000 INJECTION, SOLUTION INTRAMUSCULAR; SUBCUTANEOUS at 12:12

## 2025-06-02 DIAGNOSIS — F41.9 ANXIETY: ICD-10-CM

## 2025-06-02 NOTE — TELEPHONE ENCOUNTER
Controlled refill request:  Requested Prescriptions     Pending Prescriptions Disp Refills    clonazePAM (KlonoPIN) 0.5 MG tablet 60 tablet 0     Sig: Take 1 tablet by mouth 2 (Two) Times a Day As Needed for Anxiety.      Last OV:  4/16/2025  Next OV:  6/12/2025  Last fill:  4- #60  Last tox:  11-

## 2025-06-04 ENCOUNTER — OFFICE VISIT (OUTPATIENT)
Dept: ENDOCRINOLOGY | Age: 70
End: 2025-06-04
Payer: MEDICARE

## 2025-06-04 ENCOUNTER — CLINICAL SUPPORT (OUTPATIENT)
Dept: FAMILY MEDICINE CLINIC | Age: 70
End: 2025-06-04
Payer: MEDICARE

## 2025-06-04 VITALS
HEART RATE: 51 BPM | SYSTOLIC BLOOD PRESSURE: 126 MMHG | OXYGEN SATURATION: 98 % | DIASTOLIC BLOOD PRESSURE: 84 MMHG | BODY MASS INDEX: 46.67 KG/M2 | WEIGHT: 273.4 LBS | HEIGHT: 64 IN

## 2025-06-04 DIAGNOSIS — E66.01 OBESITY, MORBID, BMI 50 OR HIGHER: ICD-10-CM

## 2025-06-04 DIAGNOSIS — Z79.899 HIGH RISK MEDICATION USE: Primary | ICD-10-CM

## 2025-06-04 DIAGNOSIS — E11.9 TYPE 2 DIABETES MELLITUS WITHOUT COMPLICATION, WITHOUT LONG-TERM CURRENT USE OF INSULIN: Primary | ICD-10-CM

## 2025-06-04 LAB
AMPHET+METHAMPHET UR QL: NEGATIVE
AMPHETAMINES UR QL: NEGATIVE
BARBITURATES UR QL SCN: NEGATIVE
BENZODIAZ UR QL SCN: NEGATIVE
BUPRENORPHINE SERPL-MCNC: NEGATIVE NG/ML
CANNABINOIDS SERPL QL: NEGATIVE
COCAINE UR QL: NEGATIVE
EXPIRATION DATE: ABNORMAL
Lab: ABNORMAL
MDMA UR QL SCN: NEGATIVE
METHADONE UR QL SCN: NEGATIVE
MORPHINE/OPIATES SCREEN, URINE: POSITIVE
OXYCODONE UR QL SCN: POSITIVE
PCP UR QL SCN: NEGATIVE

## 2025-06-04 PROCEDURE — 80305 DRUG TEST PRSMV DIR OPT OBS: CPT | Performed by: FAMILY MEDICINE

## 2025-06-04 RX ORDER — CLONAZEPAM 0.5 MG/1
0.5 TABLET ORAL 2 TIMES DAILY PRN
Qty: 60 TABLET | Refills: 0 | Status: SHIPPED | OUTPATIENT
Start: 2025-06-04 | End: 2025-06-05 | Stop reason: SDUPTHER

## 2025-06-04 NOTE — PROGRESS NOTES
Chief complaint/Reason for consult: T2DM    HPI:   - 69 year old female here for management of diabetes mellitus type 2  - Last seen in 9/2024  - No change since last visit  - No known complications to date  - Is currently taking Mounjaro 15 mg weekly  - Is complaining of difficulty losing weight    The following portions of the patient's history were reviewed and updated as appropriate: allergies, current medications, past family history, past medical history, past social history, past surgical history, and problem list.    Objective     Vitals:    06/04/25 1107   BP: 126/84   Pulse: 51   SpO2: 98%        Physical Exam  Vitals reviewed.   Constitutional:       Appearance: Normal appearance. She is obese.   HENT:      Head: Normocephalic and atraumatic.   Eyes:      General: No scleral icterus.  Pulmonary:      Effort: Pulmonary effort is normal. No respiratory distress.   Neurological:      Mental Status: She is alert.      Gait: Gait normal.   Psychiatric:         Mood and Affect: Mood normal.         Behavior: Behavior normal.         Thought Content: Thought content normal.         Judgment: Judgment normal.       Assessment & Plan   Type 2 DM, controlled  Obesity (BMI of 46)  - On Mounjaro 15 mg weekly with A1c is normal range  - Discussed dietary changes that would help with weight loss     - Return to clinic in 6 months

## 2025-06-05 DIAGNOSIS — F41.9 ANXIETY: ICD-10-CM

## 2025-06-05 DIAGNOSIS — Z79.899 HIGH RISK MEDICATION USE: Primary | ICD-10-CM

## 2025-06-05 RX ORDER — CLONAZEPAM 0.5 MG/1
0.5 TABLET ORAL 2 TIMES DAILY PRN
Qty: 60 TABLET | Refills: 0 | Status: SHIPPED | OUTPATIENT
Start: 2025-06-05

## 2025-06-06 PROCEDURE — G0480 DRUG TEST DEF 1-7 CLASSES: HCPCS | Performed by: FAMILY MEDICINE

## 2025-06-11 LAB
LABORATORY COMMENT REPORT: NORMAL
OXYCODONE+OXYMORPHONE UR QL SCN: NEGATIVE

## 2025-06-12 ENCOUNTER — OFFICE VISIT (OUTPATIENT)
Dept: FAMILY MEDICINE CLINIC | Age: 70
End: 2025-06-12
Payer: MEDICARE

## 2025-06-12 VITALS
WEIGHT: 287.2 LBS | SYSTOLIC BLOOD PRESSURE: 131 MMHG | OXYGEN SATURATION: 96 % | DIASTOLIC BLOOD PRESSURE: 78 MMHG | TEMPERATURE: 97.8 F | BODY MASS INDEX: 49.03 KG/M2 | HEART RATE: 61 BPM | HEIGHT: 64 IN

## 2025-06-12 DIAGNOSIS — M25.512 CHRONIC PAIN OF BOTH SHOULDERS: ICD-10-CM

## 2025-06-12 DIAGNOSIS — E78.00 HYPERCHOLESTEROLEMIA: ICD-10-CM

## 2025-06-12 DIAGNOSIS — M25.561 CHRONIC PAIN OF BOTH KNEES: ICD-10-CM

## 2025-06-12 DIAGNOSIS — R60.0 LOCALIZED EDEMA: ICD-10-CM

## 2025-06-12 DIAGNOSIS — F41.9 ANXIETY AND DEPRESSION: ICD-10-CM

## 2025-06-12 DIAGNOSIS — M54.50 CHRONIC BILATERAL LOW BACK PAIN, UNSPECIFIED WHETHER SCIATICA PRESENT: ICD-10-CM

## 2025-06-12 DIAGNOSIS — F32.A ANXIETY AND DEPRESSION: ICD-10-CM

## 2025-06-12 DIAGNOSIS — G89.29 CHRONIC PAIN OF BOTH KNEES: ICD-10-CM

## 2025-06-12 DIAGNOSIS — M25.562 CHRONIC PAIN OF BOTH KNEES: ICD-10-CM

## 2025-06-12 DIAGNOSIS — G89.29 CHRONIC PAIN OF BOTH SHOULDERS: ICD-10-CM

## 2025-06-12 DIAGNOSIS — N18.30 STAGE 3 CHRONIC KIDNEY DISEASE, UNSPECIFIED WHETHER STAGE 3A OR 3B CKD: ICD-10-CM

## 2025-06-12 DIAGNOSIS — G89.29 CHRONIC BILATERAL LOW BACK PAIN, UNSPECIFIED WHETHER SCIATICA PRESENT: ICD-10-CM

## 2025-06-12 DIAGNOSIS — K21.9 GASTROESOPHAGEAL REFLUX DISEASE, UNSPECIFIED WHETHER ESOPHAGITIS PRESENT: ICD-10-CM

## 2025-06-12 DIAGNOSIS — M79.89 LEG SWELLING: Primary | ICD-10-CM

## 2025-06-12 DIAGNOSIS — G62.9 PERIPHERAL POLYNEUROPATHY: ICD-10-CM

## 2025-06-12 DIAGNOSIS — I50.32 CHRONIC DIASTOLIC HEART FAILURE: ICD-10-CM

## 2025-06-12 DIAGNOSIS — M25.511 CHRONIC PAIN OF BOTH SHOULDERS: ICD-10-CM

## 2025-06-12 DIAGNOSIS — E55.9 VITAMIN D DEFICIENCY: ICD-10-CM

## 2025-06-12 RX ORDER — TORSEMIDE 20 MG/1
40 TABLET ORAL 2 TIMES DAILY
Qty: 120 TABLET | Refills: 2 | Status: SHIPPED | OUTPATIENT
Start: 2025-06-12

## 2025-06-12 RX ORDER — CELECOXIB 200 MG/1
200 CAPSULE ORAL NIGHTLY
Qty: 30 CAPSULE | Refills: 2 | Status: SHIPPED | OUTPATIENT
Start: 2025-06-12

## 2025-06-12 NOTE — ASSESSMENT & PLAN NOTE
Stable on atorvastatin.  No changes to current meds or treatment plan.  She should avoid Cholesterol in her diet

## 2025-06-12 NOTE — ASSESSMENT & PLAN NOTE
Will increase Demadex to 40 mg twice daily dosing and continue spironolactone 25 mg twice daily.  She is to follow-up with cardiology as directed.

## 2025-06-12 NOTE — PROGRESS NOTES
Chasity Torres presents to [facility].    Chief Complaint:  joint pain    Subjective     History of Present Illness:  HPI    Chasity continues to gain weight, she is having a lot more fluid retention in LE today.  She has been on Mounjaro 15 mg weekly and had nice weight loss but now the send she has had a 12 pound weight gain which appears to be secondary to fluid overload.  She denies any shortness of air or chest pain today.  She has tolerated Mounjaro well and her bowel movements have been stable.  Last echo showed LV ejection fraction 65 to 70% with normal valvular structure and function.  Cardiologist for her CHF is Dr George.  She is on Demadex 20 mg twice daily and spironolactone 25 mg twice daily       She also complains of more joint pain in shoulders with decreased ROM in L UE.  In addition she has chronic knee pain and sees orthopedics for steroid injections.  Her knee pain is worse also.  She would like to start some kind of anti-inflammatory pain medication but is aware that this may affect her kidney function and she has stage IIIa kidney insufficiency.  She is currently using diclofenac gel and is an effective and she is aware she must stop this gel if she starts the Celebrex and she must get her kidney function checked every couple months    She has no acute worsening of her COPD symptoms.    She presents with chronic depression and sadness, she lives with chronic anxiety, today is her F2F for refill of her clonazepam which keep her calm and helps with her tremor, it helps her also to be functional.  She has seen psychiatry, Trish Paz.  Other medications include. She is also on Prozac and Remeron.  She has no suicidal or homicidal thoughts.  She is sleeping okay at night.      She takes Mirapex for her chronic restless leg syndrome and she is on primidone for her chronic tremor.  Her psychiatric medicines were adjusted to help so help with the tremor    She presents with  type II diabetes type, currently on Mounjaro and tolerates med well.   She is tolerating medication well.  Her last hemoglobin A1c was 5. 6 %.  She rarely checks her home blood sugar.  She denies hypoglycemia symptoms.  She has peripheral neuropathy but no other acute foot issues.  She is aware she needs to see the eye doctor once a year.  She defers dietitian referral and is not following a diabetic diet.      She presents with chronic COPD and today she is doing well without any symptoms.   Current treatment includes albuterol, Trelegy, DuoNebs and O2 as needed she wears CPAP at night    She presents with ongoing memory issues and feels like she is improved on the increase of her Aricept and Namenda and tolerates both medications well.    She presents with chronic hypercholesterolemia, she is now on atorvastatin and tolerating medication well.  She is also supposed be following a low-cholesterol diet her last cholesterol was 226, triglycerides 180, HDL 48 and .  Liver function test were normal    She is on iron supplementation for chronic iron deficiency    She is on vitamin D supplementation for vitamin D deficiency              Result Review   The following data was reviewed by Ami Diego MD on 06/12/2025.  Lab Results   Component Value Date    WBC 5.86 03/26/2025    HGB 14.5 03/26/2025    HCT 45.6 03/26/2025    MCV 96.4 03/26/2025     03/26/2025     Lab Results   Component Value Date    GLUCOSE 94 04/16/2025    BUN 13 04/16/2025    CREATININE 1.18 (H) 04/16/2025     04/16/2025    K 4.1 04/16/2025    CL 98 04/16/2025    CALCIUM 9.9 04/16/2025    PROTEINTOT 7.2 02/14/2025    ALBUMIN 4.0 03/26/2025    ALT 19 02/14/2025    AST 16 02/14/2025    ALKPHOS 130 (H) 02/14/2025    BILITOT <0.2 02/14/2025    GLOB 4.0 02/14/2025    AGRATIO 0.8 02/14/2025    BCR 11.0 04/16/2025    ANIONGAP 11.0 04/16/2025    EGFR 50.1 (L) 04/16/2025     Lab Results   Component Value Date    CHOL 226 (H) 02/07/2025  "   CHLPL 166 12/16/2020    TRIG 180 (H) 02/07/2025    HDL 48 02/07/2025     (H) 02/07/2025     Lab Results   Component Value Date    TSH 0.774 02/21/2024     Lab Results   Component Value Date    HGBA1C 5.60 02/07/2025     No results found for: \"PSA\"  Lab Results   Component Value Date    Iron 104 11/21/2024    Iron Saturation (TSAT) 31 11/21/2024      Lab Results   Component Value Date    HPHG23MM 33.3 02/07/2025               Assessment and Plan:   Assessment & Plan  Peripheral polyneuropathy  Patient's Lyrica was changed from 75 mg twice daily to 100 mg twice daily and she was unaware and has not been taking the higher dose for her chronic pain.  She is under management with neurology.  She now understands how to take her medication       Chronic bilateral low back pain, unspecified whether sciatica present  Overall stable.  No changes to current meds or treatment plan       Leg swelling  Will increase Demadex to 40 mg twice daily dosing and continue spironolactone 25 mg twice daily.  She is to follow-up with cardiology as directed.  She will monitor her weights and call me and let me know how she is doing  Orders:    Basic metabolic panel; Future    Localized edema  Will increase Demadex to 40 mg twice daily dosing and continue spironolactone 25 mg twice daily.  She is to follow-up with cardiology as directed.       Anxiety and depression  She sees psychiatry and is somewhat stable with current treatment plan.  She denies suicidal or homicidal ideation.  She is sleeping okay at night         Stage 3 chronic kidney disease, unspecified whether stage 3a or 3b CKD  Starting her on Celebrex for her chronic joint pain.  Will monitor her kidney function in 1 month to make sure it is not affecting her function, she is to push fluids 64 ounces a day    Orders:    Basic metabolic panel; Future    Chronic diastolic heart failure  Will increase Demadex to 40 mg twice daily dosing and continue spironolactone 25 mg " twice daily.  She is to follow-up with cardiology as directed.       Vitamin D deficiency  Stable vitamin D supplementation.  No changes to current meds or treatment plan       Gastroesophageal reflux disease, unspecified whether esophagitis present  Well-controlled on pantoprazole.  She is to avoid spicy acidic food in her diet       Hypercholesterolemia  Stable on atorvastatin.  No changes to current meds or treatment plan.  She should avoid Cholesterol in her diet         Chronic pain of both shoulders  Will start her on Celebrex and monitor her kidney function closely  Orders:    Ambulatory Referral to Physical Therapy for Evaluation & Treatment    Chronic pain of both knees  Will start on Celebrex and monitor her kidney function closely  Orders:    Ambulatory Referral to Physical Therapy for Evaluation & Treatment               Objective     Medications:  Current Outpatient Medications   Medication Instructions    albuterol sulfate  (90 Base) MCG/ACT inhaler 2 puffs, Inhalation, Every 4 Hours PRN    atorvastatin (LIPITOR) 10 mg, Oral, Daily    Blood Glucose Monitoring Suppl (Blood Glucose Monitor System) w/Device kit Use Daily to test blood glucose.    Calcium Carb-Cholecalciferol (Oyster Shell Calcium + D3) 500-10 MG-MCG tablet 1 each, Oral, 2 Times Daily    celecoxib (CELEBREX) 200 mg, Oral, Nightly    clonazePAM (KLONOPIN) 0.5 mg, Oral, 2 Times Daily PRN    Diclofenac Sodium (VOLTAREN) 4 g, Topical, 4 Times Daily    donepezil (ARICEPT) 10 mg, Oral, Nightly    FeroSul 325 mg, Oral, Daily With Breakfast    FLUoxetine (PROzac) 40 MG capsule Take 1 capsule by mouth Every Morning for 90 days.    fluticasone (FLONASE) 50 MCG/ACT nasal spray 2 sprays, Nasal, Daily    Fluticasone-Umeclidin-Vilant (TRELEGY ELLIPTA) 200-62.5-25 MCG/ACT inhaler 1 puff, Inhalation, Daily - RT    glucose blood test strip Use as instructed to check blood sugar daily    HYDROcodone-acetaminophen (NORCO)  MG per tablet      "ipratropium-albuterol (DUO-NEB) 0.5-2.5 mg/3 ml nebulizer Nebulize and inhale 1 vial 4 (Four) Times a Day As Needed for Wheezing.    Lancets (OneTouch Delica Plus Fynhfl31L) misc 1 each, Not Applicable, Daily    Magnesium Oxide -Mg Supplement 400 mg, Oral, Nightly    memantine (NAMENDA) 5 mg, Oral, 2 Times Daily    metoprolol succinate XL (TOPROL-XL) 12.5 mg, Oral, Daily    miconazole (Desenex) 2 % powder Apply topically to the appropriate area as directed 2 (Two) Times a Day.    mirtazapine (REMERON) 7.5 MG tablet Take 1 tablet by mouth Every Night.    Mounjaro 15 mg, Subcutaneous, Weekly    naloxone (NARCAN) 4 MG/0.1ML nasal spray 1 spray, As Needed    O2 (OXYGEN) 2 L/min, Nightly    pantoprazole (PROTONIX) 40 mg, Oral, Daily    pramipexole (MIRAPEX) 0.5 mg, Oral, 3 Times Daily    pregabalin (LYRICA) 100 mg, Oral, 2 Times Daily    primidone (MYSOLINE) 50 mg    spironolactone (ALDACTONE) 25 mg, Oral, 2 Times Daily    torsemide (DEMADEX) 40 mg, Oral, 2 Times Daily        Vital Signs:   /78 (BP Location: Right arm, Patient Position: Sitting)   Pulse 61   Temp 97.8 °F (36.6 °C) (Temporal)   Ht 162.6 cm (64.02\")   Wt 130 kg (287 lb 3.2 oz)   SpO2 96%   BMI 49.27 kg/m²           BP Readings from Last 3 Encounters:   06/12/25 131/78   06/04/25 126/84   05/07/25 135/85      Wt Readings from Last 3 Encounters:   06/12/25 130 kg (287 lb 3.2 oz)   06/04/25 124 kg (273 lb 6.4 oz)   05/07/25 125 kg (275 lb)        Physical Exam:  Physical Exam  Vitals and nursing note reviewed.   Constitutional:       General: She is not in acute distress.     Appearance: Normal appearance. She is not ill-appearing, toxic-appearing or diaphoretic.   HENT:      Head: Normocephalic and atraumatic.      Right Ear: Tympanic membrane, ear canal and external ear normal.      Left Ear: Tympanic membrane, ear canal and external ear normal.      Nose: Nose normal. No congestion or rhinorrhea.      Mouth/Throat:      Pharynx: Oropharynx is " clear. No oropharyngeal exudate or posterior oropharyngeal erythema.   Eyes:      Conjunctiva/sclera: Conjunctivae normal.   Cardiovascular:      Rate and Rhythm: Normal rate.      Pulses: Normal pulses.   Pulmonary:      Effort: Pulmonary effort is normal. No respiratory distress.      Breath sounds: Normal breath sounds. No stridor. No wheezing, rhonchi or rales.   Musculoskeletal:         General: Normal range of motion.      Cervical back: Normal range of motion and neck supple. No rigidity.      Right lower le+ Pitting Edema present.      Left lower le+ Pitting Edema present.   Lymphadenopathy:      Cervical: No cervical adenopathy.   Skin:     General: Skin is warm and dry.      Capillary Refill: Capillary refill takes less than 2 seconds.   Neurological:      Mental Status: She is alert and oriented to person, place, and time.   Psychiatric:         Mood and Affect: Mood normal.         Behavior: Behavior normal.           Review of Systems:  Review of Systems   Constitutional:  Positive for fatigue. Negative for chills and fever.   HENT:  Negative for ear pain, sinus pressure and sore throat.    Eyes:  Negative for blurred vision and double vision.   Respiratory:  Negative for cough, shortness of breath and wheezing.    Cardiovascular:  Positive for leg swelling. Negative for chest pain and palpitations.   Gastrointestinal:  Negative for abdominal pain, blood in stool, constipation, diarrhea, nausea and vomiting.   Musculoskeletal:  Positive for arthralgias and back pain.   Skin:  Negative for rash.   Neurological:  Negative for dizziness and headache.   Psychiatric/Behavioral:  Positive for depressed mood. Negative for sleep disturbance and suicidal ideas. The patient is nervous/anxious.               Follow Up   Return in about 3 months (around 2025) for Recheck.    Part of this note may be an electronic transcription/translation of spoken language to printed   text using the Dragon Dictation  System.              Health Maintenance   Topic Date Due    URINE MICROALBUMIN-CREATININE RATIO (uACR)  Never done    COVID-19 Vaccine (6 - 2024-25 season) 09/01/2024    TDAP/TD VACCINES (2 - Td or Tdap) 05/20/2025    DIABETIC EYE EXAM  07/03/2025    HEMOGLOBIN A1C  08/07/2025    INFLUENZA VACCINE  07/01/2025    LIPID PANEL  02/07/2026    ANNUAL WELLNESS VISIT  04/10/2026    DIABETIC FOOT EXAM  04/10/2026    DXA SCAN  08/02/2026    MAMMOGRAM  04/16/2027    COLORECTAL CANCER SCREENING  10/12/2030    HEPATITIS C SCREENING  Completed    Pneumococcal Vaccine 50+  Completed    ZOSTER VACCINE  Completed          Medical History:  Medications Discontinued During This Encounter   Medication Reason    Lancet Device misc *Therapy completed    mupirocin (BACTROBAN) 2 % ointment *Therapy completed    pregabalin (LYRICA) 75 MG capsule *Therapy completed    tacrolimus (PROTOPIC) 0.1 % ointment *Therapy completed    triamcinolone (KENALOG) 0.025 % cream *Therapy completed    torsemide (DEMADEX) 20 MG tablet Reorder      Past Medical History:    Adverse effect of other viral vaccines, initial encounter    Covid vaccine    Allergic fungal sinusitis    Allergic rhinitis    Anxiety disorder    Arthritis of neck    Asthma    CHF (congestive heart failure)    Congenital heart disease    COPD with acute exacerbation    CTS (carpal tunnel syndrome)    left    Difficulty walking    Essential (primary) hypertension    Essential tremor    Hypercholesterolemia    Insomnia    Irritable bowel syndrome    Knee swelling    Low back pain    Major depressive disorder    Morbid (severe) obesity due to excess calories    Nasal congestion    Neck strain    Neuropathy in diabetes    Obstructive sleep apnea (adult) (pediatric)    Other hereditary and idiopathic neuropathies    Pain in left hip    Pain in right toe(s)    Pain in thoracic spine    Peripheral neuropathy    Hands    Primary generalized (osteo)arthritis    Primary insomnia    Pulmonary  emphysema    Restless leg syndrome    SOBOE (shortness of breath on exertion)    Substance abuse    Type 2 diabetes mellitus without complication, without long-term current use of insulin    Vitamin D deficiency     Past Surgical History:    CARPAL TUNNEL RELEASE    COLONOSCOPY    ENDOSCOPIC FUNCTIONAL SINUS SURGERY (FESS)    Procedure: ENDOSCOPIC FUNCTIONAL SINUS SURGERY, left maxillary antrostomy with removal of contents, possible left ethmoidectomy;  Surgeon: Johnny Calderon MD;  [place] [facility] [place] [facility] [place]  Service: E[place];  Laterality: Left;    HAND SURGERY    HYSTERECTOMY    complete- ovarian cysts    WRIST SURGERY      Family History   Problem Relation Age of Onset    Hypertension Mother     COPD Father     Heart failure Father     Neuropathy Sister     Sleep apnea Sister     Restless legs syndrome Sister     Sleep apnea Brother     Anxiety disorder Daughter     Depression Daughter     Malig Hyperthermia Neg Hx     Breast cancer Neg Hx     Ovarian cancer Neg Hx     Uterine cancer Neg Hx     Cervical cancer Neg Hx     Colon cancer Neg Hx     Stomach cancer Neg Hx     Skin cancer Neg Hx     Clotting disorder Neg Hx     Deep vein thrombosis Neg Hx     Pulmonary embolism Neg Hx      Social History     Tobacco Use    Smoking status: Former     Current packs/day: 0.00     Average packs/day: 0.5 packs/day for 12.0 years (6.0 ttl pk-yrs)     Types: Cigarettes     Start date: 2009     Quit date: 2021     Years since quittin.4     Passive exposure: Past    Smokeless tobacco: Never   Substance Use Topics    Alcohol use: Not Currently       Health Maintenance Due   Topic Date Due    URINE MICROALBUMIN-CREATININE RATIO (uACR)  Never done    COVID-19 Vaccine ( season) 2024    TDAP/TD VACCINES (2 - Td or Tdap) 2025    DIABETIC EYE EXAM  2025    HEMOGLOBIN A1C  2025        Immunization History   Administered Date(s) Administered    Arexvy (RSV, Adults 60+ yrs) 2024     COVID-19 (ANGELA) 12/28/2021    COVID-19 (MODERNA) 1st,2nd,3rd Dose Monovalent 03/17/2021    COVID-19 (PFIZER) 12YRS+ (COMIRNATY) 01/17/2024, 05/15/2024    Covid-19 (Pfizer) Gray Cap Monovalent 05/09/2022    Fluzone  >6mos 10/07/2013    Fluzone (or Fluarix & Flulaval for VFC) >6mos 10/10/2017    Fluzone High-Dose 65+YRS 11/21/2024    Fluzone High-Dose 65+yrs 03/31/2022, 09/30/2022, 10/18/2023    Influenza Seasonal Injectable 12/20/2012    Pneumococcal Conjugate 13-Valent (PCV13) 09/29/2020    Pneumococcal Polysaccharide (PPSV23) 11/30/2020    Shingrix 03/28/2023, 06/27/2023    Tdap 05/20/2015    Zostavax 09/16/2008       No Known Allergies

## 2025-06-12 NOTE — PROGRESS NOTES
"Progress Note    Date: 06/20/2025  Time In: 3:30  Time Out: 4:30    Patient Legal Name: Chasity Torres  Patient Age: 69 y.o.    Mode of visit: In person  Location of provider: [facility] [address], [place] [address]  Location of patient: Office      CHIEF COMPLAINT: anxiety, treatment resistant depression, conflict/stress in the home     Subjective   History of Present Illness   Chasity is a 69 y.o. female who presents today as a follow-up for continued psychotherapy. Patient reported her son has been staying with her sometimes too and is threatening to bring his daughter back to live with him.  Patient stated \"I hate to feel so much anxiety all the time\" noting it is making her irritable.  Patient shared she wants things to be different at home but does not feel comfortable being more assertive with her children at this time. Patient expressed her concerns about her children as she sees they are both struggling. Patient recognized a link between unmet expectations and frustration. Patient is voluntarily requesting to participate in outpatient therapy at [facility].  Patient rated anxiety at 9 and depression at 8 today on a 0-10 scale where 0= no symptoms (no change).     History obtained from referring provider's note on 3/7/25:  Past Psychiatric History:  Began Treatment: while in her 20's  Diagnoses:Depression and Anxiety  Psychiatrist: -talked on phone-managed meds for 8 months prior to SOC with  in 10/2021; Also at [facility] for 2 yrs [phone]  Therapist: Sendy with [facility] (no in office visits) (telephone) next appt Oct. 2022 (for the last 8 months) prior seen Jessica at [facility] in [place] for 1-2 yrs  Admission History:Denies  Medication Trials: Several for which pt unable to recall if effective: Lexapro(ineff.),Abilify(eff),Prozac,Zoloft,Trintellix,Paxil,Celexa, (can't remember/uncertain); SGA-worsened tremors-per neuro medication induced " parkisonism-Latuda,Rexulti,Seroquel (wt gain,helped with insomnia&anxiety); Lamictal-ineff for anxiety; Xanax-weaned, Hydroxyzine(ineff 1 mo. 2022-2022)Trazodone 2021-2022 somewhat helpful for anxiety though made pt tired, Ambien [phone]?; retried Effexor XR, Wellbutrin,Cymbalta  Risperdal 1 mo approximately stopped due to starting Prozac; Hydroxyzine 100 mg 3 times daily ineffective for itching and anxiety; Zyprexa-ineffective; Prozac up to 80 mg-ineffective; Klonopin-weaned, effective; Vraylar up to 3 mg-parkinsonism tremors worsened per Neurologist recommendations; Buspar up to 20 mg 3 times daily-no longer effective  Self Harm: Denies  Suicide Attempts:Denies   Psychosis, Anxiety, Depression: Denies    Assessment    Mental Status Exam     Appearance: good hygiene and dressed appropriately for the weather  Behavior: calm  Cooperation:  engaged, cooperative, attentive, and friendly  Eye Contact:  good  Affect:  congruent  Mood: depressed and anxious  Speech: talkative  Thought Process:  organized  Thought Content: appropriate  Suicidal: denies  Homicidal:  denies  Hallucinations:  denies  Memory:  intact  Orientation:  person, place, time, and situation  Reliability:  reliable  Insight:  good  Judgment:  good    Clinical Intervention       ICD-10-CM ICD-9-CM   1. Major depressive disorder, recurrent episode, moderate  F33.1 296.32   2. Generalized anxiety disorder  F41.1 300.02   3. Other stressful life events affecting family and household  Z63.79 V61.09        Individual psychotherapy was provided utilizing CBT and person-centered techniques to provide symptom relief, build rapport, encourage expression of thoughts and feelings, support self-esteem, establish new coping skills, promote emotion regulation, increase acceptance, manage stress, acknowledge sources of feelings and behaviors, build confidence, assess symptoms, establish therapeutic alliance, and provide psychoeducation.  Therapist  utilized open-ended questions to encourage the development of a positive therapeutic relationship and open communication.  Therapist normalized/validated patient’s thoughts and feelings as appropriate. Reviewed patient’s recent progress in managing anxiety. Explored relationship dynamics with daughter. Addressed difficulties in setting boundaries with family. Discussed the benefits of setting realistic expectations with her children. Introduced acceptance as a coping mechanisms for family stress, encouraging patient to work on accepting life circumstances and not fighting what she cannot change. Provided patient with a journal and assigned task of identifying at least 2 things per day that she is grateful for and journal anything else she chooses for next 2 weeks. Patient rated anxiety at 9 and depression at 8 today on a 0-10 scale where 0= no symptoms (no change).    Plan   Plan & Goals     Moving forward, we will continue to build rapport and reinforce and build upon effective coping strategies utilizing CBT and person-centered techniques. Follow up on acceptance and journaling progress.     Patient acknowledged and verbally consented to continue working toward resolving current treatment plan goals and was educated on the importance of participation in the therapeutic process.  Patient will remain compliant with medication regimen as prescribed. Discuss any medication side effects, questions or concerns with prescribing provider.  Call 911 or present to the nearest emergency room in an emergency situation.  National Suicide Prevention Lifeline: Call 988. The Lifeline provides 24/7, free and confidential support for people in distress, prevention and crisis resources.  Crisis Text Line  Text HOME To 866749    Return in about 2 weeks (around 7/4/2025).    ____________________  This document has been electronically signed by Lila Lockhart LCSW  June 20, 2025 16:50 EDT    Part of this note may be an electronic  transcription/translation of spoken language to printed text using the Dragon Dictation System.

## 2025-06-12 NOTE — ASSESSMENT & PLAN NOTE
Starting her on Celebrex for her chronic joint pain.  Will monitor her kidney function in 1 month to make sure it is not affecting her function, she is to push fluids 64 ounces a day    Orders:    Basic metabolic panel; Future

## 2025-06-13 ENCOUNTER — PROCEDURE VISIT (OUTPATIENT)
Dept: NEUROLOGY | Facility: CLINIC | Age: 70
End: 2025-06-13
Payer: MEDICARE

## 2025-06-13 DIAGNOSIS — G24.3 CERVICAL DYSTONIA: Primary | ICD-10-CM

## 2025-06-13 NOTE — PROGRESS NOTES
Cervical Dystonia Botox Injection:    With written consent obtained and risks and benefits explained to patient.     We have discussed risk and benefits of this Botox procedure and common side effects including headache, neck pain, neck stiffness or weakness, ptosis, flu-like symptoms as well as more serious possible adverse effects including possible dysphagia, respiratory distress or even death. Verbalizes understanding, accepts risks and agrees with moving forward with Botox injections for treatment of cervical dystonia.     Injected using EMG guidance     Clinical Presentation:     Approved Muscle:   Levator scapulae ( units): Left 0 units; Right 0 units  Longissimus ( units): Left 0 units; Right 0 units  Scalene Complex (15-50 units): Left 0 units; Right 0 units  Semispinalis Capitis ( units): Left 0 units; Right 0 units  Splenius Capitis ( units): Left 30 units; Right 15 units  Splenius Cervivis (20-60 units): Left 0 units; Right 0 units  Sternocleidomastoid ( units): Left 45 units; Right 30 units  Trapezius ( units): Left 90 units; Right 90 units    Total Given:300 units  Total Wasted: 0 units Procedure   Procedures

## 2025-06-20 ENCOUNTER — OFFICE VISIT (OUTPATIENT)
Dept: BEHAVIORAL HEALTH | Facility: CLINIC | Age: 70
End: 2025-06-20
Payer: MEDICARE

## 2025-06-20 ENCOUNTER — OFFICE VISIT (OUTPATIENT)
Age: 70
End: 2025-06-20
Payer: MEDICARE

## 2025-06-20 VITALS
DIASTOLIC BLOOD PRESSURE: 72 MMHG | BODY MASS INDEX: 47.07 KG/M2 | HEART RATE: 60 BPM | SYSTOLIC BLOOD PRESSURE: 118 MMHG | WEIGHT: 274.4 LBS | OXYGEN SATURATION: 96 %

## 2025-06-20 DIAGNOSIS — F33.1 MAJOR DEPRESSIVE DISORDER, RECURRENT EPISODE, MODERATE: Primary | ICD-10-CM

## 2025-06-20 DIAGNOSIS — Z63.79 OTHER STRESSFUL LIFE EVENTS AFFECTING FAMILY AND HOUSEHOLD: ICD-10-CM

## 2025-06-20 DIAGNOSIS — F33.0 MILD EPISODE OF RECURRENT MAJOR DEPRESSIVE DISORDER: Primary | ICD-10-CM

## 2025-06-20 DIAGNOSIS — Z71.89 MEDICATION CARE PLAN DISCUSSED WITH PATIENT: ICD-10-CM

## 2025-06-20 DIAGNOSIS — F51.05 INSOMNIA SECONDARY TO ANXIETY: ICD-10-CM

## 2025-06-20 DIAGNOSIS — Z79.899 MEDICATION MANAGEMENT: ICD-10-CM

## 2025-06-20 DIAGNOSIS — F41.1 GENERALIZED ANXIETY DISORDER: ICD-10-CM

## 2025-06-20 DIAGNOSIS — F41.9 INSOMNIA SECONDARY TO ANXIETY: ICD-10-CM

## 2025-06-20 DIAGNOSIS — F43.9 STRESS AT HOME: ICD-10-CM

## 2025-06-20 RX ORDER — MIRTAZAPINE 7.5 MG/1
7.5 TABLET, FILM COATED ORAL NIGHTLY
Qty: 90 TABLET | Refills: 1 | Status: SHIPPED | OUTPATIENT
Start: 2025-06-20

## 2025-06-20 NOTE — PATIENT INSTRUCTIONS
"Should you want to get in touch with your provider, DIANE Sanches, please contact MY Medical Assistant, Marian, directly at [phone].  Recommend saving Marian's direct number in phone as this is the PREFERRED & EASIEST way to get in contact with your provider.  Please leave a voice mail if you do not get an answer and she will return your call within 24 hrs. You will NOT be able to contact provider on [facility], as Behavioral Health Providers are restricted. YOU MUST CALL [phone]  If you need to speak with the on call provider after hours or on weekends, please Contact the [facility] ([phone]) and staff will be able to page the provider on call directly.     SPECIFIC RECOMMENDATIONS:     1.      Medications discussed at this encounter:                   -  Refilled Remeron     2.      Psychotherapy recommendations: Continue with current therapist.     3.     Return to clinic: 2 months,Tues. 8/12/25 at 1040 am in office    Please arrive at least 15 minutes before your scheduled appointment time to complete check in process.      IF you are scheduled for a [facility] VIDEO visit, YOU MUST COMPLETE THE \"E-CHECK IN\" PROCESS PRIOR TO BEGINNING THE VISIT, You may still complete the E-Check in for in office visits prior to appointment, you will receive multiple text/email reminders which will direct you further if needed.            "

## 2025-06-20 NOTE — PROGRESS NOTES
"  Ciro Torres is a 69 y.o. female who presents today for follow up     Referring Provider:  No referring provider defined for this encounter.    Chief Complaint: depression, anxiety, stress at home, medication management, medication care plan discussed      History of Present Illness:   6/20/25:  Patient presents today in office with Rolator walker, at last visit Prozac was increased from 30 to 40 mg, which patient reports tolerating well.  \"Mood has been edgy, stressed.\" Hard to live with daughter, grand children. Son had stayed with patient from Friday night until Tues. Night with his 6 and 7 yrs old children, and patient and oldest grand daughter had to look after the children while he was at work. And one of the children mother came over to get her, though left angry due to daughter not wanting to come with her to go to her mom's, \" I know he brought this upon himself, but in a way I feel sorry for him, she's not very nice.\" Son doesn't get along with either of the 2 children mother's.     Becki moved in with patient almost 3 yrs ago due to a break up with boyfriend while living in [place]. Patient doesn't mind her 17 yrs old grand daughter in the house as she is helpful. Daughter takes her 2 children to the day care she is working for the summer. Patient feels less anxious when its just her and older grand daughter.     Patient will take 10 mg of Melatonin with 7.5 mg of Remeron nightly which helps with sleep. Overall mood has been improving since dose increase of Prozac.    5/9/25:   Answers submitted by the patient for this visit:  Depression (Submitted on 5/9/2025)  Chief Complaint: Depression  Visit: follow-up  Frequency: rarely  Severity: moderate  excessive worry: Yes  insomnia: Yes  irritability: Yes  malaise/fatigue: Yes  obsessions: No  hypersomnia: No  difficulty controlling mood: Yes  difficulty staying asleep: Yes  difficulty falling asleep: No  Hours of sleep per night: 6 " "Hours  Medication compliance: %  Side effects: fatigue  Aggravated by: family issues    Patient presents today via MyChart Video visit from parked vehicle in [facility] parking lot as daughter is having a MRI, located in [place], [place].  At last visit patient was referred to therapy within [facility], \"its ok, I am still depressed and anxious all the time.\"  Patient 17 yr old grand daughter, Laina, moved in recently which is not a new stressor.  Worries about son always wanting to know what Laina is doing. Son is still with his girlfriend and is trying to move out and move to patient property, \"he's trying to get a camper from a friend.\" Which has been stressful, \"of what he is going to do and how he is going to do it, its a mess.\"Patient does not necessarily want son to move to property. Son would have to run water off of patient water, and electricity with extension cords. Son is working and making good money, though uncertain if he will be able to afford as he hasn't started paying back patient for the money she has lent him, \"very stressful the last couple months, more so than usual.\" Reports daughter is not helping her like she should, though Laina helps, patient referring to house work. Laina is now sleeping on a mattress in a corner in the living room and plan to set up a divider to give her privacy, and has to get rid of the bookshelf to give her more room. Has 3 bedrooms-one for daughter, one for patient, and one for 2 younger grandchildren whom have bunk beds. Patient is struggling with allowing son to reside on property, and his 7 and 6 yr old children will come every other weekend which is an added stress.     Patient continues to take Prozac 30 mg daily and Remeron 7.5 mg nightly. Reports current dose of Klonopin 0.5 mg is not very effective at twice daily as needed and is asking to increase to 3 times per day, though admits PCP had informed her previously of short term " "use.     Depression: Patient complains of depression. She complains of anhedonia, depressed mood, difficulty concentrating, fatigue, feelings of worthlessness/guilt, hopelessness, and insomnia  Anxiety:  The patient endorses to the following symptoms of anxiety including: excessive anxiety and worry about a number of events or activities for more days than not, being easily fatigued, difficulty concentrating or mind going blank, irritability, and sleep disturbance which have caused impairment in important areas of daily functioning.  The patient has had symptoms of anxiety for several years, which have worsened over the last 3-4 months.    3/7/25:  Patient presents today via [email or web address] Video visit from home, located in [place], [place].  At last visit Prozac was increased from 20 to 30 mg daily, for which patient reports \"I still feel down, I have a lot of anxiety.\" Patient unable to pinpoint trigger for anxiety, \"I just don't feel right all the time, I just figured it was my anxiety.\" Patient kept closing eyes and nodding head during interview. \"I just need something to calm me down. I have tingling sensation across the top of my body for along time.\" Patient is taking prescribed Klonopin 0.5 mg twice daily routinely, \"its such a low dose.\" Patient reports taking dose around 9am.     In regards to therapy, patient was advised to schedule a follow up appointment at last visit and is currently on a waiting list to see a new therapist. \"All she told me I am on a waiting list.\" Confirmed patient is not scheduled with prior therapist. Patient is agreeable to see therapist in [facility] through [facility].     Patient reports she received a message from the referral dept regarding neuropsychological testing referral by this provider 6/2024, which patient further recalls she did got to [facility] and had several memory tests, was there for 3.5 hrs.  Though denies receiving testing results, and plans to call to " "inquire about receiving a copy.     Patient is asking if the Prozac can be increased due to complaints of ongoing depression.      1/17/25: Patient presents today via [employer] Video visit from home, located in [place], [place].   Since last visit patient was tapered off Buspar at last visit and later tapered off Vraylar starting 12/4/24, and restarted on Prozac due to Neurologist concerns of parkinsonism worsening tremors, for which patient reports overall feeling better, though \"I am still a little depressed and very anxious.\"    Patient reports she is going to [place] to see brother today with sister due to health decline, he  has [facility] and was given 2-3 weeks to live.  Brother has blood cancer, heart and kidney trouble.   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       11/21/24:    Patient presents today via [employer] Video visit from passenger of vehicle while daughter driving as patient woke up late and had to change today's appointment from in office to video, located in [place], [place].   At last visit Vraylar was increased from 1.5 mg to 3 mg daily, for which patient reports:  \"I really been depressed, I know your going to be mad at me, I been taking that Prozac 40 mg one every other day I started taking about 2 weeks ago.\" Reports she does feel better.  Anxiety and depressed mood are improving.     In regards to therapy, patient was advised to reschedule with therapist which patient reports had not scheduled yet, though plans to schedule today.  Patient reports \"I wasn't crazy about Becki, and she's the only one there to provide mental health.\"    Patient has been referred to another sleep medicine provider per PCP as of 11/5/24.  Reports sleeps for an hour, then awake, admits to adherence with CPAP. Though continues to struggle with sleep, which patient acknowledges contributes to mood.     Patient voices " "Buspar is no longer effective and would like to come off medication, though discussed previously patient expressed willingness to taper to discontinue.     Wt Readings from Last 3 Encounters:   10/30/24 130 kg (287 lb)   10/07/24 125 kg (275 lb 11.2 oz)   10/03/24 125 kg (276 lb)       10/4/24:  arrival 0954; compelted 0952    Answers submitted by the patient for this visit:  Primary Reason for Visit (Submitted on 10/4/2024)  What is the primary reason for your visit?: Depression  Depression (Submitted on 10/4/2024)  Chief Complaint: Depression  Visit: follow-up  Frequency: constantly  Severity: severe  excessive worry: Yes  insomnia: Yes  irritability: Yes  malaise/fatigue: Yes  obsessions: Yes  hypersomnia: No  difficulty controlling mood: Yes  difficulty staying asleep: Yes  difficulty falling asleep: Yes  Hours of sleep per night: 3 Hours  Medication compliance: %  Side effects: fatigue  Aggravated by: family issues      Patient presents today via [employer] Video visit from home, located in [place], [place]. At last visit patient was restarted on Vraylar at 1.5 mg due to self stopping for approximately 1 week, for which patient reports \"I am still depressed, I was gonna ask if we could go back to the 3 mg or put me back on Prozac.\" Patient reports the shakiness is due to stress and not actually the medication.   Patient reports waking up 2-3 times per night, \"I don't sleep hardly at all, then during the day all I want to do is sleep, I don't have any energy.\"  Patient reports adherence with CPAP, though sleeping in short intervals, and does nap throughout the day while sitting in chair watching TV, \"I can hardly keep my eyes open.\"    Sleep hygiene discussed with PCP on 9/30/24 as patient reported taking Remeron at 10 pm, going to bed at 11pm with TV on which patient has been turning the TV upon feeling sleepy. \"Usually\" has CPAP mask on while watching TV in bed.  \"Is there something else I can take to help me " "stay asleep longer?\" Patient reports taking Klonopin 0.5 mg 1 during the day and one in the evening, not at bedtime.     In regards to therapy patient has not seen in over one month, \"I was getting burnt out by the therapist. What we talked about I really wasn't doing what I needed to do, and last time I went there she said if your not going to do what we talked about  then your not going to get anywhere.\"     In regards to PHQ9 screening question of thoughts of being better off dead or hurting herself  in some way, patient reports  has days of  \"I wished I wouldn't wake up in the morning.\" However, denies actual thoughts of harming self or rumination nor planning,  Cssr low.    Depression: Patient complains of depression. She complains of anhedonia, depressed mood, difficulty concentrating, fatigue, feelings of worthlessness/guilt, and insomnia  Anxiety:  The patient endorses to the following symptoms of anxiety including: excessive anxiety and worry about a number of events or activities for more days than not, restlessness or feeling keyed up, being easily fatigued, difficulty concentrating or mind going blank, irritability, muscle tension, and sleep disturbance which have caused impairment in important areas of daily functioning.      8/26/24:  Patient presents today via [employer] Video visit from home, located in [place], [place].  \"I can't take that Vraylar anymore, it makes me shake.  I stopped taking it about a week ago.\" Patient reports her arms are no longer shaking.    Patient reports Buspar no longer helps for anxiety. Asking for Prozac.  Upon informing patient of option to increase Remeron, patient began crying more intensely, \"I don't understand why you can't give me something for depression.  Can we go back down to the lower dose of Vraylar?\"  Patient is also asking for provider to manage Klonopin ordered per PCP recently.      In regards to therapy patient is still seeing \"I have to make an appointment, I " "don't see she is helping me.\"  Patient has tried to schedule Neuropsychological testing and places from list mailed to patient several do not take her insurance. Patient reports she lost the list.  Last seen therapist a few weeks ago.        7/17/24:  Patient presents today via MyChart Video visit from home, located in [place], [place].  At last visit Vraylar was increased from 1.5 to 3 mg daily, for which patient reports \"helped a little bit, I been crying a lot, I am still depressed.\"    Patient was also referred to [facility] and did not contact, had lost paperwork from last visit. \"My therapist said testing was only for schizophrenia or bipolar, I don't know if that's true?\" In regards to therapy, patient is seeing Becki with [facility], \"I like her okay, I don't know if I want to continue, we talk about the same thing, we talk more about my Becki, I don't know its hard.\"  Patient further explains therapist wants her to make a list of all the things she wants her daughter to do, \"she said it would be hard, but I haven't done it yet.\"     Patient has concerns of how her son treats her grand daughter whom will be 17 in Sept. Patient became tearful and then crying.    \"Does the Prozac come in 10 mg, could I get that?\"      6/7/24:  Patient presents today in office using Rolator Walker, \"I'm not good, I am really depressed again, jittery, I have to have something to calm me down.\"  Patient reports having to come to PCP office this past Wed, due to feeling sick. \"I need something to take the edge off. There are some nights I pray not to wake up in the morning.\"  Patient is tearful today. Denies SI and is convincing, but rather more frustrated.   Patient did start with therapist, Shanti, with [facility], \"I am not too crazy about her.\" Patient does attend in person.     Patient was also scheduled for Neuropsychological testing 5/24/24 which was canceled by provider office at [facility] due to short staff, " "\"they said they would call back when they got someone hired.\" Which was originally ordered by Neurology 2/2023 for mild cognitive disorder. And patient prefers a closer location.     Patient is asking for Zyprexa or Prozac.  Reminded patient of weight gain and prolonged QTI which is reason for discontinuation in addition to ineffectiveness.   Daughter will be going to [place] 7/19/24 for 10 days and patient utilizes daughter phone for telehealth visits and daughter assists with logging on to [employer].  As patient expresses fear of not being able to see this provider nor go for testing due to transportation.     Patient noted with weight loss of 5lb since last recorded weight which patient contributes to poor appetite due to depressed mood.   Sleep remains the same.      5/2/24:  Patient presents today via [employer] Video visit from home in [facility], located in [place], [place]. Patient apologized for missing last visit.  At last visit Remeron was decreased from 15 to 7.5 mg, for which patient reports \"I woke up this morning at 419, I am sleeping worse now than I was.\" Patient will go to sleep around  pm and wakes up at 4 or 5 am. Denies day time naps, reported adherence with CPAP.  \"If I take another Mirtazapine I can, but I normally don't do that.\"     Depression: continues to take Vraylar 1.5 mg, \"its going good, but I am still depressed, can I go back on 20 mg of the Prozac, I still feel like there is something crawling in my head.\"   Anxiety: continues to tolerate Buspar 20 mg 3 times daily which has been somewhat effective.      Patient has an upcoming appointment for neuropsychological testing 5/24/24.   Therapy: patient has an appointment with [facility] tomorrow via telephone and will be further scheduled for an appointment.        3/14/24:    Answers submitted by the patient for this visit:  Other (Submitted on 3/14/2024)  Please describe your symptoms.: Depression  Have you had these symptoms before?: " "Yes  How long have you been having these symptoms?: Greater than 2 weeks  Primary Reason for Visit (Submitted on 3/14/2024)  What is the primary reason for your visit?: Other    Patient presents today via MyChart Video visit from home, located in [place], [place].   Patient has chosen to switch from [facility] to [facility] in clinic. Patient reports mood is better, \"I think that Vraylar is  helping, but I still feel there is something itching my head, I got these gummies,\" patient showed a bottle of Hemp Gummies to provider, \"they are helping a little bit too, I take 6-7 per day, it says take 1-2 per day.\" Hemp oil 200 mg, 175 mg of Omega 3,A,B,E, \"it curves my anxiety, I don't feel my heart coming out of my skin.\"     Therapy: has not called [facility] back to schedule.   Patient is asking about [facility] as patient grandson attends.   Patient still wakes up during the night, 2-3 times, \"I am still depressed.\"  Patient is asking about  restarting Prozac again.    In regards to PHQ9 screening question of thoughts of being better off dead or hurting himself/herself  in some way, patient reports  \"That was along time ago, I think Becki put that in there wrong, I havent' had those thoughts.\" CSSRS answers all \"none\".    Psych: Anxiety fatigue, insomnia  Depression Low mood for > 2 weeks, Decreased/Increased sleep, Loss of Interest, Feelings of guilt/worthlessness, and Low energy      1/31/24:  Patient presents today via MyChart Video visit from home, located in [place], [place].  At last visit Prozac was stopped and Remeron was decreased from 30 to 15 mg nightly due to ongoing weight gain which is suspected due to combination of medications and comorbid conditions, patient reports still waking up in the middle of the night, 2-3 times, though able to return back to sleep easily.  Continues to wear CPAP with 2L, denies mask falling off and just randomly wakes up at night.     On 1/23/24, patient was started on Vraylar " "1.5 mg, reported the cost was 1700.00 and patient did not have to pay any out of pocket. Patient admits to some improvement in mood, \"I am still depressed, I was gonna ask if I could stay on maybe a 20 mg of the Prozac, I don't want to get like I was.\"    Patient reports son had spoke with friend and discussed anxiety, and took a fourth CBD gummy which helped with anxiety.     Patient continues to take Buspar 20 mg 3 times daily, which is effective. Patient would like to switch pharmacies due to difficulty with getting ahold of [facility] staff.     Patient reports having not scheduled with therapist, due to not having the phone number.      1/18/24: Patient presents today via [employer] Video visit from home, located in [place], [place].  \"I am not doing good, I am short of air, don't have any energy, the anxiety is getting the best of me.\"  At last visit Prozac was decreased from 80 to 60 mg for 21 days, then to decrease to 40 mg for 21 days (start 1/4/24) which planned to end 1/25/24.  Daughter confirmed in the background of current dose of Prozac is 40 mg.     Patient reports received a denied payment from [facility] plan which patient no longer has as of 1/1/24. Patient has not called therapist back to schedule another appointment at this time.     Anxiety and Depressive symptoms: \"stress related, not knowing what is really wrong with me, shortness of air, I can't hardly do anything, I don't feel like doing anything. I don't think that Buspar is helping anymore.\"    Coping strategies:  \"I just sit, my heart feels like its beating out of my chest.\" Reports heart rate will range from 50-70-80. \"Oxygen level is good most of the time.\"   Fears stopping Buspar though reports primarily ineffective.   Patient denies practicing coping techniques at this time.      12/13/23:  Patient presents today via MyChart Video visit from home, located in [place], [place].  \"I am not too good, I was in the hospital I have bronchitis.\"    At last " "visit Remeron was decreased from 45 mg to 30 mg nightly and changed Buspar from 30 mg twice daily to 20 mg 3 times daily. Patient reports \"I need my sleep medicine filled, I only have one left.\" Patient feels sleep is not good.  Anxiety-\"it's still bad.\" Continues to struggle with itching and picking at scalp, reports \"nothing helps.\" Continues to express feeling depressed.     Patient has started psychotherapy with [facility]. \"I really would like to see someone in person, it's hard to talk to someone on the phone.\" Clarified patient is seeing therapist via video and not via telephone. Does not wish to drive to [place] due to being so far away, and will consider.     Patient has set up a new insurance plan through [employer]- medicare advantage plan though has not received card to upload to EHR.     11/9/23:  Patient presents today via [employer] Video visit from home, located in [place], [place].  At last visit patient was tapered off Zyprexa, for which patient reports \"I still feel like theres something crawling in my head.\"    Patient continues to take Hydroxyzine 100 mg 3 times daily for itching and anxiety which is ineffective. Reports \"anxiety is a little better, though not a whole lot, still very anxious. Everything in general, my health.\" Mood has improved somewhat, though ongoing complaint of scalp itching, and anxiety.      Patient continues to wake up during the night 2-3 times and is able to return to sleep most nights. Admits to adherence with CPAP with 2 L.  Currently taking Buspar every morning and night, and Remeron at bedtime with addition of Melatonin recently, though minimal effectiveness.     Chart review: patient seen cardiology 11/7/23 as patient was seen in the ED 10/31/23 with SOA and 15 lbs weight gain and treated with one dose of IV Lasix and discharged. Seen by PCP 11/7/23 noting depression has worsened since tapering down from Zyprexa, and neurology was concerned psychotropic " "medications are causing tremors. Noted increase of Lasix and Aldactone, and added Melatonin 3 mg nightly. Referred to physical therapy as well for generalized weakness. Patient was seen by Pulmonary 11/6/23, noted report of a fall 2 weeks prior on porch. CT of chest without acute findings.     10/5/23:  Patient presents today via MyChart Video visit from home, located in [place], [place].  \"Not to good, I am still depressed, I still feel like something is crawling in my head, I am anxious all the time, it is getting the best of me.\"     At last visit Prozac was increased from 60 to 80 mg daily for which patient reports \"can it be increased\".   Patient was also referred to [facility] for psychotherapy as patient was dissatisfied with therapist at [facility]. Patient reports she had to change phones and lost phone number and is asking for number today.     Reports adherence with medications and denies sleepiness with Zyprexa. Patient says she seen neurologist in [place] at [facility], and was advised to see a psychiatrist. Reports Dr. Alvarado will be returning to [place] clinic.  Patient is uncertain of what she wants to do at this time,   \"I don't know what to do.\"    In regards to negative intrusive thoughts of being better off dead, patient denies SI, though admits to having some days of feeling so frustrated due to depression and anxiety.     Patient was asked again about therapy referral if she had received a voice mail, for which patient does report receiving a voice mail which was on other phone she no longer has access to, phone number has not changed.     At end of visit patient asking to take 2 tabs of Remeron for waking in the middle of night.     Wt Readings from Last 3 Encounters:   10/03/23 126 kg (278 lb)   09/06/23 128 kg (282 lb)   08/30/23 130 kg (287 lb)       Depression: Patient complains of depression. She complains of anhedonia, depressed mood, difficulty concentrating, fatigue, " "feelings of worthlessness/guilt, and insomnia  Anxiety:  The patient endorses significant symptoms of anxiety including: excessive anxiety and worry about a number of events or activities for more days than not, restlessness or feeling keyed up, being easily fatigued, difficulty concentrating or mind going blank, irritability, muscle tension, and sleep disturbance which have caused impairment in important areas of daily functioning.        8/17/23: Patient presents today via [employer] Video visit from home, located in [place], [place].  Patient called this morning to switch from in office to video due to daughter being ill.  \"I'm not doing too good, I am still depressed, I feel like something is crawling in my head.\" Admits to ongoing itching, despite taking Hydroxyzine 3 times daily.     In regards to noted report to PCP recently of SI, patient denies presently. Reports difficulty staying asleep, able to fall asleep without problems, reports last night did not wake up however, frequency of night time awakenings vary. Denies day time naps. Occasionally drifts to sleep while in recliner, though does not lay down or sleep very long during those times.     Noted per chart review of Weight loss of 13 lb on Trulicity, currently receiving Dupixent injections and topical antifungals per Dermatology.  \"I loose it and I gain it back, guess because I don't get much exercise.\"    Patient asking about alternate therapist as patient does not feel current treatment with current [facility] provider, Marie Conde, is going well. Currently see's every 2 weeks. \"I don't feel like it's helping me at all.\"   Patient asking for anxiety medication, \"can you prescribe the klonopin\".    Weight today:   Wt Readings from Last 3 Encounters:   08/02/23 124 kg (274 lb)   07/28/23 129 kg (283 lb 12.8 oz)   07/26/23 129 kg (285 lb)       7/13/23:  Patient presents today in office, with well groomed appearance, subtle make-up, which patient was complimented on " "appearance, at last visit Prozac was increased from 40 to 60 mg daily and Zyprexa increased from 5 to 7.5 mg daily, for which patient reports \"I am still depressed\"    Patient was to start therapy with [facility] in May, and has started, just seen Marie today prior to this appointment. \"I don't know if I like her or not.\" Expresses frustration with not working on coping strategies, \"she just sits there and lets me talk. All other places do not accept my insurance. I don't know who takes my insurance or not.\"     Patient continues to struggle with tactile hallucinations, feeling something is crawling on scalp.  Patient has been taking Dupixent from dermatologist which has not been effective. Patient feels due to these symptoms, depression exacerbates. Patient is also having difficulty with neck pain and minimal relief from pain medications. Denies drowsiness with Zyprexa. Denies Prozac helping with \"crawling sensation.\" Continues to take Hydroxyzine 100 mg 3 times daily and the Buspar, \"I wish I could have something else for the anxiety.\" Patient asking to come off Zyprexa due to weight gain.     Patient was referred to Bariatric surgery for gastric bypass per PCP note 7/11/23 with notes to eventually come off the Zyprexa, though due to chronic mental health conditions the medication is necessary at this time.     5/15/23:  Patient presents today in office, \"I am still picking at my head.\" Patient was started on Dupixent per Dermatology in Feb.  \"I am still depressed, the anxiety is getting the best of me.\"    At last visit Zyprexa was increased from 2.5 mg to 5 mg daily at 12 noon, for which patient reports ongoing tactile hallucinations.  Due to elevated anxiety, patient was instructed to increase dose of Hydroxyzine over this past weekend to 100 mg 3 times daily as needed anxiety, for which patient reports tolerating well, \"It works for 3 hrs, then I am right back picking at my head, very anxious, like I have pressure " "in my chest all the time.\" Denies increased drowsiness with dose.  Continues to have sensation of bugs crawling on head and around ears.      Continues to take Zyprexa 5 mg at noon, and reports mild drowsiness.  Patient will \"doze off\" during the day, denies taking a nap.  Reports waking up during the night, approximately twice and able to eventually return to sleep.      Depression: Patient complains of depression. She complains of anhedonia, depressed mood, difficulty concentrating, fatigue, hopelessness and insomnia   Anxiety:  The patient endorses significant symptoms of anxiety including: excessive anxiety and worry about a number of events or activities for more days than not, restlessness or feeling keyed up, being easily fatigued, difficulty concentrating or mind going blank, irritability, muscle tension and sleep disturbance which have caused impairment in important areas of daily functioning.      4/7/23: Patient presents today in office, at last visit patient was started on Zyprexa 2.5 mg daily at noon for which patient reports \"it's okay\". Admits to medication adherence, \"I am still depressed, still picking my head, and feel theres still something crawling in my head.\" reports with excessive picking areas will bleed.  Patient tried to use an ice pack to head, though had a headache, which was wrapped in a towel. Denies relief from ice pack.    Prozac was increased from 20 to 40 mg on 3/24/23, for which patient reports she is still depressed.     Denies drowsiness with Zyprexa, or naps. Denies scratching or feeling as if something is crawling on any other places of body, only the scalp.     Patient reports asking PCP for something for anxiety and was told to discuss with this provider.    Patient reports [facility] provider, Barbie, had left [facility], and was waiting to hear back due to locating a provider whom accepted patient insurance. And patient has not heard back at this time.       3/9/23:  Patient presents " "today in office, \"I am not any better, I can't hardly stand it. The feeling of something crawling in my head, and I am depressed again, can you put me back on the Cymbalta along with the Prozac.  I am so anxious.\"     Patient continues to experience crying spells, anxiety and depression ongoing with minimal relief.  Continues to feel as if something is crawling on scalp, complaint of itching is not voiced today.       2/23/23:  Patient presents today in office, at last visit Risperdal was stopped due to potential D2D interaction and short duration of use and started Prozac 10 mg was started for which patient reports \"I can't see a difference, I am so depressed, I am still scratching my head.\" Reports Hydroxyzine has helped with the itching, though, \"I can't help it, I can't stop scratching.\"  Patient wore gloves for short duration with minimal effectiveness, \"I still feel that sensation, like something is crawling in my head, then I go pick at it.\" Reports sensation does go down neck.    \"I get this tingling sensation through this upper part of my body.\" as patient pointing to upper chest, shoulders. Patient recalls this sensation has been going on for \"along time, several months, even when I was seeing .\"    Patient is no longer seeing therapist, patient has an appointment with [facility] in April with Barbie,  as Sendy with [facility] is on maternity leave, and was referred to Kayleigh and she lives in [place] and was only talking via phone.       1/30/23:  Patient presents today in office reports tolerating taper of Cymbalta though continues to feel depressed and continues to scratch at head, \"My anxiety is ridiculous, over the weekend I said to myself I don't even want to live.\" Patient was able to take some deep breaths and managed to deal with symptoms, though difficult.  Denies current SI.     Increase Hydroxyzine FROM 25 mg 4 times daily as needed TO 50 mg by mouth 3 times daily as needed at last visit, which " "patient feels itching has not improved.     Risperdal w/Prozac D2D discussed.   Patient is uncertain if Risperdal is helpful, \"I can stop taking it, if you can get me on something to stop tearing up my head.\" Patient reports times of dozing off during the day, though uncertain if dose was taken yesterday, denies recent falls. Patient reports trying to lose weight and prefers to not take any medications that can potentially increase weight as patient denies knowledge of Risperdal causing weight gain.     Patient informed of other potential D2D interaction with Wellbutrin and Prozac as dose of Prozac would be doubled essentially.  Patient wishes to proceed with switch to Prozac as previously discussed.      Patient asking about Gabapentin refill, uses for RLS and was told by PCP medication would need to come from psychiatry or pain management.      1/9/23:  Patient presents today in office complaining of severe itching and skin picking to head and scalp.  Since last visit, patient was started on Risperdal 1 mg twice daily 12/22/22 per PCP.  Noted PCP also weaning down from Hydrocodone and switched to Diclofenac.  Patient was admitted to [facility] 12/31/22-1/2/23 for chest pain, palpitations, acute exacerbation of COPD, anxiety disorder, chronic hypoxemic respiratory failure on home oxygen.     Started Hydroxyzine (Vistaril) 25 mg 4 times daily as needed, which patient takes 3-4 times daily which has not provided effectiveness, \"Nothing helps, I swear there is something crawling in my head, I can't stop scratching it, I been to a dermatologist.\"  Admits to medication calming the itching a little bit, \"not really helping with anxiety, just a little bit.\" Denies drowsiness, though reports ongoing low energy.      In regards to Risperdal, \"I don't know if it helps either.\"    Admits to having weight gain, has been walking in home due to living [place] a [place] cannot walk outside, which has helped some in regards to exercise.  Patient " "states, \"I can feel the depression coming.\"   \"When I stand up I have to hold on to this and not take off right away.\"  Referring to rollater walker.  Admits to waking up in the middle of the night, at times goes to sleep at 1 am, uses CPAP.      11/21/22:  Patient presents today in office, \"I am still depressed and my anxiety is off the roof, my head is so sore from scratching.\"   Increased Wellbutrin SR to 200 mg daily and Buspar from 15 to 30 mg twice daily at last visit, for which patient reports has not been helpful.  \"I wonder if you can raise the Cymbalta or not?\"    \"There are some days I feel like I want to die.\"     Patient continues to report difficulty with living situation with daughter and grand children, \"I can't hardly get her to help me with the house cleaning, I just don't live like her.\"  Admits to ongoing stress due to current living environment.     Upon inquiring of amount of Cymbalta, patient is uncertain if there are 2 of the 60 mg in medi-planner, as the current order is for 2 of the 60 mg daily.      Patient expresses desire to speak with a therapist more often, as patient does not have another appointment for a few weeks with current therapist.     \"I know you won't give me the Diazepam, but is there something else you can give me for anxiety?\"      10/17/22:  Patient presents today in office for medication check as at last visit Wellbutrin SR was increased to 150 mg, changed Buspar frequency to twice daily as patient was not consistent with 3 times daily dosing, and due to not taking Remeron as prescribed patient was educated to start taking the 1.5 tabs to equal 45 mg.  Patient was also instructed to allow daughter to fill all medications to medi-planner as patient was administering some medications independently leading to inconsistency and inaccurate dosing.     Patient reports taking Remeron 45 mg and it is the only bottle at bedside which patient admits to adherence.  Buspar is now " "in prefilled medi planner filled by daughter.     Patient reports depression has worsened, \"my nerves I just get nervous all the time, my daughter lives with me, it's hard sometimes.\"  Patients 2 grand children almost 3 and 8, and \"Becki is suárez and is sometimes not nice to me.\"  Patient began to cry explaining this morning she kept son home from school and got upset because she had to change son's doctor appointment from 130 to 230 pm, as patient has appointment at 1230 with PCP today.    Patient continues to work with Sendy, therapist from [facility].     Patient reports utilizing deep focused breathing to help with anxiety which isn't always effective.   Patient admits to taking Klonopin, \"the little blue pill\", \"I only got 30 so I can only take one a day.  I believe you gave me a prescription last time for 30 days.\"  Patient reports no Klonopin remain, as last pill was taken yesterday.  Patient reports taking upon feeling very anxious.  Denies falls or dizziness.       9/9/22: INITIAL EVAL/Transfer of care   Patient presents today in office for transfer of care within practice due to location, as patient residence is in [place].  Patient has been a patient of  in [place] since 10/2021.  Patient was last seen by  7/15/22.  Patient has a longstanding history of depression and anxiety which has been treatment resistant for the most part, as patient has failed numerous medications due to either minimal effectiveness, side effects, or induction/worsening of other comorbid conditions. 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  Patient had been advised numerous times in the last year to receive a higher level of care with inpatient mental health care-suggestions of ECT- due to minimal improvement with outpatient medication management, however, patient has either refused due to a multitude of reasons or failure to follow through " "after arrangements for treatment were finalized.  Unable to have TMS due to transportation issues, has had ECT in the past.     Patient has a history of CHF,HTN,HLD,DM,OA,chronic neck and back pain (L-spine), MEGAN with CPAP w/2L O2, IBS, Peripheral Neuropathy,COPD,RLS, essential tremor,2 and obesity.  Patient pain is managed by [facility].  Due to these chronic medical conditions she has physical limitations which also contribute to mood.      Patient had been living alone up until this past summer, as patient did not like living alone.  Patient daughter, Becki, and her 2 children age 2.5 and almost 8 yr old, moved in with patient in her mobile home in [place].     Patient complaint of \"very jittery all the time, I am still depressed, I didn't know if you could raise my Cymbalta or Wellbutrin to help me? Sometimes my nerves, I just feel like I am going to explode\".  \"The 2 little one's, it's just really hard on me.\"   Patient admits triggers of anxiety are primarily 2 grand children whom live in home, \"they live different than I do.\"   Reports daughter is also irritable which makes it difficult for patient to cope with anxiety.   Stress and anxiety has worsened since they moved into home.      Patient feels Buspar at 15 mg 3 times daily is not always effective, denies drowsiness though has little energy.  \"I get agitated very easily.  If kids make a mess, and she doesn't clean it up then I have to do it, I can't go behind her, but if I say something, she acts like it's nothing.\"     Patient denies sleep difficulty, adheres to nightly CPAP with Oxygen 2L, denies day time oxygen as patient did not qualify.  Denies recent falls.  Patient sleeps from 1130 pm until 8-9 am, denies night time awakenings nor naps.     Patient states, \"I think I have my days and nights mixed up.\"  Patient will try to eat dinner, at night snacks on pretzles or crackers.  Patient does not eat breakfast or lunch, will snack sometimes. " "Patient checks blood sugars daily, yesterday was 124 when nurse came, confirmed [facility]  services for SN only, though patient believes will be released soon, and would find out today if services would continue. Patient has been receiving services for approximately 4 months.     Patient uses a medi-planner which daughter fills weekly.  Currently taking Wellbutrin  mg once daily in the morning, \"I try to take the Buspar 15 mg 3 times daily, I will take it if I feel anxious.\" Denies 3 times daily dosing as prescribed. Patient reports keeping Buspar bottle out of routine medications which daughter fills.  Clonazepam 1 mg nightly with Remeron.  Reports upon last  of Remeron it was 30 mg and not 45 mg, denies reading bottle which instructs patient to take 1.5 tabs to equal 45 mg. \"when I take my night medications I don't always go to sleep at that time, so I wait until I get ready to go to sleep then take it.\"  \"It's in the bottle next to my night stand, I leave the pill box on the table in the kitchen.\"  Patient takes Cymbalta 120 mg in the morning.   Confirmed with patient both the Wellbutrin and Cymbalta are in the medi-planner.  And the Buspar, Clonazepam, and Remeron bottles are at patient bedside.  The medi-planner is for am and pm, and \"leg medicine and blood pressure medicine are in the night\". And patient takes those medications around 10 pm, and takes Remeron later at 11 pm.  Patient reports taking Clonazepam once daily, and does not take at night.  Confirmed as patient had stated differently earlier in visit.  \"I just want to feel better.\"     Denies Signs & Symptoms of Serotonin Syndrome: confusion, hallucinations, seizures, increased heart rate, fever, excessive sweating, shivering or shaking, blurred vision, muscle spasms or stiffness, tremors, incoordination, stomach cramps, nausea, & vomiting.  Though patient endorses to soa with minimal exertion with \"beads of sweat on my forehead while I " "got dressed this morning.\"  Patient tremors are chronic.        PHQ-9 Depression Screening  PHQ-9 Total Score:   5/9/2025 16   The PHQ has not been completed during this encounter.       DIVYA-7    5/9/2025 12    The following portions of the patient's history were reviewed and updated as appropriate: allergies, current medications, past family history, past medical history, past social history, and past surgical history.     Past Surgical History:  Past Surgical History:   Procedure Laterality Date    CARPAL TUNNEL RELEASE      COLONOSCOPY  2020    ENDOSCOPIC FUNCTIONAL SINUS SURGERY (FESS) Left 08/15/2022    Procedure: ENDOSCOPIC FUNCTIONAL SINUS SURGERY, left maxillary antrostomy with removal of contents, possible left ethmoidectomy;  Surgeon: Johnny Calderon MD;  [place] [facility]  Service: E[place];  Laterality: Left;    HAND SURGERY      HYSTERECTOMY  2002    complete- ovarian cysts    WRIST SURGERY         Problem List:  Patient Active Problem List   Diagnosis    Carpal tunnel syndrome of left wrist    Carpal tunnel syndrome of right wrist    Anxiety    Celiac artery stenosis    Cervical disc disorder    Chronic pain disorder    Degeneration of lumbar intervertebral disc    Depressive disorder    Diabetic neuropathy    Essential hypertension    Essential tremor    Hypercholesterolemia    Insomnia    Irritable bowel syndrome    Lumbar spondylosis    Obesity, morbid, BMI 50 or higher    Osteoarthritis    Sacroiliac joint pain    MEGAN (obstructive sleep apnea)    Chronic diastolic heart failure    Palpitations    Type 2 diabetes mellitus without complication, without long-term current use of insulin    Other chest pain    Generalized anxiety disorder    Paresthesia    Shortness of breath    Chronic obstructive pulmonary disease    Long-term current use of opiate analgesic    Cervical radiculopathy    Cervical spondylosis    Degeneration of thoracic intervertebral disc    Thoracic spondylosis    Coronary " artery calcification    Mild persistent asthma without complication    Sore throat    Acute pain of left knee    CKD (chronic kidney disease)    B12 deficiency    COPD with asthma and status asthmaticus    Iron deficiency    Overweight    Vulvar cyst    Iron deficiency anemia    Vitamin D deficiency    Non-restorative sleep    Snoring    Excessive daytime sleepiness    Class 3 severe obesity due to excess calories without serious comorbidity with body mass index (BMI) of 45.0 to 49.9 in adult    Chronic hypoxic respiratory failure on home oxygen with the CPAP at 2 L    Sleep related hypoxia       Allergy:   No Known Allergies     Discontinued Medications:  Medications Discontinued During This Encounter   Medication Reason    mirtazapine (REMERON) 7.5 MG tablet Patient Transfer                                     Current Medications:   Current Outpatient Medications   Medication Sig Dispense Refill    albuterol sulfate  (90 Base) MCG/ACT inhaler Inhale 2 puffs Every 4 (Four) Hours As Needed for Wheezing or Shortness of Air. 18 g 11    atorvastatin (LIPITOR) 10 MG tablet Take 1 tablet by mouth Daily. 90 tablet 3    Blood Glucose Monitoring Suppl (Blood Glucose Monitor System) w/Device kit Use Daily to test blood glucose. 1 each 0    Calcium Carb-Cholecalciferol (Oyster Shell Calcium + D3) 500-10 MG-MCG tablet Take 1 each by mouth 2 (Two) Times a Day. 60 tablet 1    celecoxib (CeleBREX) 200 MG capsule Take 1 capsule by mouth Every Night. 30 capsule 2    clonazePAM (KlonoPIN) 0.5 MG tablet Take 1 tablet by mouth 2 (Two) Times a Day As Needed for Anxiety. 60 tablet 0    Diclofenac Sodium (VOLTAREN) 1 % gel gel Apply 4 g topically to the appropriate area as directed 4 (Four) Times a Day. 100 g 1    donepezil (ARICEPT) 10 MG tablet Take 1 tablet by mouth Every Night. 30 tablet 2    ferrous sulfate 325 (65 FE) MG tablet Take 1 tablet by mouth Daily With Breakfast. 90 tablet 1    FLUoxetine (PROzac) 40 MG capsule Take  1 capsule by mouth Every Morning for 90 days. 90 capsule 0    fluticasone (FLONASE) 50 MCG/ACT nasal spray Administer 2 sprays into the nostril(s) as directed by provider Daily. 16 g 0    Fluticasone-Umeclidin-Vilant (TRELEGY ELLIPTA) 200-62.5-25 MCG/ACT inhaler Inhale 1 puff Daily. 60 each 5    glucose blood test strip Use as instructed to check blood sugar daily 100 each 3    HYDROcodone-acetaminophen (NORCO)  MG per tablet       ipratropium-albuterol (DUO-NEB) 0.5-2.5 mg/3 ml nebulizer Nebulize and inhale 1 vial 4 (Four) Times a Day As Needed for Wheezing. 360 mL 3    Lancets (OneTouch Delica Plus [id number]) misc Use 1 each Daily. 100 each 0    Magnesium Oxide -Mg Supplement 400 (240 Mg) MG tablet Take 1 tablet by mouth Every Night. 30 tablet 11    memantine (Namenda) 5 MG tablet Take 1 tablet by mouth 2 (Two) Times a Day. 60 tablet 2    metoprolol succinate XL (TOPROL-XL) 25 MG 24 hr tablet Take 0.5 tablets by mouth Daily.      miconazole (Desenex) 2 % powder Apply topically to the appropriate area as directed 2 (Two) Times a Day. 71 g 11    mirtazapine (REMERON) 7.5 MG tablet Take 1 tablet by mouth Every Night. Indications: Major Depressive Disorder, insomnia 90 tablet 1    naloxone (NARCAN) 4 MG/0.1ML nasal spray 1 spray As Needed. Has on hand      O2 (OXYGEN) Inhale 2 L/min Every Night.      pantoprazole (PROTONIX) 40 MG EC tablet TAKE 1 TABLET BY MOUTH DAILY 90 tablet 0    pramipexole (MIRAPEX) 0.5 MG tablet Take 1 tablet by mouth 3 (Three) Times a Day. 270 tablet 0    pregabalin (LYRICA) 50 MG capsule Take 2 capsules by mouth 2 (Two) Times a Day. 120 capsule 5    primidone (MYSOLINE) 50 MG tablet Take 1 tablet by mouth.      spironolactone (ALDACTONE) 25 MG tablet Take 1 tablet by mouth 2 (Two) Times a Day. 60 tablet 5    Tirzepatide (Mounjaro) 15 MG/0.5ML solution auto-injector Inject 15 mg under the skin into the appropriate area as directed 1 (One) Time Per Week. 2 mL 2    torsemide (DEMADEX) 20 MG  tablet Take 2 tablets by mouth 2 (Two) Times a Day. 120 tablet 2     Current Facility-Administered Medications   Medication Dose Route Frequency Provider Last Rate Last Admin    cyanocobalamin injection 1,000 mcg  1,000 mcg Intramuscular Q28 Days Ami Diego MD   1,000 mcg at 05/30/25 1212       Past Medical History:  Past Medical History:   Diagnosis Date    Adverse effect of other viral vaccines, initial encounter     Covid vaccine    Allergic fungal sinusitis 07/29/2022    Allergic rhinitis     Anxiety disorder     Arthritis of neck 8mths ago    Asthma     CHF (congestive heart failure)     Congenital heart disease 05 2021    COPD with acute exacerbation     CTS (carpal tunnel syndrome)     left    Difficulty walking 2022    Essential (primary) hypertension     Essential tremor     Hypercholesterolemia 10/18/2021    Insomnia     Irritable bowel syndrome     Knee swelling 6mth ago    Low back pain     Major depressive disorder     Morbid (severe) obesity due to excess calories     Nasal congestion     Neck strain 6mths ago    Neuropathy in diabetes 2023    Obstructive sleep apnea (adult) (pediatric)     Other hereditary and idiopathic neuropathies     Pain in left hip     Pain in right toe(s)     Pain in thoracic spine     Peripheral neuropathy     Hands    Primary generalized (osteo)arthritis     Primary insomnia     Pulmonary emphysema 01/05/2023    Restless leg syndrome     SOBOE (shortness of breath on exertion)     Substance abuse     Type 2 diabetes mellitus without complication, without long-term current use of insulin 05/02/2022    Vitamin D deficiency        Past Psychiatric History:  Began Treatment:while in her 20's  Diagnoses:Depression and Anxiety  Psychiatrist:-talked on phone-managed meds for 8 months prior to SOC with  in 10/2021; Also at [facility] (formerly [facility]) for 2 yrs [id number]  Therapist:Sendy with [facility] (no in office visits) (telephone) next appt Oct. 2022 (for the last 8 months)  prior seen Jessica at [facility] in [place] for 1-2 yrs  Admission History:Denies  Medication Trials:Several for which pt unable to recall if effective: Lexapro(ineff.),Abilify(eff),Prozac,Zoloft,Trintellix,Paxil,Celexa, (can't remember/uncertain); SGA-worsened tremors-per neuro medication induced parkisonism-Latuda,Rexulti,Seroquel (wt gain,helped with insomnia&anxiety); Lamictal-ineff for anxiety; Xanax-weaned, Hydroxyzine(ineff 1 mo. 2022-2022)Trazodone 2021-2022 somewhat helpful for anxiety though made pt tired, Ambien [id number]?; retried Effexor XR, Wellbutrin,Cymbalta Risperdal 1 mo approximately stopped due to starting Prozac; Hydroxyzine 100 mg 3 times daily ineffective for itching and anxiety; Zyprexa-ineffective; Prozac up to 80 mg-ineffective; Klonopin-weaned, effective; Vraylar up to 3 mg-parkinsonism tremors worsened per Neurologist recommendations; Buspar up to 20 mg 3 times daily-no longer effective  Self Harm: Denies  Suicide Attempts:Denies   Psychosis, Anxiety, Depression: Denies    Substance Abuse History:   Types:Denies all, including illicit  Withdrawal Symptoms:Denies  Longest Period Sober:Not Applicable   AA: Not applicable     Social History:  Martial Status:   Employed:No Retired from [employer], worked in [employer]  Kids:Yes or If so, how many 1 son, Simon; 1 dtr, Becki  House:mobile home daughter, Becki and her 2 young children, 17 yr old grand daughter    History: Denies  Access to Guns:  Denies (son has his guns locked in safe in patient home, which patient does not have access to)    Social History     Socioeconomic History    Marital status:     Number of children: 2   Tobacco Use    Smoking status: Former     Current packs/day: 0.00     Average packs/day: 0.5 packs/day for 12.0 years (6.0 ttl pk-yrs)     Types: Cigarettes     Start date: 2009     Quit date: 2021     Years since quittin.4     Passive exposure: Past    Smokeless tobacco:  Never   Vaping Use    Vaping status: Never Used   Substance and Sexual Activity    Alcohol use: Not Currently    Drug use: Never    Sexual activity: Not Currently     Partners: Male     Birth control/protection: Hysterectomy, Surgical       Family History:   Suicide Attempts: Denies  Suicide Completions:Denies      Family History   Problem Relation Age of Onset    Hypertension Mother     COPD Father     Heart failure Father     Neuropathy Sister     Sleep apnea Sister     Restless legs syndrome Sister     Sleep apnea Brother     Anxiety disorder Daughter     Depression Daughter     Malig Hyperthermia Neg Hx     Breast cancer Neg Hx     Ovarian cancer Neg Hx     Uterine cancer Neg Hx     Cervical cancer Neg Hx     Colon cancer Neg Hx     Stomach cancer Neg Hx     Skin cancer Neg Hx     Clotting disorder Neg Hx     Deep vein thrombosis Neg Hx     Pulmonary embolism Neg Hx        Developmental History:   Born: [place], [place]  Siblings:3 brothers, 6 sisters (1 )  Childhood: Denies Abuse  High School:Completed  College:Denies    Mental Status Exam:   Hygiene:   good  Cooperation:  Cooperative  Eye Contact:  Good   Psychomotor Behavior:  Appropriate   Affect:  Appropriate  Mood: anxious   Speech:  Normal  Thought Process:  Goal directed   Thought Content:  Mood congruent  Suicidal:  None  Homicidal:  None  Hallucinations:  None   Delusion:  None  Memory:  Intact   Orientation:  Person, Place, Time and Situation  Reliability:  good  Insight:  Good  Judgement:  Good  Impulse Control:  Good  Physical/Medical Issues:  Yes CHF,COPD,MEGAN,HTN,HLD,CKD3,RLS,IBS,OA,Peripheral Neuropathy,Chronic back&neck pain,DM,obesity,essential tremors to kush hands Home Oxygen, cervical dystonia    Review of Systems:  Review of Systems   Constitutional:  Positive for fatigue. Negative for diaphoresis.   HENT:  Negative for drooling.    Eyes:  Negative for visual disturbance.   Respiratory:  Positive for apnea. Negative for cough and  shortness of breath.          2L O2 nightly, not significant for CPAP per sleep study 2/27/25   Cardiovascular:  Negative for chest pain, palpitations and leg swelling.   Gastrointestinal:  Negative for nausea and vomiting.   Endocrine: Negative for cold intolerance and heat intolerance.   Genitourinary:  Negative for difficulty urinating.   Musculoskeletal:  Positive for back pain, gait problem and neck pain. Negative for joint swelling.        Use of Rolator walker   Allergic/Immunologic: Negative for immunocompromised state.   Neurological:  Positive for weakness. Negative for dizziness, seizures and speech difficulty.   Psychiatric/Behavioral:  Positive for decreased concentration and sleep disturbance. Negative for agitation, confusion, hallucinations, self-injury and suicidal ideas. The patient is nervous/anxious. The patient is not hyperactive.        Physical Exam:  Physical Exam  Psychiatric:         Attention and Perception: Attention and perception normal.         Mood and Affect: Affect normal. Mood is anxious.         Speech: Speech normal.         Behavior: Behavior normal. Behavior is cooperative.         Thought Content: Thought content normal. Thought content does not include suicidal ideation. Thought content does not include suicidal plan.         Cognition and Memory: Cognition normal.         Judgment: Judgment normal.         Vital Signs:   /72   Pulse 60   Wt 124 kg (274 lb 6.4 oz)   SpO2 96%   BMI 47.07 kg/m²      Office notes from care team reviewed:  Office Visit with Ami Diego MD (06/12/2025)   Office Visit with Bradley Bethea DO (06/04/2025)     Lab Results: REVIEWED  Clinical Support on 06/04/2025   Component Date Value Ref Range Status    Amphetamine Screen, Urine 06/04/2025 Negative  Negative Final    Barbiturates Screen, Urine 06/04/2025 Negative  Negative Final    Buprenorphine, Screen, Urine 06/04/2025 Negative  Negative Final    Benzodiazepine Screen, Urine  06/04/2025 Negative  Negative Final    Cocaine Screen, Urine 06/04/2025 Negative  Negative Final    MDMA (ECSTASY) 06/04/2025 Negative  Negative Final    Methamphetamine, Ur 06/04/2025 Negative  Negative Final    Morphine/Opiates Screen, Urine 06/04/2025 Positive (A)  Negative Final    Methadone Screen, Urine 06/04/2025 Negative  Negative Final    Oxycodone Screen, Urine 06/04/2025 Positive (A)  Negative Final    Phencyclidine (PCP), Urine 06/04/2025 Negative  Negative Final    THC, Screen, Urine 06/04/2025 Negative  Negative Final    Lot Number 06/04/2025 [id number]   Final    Expiration Date 06/04/2025 1-16-27   Final    Oxycodone/Oxymorph 06/06/2025 Negative  [id number] Final    Test includes Oxycodone and Oxymorphone    Please note 06/06/2025 Comment   Final    Drug test results should be interpreted in the context of clinical  information. Patient metabolic variables, specific drug chemistry, and  specimen characteristics can affect test outcome. Technical  consultation is available if a test result is inconsistent with an  expected outcome.    Email:  [email or web address]    Phone:  [phone]  Drug brands, if listed herein, are trademarks of their respective  owners.   Lab on 04/16/2025   Component Date Value Ref Range Status    Glucose 04/16/2025 94  65 - 99 mg/dL Final    BUN 04/16/2025 13  8 - 23 mg/dL Final    Creatinine 04/16/2025 1.18 (H)  0.57 - 1.00 mg/dL Final    Sodium 04/16/2025 141  136 - 145 mmol/L Final    Potassium 04/16/2025 4.1  3.5 - 5.2 mmol/L Final    Chloride 04/16/2025 98  98 - 107 mmol/L Final    CO2 04/16/2025 32.0 (H)  22.0 - 29.0 mmol/L Final    Calcium 04/16/2025 9.9  8.6 - 10.5 mg/dL Final    BUN/Creatinine Ratio 04/16/2025 11.0  7.0 - 25.0 Final    Anion Gap 04/16/2025 11.0  5.0 - 15.0 mmol/L Final    eGFR 04/16/2025 50.1 (L)  >60.0 mL/min/1.73 Final   Office Visit on 04/10/2025   Component Date Value Ref Range Status    Monospot 04/10/2025 Negative  Negative Final     Internal Control 04/10/2025 Passed  Passed Final    Lot Number 04/10/2025 [id number]   Final    Expiration Date 04/10/2025 1-31-26   Final   Lab on 03/26/2025   Component Date Value Ref Range Status    Glucose 03/26/2025 94  65 - 99 mg/dL Final    BUN 03/26/2025 14  8 - 23 mg/dL Final    Creatinine 03/26/2025 1.24 (H)  0.57 - 1.00 mg/dL Final    Sodium 03/26/2025 142  136 - 145 mmol/L Final    Potassium 03/26/2025 3.9  3.5 - 5.2 mmol/L Final    Chloride 03/26/2025 100  98 - 107 mmol/L Final    CO2 03/26/2025 32.0 (H)  22.0 - 29.0 mmol/L Final    Calcium 03/26/2025 9.9  8.6 - 10.5 mg/dL Final    Albumin 03/26/2025 4.0  3.5 - 5.2 g/dL Final    Phosphorus 03/26/2025 3.3  2.5 - 4.5 mg/dL Final    Anion Gap 03/26/2025 10.0  5.0 - 15.0 mmol/L Final    BUN/Creatinine Ratio 03/26/2025 11.3  7.0 - 25.0 Final    eGFR 03/26/2025 47.2 (L)  >60.0 mL/min/1.73 Final    Creatinine, Urine 03/26/2025 88.3  mg/dL Final    Total Protein, Urine 03/26/2025 9.2  mg/dL Final    Protein/Creatinine Ratio, Urine 03/26/2025 0.10   Final    Color, UA 03/26/2025 Yellow  Yellow, Straw Final    Appearance, UA 03/26/2025 Slightly Cloudy (A)  Clear Final    pH, UA 03/26/2025 <=5.0  5.0 - 8.0 Final    Specific Gravity, UA 03/26/2025 1.020  1.005 - 1.030 Final    Glucose, UA 03/26/2025 Negative  Negative Final    Ketones, UA 03/26/2025 Negative  Negative Final    Bilirubin, UA 03/26/2025 Negative  Negative Final    Blood, UA 03/26/2025 Negative  Negative Final    Protein, UA 03/26/2025 Negative  Negative Final    Leuk Esterase, UA 03/26/2025 Negative  Negative Final    Nitrite, UA 03/26/2025 Negative  Negative Final    Urobilinogen, UA 03/26/2025 0.2 E.U./dL  0.2 - 1.0 E.U./dL Final    RBC, UA 03/26/2025 None Seen  None Seen, 0-2 /HPF Final    WBC, UA 03/26/2025 0-2  None Seen, 0-2 /HPF Final    Bacteria, UA 03/26/2025 2+ (A)  None Seen /HPF Final    Squamous Epithelial Cells, UA 03/26/2025 13-20 (A)  None Seen, 0-2 /HPF Final    Hyaline Casts, UA  03/26/2025 3-6  None Seen /LPF Final    Methodology 03/26/2025 Manual Light Microscopy   Final    WBC 03/26/2025 5.86  3.40 - 10.80 10*3/mm3 Final    RBC 03/26/2025 4.73  3.77 - 5.28 10*6/mm3 Final    Hemoglobin 03/26/2025 14.5  12.0 - 15.9 g/dL Final    Hematocrit 03/26/2025 45.6  34.0 - 46.6 % Final    MCV 03/26/2025 96.4  79.0 - 97.0 fL Final    MCH 03/26/2025 30.7  26.6 - 33.0 pg Final    MCHC 03/26/2025 31.8  31.5 - 35.7 g/dL Final    RDW 03/26/2025 12.9  12.3 - 15.4 % Final    RDW-SD 03/26/2025 46.6  37.0 - 54.0 fl Final    MPV 03/26/2025 9.4  6.0 - 12.0 fL Final    Platelets 03/26/2025 309  140 - 450 10*3/mm3 Final    Neutrophil % 03/26/2025 50.1  42.7 - 76.0 % Final    Lymphocyte % 03/26/2025 35.7  19.6 - 45.3 % Final    Monocyte % 03/26/2025 11.1  5.0 - 12.0 % Final    Eosinophil % 03/26/2025 2.4  0.3 - 6.2 % Final    Basophil % 03/26/2025 0.5  0.0 - 1.5 % Final    Immature Grans % 03/26/2025 0.2  0.0 - 0.5 % Final    Neutrophils, Absolute 03/26/2025 2.94  1.70 - 7.00 10*3/mm3 Final    Lymphocytes, Absolute 03/26/2025 2.09  0.70 - 3.10 10*3/mm3 Final    Monocytes, Absolute 03/26/2025 0.65  0.10 - 0.90 10*3/mm3 Final    Eosinophils, Absolute 03/26/2025 0.14  0.00 - 0.40 10*3/mm3 Final    Basophils, Absolute 03/26/2025 0.03  0.00 - 0.20 10*3/mm3 Final    Immature Grans, Absolute 03/26/2025 0.01  0.00 - 0.05 10*3/mm3 Final   Lab on 03/13/2025   Component Date Value Ref Range Status    Creatinine, Urine 03/13/2025 42.8  mg/dL Final    Total Protein, Urine 03/13/2025 5.0  mg/dL Final    Protein/Creatinine Ratio, Urine 03/13/2025 0.12   Final    Glucose 03/13/2025 87  65 - 99 mg/dL Final    BUN 03/13/2025 15  8 - 23 mg/dL Final    Creatinine 03/13/2025 1.34 (H)  0.57 - 1.00 mg/dL Final    Sodium 03/13/2025 140  136 - 145 mmol/L Final    Potassium 03/13/2025 4.0  3.5 - 5.2 mmol/L Final    Chloride 03/13/2025 99  98 - 107 mmol/L Final    CO2 03/13/2025 32.6 (H)  22.0 - 29.0 mmol/L Final    Calcium 03/13/2025 9.3   8.6 - 10.5 mg/dL Final    Albumin 03/13/2025 3.8  3.5 - 5.2 g/dL Final    Phosphorus 03/13/2025 3.7  2.5 - 4.5 mg/dL Final    Anion Gap 03/13/2025 8.4  5.0 - 15.0 mmol/L Final    BUN/Creatinine Ratio 03/13/2025 11.2  7.0 - 25.0 Final    eGFR 03/13/2025 43.0 (L)  >60.0 mL/min/1.73 Final    WBC 03/13/2025 5.28  3.40 - 10.80 10*3/mm3 Final    RBC 03/13/2025 4.57  3.77 - 5.28 10*6/mm3 Final    Hemoglobin 03/13/2025 14.0  12.0 - 15.9 g/dL Final    Hematocrit 03/13/2025 43.9  34.0 - 46.6 % Final    MCV 03/13/2025 96.1  79.0 - 97.0 fL Final    MCH 03/13/2025 30.6  26.6 - 33.0 pg Final    MCHC 03/13/2025 31.9  31.5 - 35.7 g/dL Final    RDW 03/13/2025 13.0  12.3 - 15.4 % Final    RDW-SD 03/13/2025 46.2  37.0 - 54.0 fl Final    MPV 03/13/2025 9.2  6.0 - 12.0 fL Final    Platelets 03/13/2025 286  140 - 450 10*3/mm3 Final    Neutrophil % 03/13/2025 47.7  42.7 - 76.0 % Final    Lymphocyte % 03/13/2025 32.2  19.6 - 45.3 % Final    Monocyte % 03/13/2025 15.9 (H)  5.0 - 12.0 % Final    Eosinophil % 03/13/2025 3.2  0.3 - 6.2 % Final    Basophil % 03/13/2025 0.8  0.0 - 1.5 % Final    Immature Grans % 03/13/2025 0.2  0.0 - 0.5 % Final    Neutrophils, Absolute 03/13/2025 2.52  1.70 - 7.00 10*3/mm3 Final    Lymphocytes, Absolute 03/13/2025 1.70  0.70 - 3.10 10*3/mm3 Final    Monocytes, Absolute 03/13/2025 0.84  0.10 - 0.90 10*3/mm3 Final    Eosinophils, Absolute 03/13/2025 0.17  0.00 - 0.40 10*3/mm3 Final    Basophils, Absolute 03/13/2025 0.04  0.00 - 0.20 10*3/mm3 Final    Immature Grans, Absolute 03/13/2025 0.01  0.00 - 0.05 10*3/mm3 Final    Color, UA 03/13/2025 Yellow  Yellow, Straw Final    Appearance, UA 03/13/2025 Cloudy (A)  Clear Final    pH, UA 03/13/2025 5.5  5.0 - 8.0 Final    Specific Gravity, UA 03/13/2025 1.010  1.005 - 1.030 Final    Glucose, UA 03/13/2025 Negative  Negative Final    Ketones, UA 03/13/2025 Negative  Negative Final    Bilirubin, UA 03/13/2025 Negative  Negative Final    Blood, UA 03/13/2025 Negative   Negative Final    Protein, UA 03/13/2025 Negative  Negative Final    Leuk Esterase, UA 03/13/2025 Negative  Negative Final    Nitrite, UA 03/13/2025 Negative  Negative Final    Urobilinogen, UA 03/13/2025 0.2 E.U./dL  0.2 - 1.0 E.U./dL Final    RBC, UA 03/13/2025 None Seen  None Seen, 0-2 /HPF Final    WBC, UA 03/13/2025 None Seen  None Seen, 0-2 /HPF Final    Bacteria, UA 03/13/2025 1+ (A)  None Seen /HPF Final    Squamous Epithelial Cells, UA 03/13/2025 13-20 (A)  None Seen, 0-2 /HPF Final    Methodology 03/13/2025 Manual Light Microscopy   Final   Lab on 02/24/2025   Component Date Value Ref Range Status    Glucose 02/24/2025 77  65 - 99 mg/dL Final    BUN 02/24/2025 13  8 - 23 mg/dL Final    Creatinine 02/24/2025 1.65 (H)  0.57 - 1.00 mg/dL Final    Sodium 02/24/2025 138  136 - 145 mmol/L Final    Potassium 02/24/2025 3.9  3.5 - 5.2 mmol/L Final    Chloride 02/24/2025 97 (L)  98 - 107 mmol/L Final    CO2 02/24/2025 29.0  22.0 - 29.0 mmol/L Final    Calcium 02/24/2025 9.6  8.6 - 10.5 mg/dL Final    BUN/Creatinine Ratio 02/24/2025 7.9  7.0 - 25.0 Final    Anion Gap 02/24/2025 12.0  5.0 - 15.0 mmol/L Final    eGFR 02/24/2025 33.5 (L)  >60.0 mL/min/1.73 Final   Lab on 02/14/2025   Component Date Value Ref Range Status    proBNP 02/14/2025 357.0  0.0 - 900.0 pg/mL Final    Glucose 02/14/2025 85  65 - 99 mg/dL Final    BUN 02/14/2025 17  8 - 23 mg/dL Final    Creatinine 02/14/2025 1.59 (H)  0.57 - 1.00 mg/dL Final    Sodium 02/14/2025 144  136 - 145 mmol/L Final    Potassium 02/14/2025 4.5  3.5 - 5.2 mmol/L Final    Chloride 02/14/2025 104  98 - 107 mmol/L Final    CO2 02/14/2025 32.7 (H)  22.0 - 29.0 mmol/L Final    Calcium 02/14/2025 9.5  8.6 - 10.5 mg/dL Final    Total Protein 02/14/2025 7.2  6.0 - 8.5 g/dL Final    Albumin 02/14/2025 3.2 (L)  3.5 - 5.2 g/dL Final    ALT (SGPT) 02/14/2025 19  1 - 33 U/L Final    AST (SGOT) 02/14/2025 16  1 - 32 U/L Final    Alkaline Phosphatase 02/14/2025 130 (H)  39 - 117 U/L  Final    Total Bilirubin 02/14/2025 <0.2  0.0 - 1.2 mg/dL Final    Globulin 02/14/2025 4.0  gm/dL Final    A/G Ratio 02/14/2025 0.8  g/dL Final    BUN/Creatinine Ratio 02/14/2025 10.7  7.0 - 25.0 Final    Anion Gap 02/14/2025 7.3  5.0 - 15.0 mmol/L Final    eGFR 02/14/2025 35.0 (L)  >60.0 mL/min/1.73 Final   Office Visit on 02/14/2025   Component Date Value Ref Range Status    SARS Antigen 02/14/2025 Not Detected  Not Detected, Presumptive Negative Final    Influenza A Antigen DEANDRE 02/14/2025 Not Detected  Not Detected Final    Influenza B Antigen DEANDRE 02/14/2025 Not Detected  Not Detected Final    Internal Control 02/14/2025 Passed  Passed Final    Lot Number 02/14/2025 [id number]   Final    Expiration Date 02/14/2025 7-   Final   Lab on 02/07/2025   Component Date Value Ref Range Status    Magnesium 02/07/2025 2.3  1.6 - 2.4 mg/dL Final    Total Cholesterol 02/07/2025 226 (H)  0 - 200 mg/dL Final    Triglycerides 02/07/2025 180 (H)  0 - 150 mg/dL Final    HDL Cholesterol 02/07/2025 48  40 - 60 mg/dL Final    LDL Cholesterol  02/07/2025 146 (H)  0 - 100 mg/dL Final    VLDL Cholesterol 02/07/2025 32  5 - 40 mg/dL Final    LDL/HDL Ratio 02/07/2025 2.96   Final    Hemoglobin A1C 02/07/2025 5.60  4.80 - 5.60 % Final    25 Hydroxy, Vitamin D 02/07/2025 33.3  30.0 - 100.0 ng/ml Final   Office Visit on 01/27/2025   Component Date Value Ref Range Status    SARS Antigen 01/27/2025 Not Detected  Not Detected, Presumptive Negative Final    Influenza A Antigen DEANDRE 01/27/2025 Not Detected  Not Detected Final    Influenza B Antigen DEANDRE 01/27/2025 Not Detected  Not Detected Final    Internal Control 01/27/2025 Passed  Passed Final    Lot Number 01/27/2025 [id number]   Final    Expiration Date 01/27/2025 7-30-25   Final   There may be more visits with results that are not included.       EKG Results:  No orders to display       Imaging Results:  CT Head Without Contrast    Result Date: 6/7/2022    1. Generalized atrophy with no  acute intracranial findings 2. Expansion of the left maxillary sinus with some thickening of the bony wall laterally suggesting a mucocele or mass.  Adjacent involvement of the left ethmoid air cells and left frontal sinus.  Recommend ENT consultation.  Mild mucosal disease in the sphenoid sinuses.     Mars Aden MD       Electronically Signed and Approved By: Mars Aden MD on 6/07/2022 at 14:20                 Assessment & Plan   Diagnoses and all orders for this visit:    1. Mild episode of recurrent major depressive disorder (Primary)  -     mirtazapine (REMERON) 7.5 MG tablet; Take 1 tablet by mouth Every Night. Indications: Major Depressive Disorder, insomnia  Dispense: 90 tablet; Refill: 1    2. Stress at home    3. Insomnia secondary to anxiety  -     mirtazapine (REMERON) 7.5 MG tablet; Take 1 tablet by mouth Every Night. Indications: Major Depressive Disorder, insomnia  Dispense: 90 tablet; Refill: 1    4. Medication management    5. Medication care plan discussed with patient          Visit Diagnoses:    ICD-10-CM ICD-9-CM   1. Mild episode of recurrent major depressive disorder  F33.0 296.31   2. Stress at home  F43.9 V61.9   3. Insomnia secondary to anxiety  F41.9 300.00    F51.05 327.02   4. Medication management  Z79.899 V58.69   5. Medication care plan discussed with patient  Z71.89 V65.49         PLAN:  Safety: No acute safety concerns  Therapy: Currently Seeing a Therapist [facility] with Lila Lockhart LCSW  Risk Assessment: Risk of self-harm acutely is low.  Risk factors include anxiety disorder, mood disorder, and recent psychosocial stressors (pandemic). Protective factors include no family history, denies access to guns/weapons, no present SI, no history of suicide attempts or self-harm in the past, minimal AODA, healthcare seeking, future orientation, willingness to engage in care.  Risk of self-harm chronically is also low, but could be further elevated in the event of  treatment noncompliance and/or AODA.  Meds:    -Continue MIRTAZAPINE (REMERON) 7.5 mg by mouth nightly to target depression and insomnia. (No adjustment required for most recent eGFR on 4/16/25- 50.0) Refilled today for 90 days with 1 refill to [facility] for mail delivery.    -Continue Prozac (Fluoxetine) 40 mg by mouth daily in the morning to target anxiety, depression.  Discussed all risks, benefits, alternatives, and side effects of Prozac (Fluoxetine) including but not limited to GI upset, decreased appetite, sexual dysfunction, bleeding risk, seizure risk, insomnia, anxiety, drowsiness, headache, tremor, nervousness, activation of hossein or hypomania, increased fragility fracture risk, hyponatremia, ocular effects, serotonin syndrome, hypersensitivity reaction, and activation of suicidal ideation and behavior. . Discussed the need for patient to immediately call the office for any new or worsening symptoms, such as worsening depression; feeling nervous or restless; suicidal thoughts or actions; or other changes in mood or behavior, and all other concerns. Patient educated on medication compliance.  Patient verbalized understanding and is agreeable to taking Prozac (Fluoxetine). Addressed all questions and concerns.   5.  Labs: n/a  6.  Updated  of POC     Symptoms of anxiety, depression, insomnia are under good control with current medication regimen.  Overall mood is improving, patient has had some stress relief with grand children and daughter being out of the home during the daytime on weekdays, and older grand daughter has been helpful around the house and patient enjoys spending time with her, and feels a sense of calmness. Patient scheduled for 2 months in office same day as visit with PCP.   The plan was discussed with the patient. The patient was given time to ask questions and these questions were answered. At the conclusion of their visit they had no additional questions or  concerns and all questions were answered to their satisfaction.   Patient was given instructions and counseling regarding condition and for health maintenance advice. Please see specific information pulled into the AVS if appropriate.    Patient to contact provider if symptoms worsen or fail to improve.        5/9/25:  -Continue MIRTAZAPINE (REMERON) 7.5 mg by mouth nightly to target depression and insomnia. (No adjustment required for most recent eGFR on 4/16/25- 50.0) Last dispensed 3/19/25 #90/90 days. (Reordered in error today and reentered with a dose of 1 with last dispense date)    -INCREASE Prozac (Fluoxetine) FROM 30 mg (taking 3 of the 10 mg capsules) TO 40 mg (ordered 40 mg capsules today, and instructed patient she may start taking 4 of the 10 mg to equal to 40 mg until new prescription arrives) by mouth daily in the morning to target anxiety, depression.  Last dispensed 3/28/25 #270/90 days. New prescription sent for 40 mg for 90 days.   Symptoms of anxiety, depression are under fair control with current medication regimen.  There are several stressors patient is struggling with which are negatively impacting mood, highly advised patient to have a discussion with son regarding him moving to her property, which will create more stress, due to having 8 people potentially in home at times.  After discussing these likely worsening stressors patient is in agreement to have discussion with son. As symptoms are likely to worsen if she allows him to reside on property and if she did allow him she was advised to set boundaries, and a move out date as it would only be temporary.  The plan was discussed with the patient. The patient was given time to ask questions and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 food as patient has not been taking with food. Space times between doses of 6 hrs.  Refilled today as patient wishes to continue.   -Continue MIRTAZAPINE (REMERON) 15 mg by mouth nightly as lower doses can be more sedating, due to sleep difficulty reported of frequent night time awakenings.  New order sent for the 15 mg, patient informed of new tablet dose and no longer needed to break pill in half. Plan to continue at this time, and decrease to 7.5 mg next visit.  -Continue Vraylar (Cariprazine)1.5 mg by mouth daily to target unstable mood, cognitive symptoms, aggression, bipolar hossein and depressive symptoms.  Can be taken with or without food. No adverse effects of antipsychotic medications noted, such as EPS (Extrapyramidal side effects and TD (Tardive Dyskinesia), patient to contact provider immediately if experienced.  NR needed, current script will end 2/22/24 and patient was advised to request via [place] when needed.    -Patient advised to contact [facility] pharmacy to discuss switching all prescriptions over from [facility] as patient is displeased with  current pharmacy. Both Buspar and Remeron were sent to [facility] per patient request today with note indicating patient wishing to switch.  -Updated  of POC     Symptoms of anxiety, depression, insomnia are under better control with current medication regimen.  Overall mood has improved since starting Vraylar 1 week ago. Patient asking about restarting Prozac, which was denied as Vraylar will target depressed mood, which is improving.  Informed patient that CBD is not regulated by the FDA therefore, there may be variations of the amount of THC in its contents, which could show positive on UDS which would hinder ability to obtain routine pain medications and would not prescribe, patient verbalized understanding and will plan to discuss with pain management.   Patient was given instructions and counseling regarding condition and for health maintenance advice. Please see specific information pulled into the AVS if appropriate.    Patient to contact provider if symptoms worsen or fail to improve.        1/23/24:  Received message this morning from PCP office forwarding message sent by daughter via [employer] voicing patient ongoing struggle with anxiety, fears of dying, and frustration with no relief of symptoms.  And reports of looking for another provider if a medication was not given for the ongoing anxiety.  Informed PCP office staff of plans to contact patient this afternoon as received message yesterday from patient and MA contacted patient with further instructions.     This morning patient had came to the office to see another provider and when patient was seen in the hallway, this provider informed the patient of plan to call her this afternoon during lunch, however, since both patient and her daughter were here, they could stop by the office to further discuss, which patient was in agreement.    Patient stopped by this provider office prior to leaving building without daughter, informed patient of recent  "messages from her and her daughter this morning, and reminded patient of plan of care since last visit, Thurs., 1/18/24, with plans to start the Vraylar this Thurs. 1/25/24 at scheduled visit, and inquired about patient daughter reports of patient feeling as she is dying, as this was new information.  Patient expresses frustration with anxiety, having difficulty breathing during times of high anxiety, and becomes further worried during this time of not being able to breath.  Patient asked several times during conversation if either Ativan or Xanax could be ordered, as patient received Ativan in the ED in the past. Patient reminded of risks involved with benzodiazepines and would refrain from starting that medication as this was declined in the past.  Discussed option to proceed with starting Vraylar today and switch appointment from 1/25/24 to 1/31/24 at 11 am via video, for which patient was in agreement. Patient denies calling therapist to reschedule an appointment, \"I think I may need to find someone else.\"  Patient was advised again to reschedule with therapist as this is an essential part of care and treatment outcome. Patient was asking to restart Prozac due to depression. Which patient was informed that would be considered and discussed at next visit, as goal for patient to have a limited amount of medications due to polypharmacy.   Patient admits to adherence with decrease dose of Remeron from 30 to 15 mg.     Start Vraylar (Cariprazine)1.5 mg by mouth daily to target unstable mood, cognitive symptoms, aggression, bipolar hossein and depressive symptoms.  Can be taken with or without food.  Discussed all risks, benefits, alternatives, and side effects of Vraylar  including but not limited to GI upset, sedation, rare weight gain, dyslipidemia, extrapyramidal symptoms (dystonia, drug-induced parkinsonism, akathisia, tardive dyskinesia), lowering of seizure threshold, hematologic abnormalities, hyperglycemia, " "increased mortality in elderly patients with dementia-related psychosis, neuroleptic malignant syndrome, sexual dysfunction, orthostatic hypotension, (may enhance the effects of antihypertensive medications) falls risk in older adults, and temperature dysregulation. Patient instructed to avoid driving and doing other tasks or actions that require to be alert until knowing how the drug affects them. Discussed the need for patient to immediately call the office for any new or worsening symptoms, such as worsening depression; feeling nervous or restless; suicidal thoughts or actions; or other changes changes in mood or behavior, and all other concerns. Patient educated on medication compliance and the risks of suddenly stopping this medication or missing doses. Patient verbalized understanding and is agreeable to taking Vraylar. Addressed all questions and concerns. After discussion of these risks and benefits, the patient voiced understanding and agreed to proceed.       Patient instructed to start dose of Vryalar this evening, and if experienced GI upset to call provider by Friday and could switch to every other day dosing.  Patient verbalized understanding.   Instructed MA to switch appointment times/dates as discussed today in office.     1/22/24: TELEPHONE  Patient LMRIYA advising \"I want something for my Anxiety, I had to go to the Emergency room because I couldn't breath because of my anxiety\".  Patient is requesting a callback.    At last visit, 1/18/24, we discussed various coping strategies to help manage anxiety, she was also to reschedule with therapist, the ED note reviewed and patient was instructed to stop Hydroxyzine due to a prolonged QTI, which was discontinued 11/9/23.  Will see patient this Thurs. 1/25/24 at scheduled appointment.     Called and spoke to patient and relayed Trish's message to her verbatim.  Patient advised \"Trish is gonna have to do something for me for this anxiety I can't breath!\" " " I advised her to breath slowly and keep her appointment with Trish for this that is for this Thursday 01/25/2024.  Patient advised \"If I am still alive by then.\"  I told patient she will be ok and I will have Trish call her. Patient seemed to calm down and voiced understanding.       1/18/24:   -Therapy: Currently Seeing a Therapist  Jania Pagan [facility] with [facility]- patient advised to schedule follow up appointment and inquire about insurance acceptance.   -Continue Buspar 20 mg 3 times daily 3 without food as patient has not been taking with food. Space times between doses of 6 hrs. Planned to discontinue due to ineffectiveness reported, however, patient expressed fear of not having \"something\" for the anxiety, will continue at this time.    DECREASE MIRTAZAPINE (REMERON) FROM 30 mg TO 15 mg (instructed to take 1.5 tablet of the 30 mg tabs) by mouth nightly as lower doses can be more sedating, due to sleep difficulty reported of frequent night time awakenings. Updated order as a NO PRINT.    -DISCONTINUE Prozac 40 mg     Discussed started Vraylar, stopping Prozac and Remeron due to weight gain which is suspected due to several comorbid conditions, which are medically improving per review of notes with  prior to start of visit today and agreed to Stop Prozac 40 mg dose as this medication will taper itself due to long half life, wean down or discontinue Remeron, and then Add Vraylar.  Which was discussed with patient today and is agreeable to plan, however, expressed anxiety about no longer taking Prozac, in which patient was reassured and reiterated discussion with  with patient.  Will update  of today's visit and changes to current medication regimen.    Coping mechanisms discussed with patient today as a way to gain control over feelings to prevent situation or panic attack from getting worse: grounding techniques using 5 senses (name 3 things you can " see, smell, touch, etc.), slow paced breathing techniques- focus on door/book/magazine (something with 4 corners) inhaling and exhaling at each corner, application of cold compress/ice pack/water on face or opening freezer door to allow cold air to be breathed in taking slow deep breaths, closing eyes and focus on positive thoughts.    Advise to have stress ball within reach to use at times of anxiety.    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  Patient continues to take multiple medications for diagnosis, and plan to continue to eliminate as much as feasible to maintain mental health stability.   Patient was given instructions and counseling regarding condition and for health maintenance advice. Please see specific information pulled into the AVS if appropriate.    Patient to contact provider if symptoms worsen or fail to improve.      1/15/24: TELEPHONE  Patient daughter had reached out to PCP office regarding mental health, will have MA contact patient to schedule a follow up appointment as last appointment 12/13/24, patient did not have insurance information and prior insurance would not allow office to schedule, patient has not reached out to schedule an appointment.   Called [facility] for patient to callback to schedule a follow up appt with Trish.  Called patient back on 1/16/24 and she is now scheduled for a video with Trish Paz on Thursday 01/18/2024.    12/13/23:  Therapy: Currently Seeing a Therapist  LUIS Carrillo with [facility] - patient inquired about switching to a therapist that offers in office, patient was offered to go to [place] with a new provider in their family health clinic, however, due to distance from home prefers to think about it, advised patient to continue with current therapist.    Continue  Buspar 20 mg 3 times daily 3 without food as patient has not been taking with food. Space times between doses of 6 hrs.     Continue MIRTAZAPINE (REMERON) 30 mg (1 tablet) by mouth nightly as lower doses can be more sedating, due to sleep difficulty reported of frequent night time awakenings. Instructed patient to contact pharmacy for refills, as current prescription has 2 refills remaining, for which patient was not aware.    -DECREASE Prozac FROM 80 mg TO 60 mg by mouth daily in the morning FOR 21 days, THEN DECREASE TO 40 mg by mouth daily in the morning FOR 21 days to target skin picking disorder, OCD, anxiety, and depression. Refilled today as a 20 mg capsule.  Note to pharmacy to remove current 80 mg order from system.     -Will update  of today's visit and changes to current medication regimen.    -Patient instructed to upload new insurance card with [employer] plan once received and to contact MA directly in [place] to schedule follow up appointment for 5 weeks as current insurance [employer] HMO [place] which [place] [facility] [place] will not allow staff to schedule, informed patient scheduled appointment with Endocrine in Jan. Had also noted to be cancelled.     Symptoms of anxiety, depression, insomnia, skin picking, and factitious disorder are under fair control with current medication regimen. Patient continues to express ongoing symptoms despite efforts to add psychotherapy which patient is now disliking telehealth mode, though due to transportation difficulty this was the best option for patient based on past failures with attending therapy.  Despite numerous medication variations, patient chronically reports ineffectiveness.  Plan to continue to taper down Prozac with goal to only take 3 psychotropic medications per discussion with  today.    Patient was given instructions and counseling regarding condition and for health maintenance advice. Please see specific  information pulled into the AVS if appropriate.    Patient to contact provider if symptoms worsen or fail to improve.       11/9/23:  Therapy:   Jania Pagan with [facility] 11/14/23 at 1:30 pm (patient informed today as patient was scheduled with another therapist previously and due to scheduling error patient noted to be seeing Ming)  -Stop Hydroxyzine as it has been ineffective.  -CHANGE BUSPAR from 30 mg twice daily TO 20 mg (start taking 2 of the 10 mg ordered today) 3 TIMES DAILY without food as patient has not been taking with food. Space times between doses of 6 hrs.   -DECREASE MIRTAZAPINE (REMERON) from 45 mg TO 30 mg (1 tablet) nightly as lower doses can be more sedating, due to sleep difficulty reported of frequent night time awakenings.   -Continue Prozac 80 mg by mouth daily in the morning to target skin picking disorder, OCD, anxiety, and depression. Refilled today  -Will update  of today's visit and changes to current medication regimen.    Symptoms of anxiety, depression, skin picking, factitious disorder, and insomnia are under fair control with current medication regimen.  Due to polypharmacy and ineffectiveness of hydroxyzine will eliminate from current regimen.  Due to ongoing anxiety, will try Buspar 3 times daily vs twice daily.  And decrease Remeron to help with frequent night time awakenings.     Patient was given instructions and counseling regarding condition and for health maintenance advice. Please see specific information pulled into the AVS if appropriate.    Patient to contact provider if symptoms worsen or fail to improve.      10/12/23:  TELEPHONE  Called patient to discuss medication adjustments after discussion with Dr. Newman yesterday, patient informed and instructed to decrease Zyprexa FROM 7.5 mg daily with lunch TO 5 mg for 14 days THEN decrease to 2.5 mg (instructed to break 5 mg tab in half) for 14 days THEN STOP. Informed patient a  new prescription for Zyprexa 5 mg tabs has been sent in to the pharmacy today. Patient verbalized understanding and agreeable to plan.   Informed patient plan going forward is to decrease current psychotropic medications due to polypharmacy, and will be consulting with Dr. Newman after each office visit.   Patient reports has an upcoming appointment with [facility] next week, noted appointment scheduled for 10/25/23 with Shital Horowitz LCSW, praised patient for scheduling appointment.   Patient to contact provider if needed in between now and upcoming appointment on 11/9/23.     10/9/23: TELEPHONE  Returned call to patient as planned, informed patient that  was given an update of current treatment plan and concerns and would plan to reconvene this Wed. And if there were any changes in treatment plan provider would notify patient on Thurs. This week. Patient voiced understanding and thanked provider for the call.     10/5/23:   Therapy: New referral sent to [facility] at last visit.  Patient given number again today as patient had lost phone with contacts, informed patient referral had been closed due to not responding to voice mails. Patient wrote down both numbers listed: [phone] and [phone]    -Continue Buspar 30 mg by mouth twice daily to target anxiety.    -Continue Remeron 45 mg by mouth nightly to target insomnia and depression.  Taking 1.5 tabs of the 30 mg to equal 45 mg. Refilled today  -Continue Hydroxyzine 100 mg by mouth 3 times daily as needed to target itching and anxiety.    -Continue Prozac 80 mg by mouth daily in the morning to target skin picking disorder, OCD, anxiety, and depression.   -Continue Zyprexa 7.5 mg by mouth daily at 12 noon with food to target tactile hallucinations, depression, and anxiety.  Risks, benefits, alternatives discussed with patient including nausea and vomiting, GI upset, sedation, dizziness/falls risk,  akathisia, hypotension, increased appetite, lowering of seizure threshold, theoretical risk of tardive dyskinesia. After discussion of these risks and benefits, the patient voiced understanding and agreed to proceed.  Slight tremor noted around mouth when mouth closed which are suspected related to medication vs anxiety.   Refilled today    -All medications are good through mid Nov.   -Will plan on contacting Dr. Newman to discuss case as patient has had minimal improvement with outpatient medication management.  And calling patient by early next week.   Patient had been advised numerous times in the past to receive a higher level of care with inpatient mental health care-suggestions of ECT- due to minimal improvement with outpatient medication management, however, patient has either refused due to a multitude of reasons or failure to follow through after arrangements for treatment were finalized.  Symptoms of anxiety, depression, insomnia, tactile hallucinations, and factitious disorder are under fair control with current medication regimen.  Lengthy discussion with patient today regarding plan of care, past discussion and treatment recommendations, which had been advised by Dr. Newman prior to start of care with this provider 1 yr ago.  Patient wishes to remain with this provider.   Patient was given instructions and counseling regarding condition and for health maintenance advice. Please see specific information pulled into the AVS if appropriate.    Patient to contact provider if symptoms worsen or fail to improve.        8/17/23:   Therapy: Chang provider -Marie Dorsey referral sent to [facility], the office will contact you to set up the appointment. Please contact them at [phone] if you do not hear from them by next week.     -Continue Buspar 30 mg by mouth twice daily to target anxiety.    -Continue Remeron 45 mg by mouth nightly to target insomnia and depression.  Taking 1.5  tabs of the 30 mg to equal 45 mg. Refilled today  -Continue Hydroxyzine 100 mg by mouth 3 times daily as needed to target itching and anxiety.      Increase Prozac FROM 60 mg TO 80 mg by mouth daily in the morning to target skin picking disorder, OCD, anxiety, and depression. Instructed to start taking 2 of the 40 mg caps prescribed today, and may take 4 of the 20 mg on hand until new dose is picked up.    Continue Zyprexa 7.5 mg by mouth daily at 12 noon with food to target tactile hallucinations, depression, and anxiety.  No signs of EPS or TD noted.     Patient instructed to request refills when appropriate, when down to 5-7 days remaining via  pharmacy or via [employer].       Symptoms of anxiety, depression, insomnia, skin picking are under fair control with current medication regimen.  Continues to request Klonopin for anxiety which has been denied due to patient with long term and current use of opioids, comorbid conditions, placing patient at higher risks, and has been off of Benzodiazepines for over 1 yr. CBT advised, agreeable to see therapist via telehealth only.  Patient with both physical and psychological signs and symptoms of factitious disorder.   Patient was given instructions and counseling regarding condition and for health maintenance advice. Please see specific information pulled into the AVS if appropriate.    Patient to contact provider if symptoms worsen or fail to improve.      7/13/23:   Therapy: Chang Gipson     -Continue Buspar 30 mg by mouth twice daily to target anxiety.    -Continue Remeron 45 mg by mouth nightly to target insomnia and depression.  Taking 1.5 tabs of the 30 mg to equal 45 mg.   -Continue Hydroxyzine 100 mg by mouth 3 times daily as needed to target itching and anxiety.    Continue Prozac 60 mg by mouth daily in the morning to target skin picking disorder, OCD, anxiety, and depression. Instructed to start taking 3 of the 20 mg caps prescribed.   Continue Zyprexa  7.5 mg by mouth daily at 12 noon with food to target tactile hallucinations, depression, and anxiety.  Risks, benefits, alternatives discussed with patient including nausea and vomiting, GI upset, sedation, dizziness/falls risk, akathisia, hypotension, increased appetite, lowering of seizure threshold, theoretical risk of tardive dyskinesia. After discussion of these risks and benefits, the patient voiced understanding and agreed to proceed.   No signs of EPS or TD noted. Refilled today for 30 days with 1 refill    Symptoms of anxiety, depression, skin picking, and tactile hallucinations are under good to fair control with current medication regimen.  Suspect ongoing problem with skin picking negatively impacts mood. Explained to patient again that due to several comorbid conditions and current medications that weight gain has been noted and reviewed for several years, however, due to ongoing weight gain, plan to continue current medications along with therapy and determine at next visit if alternative medications are needed vs tapering down from Zyprexa.  Patient continues to request alternative medication for anxiety, which have been declined.     Patient has been advised to do the following at next therapy session due to displeased recent session, which is listed on AVS:   Recommend writing down a list of a few things you want to discuss at next appointment, perhaps have 2 things that you definitely want to discuss to help guide the visit. What you want to accomplish or discuss more?  Such as developing coping strategies to help with anxiety, AND practice those techniques during the visit with your therapist.  Also, ask for those coping strategies to be written down or what resources she can give you.   Patient was given instructions and counseling regarding condition and for health maintenance advice. Please see specific information pulled into the AVS if appropriate.    Patient to contact provider if symptoms  "worsen or fail to improve.      5/15/23:   Therapy: Chang provider scheduled end of May   -Continue Buspar 30 mg by mouth twice daily to target anxiety.  Refilled today  -Continue Remeron 45 mg by mouth nightly to target insomnia and depression.  Taking 1.5 tabs of the 30 mg to equal 45 mg. Refilled today  -Continue Hydroxyzine 100 mg by mouth 3 times daily as needed to target itching and anxiety.   Refilled today, tolerated well without reported increase in drowsiness.    -Increase Prozac FROM 40 mg TO 60 mg by mouth daily in the morning to target skin picking disorder, OCD, anxiety, and depression. Instructed to start taking 3 of the 20 mg caps prescribed today.   Dose increased to 40 mg ordered 3/24/23.   -Increase Zyprexa FROM 5 mg TO 7.5 mg  by mouth daily at 12 noon with food to target tactile hallucinations, depression, and anxiety.  Risks, benefits, alternatives discussed with patient including nausea and vomiting, GI upset, sedation, dizziness/falls risk, akathisia, hypotension, increased appetite, lowering of seizure threshold, theoretical risk of tardive dyskinesia. After discussion of these risks and benefits, the patient voiced understanding and agreed to proceed.    No signs of EPS or TD noted.  AIMS-0; Informed patient a new prescription was sent for the 7.5 mg tabs.     Symptoms of anxiety, depression, insomnia, OCD, tactile hallucinations are under fair control presently, medications dose adjustments made today, patient instructed to contact provider if the Zyprexa 7.5 mg causes increased drowsiness as the dose may be switched to nightly.  Suspect anxiety is worsened by tactile hallucinations, which aim to control with Zyprexa.   Patient to contact provider if symptoms worsen or fail to improve.     5/11/23:   Patient LMVM that she is needing something other than the Buspar for her Anxiety.  Patient reports \"My Anxiety if through the roof\"  Please call me back\"    Returned call to patient as " "requested, patient reports \"I can hardly go on living like this, I just don't feel right, I just got out of the hospital, mostly anxiety and stress, they did a Cat scan and MRI and everything was all okay, I just don't feel right.\" Patient admits to adherence to medications. Patient is uncertain if the Zyprexa is effective, \"I still feel like there's something crawling in my head, I need something else.\" Reports taking Hydroxyzine 50 mg 2-3 times daily, which is somewhat helpful, denies drowsiness. Instructed patient to start taking routinely 3 times daily. Patient has an appointment with [facility] therapist end of May.    \"The only thing I took that helped was the Xanax.\" Patient asking if Buspar can be changed to another medication.  \"I been battling this for over a year.\" Patient requesting refill for Remeron, instructed patient to check bottle to determine if refill is available as patient only has 4 tabs and 2 half tabs remaining, patient instructed to request refill from pharmacy if one remains, otherwise, patient to inform provider or MA upon return call later this afternoon and a refill can be sent.  Informed patient will call back later this afternoon after further review of other alternatives for anxiety. Patient verbalized understanding.    Returned call to patient as planned, left vm instructing patient to start taking 2 of the Hydroxyzine 50 mg to equal 100 mg by mouth 3 times daily as needed anxiety/itching temporarily until seen in office at scheduled appointment Monday 5/15/23, and to return to 50 mg dose if excessive grogginess, drowsiness occurs as there is an increased fall risk with higher dose.  Will plan to discuss further at appointment.   Patient called re: the missed call from last evening.  Patient states she did Not check her voicemail.  I read patient Trish's exact message verbatim about temporary med increase of Hydroxyzine until Monday appt.  Patient voiced " understanding      4/7/23:  Therapy: [facility] provider no longer working at [facility], and [facility] with [facility] provider List of counseling services given to patient today, Patient to contact provider and set up appointment.  And to contact office if unable to get an appointment scheduled.   Continue Buspar 30 mg by mouth twice daily to target anxiety.     Continue Remeron 45 mg by mouth nightly to target insomnia and depression.  Taking 1.5 tabs of the 30 mg to equal 45 mg.    Continue Hydroxyzine 50 mg by mouth 3 times daily as needed to target itching and anxiety.    Continue Prozac 40 mg by mouth daily in the morning to target skin picking disorder, anxiety, and depression. Dose increased to 40 mg ordered 3/24/23.     Increase Zyprexa FROM 2.5 mg TO 5 mg by mouth daily at 12 noon with food to target tactile hallucinations, depression, and anxiety.  Risks, benefits, alternatives discussed with patient including nausea and vomiting, GI upset, sedation, dizziness/falls risk, akathisia, hypotension, increased appetite, lowering of seizure threshold, theoretical risk of tardive dyskinesia. After discussion of these risks and benefits, the patient voiced understanding and agreed to proceed.  Instructed patient to ensure accurate dose is picked up with pharmacy, new order with notes to pharmacy informing to not fill 2.5 mg dose previously requested.  No signs of EPS or TD noted.     Reminded patient of refill process for all medications     Symptoms of anxiety, tactile hallucinations, and depression continue to be present, advised for patient to get set up with therapy, as this will provide great benefit for patient and help establish coping strategies to help manage chronic symptoms.  Patient somewhat anxious regarding advising to return in 5 weeks vs earlier.  Reiterated to patient  Antidepressants can take 4-6 weeks to start working and up to 2-3 months for the full benefits. Due to toleration of  "Zyprexa, will increase to help with tactile hallucinations. Patient to contact provider if symptoms worsen or fail to improve.     3/24/23:   Patient called stating she is needing a refill for her Prozac and you had spoke with her about increasing the dose at last visit.  Patient would like for you to increase her Prozac because she says she is still having \"increased depression and still feels like something is crawling in my head\"  Please advise  Per discussion at last visit, Prozac dose can be increased from 20 to 40 mg by mouth daily in the morning, order sent to pharmacy.   Called patient and let her know that the Prozac has been increased at which time she said she is also needing a refill on her Hydroxyzine.  Please refill  Med pended    3/9/23:   Therapy: Currently Seeing a Therapist scheduled with new [facility] at [facility], as prior provider-[facility] went on maternity leave, and Kayleigh lives in [place] and was providing telephone therapy support.  To see new therapist in April.  -Continue Buspar 30 mg by mouth twice daily to target anxiety.   -Continue Remeron 45 mg by mouth nightly to target insomnia and depression.  Taking 1.5 tabs of the 30 mg to equal 45 mg.  -Continue Hydroxyzine 50 mg by mouth 3 times daily as needed to target itching and anxiety.    -Continue Prozac 20 mg by mouth daily in the morning to target skin picking disorder, anxiety, and depression.   Will plan on increasing in 2-3 weeks if needed.    Start Zyprexa 2.5 mg by mouth daily at 12 noon with food to target tactile hallucinations, depression, and anxiety.  Risks, benefits, alternatives discussed with patient including nausea and vomiting, GI upset, sedation, dizziness/falls risk, akathisia, hypotension, increased appetite, lowering of seizure threshold, theoretical risk of tardive dyskinesia. After discussion of these risks and benefits, the patient voiced understanding and agreed to proceed.  Instructed patient to contact provider " next Tues. 3/14/23 to inform of toleration of afternoon dose, plan to have patient take without any other medications to determine if drowsiness occurs and effectiveness. Informed patient dose may need to be given later in the evening depending on drowsiness.     Symptoms of depression, anxiety, skin picking disorder and tactile hallucinations are not under good control, will add low dose Zyprexa. Due to patient not able to come early in the morning for an appointment she will return in approximately 3 weeks.  Patient to contact provider if symptoms worsen or fail to improve.         2/23/23:   Therapy: Currently Seeing a Therapist scheduled with new therapist at [facility], as prior provider-[facility] went on maternity leave, and Kayleigh lives in [place] and was providing telephone therapy support.  To see new therapist in April.  Continue Buspar 30 mg by mouth twice daily to target anxiety. Refilled today    Decrease Wellbutrin SR FROM 200 mg daily  mg by mouth daily in the morning FOR 7 DAYS (2/24-3/2/23) THEN take every other day on the following days: 3/4, 3/6, and 3/8 to target depression and anxiety, THEN STOP.  New order sent to pharmacy. Informed patient of the D2D interaction with Wellbutrin and Prozac, and as the dose is decreased the Prozac will be increased.  Discussed Symptoms of withdrawal:  GI flu-like symptoms, paresthesias (prickling or tingling sensation, like pins and needs), irritability, insomnia, dizziness, or vivid dreams    Continue Remeron 45 mg by mouth nightly to target insomnia and depression.  Taking 1.5 tabs of the 30 mg to equal 45 mg. Refilled today    Continue Hydroxyzine 50 mg by mouth 3 times daily as needed to target itching and anxiety.  Risks, benefits, alternatives discussed with patient including sedation, dizziness, fall risk, GI upset, and risk of increased CNS depression and elevated heart rate if taken with other antihistamines.  After discussion of these risks and  benefits, the patient voiced understanding and agreed to proceed.    Increase Prozac FROM 10 mg TO 20 mg by mouth daily in the morning to target skin picking disorder, anxiety, and depression.  Discussed all risks, benefits, alternatives, and side effects of Fluoxetine including but not limited to GI upset, decreased appetite, sexual dysfunction, bleeding risk, seizure risk, insomnia, anxiety, drowsiness, headache, tremor, nervousness, activation of hossein or hypomania, increased fragility fracture risk, hyponatremia, ocular effects, serotonin syndrome, hypersensitivity reaction, and activation of suicidal ideation and behavior.  Discussed the need for patient to immediately call the office for any new or worsening symptoms, such as worsening depression; feeling nervous or restless; suicidal thoughts or actions; or other changes in mood or behavior, and all other concerns. Patient educated on medication compliance.  Patient verbalized understanding and is agreeable to taking Fluoxetine. Addressed all questions and concerns.  Provider informed patient had not requested refill of 10 mg dose ordered previously, therefore, a new prescription was sent for the 20 mg cap, MA informed patient.     Symptoms of depression, anxiety, and skin picking is ongoing, as expected patient mood is down due to medication adjustments, which was reiterated to patient today while switching medications to expect a decreased mood, increased irritability, etc.  Will see patient back in office in 2 weeks to determine toleration of Wellbutrin wean.  Patient was asking for Valium today as a relative takes opioids with Valium, again reiterated to patient the increased risk of overdose death, suicide, worse treatment outcomes, and increased health service use when co-prescribing of opioids and benzodiazepines regardless of whether prescribed for comorbid chronic pain, anxiety, or insomnia.  Patient to contact provider if symptoms worsen or fail to  improve.       1/30/23:   Continue Buspar 30 mg by mouth twice daily to target anxiety.     Continue Wellbutrin  mg by mouth daily in the morning to target depression and anxiety.      Continue Remeron 45 mg by mouth nightly to target insomnia and depression.  Taking 1.5 tabs of the 30 mg to equal 45 mg.     Stop Risperdal due to D2D interaction with Prozac. And ineffectiveness, patient has been taking for less than 2 months and missed doses reported.     Continue Hydroxyzine 50 mg by mouth 3 times daily as needed to target itching and anxiety.  Refilled today, able to receive 2/6/23. Risks, benefits, alternatives discussed with patient including sedation, dizziness, fall risk, GI upset, and risk of increased CNS depression and elevated heart rate if taken with other antihistamines.  After discussion of these risks and benefits, the patient voiced understanding and agreed to proceed.    Start Prozac 10 mg by mouth daily in the morning to target skin picking disorder, anxiety, and depression.  Informed patient due to potential D2D with Wellbutrin as dose of Prozac would be doubled. Canceled initial order for 20 mg due to this potential D2D interaction.  Discussed all risks, benefits, alternatives, and side effects of Fluoxetine including but not limited to GI upset, decreased appetite, sexual dysfunction, bleeding risk, seizure risk, insomnia, anxiety, drowsiness, headache, tremor, nervousness, activation of hossein or hypomania, increased fragility fracture risk, hyponatremia, ocular effects, serotonin syndrome, hypersensitivity reaction, and activation of suicidal ideation and behavior.  Discussed the need for patient to immediately call the office for any new or worsening symptoms, such as worsening depression; feeling nervous or restless; suicidal thoughts or actions; or other changes in mood or behavior, and all other concerns. Patient educated on medication compliance.  Patient verbalized understanding and  is agreeable to taking Fluoxetine. Addressed all questions and concerns.     Patient tolerated Cymbalta taper, continues to struggle with skin picking to scalp which has worsened mood.  Will start Prozac as discussed previously, due to potential D2D interaction with Wellbutrin, the dose of Wellbutrin may need to be decreased before increasing Prozac dose, which was discussed today. Patient instructed to discuss Gabapentin with Pain management at upcoming appointment 2/23/23 and to update provider regarding current provider for mental health and medications as past note did not have accurate information. Patient to contact provider if symptoms worsen or fail to improve.       1/9/23:   Continue Buspar 30 mg by mouth twice daily to target anxiety.     Continue Wellbutrin  mg by mouth daily in the morning to target depression and anxiety.      Continue Remeron 45 mg by mouth nightly to target insomnia and depression.  Taking 1.5 tabs of the 30 mg to equal 45 mg.     Continue Risperdal 1 mg by mouth twice daily to target delusional thoughts as patient believed there was a bug infestation on scalp. No signs of TD noted today.     Start Taper of Cymbalta FROM 120 mg by mouth daily in the morning TO the following:  Cymbalta 30 mg caps ordered-take 3 to equal 90 mg daily for 7 days 1/10-1/16 THEN  Decrease to 60 mg (2 of the 30 mg cap) daily for 7 days 1/17-1/23/23  THEN  Decrease 30 mg (1 cap of 30 mg) daily for 7 days 1/24-1/30/23. THEN STOP    Symptoms of withdrawal:  GI flu-like symptoms, paresthesias (prickling or tingling sensation, like pins and needs), irritability, insomnia, dizziness, or vivid dreams     Increase Hydroxyzine FROM 25 mg 4 times daily as needed TO 50 mg by mouth 3 times daily as needed to target itching and anxiety.  Risks, benefits, alternatives discussed with patient including sedation, dizziness, fall risk, GI upset, and risk of increased CNS depression and elevated heart rate if taken with  other antihistamines.  After discussion of these risks and benefits, the patient voiced understanding and agreed to proceed.    Due to chronic skin picking to head causing significant distress to patient, will plan on tapering down from Cymbalta and switching to Prozac 10 mg.  Discussed risks of withdrawal today with patient.  Also, discussed plans to taper down from Wellbutrin as there is a potential D2D interaction with combination of Wellbutrin with Prozac.  Patient agreeable to plan.    Current symptoms of depression and anxiety are under fair control with current medication regimen, however, due to severe tactile hallucinations her anxiety has been elevated.    Patient to contact provider if symptoms worsen or fail to improve.       11/21/22:   Therapy: Currently Seeing a Therapist [facility] via telephone; Patient would like to seek a therapist in [place] due to current therapist is only via phone and has to wait until Oct. For next appointment. Advised patient to contact insurance company to determine available therapist in area and/or check [employer] and filter results based on needs. And to contact provider if a referral order is needed.  Continue Buspar 30 mg by mouth twice daily to target anxiety.  Instructed to take 2 of the 15 mg on hand to equal 30 mg.     Continue Wellbutrin  mg by mouth daily in the morning to target depression and anxiety.      Continue Remeron 45 mg by mouth nightly to target insomnia and depression.  Taking 1.5 tabs of the 30 mg to equal 45 mg.     Advised patient to inquire with therapist or search on own about  local support groups, Judaism groups, library, and/or adult day a few days per week which will get patient out of the house and develop relationships with other people her age.    Patient continues with ongoing stressors in home, which have not improved, highly encouraged patient to get out of home several days per week which I believe will provide  benefit for patient due to ongoing stress she complains of in the home. Patient continues to ask for other medication or an increase in medications.  Patient is uncertain of amount of Cymbalta she is taking and is to check with daughter.  Patient is on chronic opioids and benzodiazepines will not be added.  Currently on max daily dose of Buspar, Cymbalta, and recently increased both Buspar and Wellbutrin SR at last appointment.  May consider increasing Wellbutrin SR to an additional dose in the evening, however, due to suspected worsening insomnia with Wellbutrin SR, this will have to be revisited.    Patient to contact provider if symptoms worsen or fail to improve.       11/11/22: TELEPHONE  Patient called back and stated her Depression and Anxiety are bad and she don't know why Trish doesn't understand that.  I told patient that Trish is trying to help the patient but giving her Benzodiazepine's is not an option.  I told her that Trish had said she needs to go to therapy and find other ways of coping rather than just taking a pill.  Patient said she is seeing a Therapist at [facility].  I explained to her that at her age there is more chance of falling with the Benzodiazepine's and I am sure she doesn't want to fall and break a hip.  Patient said OK I told her to have a good weekend she said you too and hung up      10/17/22:   Current with therapy, [facility] with [facility] via telephone; Patient would like to seek a therapist in [place] due to current therapist is only via phone and has to wait until Oct. For next appointment. Advised patient to contact insurance company to determine available therapist in area and/or check [employer] and filter results based on needs. And to contact provider if a referral order is needed.    Increase Buspar from 15 mg to 30 mg by mouth twice daily to target anxiety.  Instructed to take 2 of the 15 mg on hand to equal 30 mg.     Increase Wellbutrin SR from 150 mg to 200 mg by  mouth daily in the morning.  Risks, benefits, alternatives discussed with patient including nausea, GI upset, increased energy, exacerbation of irritability, insomnia, lowering of seizure threshold.  After discussion of these risks and benefits, the patient voiced understanding and agreed to proceed.Risks, benefits, alternatives discussed with patient including GI upset, nausea vomiting diarrhea, theoretical decrease of seizure threshold predisposing the patient to a slightly higher seizure risk, headaches, sexual dysfunction, serotonin syndrome, bleeding risk, increased suicidality in patients 24 years and younger.  After discussion of these risks and benefits, the patient voiced understanding and agreed to proceed.    Continue Remeron 45 mg by mouth nightly to target insomnia and depression.  Taking 1.5 tabs of the 30 mg to equal 45 mg.     Discontinued Clonazepam due to limited use from 5/2022-9/9/22, and filled once on 9/9/22.      Patient depression is chronic and ongoing, will increase Wellbutrin today and Buspar to help with anxiety as patient will no longer be given Klonopin.  Patient had limited use of tapered dose originally ordered per  5/19/22, patient did fill early Sept. However, due to limited use and tolerating taper over 4 months, will not reorder. Patient also has increased risk of falls, memory impairment and other comorbid conditions.  Encouraged patient to have a visual calendar for all family to see and to have all appointments listed to prevent further over scheduling as daughter transports patient to appointments. Patient current stressors are environmental and situational, encourage use of coping mechanisms and frequent visits with therapist. Informed patient weekly visits were not appropriate with this provider as medications can take up to 4-6 weeks for effectiveness.  Will see patient back in 5 weeks. Patient to contact provider if symptoms worsen or fail to improve.     9/9/22:    Current with therapist, [facility]  CHANGE Buspar to 15 mg by mouth twice daily to target anxiety.  Patient reports taking as needed, possibly daily, will remain at current dose and make routine twice daily instead of as previously prescribed as 3 times daily.  Risks, benefits, alternatives discussed with patient including nausea, GI upset, mild sedation, falls risk.  After discussion of these risks and benefits, the patient voiced understanding and agreed to proceed. Instructed to Place in pill planner for am and pm     INCREASE Wellbutrin SR from 100 mg to 150 mg by mouth daily in the morning.  Risks, benefits, alternatives discussed with patient including nausea, GI upset, increased energy, exacerbation of irritability, insomnia, lowering of seizure threshold.  After discussion of these risks and benefits, the patient voiced understanding and agreed to proceed.Risks, benefits, alternatives discussed with patient including GI upset, nausea vomiting diarrhea, theoretical decrease of seizure threshold predisposing the patient to a slightly higher seizure risk, headaches, sexual dysfunction, serotonin syndrome, bleeding risk, increased suicidality in patients 24 years and younger.  After discussion of these risks and benefits, the patient voiced understanding and agreed to proceed.    Remeron order is for 45 mg by mouth nightly to target insomnia and depression.  Patient reported only taking 30 mg and failure to read bottle.  INSTRUCTED PT  TO TAKE 1.5 tabs (break one tablet in half to equal 15 mg with the 1 whole tablet of 30 mg to equal 45 mg) Risks, benefits, alternatives discussed with patient including GI upset, sedation, dizziness with falls risk, increased appetite.  After discussion of these risks and benefits, the patient voiced understanding and agreed to proceed.    Instructed patient : HAVE YOUR DAUGHTER PUT THE NEW WELLBUTRIN DOSE IN YOUR PLANNER,  AND THE BUSPAR IN BOTH MORNING AND NIGHT BOXES,  AND the REMERON she or you will need to break some of the tablets in half to equal correct dose. Next time I will fill for the 45 mg tabs.     SUGGEST YOUR DAUGHTER MANAGE ALL OF YOUR MEDICATIONS TO PREVENT CONFUSION AND/OR MISSED DOSES, WANT YOU TO FEEL BETTER AND NEED YOUR MEDICATIONS TO BE TAKEN AS ORDERED.    Continue Clonazepam 1 mg by mouth daily as needed to target anxiety. Risks, benefits, and alternatives discussed with patient including sedation/falls risk, dizziness, disinhibition, headache, fatigue, dry mouth, blurred vision, constipation, nausea, diarrhea, heartburn, unusual taste in mouth, urinary retention, increased appetite, restlessness, sexual dysfunction, sweating, weight gain, cardiac arrhythmias, seizures, and fall risk.  After discussion of these risks and benefits, patient voiced understanding and agreed to proceed.  Delmer reviewed, NEW prescription per  5/19/22, tapering down dose, as patient was prescribed twice daily.  Last Dispensed 5/19/22 #30.    Plan to obtain UDS and sign CSA in future, although patient admits to taking, she has not filled since new prescription 5/19/22 which has 1 refill. Will plan to inquire further at next appointment, if patient is in fact rarely using will either discontinue or decrease to 0.5 mg.      Patient presentation seems most consistent with anxiety and depression.  Education provided regarding safety concerns with current medication management. Instructions provided to have daughter manage all medications due to memory impairment as patient was inconsistent with information regarding doses, frequency, and adherence of medications today.   Patient to contact provider if symptoms worsen or fail to improve.        Patient screened positive for depression based on a PHQ-9 score of 16 on 5/9/2025. Follow-up recommendations include: Prescribed antidepressant medication treatment and Suicide Risk Assessment performed.       TREATMENT PLAN/GOALS:  Continue supportive psychotherapy efforts and medications as indicated. Treatment and medication options discussed during today's visit. Patient acknowledged and verbally consented to continue with current treatment plan and was educated on the importance of compliance with treatment and follow-up appointments.    MEDICATION ISSUES:  KOSTA reviewed as expected.   Discussed medication options and treatment plan of prescribed medication as well as the risks, benefits, and side effects including potential falls, possible impaired driving and metabolic adversities among others. Patient is agreeable to call the office with any worsening of symptoms or onset of side effects. Patient is agreeable to call 911 or go to the nearest ER should he/she begin having SI/HI. No medication side effects or related complaints today.     MEDS ORDERED DURING VISIT:  New Medications Ordered This Visit   Medications    mirtazapine (REMERON) 7.5 MG tablet     Sig: Take 1 tablet by mouth Every Night. Indications: Major Depressive Disorder, insomnia     Dispense:  90 tablet     Refill:  1     Please remove any prior prescriptions for this medication from profile due to psychiatry managing.       Return in about 2 months (around 8/20/2025) for medication check.       I spent 45 minutes caring for Chasity on this date of service. This time includes time spent by me in the following activities: preparing for the visit, reviewing tests, obtaining and/or reviewing a separately obtained history, performing a medically appropriate examination and/or evaluation, counseling and educating the patient/family/caregiver, ordering medications, tests, or procedures, referring and communicating with other health care professionals, documenting information in the medical record, care coordination, and check in process, scheduling    This document has been electronically signed by DIANE Sanches  June 20, 2025 15:29 EDT      Part of this note may be an  electronic transcription/translation of spoken language to printed text using the Dragon Dictation System.

## 2025-06-21 DIAGNOSIS — D50.9 IRON DEFICIENCY ANEMIA, UNSPECIFIED IRON DEFICIENCY ANEMIA TYPE: ICD-10-CM

## 2025-06-21 DIAGNOSIS — R41.3 MEMORY LOSS: ICD-10-CM

## 2025-06-21 DIAGNOSIS — K21.9 GASTROESOPHAGEAL REFLUX DISEASE, UNSPECIFIED WHETHER ESOPHAGITIS PRESENT: ICD-10-CM

## 2025-06-23 RX ORDER — FLUTICASONE PROPIONATE 50 MCG
2 SPRAY, SUSPENSION (ML) NASAL DAILY
Qty: 16 G | Refills: 0 | Status: SHIPPED | OUTPATIENT
Start: 2025-06-23

## 2025-06-23 RX ORDER — PANTOPRAZOLE SODIUM 40 MG/1
40 TABLET, DELAYED RELEASE ORAL DAILY
Qty: 90 TABLET | Refills: 0 | Status: SHIPPED | OUTPATIENT
Start: 2025-06-23

## 2025-06-23 RX ORDER — FERROUS SULFATE 325(65) MG
325 TABLET ORAL
Qty: 90 TABLET | Refills: 1 | Status: SHIPPED | OUTPATIENT
Start: 2025-06-23

## 2025-06-23 RX ORDER — DONEPEZIL HYDROCHLORIDE 10 MG/1
10 TABLET, FILM COATED ORAL NIGHTLY
Qty: 30 TABLET | Refills: 2 | Status: SHIPPED | OUTPATIENT
Start: 2025-06-23

## 2025-06-25 ENCOUNTER — OFFICE VISIT (OUTPATIENT)
Dept: SLEEP MEDICINE | Facility: HOSPITAL | Age: 70
End: 2025-06-25
Payer: MEDICARE

## 2025-06-25 VITALS
HEART RATE: 73 BPM | SYSTOLIC BLOOD PRESSURE: 108 MMHG | WEIGHT: 274 LBS | OXYGEN SATURATION: 97 % | BODY MASS INDEX: 46.78 KG/M2 | HEIGHT: 64 IN | DIASTOLIC BLOOD PRESSURE: 48 MMHG

## 2025-06-25 DIAGNOSIS — E66.813 CLASS 3 SEVERE OBESITY DUE TO EXCESS CALORIES WITHOUT SERIOUS COMORBIDITY WITH BODY MASS INDEX (BMI) OF 45.0 TO 49.9 IN ADULT: ICD-10-CM

## 2025-06-25 DIAGNOSIS — J96.11 CHRONIC HYPOXIC RESPIRATORY FAILURE: ICD-10-CM

## 2025-06-25 DIAGNOSIS — E11.9 TYPE 2 DIABETES MELLITUS WITHOUT COMPLICATION, WITHOUT LONG-TERM CURRENT USE OF INSULIN: ICD-10-CM

## 2025-06-25 DIAGNOSIS — G47.33 OSA ON CPAP: Primary | ICD-10-CM

## 2025-06-25 PROCEDURE — G0463 HOSPITAL OUTPT CLINIC VISIT: HCPCS

## 2025-06-25 NOTE — PROGRESS NOTES
"Progress Note    Date: 07/03/2025  Time In: 3:05  Time Out: 4:00    Patient Legal Name: Chasity Torres  Patient Age: 69 y.o.    Mode of visit: In person  Location of provider: [facility] [address], [place] [address]  Location of patient: Office      CHIEF COMPLAINT: anxiety, treatment resistant depression, conflict/stress in the home     Subjective   History of Present Illness   Chasity is a 69 y.o. female who presents today as a follow-up for continued psychotherapy. Patient running a few minutes late today. Patient reported being \"all tore up yesterday and today\" about getting her granddaughter's permit scheduled but  stated they did get that resolved.  Patient shared there's starting to be consequences for her daughter's children since Laina moved in. Patient advised she often gets upset by her son's phone calls because he is loud and seems to be yelling.  Patient reported she is also often aggravated by her daughter not helping out in the home.  Patient shared it is exhausting getting upset about her children. Patient advised she forgot her journal today.  Patient is voluntarily requesting to participate in outpatient therapy at [facility].      History obtained from referring provider's note on 3/7/25:  Past Psychiatric History:  Began Treatment: while in her 20's  Diagnoses:Depression and Anxiety  Psychiatrist: -talked on phone-managed meds for 8 months prior to SOC with  in 10/2021; Also at [facility] for 2 yrs [id number]  Therapist: Sendy with [facility] (no in office visits) (telephone) next appt Oct. 2022 (for the last 8 months) prior seen Jessica at [facility] in [place] for 1-2 yrs  Admission History:Denies  Medication Trials: Several for which pt unable to recall if effective: Lexapro(ineff.),Abilify(eff),Prozac,Zoloft,Trintellix,Paxil,Celexa, (can't remember/uncertain); SGA-worsened tremors-per neuro medication induced parkisonism-Latuda,Rexulti,Seroquel (wt " gain,helped with insomnia&anxiety); Lamictal-ineff for anxiety; Xanax-weaned, Hydroxyzine(ineff 1 mo. 2022-2022)Trazodone 2021-2022 somewhat helpful for anxiety though made pt tired, Ambien [id number]?; retried Effexor XR, Wellbutrin,Cymbalta  Risperdal 1 mo approximately stopped due to starting Prozac; Hydroxyzine 100 mg 3 times daily ineffective for itching and anxiety; Zyprexa-ineffective; Prozac up to 80 mg-ineffective; Klonopin-weaned, effective; Vraylar up to 3 mg-parkinsonism tremors worsened per Neurologist recommendations; Buspar up to 20 mg 3 times daily-no longer effective  Self Harm: Denies  Suicide Attempts:Denies   Psychosis, Anxiety, Depression: Denies    Assessment    Mental Status Exam     Appearance: good hygiene and dressed appropriately for the weather  Behavior: calm  Cooperation:  engaged, cooperative, attentive, and friendly  Eye Contact:  good  Affect:  congruent  Mood: expressive  Speech: talkative  Thought Process:  linear  Thought Content: appropriate  Suicidal: denies  Homicidal:  denies  Hallucinations:  denies  Memory:  intact  Orientation:  person, place, time, and situation  Reliability:  reliable  Insight:  good  Judgment:  good    Clinical Intervention       ICD-10-CM ICD-9-CM   1. Stress at home  F43.9 V61.9   2. Generalized anxiety disorder  F41.1 300.02   3. Major depressive disorder, recurrent episode, moderate  F33.1 296.32        Individual psychotherapy was provided utilizing CBT and person-centered techniques to provide symptom relief, build rapport, encourage expression of thoughts and feelings, support self-esteem, establish new coping skills, manage stress, identify triggers, build confidence, assess symptoms, recognize cognitive distortions, establish therapeutic alliance, and provide psychoeducation.  Therapist utilized open-ended questions to encourage the development of a positive therapeutic relationship and open communication.  Therapist  normalized/validated patient’s thoughts and feelings as appropriate. Reviewed patient’s recent stressful situations and how she managed them. Discussed adjusting expectations regarding interactions with her children to help reduce anxiety/frustration. Revisited circles of control and explored ways that she can control her responses to her her children's bx. Encouraged patient to experiment with strategies discussed today and report results at next session. Unable to follow up on journaling due to her leaving it at home today.    Plan   Plan & Goals     Moving forward, we will continue to build rapport and reinforce and build upon effective coping strategies utilizing CBT and person-centered techniques. Follow up on today's assignment.    Patient acknowledged and verbally consented to continue working toward resolving current treatment plan goals and was educated on the importance of participation in the therapeutic process.  Patient will remain compliant with medication regimen as prescribed. Discuss any medication side effects, questions or concerns with prescribing provider.  Call 911 or present to the nearest emergency room in an emergency situation.  National Suicide Prevention Lifeline: Call 988. The Lifeline provides 24/7, free and confidential support for people in distress, prevention and crisis resources.  Crisis Text Line  Text HOME To 175031    Return in about 2 weeks (around 7/17/2025).    ____________________  This document has been electronically signed by Lila Lockhart LCSW  July 3, 2025 16:41 EDT    Part of this note may be an electronic transcription/translation of spoken language to printed text using the Dragon Dictation System.

## 2025-06-25 NOTE — PROGRESS NOTES
"  [facility]  Sleep Medicine   [address] [place] [address]  [place]   [place] [address]  Phone: [phone]  Fax: [phone]      SLEEP CLINIC FOLLOW UP PROGRESS NOTE.    Chasity Torres  [id number]   1955  69 y.o.  female      PCP: Ami Diego MD      Date of visit: 6/25/2025    Chief Complaint   Patient presents with    Sleep Apnea    Obesity       HPI:  This is a 69 y.o. years old patient is here for the management of obstructive sleep apnea.  Sleep apnea is mild in severity with a AHI of 6/hr. Patient is using positive airway pressure therapy with auto CPAP and the symptoms of sleep apnea have improved significantly on the therapy.  She also uses oxygen at 2 L with the CPAP.  Normally patient goes to bed at 11 PM and wakes up at 730 AM .  The patient wakes up 2 time(s) during the night and has no problem going back to sleep.  Feels refreshed after waking up.  She uses a walker for stability.  She also has a history of COPD reports that she is short of breath with exertion      Medications and allergies are reviewed by me and documented in the encounter.     SOCIAL (habits pertaining to sleep medicine)  History tobacco use:No   History of alcohol use: 0 per week  Caffeine use: 4     REVIEW OF SYSTEMS:   Pertaining positive symptoms are:  [place] Sleepiness Scale :Total score: 1   Nasal congestion      PHYSICAL EXAMINATION:  CONSTITUTIONAL:  Vitals:    06/25/25 1300   BP: 108/48   BP Location: Left arm   Patient Position: Sitting   Pulse: 73   SpO2: 97%   Weight: 124 kg (274 lb)   Height: 162.6 cm (64.02\")    Body mass index is 47.01 kg/m².   NOSE: nasal passages are clear, No deformities noted   RESP SYSTEM: Not in any respiratory distress, no chest deformities noted,   CARDIOVASULAR: No edema noted  NEURO: Oriented x 3, gait normal,  Mood and affect appeared appropriate    The Smart card downloaded on 6/25/2025 has been independently reviewed by me and discussed the data with the " patient. It shows the following..  Compliance;100 %  > 4 hr use, 97 %  Average use of the device 8  per night  Residual AHI: 1.2 /hr (Optimal < 5/hr, Good <10/hr, Adequate reduce by 75% from baseline)  Mask type: Nasal mask  Device: ResMed AirSense 10  DME: Merged with [facility]  Continue oxygen at 2 L with the CPAP      ASSESSMENT AND PLAN:  Obstructive sleep apnea ( G 47.33).  Mild sleep apnea with sleep-related hypoxia and hypertension diabetes mellitus.  I have ordered a new CPAP from [employer] and she will continue to use oxygen with the CPAP.  The device is benefiting the patient and the device is medically necessary.  Without proper control of sleep apnea and good compliance there is a increased risk for hypertension, diabetes mellitus and nonrestorative sleep with hypersomnia which can increase risk for motor vehicle accidents.  Untreated sleep apnea is also a risk factor for development of atrial fibrillation, pulmonary hypertension, insulin resistance and stroke.   Chronic hypoxic respiratory failure.  Continue oxygen with the CPAP  Hypertension  Diastolic heart failure  Diabetes type II  Obesity  3 with BMI is Body mass index is 47.01 kg/m².. I have discuss the relationship between the weight and sleep apnea. The benefit of weight loss in reducing severity of sleep apnea was discussed. Discussed diet and exercise with the patient to achieve ideal BMI.  Return in about 1 year (around 6/25/2026) for with smart card down load. . Patient's questions were answered.    6/25/2025  Chance De Leon MD  Sleep Medicine.  Medical Director,   [facility] sleep centers.

## 2025-07-01 ENCOUNTER — CLINICAL SUPPORT (OUTPATIENT)
Dept: FAMILY MEDICINE CLINIC | Age: 70
End: 2025-07-01
Payer: MEDICARE

## 2025-07-01 VITALS — BODY MASS INDEX: 48.21 KG/M2 | WEIGHT: 281 LBS

## 2025-07-01 DIAGNOSIS — Z79.899 HIGH RISK MEDICATION USE: Primary | ICD-10-CM

## 2025-07-01 RX ADMIN — CYANOCOBALAMIN 1000 MCG: 1000 INJECTION, SOLUTION INTRAMUSCULAR; SUBCUTANEOUS at 16:58

## 2025-07-03 ENCOUNTER — OFFICE VISIT (OUTPATIENT)
Age: 70
End: 2025-07-03
Payer: MEDICARE

## 2025-07-03 DIAGNOSIS — F33.1 MAJOR DEPRESSIVE DISORDER, RECURRENT EPISODE, MODERATE: ICD-10-CM

## 2025-07-03 DIAGNOSIS — F41.1 GENERALIZED ANXIETY DISORDER: ICD-10-CM

## 2025-07-03 DIAGNOSIS — F43.9 STRESS AT HOME: Primary | ICD-10-CM

## 2025-07-15 DIAGNOSIS — E11.9 TYPE 2 DIABETES MELLITUS WITHOUT COMPLICATION, WITHOUT LONG-TERM CURRENT USE OF INSULIN: ICD-10-CM

## 2025-07-15 DIAGNOSIS — G25.81 RESTLESS LEG SYNDROME: ICD-10-CM

## 2025-07-15 DIAGNOSIS — E83.42 HYPOMAGNESEMIA: ICD-10-CM

## 2025-07-15 DIAGNOSIS — R00.2 PALPITATIONS: ICD-10-CM

## 2025-07-15 DIAGNOSIS — R41.3 MEMORY LOSS: ICD-10-CM

## 2025-07-15 DIAGNOSIS — F33.1 MAJOR DEPRESSIVE DISORDER, RECURRENT EPISODE, MODERATE: ICD-10-CM

## 2025-07-15 DIAGNOSIS — F43.29 STRESS AND ADJUSTMENT REACTION: ICD-10-CM

## 2025-07-15 DIAGNOSIS — I10 ESSENTIAL HYPERTENSION: ICD-10-CM

## 2025-07-15 DIAGNOSIS — K21.9 GASTROESOPHAGEAL REFLUX DISEASE, UNSPECIFIED WHETHER ESOPHAGITIS PRESENT: ICD-10-CM

## 2025-07-15 DIAGNOSIS — F41.1 GENERALIZED ANXIETY DISORDER: ICD-10-CM

## 2025-07-15 RX ORDER — FLUOXETINE HYDROCHLORIDE 40 MG/1
40 CAPSULE ORAL EVERY MORNING
Qty: 90 CAPSULE | Refills: 0 | Status: SHIPPED | OUTPATIENT
Start: 2025-07-15 | End: 2025-10-13

## 2025-07-15 NOTE — TELEPHONE ENCOUNTER
REFILL REQUEST:    FLUoxetine (PROzac) 40 MG capsule (05/09/2025)     F/UP: 08/12/2025.  LOV: 06/20/2025.

## 2025-07-16 ENCOUNTER — LAB (OUTPATIENT)
Dept: LAB | Facility: HOSPITAL | Age: 70
End: 2025-07-16
Payer: MEDICARE

## 2025-07-16 ENCOUNTER — OFFICE VISIT (OUTPATIENT)
Dept: FAMILY MEDICINE CLINIC | Age: 70
End: 2025-07-16
Payer: MEDICARE

## 2025-07-16 VITALS
HEART RATE: 99 BPM | BODY MASS INDEX: 47.33 KG/M2 | HEIGHT: 64 IN | DIASTOLIC BLOOD PRESSURE: 57 MMHG | OXYGEN SATURATION: 97 % | WEIGHT: 277.2 LBS | TEMPERATURE: 97.8 F | SYSTOLIC BLOOD PRESSURE: 104 MMHG

## 2025-07-16 DIAGNOSIS — E11.9 TYPE 2 DIABETES MELLITUS WITHOUT COMPLICATION, WITHOUT LONG-TERM CURRENT USE OF INSULIN: ICD-10-CM

## 2025-07-16 DIAGNOSIS — F41.9 ANXIETY: ICD-10-CM

## 2025-07-16 DIAGNOSIS — I10 ESSENTIAL HYPERTENSION: ICD-10-CM

## 2025-07-16 DIAGNOSIS — N18.30 STAGE 3 CHRONIC KIDNEY DISEASE, UNSPECIFIED WHETHER STAGE 3A OR 3B CKD: ICD-10-CM

## 2025-07-16 DIAGNOSIS — M79.89 LEG SWELLING: ICD-10-CM

## 2025-07-16 DIAGNOSIS — E66.813 CLASS 3 SEVERE OBESITY DUE TO EXCESS CALORIES WITH SERIOUS COMORBIDITY AND BODY MASS INDEX (BMI) OF 40.0 TO 44.9 IN ADULT: Primary | ICD-10-CM

## 2025-07-16 DIAGNOSIS — R60.0 BILATERAL LEG EDEMA: ICD-10-CM

## 2025-07-16 LAB
ANION GAP SERPL CALCULATED.3IONS-SCNC: 12.2 MMOL/L (ref 5–15)
BUN SERPL-MCNC: 23 MG/DL (ref 8–23)
BUN/CREAT SERPL: 16 (ref 7–25)
CALCIUM SPEC-SCNC: 9.7 MG/DL (ref 8.6–10.5)
CHLORIDE SERPL-SCNC: 101 MMOL/L (ref 98–107)
CO2 SERPL-SCNC: 27.8 MMOL/L (ref 22–29)
CREAT SERPL-MCNC: 1.44 MG/DL (ref 0.57–1)
EGFRCR SERPLBLD CKD-EPI 2021: 39.5 ML/MIN/1.73
GLUCOSE SERPL-MCNC: 85 MG/DL (ref 65–99)
POTASSIUM SERPL-SCNC: 4.2 MMOL/L (ref 3.5–5.2)
SODIUM SERPL-SCNC: 141 MMOL/L (ref 136–145)

## 2025-07-16 PROCEDURE — 80048 BASIC METABOLIC PNL TOTAL CA: CPT

## 2025-07-16 PROCEDURE — 36415 COLL VENOUS BLD VENIPUNCTURE: CPT

## 2025-07-16 RX ORDER — FUROSEMIDE 40 MG/1
40 TABLET ORAL DAILY
Qty: 3 TABLET | Refills: 0 | Status: SHIPPED | OUTPATIENT
Start: 2025-07-16

## 2025-07-16 RX ORDER — FLUTICASONE PROPIONATE 50 MCG
2 SPRAY, SUSPENSION (ML) NASAL DAILY
Qty: 16 G | Refills: 0 | Status: SHIPPED | OUTPATIENT
Start: 2025-07-16

## 2025-07-16 RX ORDER — METOPROLOL SUCCINATE 25 MG/1
12.5 TABLET, EXTENDED RELEASE ORAL DAILY
Qty: 45 TABLET | Refills: 0 | Status: SHIPPED | OUTPATIENT
Start: 2025-07-16

## 2025-07-16 RX ORDER — PRAMIPEXOLE DIHYDROCHLORIDE 0.5 MG/1
0.5 TABLET ORAL 3 TIMES DAILY
Qty: 270 TABLET | Refills: 0 | Status: SHIPPED | OUTPATIENT
Start: 2025-07-16

## 2025-07-16 RX ORDER — LANOLIN ALCOHOL/MO/W.PET/CERES
400 CREAM (GRAM) TOPICAL NIGHTLY
Qty: 30 TABLET | Refills: 11 | Status: SHIPPED | OUTPATIENT
Start: 2025-07-16

## 2025-07-16 RX ORDER — DONEPEZIL HYDROCHLORIDE 10 MG/1
10 TABLET, FILM COATED ORAL NIGHTLY
Qty: 30 TABLET | Refills: 2 | OUTPATIENT
Start: 2025-07-16

## 2025-07-16 RX ORDER — PANTOPRAZOLE SODIUM 40 MG/1
40 TABLET, DELAYED RELEASE ORAL DAILY
Qty: 90 TABLET | Refills: 0 | OUTPATIENT
Start: 2025-07-16

## 2025-07-16 RX ORDER — TIRZEPATIDE 15 MG/.5ML
15 INJECTION, SOLUTION SUBCUTANEOUS WEEKLY
Qty: 2 ML | Refills: 2 | Status: SHIPPED | OUTPATIENT
Start: 2025-07-16

## 2025-07-16 RX ORDER — FUROSEMIDE 40 MG/1
40 TABLET ORAL 2 TIMES DAILY
Qty: 3 TABLET | Refills: 0 | Status: SHIPPED | OUTPATIENT
Start: 2025-07-16 | End: 2025-07-16

## 2025-07-16 RX ORDER — MEMANTINE HYDROCHLORIDE 5 MG/1
5 TABLET ORAL 2 TIMES DAILY
Qty: 60 TABLET | Refills: 2 | Status: SHIPPED | OUTPATIENT
Start: 2025-07-16

## 2025-07-16 NOTE — ASSESSMENT & PLAN NOTE
Blood pressure is low.  She is on a low-dose of metoprolol 12.5 mg daily.  She has no current symptoms of chest pain/palpitations/dizzy spells

## 2025-07-16 NOTE — ASSESSMENT & PLAN NOTE
Patient's (Body mass index is 47.55 kg/m².) indicates that they are {obesity categories w/o overweight:[id number]} with health conditions that include {obesity comorbidities:22560} . Weight is {new/improving/stable/worsenin}. BMI  {[facility]37590}. We discussed {obesity treatment:98597}.

## 2025-07-16 NOTE — PROGRESS NOTES
Chasity Torres presents to [facility].    Chief Complaint:  edema    Subjective     History of Present Illness:  CAT Gaspar is feeling like she is having more shortness of air and she is concerned that her edema is getting worse again.  She is very conscientious about her weight.  Today it looks like her weight is 3 pounds up and she has 1+ edema in her lower extremity bilaterally.  She is currently on torsemide 40 mg twice a day and spironolactone 25 mg twice a day.  She tolerates these medications well.  She sees Dr. George who manages her CHF in general.  She does not have compression stockings.Last echo showed LV ejection fraction 65 to 70% with normal valvular structure and function.    In addition she is on the weight loss medication for her type II diabetes called Mounjaro 15 mg weekly, her BMI had been over 50 and she is now down to a BMI of 47.55 so she has lost weight with the medication but her weight loss has plateaued.  Her diabetes is very well-controlled with her last hemoglobin A1c being 5.6%.  She denies hypoglycemic episodes.  She does have chronic peripheral neuropathy in her feet bilaterally.She defers dietitian referral and is not following a diabetic diet.      She presents with chronic depression and sadness, she lives with chronic anxiety, she is stable today without new complaints.  She is on clonazepam which keep her calm and helps with her tremor, it helps her also to be functional.  She has seen psychiatry, Trish Paz.  Other medications include. She is also on Prozac and Remeron.  She has no suicidal or homicidal thoughts.  She is sleeping okay at night.      She presents with chronic COPD and complains of more shortness of air today.   Current treatment includes albuterol, Trelegy, DuoNebs and O2 as needed she wears CPAP at night    She presents with ongoing memory issues and she is currently stable on her current dose of Aricept and Namenda and tolerates  "both medications well.    She presents with chronic hypercholesterolemia, she is now on atorvastatin and tolerating medication well.  She is also supposed be following a low-cholesterol diet her last cholesterol was 226, triglycerides 180, HDL 48 and .  Liver function test were normal    She is on iron supplementation for chronic iron deficiency.  She is on vitamin D supplementation for vitamin D deficiency              Result Review   The following data was reviewed by Ami Diego MD on 06/12/2025.  Lab Results   Component Value Date    WBC 5.86 03/26/2025    HGB 14.5 03/26/2025    HCT 45.6 03/26/2025    MCV 96.4 03/26/2025     03/26/2025     Lab Results   Component Value Date    GLUCOSE 94 04/16/2025    BUN 13 04/16/2025    CREATININE 1.18 (H) 04/16/2025     04/16/2025    K 4.1 04/16/2025    CL 98 04/16/2025    CALCIUM 9.9 04/16/2025    PROTEINTOT 7.2 02/14/2025    ALBUMIN 4.0 03/26/2025    ALT 19 02/14/2025    AST 16 02/14/2025    ALKPHOS 130 (H) 02/14/2025    BILITOT <0.2 02/14/2025    GLOB 4.0 02/14/2025    AGRATIO 0.8 02/14/2025    BCR 11.0 04/16/2025    ANIONGAP 11.0 04/16/2025    EGFR 50.1 (L) 04/16/2025     Lab Results   Component Value Date    CHOL 226 (H) 02/07/2025    CHLPL 166 12/16/2020    TRIG 180 (H) 02/07/2025    HDL 48 02/07/2025     (H) 02/07/2025     Lab Results   Component Value Date    TSH 0.774 02/21/2024     Lab Results   Component Value Date    HGBA1C 5.60 02/07/2025     No results found for: \"PSA\"  Lab Results   Component Value Date    Iron 104 11/21/2024    Iron Saturation (TSAT) 31 11/21/2024      Lab Results   Component Value Date    FCGV16EH 33.3 02/07/2025               Assessment and Plan:   Assessment & Plan  Class 3 severe obesity due to excess calories with serious comorbidity and body mass index (BMI) of 40.0 to 44.9 in adult  She is very frustrated with her weight and I think it is time since she has plateaued with weight loss on the Mounjaro to " get her set up with Dr. Bennett and bariatric surgery to further eval treatment options.  She defers dietitian referral    Orders:    Ambulatory Referral to Bariatric Surgery    Bilateral leg edema  She does have 1+ edema in her legs with 3 pound weight gain.  I will put her on a short-term dose of Lasix 40 mg daily x 3 days and she is to continue her Demadex 40 mg twice daily and spironolactone 25 mg twice daily.  Orders:    Miscellaneous DME    furosemide (Lasix) 40 MG tablet; Take 1 tablet by mouth Daily.    Type 2 diabetes mellitus without complication, without long-term current use of insulin  Stable and well-controlled.  No changes to current meds or treatment plan         Essential hypertension  Blood pressure is low.  She is on a low-dose of metoprolol 12.5 mg daily.  She has no current symptoms of chest pain/palpitations/dizzy spells         Anxiety  Currently stable with no acute issues                  Objective     Medications:  Current Outpatient Medications   Medication Instructions    albuterol sulfate  (90 Base) MCG/ACT inhaler 2 puffs, Inhalation, Every 4 Hours PRN    atorvastatin (LIPITOR) 10 mg, Oral, Daily    Blood Glucose Monitoring Suppl (Blood Glucose Monitor System) w/Device kit Use Daily to test blood glucose.    Calcium Carb-Cholecalciferol (Oyster Shell Calcium + D3) 500-10 MG-MCG tablet 1 each, Oral, 2 Times Daily    celecoxib (CELEBREX) 200 mg, Oral, Nightly    clonazePAM (KLONOPIN) 0.5 mg, Oral, 2 Times Daily PRN    Diclofenac Sodium (VOLTAREN) 4 g, Topical, 4 Times Daily    donepezil (ARICEPT) 10 mg, Oral, Nightly    FeroSul 325 mg, Oral, Daily With Breakfast    FLUoxetine (PROzac) 40 MG capsule Take 1 capsule by mouth Every Morning    fluticasone (FLONASE) 50 MCG/ACT nasal spray 2 sprays, Nasal, Daily    Fluticasone-Umeclidin-Vilant (TRELEGY ELLIPTA) 200-62.5-25 MCG/ACT inhaler 1 puff, Inhalation, Daily - RT    furosemide (LASIX) 40 mg, Oral, Daily    glucose blood test strip  "Use as instructed to check blood sugar daily    HYDROcodone-acetaminophen (NORCO)  MG per tablet     ipratropium-albuterol (DUO-NEB) 0.5-2.5 mg/3 ml nebulizer Nebulize and inhale 1 vial 4 (Four) Times a Day As Needed for Wheezing.    Lancets (OneTouch Delica Plus [id number]) misc 1 each, Not Applicable, Daily    Magnesium Oxide -Mg Supplement 400 mg, Oral, Nightly    memantine (NAMENDA) 5 mg, Oral, 2 Times Daily    metoprolol succinate XL (TOPROL-XL) 12.5 mg, Oral, Daily    miconazole (Desenex) 2 % powder Apply topically to the appropriate area as directed 2 (Two) Times a Day.    mirtazapine (REMERON) 7.5 MG tablet Take 1 tablet by mouth Every Night.    Mounjaro 15 mg, Subcutaneous, Weekly    naloxone (NARCAN) 4 MG/0.1ML nasal spray 1 spray, As Needed    O2 (OXYGEN) 2 L/min, Nightly    pantoprazole (PROTONIX) 40 mg, Oral, Daily    pramipexole (MIRAPEX) 0.5 mg, Oral, 3 Times Daily    pregabalin (LYRICA) 100 mg, Oral, 2 Times Daily    primidone (MYSOLINE) 50 mg    spironolactone (ALDACTONE) 25 mg, Oral, 2 Times Daily    torsemide (DEMADEX) 40 mg, Oral, 2 Times Daily        Vital Signs:   /57 (BP Location: Left arm, Patient Position: Sitting)   Pulse 99   Temp 97.8 °F (36.6 °C) (Temporal)   Ht 162.6 cm (64.02\")   Wt 126 kg (277 lb 3.2 oz)   SpO2 97%   BMI 47.55 kg/m²           BP Readings from Last 3 Encounters:   07/16/25 104/57   06/25/25 108/48   06/20/25 118/72      Wt Readings from Last 3 Encounters:   07/16/25 126 kg (277 lb 3.2 oz)   07/01/25 127 kg (281 lb)   06/25/25 124 kg (274 lb)        Physical Exam:  Physical Exam  Vitals and nursing note reviewed.   Constitutional:       General: She is not in acute distress.     Appearance: Normal appearance. She is not ill-appearing, toxic-appearing or diaphoretic.   HENT:      Head: Normocephalic and atraumatic.      Mouth/Throat:      Pharynx: Oropharynx is clear. No oropharyngeal exudate or posterior oropharyngeal erythema.   Eyes:      Extraocular " Movements: Extraocular movements intact.      Conjunctiva/sclera: Conjunctivae normal.      Pupils: Pupils are equal, round, and reactive to light.   Cardiovascular:      Rate and Rhythm: Normal rate.      Pulses: Normal pulses.      Heart sounds: No murmur heard.  Pulmonary:      Effort: Pulmonary effort is normal. No respiratory distress.      Breath sounds: Normal breath sounds. No stridor. No wheezing, rhonchi or rales.   Musculoskeletal:         General: Normal range of motion.      Right lower le+ Pitting Edema present.      Left lower le+ Pitting Edema present.   Skin:     General: Skin is warm and dry.      Capillary Refill: Capillary refill takes less than 2 seconds.   Neurological:      Mental Status: She is alert and oriented to person, place, and time.      Motor: Weakness present.      Gait: Gait abnormal.   Psychiatric:         Mood and Affect: Mood normal.         Behavior: Behavior normal.           Review of Systems:  Review of Systems   Constitutional:  Positive for fatigue. Negative for chills and fever.   HENT:  Negative for ear pain, sinus pressure and sore throat.    Eyes:  Negative for blurred vision and double vision.   Respiratory:  Negative for cough, shortness of breath and wheezing.    Cardiovascular:  Positive for leg swelling. Negative for chest pain and palpitations.   Gastrointestinal:  Negative for abdominal pain, blood in stool, constipation, diarrhea, nausea and vomiting.   Musculoskeletal:  Positive for arthralgias and back pain.   Skin:  Negative for rash.   Neurological:  Negative for dizziness and headache.   Psychiatric/Behavioral:  Positive for depressed mood. Negative for sleep disturbance and suicidal ideas. The patient is nervous/anxious.               Follow Up   Return in about 3 months (around 10/16/2025), or if symptoms worsen or fail to improve, for Recheck.    Part of this note may be an electronic transcription/translation of spoken language to printed    text using the Dragon Dictation System.              Health Maintenance   Topic Date Due    URINE MICROALBUMIN-CREATININE RATIO (uACR)  Never done    COVID-19 Vaccine (6 - 2024-25 season) 09/01/2024    TDAP/TD VACCINES (2 - Td or Tdap) 05/20/2025    DIABETIC EYE EXAM  07/03/2025    HEMOGLOBIN A1C  08/07/2025    INFLUENZA VACCINE  10/01/2025    LIPID PANEL  02/07/2026    ANNUAL WELLNESS VISIT  04/10/2026    DIABETIC FOOT EXAM  04/10/2026    DXA SCAN  08/02/2026    MAMMOGRAM  04/16/2027    COLORECTAL CANCER SCREENING  10/12/2030    HEPATITIS C SCREENING  Completed    Pneumococcal Vaccine 50+  Completed    ZOSTER VACCINE  Completed          Medical History:  Medications Discontinued During This Encounter   Medication Reason    furosemide (Lasix) 40 MG tablet         Past Medical History:    Adverse effect of other viral vaccines, initial encounter    Covid vaccine    Allergic fungal sinusitis    Allergic rhinitis    Anxiety disorder    Arthritis of neck    Asthma    CHF (congestive heart failure)    Congenital heart disease    COPD with acute exacerbation    CTS (carpal tunnel syndrome)    left    Difficulty walking    Essential (primary) hypertension    Essential tremor    Hypercholesterolemia    Insomnia    Irritable bowel syndrome    Knee swelling    Low back pain    Major depressive disorder    Morbid (severe) obesity due to excess calories    Nasal congestion    Neck strain    Neuropathy in diabetes    Obstructive sleep apnea (adult) (pediatric)    Other hereditary and idiopathic neuropathies    Pain in left hip    Pain in right toe(s)    Pain in thoracic spine    Peripheral neuropathy    Hands    Primary generalized (osteo)arthritis    Primary insomnia    Pulmonary emphysema    Restless leg syndrome    SOBOE (shortness of breath on exertion)    Substance abuse    Type 2 diabetes mellitus without complication, without long-term current use of insulin    Vitamin D deficiency     Past Surgical History:    CARPAL  TUNNEL RELEASE    COLONOSCOPY    ENDOSCOPIC FUNCTIONAL SINUS SURGERY (FESS)    Procedure: ENDOSCOPIC FUNCTIONAL SINUS SURGERY, left maxillary antrostomy with removal of contents, possible left ethmoidectomy;  Surgeon: Johnny Calderon MD;  Location: [facility] OR [facility];  Service: ENT;  Laterality: Left;    HAND SURGERY    HYSTERECTOMY    complete- ovarian cysts    WRIST SURGERY      Family History   Problem Relation Age of Onset    Hypertension Mother     COPD Father     Heart failure Father     Neuropathy Sister     Sleep apnea Sister     Restless legs syndrome Sister     Sleep apnea Brother     Anxiety disorder Daughter     Depression Daughter     Malig Hyperthermia Neg Hx     Breast cancer Neg Hx     Ovarian cancer Neg Hx     Uterine cancer Neg Hx     Cervical cancer Neg Hx     Colon cancer Neg Hx     Stomach cancer Neg Hx     Skin cancer Neg Hx     Clotting disorder Neg Hx     Deep vein thrombosis Neg Hx     Pulmonary embolism Neg Hx      Social History     Tobacco Use    Smoking status: Former     Current packs/day: 0.00     Average packs/day: 0.5 packs/day for 12.0 years (6.0 ttl pk-yrs)     Types: Cigarettes     Start date: 2009     Quit date: 2021     Years since quittin.5     Passive exposure: Past    Smokeless tobacco: Never   Substance Use Topics    Alcohol use: Not Currently       Health Maintenance Due   Topic Date Due    URINE MICROALBUMIN-CREATININE RATIO (uACR)  Never done    COVID-19 Vaccine (2024- season) 2024    TDAP/TD VACCINES (2 - Td or Tdap) 2025    DIABETIC EYE EXAM  2025    HEMOGLOBIN A1C  2025        Immunization History   Administered Date(s) Administered    Arexvy (RSV, Adults 60+ yrs) 2024    COVID-19 (ANGELA) 2021    COVID-19 (MODERNA) 1st,2nd,3rd Dose Monovalent 2021    COVID-19 (PFIZER) 12YRS+ (COMIRNATY) 2024, 05/15/2024    Covid-19 (Pfizer) Gray Cap Monovalent 2022    Fluzone  >6mos 10/07/2013    Fluzone (or  Fluarix & Flulaval for VFC) >6mos 10/10/2017    Fluzone High-Dose 65+YRS 11/21/2024    Fluzone High-Dose 65+yrs 03/31/2022, 09/30/2022, 10/18/2023    Influenza Seasonal Injectable 12/20/2012    Pneumococcal Conjugate 13-Valent (PCV13) 09/29/2020    Pneumococcal Polysaccharide (PPSV23) 11/30/2020    Shingrix 03/28/2023, 06/27/2023    Tdap 05/20/2015    Zostavax 09/16/2008       No Known Allergies

## 2025-07-16 NOTE — TELEPHONE ENCOUNTER
Caller: Chasity Torres    Relationship: Self    Best call back number: [phone]     What medication are you requesting: SOMETHING TO TREAT INSOMNIA     What are your current symptoms: PATIENT STATES SHE IS ABLE TO FALL ASLEEP AROUND MIDNIGHT BUT WAKES UP A FEW HOURS LATER AND THEN IS UNABLE TO GO BACK TO SLEEP.     If a prescription is needed, what is your preferred pharmacy and phone number: [facility]        with RW/good minus

## 2025-07-17 ENCOUNTER — OFFICE VISIT (OUTPATIENT)
Dept: PODIATRY | Facility: CLINIC | Age: 70
End: 2025-07-17
Payer: MEDICARE

## 2025-07-17 VITALS
DIASTOLIC BLOOD PRESSURE: 57 MMHG | HEART RATE: 63 BPM | SYSTOLIC BLOOD PRESSURE: 82 MMHG | HEIGHT: 64 IN | BODY MASS INDEX: 47.46 KG/M2 | WEIGHT: 278 LBS

## 2025-07-17 DIAGNOSIS — L60.0 ONYCHOCRYPTOSIS: ICD-10-CM

## 2025-07-17 DIAGNOSIS — B35.1 ONYCHOMYCOSIS: ICD-10-CM

## 2025-07-17 DIAGNOSIS — R26.2 DIFFICULTY WALKING: ICD-10-CM

## 2025-07-17 DIAGNOSIS — M79.672 FOOT PAIN, BILATERAL: ICD-10-CM

## 2025-07-17 DIAGNOSIS — E11.9 NON-INSULIN DEPENDENT TYPE 2 DIABETES MELLITUS: ICD-10-CM

## 2025-07-17 DIAGNOSIS — E11.8 DM FEET: Primary | ICD-10-CM

## 2025-07-17 DIAGNOSIS — M79.671 FOOT PAIN, BILATERAL: ICD-10-CM

## 2025-07-17 NOTE — PROGRESS NOTES
[facility] - PODIATRY    Today's Date: 07/17/25    Patient Name: Chasity Torres  MRN: [id number]  CSN: [id number]  PCP: Ami Diego MD, Last PCP Visit: 7/17/2025  Referring Provider: No ref. provider found    SUBJECTIVE     Chief Complaint   Patient presents with    Left Foot - Follow-up, Nail Problem    Right Foot - Follow-up, Nail Problem     HPI: Chasity Torres, a 69 y.o.female, presents to clinic for painful toenails:    New, Established, New Problem:  est  Location:  Toenails  Duration:   Greater than five years  Onset:  Gradual  Nature:  sore with palpation.  Stable, worsening, improving:   Stable  Aggravating factors:  Pain with shoe gear and ambulation.  Previous Treatment: debridement    Patient controlling diabetes via:  Oral meds    Patient denies any fevers, chills, nausea, vomiting, shortness of breath, nor any other constitutional signs nor symptoms.    Medical changes: None    Patient states they are unsure of the most recent blood glucose reading.      I have reviewed/confirmed previously documented HPI with no changes.     Past Medical History:   Diagnosis Date    Adverse effect of other viral vaccines, initial encounter     Covid vaccine    Allergic fungal sinusitis 07/29/2022    Allergic rhinitis     Anxiety disorder     Arthritis of neck 8mths ago    Asthma     CHF (congestive heart failure)     Congenital heart disease 05 2021    COPD with acute exacerbation     CTS (carpal tunnel syndrome)     left    Difficulty walking 2022    Essential (primary) hypertension     Essential tremor     Hypercholesterolemia 10/18/2021    Insomnia     Irritable bowel syndrome     Knee swelling 6mth ago    Low back pain     Major depressive disorder     Morbid (severe) obesity due to excess calories     Nasal congestion     Neck strain 6mths ago    Neuropathy in diabetes 2023    Obstructive sleep apnea (adult) (pediatric)     Other hereditary and idiopathic neuropathies     Pain in left hip      Pain in right toe(s)     Pain in thoracic spine     Peripheral neuropathy     Hands    Primary generalized (osteo)arthritis     Primary insomnia     Pulmonary emphysema 2023    Restless leg syndrome     SOBOE (shortness of breath on exertion)     Substance abuse     Type 2 diabetes mellitus without complication, without long-term current use of insulin 2022    Vitamin D deficiency      Past Surgical History:   Procedure Laterality Date    CARPAL TUNNEL RELEASE      COLONOSCOPY      ENDOSCOPIC FUNCTIONAL SINUS SURGERY (FESS) Left 08/15/2022    Procedure: ENDOSCOPIC FUNCTIONAL SINUS SURGERY, left maxillary antrostomy with removal of contents, possible left ethmoidectomy;  Surgeon: Johnny Calderon MD;  Location: [facility] OR [facility] [place] [facility];  Service: ENT;  Laterality: Left;    HAND SURGERY      HYSTERECTOMY      complete- ovarian cysts    WRIST SURGERY       Family History   Problem Relation Age of Onset    Hypertension Mother     COPD Father     Heart failure Father     Neuropathy Sister     Sleep apnea Sister     Restless legs syndrome Sister     Sleep apnea Brother     Anxiety disorder Daughter     Depression Daughter     Malig Hyperthermia Neg Hx     Breast cancer Neg Hx     Ovarian cancer Neg Hx     Uterine cancer Neg Hx     Cervical cancer Neg Hx     Colon cancer Neg Hx     Stomach cancer Neg Hx     Skin cancer Neg Hx     Clotting disorder Neg Hx     Deep vein thrombosis Neg Hx     Pulmonary embolism Neg Hx      Social History     Socioeconomic History    Marital status:     Number of children: 2   Tobacco Use    Smoking status: Former     Current packs/day: 0.00     Average packs/day: 0.5 packs/day for 12.0 years (6.0 ttl pk-yrs)     Types: Cigarettes     Start date: 2009     Quit date: 2021     Years since quittin.5     Passive exposure: Past    Smokeless tobacco: Never   Vaping Use    Vaping status: Never Used   Substance and Sexual Activity    Alcohol use: Not Currently    Drug  use: Never    Sexual activity: Not Currently     Partners: Male     Birth control/protection: Hysterectomy, Surgical     No Known Allergies  Current Outpatient Medications   Medication Sig Dispense Refill    albuterol sulfate  (90 Base) MCG/ACT inhaler Inhale 2 puffs Every 4 (Four) Hours As Needed for Wheezing or Shortness of Air. 18 g 11    atorvastatin (LIPITOR) 10 MG tablet Take 1 tablet by mouth Daily. 90 tablet 3    Blood Glucose Monitoring Suppl (Blood Glucose Monitor System) w/Device kit Use Daily to test blood glucose. 1 each 0    Calcium Carb-Cholecalciferol (Oyster Shell Calcium + D3) 500-10 MG-MCG tablet Take 1 each by mouth 2 (Two) Times a Day. 60 tablet 1    celecoxib (CeleBREX) 200 MG capsule Take 1 capsule by mouth Every Night. 30 capsule 2    clonazePAM (KlonoPIN) 0.5 MG tablet Take 1 tablet by mouth 2 (Two) Times a Day As Needed for Anxiety. 60 tablet 0    Diclofenac Sodium (VOLTAREN) 1 % gel gel Apply 4 g topically to the appropriate area as directed 4 (Four) Times a Day. 100 g 1    donepezil (ARICEPT) 10 MG tablet Take 1 tablet by mouth Every Night. 30 tablet 2    ferrous sulfate (FeroSul) 325 (65 FE) MG tablet Take 1 tablet by mouth Daily With Breakfast. 90 tablet 1    FLUoxetine (PROzac) 40 MG capsule Take 1 capsule by mouth Every Morning 90 capsule 0    fluticasone (FLONASE) 50 MCG/ACT nasal spray Administer 2 sprays into the nostril(s) as directed by provider Daily. 16 g 0    Fluticasone-Umeclidin-Vilant (TRELEGY ELLIPTA) 200-62.5-25 MCG/ACT inhaler Inhale 1 puff Daily. 60 each 5    furosemide (Lasix) 40 MG tablet Take 1 tablet by mouth Daily. 3 tablet 0    glucose blood test strip Use as instructed to check blood sugar daily 100 each 3    HYDROcodone-acetaminophen (NORCO)  MG per tablet       ipratropium-albuterol (DUO-NEB) 0.5-2.5 mg/3 ml nebulizer Nebulize and inhale 1 vial 4 (Four) Times a Day As Needed for Wheezing. 360 mL 3    Lancets (OneTouch Delica Plus [id number]) misc  Use 1 each Daily. 100 each 0    Magnesium Oxide -Mg Supplement 400 (240 Mg) MG tablet Take 1 tablet by mouth Every Night. 30 tablet 11    memantine (Namenda) 5 MG tablet Take 1 tablet by mouth 2 (Two) Times a Day. 60 tablet 2    metoprolol succinate XL (TOPROL-XL) 25 MG 24 hr tablet Take 0.5 tablets by mouth Daily. 45 tablet 0    miconazole (Desenex) 2 % powder Apply topically to the appropriate area as directed 2 (Two) Times a Day. 71 g 11    mirtazapine (REMERON) 7.5 MG tablet Take 1 tablet by mouth Every Night. 90 tablet 1    naloxone (NARCAN) 4 MG/0.1ML nasal spray 1 spray As Needed. Has on hand      O2 (OXYGEN) Inhale 2 L/min Every Night.      pantoprazole (PROTONIX) 40 MG EC tablet Take 1 tablet by mouth Daily. 90 tablet 0    pramipexole (MIRAPEX) 0.5 MG tablet Take 1 tablet by mouth 3 (Three) Times a Day. 270 tablet 0    pregabalin (LYRICA) 50 MG capsule Take 2 capsules by mouth 2 (Two) Times a Day. 120 capsule 5    primidone (MYSOLINE) 50 MG tablet Take 1 tablet by mouth.      spironolactone (ALDACTONE) 25 MG tablet Take 1 tablet by mouth 2 (Two) Times a Day. 60 tablet 5    Tirzepatide (Mounjaro) 15 MG/0.5ML solution auto-injector Inject 15 mg under the skin into the appropriate area as directed 1 (One) Time Per Week. 2 mL 2    torsemide (DEMADEX) 20 MG tablet Take 2 tablets by mouth 2 (Two) Times a Day. 120 tablet 2     Current Facility-Administered Medications   Medication Dose Route Frequency Provider Last Rate Last Admin    cyanocobalamin injection 1,000 mcg  1,000 mcg Intramuscular Q28 Days Ami Diego MD   1,000 mcg at 07/01/25 1658     Review of Systems   Constitutional: Negative.    Skin:         Painful toenails.  Athletes feet and forefoot bilaterally.   All other systems reviewed and are negative.      OBJECTIVE     Vitals:    07/17/25 1101   BP: (!) 82/57   Pulse: 63           Body mass index is 47.69 kg/m².    Lab Results   Component Value Date    HGBA1C 5.60 02/07/2025       Lab  Results   Component Value Date    GLUCOSE 85 07/16/2025    CALCIUM 9.7 07/16/2025     07/16/2025    K 4.2 07/16/2025    CO2 27.8 07/16/2025     07/16/2025    BUN 23.0 07/16/2025    CREATININE 1.44 (H) 07/16/2025    EGFRIFAFRI >60 05/10/2021    EGFRIFNONA 60 (L) 11/09/2021    BCR 16.0 07/16/2025    ANIONGAP 12.2 07/16/2025       Patient seen in no apparent distress.      PHYSICAL EXAM:     Foot/Ankle Exam    GENERAL  Appearance:  obese and elderly (Chronically ill)  Orientation:  AAOx3  Affect:  appropriate  Gait:  unimpaired  Assistance:  walker  Right shoe gear: casual shoe  Left shoe gear: casual shoe    VASCULAR     Right Foot Vascularity   Dorsalis pedis:  1+  Posterior tibial:  1+  Skin temperature:  warm  Edema grading:  None  CFT:  < 3 seconds  Pedal hair growth:  Absent  Varicosities:  mild varicosities     Left Foot Vascularity   Dorsalis pedis:  1+  Posterior tibial:  1+  Skin temperature:  warm  Edema grading:  None  CFT:  < 3 seconds  Pedal hair growth:  Absent  Varicosities:  mild varicosities     NEUROLOGIC     Right Foot Neurologic   Normal sensation    Light touch sensation: normal  Vibratory sensation: normal  Hot/Cold sensation: normal  Protective Sensation using Advance-Bobby Monofilament:   Sites intact: 10  Sites tested: 10     Left Foot Neurologic   Normal sensation    Light touch sensation: normal  Vibratory sensation: normal  Hot/Cold sensation:  normal  Protective Sensation using Advance-Bobby Monofilament:   Sites intact: 10  Sites tested: 10    MUSCLE STRENGTH     Right Foot Muscle Strength   Foot dorsiflexion:  4-  Foot plantar flexion:  4-  Foot inversion:  4-  Foot eversion:  4-     Left Foot Muscle Strength   Foot dorsiflexion:  4-  Foot plantar flexion:  4-  Foot inversion:  4-  Foot eversion:  4-    RANGE OF MOTION     Right Foot Range of Motion   Foot and ankle ROM within normal limits       Left Foot Range of Motion   Foot and ankle ROM within normal limits       DERMATOLOGIC      Right Foot Dermatologic   Skin  Positive for tinea (Forefoot).   Nails  1.  Positive for elongated, onychomycosis, abnormal thickness, subungual debris and ingrown toenail.  2.  Positive for elongated, onychomycosis, abnormal thickness, subungual debris and ingrown toenail.  3.  Positive for elongated, onychomycosis, abnormal thickness, subungual debris and ingrown toenail.     Left Foot Dermatologic   Skin  Positive for tinea (Forefoot).   Nails  1.  Positive for elongated, onychomycosis, abnormal thickness, subungual debris and ingrown toenail.  2.  Positive for elongated, onychomycosis, abnormal thickness, subungual debris and ingrown toenail.  3.  Positive for elongated, onychomycosis, abnormal thickness, subungual debris and ingrown toenail.    I have reexamined the patient the results are consistent with the previously documented exam.    ASSESSMENT/PLAN     Diagnoses and all orders for this visit:    1. DM feet (Primary)    2. Difficulty walking    3. Onychocryptosis    4. Non-insulin dependent type 2 diabetes mellitus    5. Foot pain, bilateral    6. Onychomycosis    Comprehensive lower extremity examination and evaluation was performed.    Discussed findings and treatment plan including risks, benefits, and treatment options with patient in detail. Patient agreed with treatment plan.    Medications and allergies reviewed.  Reviewed available blood glucose and HgB A1C lab values along with other pertinent labs.  These were discussed with the patient as to their importance of diabetic maintenance.    Toenails 1, 2, 3 on Right and 1, 2, 3 on Left were debrided with nail nippers then filed with a Dremel nail toby.  Patient tolerated procedure well without complications.    An After Visit Summary was printed and given to the patient at discharge, including (if requested) any available informative/educational handouts regarding diagnosis, treatment, or medications. All questions were  answered to patient/family satisfaction. Should symptoms fail to improve or worsen they agree to call or return to clinic or to go to the Emergency Department. Discussed the importance of following up with any needed screening tests/labs/specialist appointments and any requested follow-up recommended by me today. Importance of maintaining follow-up discussed and patient accepts that missed appointments can delay diagnosis and potentially lead to worsening of conditions.    Return in about 9 weeks (around 9/18/2025) for Toenail Care., or sooner if acute issues arise.    I have reviewed the assessment and plan and verified the accuracy of it. No changes to assessment and plan since the information was documented. Davin Sheffield DPM 07/17/25     I have dictated this note utilizing Dragon Dictation.  Please note that portions of this note were completed with a voice recognition program.  Part of this note may be an electronic transcription/translation of spoken language to printed text using the Dragon Dictation System.      This document has been electronically signed by Davin Sheffield DPM on July 17, 2025 11:15 EDT

## 2025-07-17 NOTE — PROGRESS NOTES
"Progress Note    Date: 07/21/2025  Time In: 11:04  Time Out: 11:58    Patient Legal Name: Chasity Torres  Patient Age: 69 y.o.    Mode of visit: In person  Location of provider: [facility] [address], [place] [address]  Location of patient: Office      CHIEF COMPLAINT: anxiety, treatment resistant depression, conflict/stress in the home     Subjective   History of Present Illness   Chasity is a 69 y.o. female who presents today as a follow-up for continued psychotherapy. Patient reported she is not feeling well today. \"I just don't feel right.\"  Patient stated nothing has changed since last session, noting she is not getting help at home.  Patient shared she has been stressed and picking at her scalp (sores present in her hairline). Patient expressed disappointment that her granddaughter is not as helpful she would like her to be unless she asks her for help but noted she does not like to ask her a lot.  Patient shared her journal with therapist and the entry indicated patient is feeling stuck and wanting to avoid the stress at home.  Patient is voluntarily requesting to participate in outpatient therapy at [facility].      History obtained from referring provider's note on 3/7/25:  Past Psychiatric History:  Began Treatment: while in her 20's  Diagnoses:Depression and Anxiety  Psychiatrist: -talked on phone-managed meds for 8 months prior to SOC with  in 10/2021; Also at [facility] for 2 yrs [id number]  Therapist: Sendy with [facility] (no in office visits) (telephone) next appt Oct. 2022 (for the last 8 months) prior seen Jessica at [facility] in [place] for 1-2 yrs  Admission History:Denies  Medication Trials: Several for which pt unable to recall if effective: Lexapro(ineff.),Abilify(eff),Prozac,Zoloft,Trintellix,Paxil,Celexa, (can't remember/uncertain); SGA-worsened tremors-per neuro medication induced parkisonism-Latuda,Rexulti,Seroquel (wt gain,helped with insomnia&anxiety); " She cant take amlodipine plus nifedipine. Tell her to bring all her meds and come for a nurse visit bp check please   Lamictal-ineff for anxiety; Xanax-weaned, Hydroxyzine(ineff 1 mo. 2022-2022)Trazodone 2021-2022 somewhat helpful for anxiety though made pt tired, Ambien [id number]?; retried Effexor XR, Wellbutrin,Cymbalta  Risperdal 1 mo approximately stopped due to starting Prozac; Hydroxyzine 100 mg 3 times daily ineffective for itching and anxiety; Zyprexa-ineffective; Prozac up to 80 mg-ineffective; Klonopin-weaned, effective; Vraylar up to 3 mg-parkinsonism tremors worsened per Neurologist recommendations; Buspar up to 20 mg 3 times daily-no longer effective  Self Harm: Denies  Suicide Attempts:Denies   Psychosis, Anxiety, Depression: Denies    Assessment    Mental Status Exam     Appearance: good hygiene and dressed appropriately for the weather  Behavior: calm  Cooperation:  engaged, cooperative, attentive, and friendly  Eye Contact:  good  Affect:  congruent  Mood: depressed and anxious  Speech: talkative  Thought Process:  linear  Thought Content: appropriate  Suicidal: denies  Homicidal:  denies  Hallucinations:  denies  Memory:  intact  Orientation:  person, place, time, and situation  Reliability:  reliable  Insight:  good  Judgment:  good    Clinical Intervention       ICD-10-CM ICD-9-CM   1. Generalized anxiety disorder  F41.1 300.02   2. Stress at home  F43.9 V61.9   3. Major depressive disorder, recurrent episode, moderate  F33.1 296.32        Individual psychotherapy was provided utilizing CBT and person-centered techniques to promote problem-solving, provide symptom relief, build rapport, encourage expression of thoughts and feelings, support self-esteem, establish new coping skills, increase acceptance, develop healthy communication skills, manage stress, identify triggers, acknowledge sources of feelings and behaviors, build confidence, assess symptoms, recognize cognitive distortions, and provide psychoeducation.  Therapist utilized open-ended questions to encourage the development of a  positive therapeutic relationship and open communication.  Therapist normalized/validated patient’s thoughts and feelings as appropriate. Explored ways to stop picking at her scalp (handouts provided). Discussed asking her daughter and granddaughter for help/what she needs and possibly creating a schedule for chores in the home. Revisited circles of control and cognitive distortions (tackling should thoughts). Discussed the benefits of acceptance regarding things that cannot be changed. Encouraged patient to continue with journaling.    Plan   Plan & Goals     Moving forward, we will continue to build rapport and reinforce and build upon effective coping strategies utilizing CBT and person-centered techniques.    Patient acknowledged and verbally consented to continue working toward resolving current treatment plan goals and was educated on the importance of participation in the therapeutic process.  Patient will remain compliant with medication regimen as prescribed. Discuss any medication side effects, questions or concerns with prescribing provider.  Call 911 or present to the nearest emergency room in an emergency situation.  National Suicide Prevention Lifeline: Call 988. The Lifeline provides 24/7, free and confidential support for people in distress, prevention and crisis resources.  Crisis Text Line  Text HOME To 246657    Return in about 2 weeks (around 8/4/2025).    ____________________  This document has been electronically signed by Lila Lockhart LCSW  July 21, 2025 12:15 EDT    Part of this note may be an electronic transcription/translation of spoken language to printed text using the Dragon Dictation System.

## 2025-07-17 NOTE — PROGRESS NOTES
Primary Care Provider  Ami Diego MD   Referring Provider  No ref. provider found      Patient Complaint  Shortness of Breath (Follow up), Follow-up (Shortness of air last 3 days), and COPD      Subjective          Chasity Torres presents to [facility] PULMONARY & CRITICAL CARE MEDICINE      Chasity Torres is a 69 y.o. female patient of Dr. Houston with chronic hypoxic respiratory failure, asthma, mild COPD, chronic diastolic heart failure, anxiety/depression, MEGAN, chronic dyspnea, and tobacco use in remission, here for 3 month follow up.    History of Present Illness    Patient states she has been feeling a little fatigued, more short of breath the last 3 or 4 days.  She denies using any antibiotics or steroids for her lungs recently, denies any fevers or chills, no ER visits or hospitalizations for her breathing since she was last seen.  Her last hospitalization for her breathing was in 2023.  Patient's baseline shortness of breath is usually mild in severity, improves with rest.  She was previously on Dupixent for frequent exacerbations, but discontinued.  Patient is a former smoker, quit 4 years ago, 6 pack years.  She denies any hemoptysis, swollen lymph nodes, unintentional weight loss, or night sweats.  She continues to see Trish Paz with psychiatry, who is managing her psychiatric medications for anxiety and depression.  She also sees a therapist.  Patient continues to see Dr. George with cardiology for chronic diastolic heart failure.  Patient is able to perform ADLs with minor modifications, uses walker when out.  I have personally reviewed the review of systems, past family, social, medical and surgical histories; and agree with their findings.    Pulmonary Meds  Trelegy 200  Albuterol inhaler  DuoNebs    Pulmonary equipment  Oxygen therapy  CPAP      Review of Systems    Review of Systems   Constitutional:  Negative for activity change, chills, fatigue, fever, unexpected weight gain  and unexpected weight loss.   HENT:  Negative for congestion, ear discharge, ear pain, mouth sores, postnasal drip, rhinorrhea, sinus pressure, sore throat, swollen glands and trouble swallowing.    Eyes:  Negative for blurred vision, pain, discharge, itching and visual disturbance.   Respiratory:  Positive for shortness of breath (with exertion). Negative for apnea, cough, chest tightness, wheezing and stridor.    Cardiovascular:  Negative for chest pain, palpitations and leg swelling.   Gastrointestinal:  Negative for abdominal distention, abdominal pain, constipation, diarrhea, nausea, vomiting, GERD and indigestion.   Musculoskeletal:  Negative for arthralgias, joint swelling and myalgias.   Skin:  Negative for color change.   Neurological:  Negative for dizziness, weakness, light-headedness and headache.      Sleep: Negative for Excessive daytime sleepiness  Negative for morning headaches  Negative for Snoring      Family History   Problem Relation Age of Onset    Hypertension Mother     COPD Father     Heart failure Father     Neuropathy Sister     Sleep apnea Sister     Restless legs syndrome Sister     Sleep apnea Brother     Anxiety disorder Daughter     Depression Daughter     Malig Hyperthermia Neg Hx     Breast cancer Neg Hx     Ovarian cancer Neg Hx     Uterine cancer Neg Hx     Cervical cancer Neg Hx     Colon cancer Neg Hx     Stomach cancer Neg Hx     Skin cancer Neg Hx     Clotting disorder Neg Hx     Deep vein thrombosis Neg Hx     Pulmonary embolism Neg Hx         Social History     Socioeconomic History    Marital status:     Number of children: 2   Tobacco Use    Smoking status: Former     Current packs/day: 0.00     Average packs/day: 0.5 packs/day for 12.0 years (6.0 ttl pk-yrs)     Types: Cigarettes     Start date: 2009     Quit date: 2021     Years since quittin.5     Passive exposure: Past    Smokeless tobacco: Never   Vaping Use    Vaping status: Never Used   Substance  and Sexual Activity    Alcohol use: Not Currently    Drug use: Never    Sexual activity: Not Currently     Partners: Male     Birth control/protection: Hysterectomy, Surgical        Past Medical History:   Diagnosis Date    Adverse effect of other viral vaccines, initial encounter     Covid vaccine    Allergic fungal sinusitis 07/29/2022    Allergic rhinitis     Anxiety disorder     Arthritis of neck 8mths ago    Asthma     CHF (congestive heart failure)     Congenital heart disease 05 2021    COPD with acute exacerbation     CTS (carpal tunnel syndrome)     left    Difficulty walking 2022    Essential (primary) hypertension     Essential tremor     Hypercholesterolemia 10/18/2021    Insomnia     Irritable bowel syndrome     Knee swelling 6mth ago    Low back pain     Major depressive disorder     Morbid (severe) obesity due to excess calories     Nasal congestion     Neck strain 6mths ago    Neuropathy in diabetes 2023    Obstructive sleep apnea (adult) (pediatric)     Other hereditary and idiopathic neuropathies     Pain in left hip     Pain in right toe(s)     Pain in thoracic spine     Peripheral neuropathy     Hands    Primary generalized (osteo)arthritis     Primary insomnia     Pulmonary emphysema 01/05/2023    Restless leg syndrome     SOBOE (shortness of breath on exertion)     Substance abuse     Type 2 diabetes mellitus without complication, without long-term current use of insulin 05/02/2022    Vitamin D deficiency         Immunization History   Administered Date(s) Administered    Arexvy (RSV, Adults 60+ yrs) 01/11/2024    COVID-19 (ANGELA) 12/28/2021    COVID-19 (MODERNA) 1st,2nd,3rd Dose Monovalent 03/17/2021    COVID-19 (PFIZER) 12YRS+ (COMIRNATY) 01/17/2024, 05/15/2024    Covid-19 (Pfizer) Gray Cap Monovalent 05/09/2022    Fluzone  >6mos 10/07/2013    Fluzone (or Fluarix & Flulaval for VFC) >6mos 10/10/2017    Fluzone High-Dose 65+YRS 11/21/2024    Fluzone High-Dose 65+yrs 03/31/2022, 09/30/2022,  10/18/2023    Influenza Seasonal Injectable 12/20/2012    Pneumococcal Conjugate 13-Valent (PCV13) 09/29/2020    Pneumococcal Polysaccharide (PPSV23) 11/30/2020    Shingrix 03/28/2023, 06/27/2023    Tdap 05/20/2015    Zostavax 09/16/2008       No Known Allergies       Current Outpatient Medications:     albuterol sulfate  (90 Base) MCG/ACT inhaler, Inhale 2 puffs Every 4 (Four) Hours As Needed for Wheezing or Shortness of Air., Disp: 18 g, Rfl: 11    atorvastatin (LIPITOR) 10 MG tablet, Take 1 tablet by mouth Daily., Disp: 90 tablet, Rfl: 3    Blood Glucose Monitoring Suppl (Blood Glucose Monitor System) w/Device kit, Use Daily to test blood glucose., Disp: 1 each, Rfl: 0    celecoxib (CeleBREX) 200 MG capsule, Take 1 capsule by mouth Every Night., Disp: 30 capsule, Rfl: 2    clonazePAM (KlonoPIN) 0.5 MG tablet, Take 1 tablet by mouth 2 (Two) Times a Day As Needed for Anxiety., Disp: 60 tablet, Rfl: 0    donepezil (ARICEPT) 10 MG tablet, Take 1 tablet by mouth Every Night., Disp: 30 tablet, Rfl: 2    ferrous sulfate (FeroSul) 325 (65 FE) MG tablet, Take 1 tablet by mouth Daily With Breakfast., Disp: 90 tablet, Rfl: 1    FLUoxetine (PROzac) 40 MG capsule, Take 1 capsule by mouth Every Morning, Disp: 90 capsule, Rfl: 0    fluticasone (FLONASE) 50 MCG/ACT nasal spray, Administer 2 sprays into the nostril(s) as directed by provider Daily., Disp: 16 g, Rfl: 0    Fluticasone-Umeclidin-Vilant (TRELEGY ELLIPTA) 200-62.5-25 MCG/ACT inhaler, Inhale 1 puff Daily., Disp: 60 each, Rfl: 5    furosemide (Lasix) 40 MG tablet, Take 1 tablet by mouth Daily., Disp: 3 tablet, Rfl: 0    glucose blood test strip, Use as instructed to check blood sugar daily, Disp: 100 each, Rfl: 3    HYDROcodone-acetaminophen (NORCO)  MG per tablet, , Disp: , Rfl:     ipratropium-albuterol (DUO-NEB) 0.5-2.5 mg/3 ml nebulizer, Nebulize and inhale 1 vial 4 (Four) Times a Day As Needed for Wheezing., Disp: 360 mL, Rfl: 3    Lancets (OneTouch  Delica Plus [id number]) misc, Use 1 each Daily., Disp: 100 each, Rfl: 0    Magnesium Oxide -Mg Supplement 400 (240 Mg) MG tablet, Take 1 tablet by mouth Every Night., Disp: 30 tablet, Rfl: 11    memantine (Namenda) 5 MG tablet, Take 1 tablet by mouth 2 (Two) Times a Day., Disp: 60 tablet, Rfl: 2    metoprolol succinate XL (TOPROL-XL) 25 MG 24 hr tablet, Take 0.5 tablets by mouth Daily., Disp: 45 tablet, Rfl: 0    miconazole (Desenex) 2 % powder, Apply topically to the appropriate area as directed 2 (Two) Times a Day., Disp: 71 g, Rfl: 11    mirtazapine (REMERON) 7.5 MG tablet, Take 1 tablet by mouth Every Night., Disp: 90 tablet, Rfl: 1    naloxone (NARCAN) 4 MG/0.1ML nasal spray, 1 spray As Needed. Has on hand, Disp: , Rfl:     O2 (OXYGEN), Inhale 2 L/min Every Night., Disp: , Rfl:     pantoprazole (PROTONIX) 40 MG EC tablet, Take 1 tablet by mouth Daily., Disp: 90 tablet, Rfl: 0    pramipexole (MIRAPEX) 0.5 MG tablet, Take 1 tablet by mouth 3 (Three) Times a Day., Disp: 270 tablet, Rfl: 0    pregabalin (LYRICA) 50 MG capsule, Take 2 capsules by mouth 2 (Two) Times a Day., Disp: 120 capsule, Rfl: 5    primidone (MYSOLINE) 50 MG tablet, Take 1 tablet by mouth., Disp: , Rfl:     spironolactone (ALDACTONE) 25 MG tablet, Take 1 tablet by mouth 2 (Two) Times a Day., Disp: 60 tablet, Rfl: 5    Tirzepatide (Mounjaro) 15 MG/0.5ML solution auto-injector, Inject 15 mg under the skin into the appropriate area as directed 1 (One) Time Per Week., Disp: 2 mL, Rfl: 2    torsemide (DEMADEX) 20 MG tablet, Take 2 tablets by mouth 2 (Two) Times a Day., Disp: 120 tablet, Rfl: 2    Calcium+D3 500-10 MG-MCG tablet, Take 1 each by mouth 2 (Two) Times a Day., Disp: 60 tablet, Rfl: 1    Diclofenac Sodium (VOLTAREN) 1 % gel gel, Apply 4 g topically to the appropriate area as directed 4 (Four) Times a Day., Disp: 100 g, Rfl: 1    predniSONE (DELTASONE) 10 MG tablet, Take 4 tabs daily x 3 days, then take 3 tabs daily x 3 days, then take 2 tabs  "daily x 3 days, then take 1 tab daily x 3 days, Disp: 31 tablet, Rfl: 0    Current Facility-Administered Medications:     cyanocobalamin injection 1,000 mcg, 1,000 mcg, Intramuscular, Q28 Days, Ami Diego MD, 1,000 mcg at 07/01/25 1658     Objective     Vital Signs:   /63 (BP Location: Right arm, Patient Position: Sitting, Cuff Size: Large Adult)   Pulse 65   Temp 97.4 °F (36.3 °C) (Oral)   Resp 18   Ht 162.6 cm (64.02\")   Wt 124 kg (273 lb 6.4 oz)   SpO2 98% Comment: room air  BMI 46.90 kg/m²     Physical Exam  Constitutional:       General: She is not in acute distress.     Appearance: Normal appearance. She is obese. She is not ill-appearing.   HENT:      Right Ear: Tympanic membrane and ear canal normal.      Left Ear: Tympanic membrane and ear canal normal.      Nose: Nose normal.      Mouth/Throat:      Mouth: Mucous membranes are moist.      Pharynx: Oropharynx is clear.   Eyes:      Extraocular Movements: Extraocular movements intact.      Conjunctiva/sclera: Conjunctivae normal.      Pupils: Pupils are equal, round, and reactive to light.   Cardiovascular:      Rate and Rhythm: Normal rate and regular rhythm.      Pulses: Normal pulses.      Heart sounds: Normal heart sounds.   Pulmonary:      Effort: Pulmonary effort is normal. No respiratory distress.      Breath sounds: No stridor. No wheezing, rhonchi or rales.      Comments: Diminished but clear throughout  Abdominal:      General: Bowel sounds are normal.      Palpations: Abdomen is soft.   Musculoskeletal:         General: No swelling. Normal range of motion.      Cervical back: Normal range of motion and neck supple.      Right lower leg: No edema.      Left lower leg: No edema.   Skin:     General: Skin is warm and dry.   Neurological:      General: No focal deficit present.      Mental Status: She is alert and oriented to person, place, and time.      Motor: No weakness.   Psychiatric:         Mood and Affect: Mood normal.   "       Behavior: Behavior normal.        Result Review :   I have personally reviewed patient's labs and images.  I also reviewed my last progress note 4/17/2025.    -PFTs 1/3/2024 showed mild obstruction FEV1 71% predicted, no significant response to bronchodilator, normal TLC, mild air trapping present, DLCO normal.  Compared to her previous PFTs almost 2 years ago, these are similar results.  -Methacholine challenge 2/3/2023 positive test for asthma/reactive airway disease  -CT chest 7/2/2024 negative for pulmonary embolism, lungs clear, no lymphadenopathy, there was evidence of calcified granulomatous disease  -Echocardiogram 6/30/2022 showed EF 51 to 55%       Diagnoses and all orders for this visit:    1. Chronic obstructive pulmonary disease, unspecified COPD type (Primary)  -     predniSONE (DELTASONE) 10 MG tablet; Take 4 tabs daily x 3 days, then take 3 tabs daily x 3 days, then take 2 tabs daily x 3 days, then take 1 tab daily x 3 days  Dispense: 31 tablet; Refill: 0    2. Mild persistent asthma without complication  -     predniSONE (DELTASONE) 10 MG tablet; Take 4 tabs daily x 3 days, then take 3 tabs daily x 3 days, then take 2 tabs daily x 3 days, then take 1 tab daily x 3 days  Dispense: 31 tablet; Refill: 0    3. Chronic respiratory failure with hypoxia    4. Pulmonary emphysema, unspecified emphysema type  -     predniSONE (DELTASONE) 10 MG tablet; Take 4 tabs daily x 3 days, then take 3 tabs daily x 3 days, then take 2 tabs daily x 3 days, then take 1 tab daily x 3 days  Dispense: 31 tablet; Refill: 0    5. Dyspnea on exertion  -     XR Chest 2 View; Future  -     predniSONE (DELTASONE) 10 MG tablet; Take 4 tabs daily x 3 days, then take 3 tabs daily x 3 days, then take 2 tabs daily x 3 days, then take 1 tab daily x 3 days  Dispense: 31 tablet; Refill: 0    6. MEGAN on CPAP    7. Seasonal allergies    8. Chronic diastolic heart failure    9. Anxiety    10. Tobacco abuse, in remission    11. Morbid  obesity with BMI of 45.0-49.9, adult      Assessment & Plan    -Prednisone taper prescribed for COPD/asthma exacerbation, CXR ordered to check for any acute changes  -Continue wearing 2 L supplemental oxygen at night and with activity, since June 2024  -Continue wearing CPAP nightly and with naps with oxygen bled in  -Patient previously on Dupixent injections, discontinued  -Continue using Trelegy 200 daily, reminded patient to rinse mouth after each use.  -Continue using albuterol inhaler and DuoNeb treatments as needed  -Continue using incentive spirometer for atelectasis  -Continue following up with Dr. George with cardiology for management for heart failure on beta-blocker, Lasix 40 mg twice daily, hydralazine  -Continue following up with DIANE Sanches with psychiatry and therapist  -Follow-up in 4 months, may return sooner if needed     Smoking status: Reviewed  Vaccination status:  Patient reports she is up-to-date with her Covid, flu, and pneumonia vaccines.  She has also gotten the RSV vaccine.  Patient is advised to continue to follow CDC recommendations such as social distancing wearing a mask and washing hands for at least 20 seconds.  Medications personally reviewed      Follow Up   No follow-ups on file.  Patient was given instructions and counseling regarding her condition or for health maintenance advice. Please see specific information pulled into the AVS if appropriate.

## 2025-07-18 ENCOUNTER — HOSPITAL ENCOUNTER (OUTPATIENT)
Dept: GENERAL RADIOLOGY | Facility: HOSPITAL | Age: 70
Discharge: HOME | End: 2025-07-18
Payer: MEDICARE

## 2025-07-18 ENCOUNTER — OFFICE VISIT (OUTPATIENT)
Dept: PULMONOLOGY | Facility: CLINIC | Age: 70
End: 2025-07-18
Payer: MEDICARE

## 2025-07-18 VITALS
HEART RATE: 65 BPM | TEMPERATURE: 97.4 F | DIASTOLIC BLOOD PRESSURE: 63 MMHG | HEIGHT: 64 IN | WEIGHT: 273.4 LBS | RESPIRATION RATE: 18 BRPM | BODY MASS INDEX: 46.67 KG/M2 | SYSTOLIC BLOOD PRESSURE: 106 MMHG | OXYGEN SATURATION: 98 %

## 2025-07-18 DIAGNOSIS — G47.33 OSA ON CPAP: ICD-10-CM

## 2025-07-18 DIAGNOSIS — J96.11 CHRONIC RESPIRATORY FAILURE WITH HYPOXIA: ICD-10-CM

## 2025-07-18 DIAGNOSIS — J43.9 PULMONARY EMPHYSEMA, UNSPECIFIED EMPHYSEMA TYPE: ICD-10-CM

## 2025-07-18 DIAGNOSIS — F17.201 TOBACCO ABUSE, IN REMISSION: ICD-10-CM

## 2025-07-18 DIAGNOSIS — E66.01 MORBID OBESITY WITH BMI OF 45.0-49.9, ADULT: ICD-10-CM

## 2025-07-18 DIAGNOSIS — M25.562 LEFT KNEE PAIN, UNSPECIFIED CHRONICITY: ICD-10-CM

## 2025-07-18 DIAGNOSIS — R06.09 DYSPNEA ON EXERTION: ICD-10-CM

## 2025-07-18 DIAGNOSIS — J44.9 CHRONIC OBSTRUCTIVE PULMONARY DISEASE, UNSPECIFIED COPD TYPE: Primary | ICD-10-CM

## 2025-07-18 DIAGNOSIS — M17.12 OSTEOARTHRITIS OF LEFT KNEE, UNSPECIFIED OSTEOARTHRITIS TYPE: ICD-10-CM

## 2025-07-18 DIAGNOSIS — J45.30 MILD PERSISTENT ASTHMA WITHOUT COMPLICATION: ICD-10-CM

## 2025-07-18 DIAGNOSIS — J30.2 SEASONAL ALLERGIES: ICD-10-CM

## 2025-07-18 DIAGNOSIS — I50.32 CHRONIC DIASTOLIC HEART FAILURE: ICD-10-CM

## 2025-07-18 DIAGNOSIS — F41.9 ANXIETY: ICD-10-CM

## 2025-07-18 PROCEDURE — 71046 X-RAY EXAM CHEST 2 VIEWS: CPT

## 2025-07-18 RX ORDER — CHLORHEXIDINE GLUCONATE 4 %
1 LIQUID (ML) TOPICAL 2 TIMES DAILY
Qty: 60 TABLET | Refills: 1 | Status: SHIPPED | OUTPATIENT
Start: 2025-07-18

## 2025-07-18 RX ORDER — PREDNISONE 10 MG/1
TABLET ORAL
Qty: 31 TABLET | Refills: 0 | Status: SHIPPED | OUTPATIENT
Start: 2025-07-18 | End: 2025-07-30

## 2025-07-20 DIAGNOSIS — R41.3 MEMORY LOSS: ICD-10-CM

## 2025-07-20 DIAGNOSIS — K21.9 GASTROESOPHAGEAL REFLUX DISEASE, UNSPECIFIED WHETHER ESOPHAGITIS PRESENT: ICD-10-CM

## 2025-07-21 ENCOUNTER — OFFICE VISIT (OUTPATIENT)
Age: 70
End: 2025-07-21
Payer: MEDICARE

## 2025-07-21 DIAGNOSIS — F43.9 STRESS AT HOME: ICD-10-CM

## 2025-07-21 DIAGNOSIS — F33.1 MAJOR DEPRESSIVE DISORDER, RECURRENT EPISODE, MODERATE: ICD-10-CM

## 2025-07-21 DIAGNOSIS — F41.1 GENERALIZED ANXIETY DISORDER: Primary | ICD-10-CM

## 2025-07-22 RX ORDER — PANTOPRAZOLE SODIUM 40 MG/1
40 TABLET, DELAYED RELEASE ORAL DAILY
Qty: 90 TABLET | Refills: 0 | OUTPATIENT
Start: 2025-07-22

## 2025-07-22 RX ORDER — DONEPEZIL HYDROCHLORIDE 10 MG/1
10 TABLET, FILM COATED ORAL NIGHTLY
Qty: 30 TABLET | Refills: 2 | OUTPATIENT
Start: 2025-07-22

## 2025-07-24 DIAGNOSIS — F41.9 ANXIETY: ICD-10-CM

## 2025-07-25 RX ORDER — CLONAZEPAM 0.5 MG/1
0.5 TABLET ORAL 2 TIMES DAILY PRN
Qty: 60 TABLET | Refills: 2 | Status: SHIPPED | OUTPATIENT
Start: 2025-07-25

## 2025-07-25 NOTE — TELEPHONE ENCOUNTER
Controlled refill request:  Requested Prescriptions     Pending Prescriptions Disp Refills    clonazePAM (KlonoPIN) 0.5 MG tablet 60 tablet 0     Sig: Take 1 tablet by mouth 2 (Two) Times a Day As Needed for Anxiety.      Last OV:  7/16/2025  Next OV:  8/12/2025  Last fill:  6-5-2025 #60  Last tox:  7-1-2025

## 2025-08-07 ENCOUNTER — CLINICAL SUPPORT (OUTPATIENT)
Dept: FAMILY MEDICINE CLINIC | Age: 70
End: 2025-08-07
Payer: MEDICARE

## 2025-08-07 ENCOUNTER — OFFICE VISIT (OUTPATIENT)
Age: 70
End: 2025-08-07
Payer: MEDICARE

## 2025-08-07 DIAGNOSIS — F41.1 GENERALIZED ANXIETY DISORDER: Primary | ICD-10-CM

## 2025-08-07 DIAGNOSIS — F33.1 MAJOR DEPRESSIVE DISORDER, RECURRENT EPISODE, MODERATE: ICD-10-CM

## 2025-08-07 DIAGNOSIS — E53.8 B12 DEFICIENCY: Primary | ICD-10-CM

## 2025-08-07 DIAGNOSIS — F43.9 STRESS AT HOME: ICD-10-CM

## 2025-08-07 PROCEDURE — 96372 THER/PROPH/DIAG INJ SC/IM: CPT | Performed by: FAMILY MEDICINE

## 2025-08-07 RX ADMIN — CYANOCOBALAMIN 1000 MCG: 1000 INJECTION, SOLUTION INTRAMUSCULAR; SUBCUTANEOUS at 13:40

## 2025-08-12 ENCOUNTER — OFFICE VISIT (OUTPATIENT)
Dept: BEHAVIORAL HEALTH | Facility: CLINIC | Age: 70
End: 2025-08-12
Payer: MEDICARE

## 2025-08-12 ENCOUNTER — OFFICE VISIT (OUTPATIENT)
Dept: FAMILY MEDICINE CLINIC | Age: 70
End: 2025-08-12
Payer: MEDICARE

## 2025-08-12 ENCOUNTER — LAB (OUTPATIENT)
Dept: LAB | Facility: HOSPITAL | Age: 70
End: 2025-08-12
Payer: MEDICARE

## 2025-08-12 VITALS
DIASTOLIC BLOOD PRESSURE: 60 MMHG | WEIGHT: 285 LBS | BODY MASS INDEX: 48.65 KG/M2 | HEART RATE: 65 BPM | SYSTOLIC BLOOD PRESSURE: 108 MMHG | HEIGHT: 64 IN

## 2025-08-12 VITALS
WEIGHT: 285 LBS | BODY MASS INDEX: 48.65 KG/M2 | OXYGEN SATURATION: 98 % | HEIGHT: 64 IN | TEMPERATURE: 97.6 F | DIASTOLIC BLOOD PRESSURE: 62 MMHG | SYSTOLIC BLOOD PRESSURE: 114 MMHG | HEART RATE: 60 BPM

## 2025-08-12 DIAGNOSIS — E11.9 TYPE 2 DIABETES MELLITUS WITHOUT COMPLICATION, WITHOUT LONG-TERM CURRENT USE OF INSULIN: Primary | ICD-10-CM

## 2025-08-12 DIAGNOSIS — D50.8 OTHER IRON DEFICIENCY ANEMIA: ICD-10-CM

## 2025-08-12 DIAGNOSIS — E11.9 TYPE 2 DIABETES MELLITUS WITHOUT COMPLICATION, WITHOUT LONG-TERM CURRENT USE OF INSULIN: ICD-10-CM

## 2025-08-12 DIAGNOSIS — E55.9 VITAMIN D DEFICIENCY: ICD-10-CM

## 2025-08-12 DIAGNOSIS — E83.42 HYPOMAGNESEMIA: ICD-10-CM

## 2025-08-12 DIAGNOSIS — E53.8 B12 DEFICIENCY: ICD-10-CM

## 2025-08-12 DIAGNOSIS — Z79.899 MEDICATION MANAGEMENT: ICD-10-CM

## 2025-08-12 DIAGNOSIS — F41.1 GENERALIZED ANXIETY DISORDER: ICD-10-CM

## 2025-08-12 DIAGNOSIS — I50.32 CHRONIC DIASTOLIC HEART FAILURE: ICD-10-CM

## 2025-08-12 DIAGNOSIS — G47.33 OSA ON CPAP: ICD-10-CM

## 2025-08-12 DIAGNOSIS — F33.0 MILD EPISODE OF RECURRENT MAJOR DEPRESSIVE DISORDER: ICD-10-CM

## 2025-08-12 DIAGNOSIS — I10 ESSENTIAL HYPERTENSION: ICD-10-CM

## 2025-08-12 DIAGNOSIS — R60.0 BILATERAL LEG EDEMA: ICD-10-CM

## 2025-08-12 DIAGNOSIS — G25.81 RESTLESS LEG SYNDROME: ICD-10-CM

## 2025-08-12 DIAGNOSIS — F41.9 ANXIETY: ICD-10-CM

## 2025-08-12 DIAGNOSIS — L98.9 SORE ON SCALP: ICD-10-CM

## 2025-08-12 DIAGNOSIS — F43.0 ACUTE REACTION TO SITUATIONAL STRESS: Primary | ICD-10-CM

## 2025-08-12 LAB
CHOLEST SERPL-MCNC: 146 MG/DL (ref 0–200)
HBA1C MFR BLD: 5.9 % (ref 4.8–5.6)
HDLC SERPL-MCNC: 52 MG/DL (ref 40–60)
IRON 24H UR-MRATE: 82 MCG/DL (ref 37–145)
IRON SATN MFR SERPL: 25 % (ref 20–50)
LDLC SERPL CALC-MCNC: 75 MG/DL (ref 0–100)
LDLC/HDLC SERPL: 1.42 {RATIO}
TIBC SERPL-MCNC: 322 MCG/DL (ref 298–536)
TRANSFERRIN SERPL-MCNC: 216 MG/DL (ref 200–360)
TRIGL SERPL-MCNC: 101 MG/DL (ref 0–150)
VLDLC SERPL-MCNC: 19 MG/DL (ref 5–40)

## 2025-08-12 PROCEDURE — 82043 UR ALBUMIN QUANTITATIVE: CPT

## 2025-08-12 PROCEDURE — 80061 LIPID PANEL: CPT

## 2025-08-12 PROCEDURE — 82306 VITAMIN D 25 HYDROXY: CPT

## 2025-08-12 PROCEDURE — 83540 ASSAY OF IRON: CPT

## 2025-08-12 PROCEDURE — 83036 HEMOGLOBIN GLYCOSYLATED A1C: CPT

## 2025-08-12 PROCEDURE — 36415 COLL VENOUS BLD VENIPUNCTURE: CPT

## 2025-08-12 PROCEDURE — 84466 ASSAY OF TRANSFERRIN: CPT

## 2025-08-12 PROCEDURE — 82570 ASSAY OF URINE CREATININE: CPT

## 2025-08-12 RX ORDER — SPIRONOLACTONE 25 MG/1
25 TABLET ORAL 2 TIMES DAILY
Qty: 60 TABLET | Refills: 5 | Status: SHIPPED | OUTPATIENT
Start: 2025-08-12

## 2025-08-12 RX ORDER — MUPIROCIN 2 %
1 OINTMENT (GRAM) TOPICAL 2 TIMES DAILY
Qty: 30 G | Refills: 0 | Status: SHIPPED | OUTPATIENT
Start: 2025-08-12 | End: 2025-08-27

## 2025-08-12 RX ORDER — TORSEMIDE 20 MG/1
40 TABLET ORAL 2 TIMES DAILY
Qty: 120 TABLET | Refills: 2 | Status: SHIPPED | OUTPATIENT
Start: 2025-08-12

## 2025-08-12 RX ORDER — TIRZEPATIDE 15 MG/.5ML
15 INJECTION, SOLUTION SUBCUTANEOUS WEEKLY
Qty: 2 ML | Refills: 2 | Status: SHIPPED | OUTPATIENT
Start: 2025-08-12

## 2025-08-13 LAB
25(OH)D3 SERPL-MCNC: 35.5 NG/ML (ref 30–100)
ALBUMIN UR-MCNC: <1.2 MG/DL
CREAT UR-MCNC: 86.9 MG/DL
MICROALBUMIN/CREAT UR: NORMAL MG/G{CREAT}

## 2025-08-19 DIAGNOSIS — R60.0 BILATERAL LEG EDEMA: ICD-10-CM

## 2025-08-19 RX ORDER — MEMANTINE HYDROCHLORIDE 5 MG/1
5 TABLET ORAL 2 TIMES DAILY
Qty: 60 TABLET | Refills: 2 | OUTPATIENT
Start: 2025-08-19

## 2025-08-19 RX ORDER — FUROSEMIDE 40 MG/1
40 TABLET ORAL DAILY
Qty: 3 TABLET | Refills: 0 | OUTPATIENT
Start: 2025-08-19

## 2025-08-21 ENCOUNTER — OFFICE VISIT (OUTPATIENT)
Age: 70
End: 2025-08-21
Payer: MEDICARE

## 2025-08-21 DIAGNOSIS — F33.1 MAJOR DEPRESSIVE DISORDER, RECURRENT EPISODE, MODERATE: ICD-10-CM

## 2025-08-21 DIAGNOSIS — F43.9 STRESS AT HOME: ICD-10-CM

## 2025-08-21 DIAGNOSIS — F41.1 GENERALIZED ANXIETY DISORDER: Primary | ICD-10-CM

## 2025-08-22 ENCOUNTER — TRANSCRIBE ORDERS (OUTPATIENT)
Dept: ADMINISTRATIVE | Facility: HOSPITAL | Age: 70
End: 2025-08-22
Payer: MEDICARE

## 2025-08-22 DIAGNOSIS — M54.12 CERVICAL RADICULOPATHY: Primary | ICD-10-CM

## 2025-08-22 RX ORDER — CELECOXIB 200 MG/1
200 CAPSULE ORAL NIGHTLY
Qty: 30 CAPSULE | Refills: 2 | Status: SHIPPED | OUTPATIENT
Start: 2025-08-22

## 2025-08-28 DIAGNOSIS — E11.9 TYPE 2 DIABETES MELLITUS WITHOUT COMPLICATION, WITHOUT LONG-TERM CURRENT USE OF INSULIN: ICD-10-CM

## 2025-08-28 RX ORDER — BLOOD SUGAR DIAGNOSTIC
STRIP MISCELLANEOUS
Qty: 100 EACH | Refills: 3 | Status: SHIPPED | OUTPATIENT
Start: 2025-08-28

## (undated) DEVICE — STANDARD HYPODERMIC NEEDLE,POLYPROPYLENE HUB: Brand: MONOJECT

## (undated) DEVICE — ENT-LF: Brand: MEDLINE INDUSTRIES, INC.

## (undated) DEVICE — BASIC SINGLE BASIN-LF: Brand: MEDLINE INDUSTRIES, INC.

## (undated) DEVICE — MEDI-VAC NON-CONDUCTIVE SUCTION TUBING: Brand: CARDINAL HEALTH

## (undated) DEVICE — PAD,EYE,1-5/8X2 5/8,STERILE,LF,1/PK: Brand: MEDLINE

## (undated) DEVICE — TUBING 1895522 5PK STRAIGHTSHOT TO XPS: Brand: STRAIGHTSHOT®

## (undated) DEVICE — SYR LUERLOK 30CC

## (undated) DEVICE — CODMAN® SURGICAL PATTIES 1/2" X 3" (1.27CM X 7.62CM): Brand: CODMAN®

## (undated) DEVICE — SLV SCD KN/LEN ADJ EXPRSS BLENDED MD 1P/U

## (undated) DEVICE — GLV SURG BIOGEL LTX PF 8 1/2

## (undated) DEVICE — KT ANTI FOG W/FLD AND SPNG

## (undated) DEVICE — BLADE 1884004 TRICUT 5PK 4MM: Brand: TRICUT®